# Patient Record
Sex: FEMALE | Race: WHITE | NOT HISPANIC OR LATINO | Employment: UNEMPLOYED | URBAN - METROPOLITAN AREA
[De-identification: names, ages, dates, MRNs, and addresses within clinical notes are randomized per-mention and may not be internally consistent; named-entity substitution may affect disease eponyms.]

---

## 2023-01-01 ENCOUNTER — APPOINTMENT (EMERGENCY)
Dept: CT IMAGING | Facility: HOSPITAL | Age: 78
End: 2023-01-01
Payer: MEDICARE

## 2023-01-01 ENCOUNTER — APPOINTMENT (INPATIENT)
Dept: RADIOLOGY | Facility: HOSPITAL | Age: 78
End: 2023-01-01
Payer: MEDICARE

## 2023-01-01 ENCOUNTER — HOSPITAL ENCOUNTER (INPATIENT)
Facility: HOSPITAL | Age: 78
LOS: 1 days | End: 2023-08-25
Attending: EMERGENCY MEDICINE | Admitting: INTERNAL MEDICINE
Payer: MEDICARE

## 2023-01-01 VITALS
HEIGHT: 59 IN | BODY MASS INDEX: 20.67 KG/M2 | DIASTOLIC BLOOD PRESSURE: 51 MMHG | TEMPERATURE: 98.1 F | OXYGEN SATURATION: 82 % | SYSTOLIC BLOOD PRESSURE: 93 MMHG | RESPIRATION RATE: 3 BRPM | WEIGHT: 102.51 LBS

## 2023-01-01 DIAGNOSIS — L02.91 PHLEGMON: ICD-10-CM

## 2023-01-01 DIAGNOSIS — R77.8 ELEVATED TROPONIN: ICD-10-CM

## 2023-01-01 DIAGNOSIS — K55.1 MESENTERIC ARTERY STENOSIS (HCC): Primary | ICD-10-CM

## 2023-01-01 DIAGNOSIS — A41.9 SEPTIC SHOCK (HCC): ICD-10-CM

## 2023-01-01 DIAGNOSIS — R77.8 TROPONIN I ABOVE REFERENCE RANGE: Primary | ICD-10-CM

## 2023-01-01 DIAGNOSIS — K85.90 PANCREATITIS: ICD-10-CM

## 2023-01-01 DIAGNOSIS — R65.21 SEPTIC SHOCK (HCC): ICD-10-CM

## 2023-01-01 DIAGNOSIS — R93.2 ABNORMAL LIVER DIAGNOSTIC IMAGING: Primary | ICD-10-CM

## 2023-01-01 LAB
2HR DELTA HS TROPONIN: 279 NG/L
4HR DELTA HS TROPONIN: 300 NG/L
ABO GROUP BLD: NORMAL
ALBUMIN SERPL BCP-MCNC: 2 G/DL (ref 3.5–5)
ALBUMIN SERPL BCP-MCNC: 2.1 G/DL (ref 3.5–5)
ALP SERPL-CCNC: 236 U/L (ref 34–104)
ALP SERPL-CCNC: 286 U/L (ref 34–104)
ALT SERPL W P-5'-P-CCNC: 12 U/L (ref 7–52)
ALT SERPL W P-5'-P-CCNC: 13 U/L (ref 7–52)
AMMONIA PLAS-SCNC: 28 UMOL/L (ref 18–72)
ANION GAP SERPL CALCULATED.3IONS-SCNC: 10 MMOL/L
ANION GAP SERPL CALCULATED.3IONS-SCNC: 14 MMOL/L
APTT PPP: 52 SECONDS (ref 23–37)
AST SERPL W P-5'-P-CCNC: 39 U/L (ref 13–39)
AST SERPL W P-5'-P-CCNC: 45 U/L (ref 13–39)
ATRIAL RATE: 83 BPM
BASE EX.OXY STD BLDV CALC-SCNC: 63.9 % (ref 60–80)
BASE EXCESS BLDV CALC-SCNC: -5.4 MMOL/L
BASOPHILS # BLD AUTO: 0.05 THOUSANDS/ÂΜL (ref 0–0.1)
BASOPHILS # BLD AUTO: 0.05 THOUSANDS/ÂΜL (ref 0–0.1)
BASOPHILS NFR BLD AUTO: 0 % (ref 0–1)
BASOPHILS NFR BLD AUTO: 0 % (ref 0–1)
BILIRUB SERPL-MCNC: 1.59 MG/DL (ref 0.2–1)
BILIRUB SERPL-MCNC: 1.83 MG/DL (ref 0.2–1)
BILIRUB UR QL STRIP: NEGATIVE
BLD GP AB SCN SERPL QL: NEGATIVE
BUN SERPL-MCNC: 43 MG/DL (ref 5–25)
BUN SERPL-MCNC: 44 MG/DL (ref 5–25)
CA-I BLD-SCNC: 0.97 MMOL/L (ref 1.12–1.32)
CALCIUM ALBUM COR SERPL-MCNC: 8.9 MG/DL (ref 8.3–10.1)
CALCIUM ALBUM COR SERPL-MCNC: 9.1 MG/DL (ref 8.3–10.1)
CALCIUM SERPL-MCNC: 7.4 MG/DL (ref 8.4–10.2)
CALCIUM SERPL-MCNC: 7.5 MG/DL (ref 8.4–10.2)
CARDIAC TROPONIN I PNL SERPL HS: 1230 NG/L
CARDIAC TROPONIN I PNL SERPL HS: 1251 NG/L
CARDIAC TROPONIN I PNL SERPL HS: 951 NG/L
CHLORIDE SERPL-SCNC: 101 MMOL/L (ref 96–108)
CHLORIDE SERPL-SCNC: 99 MMOL/L (ref 96–108)
CLARITY UR: CLEAR
CO2 SERPL-SCNC: 16 MMOL/L (ref 21–32)
CO2 SERPL-SCNC: 21 MMOL/L (ref 21–32)
COLOR UR: YELLOW
CREAT SERPL-MCNC: 2.61 MG/DL (ref 0.6–1.3)
CREAT SERPL-MCNC: 2.67 MG/DL (ref 0.6–1.3)
EOSINOPHIL # BLD AUTO: 0.02 THOUSAND/ÂΜL (ref 0–0.61)
EOSINOPHIL # BLD AUTO: 0.04 THOUSAND/ÂΜL (ref 0–0.61)
EOSINOPHIL NFR BLD AUTO: 0 % (ref 0–6)
EOSINOPHIL NFR BLD AUTO: 0 % (ref 0–6)
ERYTHROCYTE [DISTWIDTH] IN BLOOD BY AUTOMATED COUNT: 14.4 % (ref 11.6–15.1)
ERYTHROCYTE [DISTWIDTH] IN BLOOD BY AUTOMATED COUNT: 14.6 % (ref 11.6–15.1)
GFR SERPL CREATININE-BSD FRML MDRD: 16 ML/MIN/1.73SQ M
GFR SERPL CREATININE-BSD FRML MDRD: 17 ML/MIN/1.73SQ M
GLUCOSE SERPL-MCNC: 125 MG/DL (ref 65–140)
GLUCOSE SERPL-MCNC: 31 MG/DL (ref 65–140)
GLUCOSE SERPL-MCNC: 57 MG/DL (ref 65–140)
GLUCOSE SERPL-MCNC: 76 MG/DL (ref 65–140)
GLUCOSE UR STRIP-MCNC: NEGATIVE MG/DL
HCO3 BLDV-SCNC: 19.2 MMOL/L (ref 24–30)
HCT VFR BLD AUTO: 24.6 % (ref 34.8–46.1)
HCT VFR BLD AUTO: 28.2 % (ref 34.8–46.1)
HGB BLD-MCNC: 7.9 G/DL (ref 11.5–15.4)
HGB BLD-MCNC: 9.5 G/DL (ref 11.5–15.4)
HGB UR QL STRIP.AUTO: NEGATIVE
IMM GRANULOCYTES # BLD AUTO: 0.28 THOUSAND/UL (ref 0–0.2)
IMM GRANULOCYTES # BLD AUTO: 0.4 THOUSAND/UL (ref 0–0.2)
IMM GRANULOCYTES NFR BLD AUTO: 1 % (ref 0–2)
IMM GRANULOCYTES NFR BLD AUTO: 1 % (ref 0–2)
INR PPP: 2.11 (ref 0.84–1.19)
INR PPP: 2.3 (ref 0.84–1.19)
KETONES UR STRIP-MCNC: NEGATIVE MG/DL
LACTATE SERPL-SCNC: 1.6 MMOL/L (ref 0.5–2)
LACTATE SERPL-SCNC: 3.1 MMOL/L (ref 0.5–2)
LEUKOCYTE ESTERASE UR QL STRIP: NEGATIVE
LIPASE SERPL-CCNC: 24 U/L (ref 11–82)
LIPASE SERPL-CCNC: 61 U/L (ref 11–82)
LYMPHOCYTES # BLD AUTO: 1.18 THOUSANDS/ÂΜL (ref 0.6–4.47)
LYMPHOCYTES # BLD AUTO: 1.22 THOUSANDS/ÂΜL (ref 0.6–4.47)
LYMPHOCYTES NFR BLD AUTO: 4 % (ref 14–44)
LYMPHOCYTES NFR BLD AUTO: 5 % (ref 14–44)
MAGNESIUM SERPL-MCNC: 1.6 MG/DL (ref 1.9–2.7)
MCH RBC QN AUTO: 32.6 PG (ref 26.8–34.3)
MCH RBC QN AUTO: 32.6 PG (ref 26.8–34.3)
MCHC RBC AUTO-ENTMCNC: 32.1 G/DL (ref 31.4–37.4)
MCHC RBC AUTO-ENTMCNC: 33.7 G/DL (ref 31.4–37.4)
MCV RBC AUTO: 102 FL (ref 82–98)
MCV RBC AUTO: 97 FL (ref 82–98)
MONOCYTES # BLD AUTO: 2.44 THOUSAND/ÂΜL (ref 0.17–1.22)
MONOCYTES # BLD AUTO: 2.62 THOUSAND/ÂΜL (ref 0.17–1.22)
MONOCYTES NFR BLD AUTO: 9 % (ref 4–12)
MONOCYTES NFR BLD AUTO: 9 % (ref 4–12)
NEUTROPHILS # BLD AUTO: 21.89 THOUSANDS/ÂΜL (ref 1.85–7.62)
NEUTROPHILS # BLD AUTO: 23.59 THOUSANDS/ÂΜL (ref 1.85–7.62)
NEUTS SEG NFR BLD AUTO: 85 % (ref 43–75)
NEUTS SEG NFR BLD AUTO: 86 % (ref 43–75)
NITRITE UR QL STRIP: NEGATIVE
NRBC BLD AUTO-RTO: 0 /100 WBCS
NRBC BLD AUTO-RTO: 0 /100 WBCS
O2 CT BLDV-SCNC: 7.3 ML/DL
P AXIS: 53 DEGREES
PCO2 BLDV: 33.8 MM HG (ref 42–50)
PH BLDV: 7.37 [PH] (ref 7.3–7.4)
PH UR STRIP.AUTO: 5.5 [PH]
PHOSPHATE SERPL-MCNC: 4.9 MG/DL (ref 2.3–4.1)
PLATELET # BLD AUTO: 105 THOUSANDS/UL (ref 149–390)
PLATELET # BLD AUTO: 109 THOUSANDS/UL (ref 149–390)
PLATELET # BLD AUTO: 136 THOUSANDS/UL (ref 149–390)
PMV BLD AUTO: 10.1 FL (ref 8.9–12.7)
PMV BLD AUTO: 10.2 FL (ref 8.9–12.7)
PMV BLD AUTO: 10.5 FL (ref 8.9–12.7)
PO2 BLDV: 36.2 MM HG (ref 35–45)
POTASSIUM SERPL-SCNC: 4.4 MMOL/L (ref 3.5–5.3)
POTASSIUM SERPL-SCNC: 4.9 MMOL/L (ref 3.5–5.3)
PR INTERVAL: 156 MS
PROT SERPL-MCNC: 4.7 G/DL (ref 6.4–8.4)
PROT SERPL-MCNC: 5.3 G/DL (ref 6.4–8.4)
PROT UR STRIP-MCNC: NEGATIVE MG/DL
PROTHROMBIN TIME: 23.6 SECONDS (ref 11.6–14.5)
PROTHROMBIN TIME: 25.2 SECONDS (ref 11.6–14.5)
QRS AXIS: 11 DEGREES
QRSD INTERVAL: 84 MS
QT INTERVAL: 388 MS
QTC INTERVAL: 455 MS
RBC # BLD AUTO: 2.42 MILLION/UL (ref 3.81–5.12)
RBC # BLD AUTO: 2.91 MILLION/UL (ref 3.81–5.12)
RH BLD: POSITIVE
SODIUM SERPL-SCNC: 130 MMOL/L (ref 135–147)
SODIUM SERPL-SCNC: 131 MMOL/L (ref 135–147)
SP GR UR STRIP.AUTO: 1.01
SPECIMEN EXPIRATION DATE: NORMAL
T WAVE AXIS: 82 DEGREES
UROBILINOGEN UR QL STRIP.AUTO: 0.2 E.U./DL
VENTRICULAR RATE: 83 BPM
WBC # BLD AUTO: 25.86 THOUSAND/UL (ref 4.31–10.16)
WBC # BLD AUTO: 27.92 THOUSAND/UL (ref 4.31–10.16)

## 2023-01-01 PROCEDURE — 82948 REAGENT STRIP/BLOOD GLUCOSE: CPT

## 2023-01-01 PROCEDURE — NC001 PR NO CHARGE: Performed by: NURSE PRACTITIONER

## 2023-01-01 PROCEDURE — 96365 THER/PROPH/DIAG IV INF INIT: CPT

## 2023-01-01 PROCEDURE — 82330 ASSAY OF CALCIUM: CPT

## 2023-01-01 PROCEDURE — 36415 COLL VENOUS BLD VENIPUNCTURE: CPT | Performed by: EMERGENCY MEDICINE

## 2023-01-01 PROCEDURE — 84100 ASSAY OF PHOSPHORUS: CPT

## 2023-01-01 PROCEDURE — 99292 CRITICAL CARE ADDL 30 MIN: CPT

## 2023-01-01 PROCEDURE — 83690 ASSAY OF LIPASE: CPT | Performed by: HOSPITALIST

## 2023-01-01 PROCEDURE — 74176 CT ABD & PELVIS W/O CONTRAST: CPT

## 2023-01-01 PROCEDURE — 84484 ASSAY OF TROPONIN QUANT: CPT | Performed by: EMERGENCY MEDICINE

## 2023-01-01 PROCEDURE — 85049 AUTOMATED PLATELET COUNT: CPT | Performed by: HOSPITALIST

## 2023-01-01 PROCEDURE — 83605 ASSAY OF LACTIC ACID: CPT | Performed by: NURSE PRACTITIONER

## 2023-01-01 PROCEDURE — 99285 EMERGENCY DEPT VISIT HI MDM: CPT

## 2023-01-01 PROCEDURE — 83735 ASSAY OF MAGNESIUM: CPT

## 2023-01-01 PROCEDURE — 80053 COMPREHEN METABOLIC PANEL: CPT | Performed by: EMERGENCY MEDICINE

## 2023-01-01 PROCEDURE — 85025 COMPLETE CBC W/AUTO DIFF WBC: CPT | Performed by: EMERGENCY MEDICINE

## 2023-01-01 PROCEDURE — 86850 RBC ANTIBODY SCREEN: CPT | Performed by: EMERGENCY MEDICINE

## 2023-01-01 PROCEDURE — 36556 INSERT NON-TUNNEL CV CATH: CPT

## 2023-01-01 PROCEDURE — 94760 N-INVAS EAR/PLS OXIMETRY 1: CPT

## 2023-01-01 PROCEDURE — 99223 1ST HOSP IP/OBS HIGH 75: CPT

## 2023-01-01 PROCEDURE — 96361 HYDRATE IV INFUSION ADD-ON: CPT

## 2023-01-01 PROCEDURE — 82140 ASSAY OF AMMONIA: CPT | Performed by: EMERGENCY MEDICINE

## 2023-01-01 PROCEDURE — 87147 CULTURE TYPE IMMUNOLOGIC: CPT | Performed by: HOSPITALIST

## 2023-01-01 PROCEDURE — 82805 BLOOD GASES W/O2 SATURATION: CPT

## 2023-01-01 PROCEDURE — 85025 COMPLETE CBC W/AUTO DIFF WBC: CPT | Performed by: HOSPITALIST

## 2023-01-01 PROCEDURE — 85610 PROTHROMBIN TIME: CPT

## 2023-01-01 PROCEDURE — 81003 URINALYSIS AUTO W/O SCOPE: CPT | Performed by: EMERGENCY MEDICINE

## 2023-01-01 PROCEDURE — 76937 US GUIDE VASCULAR ACCESS: CPT

## 2023-01-01 PROCEDURE — 83690 ASSAY OF LIPASE: CPT | Performed by: EMERGENCY MEDICINE

## 2023-01-01 PROCEDURE — 80053 COMPREHEN METABOLIC PANEL: CPT | Performed by: HOSPITALIST

## 2023-01-01 PROCEDURE — 02HV33Z INSERTION OF INFUSION DEVICE INTO SUPERIOR VENA CAVA, PERCUTANEOUS APPROACH: ICD-10-PCS | Performed by: INTERNAL MEDICINE

## 2023-01-01 PROCEDURE — 96375 TX/PRO/DX INJ NEW DRUG ADDON: CPT

## 2023-01-01 PROCEDURE — 87081 CULTURE SCREEN ONLY: CPT | Performed by: HOSPITALIST

## 2023-01-01 PROCEDURE — 99222 1ST HOSP IP/OBS MODERATE 55: CPT | Performed by: INTERNAL MEDICINE

## 2023-01-01 PROCEDURE — NC001 PR NO CHARGE

## 2023-01-01 PROCEDURE — 87040 BLOOD CULTURE FOR BACTERIA: CPT | Performed by: EMERGENCY MEDICINE

## 2023-01-01 PROCEDURE — 83605 ASSAY OF LACTIC ACID: CPT | Performed by: EMERGENCY MEDICINE

## 2023-01-01 PROCEDURE — 85610 PROTHROMBIN TIME: CPT | Performed by: EMERGENCY MEDICINE

## 2023-01-01 PROCEDURE — 99291 CRITICAL CARE FIRST HOUR: CPT

## 2023-01-01 PROCEDURE — 86901 BLOOD TYPING SEROLOGIC RH(D): CPT | Performed by: EMERGENCY MEDICINE

## 2023-01-01 PROCEDURE — 99223 1ST HOSP IP/OBS HIGH 75: CPT | Performed by: HOSPITALIST

## 2023-01-01 PROCEDURE — 86900 BLOOD TYPING SEROLOGIC ABO: CPT | Performed by: EMERGENCY MEDICINE

## 2023-01-01 PROCEDURE — 99291 CRITICAL CARE FIRST HOUR: CPT | Performed by: EMERGENCY MEDICINE

## 2023-01-01 PROCEDURE — C9113 INJ PANTOPRAZOLE SODIUM, VIA: HCPCS | Performed by: EMERGENCY MEDICINE

## 2023-01-01 PROCEDURE — 99233 SBSQ HOSP IP/OBS HIGH 50: CPT | Performed by: PHYSICIAN ASSISTANT

## 2023-01-01 PROCEDURE — 93010 ELECTROCARDIOGRAM REPORT: CPT | Performed by: INTERNAL MEDICINE

## 2023-01-01 PROCEDURE — 85730 THROMBOPLASTIN TIME PARTIAL: CPT | Performed by: EMERGENCY MEDICINE

## 2023-01-01 PROCEDURE — 93005 ELECTROCARDIOGRAM TRACING: CPT

## 2023-01-01 PROCEDURE — 71045 X-RAY EXAM CHEST 1 VIEW: CPT

## 2023-01-01 RX ORDER — METOPROLOL SUCCINATE 25 MG/1
25 TABLET, EXTENDED RELEASE ORAL 2 TIMES DAILY
Status: DISCONTINUED | OUTPATIENT
Start: 2023-01-01 | End: 2023-01-01

## 2023-01-01 RX ORDER — LEVOTHYROXINE SODIUM 0.07 MG/1
75 TABLET ORAL DAILY
Status: DISCONTINUED | OUTPATIENT
Start: 2023-01-01 | End: 2023-01-01

## 2023-01-01 RX ORDER — DEXTROSE MONOHYDRATE 25 G/50ML
50 INJECTION, SOLUTION INTRAVENOUS ONCE
Status: COMPLETED | OUTPATIENT
Start: 2023-01-01 | End: 2023-01-01

## 2023-01-01 RX ORDER — SODIUM CHLORIDE, SODIUM GLUCONATE, SODIUM ACETATE, POTASSIUM CHLORIDE, MAGNESIUM CHLORIDE, SODIUM PHOSPHATE, DIBASIC, AND POTASSIUM PHOSPHATE .53; .5; .37; .037; .03; .012; .00082 G/100ML; G/100ML; G/100ML; G/100ML; G/100ML; G/100ML; G/100ML
1000 INJECTION, SOLUTION INTRAVENOUS ONCE
Status: COMPLETED | OUTPATIENT
Start: 2023-01-01 | End: 2023-01-01

## 2023-01-01 RX ORDER — DEXTROSE MONOHYDRATE 25 G/50ML
INJECTION, SOLUTION INTRAVENOUS
Status: COMPLETED
Start: 2023-01-01 | End: 2023-01-01

## 2023-01-01 RX ORDER — MINERAL OIL AND PETROLATUM 150; 830 MG/G; MG/G
1 OINTMENT OPHTHALMIC 2 TIMES DAILY
Status: DISCONTINUED | OUTPATIENT
Start: 2023-01-01 | End: 2023-01-01 | Stop reason: HOSPADM

## 2023-01-01 RX ORDER — GLYCOPYRROLATE 0.2 MG/ML
0.1 INJECTION INTRAMUSCULAR; INTRAVENOUS EVERY 4 HOURS PRN
Status: DISCONTINUED | OUTPATIENT
Start: 2023-01-01 | End: 2023-01-01 | Stop reason: HOSPADM

## 2023-01-01 RX ORDER — ENOXAPARIN SODIUM 100 MG/ML
40 INJECTION SUBCUTANEOUS DAILY
Status: CANCELLED | OUTPATIENT
Start: 2023-01-01

## 2023-01-01 RX ORDER — PANTOPRAZOLE SODIUM 40 MG/1
40 TABLET, DELAYED RELEASE ORAL
Status: DISCONTINUED | OUTPATIENT
Start: 2023-01-01 | End: 2023-01-01

## 2023-01-01 RX ORDER — HYDROMORPHONE HCL/PF 1 MG/ML
0.5 SYRINGE (ML) INJECTION
Status: DISCONTINUED | OUTPATIENT
Start: 2023-01-01 | End: 2023-01-01

## 2023-01-01 RX ORDER — HALOPERIDOL 5 MG/ML
0.5 INJECTION INTRAMUSCULAR EVERY 2 HOUR PRN
Status: DISCONTINUED | OUTPATIENT
Start: 2023-01-01 | End: 2023-01-01 | Stop reason: HOSPADM

## 2023-01-01 RX ORDER — LORAZEPAM 2 MG/ML
1 INJECTION INTRAMUSCULAR
Status: DISCONTINUED | OUTPATIENT
Start: 2023-01-01 | End: 2023-01-01

## 2023-01-01 RX ORDER — SODIUM CHLORIDE, SODIUM GLUCONATE, SODIUM ACETATE, POTASSIUM CHLORIDE, MAGNESIUM CHLORIDE, SODIUM PHOSPHATE, DIBASIC, AND POTASSIUM PHOSPHATE .53; .5; .37; .037; .03; .012; .00082 G/100ML; G/100ML; G/100ML; G/100ML; G/100ML; G/100ML; G/100ML
500 INJECTION, SOLUTION INTRAVENOUS ONCE
Status: COMPLETED | OUTPATIENT
Start: 2023-01-01 | End: 2023-01-01

## 2023-01-01 RX ORDER — ONDANSETRON 2 MG/ML
4 INJECTION INTRAMUSCULAR; INTRAVENOUS EVERY 6 HOURS PRN
Status: DISCONTINUED | OUTPATIENT
Start: 2023-01-01 | End: 2023-01-01 | Stop reason: HOSPADM

## 2023-01-01 RX ORDER — SODIUM CHLORIDE, SODIUM LACTATE, POTASSIUM CHLORIDE, CALCIUM CHLORIDE 600; 310; 30; 20 MG/100ML; MG/100ML; MG/100ML; MG/100ML
125 INJECTION, SOLUTION INTRAVENOUS CONTINUOUS
Status: DISCONTINUED | OUTPATIENT
Start: 2023-01-01 | End: 2023-01-01 | Stop reason: HOSPADM

## 2023-01-01 RX ORDER — LORAZEPAM 2 MG/ML
1 INJECTION INTRAMUSCULAR
Status: DISCONTINUED | OUTPATIENT
Start: 2023-01-01 | End: 2023-01-01 | Stop reason: HOSPADM

## 2023-01-01 RX ORDER — ACETAMINOPHEN 325 MG/1
650 TABLET ORAL EVERY 6 HOURS PRN
Status: DISCONTINUED | OUTPATIENT
Start: 2023-01-01 | End: 2023-01-01 | Stop reason: HOSPADM

## 2023-01-01 RX ORDER — ASPIRIN 81 MG/1
324 TABLET, CHEWABLE ORAL ONCE
Status: DISCONTINUED | OUTPATIENT
Start: 2023-01-01 | End: 2023-01-01 | Stop reason: HOSPADM

## 2023-01-01 RX ORDER — ALBUMIN (HUMAN) 12.5 G/50ML
12.5 SOLUTION INTRAVENOUS ONCE
Status: COMPLETED | OUTPATIENT
Start: 2023-01-01 | End: 2023-01-01

## 2023-01-01 RX ORDER — POTASSIUM CHLORIDE 20 MEQ/1
20 TABLET, EXTENDED RELEASE ORAL DAILY
Status: DISCONTINUED | OUTPATIENT
Start: 2023-01-01 | End: 2023-01-01

## 2023-01-01 RX ORDER — ALBUMIN, HUMAN INJ 5% 5 %
12.5 SOLUTION INTRAVENOUS ONCE
Status: COMPLETED | OUTPATIENT
Start: 2023-01-01 | End: 2023-01-01

## 2023-01-01 RX ORDER — PHYTONADIONE 10 MG/ML
10 INJECTION, EMULSION INTRAMUSCULAR; INTRAVENOUS; SUBCUTANEOUS ONCE
Status: COMPLETED | OUTPATIENT
Start: 2023-01-01 | End: 2023-01-01

## 2023-01-01 RX ORDER — MAGNESIUM SULFATE HEPTAHYDRATE 40 MG/ML
2 INJECTION, SOLUTION INTRAVENOUS ONCE
Status: COMPLETED | OUTPATIENT
Start: 2023-01-01 | End: 2023-01-01

## 2023-01-01 RX ORDER — TRAZODONE HYDROCHLORIDE 100 MG/1
100 TABLET ORAL
Status: DISCONTINUED | OUTPATIENT
Start: 2023-01-01 | End: 2023-01-01

## 2023-01-01 RX ORDER — HYDROMORPHONE HCL/PF 1 MG/ML
0.3 SYRINGE (ML) INJECTION EVERY 2 HOUR PRN
Status: DISCONTINUED | OUTPATIENT
Start: 2023-01-01 | End: 2023-01-01 | Stop reason: HOSPADM

## 2023-01-01 RX ORDER — PANTOPRAZOLE SODIUM 40 MG/10ML
40 INJECTION, POWDER, LYOPHILIZED, FOR SOLUTION INTRAVENOUS ONCE
Status: COMPLETED | OUTPATIENT
Start: 2023-01-01 | End: 2023-01-01

## 2023-01-01 RX ORDER — DEXTROSE MONOHYDRATE 25 G/50ML
25 INJECTION, SOLUTION INTRAVENOUS ONCE
Status: COMPLETED | OUTPATIENT
Start: 2023-01-01 | End: 2023-01-01

## 2023-01-01 RX ADMIN — HYDROMORPHONE HYDROCHLORIDE 0.3 MG: 1 INJECTION, SOLUTION INTRAMUSCULAR; INTRAVENOUS; SUBCUTANEOUS at 16:49

## 2023-01-01 RX ADMIN — HYDROMORPHONE HYDROCHLORIDE 0.3 MG: 1 INJECTION, SOLUTION INTRAMUSCULAR; INTRAVENOUS; SUBCUTANEOUS at 13:50

## 2023-01-01 RX ADMIN — NOREPINEPHRINE BITARTRATE 6 MCG/MIN: 1 SOLUTION INTRAVENOUS at 07:55

## 2023-01-01 RX ADMIN — GLYCOPYRROLATE 0.1 MG: 0.2 INJECTION, SOLUTION INTRAMUSCULAR; INTRAVENOUS at 13:50

## 2023-01-01 RX ADMIN — ALBUMIN (HUMAN) 12.5 G: 0.25 INJECTION, SOLUTION INTRAVENOUS at 04:21

## 2023-01-01 RX ADMIN — PIPERACILLIN AND TAZOBACTAM 2.25 G: 2; .25 INJECTION, POWDER, FOR SOLUTION INTRAVENOUS at 02:56

## 2023-01-01 RX ADMIN — ALBUMIN (HUMAN) 12.5 G: 12.5 INJECTION, SOLUTION INTRAVENOUS at 02:14

## 2023-01-01 RX ADMIN — SODIUM CHLORIDE 1000 ML: 0.9 INJECTION, SOLUTION INTRAVENOUS at 10:30

## 2023-01-01 RX ADMIN — NOREPINEPHRINE BITARTRATE 4000 MCG: 1 SOLUTION INTRAVENOUS at 06:33

## 2023-01-01 RX ADMIN — SODIUM CHLORIDE, SODIUM LACTATE, POTASSIUM CHLORIDE, AND CALCIUM CHLORIDE 125 ML/HR: .6; .31; .03; .02 INJECTION, SOLUTION INTRAVENOUS at 17:22

## 2023-01-01 RX ADMIN — SODIUM CHLORIDE, SODIUM GLUCONATE, SODIUM ACETATE, POTASSIUM CHLORIDE, MAGNESIUM CHLORIDE, SODIUM PHOSPHATE, DIBASIC, AND POTASSIUM PHOSPHATE 1000 ML: .53; .5; .37; .037; .03; .012; .00082 INJECTION, SOLUTION INTRAVENOUS at 02:56

## 2023-01-01 RX ADMIN — PIPERACILLIN AND TAZOBACTAM 2.25 G: 2; .25 INJECTION, POWDER, FOR SOLUTION INTRAVENOUS at 22:28

## 2023-01-01 RX ADMIN — HYDROMORPHONE HYDROCHLORIDE 0.3 MG: 1 INJECTION, SOLUTION INTRAMUSCULAR; INTRAVENOUS; SUBCUTANEOUS at 11:39

## 2023-01-01 RX ADMIN — DEXTROSE MONOHYDRATE 50 ML: 25 INJECTION, SOLUTION INTRAVENOUS at 05:39

## 2023-01-01 RX ADMIN — SODIUM CHLORIDE, SODIUM GLUCONATE, SODIUM ACETATE, POTASSIUM CHLORIDE, MAGNESIUM CHLORIDE, SODIUM PHOSPHATE, DIBASIC, AND POTASSIUM PHOSPHATE 500 ML: .53; .5; .37; .037; .03; .012; .00082 INJECTION, SOLUTION INTRAVENOUS at 08:30

## 2023-01-01 RX ADMIN — SODIUM CHLORIDE, SODIUM GLUCONATE, SODIUM ACETATE, POTASSIUM CHLORIDE, MAGNESIUM CHLORIDE, SODIUM PHOSPHATE, DIBASIC, AND POTASSIUM PHOSPHATE 500 ML: .53; .5; .37; .037; .03; .012; .00082 INJECTION, SOLUTION INTRAVENOUS at 10:24

## 2023-01-01 RX ADMIN — SODIUM CHLORIDE, SODIUM LACTATE, POTASSIUM CHLORIDE, AND CALCIUM CHLORIDE 125 ML/HR: .6; .31; .03; .02 INJECTION, SOLUTION INTRAVENOUS at 01:49

## 2023-01-01 RX ADMIN — LORAZEPAM 1 MG: 2 INJECTION INTRAMUSCULAR; INTRAVENOUS at 12:47

## 2023-01-01 RX ADMIN — HYDROMORPHONE HYDROCHLORIDE 0.5 MG: 1 INJECTION, SOLUTION INTRAMUSCULAR; INTRAVENOUS; SUBCUTANEOUS at 17:24

## 2023-01-01 RX ADMIN — DEXTROSE MONOHYDRATE 25 ML: 25 INJECTION, SOLUTION INTRAVENOUS at 05:45

## 2023-01-01 RX ADMIN — PANTOPRAZOLE SODIUM 40 MG: 40 INJECTION, POWDER, FOR SOLUTION INTRAVENOUS at 11:22

## 2023-01-01 RX ADMIN — WHITE PETROLATUM 57.7 %-MINERAL OIL 31.9 % EYE OINTMENT 1 APPLICATION: at 09:58

## 2023-01-01 RX ADMIN — WHITE PETROLATUM 57.7 %-MINERAL OIL 31.9 % EYE OINTMENT 1 APPLICATION: at 22:28

## 2023-01-01 RX ADMIN — PIPERACILLIN AND TAZOBACTAM 3.38 G: 3; .375 INJECTION, POWDER, FOR SOLUTION INTRAVENOUS at 14:41

## 2023-01-01 RX ADMIN — SODIUM CHLORIDE 500 ML: 0.9 INJECTION, SOLUTION INTRAVENOUS at 15:07

## 2023-01-01 RX ADMIN — MAGNESIUM SULFATE HEPTAHYDRATE 2 G: 40 INJECTION, SOLUTION INTRAVENOUS at 09:49

## 2023-01-01 RX ADMIN — SODIUM CHLORIDE, SODIUM GLUCONATE, SODIUM ACETATE, POTASSIUM CHLORIDE, MAGNESIUM CHLORIDE, SODIUM PHOSPHATE, DIBASIC, AND POTASSIUM PHOSPHATE 1000 ML: .53; .5; .37; .037; .03; .012; .00082 INJECTION, SOLUTION INTRAVENOUS at 04:33

## 2023-01-01 RX ADMIN — PIPERACILLIN AND TAZOBACTAM 2.25 G: 2; .25 INJECTION, POWDER, FOR SOLUTION INTRAVENOUS at 09:49

## 2023-01-01 RX ADMIN — PHYTONADIONE 10 MG: 10 INJECTION, EMULSION INTRAMUSCULAR; INTRAVENOUS; SUBCUTANEOUS at 16:24

## 2023-01-01 RX ADMIN — MORPHINE SULFATE 1 MG: 2 INJECTION, SOLUTION INTRAMUSCULAR; INTRAVENOUS at 15:05

## 2023-03-20 ENCOUNTER — OFFICE VISIT (OUTPATIENT)
Dept: GASTROENTEROLOGY | Facility: CLINIC | Age: 78
End: 2023-03-20

## 2023-03-20 VITALS
SYSTOLIC BLOOD PRESSURE: 138 MMHG | BODY MASS INDEX: 22.97 KG/M2 | OXYGEN SATURATION: 97 % | HEART RATE: 59 BPM | WEIGHT: 117 LBS | DIASTOLIC BLOOD PRESSURE: 60 MMHG | HEIGHT: 60 IN

## 2023-03-20 DIAGNOSIS — D50.9 IRON DEFICIENCY ANEMIA, UNSPECIFIED IRON DEFICIENCY ANEMIA TYPE: ICD-10-CM

## 2023-03-20 DIAGNOSIS — R19.7 DIARRHEA, UNSPECIFIED TYPE: Primary | ICD-10-CM

## 2023-03-20 RX ORDER — FERROUS SULFATE 325(65) MG
1 TABLET ORAL 2 TIMES DAILY WITH MEALS
COMMUNITY

## 2023-03-20 RX ORDER — ALUMINUM ZIRCONIUM OCTACHLOROHYDREX GLY 16 G/100G
1 GEL TOPICAL
COMMUNITY

## 2023-03-20 RX ORDER — LEVOTHYROXINE SODIUM 0.07 MG/1
75 TABLET ORAL DAILY
COMMUNITY

## 2023-03-20 RX ORDER — SODIUM CHLORIDE 1000 MG
1 TABLET, SOLUBLE MISCELLANEOUS 2 TIMES DAILY
COMMUNITY
Start: 2022-12-13

## 2023-03-20 RX ORDER — POLYETHYLENE GLYCOL 3350 17 G/17G
238 POWDER, FOR SOLUTION ORAL ONCE
Qty: 238 G | Refills: 0 | Status: SHIPPED | OUTPATIENT
Start: 2023-03-20 | End: 2023-03-20

## 2023-03-20 RX ORDER — METOPROLOL SUCCINATE 25 MG/1
25 TABLET, EXTENDED RELEASE ORAL 2 TIMES DAILY
COMMUNITY

## 2023-03-20 RX ORDER — TRAZODONE HYDROCHLORIDE 50 MG/1
50 TABLET ORAL
COMMUNITY

## 2023-03-20 RX ORDER — BISACODYL 5 MG/1
TABLET, DELAYED RELEASE ORAL
Qty: 4 TABLET | Refills: 0 | Status: SHIPPED | OUTPATIENT
Start: 2023-03-20

## 2023-03-20 RX ORDER — AMLODIPINE BESYLATE 10 MG/1
10 TABLET ORAL DAILY
COMMUNITY

## 2023-03-20 RX ORDER — FUROSEMIDE 20 MG/1
20 TABLET ORAL DAILY
COMMUNITY

## 2023-03-20 RX ORDER — ASPIRIN 81 MG/1
81 TABLET ORAL DAILY
COMMUNITY

## 2023-03-20 RX ORDER — BUPROPION HYDROCHLORIDE 150 MG/1
150 TABLET ORAL DAILY
COMMUNITY

## 2023-03-20 RX ORDER — LOSARTAN POTASSIUM 50 MG/1
50 TABLET ORAL DAILY
COMMUNITY
Start: 2023-02-14 | End: 2024-02-14

## 2023-03-20 RX ORDER — OMEPRAZOLE 40 MG/1
40 CAPSULE, DELAYED RELEASE ORAL DAILY
COMMUNITY

## 2023-03-20 RX ORDER — SENNOSIDES 8.6 MG
CAPSULE ORAL
COMMUNITY

## 2023-03-20 RX ORDER — ASCORBIC ACID 500 MG
500 TABLET ORAL 2 TIMES DAILY
COMMUNITY

## 2023-03-20 RX ORDER — POTASSIUM CHLORIDE 750 MG/1
20 TABLET, FILM COATED, EXTENDED RELEASE ORAL 2 TIMES DAILY
COMMUNITY

## 2023-03-20 RX ORDER — SACCHAROMYCES BOULARDII 250 MG
250 CAPSULE ORAL 2 TIMES DAILY
COMMUNITY

## 2023-03-20 NOTE — PROGRESS NOTES
[facility] - Outpatient Consultation  Lon Avalos 69 y o  female MRN: [id number]  Encounter: [id number]          ASSESSMENT AND PLAN:      1  Diarrhea  2  Iron deficiency anemia    Patient reports struggling with diarrhea multiple times a day x months  She also has a history of an iron deficiency anemia on iron supplementation  Latest HGB from 12/13 was 9 8  She also had a low TSH level at that time  She reports a history of what sounds like mesenteric artery stenosis previously in [place]  Will plan for EGD and colonoscopy with visualization of the terminal ileum and biopsies to investigate  Will check CBC, TSH, CRP, and celiac serology  Will check stool cultures  Will check stool fecal calprotectin and fecal pancreatic elastase  Will check a mesenteric doppler, ultrasound  Follow up after above testing   ______________________________________________________________________    HPI:  Patient is a pleasant 69year old female with a PMH of CAD s/p CABG in 2021, PAF, mild AS, HTN who presents for an evaluation of diarrhea x months  She reports she can have six episodes in a day  She reports some abdominal cramping  She also reports rectal bleeding on the toilet tissue at times  She has a history of a GIB in the past   She also reports she was diagnosed with what sounds like mesenteric artery stenosis previously in [place]  She is anemic and on iron supplementation  She denies any family history of colon cancer  No family history of celiac disease, crohn's disease, or ulcerative colitis  She has tried a probiotic and fiber supplement without much improvement in her diarrhea  REVIEW OF SYSTEMS:    CONSTITUTIONAL: Denies any fever, chills, rigors, and weight loss  HEENT: No earache or tinnitus  Denies hearing loss or visual disturbances  CARDIOVASCULAR: No chest pain or palpitations     RESPIRATORY: Denies any cough, hemoptysis, shortness of breath or dyspnea on exertion  GASTROINTESTINAL: As noted in the History of Present Illness  GENITOURINARY: No problems with urination  Denies any hematuria or dysuria  NEUROLOGIC: No dizziness or vertigo, denies headaches  MUSCULOSKELETAL: Denies any muscle or joint pain  SKIN: Denies skin rashes or itching  ENDOCRINE: Denies excessive thirst  Denies intolerance to heat or cold  PSYCHOSOCIAL: Denies depression or anxiety  Denies any recent memory loss  Historical Information   Past Medical History:   Diagnosis Date   • Anemia    • Atrial fibrillation ([place] [address] )    • Depression    • GI (gastrointestinal bleed)    • Ischemic colitis ([place] [address] )      Past Surgical History:   Procedure Laterality Date   • APPENDECTOMY     • CARDIAC SURGERY     • CHOLECYSTECTOMY     • HIP SURGERY Right     Partial replacement   • HYSTERECTOMY       Social History   Social History     Substance and Sexual Activity   Alcohol Use Not Currently     Social History     Substance and Sexual Activity   Drug Use Never     Social History     Tobacco Use   Smoking Status Former   • Types: Cigarettes   Smokeless Tobacco Never     History reviewed  No pertinent family history      Meds/Allergies       Current Outpatient Medications:   •  acetaminophen (TYLENOL) 650 mg CR tablet  •  amLODIPine (NORVASC) 10 mg tablet  •  ascorbic acid (VITAMIN C) 500 MG tablet  •  aspirin (ECOTRIN LOW STRENGTH) 81 mg EC tablet  •  bisacodyl (DULCOLAX) 5 mg EC tablet  •  buPROPion (WELLBUTRIN XL) 150 mg 24 hr tablet  •  ferrous sulfate 325 (65 Fe) mg tablet  •  furosemide (LASIX) 20 mg tablet  •  levothyroxine 75 mcg tablet  •  losartan (COZAAR) 50 mg tablet  •  metoprolol succinate (TOPROL-XL) 25 mg 24 hr tablet  •  omeprazole (PriLOSEC) 40 MG capsule  •  polyethylene glycol (GLYCOLAX) 17 GM/SCOOP powder  •  potassium chloride (Klor-Con) 10 mEq tablet  •  psyllium (Metamucil Smooth Texture) 58 6 % powder  •  saccharomyces boulardii (FLORASTOR) 250 mg capsule  •  sodium chloride 1 g tablet  •  traZODone (DESYREL) 50 mg tablet    Allergies   Allergen Reactions   • Ceftaroline GI Intolerance   • Diphenhydramine Hyperactivity   • Influenza Vaccines Other (See Comments)           Objective     Blood pressure 138/60, pulse 59, height 5' (1 524 m), weight 53 1 kg (117 lb), SpO2 97 %  Body mass index is 22 85 kg/m²  PHYSICAL EXAM:      General Appearance:   Alert, cooperative, no distress   HEENT:   Normocephalic, atraumatic, anicteric     Neck:  Supple, symmetrical, trachea midline   Lungs:   Clear to auscultation bilaterally; no rales, rhonchi or wheezing; respirations unlabored    Heart[de-identified]   Regular rate and rhythm; no rub, or gallop  Abdomen:   Soft, non-tender, non-distended; normal bowel sounds; no masses, no organomegaly    Genitalia:   Deferred    Rectal:   Deferred    Extremities:  No cyanosis, clubbing   Pulses:  2+ and symmetric    Skin:  No jaundice, rashes, or lesions    Lymph nodes:  No palpable cervical lymphadenopathy        Lab Results:   No visits with results within 1 Day(s) from this visit  Latest known visit with results is:   No results found for any previous visit  Radiology Results:   No results found

## 2023-03-20 NOTE — PATIENT INSTRUCTIONS
Scheduled date of EGD/colonoscopy (as of today):  Facility to schedule with [facility]  Physician performing EGD/colonoscopy: Hari Malloy  Location of EGD/colonoscopy: [facility]  Desired bowel prep reviewed with patient: Jayce/Nikko  Instructions reviewed with patient by: Gracie RANGEL  Clearances:

## 2023-03-21 ENCOUNTER — TELEPHONE (OUTPATIENT)
Dept: OTHER | Facility: OTHER | Age: 78
End: 2023-03-21

## 2023-03-21 ENCOUNTER — TELEPHONE (OUTPATIENT)
Dept: GASTROENTEROLOGY | Facility: AMBULARY SURGERY CENTER | Age: 78
End: 2023-03-21

## 2023-03-21 DIAGNOSIS — R19.7 DIARRHEA, UNSPECIFIED TYPE: Primary | ICD-10-CM

## 2023-03-21 DIAGNOSIS — D50.9 IRON DEFICIENCY ANEMIA, UNSPECIFIED IRON DEFICIENCY ANEMIA TYPE: ICD-10-CM

## 2023-03-21 NOTE — TELEPHONE ENCOUNTER
Patients GI provider:  REGGIE Ingram    Number to return call: [facility] [phone] OPTION 1    Reason for call: Pt needs to schedule her procedure, please assist    Scheduled procedure/appointment date if applicable:  n/a

## 2023-03-21 NOTE — TELEPHONE ENCOUNTER
Call from [facility] advising patient was ordered a TSH 3rd generation lab which [facility] does not do  She is asking for call back to discuss available TSH testing the lab will do for them

## 2023-04-04 ENCOUNTER — HOSPITAL ENCOUNTER (OUTPATIENT)
Dept: ULTRASOUND IMAGING | Facility: HOSPITAL | Age: 78
Discharge: HOME | End: 2023-04-04

## 2023-04-04 ENCOUNTER — HOSPITAL ENCOUNTER (OUTPATIENT)
Dept: NON INVASIVE DIAGNOSTICS | Facility: HOSPITAL | Age: 78
Discharge: HOME | End: 2023-04-04

## 2023-04-04 DIAGNOSIS — R19.7 DIARRHEA, UNSPECIFIED TYPE: ICD-10-CM

## 2023-04-05 ENCOUNTER — TELEPHONE (OUTPATIENT)
Dept: GASTROENTEROLOGY | Facility: CLINIC | Age: 78
End: 2023-04-05

## 2023-04-05 NOTE — TELEPHONE ENCOUNTER
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

## 2023-04-18 ENCOUNTER — HOSPITAL ENCOUNTER (OUTPATIENT)
Facility: HOSPITAL | Age: 78
Setting detail: OBSERVATION
Discharge: HOME | End: 2023-04-19
Attending: EMERGENCY MEDICINE | Admitting: EMERGENCY MEDICINE

## 2023-04-18 ENCOUNTER — APPOINTMENT (EMERGENCY)
Dept: CT IMAGING | Facility: HOSPITAL | Age: 78
End: 2023-04-18

## 2023-04-18 DIAGNOSIS — E03.9 ACQUIRED HYPOTHYROIDISM: ICD-10-CM

## 2023-04-18 DIAGNOSIS — N17.9 AKI (ACUTE KIDNEY INJURY) (HCC): Primary | ICD-10-CM

## 2023-04-18 DIAGNOSIS — R10.9 ABDOMINAL PAIN: ICD-10-CM

## 2023-04-18 DIAGNOSIS — R19.7 DIARRHEA: ICD-10-CM

## 2023-04-18 DIAGNOSIS — R60.0 LOWER EXTREMITY EDEMA: ICD-10-CM

## 2023-04-18 DIAGNOSIS — I10 PRIMARY HYPERTENSION: ICD-10-CM

## 2023-04-18 PROBLEM — K58.0 IRRITABLE BOWEL SYNDROME WITH DIARRHEA: Status: ACTIVE | Noted: 2023-04-18

## 2023-04-18 PROBLEM — R93.89 ABNORMAL CT SCAN: Status: ACTIVE | Noted: 2023-04-18

## 2023-04-18 LAB
2HR DELTA HS TROPONIN: -1 NG/L
4HR DELTA HS TROPONIN: -2 NG/L
ALBUMIN SERPL BCP-MCNC: 3 G/DL (ref 3.5–5)
ALP SERPL-CCNC: 393 U/L (ref 34–104)
ALT SERPL W P-5'-P-CCNC: 19 U/L (ref 7–52)
ANION GAP SERPL CALCULATED.3IONS-SCNC: 9 MMOL/L (ref 4–13)
ANION GAP SERPL CALCULATED.3IONS-SCNC: 9 MMOL/L (ref 4–13)
AST SERPL W P-5'-P-CCNC: 35 U/L (ref 13–39)
BASOPHILS # BLD AUTO: 0.06 THOUSANDS/ΜL (ref 0–0.1)
BASOPHILS NFR BLD AUTO: 1 % (ref 0–1)
BILIRUB SERPL-MCNC: 1.21 MG/DL (ref 0.2–1)
BILIRUB UR QL STRIP: NEGATIVE
BUN SERPL-MCNC: 26 MG/DL (ref 5–25)
BUN SERPL-MCNC: 27 MG/DL (ref 5–25)
CALCIUM ALBUM COR SERPL-MCNC: 9.6 MG/DL (ref 8.3–10.1)
CALCIUM SERPL-MCNC: 8.1 MG/DL (ref 8.4–10.2)
CALCIUM SERPL-MCNC: 8.8 MG/DL (ref 8.4–10.2)
CARDIAC TROPONIN I PNL SERPL HS: 10 NG/L
CARDIAC TROPONIN I PNL SERPL HS: 8 NG/L
CARDIAC TROPONIN I PNL SERPL HS: 9 NG/L
CHLORIDE SERPL-SCNC: 104 MMOL/L (ref 96–108)
CHLORIDE SERPL-SCNC: 107 MMOL/L (ref 96–108)
CLARITY UR: CLEAR
CO2 SERPL-SCNC: 20 MMOL/L (ref 21–32)
CO2 SERPL-SCNC: 22 MMOL/L (ref 21–32)
COLOR UR: NORMAL
CREAT SERPL-MCNC: 1.39 MG/DL (ref 0.6–1.3)
CREAT SERPL-MCNC: 1.45 MG/DL (ref 0.6–1.3)
EOSINOPHIL # BLD AUTO: 0.34 THOUSAND/ΜL (ref 0–0.61)
EOSINOPHIL NFR BLD AUTO: 4 % (ref 0–6)
ERYTHROCYTE [DISTWIDTH] IN BLOOD BY AUTOMATED COUNT: 15.6 % (ref 11.6–15.1)
GFR SERPL CREATININE-BSD FRML MDRD: 35 ML/MIN/1.73SQ M
GFR SERPL CREATININE-BSD FRML MDRD: 36 ML/MIN/1.73SQ M
GLUCOSE SERPL-MCNC: 115 MG/DL (ref 65–140)
GLUCOSE SERPL-MCNC: 93 MG/DL (ref 65–140)
GLUCOSE UR STRIP-MCNC: NEGATIVE MG/DL
HCT VFR BLD AUTO: 36.7 % (ref 34.8–46.1)
HGB BLD-MCNC: 12.5 G/DL (ref 11.5–15.4)
HGB UR QL STRIP.AUTO: NEGATIVE
IMM GRANULOCYTES # BLD AUTO: 0.02 THOUSAND/UL (ref 0–0.2)
IMM GRANULOCYTES NFR BLD AUTO: 0 % (ref 0–2)
KETONES UR STRIP-MCNC: NEGATIVE MG/DL
LEUKOCYTE ESTERASE UR QL STRIP: NEGATIVE
LIPASE SERPL-CCNC: 104 U/L (ref 11–82)
LYMPHOCYTES # BLD AUTO: 1.86 THOUSANDS/ΜL (ref 0.6–4.47)
LYMPHOCYTES NFR BLD AUTO: 21 % (ref 14–44)
MCH RBC QN AUTO: 34.7 PG (ref 26.8–34.3)
MCHC RBC AUTO-ENTMCNC: 34.1 G/DL (ref 31.4–37.4)
MCV RBC AUTO: 102 FL (ref 82–98)
MONOCYTES # BLD AUTO: 2.07 THOUSAND/ΜL (ref 0.17–1.22)
MONOCYTES NFR BLD AUTO: 23 % (ref 4–12)
NEUTROPHILS # BLD AUTO: 4.63 THOUSANDS/ΜL (ref 1.85–7.62)
NEUTS SEG NFR BLD AUTO: 51 % (ref 43–75)
NITRITE UR QL STRIP: NEGATIVE
NRBC BLD AUTO-RTO: 0 /100 WBCS
PH UR STRIP.AUTO: 6 [PH]
PLATELET # BLD AUTO: 117 THOUSANDS/UL (ref 149–390)
PMV BLD AUTO: 11.6 FL (ref 8.9–12.7)
POTASSIUM SERPL-SCNC: 3.8 MMOL/L (ref 3.5–5.3)
POTASSIUM SERPL-SCNC: 3.8 MMOL/L (ref 3.5–5.3)
PROT SERPL-MCNC: 8 G/DL (ref 6.4–8.4)
PROT UR STRIP-MCNC: NEGATIVE MG/DL
RBC # BLD AUTO: 3.6 MILLION/UL (ref 3.81–5.12)
SODIUM SERPL-SCNC: 135 MMOL/L (ref 135–147)
SODIUM SERPL-SCNC: 136 MMOL/L (ref 135–147)
SP GR UR STRIP.AUTO: 1.01
UROBILINOGEN UR QL STRIP.AUTO: 0.2 E.U./DL
WBC # BLD AUTO: 8.98 THOUSAND/UL (ref 4.31–10.16)

## 2023-04-18 RX ORDER — BUPROPION HYDROCHLORIDE 150 MG/1
150 TABLET ORAL DAILY
Status: DISCONTINUED | OUTPATIENT
Start: 2023-04-19 | End: 2023-04-19 | Stop reason: HOSPADM

## 2023-04-18 RX ORDER — CHOLECALCIFEROL (VITAMIN D3) 125 MCG
6000 CAPSULE ORAL
Status: DISCONTINUED | OUTPATIENT
Start: 2023-04-18 | End: 2023-04-19 | Stop reason: HOSPADM

## 2023-04-18 RX ORDER — AMLODIPINE BESYLATE 10 MG/1
10 TABLET ORAL DAILY
Status: DISCONTINUED | OUTPATIENT
Start: 2023-04-19 | End: 2023-04-19 | Stop reason: HOSPADM

## 2023-04-18 RX ORDER — TRAZODONE HYDROCHLORIDE 50 MG/1
50 TABLET ORAL
Status: DISCONTINUED | OUTPATIENT
Start: 2023-04-18 | End: 2023-04-19 | Stop reason: HOSPADM

## 2023-04-18 RX ORDER — SODIUM CHLORIDE, SODIUM GLUCONATE, SODIUM ACETATE, POTASSIUM CHLORIDE, MAGNESIUM CHLORIDE, SODIUM PHOSPHATE, DIBASIC, AND POTASSIUM PHOSPHATE .53; .5; .37; .037; .03; .012; .00082 G/100ML; G/100ML; G/100ML; G/100ML; G/100ML; G/100ML; G/100ML
75 INJECTION, SOLUTION INTRAVENOUS CONTINUOUS
Status: DISCONTINUED | OUTPATIENT
Start: 2023-04-18 | End: 2023-04-19 | Stop reason: HOSPADM

## 2023-04-18 RX ORDER — HEPARIN SODIUM 5000 [USP'U]/ML
5000 INJECTION, SOLUTION INTRAVENOUS; SUBCUTANEOUS EVERY 8 HOURS SCHEDULED
Status: DISCONTINUED | OUTPATIENT
Start: 2023-04-18 | End: 2023-04-19 | Stop reason: HOSPADM

## 2023-04-18 RX ORDER — METOPROLOL SUCCINATE 25 MG/1
25 TABLET, EXTENDED RELEASE ORAL 2 TIMES DAILY
Status: DISCONTINUED | OUTPATIENT
Start: 2023-04-18 | End: 2023-04-19 | Stop reason: HOSPADM

## 2023-04-18 RX ORDER — ASPIRIN 81 MG/1
81 TABLET ORAL DAILY
Status: DISCONTINUED | OUTPATIENT
Start: 2023-04-19 | End: 2023-04-19 | Stop reason: HOSPADM

## 2023-04-18 RX ORDER — SODIUM CHLORIDE 1 G/1
1 TABLET ORAL 2 TIMES DAILY
Status: DISCONTINUED | OUTPATIENT
Start: 2023-04-18 | End: 2023-04-18

## 2023-04-18 RX ORDER — KETOROLAC TROMETHAMINE 30 MG/ML
15 INJECTION, SOLUTION INTRAMUSCULAR; INTRAVENOUS ONCE
Status: COMPLETED | OUTPATIENT
Start: 2023-04-18 | End: 2023-04-18

## 2023-04-18 RX ORDER — LEVOTHYROXINE SODIUM 0.07 MG/1
75 TABLET ORAL DAILY
Status: DISCONTINUED | OUTPATIENT
Start: 2023-04-19 | End: 2023-04-19 | Stop reason: HOSPADM

## 2023-04-18 RX ORDER — ACETAMINOPHEN 325 MG/1
650 TABLET ORAL EVERY 6 HOURS PRN
Status: DISCONTINUED | OUTPATIENT
Start: 2023-04-18 | End: 2023-04-19 | Stop reason: HOSPADM

## 2023-04-18 RX ORDER — PANTOPRAZOLE SODIUM 40 MG/1
40 TABLET, DELAYED RELEASE ORAL
Status: DISCONTINUED | OUTPATIENT
Start: 2023-04-19 | End: 2023-04-19 | Stop reason: HOSPADM

## 2023-04-18 RX ORDER — SACCHAROMYCES BOULARDII 250 MG
250 CAPSULE ORAL 2 TIMES DAILY
Status: DISCONTINUED | OUTPATIENT
Start: 2023-04-18 | End: 2023-04-19 | Stop reason: HOSPADM

## 2023-04-18 RX ADMIN — Medication 250 MG: at 17:56

## 2023-04-18 RX ADMIN — SODIUM CHLORIDE 1000 ML: 0.9 INJECTION, SOLUTION INTRAVENOUS at 11:24

## 2023-04-18 RX ADMIN — HEPARIN SODIUM 5000 UNITS: 5000 INJECTION INTRAVENOUS; SUBCUTANEOUS at 21:49

## 2023-04-18 RX ADMIN — LACTASE TAB 3000 UNIT 6000 UNITS: 3000 TAB at 17:56

## 2023-04-18 RX ADMIN — KETOROLAC TROMETHAMINE 15 MG: 30 INJECTION, SOLUTION INTRAMUSCULAR at 11:04

## 2023-04-18 RX ADMIN — METOPROLOL SUCCINATE 25 MG: 25 TABLET, EXTENDED RELEASE ORAL at 17:56

## 2023-04-18 RX ADMIN — SODIUM CHLORIDE 1000 ML: 0.9 INJECTION, SOLUTION INTRAVENOUS at 15:39

## 2023-04-18 RX ADMIN — IOHEXOL 80 ML: 350 INJECTION, SOLUTION INTRAVENOUS at 11:42

## 2023-04-18 RX ADMIN — SODIUM CHLORIDE, SODIUM GLUCONATE, SODIUM ACETATE, POTASSIUM CHLORIDE, MAGNESIUM CHLORIDE, SODIUM PHOSPHATE, DIBASIC, AND POTASSIUM PHOSPHATE 75 ML/HR: .53; .5; .37; .037; .03; .012; .00082 INJECTION, SOLUTION INTRAVENOUS at 17:56

## 2023-04-18 NOTE — ASSESSMENT & PLAN NOTE
· With increase frequency of loose non-bloody BMs 8-10 times daily since 12/1/2022  · Presented today with left sided abdominal pain and reports that she has been up all night running to the bathroom  · Has been living at [facility] since November 2022  · The patient claims to have lactose intolerance, IBS-D and cannot tolerate a lot of the food at the personal care home  · CT of abdomen and pelvis (4/18): Mild nonspecific enterocolitis  · Mildly elevated lipase -104 without CT evidence of pancreatitis   · Low suspicion that her diarrhea is due to infectious cause since this has been going on for months  However, will rule out C diff, obtain stool studies     · GI consult   · Dietary consult  · Lactaid pills with meals  · Follow with labs

## 2023-04-18 NOTE — ASSESSMENT & PLAN NOTE
· Baseline creatinine appears to be between 0 7 to 1 mg/dL  · Presented with creatinine of 1 45 milligram per deciliter  · ALEJANDRA is likely due to volume depletion with multiple episodes of diarrhea while on furosemide  · Received IV fluid boluses in the ED  · We will continue with gentle IV fluid  · Hold furosemide, losartan  · Avoid hypotension and nephrotoxins  · Check PVR  · Trend BMP

## 2023-04-18 NOTE — ASSESSMENT & PLAN NOTE
· With increase frequency of loose non-bloody BMs 8-10 times daily since 12/1/2022  · Presented today with left sided abdominal pain and reports that she has been up all night running to the bathroom  · Has been living at [facility] since November 2022  · The patient claims to have lactose intolerance, IBS-D and cannot tolerate a lot of the food at the personal care home  · CT of abdomen and pelvis (4/18): Mild nonspecific enterocolitis  · Mildly elevated lipase -104 without CT or clinical evidence of pancreatitis  · The patient is afebrile, no leukocytosis  · Interestingly diarrhea has resolved since hospitalization   · Low suspicion that her diarrhea is due to infectious cause since this has been going on for months  · C diff and stool enteric panel- negative, O&P- pending   · GI input appreciated   · Follow-up with outpatient GI  He has EGD and colonoscopy scheduled for 5/18/2023  · Follow-up with celiac panel and quantitative immunoglobulins  Patient will need pancreatic elastase fecal and fecal calprotectin as an outpatient (no further bowel movement)

## 2023-04-18 NOTE — H&P
[facility]  H&P  Name: Jakob Castro 69 y o  female I MRN: [id number]  Unit/Bed#: -01 I Date of Admission: 4/18/2023   Date of Service: 4/18/2023 I Hospital Day: 0      Assessment/Plan   * ALEJANDRA (acute kidney injury) ([facility] [address] )  Assessment & Plan  · Baseline creatinine appears to be between 0 7 to 1 mg/dL  · Presented with creatinine of 1 45 milligram per deciliter  · ALEJANDRA is likely due to volume depletion with multiple episodes of diarrhea while on furosemide  · Received IV fluid boluses in the ED  · We will continue with gentle IV fluid  · Hold furosemide, losartan  · Avoid hypotension and nephrotoxins  · Check PVR  · Trend BMP    Diarrhea of presumed infectious origin  Assessment & Plan  · With increase frequency of loose non-bloody BMs 8-10 times daily since 12/1/2022  · Presented today with left sided abdominal pain and reports that she has been up all night running to the bathroom  · Has been living at [facility] since November 2022  · The patient claims to have lactose intolerance, IBS-D and cannot tolerate a lot of the food at the personal care home  · CT of abdomen and pelvis (4/18): Mild nonspecific enterocolitis  · Mildly elevated lipase -104 without CT evidence of pancreatitis   · Low suspicion that her diarrhea is due to infectious cause since this has been going on for months  However, will rule out C diff, obtain stool studies  · GI consult   · Dietary consult  · Lactaid pills with meals  · Follow with labs     Abnormal CT scan  Assessment & Plan  · CT a/p w contrast (4/18) : · Nodular liver contour is suggestive of hepatic cirrhosis  · 5 mm cystic lesion in the tail of the pancreas  For simple cyst(s) less than 1 5 cm, recommend followup every 2 year for 5 times or to age [de-identified], whichever comes first  Followup can stop at age [de-identified] or can switch over to [de-identified] year or older algorithm  Recommend next followup in 2 years   Preferred imaging modality: abdomen MRI and MRCP with and without IV contrast, or triple phase abdomen CT with IV contrast, or abdomen MRI and MRCP without IV contrast   · Discussed finding with the patient     Irritable bowel syndrome with diarrhea  Assessment & Plan  · Normal BMs 1-2 times a daily with soft stools  · EGD/colonoscopy done 2022 in [place]  Per patient, EGD shows gastric ulcer  Colonoscopy was essential unmarkable  No records to review  · Consult GI   · Please see treatment outlined above     Acquired hypothyroidism  Assessment & Plan  · Continue with levothyroxine    Primary hypertension  Assessment & Plan  · BP is controlled   · We will hold furosemide and losartan for now due to ALEJANDRA  · Continue with amlodipine and Toprol       VTE Prophylaxis: Heparin  Code Status: DNR/DNI  POLST: POLST is not applicable to this patient  Discussion with family: none present during exam     Anticipated Length of Stay:  Patient will be admitted on an Observation basis with an anticipated length of stay of  < 2 midnights  Justification for Hospital Stay: ALEJANDRA     Chief Complaint:   Diarrhea and abdominal pain    History of Present Illness:    Driss Mendieta is a 69 y o  female who presents with diarrhea and abdominal pain  The patient past medical history of IBS-diarrhea, hypertension, CAD s/p CABG in 2021, and hypothyroidism  She presented with approximately 3 to 4 months history of increasing nonbloody loose stools approximately 8-10 times daily  She states that she has mentioned to the nursing staff at [facility] regarding her issue and was started on Metamucil which not help much with her symptoms  She recently moved up from [place] and has been living there since November 2022  Patient reports that in the beginning of December, her symptoms started  She reports approximately 10 pounds weight loss since December  Prior EGD/colonoscopy done in 2022 which were done in [place] were unremarkable    Patient was complaining of left-sided abdominal pain rating 6-7 out of 10 intensity, sharp pain, nonradiating, worse with meals, currently is much improved with pain medication received in the ED  In the ER, the patient received IV fluid boluses  She was found to have ALEJANDRA and mild enterocolitis on CT and subsequently being admitted  Review of Systems:  Review of Systems   Constitutional: Positive for unexpected weight change (loss about 10 lbs since 12/1/22)  Negative for activity change, appetite change, chills, diaphoresis and fever  HENT: Negative for congestion, ear pain, hearing loss, tinnitus and trouble swallowing  Eyes: Negative for photophobia, pain, discharge, itching and visual disturbance  Respiratory: Negative for cough, shortness of breath, wheezing and stridor  Cardiovascular: Negative for chest pain, palpitations and leg swelling  Gastrointestinal: Positive for diarrhea (up all night with incontinence)  Negative for abdominal pain, blood in stool, constipation, nausea and vomiting  Bright blood when wiping   Endocrine: Negative for cold intolerance, heat intolerance, polydipsia, polyphagia and polyuria  Genitourinary: Negative for difficulty urinating, dysuria, frequency, hematuria and urgency  Musculoskeletal: Negative for back pain, gait problem and neck stiffness  Skin: Negative for pallor, rash and wound  Allergic/Immunologic: Negative for environmental allergies, food allergies and immunocompromised state  Neurological: Negative for dizziness, tremors, speech difficulty, weakness, light-headedness, numbness and headaches  Hematological: Negative for adenopathy  Does not bruise/bleed easily  Psychiatric/Behavioral: Negative for confusion, hallucinations and sleep disturbance         Past Medical and Surgical History:   Past Medical History:   Diagnosis Date   • Anemia    • Atrial fibrillation [facility])    • Depression    • GI (gastrointestinal bleed)    • Ischemic colitis [facility])        Past Surgical History:   Procedure Laterality Date   • APPENDECTOMY     • CARDIAC SURGERY     • CHOLECYSTECTOMY     • HIP SURGERY Right     Partial replacement   • HYSTERECTOMY         Meds/Allergies:  Prior to Admission medications    Medication Sig Start Date End Date Taking?  Authorizing Provider   acetaminophen (TYLENOL) 650 mg CR tablet Take by mouth   Yes Historical Provider, MD   amLODIPine (NORVASC) 10 mg tablet Take 10 mg by mouth daily   Yes Historical Provider, MD   ascorbic acid (VITAMIN C) 500 MG tablet Take 500 mg by mouth 2 (two) times a day   Yes Historical Provider, MD   aspirin (ECOTRIN LOW STRENGTH) 81 mg EC tablet Take 81 mg by mouth daily   Yes Historical Provider, MD   buPROPion (WELLBUTRIN XL) 150 mg 24 hr tablet Take 150 mg by mouth daily   Yes Historical Provider, MD   ferrous sulfate 325 (65 Fe) mg tablet Take 1 tablet by mouth 2 (two) times a day with meals   Yes Historical Provider, MD   furosemide (LASIX) 20 mg tablet Take 20 mg by mouth daily   Yes Historical Provider, MD   levothyroxine 75 mcg tablet Take 75 mcg by mouth daily   Yes Historical Provider, MD   losartan (COZAAR) 50 mg tablet Take 50 mg by mouth daily 2/14/23 2/14/24 Yes Historical Provider, MD   metoprolol succinate (TOPROL-XL) 25 mg 24 hr tablet Take 25 mg by mouth 2 (two) times a day   Yes Historical Provider, MD   omeprazole (PriLOSEC) 40 MG capsule Take 40 mg by mouth daily   Yes Historical Provider, MD   potassium chloride (Klor-Con) 10 mEq tablet Take 20 mEq by mouth 2 (two) times a day   Yes Historical Provider, MD   saccharomyces boulardii (FLORASTOR) 250 mg capsule Take 250 mg by mouth 2 (two) times a day   Yes Historical Provider, MD   sodium chloride 1 g tablet Take 1 g by mouth 2 (two) times a day 12/13/22  Yes Historical Provider, MD   traZODone (DESYREL) 50 mg tablet Take 50 mg by mouth   Yes Historical Provider, MD   bisacodyl (DULCOLAX) 5 mg EC tablet Use as directed by the office for bowel prep 3/20/23   Oneil Rosales "YOVANI   polyethylene glycol (GLYCOLAX) 17 GM/SCOOP powder Take 238 g by mouth once for 1 dose Use as directed by the office  3/20/23 3/20/23  Oneil Rosales PA-C   psyllium (Metamucil Smooth Texture) 58 6 % powder Take 1 packet by mouth  Patient not taking: Reported on 4/18/2023    Historical Provider, MD     I have reviewed home medications with patient personally  Allergies: Allergies   Allergen Reactions   • Influenza Vaccines Other (See Comments) and Vomiting     nausea   • Ceftaroline GI Intolerance   • Diphenhydramine Hyperactivity       Social History:  Marital Status: Single   Occupation: retired   Patient Pre-hospital Living Situation: personal care home   Patient Pre-hospital Level of Mobility: independent   Patient Pre-hospital Diet Restrictions: lactose intolerance   Substance Use History:   Social History     Substance and Sexual Activity   Alcohol Use Never     Social History     Tobacco Use   Smoking Status Former   • Types: Cigarettes   Smokeless Tobacco Never     Social History     Substance and Sexual Activity   Drug Use Never       Family History:  I have reviewed the patients family history    Physical Exam:   Vitals:   Blood Pressure: 146/56 (04/18/23 1558)  Pulse: 62 (04/18/23 1558)  Temperature: (!) 97 2 °F (36 2 °C) (04/18/23 1558)  Temp Source: Temporal (04/18/23 1558)  Respirations: 20 (04/18/23 1558)  Height: 4' 11\" (149 9 cm) (04/18/23 1558)  Weight - Scale: 53 3 kg (117 lb 8 1 oz) (04/18/23 1558)  SpO2: 97 % (04/18/23 1558)    Physical Exam  Vitals and nursing note reviewed  Constitutional:       General: She is not in acute distress  Appearance: Normal appearance  Comments: Frail and elderly      HENT:      Head: Normocephalic and atraumatic  Right Ear: External ear normal       Left Ear: External ear normal       Nose: Nose normal  No rhinorrhea  Mouth/Throat:      Mouth: Mucous membranes are moist       Pharynx: Oropharynx is clear     Eyes:      General:    " Right eye: No discharge  Left eye: No discharge  Pupils: Pupils are equal, round, and reactive to light  Cardiovascular:      Rate and Rhythm: Normal rate and regular rhythm  Pulses: Normal pulses  Heart sounds: Normal heart sounds  No murmur heard  Pulmonary:      Effort: Pulmonary effort is normal  No respiratory distress  Breath sounds: Normal breath sounds  Abdominal:      General: Bowel sounds are normal  There is no distension  Palpations: Abdomen is soft  There is no mass  Tenderness: There is no abdominal tenderness  Musculoskeletal:         General: No swelling or tenderness  Normal range of motion  Cervical back: Normal range of motion and neck supple  No muscular tenderness  Skin:     General: Skin is warm and dry  Capillary Refill: Capillary refill takes less than 2 seconds  Findings: No erythema or rash  Neurological:      General: No focal deficit present  Mental Status: She is alert and oriented to person, place, and time  Mental status is at baseline  Psychiatric:         Mood and Affect: Mood normal          Behavior: Behavior normal          Thought Content: Thought content normal          Judgment: Judgment normal          Additional Data:   Lab Results: I have personally reviewed pertinent reports  Results from last 7 days   Lab Units 04/18/23  1043   WBC Thousand/uL 8 98   HEMOGLOBIN g/dL 12 5   HEMATOCRIT % 36 7   PLATELETS Thousands/uL 117*   NEUTROS PCT % 51   LYMPHS PCT % 21   MONOS PCT % 23*   EOS PCT % 4     Results from last 7 days   Lab Units 04/18/23  1312 04/18/23  1043   SODIUM mmol/L 136 135   POTASSIUM mmol/L 3 8 3 8   CHLORIDE mmol/L 107 104   CO2 mmol/L 20* 22   BUN mg/dL 26* 27*   CREATININE mg/dL 1 39* 1 45*   CALCIUM mg/dL 8 1* 8 8   ALK PHOS U/L  --  393*   ALT U/L  --  19   AST U/L  --  35                   Imaging: I have personally reviewed pertinent reports      CT abdomen pelvis with contrast Final Result by Angelica Beebe MD (04/18 1563)      Mild nonspecific enterocolitis  Nodular liver contour is suggestive of hepatic cirrhosis  5 mm cystic lesion in the tail of the pancreas  For simple cyst(s) less than 1 5 cm, recommend followup every 2 year for 5 times or to age [de-identified], whichever comes first  Followup can stop at age [de-identified] or can switch over to [de-identified] year or older algorithm  Recommend    next followup in 2 years  Preferred imaging modality: abdomen MRI and MRCP with and without IV contrast, or triple phase abdomen CT with IV contrast, or abdomen MRI and MRCP without IV contrast       The recommendations regarding pancreatic findings assumes that patient does not have family history of pancreatic cancer nor have any symptoms potentially attributable to pancreatic cystic lesions (hyperamylasemia, recent-onset diabetes, severe    epigastric pain, weight loss, steatorrhea, or jaundice ) If these conditions are not true, then management should be deferred to judgement of specialists such as gastroenterologists or oncologic surgeons  Recommendations are based on recent consensus    statements on management of pancreatic cystic lesions from [facility], [facility], the journal Pancreatology, and our own institutional consensus  Additional chronic findings and negatives as above  Workstation performed: [id number]             EKG, Pathology, and Other Studies Reviewed on Admission:   · EKG: NSR HR 74     Epic Records Reviewed: Yes     ** Please Note: This note has been constructed using a voice recognition system   **

## 2023-04-18 NOTE — PLAN OF CARE
Problem: PAIN - ADULT  Goal: Verbalizes/displays adequate comfort level or baseline comfort level  Description: Interventions:  - Encourage patient to monitor pain and request assistance  - Assess pain using appropriate pain scale  - Administer analgesics based on type and severity of pain and evaluate response  - Implement non-pharmacological measures as appropriate and evaluate response  - Consider cultural and social influences on pain and pain management  - Notify physician/advanced practitioner if interventions unsuccessful or patient reports new pain  Outcome: Progressing     Problem: INFECTION - ADULT  Goal: Absence or prevention of progression during hospitalization  Description: INTERVENTIONS:  - Assess and monitor for signs and symptoms of infection  - Monitor lab/diagnostic results  - Monitor all insertion sites, i e  indwelling lines, tubes, and drains  - Monitor endotracheal if appropriate and nasal secretions for changes in amount and color  - [place] appropriate cooling/warming therapies per order  - Administer medications as ordered  - Instruct and encourage patient and family to use good hand hygiene technique  - Identify and instruct in appropriate isolation precautions for identified infection/condition  Outcome: Progressing  Goal: Absence of fever/infection during neutropenic period  Description: INTERVENTIONS:  - Monitor WBC    Outcome: Progressing     Problem: SAFETY ADULT  Goal: Patient will remain free of falls  Description: INTERVENTIONS:  - Educate patient/family on patient safety including physical limitations  - Instruct patient to call for assistance with activity   - Consult OT/PT to assist with strengthening/mobility   - Keep Call bell within reach  - Keep bed low and locked with side rails adjusted as appropriate  - Keep care items and personal belongings within reach  - Initiate and maintain comfort rounds  - Make Fall Risk Sign visible to staff  - Offer Toileting every 2 Hours, in advance of need  - Initiate/Maintain bed alarm  - Obtain necessary fall risk management equipment  - Apply yellow socks and bracelet for high fall risk patients  - Consider moving patient to room near nurses station  Outcome: Progressing  Goal: Maintain or return to baseline ADL function  Description: INTERVENTIONS:  -  Assess patient's ability to carry out ADLs; assess patient's baseline for ADL function and identify physical deficits which impact ability to perform ADLs (bathing, care of mouth/teeth, toileting, grooming, dressing, etc )  - Assess/evaluate cause of self-care deficits   - Assess range of motion  - Assess patient's mobility; develop plan if impaired  - Assess patient's need for assistive devices and provide as appropriate  - Encourage maximum independence but intervene and supervise when necessary  - Involve family in performance of ADLs  - Assess for home care needs following discharge   - Consider OT consult to assist with ADL evaluation and planning for discharge  - Provide patient education as appropriate  Outcome: Progressing  Goal: Maintains/Returns to pre admission functional level  Description: INTERVENTIONS:  - Perform BMAT or MOVE assessment daily    - Set and communicate daily mobility goal to care team and patient/family/caregiver  - Collaborate with rehabilitation services on mobility goals if consulted  - Perform Range of Motion 2 times a day  - Reposition patient every 2 hours    - Dangle patient 2 times a day  - Stand patient 2 times a day  - Ambulate patient 2 times a day  - Out of bed to chair 2 times a day   - Out of bed for meals 2 times a day  - Out of bed for toileting  - Record patient progress and toleration of activity level   Outcome: Progressing     Problem: DISCHARGE PLANNING  Goal: Discharge to home or other facility with appropriate resources  Description: INTERVENTIONS:  - Identify barriers to discharge w/patient and caregiver  - Arrange for needed discharge resources and transportation as appropriate  - Identify discharge learning needs (meds, wound care, etc )  - Refer to Case Management Department for coordinating discharge planning if the patient needs post-hospital services based on physician/advanced practitioner order or complex needs related to functional status, cognitive ability, or social support system  Outcome: Progressing     Problem: Knowledge Deficit  Goal: Patient/family/caregiver demonstrates understanding of disease process, treatment plan, medications, and discharge instructions  Description: Complete learning assessment and assess knowledge base    Interventions:  - Provide teaching at level of understanding  - Provide teaching via preferred learning methods  Outcome: Progressing     Problem: CARDIOVASCULAR - ADULT  Goal: Maintains optimal cardiac output and hemodynamic stability  Description: INTERVENTIONS:  - Monitor I/O, vital signs and rhythm  - Monitor for S/S and trends of decreased cardiac output  - Administer and titrate ordered vasoactive medications to optimize hemodynamic stability  - Assess quality of pulses, skin color and temperature  - Assess for signs of decreased coronary artery perfusion  - Instruct patient to report change in severity of symptoms  Outcome: Progressing     Problem: GASTROINTESTINAL - ADULT  Goal: Minimal or absence of nausea and/or vomiting  Description: INTERVENTIONS:  - Administer IV fluids if ordered to ensure adequate hydration  - Maintain NPO status until nausea and vomiting are resolved  - Nasogastric tube if ordered  - Administer ordered antiemetic medications as needed  - Provide nonpharmacologic comfort measures as appropriate  - Advance diet as tolerated, if ordered  - Consider nutrition services referral to assist patient with adequate nutrition and appropriate food choices  Outcome: Progressing  Goal: Maintains or returns to baseline bowel function  Description: INTERVENTIONS:  - Assess bowel function  - Encourage oral fluids to ensure adequate hydration  - Administer IV fluids if ordered to ensure adequate hydration  - Administer ordered medications as needed  - Encourage mobilization and activity  - Consider nutritional services referral to assist patient with adequate nutrition and appropriate food choices  Outcome: Progressing     Problem: MOBILITY - ADULT  Goal: Maintain or return to baseline ADL function  Description: INTERVENTIONS:  -  Assess patient's ability to carry out ADLs; assess patient's baseline for ADL function and identify physical deficits which impact ability to perform ADLs (bathing, care of mouth/teeth, toileting, grooming, dressing, etc )  - Assess/evaluate cause of self-care deficits   - Assess range of motion  - Assess patient's mobility; develop plan if impaired  - Assess patient's need for assistive devices and provide as appropriate  - Encourage maximum independence but intervene and supervise when necessary  - Involve family in performance of ADLs  - Assess for home care needs following discharge   - Consider OT consult to assist with ADL evaluation and planning for discharge  - Provide patient education as appropriate  Outcome: Progressing  Goal: Maintains/Returns to pre admission functional level  Description: INTERVENTIONS:  - Perform BMAT or MOVE assessment daily    - Set and communicate daily mobility goal to care team and patient/family/caregiver  - Collaborate with rehabilitation services on mobility goals if consulted  - Perform Range of Motion 2 times a day  - Reposition patient every 2 hours    - Dangle patient 2 times a day  - Stand patient 2 times a day  - Ambulate patient 2 times a day  - Out of bed to chair 2 times a day   - Out of bed for meals 2 times a day  - Out of bed for toileting  - Record patient progress and toleration of activity level   Outcome: Progressing

## 2023-04-18 NOTE — ASSESSMENT & PLAN NOTE
· BP is controlled   · We will hold furosemide and losartan for now due to ALEJANDRA  · Continue with amlodipine and Toprol

## 2023-04-18 NOTE — ASSESSMENT & PLAN NOTE
· Normal BMs 1-2 times a daily with soft stools  · EGD/colonoscopy done 2022 in [place]  Per patient, EGD shows gastric ulcer  Colonoscopy was essential unmarkable  No records to review     · Consult GI   · Please see treatment outlined above

## 2023-04-18 NOTE — PLAN OF CARE
Problem: PAIN - ADULT  Goal: Verbalizes/displays adequate comfort level or baseline comfort level  Description: Interventions:  - Encourage patient to monitor pain and request assistance  - Assess pain using appropriate pain scale  - Administer analgesics based on type and severity of pain and evaluate response  - Implement non-pharmacological measures as appropriate and evaluate response  - Consider cultural and social influences on pain and pain management  - Notify physician/advanced practitioner if interventions unsuccessful or patient reports new pain  Outcome: Progressing     Problem: INFECTION - ADULT  Goal: Absence or prevention of progression during hospitalization  Description: INTERVENTIONS:  - Assess and monitor for signs and symptoms of infection  - Monitor lab/diagnostic results  - Monitor all insertion sites, i e  indwelling lines, tubes, and drains  - Monitor endotracheal if appropriate and nasal secretions for changes in amount and color  - [place] appropriate cooling/warming therapies per order  - Administer medications as ordered  - Instruct and encourage patient and family to use good hand hygiene technique  - Identify and instruct in appropriate isolation precautions for identified infection/condition  Outcome: Progressing  Goal: Absence of fever/infection during neutropenic period  Description: INTERVENTIONS:  - Monitor WBC    Outcome: Progressing     Problem: SAFETY ADULT  Goal: Patient will remain free of falls  Description: INTERVENTIONS:  - Educate patient/family on patient safety including physical limitations  - Instruct patient to call for assistance with activity   - Consult OT/PT to assist with strengthening/mobility   - Keep Call bell within reach  - Keep bed low and locked with side rails adjusted as appropriate  - Keep care items and personal belongings within reach  - Initiate and maintain comfort rounds  - Make Fall Risk Sign visible to staff  - Offer Toileting every 2 Hours, in advance of need  - Initiate/Maintain bed alarm  - Obtain necessary fall risk management equipment  - Apply yellow socks and bracelet for high fall risk patients  - Consider moving patient to room near nurses station  Outcome: Progressing  Goal: Maintain or return to baseline ADL function  Description: INTERVENTIONS:  -  Assess patient's ability to carry out ADLs; assess patient's baseline for ADL function and identify physical deficits which impact ability to perform ADLs (bathing, care of mouth/teeth, toileting, grooming, dressing, etc )  - Assess/evaluate cause of self-care deficits   - Assess range of motion  - Assess patient's mobility; develop plan if impaired  - Assess patient's need for assistive devices and provide as appropriate  - Encourage maximum independence but intervene and supervise when necessary  - Involve family in performance of ADLs  - Assess for home care needs following discharge   - Consider OT consult to assist with ADL evaluation and planning for discharge  - Provide patient education as appropriate  Outcome: Progressing  Goal: Maintains/Returns to pre admission functional level  Description: INTERVENTIONS:  - Perform BMAT or MOVE assessment daily    - Set and communicate daily mobility goal to care team and patient/family/caregiver  - Collaborate with rehabilitation services on mobility goals if consulted  - Perform Range of Motion 2 times a day  - Reposition patient every 2 hours    - Dangle patient 2 times a day  - Stand patient 2 times a day  - Ambulate patient 2 times a day  - Out of bed to chair 2 times a day   - Out of bed for meals 2 times a day  - Out of bed for toileting  - Record patient progress and toleration of activity level   Outcome: Progressing     Problem: DISCHARGE PLANNING  Goal: Discharge to home or other facility with appropriate resources  Description: INTERVENTIONS:  - Identify barriers to discharge w/patient and caregiver  - Arrange for needed discharge resources and transportation as appropriate  - Identify discharge learning needs (meds, wound care, etc )  - Refer to Case Management Department for coordinating discharge planning if the patient needs post-hospital services based on physician/advanced practitioner order or complex needs related to functional status, cognitive ability, or social support system  Outcome: Progressing     Problem: Knowledge Deficit  Goal: Patient/family/caregiver demonstrates understanding of disease process, treatment plan, medications, and discharge instructions  Description: Complete learning assessment and assess knowledge base    Interventions:  - Provide teaching at level of understanding  - Provide teaching via preferred learning methods  Outcome: Progressing     Problem: CARDIOVASCULAR - ADULT  Goal: Maintains optimal cardiac output and hemodynamic stability  Description: INTERVENTIONS:  - Monitor I/O, vital signs and rhythm  - Monitor for S/S and trends of decreased cardiac output  - Administer and titrate ordered vasoactive medications to optimize hemodynamic stability  - Assess quality of pulses, skin color and temperature  - Assess for signs of decreased coronary artery perfusion  - Instruct patient to report change in severity of symptoms  Outcome: Progressing     Problem: GASTROINTESTINAL - ADULT  Goal: Minimal or absence of nausea and/or vomiting  Description: INTERVENTIONS:  - Administer IV fluids if ordered to ensure adequate hydration  - Maintain NPO status until nausea and vomiting are resolved  - Nasogastric tube if ordered  - Administer ordered antiemetic medications as needed  - Provide nonpharmacologic comfort measures as appropriate  - Advance diet as tolerated, if ordered  - Consider nutrition services referral to assist patient with adequate nutrition and appropriate food choices  Outcome: Progressing  Goal: Maintains or returns to baseline bowel function  Description: INTERVENTIONS:  - Assess bowel function  - Encourage oral fluids to ensure adequate hydration  - Administer IV fluids if ordered to ensure adequate hydration  - Administer ordered medications as needed  - Encourage mobilization and activity  - Consider nutritional services referral to assist patient with adequate nutrition and appropriate food choices  Outcome: Progressing

## 2023-04-18 NOTE — ASSESSMENT & PLAN NOTE
· CT a/p w contrast (4/18) : · Nodular liver contour is suggestive of hepatic cirrhosis  · 5 mm cystic lesion in the tail of the pancreas  For simple cyst(s) less than 1 5 cm, recommend followup every 2 year for 5 times or to age [de-identified], whichever comes first  Followup can stop at age [de-identified] or can switch over to [de-identified] year or older algorithm  Recommend next followup in 2 years   Preferred imaging modality: abdomen MRI and MRCP with and without IV contrast, or triple phase abdomen CT with IV contrast, or abdomen MRI and MRCP without IV contrast   · Discussed finding with the patient

## 2023-04-19 VITALS
OXYGEN SATURATION: 95 % | BODY MASS INDEX: 23.69 KG/M2 | RESPIRATION RATE: 18 BRPM | HEIGHT: 59 IN | TEMPERATURE: 98.3 F | WEIGHT: 117.5 LBS | DIASTOLIC BLOOD PRESSURE: 58 MMHG | HEART RATE: 70 BPM | SYSTOLIC BLOOD PRESSURE: 128 MMHG

## 2023-04-19 PROBLEM — K55.1 SUPERIOR MESENTERIC ARTERY STENOSIS (HCC): Status: ACTIVE | Noted: 2023-04-19

## 2023-04-19 PROBLEM — R10.32 LEFT LOWER QUADRANT ABDOMINAL PAIN: Status: ACTIVE | Noted: 2023-04-19

## 2023-04-19 LAB
ALBUMIN SERPL BCP-MCNC: 2.4 G/DL (ref 3.5–5)
ALP SERPL-CCNC: 335 U/L (ref 34–104)
ALT SERPL W P-5'-P-CCNC: 17 U/L (ref 7–52)
ANION GAP SERPL CALCULATED.3IONS-SCNC: 7 MMOL/L (ref 4–13)
AST SERPL W P-5'-P-CCNC: 31 U/L (ref 13–39)
ATRIAL RATE: 74 BPM
BASOPHILS # BLD AUTO: 0.04 THOUSANDS/ΜL (ref 0–0.1)
BASOPHILS NFR BLD AUTO: 1 % (ref 0–1)
BILIRUB SERPL-MCNC: 1.13 MG/DL (ref 0.2–1)
BUN SERPL-MCNC: 28 MG/DL (ref 5–25)
C DIFF TOX GENS STL QL NAA+PROBE: NEGATIVE
CALCIUM ALBUM COR SERPL-MCNC: 9.4 MG/DL (ref 8.3–10.1)
CALCIUM SERPL-MCNC: 8.1 MG/DL (ref 8.4–10.2)
CAMPYLOBACTER DNA SPEC NAA+PROBE: NORMAL
CHLORIDE SERPL-SCNC: 109 MMOL/L (ref 96–108)
CO2 SERPL-SCNC: 20 MMOL/L (ref 21–32)
CREAT SERPL-MCNC: 1.25 MG/DL (ref 0.6–1.3)
EOSINOPHIL # BLD AUTO: 0.25 THOUSAND/ΜL (ref 0–0.61)
EOSINOPHIL NFR BLD AUTO: 4 % (ref 0–6)
ERYTHROCYTE [DISTWIDTH] IN BLOOD BY AUTOMATED COUNT: 15.8 % (ref 11.6–15.1)
GFR SERPL CREATININE-BSD FRML MDRD: 41 ML/MIN/1.73SQ M
GLUCOSE P FAST SERPL-MCNC: 80 MG/DL (ref 65–99)
GLUCOSE SERPL-MCNC: 80 MG/DL (ref 65–140)
HCT VFR BLD AUTO: 30.8 % (ref 34.8–46.1)
HGB BLD-MCNC: 10.2 G/DL (ref 11.5–15.4)
IMM GRANULOCYTES # BLD AUTO: 0.01 THOUSAND/UL (ref 0–0.2)
IMM GRANULOCYTES NFR BLD AUTO: 0 % (ref 0–2)
LIPASE SERPL-CCNC: 94 U/L (ref 11–82)
LYMPHOCYTES # BLD AUTO: 1.14 THOUSANDS/ΜL (ref 0.6–4.47)
LYMPHOCYTES NFR BLD AUTO: 19 % (ref 14–44)
MAGNESIUM SERPL-MCNC: 1.6 MG/DL (ref 1.9–2.7)
MCH RBC QN AUTO: 34.3 PG (ref 26.8–34.3)
MCHC RBC AUTO-ENTMCNC: 33.1 G/DL (ref 31.4–37.4)
MCV RBC AUTO: 104 FL (ref 82–98)
MONOCYTES # BLD AUTO: 1.17 THOUSAND/ΜL (ref 0.17–1.22)
MONOCYTES NFR BLD AUTO: 19 % (ref 4–12)
NEUTROPHILS # BLD AUTO: 3.42 THOUSANDS/ΜL (ref 1.85–7.62)
NEUTS SEG NFR BLD AUTO: 57 % (ref 43–75)
NRBC BLD AUTO-RTO: 0 /100 WBCS
P AXIS: 80 DEGREES
PHOSPHATE SERPL-MCNC: 3.6 MG/DL (ref 2.3–4.1)
PLATELET # BLD AUTO: 86 THOUSANDS/UL (ref 149–390)
PMV BLD AUTO: 11 FL (ref 8.9–12.7)
POTASSIUM SERPL-SCNC: 3.5 MMOL/L (ref 3.5–5.3)
PR INTERVAL: 178 MS
PROT SERPL-MCNC: 6.5 G/DL (ref 6.4–8.4)
QRS AXIS: 97 DEGREES
QRSD INTERVAL: 76 MS
QT INTERVAL: 398 MS
QTC INTERVAL: 441 MS
RBC # BLD AUTO: 2.97 MILLION/UL (ref 3.81–5.12)
SALMONELLA DNA SPEC QL NAA+PROBE: NORMAL
SHIGA TOXIN STX GENE SPEC NAA+PROBE: NORMAL
SHIGELLA DNA SPEC QL NAA+PROBE: NORMAL
SODIUM SERPL-SCNC: 136 MMOL/L (ref 135–147)
T WAVE AXIS: 58 DEGREES
VENTRICULAR RATE: 74 BPM
WBC # BLD AUTO: 6.03 THOUSAND/UL (ref 4.31–10.16)

## 2023-04-19 RX ORDER — FUROSEMIDE 20 MG/1
20 TABLET ORAL DAILY
Refills: 0 | Status: ON HOLD
Start: 2023-04-20

## 2023-04-19 RX ORDER — CHOLECALCIFEROL (VITAMIN D3) 125 MCG
6000 CAPSULE ORAL
Qty: 180 TABLET | Refills: 0 | Status: ON HOLD
Start: 2023-04-19 | End: 2023-05-19

## 2023-04-19 RX ORDER — MAGNESIUM SULFATE HEPTAHYDRATE 40 MG/ML
2 INJECTION, SOLUTION INTRAVENOUS ONCE
Status: COMPLETED | OUTPATIENT
Start: 2023-04-19 | End: 2023-04-19

## 2023-04-19 RX ORDER — POTASSIUM CHLORIDE 20 MEQ/1
40 TABLET, EXTENDED RELEASE ORAL ONCE
Status: COMPLETED | OUTPATIENT
Start: 2023-04-19 | End: 2023-04-19

## 2023-04-19 RX ORDER — LOSARTAN POTASSIUM 50 MG/1
50 TABLET ORAL DAILY
Qty: 30 TABLET | Refills: 0 | Status: ON HOLD
Start: 2023-04-20 | End: 2024-04-19

## 2023-04-19 RX ORDER — CHOLECALCIFEROL (VITAMIN D3) 125 MCG
6000 CAPSULE ORAL
Qty: 180 TABLET | Refills: 0
Start: 2023-04-19 | End: 2023-04-19 | Stop reason: SDUPTHER

## 2023-04-19 RX ADMIN — AMLODIPINE BESYLATE 10 MG: 10 TABLET ORAL at 08:15

## 2023-04-19 RX ADMIN — POTASSIUM CHLORIDE 40 MEQ: 1500 TABLET, EXTENDED RELEASE ORAL at 08:15

## 2023-04-19 RX ADMIN — BUPROPION HYDROCHLORIDE 150 MG: 150 TABLET, EXTENDED RELEASE ORAL at 08:15

## 2023-04-19 RX ADMIN — MAGNESIUM SULFATE HEPTAHYDRATE 2 G: 40 INJECTION, SOLUTION INTRAVENOUS at 08:15

## 2023-04-19 RX ADMIN — METOPROLOL SUCCINATE 25 MG: 25 TABLET, EXTENDED RELEASE ORAL at 08:16

## 2023-04-19 RX ADMIN — ASPIRIN 81 MG: 81 TABLET, COATED ORAL at 08:15

## 2023-04-19 RX ADMIN — PANTOPRAZOLE SODIUM 40 MG: 40 TABLET, DELAYED RELEASE ORAL at 07:28

## 2023-04-19 RX ADMIN — HEPARIN SODIUM 5000 UNITS: 5000 INJECTION INTRAVENOUS; SUBCUTANEOUS at 05:15

## 2023-04-19 RX ADMIN — Medication 250 MG: at 08:15

## 2023-04-19 RX ADMIN — LEVOTHYROXINE SODIUM 75 MCG: 75 TABLET ORAL at 05:15

## 2023-04-19 RX ADMIN — LACTASE TAB 3000 UNIT 6000 UNITS: 3000 TAB at 07:28

## 2023-04-19 RX ADMIN — SODIUM CHLORIDE, SODIUM GLUCONATE, SODIUM ACETATE, POTASSIUM CHLORIDE, MAGNESIUM CHLORIDE, SODIUM PHOSPHATE, DIBASIC, AND POTASSIUM PHOSPHATE 75 ML/HR: .53; .5; .37; .037; .03; .012; .00082 INJECTION, SOLUTION INTRAVENOUS at 07:19

## 2023-04-19 RX ADMIN — LACTASE TAB 3000 UNIT 6000 UNITS: 3000 TAB at 12:11

## 2023-04-19 NOTE — PLAN OF CARE
Problem: PHYSICAL THERAPY ADULT  Goal: Performs mobility at highest level of function for planned discharge setting  See evaluation for individualized goals  Description: Treatment/Interventions: Functional transfer training, LE strengthening/ROM, Elevations, Endurance training, Therapeutic exercise, Patient/family training, Equipment eval/education, Gait training, Spoke to nursing, Spoke to case management  Equipment Recommended: Julio (pt  has own)       See flowsheet documentation for full assessment, interventions and recommendations  Note: Prognosis: Good  Problem List: Decreased strength, Decreased endurance, Impaired balance, Decreased mobility, Decreased coordination, Pain  Assessment: Pt is 69 y o  female seen for PT evaluation s/p admit to [facility] on 4/18/2023 w/ ALEJANDRA (acute kidney injury) ([facility] [address] )  PT consulted to assess pt's functional mobility and d/c needs  Order placed for PT eval and tx, w/ up and OOB as tolerated order  Comorbidities affecting pt's physical performance at time of assessment include: weakness,ALEJANDRA, diarrhea of presumed infectious origin, irritable bowel syndrome with diarrhea, HTN  PTA, pt was independent w/ all functional mobility w/ AD usage  Personal factors affecting pt at time of IE include: inability to navigate community distances, unable to perform dynamic tasks in community, positive fall history and inability to perform IADLs  Please find objective findings from PT assessment regarding body systems outlined above with impairments and limitations including weakness, impaired balance, decreased endurance, impaired coordination, gait deviations, pain, decreased activity tolerance, decreased functional mobility tolerance and fall risk  From PT/mobility standpoint, recommendation at time of d/c would be return to facility with rehabilitation services pending progress in order to facilitate return to PLOF          PT Discharge Recommendation: Return to facility with rehabilitation services    See flowsheet documentation for full assessment

## 2023-04-19 NOTE — PLAN OF CARE
Problem: PAIN - ADULT  Goal: Verbalizes/displays adequate comfort level or baseline comfort level  Description: Interventions:  - Encourage patient to monitor pain and request assistance  - Assess pain using appropriate pain scale  - Administer analgesics based on type and severity of pain and evaluate response  - Implement non-pharmacological measures as appropriate and evaluate response  - Consider cultural and social influences on pain and pain management  - Notify physician/advanced practitioner if interventions unsuccessful or patient reports new pain  4/19/2023 1442 by Cris Leslie RN  Outcome: Adequate for Discharge  4/19/2023 0744 by Cris Leslie RN  Outcome: Progressing     Problem: INFECTION - ADULT  Goal: Absence or prevention of progression during hospitalization  Description: INTERVENTIONS:  - Assess and monitor for signs and symptoms of infection  - Monitor lab/diagnostic results  - Monitor all insertion sites, i e  indwelling lines, tubes, and drains  - Monitor endotracheal if appropriate and nasal secretions for changes in amount and color  - [place] appropriate cooling/warming therapies per order  - Administer medications as ordered  - Instruct and encourage patient and family to use good hand hygiene technique  - Identify and instruct in appropriate isolation precautions for identified infection/condition  4/19/2023 1442 by Cris Leslie RN  Outcome: Adequate for Discharge  4/19/2023 0744 by Cris Leslie RN  Outcome: Progressing  Goal: Absence of fever/infection during neutropenic period  Description: INTERVENTIONS:  - Monitor WBC    4/19/2023 1442 by Cris Leslie RN  Outcome: Adequate for Discharge  4/19/2023 0744 by Cris Leslie RN  Outcome: Progressing     Problem: SAFETY ADULT  Goal: Patient will remain free of falls  Description: INTERVENTIONS:  - Educate patient/family on patient safety including physical limitations  - Instruct patient to call for assistance with activity   - Consult OT/PT to assist with strengthening/mobility   - Keep Call bell within reach  - Keep bed low and locked with side rails adjusted as appropriate  - Keep care items and personal belongings within reach  - Initiate and maintain comfort rounds  - Make Fall Risk Sign visible to staff  - Offer Toileting every 2 Hours, in advance of need  - Initiate/Maintain bed alarm  - Obtain necessary fall risk management equipment  - Apply yellow socks and bracelet for high fall risk patients  - Consider moving patient to room near nurses station  4/19/2023 1442 by Ashley Chinchilla RN  Outcome: Adequate for Discharge  4/19/2023 0744 by Ashley Chinchilla RN  Outcome: Progressing  Goal: Maintain or return to baseline ADL function  Description: INTERVENTIONS:  -  Assess patient's ability to carry out ADLs; assess patient's baseline for ADL function and identify physical deficits which impact ability to perform ADLs (bathing, care of mouth/teeth, toileting, grooming, dressing, etc )  - Assess/evaluate cause of self-care deficits   - Assess range of motion  - Assess patient's mobility; develop plan if impaired  - Assess patient's need for assistive devices and provide as appropriate  - Encourage maximum independence but intervene and supervise when necessary  - Involve family in performance of ADLs  - Assess for home care needs following discharge   - Consider OT consult to assist with ADL evaluation and planning for discharge  - Provide patient education as appropriate  4/19/2023 1442 by Ashley Chinchilla RN  Outcome: Adequate for Discharge  4/19/2023 0744 by Ashley Chinchilla RN  Outcome: Progressing  Goal: Maintains/Returns to pre admission functional level  Description: INTERVENTIONS:  - Perform BMAT or MOVE assessment daily    - Set and communicate daily mobility goal to care team and patient/family/caregiver  - Collaborate with rehabilitation services on mobility goals if consulted  - Perform Range of Motion 2 times a day  - Reposition patient every 2 hours    - Dangle patient 2 times a day  - Stand patient 2 times a day  - Ambulate patient 2 times a day  - Out of bed to chair 2 times a day   - Out of bed for meals 2 times a day  - Out of bed for toileting  - Record patient progress and toleration of activity level   4/19/2023 1442 by Alma Estes RN  Outcome: Adequate for Discharge  4/19/2023 0744 by Alma Estes RN  Outcome: Progressing     Problem: DISCHARGE PLANNING  Goal: Discharge to home or other facility with appropriate resources  Description: INTERVENTIONS:  - Identify barriers to discharge w/patient and caregiver  - Arrange for needed discharge resources and transportation as appropriate  - Identify discharge learning needs (meds, wound care, etc )  - Refer to Case Management Department for coordinating discharge planning if the patient needs post-hospital services based on physician/advanced practitioner order or complex needs related to functional status, cognitive ability, or social support system  4/19/2023 1442 by Alma Estes RN  Outcome: Adequate for Discharge  4/19/2023 0744 by Alma Estes RN  Outcome: Progressing     Problem: Knowledge Deficit  Goal: Patient/family/caregiver demonstrates understanding of disease process, treatment plan, medications, and discharge instructions  Description: Complete learning assessment and assess knowledge base    Interventions:  - Provide teaching at level of understanding  - Provide teaching via preferred learning methods  4/19/2023 1442 by Alma Estes RN  Outcome: Adequate for Discharge  4/19/2023 0744 by Alma Estes RN  Outcome: Progressing     Problem: CARDIOVASCULAR - ADULT  Goal: Maintains optimal cardiac output and hemodynamic stability  Description: INTERVENTIONS:  - Monitor I/O, vital signs and rhythm  - Monitor for S/S and trends of decreased cardiac output  - Administer and titrate ordered vasoactive medications to optimize hemodynamic stability  - Assess quality of pulses, skin color and temperature  - Assess for signs of decreased coronary artery perfusion  - Instruct patient to report change in severity of symptoms  4/19/2023 1442 by Mily Rodgers RN  Outcome: Adequate for Discharge  4/19/2023 0744 by Mily Rodgers RN  Outcome: Progressing     Problem: GASTROINTESTINAL - ADULT  Goal: Minimal or absence of nausea and/or vomiting  Description: INTERVENTIONS:  - Administer IV fluids if ordered to ensure adequate hydration  - Maintain NPO status until nausea and vomiting are resolved  - Nasogastric tube if ordered  - Administer ordered antiemetic medications as needed  - Provide nonpharmacologic comfort measures as appropriate  - Advance diet as tolerated, if ordered  - Consider nutrition services referral to assist patient with adequate nutrition and appropriate food choices  4/19/2023 1442 by Mily Rodgers RN  Outcome: Adequate for Discharge  4/19/2023 0744 by Mily Rodgers RN  Outcome: Progressing  Goal: Maintains or returns to baseline bowel function  Description: INTERVENTIONS:  - Assess bowel function  - Encourage oral fluids to ensure adequate hydration  - Administer IV fluids if ordered to ensure adequate hydration  - Administer ordered medications as needed  - Encourage mobilization and activity  - Consider nutritional services referral to assist patient with adequate nutrition and appropriate food choices  4/19/2023 1442 by Mily Rodgers RN  Outcome: Adequate for Discharge  4/19/2023 0744 by Mily Rodgers RN  Outcome: Progressing     Problem: MOBILITY - ADULT  Goal: Maintain or return to baseline ADL function  Description: INTERVENTIONS:  -  Assess patient's ability to carry out ADLs; assess patient's baseline for ADL function and identify physical deficits which impact ability to perform ADLs (bathing, care of mouth/teeth, toileting, grooming, dressing, etc )  - Assess/evaluate cause of self-care deficits   - Assess range of motion  - Assess patient's mobility; develop plan if impaired  - Assess patient's need for assistive devices and provide as appropriate  - Encourage maximum independence but intervene and supervise when necessary  - Involve family in performance of ADLs  - Assess for home care needs following discharge   - Consider OT consult to assist with ADL evaluation and planning for discharge  - Provide patient education as appropriate  4/19/2023 1442 by Beatrice Christensen RN  Outcome: Adequate for Discharge  4/19/2023 0744 by Beatrice Christensen RN  Outcome: Progressing  Goal: Maintains/Returns to pre admission functional level  Description: INTERVENTIONS:  - Perform BMAT or MOVE assessment daily    - Set and communicate daily mobility goal to care team and patient/family/caregiver  - Collaborate with rehabilitation services on mobility goals if consulted  - Perform Range of Motion 2 times a day  - Reposition patient every 2 hours  - Dangle patient 2 times a day  - Stand patient 2 times a day  - Ambulate patient 2 times a day  - Out of bed to chair 2 times a day   - Out of bed for meals 2 times a day  - Out of bed for toileting  - Record patient progress and toleration of activity level   4/19/2023 1442 by Beatrice Christensen RN  Outcome: Adequate for Discharge  4/19/2023 0744 by Beatrice Christensen RN  Outcome: Progressing     Problem: Nutrition/Hydration-ADULT  Goal: Nutrient/Hydration intake appropriate for improving, restoring or maintaining nutritional needs  Description: Monitor and assess patient's nutrition/hydration status for malnutrition  Collaborate with interdisciplinary team and initiate plan and interventions as ordered  Monitor patient's weight and dietary intake as ordered or per policy  Utilize nutrition screening tool and intervene as necessary  Determine patient's food preferences and provide high-protein, high-caloric foods as appropriate       INTERVENTIONS:  - Monitor oral intake, urinary output, labs, and treatment plans  - Assess nutrition and hydration status and recommend course of action  - Evaluate amount of meals eaten  - Assist patient with eating if necessary   - Allow adequate time for meals  - Recommend/ encourage appropriate diets, oral nutritional supplements, and vitamin/mineral supplements  - Order, calculate, and assess calorie counts as needed  - Recommend, monitor, and adjust tube feedings and TPN/PPN based on assessed needs  - Assess need for intravenous fluids  - Provide specific nutrition/hydration education as appropriate  - Include patient/family/caregiver in decisions related to nutrition  Outcome: Adequate for Discharge

## 2023-04-19 NOTE — ASSESSMENT & PLAN NOTE
· Presented with LLQ pain   Now resolved   · With history of SMA stenosis  · Outpatient vascular surgery evaluation on 5/1

## 2023-04-19 NOTE — ASSESSMENT & PLAN NOTE
· BP is controlled   · Continue with amlodipine and Toprol   · Resume furosemide and losartan on 4/20

## 2023-04-19 NOTE — CASE MANAGEMENT
Case Management Assessment & Discharge Planning Note    Patient name Kris Ramos  Location [address] 222/-43 MRN [id number]  : 1945 Date 2023       Current Admission Date: 2023  Current Admission Diagnosis:ALEJANDRA (acute kidney injury) [facility])   Patient Active Problem List    Diagnosis Date Noted   • ALEJANDRA (acute kidney injury) ([facility] [address] ) 2023   • Diarrhea of presumed infectious origin 2023   • Primary hypertension 2023   • Acquired hypothyroidism 2023   • Irritable bowel syndrome with diarrhea 2023   • Abnormal CT scan 2023      LOS (days): 0  Geometric Mean LOS (GMLOS) (days):   Days to GMLOS:     OBJECTIVE:        Current admission status: Observation  Referral Reason: Other (Discharge planning)    Preferred Pharmacy:   [email or web address] [address] [place] [address] [address] [address]  Phone: [phone] Fax: [phone]    Primary Care Provider: Ailyn Hernandez MD    Primary Insurance: MEDICARE  Secondary Insurance: AETNA    ASSESSMENT:  [address] Proxies    There are no active Health Care Proxies on file  Advance Directives  Does patient have a Health Care POA?: Yes  Does patient have Advance Directives?: Yes  Advance Directives: Living will, Power of  for health care  Primary Contact: Whitney Schaeffer (Sister)   [phone] (Mobile)    Readmission Root Cause  30 Day Readmission: No    Patient Information  Admitted from[de-identified] Facility  Mental Status: Alert  During Assessment patient was accompanied by: Not accompanied during assessment  Assessment information provided by[de-identified] Patient  Primary Caregiver:  Other (Comment)  Caregiver's Name[de-identified] Reyes Cruz [facility] staff  Caregiver's Relationship to Patient[de-identified] Other (Specify) (Staff at [facility])  [facility] Telephone Number[de-identified] Sol Gagnon [phone]  Support Systems: Children, Self, Family members, Other (Comment) (Staff at [facility])  [place] of Residence: [place] [address] do you live in?: [address] entry access options   Select all that apply : No steps to enter home  Type of Current Residence: Facility  Upon entering residence, is there a bedroom on the main floor (no further steps)?: Yes  Upon entering residence, is there a bathroom on the main floor (no further steps)?: Yes  In the last 12 months, was there a time when you were not able to pay the mortgage or rent on time?: No  In the last 12 months, how many places have you lived?: 1  In the last 12 months, was there a time when you did not have a steady place to sleep or slept in a shelter (including now)?: No  Homeless/housing insecurity resource given?: N/A  Living Arrangements: Other (Comment) [facility])  Is patient a ?: No    Activities of Daily Living Prior to Admission  Functional Status: Independent  Completes ADLs independently?: Yes  Ambulates independently?: Yes  Does patient use assisted devices?: Yes  Assisted Devices (DME) used: Geovanni Ennis  Does patient currently own DME?: Yes  What DME does the patient currently own?: Geovanni Ennis, Other (Comment) ([employer]/sock aide)  Does patient have a history of Outpatient Therapy (PT/OT)?: No  Does the patient have a history of Short-Term Rehab?: No  Does patient have a history of HHC?: Yes  Does patient currently have [facility]?: Yes    Patient Information Continued  Does patient have prescription coverage?: Yes  Within the past 12 months, you worried that your food would run out before you got the money to buy more : Never true  Within the past 12 months, the food you bought just didn't last and you didn't have money to get more : Never true  Food insecurity resource given?: N/A  Does patient receive dialysis treatments?: No  Does patient have a history of substance abuse?: No  Does patient have a history of Mental Health Diagnosis?: No    Means of Transportation  Means of Transport to Appts[de-identified] Family transport  In the past 12 months, has lack of transportation kept you from medical appointments or from getting medications?: No  In the past 12 months, has lack of transportation kept you from meetings, work, or from getting things needed for daily living?: No  Was application for public transport provided?: N/A    DISCHARGE DETAILS:    Discharge planning discussed with[de-identified] Patient and Loly Pereyra from [facility] of Choice: Yes    Contacts  Patient Contacts: Herminia Wolf (Sister)   [phone] (Mobile)  Relationship to Patient[de-identified] Family  Contact Method: Phone  Phone Number: Herminia Wolf (Sister)   [phone] (Mobile)  Reason/Outcome: Emergency [address]         Is the patient interested in [address] at discharge?: No (Has [address] therapy thru [facility])    DME Referral Provided  Referral made for DME?: No    Treatment Team Recommendation: Facility Return [facility])     Additional Comments: Per [facility] [address]  patient possible dicharge and will f/u with facility when stool samples finalized  Met with patient at bedside to discuss discharge planning  Patient with many concerns RE OBS and cost op  Email to [facility] requesting call to patient to discuss same  May need transport home to [facility]  Call to Loly Pereyra [phone] at [facility] and discussed discharge  Per Loly Pereyra patient shares a bathroom with a hallway of residents and will need Cdiff results prior to return  Tabitha Rosales reports diarrhea so bad patient is incontinent and can not control bowels at facility    Message to [address] same

## 2023-04-19 NOTE — CASE MANAGEMENT
Case Management Discharge Planning Note    Patient name Angely Finder  Location [address] 222/-63 MRN [id number]  : 1945 Date 2023       Current Admission Date: 2023  Current Admission Diagnosis:ALEJANDRA (acute kidney injury) [facility])   Patient Active Problem List    Diagnosis Date Noted   • ALEJANDRA (acute kidney injury) ([facility] [address] ) 2023   • Diarrhea of presumed infectious origin 2023   • Primary hypertension 2023   • Acquired hypothyroidism 2023   • Irritable bowel syndrome with diarrhea 2023   • Abnormal CT scan 2023      LOS (days): 0  Geometric Mean LOS (GMLOS) (days):   Days to GMLOS:     OBJECTIVE:       Current admission status: Observation   Preferred Pharmacy:   [facility] [address] [place] [address]  [facility]   [place] [address]  Phone: [phone] Fax: [phone]    Primary Care Provider: Jhoana Collier MD    Primary Insurance: MEDICARE  Secondary Insurance: Kaylin Ser    DISCHARGE DETAILS:    Discharge planning discussed with[de-identified] Patient and Edith Hamilton from [facility] of Choice: Yes    Contacts  Patient Contacts: Mena Hsieh (Sister)   [phone] (Mobile)  Relationship to Patient[de-identified] Family  Contact Method: Phone  Phone Number: Mena Hsieh (Sister)   [phone] (Mobile)  Reason/Outcome: Emergency [address]         Is the patient interested in [facility] at discharge?: No (Has [facility] therapy thru [facility])    DME Referral Provided  Referral made for DME?: No    Other Referral/Resources/Interventions Provided:  Interventions: Transportation    Treatment Team Recommendation: Home with  [employer] [address]  Discharge Destination Plan[de-identified] Home with [place] at Discharge : Other (Comment) ([facility])  Dispatcher Contacted: Yes  Number/Name of Dispatcher: Via Round Trip  Transported by [employer] and Unit #):  [facility]  ETA of Transport (Date): 23  ETA of Transport (Time): 0506     Additional Comments: Per [facility] ap joce patient CDiff is negative and patient will be discharged  Call to Wilberto Ewing and updated same  Will need transport home  Updated nurse Pietro Pepe   Call to patient and updated same    Accepting Facility Name, [address] : [facility]  Receiving Facility/Agency Phone Number: [phone] Miracle Murrell for report  Facility/Agency Fax Number: [phone]

## 2023-04-19 NOTE — CONSULTS
Consultation - [address] [facility]  Ashwingraham Blunt 69 y o  female MRN: [id number]  Unit/Bed#: -01 Encounter: [id number]        Inpatient consult to gastroenterology  Consult performed by: Susan Kirkpatrick PA-C  Consult ordered by: Gillian Pearson          Reason for Consult / Principal Problem:     Diarrhea      ASSESSMENT AND PLAN:      Patient is a 69 y o  female with PMH significant for CAD s/p CABG (2021), mild AS, IBS-D, hypertension, hypothyroidism who presents to the ED with complaints of acute on chronic diarrhea and left-sided abdominal discomfort  Patient was found to have mild nonspecific enteriocolitis, nodular hepatic contour suggestive of cirrhosis and a 5 mm cystic lesion in the tail the pancreas on cross-sectional imaging  Serologies were notable for chronic thrombocytopenia, chronically elevated alk phos and T  Bili and ALEJANDRA  Review of outpatient labs also notable for elevated CRP, hyperthyroidism, and elevated TTG IgA although in the setting of extremely elevated IgA  1   Diarrhea  2  Abdominal pain  3  SMA stenosis  Differential remains broad including viral gastroenteritis, infectious/inflammatory colitis, microscopic colitis, thyroid abnormalities, and celiac disease  Agree with infectious stool studies including stool enteric bacterial panel, C  difficile, and O&P  Suspect multifactorial in the setting of hypothyroidism, recent long-term abx use and abnormal celiac serologies  Ordered a complete celiac panel and added a pancreatic elastase and fecal calprotectin to her previous collected specimen  Also ordered quant immunoglobulins given significantly elevated IgA  Regardless of etiology, patient with clinical improvement and extensive scheduled outpatient work-up including additional stool studies, EGD/colonoscopy (5/18) and vascular surgery consult for SMA stenosis  - Diet as tolerated;  Lactose-free diet  - Follow-up pending stool studies  - Follow-up quant immunoglobulins and celiac disease antibody panel  - Outpatient EGD/colonoscopy as scheduled (5/18) with random biopsies for further evaluation; R/o celiac disease and microscopic colitis  - Outpatient vascular surgery consult as scheduled (5/1) for further evaluation of SMA stenosis  - May benefit from outpatient immunology referral given elevated IgA    4  Cirrhotic morphology on imaging  5  Thrombocytopenia  Patient incidentally noted to have lobulated liver contour on ultrasound with nodular liver contour redemonstrated on CT A/P in addition to chronic thrombocytopenia  Etiology most likely REYNOLDS in the absence of excessive EtOH use  No serologic or clinical evidence of hepatic decompensation  Agree with outpatient elastography for staging of fibrosis  - Outpatient elastography for staging of hepatic fibrosis  - In anticipation of cirrhosis, recommend referral to hepatology to r/o competing causes of liver disease and further management    6  Pancreatic cyst  Patient incidentally noted to have a 5 mm cystic lesion in the tail of the pancreas on cross-sectional imaging  Patient also noted to have chronically elevated alk phos, remains stable when compared to previous  Recommend outpatient evaluation of chronically elevated alk phos and surveillance imaging as per radiology report for pancreatic cyst, repeat MRI/MRCP x2 years  - Outpt evaluation of elevated alk phos (GGT)  - Surveillance imaging of pancreatic cyst as recommended  - If alk phos of hepatic origin or rising, can consider earlier outpt MRI/MRCP    GI will continue to follow  ______________________________________________________________________    HPI: Patient is a 69 y o  female with PMH significant for CAD s/p CABG (2021), mild AS, IBS-D, hypertension, hypothyroidism who presents to the ED with complaints of diarrhea and left-sided abdominal discomfort      Patient is familiar to [facility], [facility], having been seen by Will Saeed PA-C in the outpatient setting for chronic diarrhea with recommendations for complete evaluation including serologies, stool studies, bidirectional endoscopic evaluation, and U/S with mesenteric Doppler  Reports having upwards of 8 soft-watery bowel movements daily since moving from [place] to her personal care home  Also reports mild left-sided abdominal discomfort, new within the last few days described as a dull ache  Admits to long-term abx use for MRSA bacteremia while in [place], attributed to infected staples following open-heart surgery  Denies recent travel outside the country, other close contact with similar symptoms or new medications  Also reportedly had an EGD and colonoscopy while in [place], which were unremarkable  Documentation and pathology unavailable for review  Discontinued abx 3-4 months prior  Also reports lactose-intolerance  Duplex showed greater than 70% stenosis of the SMA and she has been referred to vascular surgery  Ultrasound was notable for a lobulated liver contour and subsequent elastography has been ordered, not completed  Since hospitalization, patient has had 1 soft BM and reports complete resolution of her abdominal discomfort  CT A/P notable for mild nonspecific enterocolitis, nodular liver contour suggestive of hepatic cirrhosis and a 5 mm cystic lesion in the tail of the pancreas  Serologies on admission notable for normal white count, chronic thrombocytopenia (117 K), ALEJANDRA (Cr 1 45), chronically elevated alk phos and T  Bili  REVIEW OF SYSTEMS:    CONSTITUTIONAL: Denies any fever, chills, rigors, and weight loss  HEENT: No earache or tinnitus  Denies hearing loss or visual disturbances  CARDIOVASCULAR: No chest pain or palpitations  RESPIRATORY: Denies any cough, hemoptysis, shortness of breath or dyspnea on exertion  GASTROINTESTINAL: As noted in the History of Present Illness  GENITOURINARY: No problems with urination   Denies any hematuria or dysuria  NEUROLOGIC: No dizziness or vertigo, denies headaches  MUSCULOSKELETAL: Denies any muscle or joint pain  SKIN: Denies skin rashes or itching  ENDOCRINE: Denies excessive thirst  Denies intolerance to heat or cold  PSYCHOSOCIAL: Denies depression or anxiety  Denies any recent memory loss         Historical Information   Past Medical History:   Diagnosis Date   • Anemia    • Atrial fibrillation ([facility] )    • Depression    • GI (gastrointestinal bleed)    • Ischemic colitis ([facility] )      Past Surgical History:   Procedure Laterality Date   • APPENDECTOMY     • CARDIAC SURGERY     • CHOLECYSTECTOMY     • HIP SURGERY Right     Partial replacement   • HYSTERECTOMY       Social History   Social History     Substance and Sexual Activity   Alcohol Use Never     Social History     Substance and Sexual Activity   Drug Use Never     Social History     Tobacco Use   Smoking Status Former   • Types: Cigarettes   Smokeless Tobacco Never     Family History   Problem Relation Age of Onset   • No Known Problems Mother        Meds/Allergies     Medications Prior to Admission   Medication   • acetaminophen (TYLENOL) 650 mg CR tablet   • amLODIPine (NORVASC) 10 mg tablet   • ascorbic acid (VITAMIN C) 500 MG tablet   • aspirin (ECOTRIN LOW STRENGTH) 81 mg EC tablet   • buPROPion (WELLBUTRIN XL) 150 mg 24 hr tablet   • ferrous sulfate 325 (65 Fe) mg tablet   • furosemide (LASIX) 20 mg tablet   • levothyroxine 75 mcg tablet   • losartan (COZAAR) 50 mg tablet   • metoprolol succinate (TOPROL-XL) 25 mg 24 hr tablet   • omeprazole (PriLOSEC) 40 MG capsule   • potassium chloride (Klor-Con) 10 mEq tablet   • saccharomyces boulardii (FLORASTOR) 250 mg capsule   • sodium chloride 1 g tablet   • traZODone (DESYREL) 50 mg tablet   • bisacodyl (DULCOLAX) 5 mg EC tablet   • polyethylene glycol (GLYCOLAX) 17 GM/SCOOP powder   • psyllium (Metamucil Smooth Texture) 58 6 % powder     Current Facility-Administered Medications   Medication Dose "Route Frequency   • acetaminophen (TYLENOL) tablet 650 mg  650 mg Oral Q6H PRN   • amLODIPine (NORVASC) tablet 10 mg  10 mg Oral Daily   • aspirin (ECOTRIN LOW STRENGTH) EC tablet 81 mg  81 mg Oral Daily   • buPROPion (WELLBUTRIN XL) 24 hr tablet 150 mg  150 mg Oral Daily   • heparin (porcine) subcutaneous injection 5,000 Units  5,000 Units Subcutaneous Q8H [address]   • lactase (LACTAID) tablet 6,000 Units  6,000 Units Oral TID With Meals   • levothyroxine tablet 75 mcg  75 mcg Oral Daily   • magnesium sulfate 2 g/50 mL IVPB (premix) 2 g  2 g Intravenous Once   • metoprolol succinate (TOPROL-XL) 24 hr tablet 25 mg  25 mg Oral BID   • multi-electrolyte (PLASMALYTE-A/ISOLYTE-S PH 7 4) IV solution  75 mL/hr Intravenous Continuous   • pantoprazole (PROTONIX) EC tablet 40 mg  40 mg Oral Early Morning   • saccharomyces boulardii (FLORASTOR) capsule 250 mg  250 mg Oral BID   • traZODone (DESYREL) tablet 50 mg  50 mg Oral HS PRN       Allergies   Allergen Reactions   • Influenza Vaccines Other (See Comments) and Vomiting     nausea   • Ceftaroline GI Intolerance   • Diphenhydramine Hyperactivity           Objective     Blood pressure 128/58, pulse 70, temperature 98 3 °F (36 8 °C), temperature source Oral, resp  rate 18, height 4' 11\" (1 499 m), weight 53 3 kg (117 lb 8 1 oz), SpO2 95 %  Body mass index is 23 73 kg/m²  Intake/Output Summary (Last 24 hours) at 4/19/2023 0836  Last data filed at 4/18/2023 1700  Gross per 24 hour   Intake 220 ml   Output --   Net 220 ml         PHYSICAL EXAM:      General Appearance:   Alert, cooperative, no distress   HEENT:   Normocephalic, atraumatic, anicteric  Neck:  Supple, symmetrical, trachea midline   Lungs:   Clear to auscultation bilaterally; no rales, rhonchi or wheezing; respirations unlabored    Heart[de-identified]   Regular rate and rhythm; no murmur, rub, or gallop     Abdomen:   Soft, non-tender, non-distended; normal bowel sounds; no masses, no organomegaly    Genitalia:   Deferred  " Rectal:   Deferred    Extremities:  No cyanosis, clubbing or edema    Pulses:  2+ and symmetric all extremities    Skin:  No jaundice, rashes, or lesions    Lymph nodes:  No palpable cervical lymphadenopathy        Lab Results:   Admission on 04/18/2023   Component Date Value   • WBC 04/18/2023 8 98    • RBC 04/18/2023 3 60 (L)    • Hemoglobin 04/18/2023 12 5    • Hematocrit 04/18/2023 36 7    • MCV 04/18/2023 102 (H)    • MCH 04/18/2023 34 7 (H)    • MCHC 04/18/2023 34 1    • RDW 04/18/2023 15 6 (H)    • MPV 04/18/2023 11 6    • Platelets 44/45/2480 117 (L)    • nRBC 04/18/2023 0    • Neutrophils Relative 04/18/2023 51    • Immat GRANS % 04/18/2023 0    • Lymphocytes Relative 04/18/2023 21    • Monocytes Relative 04/18/2023 23 (H)    • Eosinophils Relative 04/18/2023 4    • Basophils Relative 04/18/2023 1    • Neutrophils Absolute 04/18/2023 4 63    • Immature Grans Absolute 04/18/2023 0 02    • Lymphocytes Absolute 04/18/2023 1 86    • Monocytes Absolute 04/18/2023 2 07 (H)    • Eosinophils Absolute 04/18/2023 0 34    • Basophils Absolute 04/18/2023 0 06    • Sodium 04/18/2023 135    • Potassium 04/18/2023 3 8    • Chloride 04/18/2023 104    • CO2 04/18/2023 22    • ANION GAP 04/18/2023 9    • BUN 04/18/2023 27 (H)    • Creatinine 04/18/2023 1 45 (H)    • Glucose 04/18/2023 115    • Calcium 04/18/2023 8 8    • Corrected Calcium 04/18/2023 9 6    • AST 04/18/2023 35    • ALT 04/18/2023 19    • Alkaline Phosphatase 04/18/2023 393 (H)    • Total Protein 04/18/2023 8 0    • Albumin 04/18/2023 3 0 (L)    • Total Bilirubin 04/18/2023 1 21 (H)    • eGFR 04/18/2023 35    • Lipase 04/18/2023 104 (H)    • hs TnI 0hr 04/18/2023 10    • Color, UA 04/18/2023 Straw    • Clarity, UA 04/18/2023 Clear    • Specific Gravity, UA 04/18/2023 1 010    • pH, UA 04/18/2023 6 0    • Leukocytes, UA 04/18/2023 Negative    • Nitrite, UA 04/18/2023 Negative    • Protein, UA 04/18/2023 Negative    • Glucose, UA 04/18/2023 Negative    • Ketones, UA 04/18/2023 Negative    • Urobilinogen, UA 04/18/2023 0 2    • Bilirubin, UA 04/18/2023 Negative    • Occult Blood, UA 04/18/2023 Negative    • Ventricular Rate 04/18/2023 74    • Atrial Rate 04/18/2023 74    • CO Interval 04/18/2023 178    • QRSD Interval 04/18/2023 76    • QT Interval 04/18/2023 398    • QTC Interval 04/18/2023 441    • P Axis 04/18/2023 80    • QRS Axis 04/18/2023 97    • T Wave Axis 04/18/2023 58    • hs TnI 2hr 04/18/2023 9    • Delta 2hr hsTnI 04/18/2023 -1    • hs TnI 4hr 04/18/2023 8    • Delta 4hr hsTnI 04/18/2023 -2    • Sodium 04/18/2023 136    • Potassium 04/18/2023 3 8    • Chloride 04/18/2023 107    • CO2 04/18/2023 20 (L)    • ANION GAP 04/18/2023 9    • BUN 04/18/2023 26 (H)    • Creatinine 04/18/2023 1 39 (H)    • Glucose 04/18/2023 93    • Calcium 04/18/2023 8 1 (L)    • eGFR 04/18/2023 36    • Sodium 04/19/2023 136    • Potassium 04/19/2023 3 5    • Chloride 04/19/2023 109 (H)    • CO2 04/19/2023 20 (L)    • ANION GAP 04/19/2023 7    • BUN 04/19/2023 28 (H)    • Creatinine 04/19/2023 1 25    • Glucose 04/19/2023 80    • Glucose, Fasting 04/19/2023 80    • Calcium 04/19/2023 8 1 (L)    • Corrected Calcium 04/19/2023 9 4    • AST 04/19/2023 31    • ALT 04/19/2023 17    • Alkaline Phosphatase 04/19/2023 335 (H)    • Total Protein 04/19/2023 6 5    • Albumin 04/19/2023 2 4 (L)    • Total Bilirubin 04/19/2023 1 13 (H)    • eGFR 04/19/2023 41    • Magnesium 04/19/2023 1 6 (L)    • Phosphorus 04/19/2023 3 6    • WBC 04/19/2023 6 03    • RBC 04/19/2023 2 97 (L)    • Hemoglobin 04/19/2023 10 2 (L)    • Hematocrit 04/19/2023 30 8 (L)    • MCV 04/19/2023 104 (H)    • MCH 04/19/2023 34 3    • MCHC 04/19/2023 33 1    • RDW 04/19/2023 15 8 (H)    • MPV 04/19/2023 11 0    • Platelets 17/38/3821 86 (L)    • nRBC 04/19/2023 0    • Neutrophils Relative 04/19/2023 57    • Immat GRANS % 04/19/2023 0    • Lymphocytes Relative 04/19/2023 19    • Monocytes Relative 04/19/2023 19 (H)    • Eosinophils Relative 04/19/2023 4    • Basophils Relative 04/19/2023 1    • Neutrophils Absolute 04/19/2023 3 42    • Immature Grans Absolute 04/19/2023 0 01    • Lymphocytes Absolute 04/19/2023 1 14    • Monocytes Absolute 04/19/2023 1 17    • Eosinophils Absolute 04/19/2023 0 25    • Basophils Absolute 04/19/2023 0 04    • Lipase 04/19/2023 94 (H)        Imaging Studies: I have personally reviewed pertinent imaging studies

## 2023-04-19 NOTE — ASSESSMENT & PLAN NOTE
· Normal BMs 1-2 times a daily with soft stools  · EGD/colonoscopy done 2022 in [place]  Per patient, EGD shows gastric ulcer  Colonoscopy was essential unmarkable  No records to review     · GI input appreciated  · The patient has an EGD and colonoscopy scheduled for 5/18/2023  · Please see treatment outlined above

## 2023-04-19 NOTE — ASSESSMENT & PLAN NOTE
· CT a/p w contrast (4/18) : · Nodular liver contour is suggestive of hepatic cirrhosis  · 5 mm cystic lesion in the tail of the pancreas  For simple cyst(s) less than 1 5 cm, recommend followup every 2 year for 5 times or to age [de-identified], whichever comes first  Followup can stop at age [de-identified] or can switch over to [de-identified] year or older algorithm  Recommend next followup in 2 years     · Will need MRI/MRCP and GI follow up outpatient   · Discussed finding with the patient

## 2023-04-19 NOTE — ASSESSMENT & PLAN NOTE
· Baseline creatinine appears to be between 0 7 to 1 mg/dL  · Presented with creatinine of 1 45 mg/dL --> down to 1 25  · ALEJANDRA is likely due to volume depletion with multiple episodes of diarrhea while on furosemide  · Received IV fluids   ALEJANDRA resolved   · Furosemide and losartan- held; can resume both on 4/20/2023  · Continue to monitor BMP closely in the outpatient setting

## 2023-04-19 NOTE — DISCHARGE INSTR - AVS FIRST PAGE
Dear Nba Pino,     It was our pleasure to care for you here at [facility],  [address]  It is our hope that we were always able to exceed the expected standards for your care during your stay  You were hospitalized due to acute kidney injury and diarrhea  You were cared for on the 2nd floor by EBEN Blanton with the [facility] who covers for your primary care physician (PCP), Susy Schuster MD, while you were hospitalized  If you have any questions or concerns related to this hospitalization, you may contact us at [phone]  For follow up as well as any medication refills, we recommend that you follow up with your primary care physician  A registered nurse will reach out to you by phone within a few days after your discharge to answer any additional questions that you may have after going home  However, at this time we provide for you here, the most important instructions / recommendations at discharge:     Notable Medication Adjustments -   Resume furosemide and losartan at the same dose on 4/20/2023  Lactaid pills 3 times with meals PRN   Testing Required after Discharge -   Follow up with BMP in 1 week- please discuss with your PCP to obtain the script   Important follow up information -   Follow up with GI, PCP, vascular surgery   Other Instructions -   Please refer to discharge instructions   Please review this entire after visit summary as additional general instructions including medication list, appointments, activity, diet, any pertinent wound care, and other additional recommendations from your care team that may be provided for you        Sincerely,     EBEN Blanton

## 2023-04-19 NOTE — PHYSICAL THERAPY NOTE
"Physical Therapy Evaluation     Patient's Name: Sony Solis    Admitting Diagnosis  Diarrhea [R19 7]  Abdominal pain [R10 9]  ALEJANDRA (acute kidney injury) ([facility] ) [N17 9]    Problem List  Patient Active Problem List   Diagnosis    ALEJANDRA (acute kidney injury) ([place] [address] )    Diarrhea of presumed infectious origin    Primary hypertension    Acquired hypothyroidism    Irritable bowel syndrome with diarrhea    Abnormal CT scan       Past Medical History  Past Medical History:   Diagnosis Date    Anemia     Atrial fibrillation ([place] [address] )     Depression     GI (gastrointestinal bleed)     Ischemic colitis [facility])        Past Surgical History  Past Surgical History:   Procedure Laterality Date    APPENDECTOMY      CARDIAC SURGERY      CHOLECYSTECTOMY      HIP SURGERY Right     Partial replacement    HYSTERECTOMY          04/19/23 0849   PT Last Visit   PT Visit Date 04/19/23   Note Type   Note type Evaluation   Pain Assessment   Pain Assessment Tool 0-10   Pain Score 8   Pain Location/Orientation Orientation: Bilateral;Location: Leg   Pain Onset/Description Onset: Ongoing;Frequency: Constant/Continuous; Descriptor: Aching; Other (Comment)  (\"swollen\", \"since I had my hip done\" -reports partial hip replacement approximately 8 weeks ago \"three times the size they should be and hard\")   Effect of Pain on Daily Activities yes   Patient's Stated Pain Goal No pain   Hospital Pain Intervention(s) Repositioned; Ambulation/increased activity; Environmental changes; Emotional support   Multiple Pain Sites Yes   Pain 2   Pain Score 2 8   Pain Location/Orientation 2 Orientation: Bilateral;Orientation: Lower; Location: Back   Pain Radiating Towards 2 yes   Pain Onset/Description 2 Onset: Ongoing;Frequency: Constant/Continuous; Descriptor: Aching;Descriptor: Sore   Patient's Stated Pain Goal 2 No pain   Hospital Pain Intervention(s) 2 Repositioned; Ambulation/increased activity; Environmental changes; Emotional support   Restrictions/Precautions   Weight Bearing " Precautions Per Order No   Other Precautions Contact/isolation; Chair Alarm; Bed Alarm;Pain; Fall Risk   Home Living   Type of Home Assisted living  [facility])   Home Layout Two level;Performs ADLs on one level; Able to live on main level with bedroom/bathroom; Access  (stays on 1st floor)   Bathroom Shower/Tub Walk-in shower   Bathroom Toilet Raised   Bathroom Equipment Grab bars in shower;Grab bars around toilet   [address] aid;Reacher; Other (Comment); Walker  (baseline rollator usage, shoe horn)   Additional Comments Lizette Lepe reports PT Mon and Wed and Tues/Thurs OT  Prior Function   Level of [place] Independent with functional mobility; Independent with ADLs; Independent with IADLS  (setup for ADLs)   Lives With Facility staff   Receives Help From Personal care attendant   IADLs Family/Friend/Other provides transportation; Family/Friend/Other provides meals; Family/Friend/Other provides medication management   Falls in the last 6 months 1 to 4  (x 1 that precipitated partial hip replacement)   Vocational Retired   General   Additional Pertinent History Myrisa OT present for co-assessment due to medical complexity, required skilled interventions of 2 clinicians for care delivery  Family/Caregiver Present No   Cognition   Overall Cognitive Status WFL   Arousal/Participation Alert   Attention Within functional limits   Orientation Level Oriented X4   Memory Within functional limits   Following Commands Follows all commands and directions without difficulty   Comments Lizette Lepe was agreeable to PT assessment, pleasant     RLE Assessment   RLE Assessment X  (3+/5 gross musculature)   LLE Assessment   LLE Assessment X  (3+/5 gross musculature)   Vestibular   Spontaneous Nystagmus (-) no evidence of nystagmus at rest in room light   Gaze Holding Nystagmus (-) no evidence of nystagmus   Coordination   Movements are Fluid and Coordinated 0   Coordination and Movement Description Incremental, antalgic mobility requiring increased time  Sharp/Dull   RLE Sharp/Dull Grossly intact   LLE Sharp/Dull Grossly intact   Bed Mobility   Supine to Sit 6  Modified independent   Additional items HOB elevated; Bedrails   Sit to Supine   (DNT as patient was sitting on the recliner upon conclusion )   Additional Comments Ana Paula Gan denied lightheadedness/dizziness with positional changes  Transfers   Sit to Stand 5  Supervision   Additional items Assist x 1;Bedrails; Increased time required;Verbal cues   Stand to Sit 5  Supervision   Additional items Assist x 1;Bedrails; Increased time required;Verbal cues   Stand pivot 5  Supervision   Additional items Assist x 1; Increased time required;Verbal cues   Toilet transfer 5  Supervision   Additional items Assist x 1; Increased time required;Verbal cues;Standard toilet  (R grab bar)   Additional Comments Verbal cues for B hand placement with transitional movements, safety while turning, and proper AD utilization  Ambulation/Elevation   Gait pattern Improper Weight shift;Narrow CARLEE; Short stride   Gait Assistance 5  Supervision   Additional items Assist x 1;Verbal cues   Assistive Device Rolling walker   Distance 90 feet  (bedside->hallway->bathroom->recliner)   Stair Management Assistance   (N/A)   Ambulation/Elevation Additional Comments Verbal cues for base of support widening for stabilization and directional change environmental awareness  Balance   Static Sitting Normal   Dynamic Sitting Good   Static Standing Fair +   Dynamic Standing Fair   Ambulatory Fair   Endurance Deficit   Endurance Deficit Yes   Activity Tolerance   Activity Tolerance Patient limited by pain   Medical Staff Made Aware Yes, Luz CONTRERAS was informed of assessment outcome and d/c disposition recommendation  Nurse Made Aware yes, Lilibeth Marquez RN   Assessment   Prognosis Good   Problem List Decreased strength;Decreased endurance; Impaired balance;Decreased mobility; Decreased coordination;Pain Assessment Pt is 69 y o  female seen for PT evaluation s/p admit to [facility] on 4/18/2023 w/ ALEJANDRA (acute kidney injury) ([facility] [address] )  PT consulted to assess pt's functional mobility and d/c needs  Order placed for PT eval and tx, w/ up and OOB as tolerated order  Comorbidities affecting pt's physical performance at time of assessment include: weakness,ALEJANDRA, diarrhea of presumed infectious origin, irritable bowel syndrome with diarrhea, HTN  PTA, pt was independent w/ all functional mobility w/ AD usage  Personal factors affecting pt at time of IE include: inability to navigate community distances, unable to perform dynamic tasks in community, positive fall history and inability to perform IADLs  Please find objective findings from PT assessment regarding body systems outlined above with impairments and limitations including weakness, impaired balance, decreased endurance, impaired coordination, gait deviations, pain, decreased activity tolerance, decreased functional mobility tolerance and fall risk  From PT/mobility standpoint, recommendation at time of d/c would be return to facility with rehabilitation services pending progress in order to facilitate return to PLOF  Goals   Patient Goals to get out of the hospital soon   LTG Expiration Date 04/29/23   Long Term Goal #1 Patient will complete transfers modified I  to decrease risk of falls, facilitate upright standing posture  BLE strength to greater than/equal to 4/5 gross musculature to increase ability to safely transfer, control descent to chair  Patient will exhibit increase dynamic standing balance Good for 5-7 minutes without LOB modified I  to improve endurance  Patient will exhibit increase dynamic ambulatory balance to Good 350-500 feet w/ AD modified I to improve ability to mobilize to toilet, chair and decrease risk for additional medical complications   Patient will exhibit good self monitoring and ability to follow 2 step commands to increase complexity of tasks and resume ADL's without LOB  PT Treatment Day 0   Plan   Treatment/Interventions Functional transfer training;LE strengthening/ROM; Elevations; Endurance training; Therapeutic exercise;Patient/family training;Equipment eval/education;Gait training;Spoke to nursing;Spoke to case management   PT Frequency 3-5x/wk   Recommendation   PT Discharge Recommendation Return to facility with rehabilitation services   Equipment Recommended Walker  (pt  has own)   Additional Comments Upon conclusion, Alonso Rao was sitting out of bed on the recliner  The chair alarm was engaged and all of her needs were within reach     AM-PAC Basic Mobility Inpatient   Turning in Flat Bed Without Bedrails 4   Lying on Back to Sitting on Edge of Flat Bed Without Bedrails 3   Moving Bed to Chair 3   Standing Up From Chair Using Arms 3   Walk in Room 3   Climb 3-5 Stairs With Railing 3   Basic Mobility Inpatient Raw Score 19   Basic Mobility Standardized Score 42 48   Highest Level Of Mobility   JH-HLM Goal 6: Walk 10 steps or more   JH-HLM Achieved 7: Walk 25 feet or more     History/Personal Factors/Comorbidities: weakness,ALEJANDRA, diarrhea of presumed infectious origin, irritable bowel syndrome with diarrhea, HTN    # of body structures/limitations: muscle weakness, activity intolerance,decreased endurance, impaired balance, gait deviations,pain    Clinical presentation: unstable as seen in pain severity in more than one body region constant in nature, fall risk, progressive symptoms prior to hospitalization,fall risk with positive fall history    Initial Assessment Time: [id number]    Lydia Contreras, PT

## 2023-04-19 NOTE — PLAN OF CARE
Problem: SAFETY ADULT  Goal: Patient will remain free of falls  Description: INTERVENTIONS:  - Educate patient/family on patient safety including physical limitations  - Instruct patient to call for assistance with activity   - Consult OT/PT to assist with strengthening/mobility   - Keep Call bell within reach  - Keep bed low and locked with side rails adjusted as appropriate  - Keep care items and personal belongings within reach  - Initiate and maintain comfort rounds  - Make Fall Risk Sign visible to staff  - Offer Toileting every 2 Hours, in advance of need  - Initiate/Maintain bed alarm  - Obtain necessary fall risk management equipment  - Apply yellow socks and bracelet for high fall risk patients  - Consider moving patient to room near nurses station  Outcome: Progressing     Problem: GASTROINTESTINAL - ADULT  Goal: Minimal or absence of nausea and/or vomiting  Description: INTERVENTIONS:  - Administer IV fluids if ordered to ensure adequate hydration  - Maintain NPO status until nausea and vomiting are resolved  - Nasogastric tube if ordered  - Administer ordered antiemetic medications as needed  - Provide nonpharmacologic comfort measures as appropriate  - Advance diet as tolerated, if ordered  - Consider nutrition services referral to assist patient with adequate nutrition and appropriate food choices  Outcome: Progressing

## 2023-04-19 NOTE — DISCHARGE SUMMARY
[address] [address]  Discharge- Marilunegraham Blunt 1945, 69 y o  female MRN: [id number]  Unit/Bed#: -01 Encounter: [id number]  Primary Care Provider: Noah Marroquin MD   Date and time admitted to hospital: 4/18/2023 10:21 AM    * ALEJANDRA (acute kidney injury) ([facility] [address] )  Assessment & Plan  · Baseline creatinine appears to be between 0 7 to 1 mg/dL  · Presented with creatinine of 1 45 mg/dL --> down to 1 25  · ALEJANDRA is likely due to volume depletion with multiple episodes of diarrhea while on furosemide  · Received IV fluids  ALEJANDRA resolved   · Furosemide and losartan- held; can resume both on 4/20/2023  · Continue to monitor BMP closely in the outpatient setting       Diarrhea of presumed infectious origin  Assessment & Plan  · With increase frequency of loose non-bloody BMs 8-10 times daily since 12/1/2022  · Presented today with left sided abdominal pain and reports that she has been up all night running to the bathroom  · Has been living at [facility] since November 2022  · The patient claims to have lactose intolerance, IBS-D and cannot tolerate a lot of the food at the personal care home  · CT of abdomen and pelvis (4/18): Mild nonspecific enterocolitis  · Mildly elevated lipase -104 without CT or clinical evidence of pancreatitis  · The patient is afebrile, no leukocytosis  · Interestingly diarrhea has resolved since hospitalization   · Low suspicion that her diarrhea is due to infectious cause since this has been going on for months  · C diff and stool enteric panel- negative, O&P- pending   · GI input appreciated   · Follow-up with outpatient GI  He has EGD and colonoscopy scheduled for 5/18/2023  · Follow-up with celiac panel and quantitative immunoglobulins  Patient will need pancreatic elastase fecal and fecal calprotectin as an outpatient (no further bowel movement)  Superior mesenteric artery stenosis [facility])  Assessment & Plan  · Presented with LLQ pain   Now resolved   · With history of SMA stenosis  · Outpatient vascular surgery evaluation on 5/1    Abnormal CT scan  Assessment & Plan  · CT a/p w contrast (4/18) : · Nodular liver contour is suggestive of hepatic cirrhosis  · 5 mm cystic lesion in the tail of the pancreas  For simple cyst(s) less than 1 5 cm, recommend followup every 2 year for 5 times or to age [de-identified], whichever comes first  Followup can stop at age [de-identified] or can switch over to [de-identified] year or older algorithm  Recommend next followup in 2 years  · Will need MRI/MRCP and GI follow up outpatient   · Discussed finding with the patient     Irritable bowel syndrome with diarrhea  Assessment & Plan  · Normal BMs 1-2 times a daily with soft stools  · EGD/colonoscopy done 2022 in [place]  Per patient, EGD shows gastric ulcer  Colonoscopy was essential unmarkable  No records to review  · GI input appreciated  · The patient has an EGD and colonoscopy scheduled for 5/18/2023  · Please see treatment outlined above     Acquired hypothyroidism  Assessment & Plan  · Continue with levothyroxine     Primary hypertension  Assessment & Plan  · BP is controlled   · Continue with amlodipine and Toprol   · Resume furosemide and losartan on 4/20        Discharging Physician / Practitioner: EBEN Coles  PCP: Zeenat Lazaro MD  Admission Date:   Admission Orders (From admission, onward)     Ordered        04/18/23 1522  Place in Observation  Once                      Discharge Date: 04/19/23    Medical Problems     Resolved Problems  Date Reviewed: 4/19/2023   None         Consultations During Hospital Stay:  · IP CONSULT TO CASE MANAGEMENT  · IP CONSULT TO GASTROENTEROLOGY  · IP CONSULT TO CASE MANAGEMENT  · IP CONSULT TO NUTRITION SERVICES      Procedures Performed:   CT abdomen pelvis with contrast  Result Date: 4/18/2023  Mild nonspecific enterocolitis  Nodular liver contour is suggestive of hepatic cirrhosis  5 mm cystic lesion in the tail of the pancreas   For simple cyst(s) less than 1 5 cm, recommend followup every 2 year for 5 times or to age [de-identified], whichever comes first  Followup can stop at age [de-identified] or can switch over to [de-identified] year or older algorithm  Recommend next followup in 2 years  Preferred imaging modality: abdomen MRI and MRCP with and without IV contrast, or triple phase abdomen CT with IV contrast, or abdomen MRI and MRCP without IV contrast  The recommendations regarding pancreatic findings assumes that patient does not have family history of pancreatic cancer nor have any symptoms potentially attributable to pancreatic cystic lesions (hyperamylasemia, recent-onset diabetes, severe epigastric pain, weight loss, steatorrhea, or jaundice ) If these conditions are not true, then management should be deferred to judgement of specialists such as gastroenterologists or oncologic surgeons  Recommendations are based on recent consensus statements on management of pancreatic cystic lesions from [facility], [facility], the journal Pancreatology, and our own institutional consensus  Additional chronic findings and negatives as above  Significant Findings / Test Results:   · Refer to above     Incidental Findings:   · Refer to above      Test Results Pending at Discharge (will require follow up):   · MRSA and stool O&P     Outpatient Tests Requested:  · Follow up with PCP, GI, vascular surgery     Complications:  None     Reason for Admission: Diarrhea (Pt presents to ER from [facility] by EMS for reports of worsening diarrhea and weakness over the last several weeks  Pt reports a history of IBS and has a colonscopy schedule but not for another month  )        Hospital Course:     Monique Crawford is a 69 y o  female patient who originally presented to the hospital on 4/18/2023 due to diarrhea and abdominal pain  The patient found to have acute kidney injury subsequently was admitted  She was started on IV fluid    ALEJANDRA "has resolved  Patient can resume furosemide and losartan on 4/20/2023  GI evaluation done for her diarrhea and abdominal pain  Interestingly, her diarrhea which has been going on for the past 3 to 4 months have resolved while in the hospital   The patient had 1 soft BM  So far C  difficile and stool enteric panel are negative  Patient will need to have a close follow-up with gastroenterology as an outpatient  She has EGD and colonoscopy scheduled  Patient also has a scheduled appointment with vascular surgery for SMA stenosis on 5/1/2023  Incidental finding of pancreatic cyst and for cirrhosis will be follow-up closely by outpatient gastroenterology  Please see above list of diagnoses and related plan for additional information  Condition at Discharge: good     Discharge Day Visit / Exam:     Subjective: Feeling much better  Diarrhea has resolved  Vitals: Blood Pressure: 128/58 (04/19/23 0723)  Pulse: 70 (04/19/23 0723)  Temperature: 98 3 °F (36 8 °C) (04/19/23 0723)  Temp Source: Oral (04/19/23 0723)  Respirations: 18 (04/19/23 0723)  Height: 4' 11\" (149 9 cm) (04/18/23 1558)  Weight - Scale: 53 3 kg (117 lb 8 1 oz) (04/18/23 1558)  SpO2: 95 % (04/19/23 0723)  Exam:   Physical Exam  Vitals and nursing note reviewed  Constitutional:       General: She is not in acute distress  Appearance: Normal appearance  Comments: Frail      HENT:      Head: Normocephalic and atraumatic  Right Ear: External ear normal       Left Ear: External ear normal       Nose: Nose normal  No rhinorrhea  Mouth/Throat:      Mouth: Mucous membranes are moist       Pharynx: Oropharynx is clear  Eyes:      General:         Right eye: No discharge  Left eye: No discharge  Pupils: Pupils are equal, round, and reactive to light  Cardiovascular:      Rate and Rhythm: Normal rate and regular rhythm  Pulses: Normal pulses  Heart sounds: Normal heart sounds  No murmur heard    Pulmonary: " Effort: Pulmonary effort is normal  No respiratory distress  Breath sounds: Normal breath sounds  Abdominal:      General: Bowel sounds are normal  There is no distension  Palpations: Abdomen is soft  There is no mass  Tenderness: There is no abdominal tenderness  Musculoskeletal:         General: No swelling or tenderness  Normal range of motion  Cervical back: Normal range of motion and neck supple  No muscular tenderness  Skin:     General: Skin is warm and dry  Capillary Refill: Capillary refill takes less than 2 seconds  Findings: No erythema or rash  Neurological:      General: No focal deficit present  Mental Status: She is alert and oriented to person, place, and time  Mental status is at baseline  Psychiatric:         Mood and Affect: Mood normal          Behavior: Behavior normal          Thought Content: Thought content normal          Judgment: Judgment normal        Discussion with Family: none present during exam     Discharge instructions/Information to patient and family:   See after visit summary for information provided to patient and family  Provisions for Follow-Up Care:  See after visit summary for information related to follow-up care and any pertinent home health orders  Disposition:     Group Home / [place] [facility] Readmission: no      Discharge Statement:  I spent 38 minutes discharging the patient  This time was spent on the day of discharge  I had direct contact with the patient on the day of discharge  Greater than 50% of the total time was spent examining patient, answering all patient questions, arranging and discussing plan of care with patient as well as directly providing post-discharge instructions  Additional time then spent on discharge activities  Discharge Medications:  See after visit summary for reconciled discharge medications provided to patient and family        ** Please Note: This note has been constructed using a voice recognition system **

## 2023-04-19 NOTE — OCCUPATIONAL THERAPY NOTE
"Occupational Therapy Evaluation      Maritn Keller    4/19/2023    Principal Problem:    ALEJANDRA (acute kidney injury) ([address] )  Active Problems:    Diarrhea of presumed infectious origin    Primary hypertension    Acquired hypothyroidism    Irritable bowel syndrome with diarrhea    Abnormal CT scan      Past Medical History:   Diagnosis Date    Anemia     Atrial fibrillation (HCC)     Depression     GI (gastrointestinal bleed)     Ischemic colitis [facility])        Past Surgical History:   Procedure Laterality Date    APPENDECTOMY      CARDIAC SURGERY      CHOLECYSTECTOMY      HIP SURGERY Right     Partial replacement    HYSTERECTOMY          04/19/23 0911   OT Last Visit   OT Visit Date 04/19/23   Note Type   Note type Evaluation   Pain Assessment   Pain Assessment Tool 0-10   Pain Score 8   Pain Location/Orientation Orientation: Bilateral;Location: Leg   Pain Onset/Description Onset: Ongoing;Frequency: Constant/Continuous; Descriptor: Aching; Other (Comment)  (\"swollen\", \"since I had my hip done\" -reports partial hip replacement approximately 8 weeks ago \"three times the size they should be and hard\")   Effect of Pain on Daily Activities yes   Patient's Stated Pain Goal No pain   Hospital Pain Intervention(s) Repositioned; Ambulation/increased activity; Environmental changes; Emotional support   Multiple Pain Sites Yes   Restrictions/Precautions   Weight Bearing Precautions Per Order No   Other Precautions Contact/isolation; Chair Alarm; Bed Alarm;Pain; Fall Risk   Home Living   Type of Home Assisted living  [facility])   Home Layout Two level;Performs ADLs on one level; Able to live on main level with bedroom/bathroom; Access  (stays on 1st floor)   Bathroom Shower/Tub Walk-in shower   Bathroom Toilet Raised   Bathroom Equipment Grab bars in shower;Grab bars around toilet   [address] aid;Reacher; Other (Comment); Walker  (baseline rollator usage, shoe horn)   Additional Comments Melody Smith " reports PT Mon and Wed and OT Tues/Thurs   Prior Function   Level of [place] Independent with functional mobility; Independent with ADLs; Needs assistance with IADLS  (setup for ADLs)   Lives With Facility staff   Receives Help From Personal care attendant   IADLs Family/Friend/Other provides transportation; Family/Friend/Other provides meals; Family/Friend/Other provides medication management   Falls in the last 6 months 1 to 4  (x 1 that precipitated partial hip replacement)   Vocational Retired   ADL   UB Bathing Assistance 5  Supervision/Setup   LB Bathing Assistance 5  [address] [facility] Deficit Verbal cueing;Supervison/safety; Increased time to complete;Grab bar use;Perineal hygiene   Bed Mobility   Supine to Sit 6  Modified independent   Additional items HOB elevated; Bedrails   Additional Comments Pt remained OOB in recliner upon conclusion   Transfers   Sit to Stand 5  Supervision   Additional items Assist x 1; Increased time required;Verbal cues   Stand to Sit 5  Supervision   Additional items Assist x 1; Armrests; Increased time required;Verbal cues   Toilet transfer 5  Supervision   Additional items Assist x 1; Increased time required;Verbal cues;Standard toilet  (R grab bar)   Balance   Static Sitting Normal   Dynamic Sitting Good   Static Standing Fair +   Dynamic Standing Fair   Ambulatory Fair   Activity Tolerance   Activity Tolerance Patient limited by pain   Nurse Made Aware STARR Martin   RUESTEPHANIA Assessment   RUE Assessment WFL   LUE Assessment   LUE Assessment WFL   Cognition   Overall Cognitive Status WFL   Arousal/Participation Alert; Cooperative   Attention Within functional limits   Orientation Level Oriented X4   Memory Within functional limits   Following Commands Follows all commands and directions without difficulty   Assessment   Limitation Decreased ADL status; Decreased Safe judgement during ADL;Decreased endurance;Decreased self-care trans;Decreased high-level ADLs   Prognosis Good   Assessment Pt is a 69 y o  female seen for OT evaluation s/p admit to [facility] on 4/18/2023 w/ ALEJANDRA (acute kidney injury) ([facility] [address] )  Comorbidities affecting pt's functional performance at time of assessment include: HTN, IBS  Personal factors affecting pt at time of IE include:difficulty performing ADLS  Prior to admission, pt was Mod I with ADLs  Upon evaluation: the following deficits impact occupational performance: decreased balance, decreased tolerance and increased pain  Pt to benefit from continued skilled OT tx while in the hospital to address deficits as defined above and maximize level of functional independence w ADL's and functional mobility  Occupational Performance areas to address include: bathing/shower, toilet hygiene, dressing, functional mobility and clothing management  From OT standpoint, recommendation at time of d/c would be return to facility with rehab services  Goals   Patient Goals To return to [facility]   Plan   Treatment Interventions ADL retraining;Functional transfer training; Endurance training;Patient/family training; Compensatory technique education; Energy conservation; Activityengagement   Goal Expiration Date 04/29/23   OT Treatment Day 0   OT Frequency 3-5x/wk   Recommendation   OT Discharge Recommendation Return to facility with rehabilitation services   Additional Comments  Pt seen as a co-eval with PT due to the patient's co-morbidities, clinically unstable presentation, and present impairments which are a regression from the patient's baseline  Additional Comments 2 The patient's raw score on the AM-PAC Daily Activity Inpatient Short Form is 20  A raw score of greater than or equal to 19 suggests the patient may benefit from discharge to home  Please refer to the recommendation of the Occupational Therapist for safe discharge planning     AM-[facility] Daily Activity Inpatient   Lower Body Dressing 3   Bathing 3   Toileting 3   Upper Body Dressing 3   Grooming 4   Eating 4   Daily Activity Raw Score 20   Daily Activity Standardized Score (Calc for Raw Score >=11) 42 03   AM-PAC Applied Cognition Inpatient   Following a Speech/Presentation 4   Understanding Ordinary Conversation 4   Taking Medications 4   Remembering Where Things Are Placed or Put Away 4   Remembering List of 4-5 Errands 4   Taking Care of Complicated Tasks 4   Applied Cognition Raw Score 24   Applied Cognition Standardized Score 62 21     GOALS:    Pt will achieve the following within specified time frame: STG  Pt will achieve the following goals within 5 days    *ADL transfers with (I) for inc'd independence with ADLs/purposeful tasks    *UB ADL with (I) for inc'd independence with self cares    *LB ADL with (I) using AE prn for inc'd independence with self cares    *Toileting with (I) for clothing management and hygiene for return to PLOF with personal care    *Increase static stand balance to G- and dyn stand balance to F+ for inc'd safety with standing purposeful tasks    *Increase stand tolerance x3 m for inc'd tolerance with standing purposeful tasks    *Participate in 10m UE therex to increase overall stamina/activity tolerance for purposeful tasks    *Bed mobility- (I) for inc'd independence to manage own comfort and initiate EOB & OOB purposeful tasks    Pt will achieve the following within specified time frame: LTG  Pt will achieve the following goals within 10 days    *Increase static stand balance to G and dyn stand balance to G- for inc'd safety with standing purposeful tasks    *Increase stand tolerance x5 m for inc'd tolerance with standing purposeful tasks      Perry Griffin MS, OTR/L

## 2023-04-20 LAB
ENDOMYSIUM IGA SER QL: NEGATIVE
GLIADIN PEPTIDE IGA SER-ACNC: 65 UNITS (ref 0–19)
GLIADIN PEPTIDE IGG SER-ACNC: 2 UNITS (ref 0–19)
IGA SERPL-MCNC: 1354 MG/DL (ref 64–422)
TTG IGA SER-ACNC: 3 U/ML (ref 0–3)
TTG IGG SER-ACNC: 5 U/ML (ref 0–5)

## 2023-04-21 LAB
IGA SERPL-MCNC: 1250 MG/DL (ref 70–400)
IGG SERPL-MCNC: 2190 MG/DL (ref 700–1600)
IGM SERPL-MCNC: 168 MG/DL (ref 40–230)

## 2023-04-22 LAB — MRSA NOSE QL CULT: NORMAL

## 2023-04-25 NOTE — ED PROVIDER NOTES
History  Chief Complaint   Patient presents with   • Diarrhea     Pt presents to ER from [facility] by EMS for reports of worsening diarrhea and weakness over the last several weeks  Pt reports a history of IBS and has a colonscopy schedule but not for another month  Patient is a 18-year-old female with history of irritable bowel syndrome that presents for evaluation of diarrhea  Patient has had this issue for months  She feels like she is having worsening diarrhea having 8-10 episodes a day over the past couple days  She says that she was going nearly constantly overnight  She says that she also has some dull/achy generalized abdominal pain  No blood noted in the stool  She denies nausea or vomiting is been able to handle p o  She has not take anything for her symptoms  She is planned to have a colonoscopy next month but is concerned that her symptoms are getting worse  She otherwise denies fever chills rigors nausea vomiting chest pain dyspnea  Prior to Admission Medications   Prescriptions Last Dose Informant Patient Reported?  Taking?   acetaminophen (TYLENOL) 650 mg CR tablet 4/18/2023 at 0800  Yes Yes   Sig: Take by mouth   amLODIPine (NORVASC) 10 mg tablet 4/18/2023 at 0800  Yes Yes   Sig: Take 10 mg by mouth daily   ascorbic acid (VITAMIN C) 500 MG tablet 4/18/2023 at 0800  Yes Yes   Sig: Take 500 mg by mouth 2 (two) times a day   aspirin (ECOTRIN LOW STRENGTH) 81 mg EC tablet 4/18/2023 at 0800  Yes Yes   Sig: Take 81 mg by mouth daily   bisacodyl (DULCOLAX) 5 mg EC tablet Unknown  No No   Sig: Use as directed by the office for bowel prep   buPROPion (WELLBUTRIN XL) 150 mg 24 hr tablet 4/18/2023 at 0800  Yes Yes   Sig: Take 150 mg by mouth daily   ferrous sulfate 325 (65 Fe) mg tablet 4/18/2023 at 0800  Yes Yes   Sig: Take 1 tablet by mouth 2 (two) times a day with meals   furosemide (LASIX) 20 mg tablet 4/18/2023 at 0800  Yes Yes   Sig: Take 20 mg by mouth daily levothyroxine 75 mcg tablet 4/18/2023 at 0800  Yes Yes   Sig: Take 75 mcg by mouth daily   losartan (COZAAR) 50 mg tablet 4/18/2023 at 0800  Yes Yes   Sig: Take 50 mg by mouth daily   metoprolol succinate (TOPROL-XL) 25 mg 24 hr tablet 4/18/2023 at 0800  Yes Yes   Sig: Take 25 mg by mouth 2 (two) times a day   omeprazole (PriLOSEC) 40 MG capsule 4/18/2023 at 0600  Yes Yes   Sig: Take 40 mg by mouth daily   polyethylene glycol (GLYCOLAX) 17 GM/SCOOP powder   No No   Sig: Take 238 g by mouth once for 1 dose Use as directed by the office  potassium chloride (Klor-Con) 10 mEq tablet 4/18/2023 at 0800  Yes Yes   Sig: Take 20 mEq by mouth 2 (two) times a day   psyllium (Metamucil Smooth Texture) 58 6 % powder Not Taking  Yes No   Sig: Take 1 packet by mouth   Patient not taking: Reported on 4/18/2023   saccharomyces boulardii (FLORASTOR) 250 mg capsule 4/18/2023 at 0800  Yes Yes   Sig: Take 250 mg by mouth 2 (two) times a day   sodium chloride 1 g tablet 4/18/2023 at 800  Yes Yes   Sig: Take 1 g by mouth 2 (two) times a day   traZODone (DESYREL) 50 mg tablet 4/17/2023 at 2100  Yes Yes   Sig: Take 50 mg by mouth      Facility-Administered Medications: None       Past Medical History:   Diagnosis Date   • Anemia    • Atrial fibrillation (HCC)    • Depression    • GI (gastrointestinal bleed)    • Ischemic colitis ([facility] [address] )        Past Surgical History:   Procedure Laterality Date   • APPENDECTOMY     • CARDIAC SURGERY     • CHOLECYSTECTOMY     • HIP SURGERY Right     Partial replacement   • HYSTERECTOMY         Family History   Problem Relation Age of Onset   • No Known Problems Mother      I have reviewed and agree with the history as documented      E-Cigarette/Vaping   • E-Cigarette Use Never User      E-Cigarette/Vaping Substances   • Nicotine No    • THC No    • CBD No    • Flavoring No    • Other No    • Unknown No      Social History     Tobacco Use   • Smoking status: Former     Types: Cigarettes   • Smokeless tobacco: Never   Vaping Use   • Vaping Use: Never used   Substance Use Topics   • Alcohol use: Never   • Drug use: Never       Review of Systems   Constitutional: Negative for fever  HENT: Negative for sore throat  Respiratory: Negative for shortness of breath  Cardiovascular: Negative for chest pain  Gastrointestinal: Positive for abdominal pain and diarrhea  Genitourinary: Negative for dysuria  Musculoskeletal: Negative for back pain  Skin: Negative for rash  Neurological: Negative for light-headedness  Psychiatric/Behavioral: Negative for agitation  All other systems reviewed and are negative  Physical Exam  Physical Exam  Vitals reviewed  Constitutional:       General: She is not in acute distress  Appearance: She is well-developed  HENT:      Head: Normocephalic  Eyes:      Pupils: Pupils are equal, round, and reactive to light  Cardiovascular:      Rate and Rhythm: Normal rate and regular rhythm  Heart sounds: Normal heart sounds  Pulmonary:      Effort: Pulmonary effort is normal       Breath sounds: Normal breath sounds  Abdominal:      General: Bowel sounds are normal  There is no distension  Palpations: Abdomen is soft  Tenderness: There is abdominal tenderness  There is no guarding  Comments: Mild diffuse tenderness noted   Musculoskeletal:         General: No tenderness or deformity  Normal range of motion  Cervical back: Normal range of motion and neck supple  Skin:     General: Skin is warm and dry  Capillary Refill: Capillary refill takes less than 2 seconds  Neurological:      Mental Status: She is alert and oriented to person, place, and time  Cranial Nerves: No cranial nerve deficit  Sensory: No sensory deficit  Psychiatric:         Behavior: Behavior normal          Thought Content:  Thought content normal          Judgment: Judgment normal          Vital Signs  ED Triage Vitals [04/18/23 1030]   Temperature Pulse Respirations Blood Pressure SpO2   (!) 97 1 °F (36 2 °C) 71 18 145/63 96 %      Temp Source Heart Rate Source Patient Position - Orthostatic VS BP Location FiO2 (%)   Temporal Monitor Lying Left arm --      Pain Score       6           Vitals:    04/18/23 1530 04/18/23 1558 04/18/23 2324 04/19/23 0723   BP: 140/62 146/56 (!) 128/48 128/58   Pulse: 63 62 63 70   Patient Position - Orthostatic VS:  Sitting Lying Lying         Visual Acuity  Visual Acuity    Flowsheet Row Most Recent Value   L Pupil Size (mm) 3   R Pupil Size (mm) 3          ED Medications  Medications   ketorolac (TORADOL) injection 15 mg (15 mg Intravenous Given 4/18/23 1104)   sodium chloride 0 9 % bolus 1,000 mL (1,000 mL Intravenous New Bag 4/18/23 1124)   iohexol (OMNIPAQUE) 350 MG/ML injection (SINGLE-DOSE) 80 mL (80 mL Intravenous Given 4/18/23 1142)   sodium chloride 0 9 % bolus 1,000 mL (1,000 mL Intravenous New Bag 4/18/23 1539)   magnesium sulfate 2 g/50 mL IVPB (premix) 2 g (0 g Intravenous Stopped 4/19/23 1009)   potassium chloride (K-DUR,KLOR-CON) CR tablet 40 mEq (40 mEq Oral Given 4/19/23 0815)       Diagnostic Studies  Results Reviewed     Procedure Component Value Units Date/Time    Ova and parasite examination [[id number]] Collected: 04/19/23 0531    Lab Status: Final result Specimen: Stool from Rectum Updated: 04/22/23 0506    Narrative:      Performed at:  [address] [place] [address] Rosalie John  [id number]  : Keri Pizano MD, Phone:  [phone]  No ova, cysts, or parasites seen     One negative specimen does not rule out the possibility of a  parasitic infection  These results were obtained using wet preparation(s) and trichrome  stained smear  This test does not include testing for Cryptosporidium  parvum, Cyclospora, or Microsporidia      Stool Enteric Bacterial Panel by PCR [[id number]]  (Normal) Collected: 04/19/23 0531    Lab Status: Final result Specimen: Stool from Rectum Updated: 04/19/23 1453     Salmonella sp PCR None Detected     Shigella sp/Enteroinvasive E  coli (EIEC) PCR None Detected     Campylobacter sp (jejuni and coli) PCR None Detected     Shiga toxin 1/Shiga toxin 2 genes PCR None Detected    Clostridium difficile toxin by PCR with EIA [[id number]]  (Normal) Collected: 04/19/23 0531    Lab Status: Final result Specimen: Stool from Rectum Updated: 04/19/23 1424     C difficile toxin by PCR Negative    HS Troponin I 4hr [[id number]]  (Normal) Collected: 04/18/23 1545    Lab Status: Final result Specimen: Blood from Arm, Left Updated: 04/18/23 1616     hs TnI 4hr 8 ng/L      Delta 4hr hsTnI -2 ng/L     HS Troponin I 2hr [[id number]]  (Normal) Collected: 04/18/23 1312    Lab Status: Final result Specimen: Blood from Arm, Left Updated: 04/18/23 1341     hs TnI 2hr 9 ng/L      Delta 2hr hsTnI -1 ng/L     Basic metabolic panel [[id number]]  (Abnormal) Collected: 04/18/23 1312    Lab Status: Final result Specimen: Blood from Arm, Left Updated: 04/18/23 1333     Sodium 136 mmol/L      Potassium 3 8 mmol/L      Chloride 107 mmol/L      CO2 20 mmol/L      ANION GAP 9 mmol/L      BUN 26 mg/dL      Creatinine 1 39 mg/dL      Glucose 93 mg/dL      Calcium 8 1 mg/dL      eGFR 36 ml/min/1 73sq m     Narrative:      [place] guidelines for Chronic Kidney Disease (CKD):   •  Stage 1 with normal or high GFR (GFR > 90 mL/min/1 73 square meters)  •  Stage 2 Mild CKD (GFR = 60-89 mL/min/1 73 square meters)  •  Stage 3A Moderate CKD (GFR = 45-59 mL/min/1 73 square meters)  •  Stage 3B Moderate CKD (GFR = 30-44 mL/min/1 73 square meters)  •  Stage 4 Severe CKD (GFR = 15-29 mL/min/1 73 square meters)  •  Stage 5 End Stage CKD (GFR <15 mL/min/1 73 square meters)  Note: GFR calculation is accurate only with a steady state creatinine    UA w Reflex to Microscopic w Reflex to Culture [[id number]]  (Normal) Collected: 04/18/23 1214    Lab Status: Final result Specimen: Urine, Clean Catch Updated: 04/18/23 1227     Color, UA Straw     Clarity, UA Clear     Specific Gravity, UA 1 010     pH, UA 6 0     Leukocytes, UA Negative     Nitrite, UA Negative     Protein, UA Negative mg/dl      Glucose, UA Negative mg/dl      Ketones, UA Negative mg/dl      Urobilinogen, UA 0 2 E U /dl      Bilirubin, UA Negative     Occult Blood, UA Negative    HS Troponin 0hr (reflex protocol) [[id number]]  (Normal) Collected: 04/18/23 1043    Lab Status: Final result Specimen: Blood from Arm, Left Updated: 04/18/23 1113     hs TnI 0hr 10 ng/L     CMP [[id number]]  (Abnormal) Collected: 04/18/23 1043    Lab Status: Final result Specimen: Blood from Arm, Left Updated: 04/18/23 1106     Sodium 135 mmol/L      Potassium 3 8 mmol/L      Chloride 104 mmol/L      CO2 22 mmol/L      ANION GAP 9 mmol/L      BUN 27 mg/dL      Creatinine 1 45 mg/dL      Glucose 115 mg/dL      Calcium 8 8 mg/dL      Corrected Calcium 9 6 mg/dL      AST 35 U/L      ALT 19 U/L      Alkaline Phosphatase 393 U/L      Total Protein 8 0 g/dL      Albumin 3 0 g/dL      Total Bilirubin 1 21 mg/dL      eGFR 35 ml/min/1 73sq m     Narrative:      [place] guidelines for Chronic Kidney Disease (CKD):   •  Stage 1 with normal or high GFR (GFR > 90 mL/min/1 73 square meters)  •  Stage 2 Mild CKD (GFR = 60-89 mL/min/1 73 square meters)  •  Stage 3A Moderate CKD (GFR = 45-59 mL/min/1 73 square meters)  •  Stage 3B Moderate CKD (GFR = 30-44 mL/min/1 73 square meters)  •  Stage 4 Severe CKD (GFR = 15-29 mL/min/1 73 square meters)  •  Stage 5 End Stage CKD (GFR <15 mL/min/1 73 square meters)  Note: GFR calculation is accurate only with a steady state creatinine    Lipase [[id number]]  (Abnormal) Collected: 04/18/23 1043    Lab Status: Final result Specimen: Blood from Arm, Left Updated: 04/18/23 1106     Lipase 104 u/L     CBC and differential [[id number]]  (Abnormal) Collected: 04/18/23 1043    Lab Status: Final result Specimen: Blood from Arm, Left Updated: 04/18/23 1057     WBC 8 98 Thousand/uL      RBC 3 60 Million/uL      Hemoglobin 12 5 g/dL      Hematocrit 36 7 %       fL      MCH 34 7 pg      MCHC 34 1 g/dL      RDW 15 6 %      MPV 11 6 fL      Platelets 616 Thousands/uL      nRBC 0 /100 WBCs      Neutrophils Relative 51 %      Immat GRANS % 0 %      Lymphocytes Relative 21 %      Monocytes Relative 23 %      Eosinophils Relative 4 %      Basophils Relative 1 %      Neutrophils Absolute 4 63 Thousands/µL      Immature Grans Absolute 0 02 Thousand/uL      Lymphocytes Absolute 1 86 Thousands/µL      Monocytes Absolute 2 07 Thousand/µL      Eosinophils Absolute 0 34 Thousand/µL      Basophils Absolute 0 06 Thousands/µL                  CT abdomen pelvis with contrast   Final Result by Garo Samaniego MD (04/18 1312)      Mild nonspecific enterocolitis  Nodular liver contour is suggestive of hepatic cirrhosis  5 mm cystic lesion in the tail of the pancreas  For simple cyst(s) less than 1 5 cm, recommend followup every 2 year for 5 times or to age [de-identified], whichever comes first  Followup can stop at age [de-identified] or can switch over to [de-identified] year or older algorithm  Recommend    next followup in 2 years  Preferred imaging modality: abdomen MRI and MRCP with and without IV contrast, or triple phase abdomen CT with IV contrast, or abdomen MRI and MRCP without IV contrast       The recommendations regarding pancreatic findings assumes that patient does not have family history of pancreatic cancer nor have any symptoms potentially attributable to pancreatic cystic lesions (hyperamylasemia, recent-onset diabetes, severe    epigastric pain, weight loss, steatorrhea, or jaundice ) If these conditions are not true, then management should be deferred to judgement of specialists such as gastroenterologists or oncologic surgeons   Recommendations are based on recent consensus    statements on management of pancreatic cystic lesions from American G[employer] Association, [facility], the journal Pancreatology, and our own institutional consensus  Additional chronic findings and negatives as above  Workstation performed: [id number]                    Procedures  Procedures         ED Course  ED Course as of 04/25/23 1210   Tue Apr 18, 2023   1109 Creatinine(!): 1 45  Baseline 0 7-1                               SBIRT 22yo+    Flowsheet Row Most Recent Value   Initial Alcohol Screen: US AUDIT-C     1  How often do you have a drink containing alcohol? 0 Filed at: 04/18/2023 1032   2  How many drinks containing alcohol do you have on a typical day you are drinking? 0 Filed at: 04/18/2023 1032   3b  FEMALE Any Age, or MALE 65+: How often do you have 4 or more drinks on one occassion? 0 Filed at: 04/18/2023 1032   Audit-C Score 0 Filed at: 04/18/2023 1032   ADAL: How many times in the past year have you    Used an illegal drug or used a prescription medication for non-medical reasons? Never Filed at: 04/18/2023 1032                    Medical Decision Making  Patient is a 21-year-old female presents for evaluation of acute on chronic diarrhea  Concern for possible IBS versus bowel obstruction versus bacterial colitis  CT imaging is negative  However her renal function is elevated from baseline  After 1 L IV fluids her kidney function did not improve significantly  She will require admission to the hospital for ALEJANDRA  Amount and/or Complexity of Data Reviewed  Labs: ordered  Decision-making details documented in ED Course  Radiology: ordered  Details: Patient with incidentally found pancreas lesion  Advised on the need to follow-up in 2 years  Risk  Prescription drug management  Decision regarding hospitalization            Disposition  Final diagnoses:   ALEJANDRA (acute kidney injury) ([facility] [address] )   Diarrhea   Abdominal pain     Time reflects when diagnosis was documented in both MDM as applicable and the Disposition within this note     Time User Action Codes Description Comment    4/18/2023  3:22 PM Karime Hamilton Add [N17 9] ALEJANDRA (acute kidney injury) ([facility] [address] )     4/18/2023  3:22 PM Kaden Farrell Add [R19 7] Diarrhea     4/18/2023  3:22 PM Kaden Hollis Bare Add [R10 9] Abdominal pain     4/19/2023  3:15 PM Bobbye Jennie Add [R60 0] Lower extremity edema     4/19/2023  3:15 PM Bobbye Jennie Add [E03 9] Acquired hypothyroidism     4/19/2023  3:15 PM Bobbye Jennie Add [I10] Primary hypertension       ED Disposition     ED Disposition   Admit    Condition   Stable    Date/Time   Tue Apr 18, 2023  3:21 PM    Comment   Case was discussed with GO and the patient's admission status was agreed to be Admission Status: observation status to the service of Dr Deepti Storey MD Documentation    [address] [facility] Name, [address]   Transported by (Company and Unit #) [employer]      RN Documentation    Flowsheet Row Most [address] [facility] Name, [address]   Transported by (Company and Unit #) [employer]      Follow-up Information     Follow up With Specialties Details Why Contact Info    Pau Deleon MD Internal Medicine Schedule an appointment as soon as possible for a visit  [address] [place] [address] Via Eloina Vazquez MD Gastroenterology Go to EGD/colonoscopy on 5/18/2023 [address]   [address] [facility] [address]  [address], [address] [facility] Surgery, Nurse Practitioner Go to appointment on 5/1/2023 [address]  [place]   [address]  [phone]            Discharge Medication List as of 4/19/2023  3:23 PM      CONTINUE these medications which have CHANGED    Details   furosemide (LASIX) 20 mg tablet Take 1 tablet (20 mg total) by mouth daily Do not start before April 20, 2023 , Starting Thu 4/20/2023, No Print      lactase (LACTAID) 3,000 units tablet Take 2 tablets (6,000 Units total) by mouth 3 (three) times a day with meals, Starting Wed 4/19/2023, Until Fri 5/19/2023, No Print      losartan (COZAAR) 50 mg tablet Take 1 tablet (50 mg total) by mouth daily Do not start before April 20, 2023 , Starting Thu 4/20/2023, Until Fri 4/19/2024, No Print         CONTINUE these medications which have NOT CHANGED    Details   acetaminophen (TYLENOL) 650 mg CR tablet Take by mouth, Historical Med      amLODIPine (NORVASC) 10 mg tablet Take 10 mg by mouth daily, Historical Med      ascorbic acid (VITAMIN C) 500 MG tablet Take 500 mg by mouth 2 (two) times a day, Historical Med      aspirin (ECOTRIN LOW STRENGTH) 81 mg EC tablet Take 81 mg by mouth daily, Historical Med      buPROPion (WELLBUTRIN XL) 150 mg 24 hr tablet Take 150 mg by mouth daily, Historical Med      ferrous sulfate 325 (65 Fe) mg tablet Take 1 tablet by mouth 2 (two) times a day with meals, Historical Med      levothyroxine 75 mcg tablet Take 75 mcg by mouth daily, Historical Med      metoprolol succinate (TOPROL-XL) 25 mg 24 hr tablet Take 25 mg by mouth 2 (two) times a day, Historical Med      omeprazole (PriLOSEC) 40 MG capsule Take 40 mg by mouth daily, Historical Med      potassium chloride (Klor-Con) 10 mEq tablet Take 20 mEq by mouth 2 (two) times a day, Historical Med      saccharomyces boulardii (FLORASTOR) 250 mg capsule Take 250 mg by mouth 2 (two) times a day, Historical Med      traZODone (DESYREL) 50 mg tablet Take 50 mg by mouth, Historical Med      bisacodyl (DULCOLAX) 5 mg EC tablet Use as directed by the office for bowel prep, Print      polyethylene glycol (GLYCOLAX) 17 GM/SCOOP powder Take 238 g by mouth once for 1 dose Use as directed by the office , Starting Mon 3/20/2023, Print         STOP taking these medications       sodium chloride 1 g tablet Comments:   Reason for Stopping:         psyllium (Metamucil Smooth Texture) 58 6 % powder Comments:   Reason for Stopping:               Outpatient Discharge Orders   Discharge Diet     Activity as tolerated     Call provider for:  persistent nausea or vomiting     Call provider for:     No dressing needed       PDMP Review     None          ED Provider  Electronically Signed by           Nilsa Paulino MD  04/25/23 2770

## 2023-04-27 ENCOUNTER — APPOINTMENT (EMERGENCY)
Dept: CT IMAGING | Facility: HOSPITAL | Age: 78
DRG: 385 | End: 2023-04-27
Payer: MEDICARE

## 2023-04-27 ENCOUNTER — HOSPITAL ENCOUNTER (INPATIENT)
Facility: HOSPITAL | Age: 78
LOS: 26 days | Discharge: NURSING, REHAB OR LONG-TERM CARE | DRG: 385 | End: 2023-05-23
Attending: FAMILY MEDICINE | Admitting: SPECIALIST
Payer: MEDICARE

## 2023-04-27 DIAGNOSIS — K74.60 HEPATIC CIRRHOSIS, UNSPECIFIED HEPATIC CIRRHOSIS TYPE, UNSPECIFIED WHETHER ASCITES PRESENT (HCC): ICD-10-CM

## 2023-04-27 DIAGNOSIS — R17 ELEVATED BILIRUBIN: ICD-10-CM

## 2023-04-27 DIAGNOSIS — N17.9 ACUTE KIDNEY INJURY (HCC): ICD-10-CM

## 2023-04-27 DIAGNOSIS — I10 PRIMARY HYPERTENSION: ICD-10-CM

## 2023-04-27 DIAGNOSIS — R10.9 ABDOMINAL PAIN: Primary | ICD-10-CM

## 2023-04-27 DIAGNOSIS — R10.32 LEFT LOWER QUADRANT ABDOMINAL PAIN: ICD-10-CM

## 2023-04-27 DIAGNOSIS — R74.8 ELEVATED ALKALINE PHOSPHATASE LEVEL: ICD-10-CM

## 2023-04-27 DIAGNOSIS — R19.7 DIARRHEA, UNSPECIFIED TYPE: ICD-10-CM

## 2023-04-27 DIAGNOSIS — D69.6 THROMBOCYTOPENIA (HCC): ICD-10-CM

## 2023-04-27 DIAGNOSIS — K57.92 DIVERTICULITIS: ICD-10-CM

## 2023-04-27 DIAGNOSIS — K92.1 TARRY STOOLS: ICD-10-CM

## 2023-04-27 DIAGNOSIS — R19.7 DIARRHEA OF PRESUMED INFECTIOUS ORIGIN: ICD-10-CM

## 2023-04-27 DIAGNOSIS — D64.9 ANEMIA, UNSPECIFIED TYPE: ICD-10-CM

## 2023-04-27 DIAGNOSIS — L03.115 CELLULITIS OF RIGHT LOWER EXTREMITY: ICD-10-CM

## 2023-04-27 DIAGNOSIS — N17.9 AKI (ACUTE KIDNEY INJURY) (HCC): ICD-10-CM

## 2023-04-27 DIAGNOSIS — E87.1 HYPONATREMIA: ICD-10-CM

## 2023-04-27 LAB
ALBUMIN SERPL BCP-MCNC: 2.9 G/DL (ref 3.5–5)
ALP SERPL-CCNC: 389 U/L (ref 34–104)
ALT SERPL W P-5'-P-CCNC: 20 U/L (ref 7–52)
ANION GAP SERPL CALCULATED.3IONS-SCNC: 8 MMOL/L (ref 4–13)
AST SERPL W P-5'-P-CCNC: 33 U/L (ref 13–39)
BASOPHILS # BLD AUTO: 0.03 THOUSANDS/ÂΜL (ref 0–0.1)
BASOPHILS NFR BLD AUTO: 0 % (ref 0–1)
BILIRUB DIRECT SERPL-MCNC: 0.6 MG/DL (ref 0–0.2)
BILIRUB SERPL-MCNC: 1.42 MG/DL (ref 0.2–1)
BUN SERPL-MCNC: 25 MG/DL (ref 5–25)
CALCIUM SERPL-MCNC: 9 MG/DL (ref 8.4–10.2)
CHLORIDE SERPL-SCNC: 107 MMOL/L (ref 96–108)
CO2 SERPL-SCNC: 18 MMOL/L (ref 21–32)
CREAT SERPL-MCNC: 1.16 MG/DL (ref 0.6–1.3)
EOSINOPHIL # BLD AUTO: 0.19 THOUSAND/ÂΜL (ref 0–0.61)
EOSINOPHIL NFR BLD AUTO: 3 % (ref 0–6)
ERYTHROCYTE [DISTWIDTH] IN BLOOD BY AUTOMATED COUNT: 16 % (ref 11.6–15.1)
GFR SERPL CREATININE-BSD FRML MDRD: 45 ML/MIN/1.73SQ M
GLUCOSE SERPL-MCNC: 99 MG/DL (ref 65–140)
HCT VFR BLD AUTO: 34.9 % (ref 34.8–46.1)
HGB BLD-MCNC: 11.5 G/DL (ref 11.5–15.4)
IMM GRANULOCYTES # BLD AUTO: 0.02 THOUSAND/UL (ref 0–0.2)
IMM GRANULOCYTES NFR BLD AUTO: 0 % (ref 0–2)
LYMPHOCYTES # BLD AUTO: 1.2 THOUSANDS/ÂΜL (ref 0.6–4.47)
LYMPHOCYTES NFR BLD AUTO: 18 % (ref 14–44)
MCH RBC QN AUTO: 34.4 PG (ref 26.8–34.3)
MCHC RBC AUTO-ENTMCNC: 33 G/DL (ref 31.4–37.4)
MCV RBC AUTO: 105 FL (ref 82–98)
MONOCYTES # BLD AUTO: 1.35 THOUSAND/ÂΜL (ref 0.17–1.22)
MONOCYTES NFR BLD AUTO: 20 % (ref 4–12)
NEUTROPHILS # BLD AUTO: 4.01 THOUSANDS/ÂΜL (ref 1.85–7.62)
NEUTS SEG NFR BLD AUTO: 59 % (ref 43–75)
NRBC BLD AUTO-RTO: 0 /100 WBCS
PLATELET # BLD AUTO: 90 THOUSANDS/UL (ref 149–390)
PMV BLD AUTO: 11.9 FL (ref 8.9–12.7)
POTASSIUM SERPL-SCNC: 4.8 MMOL/L (ref 3.5–5.3)
PROT SERPL-MCNC: 7.8 G/DL (ref 6.4–8.4)
RBC # BLD AUTO: 3.34 MILLION/UL (ref 3.81–5.12)
SODIUM SERPL-SCNC: 133 MMOL/L (ref 135–147)
WBC # BLD AUTO: 6.8 THOUSAND/UL (ref 4.31–10.16)

## 2023-04-27 PROCEDURE — 99285 EMERGENCY DEPT VISIT HI MDM: CPT

## 2023-04-27 PROCEDURE — 87081 CULTURE SCREEN ONLY: CPT | Performed by: SPECIALIST

## 2023-04-27 PROCEDURE — 82977 ASSAY OF GGT: CPT | Performed by: STUDENT IN AN ORGANIZED HEALTH CARE EDUCATION/TRAINING PROGRAM

## 2023-04-27 PROCEDURE — 80076 HEPATIC FUNCTION PANEL: CPT | Performed by: STUDENT IN AN ORGANIZED HEALTH CARE EDUCATION/TRAINING PROGRAM

## 2023-04-27 PROCEDURE — 99223 1ST HOSP IP/OBS HIGH 75: CPT

## 2023-04-27 PROCEDURE — 96374 THER/PROPH/DIAG INJ IV PUSH: CPT

## 2023-04-27 PROCEDURE — 36415 COLL VENOUS BLD VENIPUNCTURE: CPT | Performed by: FAMILY MEDICINE

## 2023-04-27 PROCEDURE — 80048 BASIC METABOLIC PNL TOTAL CA: CPT | Performed by: FAMILY MEDICINE

## 2023-04-27 PROCEDURE — 99285 EMERGENCY DEPT VISIT HI MDM: CPT | Performed by: FAMILY MEDICINE

## 2023-04-27 PROCEDURE — 74177 CT ABD & PELVIS W/CONTRAST: CPT

## 2023-04-27 PROCEDURE — 1123F ACP DISCUSS/DSCN MKR DOCD: CPT | Performed by: INTERNAL MEDICINE

## 2023-04-27 PROCEDURE — 85025 COMPLETE CBC W/AUTO DIFF WBC: CPT | Performed by: FAMILY MEDICINE

## 2023-04-27 PROCEDURE — 96361 HYDRATE IV INFUSION ADD-ON: CPT

## 2023-04-27 PROCEDURE — G1004 CDSM NDSC: HCPCS

## 2023-04-27 PROCEDURE — 99223 1ST HOSP IP/OBS HIGH 75: CPT | Performed by: STUDENT IN AN ORGANIZED HEALTH CARE EDUCATION/TRAINING PROGRAM

## 2023-04-27 RX ORDER — FLUTICASONE PROPIONATE 50 MCG
1 SPRAY, SUSPENSION (ML) NASAL DAILY
COMMUNITY
End: 2023-05-23

## 2023-04-27 RX ORDER — CIPROFLOXACIN 2 MG/ML
400 INJECTION, SOLUTION INTRAVENOUS EVERY 12 HOURS
Status: DISCONTINUED | OUTPATIENT
Start: 2023-04-27 | End: 2023-04-28

## 2023-04-27 RX ORDER — ENOXAPARIN SODIUM 100 MG/ML
40 INJECTION SUBCUTANEOUS DAILY
Status: DISCONTINUED | OUTPATIENT
Start: 2023-04-28 | End: 2023-05-03

## 2023-04-27 RX ORDER — POLYVINYL ALCOHOL 14 MG/ML
1 SOLUTION/ DROPS OPHTHALMIC 2 TIMES DAILY
COMMUNITY

## 2023-04-27 RX ORDER — LORATADINE 10 MG/1
10 TABLET ORAL DAILY
COMMUNITY
End: 2023-05-23

## 2023-04-27 RX ORDER — ONDANSETRON 2 MG/ML
4 INJECTION INTRAMUSCULAR; INTRAVENOUS EVERY 6 HOURS PRN
Status: DISCONTINUED | OUTPATIENT
Start: 2023-04-27 | End: 2023-05-21

## 2023-04-27 RX ORDER — SODIUM CHLORIDE 1 G/1
1 TABLET ORAL 2 TIMES DAILY
COMMUNITY
End: 2023-05-23

## 2023-04-27 RX ORDER — FENTANYL CITRATE 50 UG/ML
25 INJECTION, SOLUTION INTRAMUSCULAR; INTRAVENOUS ONCE
Status: COMPLETED | OUTPATIENT
Start: 2023-04-27 | End: 2023-04-27

## 2023-04-27 RX ORDER — DEXTROSE, SODIUM CHLORIDE, AND POTASSIUM CHLORIDE 5; .45; .15 G/100ML; G/100ML; G/100ML
125 INJECTION INTRAVENOUS CONTINUOUS
Status: DISCONTINUED | OUTPATIENT
Start: 2023-04-27 | End: 2023-04-29

## 2023-04-27 RX ORDER — METRONIDAZOLE 500 MG/100ML
500 INJECTION, SOLUTION INTRAVENOUS EVERY 8 HOURS
Status: DISCONTINUED | OUTPATIENT
Start: 2023-04-27 | End: 2023-04-28

## 2023-04-27 RX ADMIN — METRONIDAZOLE 500 MG: 500 INJECTION, SOLUTION INTRAVENOUS at 19:17

## 2023-04-27 RX ADMIN — SODIUM CHLORIDE 1000 ML: 0.9 INJECTION, SOLUTION INTRAVENOUS at 12:07

## 2023-04-27 RX ADMIN — DEXTROSE, SODIUM CHLORIDE, AND POTASSIUM CHLORIDE 125 ML/HR: 5; .45; .15 INJECTION INTRAVENOUS at 17:45

## 2023-04-27 RX ADMIN — IOHEXOL 80 ML: 350 INJECTION, SOLUTION INTRAVENOUS at 13:12

## 2023-04-27 RX ADMIN — FENTANYL CITRATE 25 MCG: 50 INJECTION, SOLUTION INTRAMUSCULAR; INTRAVENOUS at 12:07

## 2023-04-27 RX ADMIN — CIPROFLOXACIN 400 MG: 2 INJECTION, SOLUTION INTRAVENOUS at 17:53

## 2023-04-27 NOTE — ED PROVIDER NOTES
History  Chief Complaint   Patient presents with   • Abdominal Pain     PT REPORTS ABD PAIN STARTING AGAIN TODAY, PER STAFF PT HAS BEEN UNABLE TO EAT ANYTHING TODAY  HPI  Is a 76-year-old female presented from personal care home with complaint of left lower quadrant pain that started last night  Patient with history of diverticulitis was recently discharged from the hospital   She is rating her pain 8 out of 10 in the left lower quadrant  Denies any fever chills nausea vomiting  Vitals are stable  Patient denies any chest pain shortness of breath  Prior to Admission Medications   Prescriptions Last Dose Informant Patient Reported? Taking?   acetaminophen (TYLENOL) 650 mg CR tablet 4/26/2023  Yes Yes   Sig: Take by mouth   amLODIPine (NORVASC) 10 mg tablet 4/26/2023  Yes Yes   Sig: Take 10 mg by mouth daily   ascorbic acid (VITAMIN C) 500 MG tablet 4/26/2023  Yes Yes   Sig: Take 500 mg by mouth 2 (two) times a day   aspirin (ECOTRIN LOW STRENGTH) 81 mg EC tablet 4/26/2023  Yes Yes   Sig: Take 81 mg by mouth daily   bisacodyl (DULCOLAX) 5 mg EC tablet 4/26/2023  No Yes   Sig: Use as directed by the office for bowel prep   buPROPion (WELLBUTRIN XL) 150 mg 24 hr tablet 4/26/2023  Yes Yes   Sig: Take 150 mg by mouth daily   ferrous sulfate 325 (65 Fe) mg tablet 4/26/2023  Yes Yes   Sig: Take 1 tablet by mouth 2 (two) times a day with meals   furosemide (LASIX) 20 mg tablet 4/26/2023  No Yes   Sig: Take 1 tablet (20 mg total) by mouth daily Do not start before April 20, 2023  lactase (LACTAID) 3,000 units tablet 4/27/2023  No Yes   Sig: Take 2 tablets (6,000 Units total) by mouth 3 (three) times a day with meals   levothyroxine 75 mcg tablet 4/26/2023  Yes Yes   Sig: Take 75 mcg by mouth daily   losartan (COZAAR) 50 mg tablet 4/26/2023  No Yes   Sig: Take 1 tablet (50 mg total) by mouth daily Do not start before April 20, 2023     metoprolol succinate (TOPROL-XL) 25 mg 24 hr tablet 4/26/2023  Yes Yes   Sig: Take 25 mg by mouth 2 (two) times a day   omeprazole (PriLOSEC) 40 MG capsule 4/26/2023  Yes Yes   Sig: Take 40 mg by mouth daily   polyethylene glycol (GLYCOLAX) 17 GM/SCOOP powder   No No   Sig: Take 238 g by mouth once for 1 dose Use as directed by the office  potassium chloride (Klor-Con) 10 mEq tablet 4/26/2023  Yes Yes   Sig: Take 20 mEq by mouth 2 (two) times a day   saccharomyces boulardii (FLORASTOR) 250 mg capsule 4/26/2023  Yes Yes   Sig: Take 250 mg by mouth 2 (two) times a day   traZODone (DESYREL) 50 mg tablet   Yes No   Sig: Take 50 mg by mouth      Facility-Administered Medications: None       Past Medical History:   Diagnosis Date   • Anemia    • Atrial fibrillation (HCC)    • Depression    • GI (gastrointestinal bleed)    • Ischemic colitis ([facility] [address] )        Past Surgical History:   Procedure Laterality Date   • APPENDECTOMY     • CARDIAC SURGERY     • CHOLECYSTECTOMY     • HIP SURGERY Right     Partial replacement   • HYSTERECTOMY         Family History   Problem Relation Age of Onset   • No Known Problems Mother      I have reviewed and agree with the history as documented  E-Cigarette/Vaping   • E-Cigarette Use Never User      E-Cigarette/Vaping Substances   • Nicotine No    • THC No    • CBD No    • Flavoring No    • Other No    • Unknown No      Social History     Tobacco Use   • Smoking status: Former     Types: Cigarettes   • Smokeless tobacco: Never   Vaping Use   • Vaping Use: Never used   Substance Use Topics   • Alcohol use: Never   • Drug use: Never       Review of Systems   Constitutional: Negative  HENT: Negative  Eyes: Negative  Respiratory: Negative  Cardiovascular: Negative  Gastrointestinal: Positive for abdominal pain and nausea  Negative for diarrhea and vomiting  Endocrine: Negative  Genitourinary: Negative  Musculoskeletal: Negative  Skin: Negative  Allergic/Immunologic: Negative  Neurological: Negative  Hematological: Negative  Psychiatric/Behavioral: Negative  Physical Exam  Physical Exam  Vitals and nursing note reviewed  Constitutional:       Appearance: She is well-developed  HENT:      Head: Normocephalic and atraumatic  Right Ear: External ear normal       Left Ear: External ear normal       Nose: Nose normal       Mouth/Throat:      Mouth: Mucous membranes are moist       Pharynx: No oropharyngeal exudate  Eyes:      General: No scleral icterus  Right eye: No discharge  Left eye: No discharge  Conjunctiva/sclera: Conjunctivae normal       Pupils: Pupils are equal, round, and reactive to light  Cardiovascular:      Rate and Rhythm: Normal rate and regular rhythm  Pulses: Normal pulses  Heart sounds: Normal heart sounds  Pulmonary:      Effort: Pulmonary effort is normal  No respiratory distress  Breath sounds: Normal breath sounds  No wheezing  Abdominal:      General: Bowel sounds are normal       Palpations: Abdomen is soft  Tenderness: There is abdominal tenderness in the left lower quadrant  Musculoskeletal:         General: Normal range of motion  Cervical back: Normal range of motion and neck supple  Lymphadenopathy:      Cervical: No cervical adenopathy  Skin:     General: Skin is warm and dry  Capillary Refill: Capillary refill takes less than 2 seconds  Neurological:      General: No focal deficit present  Mental Status: She is alert and oriented to person, place, and time     Psychiatric:         Mood and Affect: Mood normal          Behavior: Behavior normal          Vital Signs  ED Triage Vitals [04/27/23 1200]   Temperature Pulse Respirations Blood Pressure SpO2   97 6 °F (36 4 °C) 68 18 (!) 175/71 96 %      Temp Source Heart Rate Source Patient Position - Orthostatic VS BP Location FiO2 (%)   Temporal Monitor Lying Left arm --      Pain Score       6           Vitals:    04/27/23 1430 04/27/23 1500 04/27/23 1600 04/27/23 1630   BP: 151/66 159/70 153/70 169/74   Pulse: 63 63 61 68   Patient Position - Orthostatic VS:    Lying         Visual Acuity      ED Medications  Medications   dextrose 5 % and sodium chloride 0 45 % with KCl 20 mEq/L infusion (has no administration in time range)   ondansetron (ZOFRAN) injection 4 mg (has no administration in time range)   enoxaparin (LOVENOX) subcutaneous injection 40 mg (has no administration in time range)   sodium chloride 0 9 % bolus 1,000 mL (0 mL Intravenous Stopped 4/27/23 1307)   fentanyl citrate (PF) 100 MCG/2ML 25 mcg (25 mcg Intravenous Given 4/27/23 1207)   iohexol (OMNIPAQUE) 350 MG/ML injection (SINGLE-DOSE) 80 mL (80 mL Intravenous Given 4/27/23 1312)       Diagnostic Studies  Results Reviewed     Procedure Component Value Units Date/Time    Basic metabolic panel [[id number]]  (Abnormal) Collected: 04/27/23 1205    Lab Status: Final result Specimen: Blood from Arm, Right Updated: 04/27/23 1250     Sodium 133 mmol/L      Potassium 4 8 mmol/L      Chloride 107 mmol/L      CO2 18 mmol/L      ANION GAP 8 mmol/L      BUN 25 mg/dL      Creatinine 1 16 mg/dL      Glucose 99 mg/dL      Calcium 9 0 mg/dL      eGFR 45 ml/min/1 73sq m     Narrative:      [place] guidelines for Chronic Kidney Disease (CKD):   •  Stage 1 with normal or high GFR (GFR > 90 mL/min/1 73 square meters)  •  Stage 2 Mild CKD (GFR = 60-89 mL/min/1 73 square meters)  •  Stage 3A Moderate CKD (GFR = 45-59 mL/min/1 73 square meters)  •  Stage 3B Moderate CKD (GFR = 30-44 mL/min/1 73 square meters)  •  Stage 4 Severe CKD (GFR = 15-29 mL/min/1 73 square meters)  •  Stage 5 End Stage CKD (GFR <15 mL/min/1 73 square meters)  Note: GFR calculation is accurate only with a steady state creatinine    CBC and differential [[id number]]  (Abnormal) Collected: 04/27/23 1205    Lab Status: Final result Specimen: Blood from Arm, Right Updated: 04/27/23 1226     WBC 6 80 Thousand/uL      RBC 3 34 Million/uL Hemoglobin 11 5 g/dL      Hematocrit 34 9 %       fL      MCH 34 4 pg      MCHC 33 0 g/dL      RDW 16 0 %      MPV 11 9 fL      Platelets 90 Thousands/uL      nRBC 0 /100 WBCs      Neutrophils Relative 59 %      Immat GRANS % 0 %      Lymphocytes Relative 18 %      Monocytes Relative 20 %      Eosinophils Relative 3 %      Basophils Relative 0 %      Neutrophils Absolute 4 01 Thousands/µL      Immature Grans Absolute 0 02 Thousand/uL      Lymphocytes Absolute 1 20 Thousands/µL      Monocytes Absolute 1 35 Thousand/µL      Eosinophils Absolute 0 19 Thousand/µL      Basophils Absolute 0 03 Thousands/µL                  CT abdomen pelvis with contrast   Final Result by Ozzy Wild MD (04/27 1444)      New small amount of free fluid in the abdomen and pelvis compared to 4/18/2023, which can be secondary to cirrhosis or acute inflammatory process in the abdomen and pelvis such as occult acute diverticulitis  Moderate amount of stool in the colon,    nonspecific and can represent constipation  Again seen is 0 6 cm pancreatic tail cystic lesion  Follow-up is recommended as suggested on report of prior CT abdomen pelvis 4/18/2023      Indeterminate 0 4 cm osteolytic lesion in the right aspect of L3 vertebral body, which can represent benign or malignant neoplasm  Bone scan is recommended for further evaluation  The study was marked in [facility] for immediate notification  Procedures  Procedures         ED Course  ED Course as of 04/27/23 1641   Thu Apr 27, 2023   1338 PAIN IS 2/10    1338 PENDING ct ABDOMEN    [address] April Shelton waiting for call back    [phone] Discuss with Dr April Shelton states will see her shortly , currently in wound center                               SBIRT 22yo+    Flowsheet Row Most Recent Value   Initial Alcohol Screen: US AUDIT-C     1  How often do you have a drink containing alcohol? 0 Filed at: 04/27/2023 1205   2   How many drinks containing alcohol do you have on a typical day you are drinking? 0 Filed at: 04/27/2023 1205   3b  FEMALE Any Age, or MALE 65+: How often do you have 4 or more drinks on one occassion? 0 Filed at: 04/27/2023 1205   Audit-C Score 0 Filed at: 04/27/2023 1205   ADAL: How many times in the past year have you    Used an illegal drug or used a prescription medication for non-medical reasons? Never Filed at: 04/27/2023 1205                    Medical Decision Making  This is a 72-year-old female presented with abdominal pain  History obtained from patient and EMS  Patient with a history of diverticulitis will obtain labs CT imaging  Differential diagnosis considered  Overall presentation is consistent with low risk abdominal pain  Low suspicion for cholecystitis, appendicitis, SBO, AAA rupture, or other serious pathology  Patient was treated with pain medications with improvement in symptoms  CT shows possible diverticulitis with the free fluid  General surgery is consulted  Given patient current condition general surgery Dr Wander Pagan is recommending admission  Disposition: Admit    Amount and/or Complexity of Data Reviewed  Labs: ordered  Radiology: ordered  Risk  Prescription drug management  Decision regarding hospitalization            Disposition  Final diagnoses:   Abdominal pain   Diverticulitis     Time reflects when diagnosis was documented in both MDM as applicable and the Disposition within this note     Time User Action Codes Description Comment    4/27/2023  4:32 PM Michelle Mejia [R10 9] Abdominal pain     4/27/2023  4:37 PM Ingrid RANGEL Add [R19 7] Diarrhea of presumed infectious origin     4/27/2023  4:37 PM Lennox Schlatter Remove [R19 7] Diarrhea of presumed infectious origin     4/27/2023  4:37 PM David Wilkinson Add [K57 92] Diverticulitis       ED Disposition     ED Disposition   Admit    Condition   Stable    Date/Time   Thu Apr 27, 2023  4:32 PM    Comment   Case was discussed with Dr Wander Pagan and the patient's admission status was agreed to be Admission Status: observation status to the service of Dr Sumit Hennessy    Follow-up Information    None         Patient's Medications   Discharge Prescriptions    No medications on file       No discharge procedures on file      PDMP Review     None          ED Provider  Electronically Signed by           Minh Fraser MD  04/27/23 4406

## 2023-04-27 NOTE — ED NOTES
Patient transported to [address] [place] [address] [address] [facility], [address] [place] [address]  04/27/23 3407

## 2023-04-27 NOTE — H&P
"H&P - General Surgery   Hayden Narayan 69 y o  female MRN: [id number]  Unit/Bed#: ED 01 Encounter: [id number]  Assessment:  69year old female with PMH significant for Afib, diverticulitis and ischemic colitis who was recently admitted for nonspecific enterocolitis presents for the evaluation of abdominal pain   · Afebrile, vitals stable   · Na 133  · Cr 1 16  · No leukocytosis   · Hgb 11 5  · CT abd/pelvis 4/26: \"New small amount of free fluid in the abdomen and pelvis compared to 4/18/2023, which can be secondary to cirrhosis or acute inflammatory process in the abdomen and pelvis such as occult acute diverticulitis  Moderate amount of stool in the colon, nonspecific and can represent constipation  \"    Plan:  · Admit to the general surgery team for conservative management of diverticulitis   · Clear liquid diet  · IV Cipro and Flagyl  · IV fluids  · Serial abdominal exams   · Analgesia and antiemetics prn   · Evaluated by attending at bedside     History of Present Illness   Chief Complaint: Abdominal pain    HPI:  Hayden Narayan is a 69 y o  female with past medical history significant for diverticulitis  Afib and ischemic colitis who initially presented to [facility] ED with complaints of abdominal pain  Patient states she has been having ongoing diarrhea for months  She reports having 6-8 episodes of diarrhea a day  Denies blood in the stool, but reports having blood on toilet paper  Pain began last night and is mostly on the left side  She was recently discharged from the hospital on 4/18/23 for nonspecific enterocolitis and ALEJANDRA  Patient was seen by GI and has an EGD and colonoscopy scheduled for May  She denies fever, chills, chest pain, sob, n/v, leg pain, leg swelling  She reports having a colonoscopy 02/2022, but does not remember the results  PSH includes appendectomy, cholecystectomy, hysterectomy, and hip surgery  Review of Systems   Constitutional: Negative for chills and fever     HENT: Negative for " congestion and sore throat  Respiratory: Negative for cough and shortness of breath  Cardiovascular: Negative for chest pain and leg swelling  Gastrointestinal: Positive for abdominal pain and diarrhea  Negative for abdominal distention, constipation, nausea and vomiting  Endocrine: Negative for polydipsia and polyuria  Genitourinary: Negative for difficulty urinating, frequency and urgency  Musculoskeletal: Negative for arthralgias and back pain  Skin: Negative for pallor and rash  Neurological: Negative for dizziness, numbness and headaches  Historical Information   Past Medical History:   Diagnosis Date   • Anemia    • Atrial fibrillation ([place] [id number] )    • Depression    • GI (gastrointestinal bleed)    • Ischemic colitis ([place] [address] )      Past Surgical History:   Procedure Laterality Date   • APPENDECTOMY     • CARDIAC SURGERY     • CHOLECYSTECTOMY     • HIP SURGERY Right     Partial replacement   • HYSTERECTOMY       Social History   Social History     Substance and Sexual Activity   Alcohol Use Never     Social History     Substance and Sexual Activity   Drug Use Never     Social History     Tobacco Use   Smoking Status Former   • Types: Cigarettes   Smokeless Tobacco Never     E-Cigarette/Vaping   • E-Cigarette Use Never User      E-Cigarette/Vaping Substances   • Nicotine No    • THC No    • CBD No    • Flavoring No    • Other No    • Unknown No      Family History: non-contributory    Meds/Allergies   PTA meds:   Prior to Admission Medications   Prescriptions Last Dose Informant Patient Reported?  Taking?   acetaminophen (TYLENOL) 650 mg CR tablet 4/26/2023  Yes Yes   Sig: Take by mouth   amLODIPine (NORVASC) 10 mg tablet 4/26/2023  Yes Yes   Sig: Take 10 mg by mouth daily   ascorbic acid (VITAMIN C) 500 MG tablet 4/26/2023  Yes Yes   Sig: Take 500 mg by mouth 2 (two) times a day   aspirin (ECOTRIN LOW STRENGTH) 81 mg EC tablet 4/26/2023  Yes Yes   Sig: Take 81 mg by mouth daily   bisacodyl (DULCOLAX) 5 mg EC tablet 4/26/2023  No Yes   Sig: Use as directed by the office for bowel prep   buPROPion (WELLBUTRIN XL) 150 mg 24 hr tablet 4/26/2023  Yes Yes   Sig: Take 150 mg by mouth daily   ferrous sulfate 325 (65 Fe) mg tablet 4/26/2023  Yes Yes   Sig: Take 1 tablet by mouth 2 (two) times a day with meals   furosemide (LASIX) 20 mg tablet 4/26/2023  No Yes   Sig: Take 1 tablet (20 mg total) by mouth daily Do not start before April 20, 2023  lactase (LACTAID) 3,000 units tablet 4/27/2023  No Yes   Sig: Take 2 tablets (6,000 Units total) by mouth 3 (three) times a day with meals   levothyroxine 75 mcg tablet 4/26/2023  Yes Yes   Sig: Take 75 mcg by mouth daily   losartan (COZAAR) 50 mg tablet 4/26/2023  No Yes   Sig: Take 1 tablet (50 mg total) by mouth daily Do not start before April 20, 2023  metoprolol succinate (TOPROL-XL) 25 mg 24 hr tablet 4/26/2023  Yes Yes   Sig: Take 25 mg by mouth 2 (two) times a day   omeprazole (PriLOSEC) 40 MG capsule 4/26/2023  Yes Yes   Sig: Take 40 mg by mouth daily   polyethylene glycol (GLYCOLAX) 17 GM/SCOOP powder   No No   Sig: Take 238 g by mouth once for 1 dose Use as directed by the office     potassium chloride (Klor-Con) 10 mEq tablet 4/26/2023  Yes Yes   Sig: Take 20 mEq by mouth 2 (two) times a day   saccharomyces boulardii (FLORASTOR) 250 mg capsule 4/26/2023  Yes Yes   Sig: Take 250 mg by mouth 2 (two) times a day   traZODone (DESYREL) 50 mg tablet   Yes No   Sig: Take 50 mg by mouth      Facility-Administered Medications: None     Allergies   Allergen Reactions   • Influenza Vaccines Other (See Comments) and Vomiting     nausea   • Lactose - Food Allergy Diarrhea   • Ceftaroline GI Intolerance   • Diphenhydramine Hyperactivity       Objective   First Vitals:   Blood Pressure: (!) 175/71 (04/27/23 1200)  Pulse: 68 (04/27/23 1200)  Temperature: 97 6 °F (36 4 °C) (04/27/23 1200)  Temp Source: Temporal (04/27/23 1200)  Respirations: 18 (04/27/23 1200)  Height: 4' "11\" (149 9 cm) (04/27/23 1200)  Weight - Scale: 53 1 kg (117 lb) (04/27/23 1200)  SpO2: 96 % (04/27/23 1200)    Current Vitals:   Blood Pressure: 153/70 (04/27/23 1600)  Pulse: 61 (04/27/23 1600)  Temperature: 97 6 °F (36 4 °C) (04/27/23 1200)  Temp Source: Temporal (04/27/23 1200)  Respirations: 18 (04/27/23 1600)  Height: 4' 11\" (149 9 cm) (04/27/23 1200)  Weight - Scale: 53 1 kg (117 lb) (04/27/23 1200)  SpO2: 93 % (04/27/23 1600)      Intake/Output Summary (Last 24 hours) at 4/27/2023 1632  Last data filed at 4/27/2023 1307  Gross per 24 hour   Intake 1000 ml   Output --   Net 1000 ml       Invasive Devices     Peripheral Intravenous Line  Duration           Peripheral IV 04/27/23 Distal;Right;Ventral (anterior) Forearm <1 day                Physical Exam  Vitals reviewed  Constitutional:       General: She is not in acute distress  Appearance: She is normal weight  HENT:      Head: Normocephalic and atraumatic  Cardiovascular:      Rate and Rhythm: Normal rate and regular rhythm  Heart sounds: Normal heart sounds  Pulmonary:      Effort: Pulmonary effort is normal  No respiratory distress  Abdominal:      General: Abdomen is flat  Bowel sounds are normal  There is no distension  Palpations: Abdomen is soft  Tenderness: There is abdominal tenderness in the left lower quadrant  There is no guarding  Musculoskeletal:      Right lower leg: No edema  Left lower leg: No edema  Skin:     General: Skin is warm and dry  Neurological:      General: No focal deficit present  Mental Status: She is alert  Mental status is at baseline  Psychiatric:         Mood and Affect: Mood normal          Behavior: Behavior normal           Lab Results:   I have personally reviewed pertinent lab results    , CBC:   Lab Results   Component Value Date    WBC 6 80 04/27/2023    HGB 11 5 04/27/2023    HCT 34 9 04/27/2023     (H) 04/27/2023    PLT 90 (L) 04/27/2023    MCH 34 4 (H) " 04/27/2023    MCHC 33 0 04/27/2023    RDW 16 0 (H) 04/27/2023    MPV 11 9 04/27/2023    NRBC 0 04/27/2023   , CMP:   Lab Results   Component Value Date    SODIUM 133 (L) 04/27/2023    K 4 8 04/27/2023     04/27/2023    CO2 18 (L) 04/27/2023    BUN 25 04/27/2023    CREATININE 1 16 04/27/2023    CALCIUM 9 0 04/27/2023    EGFR 45 04/27/2023     Imaging: I have personally reviewed pertinent reports  EKG, Pathology, and Other Studies: I have personally reviewed pertinent reports  Code Status: Prior  Advance Directive and Living Will:      Power of :    POLST:      Counseling / Coordination of Care  Total floor / unit time spent today 30 minutes  Greater than 50% of total time was spent with the patient and / or family counseling and / or coordination of care  A description of the counseling / coordination of care: evaluation of patient, review of diagnostic and laboratory studies, and discussion with attending        Jamal Cardona PA-C

## 2023-04-27 NOTE — QUICK NOTE
Asked to evaluate patient in the emergency room with recurrent abdominal pain, and a new finding on CT scan suggesting of inflammatory fluid in the pelvis  The patient is tender to palpation without acute peritoneal signs, the abdomen is firm with tenderness maximal in the left lower quadrant  This fluid may represent complicated diverticulitis, although her white count is normal     Will admit for IV antibiotics, patient was an inpatient at [place] 8 days ago, and the symptoms represent recurrent, and now somewhat more severe symptoms  n/a

## 2023-04-28 PROBLEM — R10.32 LEFT LOWER QUADRANT ABDOMINAL PAIN: Status: ACTIVE | Noted: 2023-04-28

## 2023-04-28 LAB
ALBUMIN SERPL BCP-MCNC: 3 G/DL (ref 3.5–5)
ALP SERPL-CCNC: 364 U/L (ref 34–104)
ALT SERPL W P-5'-P-CCNC: 20 U/L (ref 7–52)
ANION GAP SERPL CALCULATED.3IONS-SCNC: 8 MMOL/L (ref 4–13)
AST SERPL W P-5'-P-CCNC: 37 U/L (ref 13–39)
BILIRUB DIRECT SERPL-MCNC: 0.56 MG/DL (ref 0–0.2)
BILIRUB SERPL-MCNC: 1.43 MG/DL (ref 0.2–1)
BUN SERPL-MCNC: 20 MG/DL (ref 5–25)
CALCIUM SERPL-MCNC: 9 MG/DL (ref 8.4–10.2)
CHLORIDE SERPL-SCNC: 105 MMOL/L (ref 96–108)
CO2 SERPL-SCNC: 18 MMOL/L (ref 21–32)
CREAT SERPL-MCNC: 0.93 MG/DL (ref 0.6–1.3)
ERYTHROCYTE [DISTWIDTH] IN BLOOD BY AUTOMATED COUNT: 16.1 % (ref 11.6–15.1)
GFR SERPL CREATININE-BSD FRML MDRD: 59 ML/MIN/1.73SQ M
GGT SERPL-CCNC: 755 U/L (ref 5–85)
GLUCOSE SERPL-MCNC: 112 MG/DL (ref 65–140)
HCT VFR BLD AUTO: 36.4 % (ref 34.8–46.1)
HGB BLD-MCNC: 12 G/DL (ref 11.5–15.4)
MCH RBC QN AUTO: 34.3 PG (ref 26.8–34.3)
MCHC RBC AUTO-ENTMCNC: 33 G/DL (ref 31.4–37.4)
MCV RBC AUTO: 104 FL (ref 82–98)
PLATELET # BLD AUTO: 92 THOUSANDS/UL (ref 149–390)
PMV BLD AUTO: 10.9 FL (ref 8.9–12.7)
POTASSIUM SERPL-SCNC: 3.9 MMOL/L (ref 3.5–5.3)
PROT SERPL-MCNC: 8 G/DL (ref 6.4–8.4)
RBC # BLD AUTO: 3.5 MILLION/UL (ref 3.81–5.12)
SODIUM SERPL-SCNC: 131 MMOL/L (ref 135–147)
WBC # BLD AUTO: 6.86 THOUSAND/UL (ref 4.31–10.16)

## 2023-04-28 PROCEDURE — 80048 BASIC METABOLIC PNL TOTAL CA: CPT | Performed by: SPECIALIST

## 2023-04-28 PROCEDURE — 99232 SBSQ HOSP IP/OBS MODERATE 35: CPT | Performed by: PHYSICIAN ASSISTANT

## 2023-04-28 PROCEDURE — 80076 HEPATIC FUNCTION PANEL: CPT | Performed by: STUDENT IN AN ORGANIZED HEALTH CARE EDUCATION/TRAINING PROGRAM

## 2023-04-28 PROCEDURE — 99232 SBSQ HOSP IP/OBS MODERATE 35: CPT | Performed by: STUDENT IN AN ORGANIZED HEALTH CARE EDUCATION/TRAINING PROGRAM

## 2023-04-28 PROCEDURE — 97166 OT EVAL MOD COMPLEX 45 MIN: CPT

## 2023-04-28 PROCEDURE — 85027 COMPLETE CBC AUTOMATED: CPT | Performed by: SPECIALIST

## 2023-04-28 RX ORDER — METRONIDAZOLE 500 MG/1
500 TABLET ORAL EVERY 8 HOURS SCHEDULED
Status: DISCONTINUED | OUTPATIENT
Start: 2023-04-28 | End: 2023-05-01

## 2023-04-28 RX ORDER — CIPROFLOXACIN 500 MG/1
500 TABLET, FILM COATED ORAL EVERY 12 HOURS SCHEDULED
Status: DISCONTINUED | OUTPATIENT
Start: 2023-04-28 | End: 2023-05-01

## 2023-04-28 RX ORDER — POLYETHYLENE GLYCOL 3350 17 G/17G
17 POWDER, FOR SOLUTION ORAL
Status: DISCONTINUED | OUTPATIENT
Start: 2023-04-28 | End: 2023-04-30

## 2023-04-28 RX ORDER — MAGNESIUM CARB/ALUMINUM HYDROX 105-160MG
15 TABLET,CHEWABLE ORAL ONCE
Status: COMPLETED | OUTPATIENT
Start: 2023-04-28 | End: 2023-04-28

## 2023-04-28 RX ADMIN — METRONIDAZOLE 500 MG: 500 TABLET ORAL at 13:55

## 2023-04-28 RX ADMIN — DEXTROSE, SODIUM CHLORIDE, AND POTASSIUM CHLORIDE 125 ML/HR: 5; .45; .15 INJECTION INTRAVENOUS at 01:35

## 2023-04-28 RX ADMIN — CIPROFLOXACIN 400 MG: 2 INJECTION, SOLUTION INTRAVENOUS at 04:15

## 2023-04-28 RX ADMIN — METRONIDAZOLE 500 MG: 500 TABLET ORAL at 10:37

## 2023-04-28 RX ADMIN — CIPROFLOXACIN 500 MG: 500 TABLET, FILM COATED ORAL at 18:07

## 2023-04-28 RX ADMIN — MINERAL OIL 15 ML: 1000 SOLUTION ORAL at 10:37

## 2023-04-28 RX ADMIN — DEXTROSE, SODIUM CHLORIDE, AND POTASSIUM CHLORIDE 125 ML/HR: 5; .45; .15 INJECTION INTRAVENOUS at 10:40

## 2023-04-28 RX ADMIN — METRONIDAZOLE 500 MG: 500 TABLET ORAL at 21:49

## 2023-04-28 RX ADMIN — METRONIDAZOLE 500 MG: 500 INJECTION, SOLUTION INTRAVENOUS at 01:35

## 2023-04-28 RX ADMIN — POLYETHYLENE GLYCOL 3350 17 G: 17 POWDER, FOR SOLUTION ORAL at 21:51

## 2023-04-28 RX ADMIN — DEXTROSE, SODIUM CHLORIDE, AND POTASSIUM CHLORIDE 125 ML/HR: 5; .45; .15 INJECTION INTRAVENOUS at 20:18

## 2023-04-28 RX ADMIN — ENOXAPARIN SODIUM 40 MG: 40 INJECTION SUBCUTANEOUS at 08:12

## 2023-04-28 NOTE — CONSULTS
Consultation - [address] [facility]  [place] Millie 69 y o  female MRN: [id number]  Unit/Bed#: -01 Encounter: [id number]        Consults    Reason for Consult / Principal Problem:     Abdominal pain  Diverticulitis  Diarrhea  Abnormal celiac serology  Abnormal liver CT c/f cirrhosis  Thrombocytopenia  Cholestatic liver injury      ASSESSMENT AND PLAN:      70 yo F with CAD s/p CABG, AS, Sjogren's who presented 4/27 with 1 day of severe LLQ pain, presumed diverticulitis  GI consulted for chronic diarrhea and possible cirrhotic liver morphology on imaging  In terms of diarrhea, infectious stool studies were negative during most recent admission  TTG IgA is mildly elevated and she is scheduled for EGD and colonoscopy 5/18 to assess for celiac, IBD, microscopic colitis, malignancy which will likely need to be postponed until she is 6 weeks out from this episode of diverticulitis  Of note, CT on admissions shows considerable stool burden so it is possible overflow may be contributing to her diarrhea and she may benefit from a bowel cleanse  In terms of her liver, nodular morphology and thrombocytopenia concerning for cirrhosis though she has no evidence of decompensation  Suspect abdominal fluid is reactionary to her acute abdominal process and not ascites  She has had elevated alkaline phosphatase (awaiting liver tests for today) and will need chronic liver disease workup as an outpatient   -Continue supportive care with pain control, hydration/electrolyte repletion as needed  -Clear liquid diet, advance as tolerated  -Check LFTs and GGT  Will defer chronic liver disease workup and MRI to outpatient setting  -Monitor stool output   If abdominal symptoms improve, to consider giving miralax based bowel cleanse to alleviate fecal burden  -Will monitor clinical course but will likely need EGD and colonoscopy to be deferred until 6-8 weeks post diverticulitis episode    ______________________________________________________________________    HPI:    Has been having diarrhea or soft stools ever since moving up from [place]  Can have up to 8 BM per day  No blood in stool  No nocturnal BM  Has diarrhea and bowel movements even if she doesn't eat  Was in [address] [place] [address] until last night when developed LLQ pain  Feels similar to episode of diverticulitis she had in [place]  No fevers  No blood in stool  Has never had an issues with her liver in the past   No history of heavy alcohol use  Uncle had cirrhosis but may have been an alcoholic  REVIEW OF SYSTEMS:    CONSTITUTIONAL: Denies any fever, chills, rigors, and weight loss  HEENT: No earache or tinnitus  Denies hearing loss or visual disturbances  CARDIOVASCULAR: No chest pain or palpitations  RESPIRATORY: Denies any cough, hemoptysis, shortness of breath or dyspnea on exertion  GASTROINTESTINAL: As noted in the History of Present Illness  GENITOURINARY: No problems with urination  Denies any hematuria or dysuria  NEUROLOGIC: No dizziness or vertigo, denies headaches  MUSCULOSKELETAL: Denies any muscle or joint pain  SKIN: Denies skin rashes or itching  ENDOCRINE: Denies excessive thirst  Denies intolerance to heat or cold  PSYCHOSOCIAL: Denies depression or anxiety  Denies any recent memory loss         Historical Information   Past Medical History:   Diagnosis Date   • Anemia    • Atrial fibrillation ([facility] )    • Depression    • GI (gastrointestinal bleed)    • Ischemic colitis ([facility] )      Past Surgical History:   Procedure Laterality Date   • APPENDECTOMY     • CARDIAC SURGERY     • CHOLECYSTECTOMY     • HIP SURGERY Right     Partial replacement   • HYSTERECTOMY       Social History   Social History     Substance and Sexual Activity   Alcohol Use Never     Social History     Substance and Sexual Activity   Drug Use Never     Social History     Tobacco Use   Smoking Status Former   • Types: Cigarettes Smokeless Tobacco Never     Family History   Problem Relation Age of Onset   • No Known Problems Mother        Meds/Allergies     Medications Prior to Admission   Medication   • acetaminophen (TYLENOL) 650 mg CR tablet   • amLODIPine (NORVASC) 10 mg tablet   • ascorbic acid (VITAMIN C) 500 MG tablet   • aspirin (ECOTRIN LOW STRENGTH) 81 mg EC tablet   • buPROPion (WELLBUTRIN XL) 150 mg 24 hr tablet   • ferrous sulfate 325 (65 Fe) mg tablet   • fluticasone (FLONASE) 50 mcg/act nasal spray   • FLUTICASONE PROPIONATE, NASAL, NA   • furosemide (LASIX) 20 mg tablet   • lactase (LACTAID) 3,000 units tablet   • levothyroxine 75 mcg tablet   • loratadine (CLARITIN) 10 mg tablet   • losartan (COZAAR) 50 mg tablet   • metoprolol succinate (TOPROL-XL) 25 mg 24 hr tablet   • omeprazole (PriLOSEC) 40 MG capsule   • polyvinyl alcohol (LIQUIFILM TEARS) 1 4 % ophthalmic solution   • potassium chloride (Klor-Con) 10 mEq tablet   • saccharomyces boulardii (FLORASTOR) 250 mg capsule   • sodium chloride 1 g tablet   • bisacodyl (DULCOLAX) 5 mg EC tablet   • traZODone (DESYREL) 50 mg tablet     Current Facility-Administered Medications   Medication Dose Route Frequency   • ciprofloxacin (CIPRO) IVPB (premix in 5% dextrose) 400 mg 200 mL  400 mg Intravenous Q12H   • dextrose 5 % and sodium chloride 0 45 % with KCl 20 mEq/L infusion  125 mL/hr Intravenous Continuous   • [START ON 4/28/2023] enoxaparin (LOVENOX) subcutaneous injection 40 mg  40 mg Subcutaneous Daily   • metroNIDAZOLE (FLAGYL) IVPB (premix) 500 mg 100 mL  500 mg Intravenous Q8H   • ondansetron (ZOFRAN) injection 4 mg  4 mg Intravenous Q6H PRN       Allergies   Allergen Reactions   • Influenza Vaccines Other (See Comments) and Vomiting     nausea   • Lactose - Food Allergy Diarrhea   • Ceftaroline GI Intolerance   • Diphenhydramine Hyperactivity           Objective     Blood pressure 161/61, pulse 69, temperature 97 7 °F (36 5 °C), temperature source Oral, resp   rate 18, "height 4' 11\" (1 499 m), weight 53 1 kg (117 lb), SpO2 96 %  Body mass index is 23 63 kg/m²        Intake/Output Summary (Last 24 hours) at 4/27/2023 2010  Last data filed at 4/27/2023 1307  Gross per 24 hour   Intake 1000 ml   Output --   Net 1000 ml         PHYSICAL EXAM:      General Appearance:   Alert, cooperative, no distress   HEENT:   Normocephalic, atraumatic, anicteric      Neck:  Supple, symmetrical, trachea midline   Lungs:   Clear to auscultation bilaterally; no rales, rhonchi or wheezing; respirations unlabored    Heart[de-identified]   Regular rate and UPJGLE;7/3 systolic murmur   Abdomen:   Soft, LLQ TTP, non-distended; normal bowel sounds; no masses, no organomegaly    Genitalia:   Deferred    Rectal:   Deferred    Extremities:  No cyanosis, clubbing or edema    Pulses:  2+ and symmetric all extremities    Skin:  No jaundice, rashes, or lesions    Lymph nodes:  No palpable cervical lymphadenopathy        Lab Results:   Admission on 04/27/2023   Component Date Value   • WBC 04/27/2023 6 80    • RBC 04/27/2023 3 34 (L)    • Hemoglobin 04/27/2023 11 5    • Hematocrit 04/27/2023 34 9    • MCV 04/27/2023 105 (H)    • MCH 04/27/2023 34 4 (H)    • MCHC 04/27/2023 33 0    • RDW 04/27/2023 16 0 (H)    • MPV 04/27/2023 11 9    • Platelets 20/93/9995 90 (L)    • nRBC 04/27/2023 0    • Neutrophils Relative 04/27/2023 59    • Immat GRANS % 04/27/2023 0    • Lymphocytes Relative 04/27/2023 18    • Monocytes Relative 04/27/2023 20 (H)    • Eosinophils Relative 04/27/2023 3    • Basophils Relative 04/27/2023 0    • Neutrophils Absolute 04/27/2023 4 01    • Immature Grans Absolute 04/27/2023 0 02    • Lymphocytes Absolute 04/27/2023 1 20    • Monocytes Absolute 04/27/2023 1 35 (H)    • Eosinophils Absolute 04/27/2023 0 19    • Basophils Absolute 04/27/2023 0 03    • Sodium 04/27/2023 133 (L)    • Potassium 04/27/2023 4 8    • Chloride 04/27/2023 107    • CO2 04/27/2023 18 (L)    • ANION GAP 04/27/2023 8    • BUN 04/27/2023 25  " • Creatinine 04/27/2023 1 16    • Glucose 04/27/2023 99    • Calcium 04/27/2023 9 0    • eGFR 04/27/2023 45        Imaging Studies: I have personally reviewed pertinent imaging studies

## 2023-04-28 NOTE — ASSESSMENT & PLAN NOTE
69 F recently admitted for diarrhea presenting now with worseing LLQ abdominal pain  CT suggesting cirrhosis, but no acute findings  Ongoing pain, tolerating small amounts by mouth  Abdomen soft, mild distention, moderate TTP in LLQ and suprapubic region  AFVS on room air  , Cr 0 93, Plt 92, WBC 6 86, T bili 1 43,     Assessment:  Diverticulitis  Diarrhea  Cirrhosis    Plan:  Appreciate GI input  Bowel regimen today  PO antibiotics  Continue inpatient monitoring  AM labs

## 2023-04-28 NOTE — OCCUPATIONAL THERAPY NOTE
Occupational Therapy Evaluation      [facility]    4/28/2023    Principal Problem:    Left lower quadrant abdominal pain      Past Medical History:   Diagnosis Date    Anemia     Atrial fibrillation (HCC)     Depression     GI (gastrointestinal bleed)     Ischemic colitis [facility])        Past Surgical History:   Procedure Laterality Date    APPENDECTOMY      CARDIAC SURGERY      CHOLECYSTECTOMY      HIP SURGERY Right     Partial replacement    HYSTERECTOMY          04/28/23 1330   OT Last Visit   OT Visit Date 04/28/23   Note Type   Note type Evaluation   Pain Assessment   Pain Assessment Tool 0-10   Pain Score 6   Pain Location/Orientation Location: Abdomen   Pain Radiating Towards back   Pain Onset/Description Onset: Ongoing;Frequency: Constant/Continuous; Descriptor: Aching   Patient's Stated Pain Goal No pain   Hospital Pain Intervention(s) Repositioned; Ambulation/increased activity; Emotional support   Restrictions/Precautions   Weight Bearing Precautions Per Order No   Other Precautions Chair Alarm; Bed Alarm;Multiple lines; Fall Risk;Pain   Home Living   Type of Home Assisted living  [facility]   Home Layout Two level;Performs ADLs on one level; Able to live on main level with bedroom/bathroom; Access  (stays on 1st floor)   Bathroom Shower/Tub Walk-in shower   Bathroom Toilet Raised   Bathroom Equipment Grab bars in shower;Grab bars around toilet   [facility] aid;Reacher;Walker  (baseline rollator usage, shoe horn)   Additional Comments Alirio Pro reports PT Mon and Wed and OT Tues/Thurs   Prior Function   Level of Caroleen Independent with functional mobility; Independent with ADLs; Needs assistance with IADLS  (setup for ADLs)   Lives With Facility staff   Receives Help From Personal care attendant   IADLs Family/Friend/Other provides transportation; Family/Friend/Other provides meals; Family/Friend/Other provides medication management   Falls in the last 6 months 1 to 4  (x 1 that precipitated partial hip replacement)   Vocational Retired   ADL   UB Bathing Assistance 5  [address]  [address]  [phone]  [phone]  Supervision/Setup   Bed Mobility   Supine to Sit 5  Supervision   Additional items Assist x 1;Bedrails; Increased time required;Verbal cues   Additional Comments Pt OOB in recliner upon arrival   Transfers   Sit to Stand 5  Supervision   Additional items Assist x 1; Armrests; Increased time required;Verbal cues   Stand to Sit 5  Supervision   Additional items Assist x 1;Bedrails; Increased time required;Verbal cues   Toilet transfer 5  Supervision   Additional items Assist x 1; Increased time required;Verbal cues;Standard toilet  (L grab bar)   Functional Mobility   Functional Mobility 5  Supervision   Additional Comments 20ft + 15ft   Additional items Rolling walker   Balance   Static Sitting Good   Dynamic Sitting Fair +   Static Standing Fair   Dynamic Standing Fair -   Ambulatory Fair -   Activity Tolerance   Activity Tolerance Patient limited by pain   Medical Staff Made Aware [facility]   Nurse Made Aware STARR Elias   RUE Assessment   RUE Assessment WFL   LUE Assessment   LUE Assessment WFL   Vision-Basic Assessment   Current Vision Wears glasses all the time  (Pt has a reading pain and a distance pain)   Cognition   Overall Cognitive Status WFL   Arousal/Participation Alert; Cooperative   Attention Within functional limits   Orientation Level Oriented X4   Memory Within functional limits   Following Commands Follows all commands and directions without difficulty   Assessment   Limitation Decreased ADL status; Decreased Safe judgement during ADL;Decreased endurance;Decreased self-care trans;Decreased high-level ADLs   Prognosis Good   Assessment Pt is a 69 y o  female seen for OT evaluation s/p admit to [facility] on 4/27/2023 w/ Abdominal pain  Comorbidities affecting pt's functional performance at time of assessment include: HTN, Hypothyroidism, IBS  Personal factors affecting pt at time of IE include:difficulty performing ADLS  Prior to admission, pt was Mod I with ADLs  Upon evaluation: the following deficits impact occupational performance: decreased balance, decreased tolerance and increased pain  Pt to benefit from continued skilled OT tx while in the hospital to address deficits as defined above and maximize level of functional independence w ADL's and functional mobility  Occupational Performance areas to address include: bathing/shower, toilet hygiene, dressing, functional mobility and clothing management  From OT standpoint, recommendation at time of d/c would be return to facility with rehab services  Goals   Patient Goals To have less pain   Plan   Treatment Interventions ADL retraining;Functional transfer training; Endurance training;Patient/family training; Compensatory technique education; Energy conservation; Activityengagement   Goal Expiration Date 05/08/23   OT Treatment Day 0   OT Frequency 3-5x/wk   Recommendation   OT Discharge Recommendation Return to facility with rehabilitation services   Additional Comments  The patient's raw score on the AM-PAC Daily Activity Inpatient Short Form is 20  A raw score of greater than or equal to 19 suggests the patient may benefit from discharge to home  Please refer to the recommendation of the Occupational Therapist for safe discharge planning     AM-PAC Daily Activity Inpatient   Lower Body Dressing 3   Bathing 3   Toileting 3   Upper Body Dressing 3   Grooming 4   Eating 4   Daily Activity Raw Score 20   Daily Activity Standardized Score (Calc for Raw Score >=11) 42 03   AM-[facility] Applied Cognition Inpatient   Following a Speech/Presentation 4   Understanding Ordinary Conversation 4   Taking Medications 4   Remembering Where Things Are Placed or Put Away 4   Remembering List of 4-5 Errands 4 Taking Care of Complicated Tasks 4   Applied Cognition Raw Score 24   Applied Cognition Standardized Score 62 21     GOALS:    Pt will achieve the following within specified time frame: STG  Pt will achieve the following goals within 5 days    *ADL transfers with (I) for inc'd independence with ADLs/purposeful tasks    *UB ADL with (I) for inc'd independence with self cares    *LB ADL with (I) using AE prn for inc'd independence with self cares    *Toileting with (I) for clothing management and hygiene for return to PLOF with personal care    *Increase static stand balance to F+ and dyn stand balance to F for inc'd safety with standing purposeful tasks    *Increase stand tolerance x3 m for inc'd tolerance with standing purposeful tasks    *Participate in 10m UE therex to increase overall stamina/activity tolerance for purposeful tasks    *Bed mobility- (I) for inc'd independence to manage own comfort and initiate EOB & OOB purposeful tasks    Pt will achieve the following within specified time frame: LTG  Pt will achieve the following goals within 10 days    *Increase static stand balance to G- and dyn stand balance to F+ for inc'd safety with standing purposeful tasks    *Increase stand tolerance x5 m for inc'd tolerance with standing purposeful tasks      Perry Raman MS, OTR/L

## 2023-04-28 NOTE — PROGRESS NOTES
"[facility]  Progress Note  Name: Andrea Nunez  MRN: [id number]  Unit/Bed#: -01 I Date of Admission: 4/27/2023   Date of Service: 4/28/2023 I Hospital Day: 1    Assessment/Plan   * Left lower quadrant abdominal pain  Assessment & Plan  69 F recently admitted for diarrhea presenting now with worseing LLQ abdominal pain  CT suggesting cirrhosis, but no acute findings  Ongoing pain, tolerating small amounts by mouth  Abdomen soft, mild distention, moderate TTP in LLQ and suprapubic region  AFVS on room air  , Cr 0 93, Plt 92, WBC 6 86, T bili 1 43,     Assessment:  Diverticulitis  Diarrhea  Cirrhosis    Plan:  Appreciate GI input  Bowel regimen today  PO antibiotics  Continue inpatient monitoring  AM labs  Subjective/Objective   Chief Complaint: pain    Subjective: ongoing lower abdominal pain, improved but persisting, no N/V, but poor appetite, flatus and loose but not watery diarrhea    Objective: no overnight events    Blood pressure (!) 132/46, pulse 64, temperature 97 6 °F (36 4 °C), resp  rate 18, height 4' 11\" (1 499 m), weight 53 1 kg (117 lb), SpO2 95 %  ,Body mass index is 23 63 kg/m²  Intake/Output Summary (Last 24 hours) at 4/28/2023 1432  Last data filed at 4/28/2023 1249  Gross per 24 hour   Intake 1954 58 ml   Output --   Net 1954 58 ml       Invasive Devices     Peripheral Intravenous Line  Duration           Peripheral IV 04/27/23 Distal;Right;Ventral (anterior) Forearm 1 day                Physical Exam  Vitals and nursing note reviewed  Constitutional:       General: She is not in acute distress  Appearance: She is well-developed  She is not diaphoretic  HENT:      Head: Normocephalic and atraumatic  Eyes:      Conjunctiva/sclera: Conjunctivae normal       Pupils: Pupils are equal, round, and reactive to light  Pulmonary:      Effort: No respiratory distress  Abdominal:      Comments: Bowel sounds present   Soft, " mild distention  Moderate TTP in LLQ and suprapubic region  No guarding/rigidity  Musculoskeletal:         General: Normal range of motion  Cervical back: Normal range of motion  Skin:     General: Skin is warm and dry  Capillary Refill: Capillary refill takes less than 2 seconds  Neurological:      Mental Status: She is alert and oriented to person, place, and time  Psychiatric:         Behavior: Behavior normal            Lab, Imaging and other studies:  I have personally reviewed pertinent lab results    , CBC:   Lab Results   Component Value Date    WBC 6 86 04/28/2023    HGB 12 0 04/28/2023    HCT 36 4 04/28/2023     (H) 04/28/2023    PLT 92 (L) 04/28/2023    MCH 34 3 04/28/2023    MCHC 33 0 04/28/2023    RDW 16 1 (H) 04/28/2023    MPV 10 9 04/28/2023   , CMP:   Lab Results   Component Value Date    SODIUM 131 (L) 04/28/2023    K 3 9 04/28/2023     04/28/2023    CO2 18 (L) 04/28/2023    BUN 20 04/28/2023    CREATININE 0 93 04/28/2023    CALCIUM 9 0 04/28/2023    AST 37 04/28/2023    ALT 20 04/28/2023    ALKPHOS 364 (H) 04/28/2023    EGFR 59 04/28/2023     VTE Pharmacologic Prophylaxis: Enoxaparin (Lovenox)  VTE Mechanical Prophylaxis: sequential compression device

## 2023-04-28 NOTE — PLAN OF CARE
Problem: SAFETY ADULT  Goal: Patient will remain free of falls  Description: INTERVENTIONS:  - Educate patient/family on patient safety including physical limitations  - Instruct patient to call for assistance with activity   - Consult OT/PT to assist with strengthening/mobility   - Keep Call bell within reach  - Keep bed low and locked with side rails adjusted as appropriate  - Keep care items and personal belongings within reach  - Initiate and maintain comfort rounds  - Make Fall Risk Sign visible to staff  - Offer Toileting every 2 Hours, in advance of need  - Initiate/Maintain alarms  - Obtain necessary fall risk management equipment:  - Apply yellow socks and bracelet for high fall risk patients  - Consider moving patient to room near nurses station  4/28/2023 0958 by Erum Ferro RN  Outcome: Progressing  4/28/2023 0953 by Erum Ferro RN  Outcome: Progressing  Goal: Maintain or return to baseline ADL function  Description: INTERVENTIONS:  - Educate patient/family on patient safety including physical limitations  - Instruct patient to call for assistance with activity   - Consult OT/PT to assist with strengthening/mobility   - Keep Call bell within reach  - Keep bed low and locked with side rails adjusted as appropriate  - Keep care items and personal belongings within reach  - Initiate and maintain comfort rounds  - Make Fall Risk Sign visible to staff  - Offer Toileting every 2 Hours, in advance of need  - Initiate/Maintain alarms  - Obtain necessary fall risk management equipment:   - Apply yellow socks and bracelet for high fall risk patients  - Consider moving patient to room near nurses station  4/28/2023 0958 by Erum Ferro RN  Outcome: Progressing  4/28/2023 0953 by Erum Ferro RN  Outcome: Progressing    Outcome: Progressing     Problem: DISCHARGE PLANNING  Goal: Discharge to home or other facility with appropriate resources  Description: INTERVENTIONS:  - Identify barriers to discharge w/patient and caregiver  - Arrange for needed discharge resources and transportation as appropriate  - Identify discharge learning needs (meds, wound care, etc )  - Refer to Case Management Department for coordinating discharge planning if the patient needs post-hospital services based on physician/advanced practitioner order or complex needs related to functional status, cognitive ability, or social support system  Outcome: Progressing     Problem: Knowledge Deficit  Goal: Patient/family/caregiver demonstrates understanding of disease process, treatment plan, medications, and discharge instructions  Description: Complete learning assessment and assess knowledge base    Interventions:  - Provide teaching at level of understanding  - Provide teaching via preferred learning methods  Outcome: Progressing

## 2023-04-28 NOTE — PLAN OF CARE
Problem: OCCUPATIONAL THERAPY ADULT  Goal: Performs self-care activities at highest level of function for planned discharge setting  See evaluation for individualized goals  Description: Treatment Interventions: ADL retraining, Functional transfer training, Endurance training, Patient/family training, Compensatory technique education, Energy conservation, Activityengagement    See flowsheet documentation for full assessment, interventions and recommendations  Note: Limitation: Decreased ADL status, Decreased Safe judgement during ADL, Decreased endurance, Decreased self-care trans, Decreased high-level ADLs  Prognosis: Good  Assessment: Pt is a 69 y o  female seen for OT evaluation s/p admit to [facility] on 4/27/2023 w/ Abdominal pain  Comorbidities affecting pt's functional performance at time of assessment include: HTN, Hypothyroidism, IBS  Personal factors affecting pt at time of IE include:difficulty performing ADLS  Prior to admission, pt was Mod I with ADLs  Upon evaluation: the following deficits impact occupational performance: decreased balance, decreased tolerance and increased pain  Pt to benefit from continued skilled OT tx while in the hospital to address deficits as defined above and maximize level of functional independence w ADL's and functional mobility  Occupational Performance areas to address include: bathing/shower, toilet hygiene, dressing, functional mobility and clothing management  From OT standpoint, recommendation at time of d/c would be return to facility with rehab services       OT Discharge Recommendation: Return to facility with rehabilitation services     [address] [place] [address] Road, MS, OTR/L

## 2023-04-28 NOTE — PROGRESS NOTES
Progress Note- Jamal Beavers 69 y o  female MRN: [id number]    Unit/Bed#: -01 Encounter: [id number]      Assessment and Plan:    68 y/o F w/ pmhx sig for CAD s/p CABG, AS, HTN, Sjogren's syndrome, who presented on 04/27/23 with 1 day of severe left lower quadrant pain and was admitted with presumed diverticulitis  She was started on Cipro and Flagyl  She has chronic diarrhea, and stool enteric panel, C  difficile, and ova and parasites were negative during her last hospitalization for similar on 4/19/2023  She has positive TTG IgA antibodies, and has endoscopic evaluation planned  She has a history of SMA stenosis and will be evaluated by vascular surgery for this  Her CT scan on admission was also notable for a large stool burden  As of 4/20/2023, serologies noted Hb 12 0, , platelets 92, BUN 20, creatinine 0 93, T  bili 1 43, , AST 37, ALT 20, albumin 3 0  LLQ abdominal pain   Suspected diverticulitis   Diarrhea   Large stool burden     · Continue with conservative management of suspected diverticulitis with antibiotic therapy, IV fluid hydration, narcotic analgesia as needed, antiemetics as needed, etc   · Continue with serial abdominal examinations, monitoring WBC curve, and monitoring VS closely  · Abdominal pain may be multifactorial and a component of constipation is likely contributing to her symptoms  · Given large stool burden, her complaints of diarrhea may be in part secondary to overflow  · Should consider robust bowel regimen at this time if patient is able to tolerate  · She is planning on having comprehensive evaluation for chronic diarrhea in the coming weeks, with bidirectional endoscopic evaluation planned  This should be deferred for approximately 6 weeks given treatment for diverticulitis at present  · She has elevated TTG IgA, and she will be undergoing an upper endoscopy with duodenal biopsies in the future as well      Abnormal liver findings on CT Elevated transaminases   Thrombocytopenia     · Elevated transaminases in cholestatic pattern, with elevated GGT  · Cirrhotic liver morphology on admission imaging (and previous imaging) and thrombocytopenia likely indicative of chronic liver disease  · Pt will require complete serologic evaluation and elastography in outpatient setting, and may possibly require additional investigation in the future as well given cholestatic LFT elevation  · Outpt MRI/MRCP given panc cyst and elevated ALP  · Recommend f/u with hepatology  GI will continue to follow at this time  ______________________________________________________________________    Subjective:     Patient is a 69 y o  female with past medical history significant for CAD s/p CABG, aortic stenosis, HTN, Sjogren's syndrome, who presented on 4/27/2023 with 1 day of severe left lower quadrant abdominal pain and presumed diverticulitis  Started on antibiotics for diverticulitis  Patient has chronic diarrhea  She also had elevated TTG IgA  Interval events:  Patient continues to complain of left lower quadrant abdominal pain and diarrhea  No BRBPR or melena  No nausea or emesis  She is tolerating liquids though has poor appetite  Tmax in past 24 hours 97 7        Medication Administration - last 24 hours from 04/27/2023 0802 to 04/28/2023 0802       Date/Time Order Dose Route Action Action by     04/27/2023 1307 EDT sodium chloride 0 9 % bolus 1,000 mL 0 mL Intravenous Stopped Meliton Silva RN     04/27/2023 1207 EDT sodium chloride 0 9 % bolus 1,000 mL 1,000 mL Intravenous Pool 37 [address] \[facility]\""     04/27/2023 1207 EDT fentanyl citrate (PF) 100 MCG/2ML 25 mcg 25 mcg Intravenous Given Meliton Silva RN     04/27/2023 1312 EDT iohexol (OMNIPAQUE) 350 MG/ML injection (SINGLE-DOSE) 80 mL 80 mL Intravenous Given Sarah Boss     04/28/2023 0135 EDT dextrose 5 % and sodium chloride 0 45 % with KCl 20 mEq/L infusion 125 mL/hr "Intravenous [address] Elizabeth Zhou RN     04/27/2023 1745 EDT dextrose 5 % and sodium chloride 0 45 % with KCl 20 mEq/L infusion 125 mL/hr Intravenous [address] [facility]     04/28/2023 3641 EDT ciprofloxacin (CIPRO) IVPB (premix in 5% dextrose) 400 mg 200 mL 400 mg Intravenous [address] Zeeshan Zhou RN     04/27/2023 1753 EDT ciprofloxacin (CIPRO) IVPB (premix in 5% dextrose) 400 mg 200 mL 400 mg Intravenous [address] [facility]     04/28/2023 4010 EDT metroNIDAZOLE (FLAGYL) IVPB (premix) 500 mg 100 mL 500 mg Intravenous [address] Zeeshan Zhou RN     04/27/2023 1917 EDT metroNIDAZOLE (FLAGYL) IVPB (premix) 500 mg 100 mL 500 mg Intravenous New [address] Zeeshan Zhou RN        Review of Systems   Otherwise Per HPI    Objective:     Vitals: Blood pressure (!) 132/46, pulse 64, temperature 97 6 °F (36 4 °C), resp  rate 18, height 4' 11\" (1 499 m), weight 53 1 kg (117 lb), SpO2 95 %  ,Body mass index is 23 63 kg/m²  Intake/Output Summary (Last 24 hours) at 4/28/2023 0802  Last data filed at 4/28/2023 0616  Gross per 24 hour   Intake 2564 58 ml   Output --   Net 2564 58 ml     Physical Exam  Vitals and nursing note reviewed  Constitutional:       Appearance: Normal appearance  HENT:      Head: Normocephalic and atraumatic  Eyes:      General: No scleral icterus  Conjunctiva/sclera: Conjunctivae normal    Cardiovascular:      Rate and Rhythm: Normal rate  Heart sounds: Murmur heard  Pulmonary:      Effort: Pulmonary effort is normal  No respiratory distress  Abdominal:      General: Abdomen is flat  Bowel sounds are normal       Palpations: There is no mass  Tenderness: There is abdominal tenderness  There is guarding (active in LLQ)  There is no rebound  Skin:     General: Skin is warm and dry  Coloration: Skin is not jaundiced  Neurological:      General: No focal deficit present  Mental Status: She is alert and oriented to person, place, and time   " Psychiatric:         Mood and Affect: Mood normal          Behavior: Behavior normal        Invasive Devices     Peripheral Intravenous Line  Duration           Peripheral IV 04/27/23 Distal;Right;Ventral (anterior) Forearm <1 day              Lab Results:  Admission on 04/27/2023   Component Date Value   • WBC 04/27/2023 6 80    • RBC 04/27/2023 3 34 (L)    • Hemoglobin 04/27/2023 11 5    • Hematocrit 04/27/2023 34 9    • MCV 04/27/2023 105 (H)    • MCH 04/27/2023 34 4 (H)    • MCHC 04/27/2023 33 0    • RDW 04/27/2023 16 0 (H)    • MPV 04/27/2023 11 9    • Platelets 91/80/1349 90 (L)    • nRBC 04/27/2023 0    • Neutrophils Relative 04/27/2023 59    • Immat GRANS % 04/27/2023 0    • Lymphocytes Relative 04/27/2023 18    • Monocytes Relative 04/27/2023 20 (H)    • Eosinophils Relative 04/27/2023 3    • Basophils Relative 04/27/2023 0    • Neutrophils Absolute 04/27/2023 4 01    • Immature Grans Absolute 04/27/2023 0 02    • Lymphocytes Absolute 04/27/2023 1 20    • Monocytes Absolute 04/27/2023 1 35 (H)    • Eosinophils Absolute 04/27/2023 0 19    • Basophils Absolute 04/27/2023 0 03    • Sodium 04/27/2023 133 (L)    • Potassium 04/27/2023 4 8    • Chloride 04/27/2023 107    • CO2 04/27/2023 18 (L)    • ANION GAP 04/27/2023 8    • BUN 04/27/2023 25    • Creatinine 04/27/2023 1 16    • Glucose 04/27/2023 99    • Calcium 04/27/2023 9 0    • eGFR 04/27/2023 45    • Total Bilirubin 04/27/2023 1 42 (H)    • Bilirubin, Direct 04/27/2023 0 60 (H)    • Alkaline Phosphatase 04/27/2023 389 (H)    • AST 04/27/2023 33    • ALT 04/27/2023 20    • Total Protein 04/27/2023 7 8    • Albumin 04/27/2023 2 9 (L)    • Sodium 04/28/2023 131 (L)    • Potassium 04/28/2023 3 9    • Chloride 04/28/2023 105    • CO2 04/28/2023 18 (L)    • ANION GAP 04/28/2023 8    • BUN 04/28/2023 20    • Creatinine 04/28/2023 0 93    • Glucose 04/28/2023 112    • Calcium 04/28/2023 9 0    • eGFR 04/28/2023 59    • WBC 04/28/2023 6 86    • RBC 04/28/2023 3 50 (L)    • Hemoglobin 04/28/2023 12 0    • Hematocrit 04/28/2023 36 4    • MCV 04/28/2023 104 (H)    • MCH 04/28/2023 34 3    • MCHC 04/28/2023 33 0    • RDW 04/28/2023 16 1 (H)    • Platelets 73/95/9201 92 (L)    • MPV 04/28/2023 10 9    • Total Bilirubin 04/28/2023 1 43 (H)    • Bilirubin, Direct 04/28/2023 0 56 (H)    • Alkaline Phosphatase 04/28/2023 364 (H)    • AST 04/28/2023 37    • ALT 04/28/2023 20    • Total Protein 04/28/2023 8 0    • Albumin 04/28/2023 3 0 (L)      Imaging Studies: I have personally reviewed pertinent imaging studies  Katelyn Chou PA-C    **Please note:  Dictation voice to text software may have been used in the creation of this record  Occasional wrong word or “sound alike” substitutions may have occurred due to the inherent limitations of voice recognition software  Read the chart carefully and recognize, using context, where substitutions have occurred  **

## 2023-04-28 NOTE — CASE MANAGEMENT
Case Management Assessment & Discharge Planning Note    Patient name Holly Mares  Location [address] 309/-[id number] MRN [id number]  : 1945 Date 2023       Current Admission Date: 2023  Current Admission Diagnosis:Left lower quadrant abdominal pain   Patient Active Problem List    Diagnosis Date Noted   • Left lower quadrant abdominal pain 2023   • Superior mesenteric artery stenosis ([facility] ) 2023   • ALEJANDRA (acute kidney injury) ([facility] [address] [facility] ) 2023   • Diarrhea of presumed infectious origin 2023   • Primary hypertension 2023   • Acquired hypothyroidism 2023   • Irritable bowel syndrome with diarrhea 2023   • Abnormal CT scan 2023      LOS (days): 1  Geometric Mean LOS (GMLOS) (days): 2 60  Days to GMLOS:1 7     OBJECTIVE:    Risk of Unplanned Readmission Score: 10 2         Current admission status: Inpatient     Preferred Pharmacy:   [address] [place] [address]  [facility]   [place] [address]  Phone: [phone] Fax: [phone]    Primary Care Provider: Shonda Palmer MD    Primary Insurance: MEDICARE  Secondary Insurance: AETNA    ASSESSMENT:  Saurabh Mcclendon Proxies    There are no active Health Care Proxies on file  Advance Directives  Does patient have a [address]?: Yes  Does patient have Advance Directives?: Yes  Advance Directives: Living will, Power of  for health care  Primary Contact: Herbertherlin  (Sister)   [phone] (Mobile)         Readmission Root Cause  30 Day Readmission: No    Patient Information  Admitted from[de-identified] Facility  Mental Status: Alert  During Assessment patient was accompanied by: Not accompanied during assessment  Assessment information provided by[de-identified] Patient  Primary Caregiver: Self  Support Systems: Children, Self, Family members, Other (Comment) (Staff at [facility])  [place] of Residence: [place] [address] do you live in?: [address] entry access options   Select all that apply : No steps to enter home  Type of Current Residence: Facility  Upon entering residence, is there a bedroom on the main floor (no further steps)?: Yes  Upon entering residence, is there a bathroom on the main floor (no further steps)?: Yes  In the last 12 months, was there a time when you were not able to pay the mortgage or rent on time?: No  In the last 12 months, how many places have you lived?: 1  In the last 12 months, was there a time when you did not have a steady place to sleep or slept in a shelter (including now)?: No  Homeless/housing insecurity resource given?: N/A  Living Arrangements: Other (Comment[facility]  Is patient a ?: No    Activities of Daily Living Prior to Admission  Functional Status: Independent  Completes ADLs independently?: Yes  Ambulates independently?: Yes  Does patient use assisted devices?: Yes  Assisted Devices (DME) used: Ankit Guy  Does patient currently own DME?: Yes  What DME does the patient currently own?: Ankit Guy  Does patient have a history of Outpatient Therapy (PT/OT)?: Yes  Does the patient have a history of Short-Term Rehab?: No  Does patient have a history of HHC?: Yes  Does patient currently have [facility]?: Yes    Current Home Health Care  Type of Current Home Care Services: Home OT, [address][de-identified] Other (please enter name in comment) (Facility)  [facility] Provider[de-identified] PCP    Patient Information Continued  Income Source: Pension/long term  Does patient have prescription coverage?: Yes  Within the past 12 months, you worried that your food would run out before you got the money to buy more : Never true  Within the past 12 months, the food you bought just didn't last and you didn't have money to get more : Never true  Food insecurity resource given?: N/A  Does patient receive dialysis treatments?: No  Does patient have a history of substance abuse?: No    Means of Transportation  Means of Transport to [facility][de-identified] Family transport  In the past 12 months, has lack of transportation kept you from medical appointments or from getting medications?: No  In the past 12 months, has lack of transportation kept you from meetings, work, or from getting things needed for daily living?: No  Was application for public transport provided?: N/A    DISCHARGE DETAILS:    Discharge planning discussed with[de-identified] Patient and [facility] from [facility] of Choice: Yes     CM contacted family/caregiver?: No- see comments    Contacts  Patient Contacts: Jesusita Roberts (Sister)   [phone] (Mobile)  Relationship to Patient[de-identified] Family  Contact Method: Phone  Phone Number: Jesusita Roberts (Sister)   [phone] (Mobile)  Reason/Outcome: Emergency Contact    Other Referral/Resources/Interventions Provided:  Interventions: Transportation     Additional Comments: Met with patient at bedside Resides at [facility]  Therapy eval completed and recs are home with [facility]  Call to [facility] and discussed same  Per [facility] patient is recieving in home therapy  Facility can not accept back over weekend

## 2023-04-28 NOTE — PLAN OF CARE
Problem: Potential for Falls  Goal: Patient will remain free of falls  Description: INTERVENTIONS:  - Educate patient/family on patient safety including physical limitations  - Instruct patient to call for assistance with activity   - Consult OT/PT to assist with strengthening/mobility   - Keep Call bell within reach  - Keep bed low and locked with side rails adjusted as appropriate  - Keep care items and personal belongings within reach  - Initiate and maintain comfort rounds  - Make Fall Risk Sign visible to staff  - Offer Toileting every 4 Hours, in advance of need  - Initiate/Maintain Bed alarm  - Obtain necessary fall risk management equipment:   - Apply yellow socks and bracelet for high fall risk patients  - Consider moving patient to room near nurses station  Outcome: Progressing

## 2023-04-29 ENCOUNTER — APPOINTMENT (INPATIENT)
Dept: RADIOLOGY | Facility: HOSPITAL | Age: 78
DRG: 385 | End: 2023-04-29
Payer: MEDICARE

## 2023-04-29 PROBLEM — E83.42 HYPOMAGNESEMIA: Status: ACTIVE | Noted: 2023-04-29

## 2023-04-29 PROBLEM — I48.0 PAF (PAROXYSMAL ATRIAL FIBRILLATION) (HCC): Status: ACTIVE | Noted: 2022-12-01

## 2023-04-29 PROBLEM — Z95.1 HX OF CABG: Status: ACTIVE | Noted: 2023-01-25

## 2023-04-29 PROBLEM — D64.9 ANEMIA: Status: ACTIVE | Noted: 2023-04-29

## 2023-04-29 PROBLEM — E87.1 HYPONATREMIA: Status: ACTIVE | Noted: 2023-04-29

## 2023-04-29 LAB
ALBUMIN SERPL BCP-MCNC: 2.3 G/DL (ref 3.5–5)
ALP SERPL-CCNC: 308 U/L (ref 34–104)
ALT SERPL W P-5'-P-CCNC: 17 U/L (ref 7–52)
ANION GAP SERPL CALCULATED.3IONS-SCNC: 6 MMOL/L (ref 4–13)
AST SERPL W P-5'-P-CCNC: 31 U/L (ref 13–39)
BILIRUB SERPL-MCNC: 1.52 MG/DL (ref 0.2–1)
BUN SERPL-MCNC: 16 MG/DL (ref 5–25)
CALCIUM ALBUM COR SERPL-MCNC: 9.3 MG/DL (ref 8.3–10.1)
CALCIUM SERPL-MCNC: 7.9 MG/DL (ref 8.4–10.2)
CHLORIDE SERPL-SCNC: 105 MMOL/L (ref 96–108)
CO2 SERPL-SCNC: 17 MMOL/L (ref 21–32)
CREAT SERPL-MCNC: 0.88 MG/DL (ref 0.6–1.3)
ERYTHROCYTE [DISTWIDTH] IN BLOOD BY AUTOMATED COUNT: 15.4 % (ref 11.6–15.1)
GFR SERPL CREATININE-BSD FRML MDRD: 64 ML/MIN/1.73SQ M
GLUCOSE SERPL-MCNC: 92 MG/DL (ref 65–140)
HCT VFR BLD AUTO: 29.8 % (ref 34.8–46.1)
HEMOCCULT STL QL: NEGATIVE
HEMOCCULT STL QL: NORMAL
HEMOCCULT STL QL: NORMAL
HGB BLD-MCNC: 9.9 G/DL (ref 11.5–15.4)
LIPASE SERPL-CCNC: 108 U/L (ref 11–82)
MAGNESIUM SERPL-MCNC: 1.5 MG/DL (ref 1.9–2.7)
MCH RBC QN AUTO: 33.9 PG (ref 26.8–34.3)
MCHC RBC AUTO-ENTMCNC: 33.2 G/DL (ref 31.4–37.4)
MCV RBC AUTO: 102 FL (ref 82–98)
PHOSPHATE SERPL-MCNC: 2.6 MG/DL (ref 2.3–4.1)
PLATELET # BLD AUTO: 74 THOUSANDS/UL (ref 149–390)
PMV BLD AUTO: 12 FL (ref 8.9–12.7)
POTASSIUM SERPL-SCNC: 4.3 MMOL/L (ref 3.5–5.3)
PROT SERPL-MCNC: 6.2 G/DL (ref 6.4–8.4)
RBC # BLD AUTO: 2.92 MILLION/UL (ref 3.81–5.12)
SODIUM SERPL-SCNC: 128 MMOL/L (ref 135–147)
WBC # BLD AUTO: 6.08 THOUSAND/UL (ref 4.31–10.16)

## 2023-04-29 PROCEDURE — 84100 ASSAY OF PHOSPHORUS: CPT | Performed by: PHYSICIAN ASSISTANT

## 2023-04-29 PROCEDURE — 80053 COMPREHEN METABOLIC PANEL: CPT | Performed by: PHYSICIAN ASSISTANT

## 2023-04-29 PROCEDURE — 83690 ASSAY OF LIPASE: CPT | Performed by: PHYSICIAN ASSISTANT

## 2023-04-29 PROCEDURE — 83735 ASSAY OF MAGNESIUM: CPT | Performed by: PHYSICIAN ASSISTANT

## 2023-04-29 PROCEDURE — 99223 1ST HOSP IP/OBS HIGH 75: CPT | Performed by: INTERNAL MEDICINE

## 2023-04-29 PROCEDURE — 85027 COMPLETE CBC AUTOMATED: CPT | Performed by: PHYSICIAN ASSISTANT

## 2023-04-29 PROCEDURE — 74022 RADEX COMPL AQT ABD SERIES: CPT

## 2023-04-29 PROCEDURE — 99233 SBSQ HOSP IP/OBS HIGH 50: CPT | Performed by: SPECIALIST

## 2023-04-29 PROCEDURE — 82272 OCCULT BLD FECES 1-3 TESTS: CPT

## 2023-04-29 RX ORDER — LEVOTHYROXINE SODIUM 0.07 MG/1
75 TABLET ORAL
Status: DISCONTINUED | OUTPATIENT
Start: 2023-04-30 | End: 2023-05-23 | Stop reason: HOSPADM

## 2023-04-29 RX ORDER — POTASSIUM CHLORIDE 20MEQ/15ML
20 LIQUID (ML) ORAL ONCE
Status: COMPLETED | OUTPATIENT
Start: 2023-04-29 | End: 2023-04-29

## 2023-04-29 RX ORDER — PANTOPRAZOLE SODIUM 40 MG/1
40 TABLET, DELAYED RELEASE ORAL
Status: DISCONTINUED | OUTPATIENT
Start: 2023-04-30 | End: 2023-05-09

## 2023-04-29 RX ORDER — METOPROLOL SUCCINATE 25 MG/1
25 TABLET, EXTENDED RELEASE ORAL 2 TIMES DAILY
Status: DISCONTINUED | OUTPATIENT
Start: 2023-04-29 | End: 2023-05-05

## 2023-04-29 RX ORDER — MAGNESIUM SULFATE HEPTAHYDRATE 40 MG/ML
2 INJECTION, SOLUTION INTRAVENOUS ONCE
Status: COMPLETED | OUTPATIENT
Start: 2023-04-29 | End: 2023-04-29

## 2023-04-29 RX ORDER — SODIUM CHLORIDE 9 MG/ML
100 INJECTION, SOLUTION INTRAVENOUS CONTINUOUS
Status: DISCONTINUED | OUTPATIENT
Start: 2023-04-29 | End: 2023-04-29

## 2023-04-29 RX ORDER — FUROSEMIDE 10 MG/ML
40 INJECTION INTRAMUSCULAR; INTRAVENOUS ONCE
Status: COMPLETED | OUTPATIENT
Start: 2023-04-29 | End: 2023-04-29

## 2023-04-29 RX ORDER — KETOROLAC TROMETHAMINE 30 MG/ML
15 INJECTION, SOLUTION INTRAMUSCULAR; INTRAVENOUS EVERY 6 HOURS SCHEDULED
Status: COMPLETED | OUTPATIENT
Start: 2023-04-29 | End: 2023-04-30

## 2023-04-29 RX ADMIN — POLYETHYLENE GLYCOL 3350 17 G: 17 POWDER, FOR SOLUTION ORAL at 21:06

## 2023-04-29 RX ADMIN — METOPROLOL SUCCINATE 25 MG: 25 TABLET, EXTENDED RELEASE ORAL at 17:35

## 2023-04-29 RX ADMIN — POTASSIUM CHLORIDE 20 MEQ: 20 SOLUTION ORAL at 17:33

## 2023-04-29 RX ADMIN — CIPROFLOXACIN 500 MG: 500 TABLET, FILM COATED ORAL at 09:35

## 2023-04-29 RX ADMIN — FUROSEMIDE 40 MG: 10 INJECTION, SOLUTION INTRAMUSCULAR; INTRAVENOUS at 17:33

## 2023-04-29 RX ADMIN — METRONIDAZOLE 500 MG: 500 TABLET ORAL at 15:10

## 2023-04-29 RX ADMIN — ENOXAPARIN SODIUM 40 MG: 40 INJECTION SUBCUTANEOUS at 09:35

## 2023-04-29 RX ADMIN — DEXTROSE, SODIUM CHLORIDE, AND POTASSIUM CHLORIDE 125 ML/HR: 5; .45; .15 INJECTION INTRAVENOUS at 04:16

## 2023-04-29 RX ADMIN — KETOROLAC TROMETHAMINE 15 MG: 30 INJECTION, SOLUTION INTRAMUSCULAR; INTRAVENOUS at 15:10

## 2023-04-29 RX ADMIN — MAGNESIUM SULFATE HEPTAHYDRATE 2 G: 40 INJECTION, SOLUTION INTRAVENOUS at 15:10

## 2023-04-29 RX ADMIN — KETOROLAC TROMETHAMINE 15 MG: 30 INJECTION, SOLUTION INTRAMUSCULAR; INTRAVENOUS at 23:54

## 2023-04-29 RX ADMIN — KETOROLAC TROMETHAMINE 15 MG: 30 INJECTION, SOLUTION INTRAMUSCULAR; INTRAVENOUS at 18:42

## 2023-04-29 RX ADMIN — METRONIDAZOLE 500 MG: 500 TABLET ORAL at 21:06

## 2023-04-29 RX ADMIN — CIPROFLOXACIN 500 MG: 500 TABLET, FILM COATED ORAL at 21:06

## 2023-04-29 RX ADMIN — METRONIDAZOLE 500 MG: 500 TABLET ORAL at 05:09

## 2023-04-29 NOTE — INCIDENTAL FINDINGS
The following findings require follow up:  Radiographic finding   Finding: Abnormal CT abdomen and pelvis with IV contrast   Follow up required: After hospital discharge as an outpatient   Follow up should be done within 1 month(s)     CT abdomen pelvis with IV contrast April 27, 2023:    1) 0 6 cm pancreatic tail cystic lesion  Follow-up is recommended as suggested on report of prior CT abdomen pelvis 4/18/2023     2) Indeterminate 0 4 cm osteolytic lesion in the right aspect of L3 vertebral body, which can represent benign or malignant neoplasm  Bone scan is recommended for further evaluation  Please notify the following clinician to assist with the follow up:   Dr Mikayla Kumar or Dr Elo Fuller after hospital discharge as Dr Efra Arcos will be no longer              providing care after the patient's hospital discharge      Vivian Osborn PA-C

## 2023-04-29 NOTE — NURSING NOTE
"Pt ambulated to bathroom earlier, refused walker however held on to everything in the room on her way there  Bowel movement, 2 small soft/formed nuggets in underwear, loose stool in bathroom  \"loose but not diarrhea\" per pt  Pt unaware of why Miralax ordered and initially refused it, educated pt and then she remembered she is 'full of bm' and accepted Miralax  Currently pt is sleeping, no s/s of pain or distress  Call bell is within reach, bed alarm is active    "

## 2023-04-29 NOTE — CONSULTS
[address]  Consult  Name: Samantha Galicia 69 y o  female I MRN: [id number]  Unit/Bed#: -01 I Date of Admission: 4/27/2023   Date of Service: 4/29/2023 I Hospital Day: 2    Inpatient consult to Internal Medicine  Consult performed by: Bridget Travis MD  Consult ordered by: Gale Devries PA-C          Assessment/Plan   Anemia  Assessment & Plan  · Hemoglobin level down to 9 9 today  Patient denies any melena or hematemesis  · Monitor hemoglobin level  · Type and screen  · Transfuse as needed  · GI following    Hx of CABG  Assessment & Plan  · Currently stable  · Continue Toprol  Lasix as needed  · Resume home losartan tomorrow  Resume home Lasix and amlodipine on discharge    PAF (paroxysmal atrial fibrillation) ([facility] [address] )  Assessment & Plan  · Currently rate controlled  Continue Toprol  · Patient is typically on aspirin, resume when cleared by surgical team    Hypomagnesemia  Assessment & Plan  · Magnesium supplemented    Repeat level in the morning    Hyponatremia  Assessment & Plan  · Likely secondary to fluid overload, patient has been receiving IV fluids and is tolerating oral intake  · Hold IV fluids for now  · Will give 1 dose of IV Lasix 40 mg and monitor response  · Repeat BMP in the morning  · If sodium level worsening will consult nephrology    Acquired hypothyroidism  Assessment & Plan  Resume home Synthroid    Primary hypertension  Assessment & Plan  · Blood pressure currently stable  · Continue home Toprol  · Resume home losartan tomorrow  · We will give a dose of IV Lasix due to fluid overload    * Left lower quadrant abdominal pain  Assessment & Plan  · Continue management per surgical team  · Possible infectious etiology, patient currently on Cipro/Flagyl  · Currently on clear liquid diet         Recommendations for Discharge:  · Per Primary Service    History of Present Illness:  Samantha Galicia is a 69 y o  female who is originally admitted to the surgical service due to abdominal pain  We are consulted for medical management  Patient admitted with abdominal pain and concern for possible infection  Stool studies have been negative  Patient currently on Cipro/Flagyl  Also with a drop in hemoglobin level however no obvious signs of bleeding  Patient has been receiving IV fluids and is now on clear liquids  Patient states she has had increasing lower extremity edema as well as worsening shortness of breath  Denies any history of CHF however patient does have CAD, aortic stenosis, and chronic A-fib    Review of Systems:  Review of Systems   Constitutional: Positive for activity change and fatigue  Respiratory: Positive for cough and shortness of breath  Cardiovascular: Positive for leg swelling  Gastrointestinal: Positive for abdominal pain  Negative for blood in stool  All other systems reviewed and are negative  Past Medical and Surgical History:   Past Medical History:   Diagnosis Date   • Anemia    • Atrial fibrillation ([facility] )    • Depression    • GI (gastrointestinal bleed)    • Ischemic colitis ([facility] )        Past Surgical History:   Procedure Laterality Date   • APPENDECTOMY     • CARDIAC SURGERY     • CHOLECYSTECTOMY     • HIP SURGERY Right     Partial replacement   • HYSTERECTOMY         Meds/Allergies:  all medications and allergies reviewed    Allergies:    Allergies   Allergen Reactions   • Influenza Vaccines Other (See Comments) and Vomiting     nausea   • Lactose - Food Allergy Diarrhea   • Ceftaroline GI Intolerance   • Diphenhydramine Hyperactivity       Social History:     Marital Status: Single    Substance Use History:   Social History     Substance and Sexual Activity   Alcohol Use Never     Social History     Tobacco Use   Smoking Status Former   • Types: Cigarettes   Smokeless Tobacco Never     Social History     Substance and Sexual Activity   Drug Use Never       Family History:  I have reviewed the patients family "history    Physical Exam:   Vitals:   Blood Pressure: 153/51 (04/29/23 1601)  Pulse: 65 (04/29/23 1601)  Temperature: 97 5 °F (36 4 °C) (04/29/23 1601)  Temp Source: Oral (04/28/23 2335)  Respirations: 20 (04/29/23 1601)  Height: 4' 11\" (149 9 cm) (04/27/23 1200)  Weight - Scale: 53 1 kg (117 lb) (04/27/23 1200)  SpO2: 95 % (04/29/23 1601)    Physical Exam  Constitutional:       General: She is not in acute distress  Comments: Frail elderly female   HENT:      Head: Normocephalic and atraumatic  Nose: Nose normal       Mouth/Throat:      Mouth: Mucous membranes are moist    Eyes:      Extraocular Movements: Extraocular movements intact  Conjunctiva/sclera: Conjunctivae normal    Cardiovascular:      Rate and Rhythm: Normal rate and regular rhythm  Pulmonary:      Effort: Pulmonary effort is normal  No respiratory distress  Breath sounds: Rales present  Abdominal:      Palpations: Abdomen is soft  Tenderness: There is no abdominal tenderness  Musculoskeletal:         General: Swelling present  No tenderness  Normal range of motion  Cervical back: Normal range of motion and neck supple  Right lower leg: Edema present  Left lower leg: Edema present  Skin:     General: Skin is warm and dry  Neurological:      General: No focal deficit present  Mental Status: She is alert  Mental status is at baseline  Cranial Nerves: No cranial nerve deficit  Psychiatric:         Mood and Affect: Mood normal          Behavior: Behavior normal          Additional Data:   Lab Results: I have personally reviewed pertinent reports        Results from last 7 days   Lab Units 04/29/23  0515 04/28/23  0524 04/27/23  1205   WBC Thousand/uL 6 08   < > 6 80   HEMOGLOBIN g/dL 9 9*   < > 11 5   HEMATOCRIT % 29 8*   < > 34 9   PLATELETS Thousands/uL 74*   < > 90*   NEUTROS PCT %  --   --  59   LYMPHS PCT %  --   --  18   MONOS PCT %  --   --  20*   EOS PCT %  --   --  3    < > = values in " this interval not displayed  Results from last 7 days   Lab Units 04/29/23  0515   SODIUM mmol/L 128*   POTASSIUM mmol/L 4 3   CHLORIDE mmol/L 105   CO2 mmol/L 17*   BUN mg/dL 16   CREATININE mg/dL 0 88   CALCIUM mg/dL 7 9*   ALK PHOS U/L 308*   ALT U/L 17   AST U/L 31             No results found for: HGBA1C        Imaging: I have personally reviewed pertinent reports  CT abdomen pelvis with contrast   Final Result by Balta Sahni MD (04/27 1444)      New small amount of free fluid in the abdomen and pelvis compared to 4/18/2023, which can be secondary to cirrhosis or acute inflammatory process in the abdomen and pelvis such as occult acute diverticulitis  Moderate amount of stool in the colon,    nonspecific and can represent constipation  Again seen is 0 6 cm pancreatic tail cystic lesion  Follow-up is recommended as suggested on report of prior CT abdomen pelvis 4/18/2023      Indeterminate 0 4 cm osteolytic lesion in the right aspect of L3 vertebral body, which can represent benign or malignant neoplasm  Bone scan is recommended for further evaluation  The study was marked in [facility] for immediate notification  XR abdomen obstruction series    (Results Pending)     ** Please Note: This note has been constructed using a voice recognition system   **

## 2023-04-29 NOTE — ASSESSMENT & PLAN NOTE
· Blood pressure currently stable  · Continue home Toprol  · Resume home losartan tomorrow  · We will give a dose of IV Lasix due to fluid overload

## 2023-04-29 NOTE — ASSESSMENT & PLAN NOTE
· Currently rate controlled    Continue Toprol  · Patient is typically on aspirin, resume when cleared by surgical team

## 2023-04-29 NOTE — ASSESSMENT & PLAN NOTE
· Likely secondary to fluid overload, patient has been receiving IV fluids and is tolerating oral intake  · Hold IV fluids for now  · Will give 1 dose of IV Lasix 40 mg and monitor response  · Repeat BMP in the morning  · If sodium level worsening will consult nephrology

## 2023-04-29 NOTE — PROGRESS NOTES
Progress Note - General Surgery   Juancho Civil 69 y o  female MRN: [id number]  Unit/Bed#: -01 Encounter: [id number]    Assessment:  Patient continues with left lower quadrant abdominal pain, 8 or 9/10 pain scale  Diarrhea, not believed due to infectious etiology  Stool studies last week including C  difficile all negative  Continues with worsening left lower quadrant pain  CT abdomen pelvis showed liver cirrhosis though no acute findings or perforation  New development of anemia overnight, hemoglobin dropped to 9 9 from 12 0 yesterday  No evidence of any acute GI bleeding  Continued hyponatremia  Hypomagnesemia  Patient with 2-3+ pitting edema both lower extremities to the mid shins    Currently on clear liquids, poor appetite  Denies nausea or vomiting  2 bowel movements all loose watery this morning, 4-5 yesterday  Denies any blood in bowel movements  Decreased urine output, rule out acute urinary retention  Patient with significant bilateral lower extremity 2-3+ pitting edema to the mid shins  Hyponatremia may be related to her liver cirrhosis    CT scan abdomen pelvis with IV contrast 4/27/2023 showed:  New small amount of free fluid in the abdomen and pelvis compared to 4/18/2023, which can be secondary to cirrhosis or acute inflammatory process in the abdomen and pelvis such as occult acute diverticulitis  Moderate amount of stool in the colon,   nonspecific and can represent constipation      Again seen is 0 6 cm pancreatic tail cystic lesion  Follow-up is recommended as suggested on report of prior CT abdomen pelvis 4/18/2023     Indeterminate 0 4 cm osteolytic lesion in the right aspect of L3 vertebral body, which can represent benign or malignant neoplasm  Bone scan is recommended for further evaluation        A m  labs from today reviewed  Hypomagnesemia 1 5  WBC 6 08  Hemoglobin today dropped to 9 9, was 12 0 and 11 5 without obvious etiology or evidence of GI bleed  CMP shows continued hyponatremia of 128, (131)  Kidney function shows normal creatinine 0 88  Glucose 92  Alk phosphatase improved 308 (364)  AST and ALT remain WNL  Albumin low 2 3 (3 0)  Total protein 6 2 down from 8 0  Total bilirubin increased 1 52 (1 43)    Stool for C  difficile 4/19/2023 negative  Stool enteric bacterial panel none detected 4/19/2023, ova and parasites nondetected  Celiac disease antibody panel IgA significant elevated 1354, gliadin IgA 65 elevated    Urine output only measured 100 mL today    Vital signs reviewed, afebrile  Hemodynamically stable  Plan:  Discussed by phone with Dr Cristobal Devine  Will again ask input for reevaluation from gastroenterology as inpatient  Patient was started on bowel regiment yesterday with milk of magnesia daily as needed and MiraLAX given daily at bedtime, continues with diarrhea  CT scan shows stool burden  CBC, CMP, magnesium in a m  Replete magnesium 2g IV now  Continue IV fluids D5 and half-normal saline with 20 mEq of KCl at 125/h  Monitor strict PAM's  Stool for occult blood ordered in light of patient's drop in Hgb  Continue present ciprofloxacin and metronidazole  Patient has normal kidney function, will start Toradol 15 mg IV every 6 hours around-the-clock x4 doses to see if this helps improve her abdominal pain  Need to evaluate stool burden, ordered an abdominal obstruction series at this time  Dr Patricia Phelps requests internal medicine consult regarding hyponatremia and edema  Keep the patient on clear liquids as tolerated  Urinary retention protocol  Bladder scan by nursing every shift x4  PT/OT daily    This provider will contact her brother in [place], Randall Fitzpatrick today by telephone ([phone]) to update him about his sister's condition at the patient's request and her verbal consent that I share this information with the sibling regarding her current medical condition and care      Incidental findings on CT scan abdomen and pelvis done on April 27 showed 0 6 cm pancreatic tail cystic lesion, follow-up was recommended with surveillance CT  Also indeterminate 0 4 cm osteophytic lesion right aspect of L3 vertebral body for which bone scan was recommended for further evaluation can be done as an outpatient, may represent benign or malignant neoplasm  Subjective/Objective      Chief Complaint: Worsening left lower quadrant abdominal pain and continued diarrhea    Subjective: Patient is tearful  Asking that someone contact her son in [place] to try to obtain her records from [facility]  Patient previously had an extended hospital stay for similar symptoms, claiming she was hospitalized for about a month there last year  Patient more recently moved here from [place] to an assisted living facility to be closer to her son who now she claims does not keep in contact  Patient with worsening pain, cramping in nature  She relates as an 8 or 9 out of 10 point pain scale  Denies nausea or vomiting  She has been on clear liquids and does not want to try any solid foods  This morning she had 2 episodes of all loose watery bowel movements  The patient was started on a bowel regimen yesterday with MiraLAX and milk of magnesia  CT scan showed cirrhosis and stool in the colon  The patient currently remains on IV antibiotics with ciprofloxacin and metronidazole  The patient had previously been evaluated by GI, she underwent a whole battery of stool studies which essentially ruled out infectious etiology of her chronic diarrhea which she says she has had now for 3 or 4 years  Patient previously underwent EGD and colonoscopy in [place]  The patient admits that the abdominal pain is keeping her up and having difficulty sleeping at night  She continues with bilateral lower extremity swelling and edema  She has mostly been confined to her bed  Currently she is uncomfortable and asking for pain medication      She is asking for someone to contact her brother in [place] "[place]essee at [phone] to try to obtain her records from [place]  Scheduled Meds:  Current Facility-Administered Medications   Medication Dose Route Frequency Provider Last Rate   • ciprofloxacin  500 mg Oral Q12H [facility] Esme Hernandez PA-C     • dextrose 5 % and sodium chloride 0 45 % with KCl 20 mEq/L  125 mL/hr Intravenous Continuous Jael Cintron  mL/hr (04/29/23 0416)   • enoxaparin  40 mg Subcutaneous Daily Jael Cintron MD     • ketorolac  15 mg Intravenous Q6H [facility] Zachery Pro PA-C     • magnesium hydroxide  30 mL Oral Daily PRN Esme Hernandez PA-C     • magnesium sulfate  2 g Intravenous Once Zachery Pro PA-C     • metroNIDAZOLE  500 mg Oral [facility] Esme Hernandez PA-C     • ondansetron  4 mg Intravenous Q6H PRN Jael Cintron MD     • polyethylene glycol  17 g Oral HS Jael Cintron MD       Continuous Infusions:dextrose 5 % and sodium chloride 0 45 % with KCl 20 mEq/L, 125 mL/hr, Last Rate: 125 mL/hr (04/29/23 0416)      PRN Meds: •  magnesium hydroxide  •  ondansetron      Objective:     Blood pressure 136/57, pulse 66, temperature 98 °F (36 7 °C), resp  rate 20, height 4' 11\" (1 499 m), weight 53 1 kg (117 lb), SpO2 96 %  ,Body mass index is 23 63 kg/m²  Intake/Output Summary (Last 24 hours) at 4/29/2023 1344  Last data filed at 4/29/2023 1308  Gross per 24 hour   Intake 2600 ml   Output 100 ml   Net 2500 ml       Invasive Devices     Peripheral Intravenous Line  Duration           Peripheral IV 04/27/23 Distal;Right;Ventral (anterior) Forearm 2 days                Physical Exam:     Awake, chronically ill-appearing  Tearful  Complaining of left lower quadrant abdominal pain  Skin warm dry touch  No pallor or jaundice  ENT sclera white OU  Oral mucosa pink and moist   Neck no adenopathy  No JVD or bruit  Trachea midline  Chest symmetric in expansion  No respiratory distress  Heart regular rate and rhythm  No tachycardia    No murmur " heard   Lungs clear to auscultation  No rales or rhonchi  Back mild kyphotic curvature, no CVA tenderness to percussion  Abdomen mildly distended  Positive bowel signs are heard, hyperactive throughout  No fluid rushes  Tenderness to palpation in the left lower quadrant without guarding or rebound  No definite masses are felt  No ascites  Liver edge not palpable  No abdominal hernias are noted  Extremities patient has 2-3+ pitting edema to the mid shins bilaterally both lower extremities  Ambulation not observed  Calf muscles are soft bilaterally  Both feet are warm to touch and appear well-perfused  Neurological exam patient appears very anxious, tearful  No tremor noted  Mental status seems appropriate  Speech is clear  No focal motor or sensory neurologic weakness is present  Lab, Imaging and other studies:  I have personally reviewed pertinent lab results    , CBC:   Lab Results   Component Value Date    WBC 6 08 04/29/2023    HGB 9 9 (L) 04/29/2023    HCT 29 8 (L) 04/29/2023     (H) 04/29/2023    PLT 74 (L) 04/29/2023    MCH 33 9 04/29/2023    MCHC 33 2 04/29/2023    RDW 15 4 (H) 04/29/2023    MPV 12 0 04/29/2023   , CMP:   Lab Results   Component Value Date    SODIUM 128 (L) 04/29/2023    K 4 3 04/29/2023     04/29/2023    CO2 17 (L) 04/29/2023    BUN 16 04/29/2023    CREATININE 0 88 04/29/2023    CALCIUM 7 9 (L) 04/29/2023    AST 31 04/29/2023    ALT 17 04/29/2023    ALKPHOS 308 (H) 04/29/2023    EGFR 64 04/29/2023   , Urinalysis: No results found for: [place], SPECGRAV, PHUR, LEUKOCYTESUR, NITRITE, PROTEINUA, GLUCOSEU, KETONESU, BILIRUBINUR, BLOODU     Contains abnormal data Magnesium  Order: [id number]   Status: Final result      Visible to patient: No (inaccessible in [facility])      Next appt: 05/01/2023 at 11:30 AM in Vascular Surgery Kushal Pu, CRNP)      0 Result Notes  Component Ref Range & Units 4/29/23 0515 4/19/23 0447   Magnesium 1 9 - 2 7 mg/dL 1 5 Low   1 6 Low                Specimen Collected: 04/29/23 05:15 Last Resulted: 04/29/23 07:03             VTE Pharmacologic Prophylaxis: Enoxaparin (Lovenox)  VTE Mechanical Prophylaxis: sequential compression device noted to be not currently applied to both lower extremities despite being ordered  Total time spent for evaluation of this patient including review of medical records, review of diagnostic imaging laboratory values, personal examination patient, telephone conversation with the patient's brother, telephone conversation with Dr Juan Rodarte all conducted by this provider and the general surgical evaluation of the patient at this time was 60 minutes      Radha Olivo PA-C

## 2023-04-29 NOTE — ASSESSMENT & PLAN NOTE
· Currently stable  · Continue Toprol  Lasix as needed  · Resume home losartan tomorrow    Resume home Lasix and amlodipine on discharge

## 2023-04-29 NOTE — PLAN OF CARE
Problem: Potential for Falls  Goal: Patient will remain free of falls  Description: INTERVENTIONS:  - Educate patient/family on patient safety including physical limitations  - Instruct patient to call for assistance with activity   - Consult OT/PT to assist with strengthening/mobility   - Keep Call bell within reach  - Keep bed low and locked with side rails adjusted as appropriate  - Keep care items and personal belongings within reach  - Initiate and maintain comfort rounds  - Make Fall Risk Sign visible to staff  - Offer Toileting every 2 Hours, in advance of need  - Initiate/Maintain alarms  - Obtain necessary fall risk management equipment:  - Apply yellow socks and bracelet for high fall risk patients  - Consider moving patient to room near nurses station  Outcome: Progressing

## 2023-04-29 NOTE — NURSING NOTE
Pt denies any dark stool while here, earlier bm was tan  Denies any blood in feces or on toilet paper when wiping

## 2023-04-29 NOTE — ASSESSMENT & PLAN NOTE
· Continue management per surgical team  · Possible infectious etiology, patient currently on Cipro/Flagyl  · Currently on clear liquid diet

## 2023-04-29 NOTE — PLAN OF CARE
Problem: Potential for Falls  Goal: Patient will remain free of falls  Description: INTERVENTIONS:  - Educate patient/family on patient safety including physical limitations  - Instruct patient to call for assistance with activity   - Consult OT/PT to assist with strengthening/mobility   - Keep Call bell within reach  - Keep bed low and locked with side rails adjusted as appropriate  - Keep care items and personal belongings within reach  - Initiate and maintain comfort rounds  - Make Fall Risk Sign visible to staff  - Offer Toileting every 2 Hours, in advance of need  - Initiate/Maintain alarms  - Obtain necessary fall risk management equipment:  - Apply yellow socks and bracelet for high fall risk patients  - Consider moving patient to room near nurses station  Outcome: Progressing     Problem: PAIN - ADULT  Goal: Verbalizes/displays adequate comfort level or baseline comfort level  Description: Interventions:  - Encourage patient to monitor pain and request assistance  - Assess pain using appropriate pain scale  - Administer analgesics based on type and severity of pain and evaluate response  - Implement non-pharmacological measures as appropriate and evaluate response  - Consider cultural and social influences on pain and pain management  - Notify physician/advanced practitioner if interventions unsuccessful or patient reports new pain  Outcome: Progressing     Problem: SAFETY ADULT  Goal: Patient will remain free of falls  Description: INTERVENTIONS:  - Educate patient/family on patient safety including physical limitations  - Instruct patient to call for assistance with activity   - Consult OT/PT to assist with strengthening/mobility   - Keep Call bell within reach  - Keep bed low and locked with side rails adjusted as appropriate  - Keep care items and personal belongings within reach  - Initiate and maintain comfort rounds  - Make Fall Risk Sign visible to staff  - Offer Toileting every 2 Hours, in advance of need  - Initiate/Maintain alarms  - Obtain necessary fall risk management equipment:  - Apply yellow socks and bracelet for high fall risk patients  - Consider moving patient to room near nurses station  Outcome: Progressing  Goal: Maintain or return to baseline ADL function  Description: INTERVENTIONS:  - Educate patient/family on patient safety including physical limitations  - Instruct patient to call for assistance with activity   - Consult OT/PT to assist with strengthening/mobility   - Keep Call bell within reach  - Keep bed low and locked with side rails adjusted as appropriate  - Keep care items and personal belongings within reach  - Initiate and maintain comfort rounds  - Make Fall Risk Sign visible to staff  - Offer Toileting every 2 Hours, in advance of need  - Initiate/Maintain alarms  - Obtain necessary fall risk management equipment:   - Apply yellow socks and bracelet for high fall risk patients  - Consider moving patient to room near nurses station  Outcome: Progressing  Goal: Maintains/Returns to pre admission functional level  Description: INTERVENTIONS:  - Perform BMAT or MOVE assessment daily    - Set and communicate daily mobility goal to care team and patient/family/caregiver  - Collaborate with rehabilitation services on mobility goals if consulted  - Perform Range of Motion 2 times a day    - Reposition patient every   Problem: Potential for Falls  Goal: Patient will remain free of falls  Description: INTERVENTIONS:  - Educate patient/family on patient safety including physical limitations  - Instruct patient to call for assistance with activity   - Consult OT/PT to assist with strengthening/mobility   - Keep Call bell within reach  - Keep bed low and locked with side rails adjusted as appropriate  - Keep care items and personal belongings within reach  - Initiate and maintain comfort rounds  - Make Fall Risk Sign visible to staff  - Offer Toileting every 2 Hours, in advance of need  - Initiate/Maintain alarms  - Obtain necessary fall risk management equipment:  - Apply yellow socks and bracelet for high fall risk patients  - Consider moving patient to room near nurses station  Outcome: Progressing     Problem: PAIN - ADULT  Goal: Verbalizes/displays adequate comfort level or baseline comfort level  Description: Interventions:  - Encourage patient to monitor pain and request assistance  - Assess pain using appropriate pain scale  - Administer analgesics based on type and severity of pain and evaluate response  - Implement non-pharmacological measures as appropriate and evaluate response  - Consider cultural and social influences on pain and pain management  - Notify physician/advanced practitioner if interventions unsuccessful or patient reports new pain  Outcome: Progressing     Problem: SAFETY ADULT  Goal: Patient will remain free of falls  Description: INTERVENTIONS:  - Educate patient/family on patient safety including physical limitations  - Instruct patient to call for assistance with activity   - Consult OT/PT to assist with strengthening/mobility   - Keep Call bell within reach  - Keep bed low and locked with side rails adjusted as appropriate  - Keep care items and personal belongings within reach  - Initiate and maintain comfort rounds  - Make Fall Risk Sign visible to staff  - Offer Toileting every 2 Hours, in advance of need  - Initiate/Maintain alarms  - Obtain necessary fall risk management equipment:  - Apply yellow socks and bracelet for high fall risk patients  - Consider moving patient to room near nurses station  Outcome: Progressing  Goal: Maintain or return to baseline ADL function  Description: INTERVENTIONS:  - Educate patient/family on patient safety including physical limitations  - Instruct patient to call for assistance with activity   - Consult OT/PT to assist with strengthening/mobility   - Keep Call bell within reach  - Keep bed low and locked with side rails adjusted as appropriate  - Keep care items and personal belongings within reach  - Initiate and maintain comfort rounds  - Make Fall Risk Sign visible to staff  - Offer Toileting every 2 Hours, in advance of need  - Initiate/Maintain alarms  - Obtain necessary fall risk management equipment:   - Apply yellow socks and bracelet for high fall risk patients  - Consider moving patient to room near nurses station  Outcome: Progressing  Goal: Maintains/Returns to pre admission functional level  Description: INTERVENTIONS:  - Perform BMAT or MOVE assessment daily    - Set and communicate daily mobility goal to care team and patient/family/caregiver  - Collaborate with rehabilitation services on mobility goals if consulted  - Perform Range of Motion 2 times a day  - Reposition patient every 2 hours    - Dangle patient 2 times a day  - Stand patient 2 times a day  - Ambulate patient 2 times a day  - Out of bed to chair 2 times a day   - Out of bed for meals 2 times a day  - Out of bed for toileting  - Record patient progress and toleration of activity level   Outcome: Progressing     Problem: INFECTION - ADULT  Goal: Absence or prevention of progression during hospitalization  Description: INTERVENTIONS:  - Assess and monitor for signs and symptoms of infection  - Monitor lab/diagnostic results  - Monitor all insertion sites, i e  indwelling lines, tubes, and drains  - Monitor endotracheal if appropriate and nasal secretions for changes in amount and color  - [place] appropriate cooling/warming therapies per order  - Administer medications as ordered  - Instruct and encourage patient and family to use good hand hygiene technique  - Identify and instruct in appropriate isolation precautions for identified infection/condition  Outcome: Progressing     Problem: DISCHARGE PLANNING  Goal: Discharge to home or other facility with appropriate resources  Description: INTERVENTIONS:  - Identify barriers to discharge w/patient and caregiver  - Arrange for needed discharge resources and transportation as appropriate  - Identify discharge learning needs (meds, wound care, etc )  - Refer to Case Management Department for coordinating discharge planning if the patient needs post-hospital services based on physician/advanced practitioner order or complex needs related to functional status, cognitive ability, or social support system  Outcome: Progressing     Problem: Knowledge Deficit  Goal: Patient/family/caregiver demonstrates understanding of disease process, treatment plan, medications, and discharge instructions  Description: Complete learning assessment and assess knowledge base  Interventions:  - Provide teaching at level of understanding  - Provide teaching via preferred learning methods  Outcome: Progressing    hours    - Dangle patient 2 times a day  - Stand patient 2 times a day  - Ambulate patient 2 times a day  - Out of bed to chair 2 times a day   - Out of bed for meals 2 times a day  - Out of bed for toileting  - Record patient progress and toleration of activity level   Outcome: Progressing     Problem: INFECTION - ADULT  Goal: Absence or prevention of progression during hospitalization  Description: INTERVENTIONS:  - Assess and monitor for signs and symptoms of infection  - Monitor lab/diagnostic results  - Monitor all insertion sites, i e  indwelling lines, tubes, and drains  - Monitor endotracheal if appropriate and nasal secretions for changes in amount and color  - [place] appropriate cooling/warming therapies per order  - Administer medications as ordered  - Instruct and encourage patient and family to use good hand hygiene technique  - Identify and instruct in appropriate isolation precautions for identified infection/condition  Outcome: Progressing     Problem: DISCHARGE PLANNING  Goal: Discharge to home or other facility with appropriate resources  Description: INTERVENTIONS:  - Identify barriers to discharge w/patient and caregiver  - Arrange for needed discharge resources and transportation as appropriate  - Identify discharge learning needs (meds, wound care, etc )  - Refer to Case Management Department for coordinating discharge planning if the patient needs post-hospital services based on physician/advanced practitioner order or complex needs related to functional status, cognitive ability, or social support system  Outcome: Progressing     Problem: Knowledge Deficit  Goal: Patient/family/caregiver demonstrates understanding of disease process, treatment plan, medications, and discharge instructions  Description: Complete learning assessment and assess knowledge base    Interventions:  - Provide teaching at level of understanding  - Provide teaching via preferred learning methods  Outcome: Progressing

## 2023-04-29 NOTE — ASSESSMENT & PLAN NOTE
· Hemoglobin level down to 9 9 today    Patient denies any melena or hematemesis  · Monitor hemoglobin level  · Type and screen  · Transfuse as needed  · GI following

## 2023-04-30 LAB
ALBUMIN SERPL BCP-MCNC: 2.6 G/DL (ref 3.5–5)
ALP SERPL-CCNC: 330 U/L (ref 34–104)
ALT SERPL W P-5'-P-CCNC: 16 U/L (ref 7–52)
ANION GAP SERPL CALCULATED.3IONS-SCNC: 6 MMOL/L (ref 4–13)
ANION GAP SERPL CALCULATED.3IONS-SCNC: 8 MMOL/L (ref 4–13)
AST SERPL W P-5'-P-CCNC: 44 U/L (ref 13–39)
BILIRUB SERPL-MCNC: 1.98 MG/DL (ref 0.2–1)
BUN SERPL-MCNC: 15 MG/DL (ref 5–25)
BUN SERPL-MCNC: 16 MG/DL (ref 5–25)
CALCIUM ALBUM COR SERPL-MCNC: 9.6 MG/DL (ref 8.3–10.1)
CALCIUM SERPL-MCNC: 7.9 MG/DL (ref 8.4–10.2)
CALCIUM SERPL-MCNC: 8.5 MG/DL (ref 8.4–10.2)
CHLORIDE SERPL-SCNC: 100 MMOL/L (ref 96–108)
CHLORIDE SERPL-SCNC: 101 MMOL/L (ref 96–108)
CO2 SERPL-SCNC: 15 MMOL/L (ref 21–32)
CO2 SERPL-SCNC: 18 MMOL/L (ref 21–32)
CREAT SERPL-MCNC: 0.98 MG/DL (ref 0.6–1.3)
CREAT SERPL-MCNC: 1.01 MG/DL (ref 0.6–1.3)
ERYTHROCYTE [DISTWIDTH] IN BLOOD BY AUTOMATED COUNT: 15.5 % (ref 11.6–15.1)
GFR SERPL CREATININE-BSD FRML MDRD: 54 ML/MIN/1.73SQ M
GFR SERPL CREATININE-BSD FRML MDRD: 56 ML/MIN/1.73SQ M
GLUCOSE SERPL-MCNC: 77 MG/DL (ref 65–140)
GLUCOSE SERPL-MCNC: 96 MG/DL (ref 65–140)
HCT VFR BLD AUTO: 34 % (ref 34.8–46.1)
HEMOCCULT STL QL: NEGATIVE
HGB BLD-MCNC: 10.9 G/DL (ref 11.5–15.4)
MAGNESIUM SERPL-MCNC: 2 MG/DL (ref 1.9–2.7)
MCH RBC QN AUTO: 34.7 PG (ref 26.8–34.3)
MCHC RBC AUTO-ENTMCNC: 32.1 G/DL (ref 31.4–37.4)
MCV RBC AUTO: 108 FL (ref 82–98)
OSMOLALITY UR: 317 MMOL/KG
PLATELET # BLD AUTO: 64 THOUSANDS/UL (ref 149–390)
PMV BLD AUTO: 11.3 FL (ref 8.9–12.7)
POTASSIUM SERPL-SCNC: 4.4 MMOL/L (ref 3.5–5.3)
POTASSIUM SERPL-SCNC: 4.7 MMOL/L (ref 3.5–5.3)
PROT SERPL-MCNC: 6.7 G/DL (ref 6.4–8.4)
RBC # BLD AUTO: 3.14 MILLION/UL (ref 3.81–5.12)
SODIUM SERPL-SCNC: 124 MMOL/L (ref 135–147)
SODIUM SERPL-SCNC: 124 MMOL/L (ref 135–147)
WBC # BLD AUTO: 5.06 THOUSAND/UL (ref 4.31–10.16)

## 2023-04-30 PROCEDURE — 80053 COMPREHEN METABOLIC PANEL: CPT | Performed by: PHYSICIAN ASSISTANT

## 2023-04-30 PROCEDURE — 80048 BASIC METABOLIC PNL TOTAL CA: CPT | Performed by: NURSE PRACTITIONER

## 2023-04-30 PROCEDURE — 99222 1ST HOSP IP/OBS MODERATE 55: CPT | Performed by: NURSE PRACTITIONER

## 2023-04-30 PROCEDURE — 84300 ASSAY OF URINE SODIUM: CPT | Performed by: NURSE PRACTITIONER

## 2023-04-30 PROCEDURE — 85027 COMPLETE CBC AUTOMATED: CPT | Performed by: PHYSICIAN ASSISTANT

## 2023-04-30 PROCEDURE — 83935 ASSAY OF URINE OSMOLALITY: CPT | Performed by: NURSE PRACTITIONER

## 2023-04-30 PROCEDURE — 99233 SBSQ HOSP IP/OBS HIGH 50: CPT | Performed by: SPECIALIST

## 2023-04-30 PROCEDURE — 82272 OCCULT BLD FECES 1-3 TESTS: CPT | Performed by: SPECIALIST

## 2023-04-30 PROCEDURE — 83735 ASSAY OF MAGNESIUM: CPT

## 2023-04-30 RX ORDER — SODIUM BICARBONATE 650 MG/1
650 TABLET ORAL
Status: DISCONTINUED | OUTPATIENT
Start: 2023-04-30 | End: 2023-05-01

## 2023-04-30 RX ORDER — FUROSEMIDE 10 MG/ML
80 INJECTION INTRAMUSCULAR; INTRAVENOUS
Status: DISCONTINUED | OUTPATIENT
Start: 2023-04-30 | End: 2023-05-04

## 2023-04-30 RX ORDER — KETOROLAC TROMETHAMINE 30 MG/ML
15 INJECTION, SOLUTION INTRAMUSCULAR; INTRAVENOUS ONCE
Status: COMPLETED | OUTPATIENT
Start: 2023-04-30 | End: 2023-04-30

## 2023-04-30 RX ORDER — FUROSEMIDE 10 MG/ML
80 INJECTION INTRAMUSCULAR; INTRAVENOUS ONCE
Status: COMPLETED | OUTPATIENT
Start: 2023-04-30 | End: 2023-04-30

## 2023-04-30 RX ADMIN — CIPROFLOXACIN 500 MG: 500 TABLET, FILM COATED ORAL at 11:41

## 2023-04-30 RX ADMIN — SODIUM BICARBONATE 650 MG: 650 TABLET ORAL at 11:46

## 2023-04-30 RX ADMIN — LEVOTHYROXINE SODIUM 75 MCG: 75 TABLET ORAL at 05:45

## 2023-04-30 RX ADMIN — KETOROLAC TROMETHAMINE 15 MG: 30 INJECTION, SOLUTION INTRAMUSCULAR; INTRAVENOUS at 22:25

## 2023-04-30 RX ADMIN — SODIUM BICARBONATE 650 MG: 650 TABLET ORAL at 19:12

## 2023-04-30 RX ADMIN — METRONIDAZOLE 500 MG: 500 TABLET ORAL at 19:12

## 2023-04-30 RX ADMIN — METRONIDAZOLE 500 MG: 500 TABLET ORAL at 21:30

## 2023-04-30 RX ADMIN — ENOXAPARIN SODIUM 40 MG: 40 INJECTION SUBCUTANEOUS at 11:44

## 2023-04-30 RX ADMIN — PANTOPRAZOLE SODIUM 40 MG: 40 TABLET, DELAYED RELEASE ORAL at 05:45

## 2023-04-30 RX ADMIN — METRONIDAZOLE 500 MG: 500 TABLET ORAL at 05:46

## 2023-04-30 RX ADMIN — CIPROFLOXACIN 500 MG: 500 TABLET, FILM COATED ORAL at 21:30

## 2023-04-30 RX ADMIN — FUROSEMIDE 80 MG: 10 INJECTION, SOLUTION INTRAMUSCULAR; INTRAVENOUS at 16:10

## 2023-04-30 RX ADMIN — KETOROLAC TROMETHAMINE 15 MG: 30 INJECTION, SOLUTION INTRAMUSCULAR; INTRAVENOUS at 05:45

## 2023-04-30 RX ADMIN — METOPROLOL SUCCINATE 25 MG: 25 TABLET, EXTENDED RELEASE ORAL at 11:41

## 2023-04-30 RX ADMIN — METOPROLOL SUCCINATE 25 MG: 25 TABLET, EXTENDED RELEASE ORAL at 19:12

## 2023-04-30 NOTE — PLAN OF CARE
Problem: Potential for Falls  Goal: Patient will remain free of falls  Description: INTERVENTIONS:  - Educate patient/family on patient safety including physical limitations  - Instruct patient to call for assistance with activity   - Consult OT/PT to assist with strengthening/mobility   - Keep Call bell within reach  - Keep bed low and locked with side rails adjusted as appropriate  - Keep care items and personal belongings within reach  - Initiate and maintain comfort rounds  - Make Fall Risk Sign visible to staff  - Offer Toileting every 2 Hours, in advance of need  - Initiate/Maintain alarms  - Obtain necessary fall risk management equipment:  - Apply yellow socks and bracelet for high fall risk patients  - Consider moving patient to room near nurses station  Outcome: Progressing     Problem: PAIN - ADULT  Goal: Verbalizes/displays adequate comfort level or baseline comfort level  Description: Interventions:  - Encourage patient to monitor pain and request assistance  - Assess pain using appropriate pain scale  - Administer analgesics based on type and severity of pain and evaluate response  - Implement non-pharmacological measures as appropriate and evaluate response  - Consider cultural and social influences on pain and pain management  - Notify physician/advanced practitioner if interventions unsuccessful or patient reports new pain  Outcome: Progressing     Problem: SAFETY ADULT  Goal: Patient will remain free of falls  Description: INTERVENTIONS:  - Educate patient/family on patient safety including physical limitations  - Instruct patient to call for assistance with activity   - Consult OT/PT to assist with strengthening/mobility   - Keep Call bell within reach  - Keep bed low and locked with side rails adjusted as appropriate  - Keep care items and personal belongings within reach  - Initiate and maintain comfort rounds  - Make Fall Risk Sign visible to staff  - Offer Toileting every 2 Hours, in advance of need  - Initiate/Maintain alarms  - Obtain necessary fall risk management equipment:  - Apply yellow socks and bracelet for high fall risk patients  - Consider moving patient to room near nurses station  Outcome: Progressing  Goal: Maintain or return to baseline ADL function  Description: INTERVENTIONS:  - Educate patient/family on patient safety including physical limitations  - Instruct patient to call for assistance with activity   - Consult OT/PT to assist with strengthening/mobility   - Keep Call bell within reach  - Keep bed low and locked with side rails adjusted as appropriate  - Keep care items and personal belongings within reach  - Initiate and maintain comfort rounds  - Make Fall Risk Sign visible to staff  - Offer Toileting every 2 Hours, in advance of need  - Initiate/Maintain alarms  - Obtain necessary fall risk management equipment:   - Apply yellow socks and bracelet for high fall risk patients  - Consider moving patient to room near nurses station  Outcome: Progressing  Goal: Maintains/Returns to pre admission functional level  Description: INTERVENTIONS:  - Perform BMAT or MOVE assessment daily    - Set and communicate daily mobility goal to care team and patient/family/caregiver  - Collaborate with rehabilitation services on mobility goals if consulted  - Perform Range of Motion 2 times a day  - Reposition patient every 2 hours    - Dangle patient 2 times a day  - Stand patient 2 times a day  - Ambulate patient 2 times a day  - Out of bed to chair 2 times a day   - Out of bed for meals 2 times a day  - Out of bed for toileting  - Record patient progress and toleration of activity level   Outcome: Progressing     Problem: INFECTION - ADULT  Goal: Absence or prevention of progression during hospitalization  Description: INTERVENTIONS:  - Assess and monitor for signs and symptoms of infection  - Monitor lab/diagnostic results  - Monitor all insertion sites, i e  indwelling lines, tubes, and drains  - Monitor endotracheal if appropriate and nasal secretions for changes in amount and color  - [place] appropriate cooling/warming therapies per order  - Administer medications as ordered  - Instruct and encourage patient and family to use good hand hygiene technique  - Identify and instruct in appropriate isolation precautions for identified infection/condition  Outcome: Progressing     Problem: DISCHARGE PLANNING  Goal: Discharge to home or other facility with appropriate resources  Description: INTERVENTIONS:  - Identify barriers to discharge w/patient and caregiver  - Arrange for needed discharge resources and transportation as appropriate  - Identify discharge learning needs (meds, wound care, etc )  - Refer to Case Management Department for coordinating discharge planning if the patient needs post-hospital services based on physician/advanced practitioner order or complex needs related to functional status, cognitive ability, or social support system  Outcome: Progressing     Problem: Knowledge Deficit  Goal: Patient/family/caregiver demonstrates understanding of disease process, treatment plan, medications, and discharge instructions  Description: Complete learning assessment and assess knowledge base    Interventions:  - Provide teaching at level of understanding  - Provide teaching via preferred learning methods  Outcome: Progressing

## 2023-04-30 NOTE — PROGRESS NOTES
"Progress Note - General Surgery   Ce Carmen 69 y o  female MRN: [id number]  Unit/Bed#: -01 Encounter: [id number]    ASSESSMENT:  68 y/o F who presented with diarrhea and left lower quadrant abdominal pain  CT findings also suggestive of cirrhosis vs acute diverticulitis  CT also showed significant stool burden, diarrhea thought to be secondary to overflow from chronic constipation  Stool studies have been negative  FOB also negative  -Afebrile, vital signs stable  -Worsening hyponatremia,  from 128  -Persistent mildly elevated T bilirubin, 1 98  -Hypoalbuminemia 2 6  -No leukocytosis, WBC 5 06  -Anemia slightly improved but persists, 10 9 (9 9, 12)  -UO: 100cc  -Stool: Unrecorded  -Received IV Lasix from internal medicine team for fluid overload yesterday, nephrology gave another 80 mg today    PLAN:  -Advance to regular diet  Started on low residue/fiber and low sodium, as well as a fluid restriction per nephrology recommendations   -Continue empiric IV antibiotic  -DVT PPx, Lovenox  -PO home meds  -Analgesia/antiemetics as needed  -Consider repeat CT scan tomorrow for further evaluation of intra-abdominal pathology  -Nephrology saw in consultation for worsening hyponatremia, appreciate recommendations  -Medicine team following, appreciate recommendations  -PT/OT      SUBJECTIVE:  That she feels much better today  Up out of bed ambulating the room upon evaluation  Does note that she is having a lot of diarrhea still  She reports at least 6 episodes a day  Denies nausea/vomiting  Has an appetite  OBJECTIVE:   Vitals:  Blood pressure 143/50, pulse 56, temperature (!) 97 4 °F (36 3 °C), resp  rate 20, height 4' 11\" (1 499 m), weight 59 8 kg (131 lb 13 4 oz), SpO2 97 %  ,Body mass index is 26 63 kg/m²      I/Os:    Intake/Output Summary (Last 24 hours) at 4/30/2023 1352  Last data filed at 4/30/2023 1300  Gross per 24 hour   Intake 480 ml   Output --   Net 480 ml " Lines/Drains:  Invasive Devices     Peripheral Intravenous Line  Duration           Peripheral IV 04/29/23 Distal;Left;Ventral (anterior) Forearm 1 day                Physical Exam  Constitutional:       General: She is not in acute distress  Comments: Out of bed ambulating in the room upon evaluation   Cardiovascular:      Rate and Rhythm: Normal rate  Pulmonary:      Effort: Pulmonary effort is normal    Abdominal:      Palpations: Abdomen is soft  Tenderness: There is abdominal tenderness (Mild tenderness with deep palpation to the left lower quadrant)  Neurological:      Mental Status: She is alert  Diagnostics:  I have personally reviewed pertinent lab results  XR abdomen obstruction series    Result Date: 4/30/2023  Impression: Nonobstructive bowel gas pattern  Workstation performed: [id number]     CT abdomen pelvis with contrast    Result Date: 4/27/2023  Impression: New small amount of free fluid in the abdomen and pelvis compared to 4/18/2023, which can be secondary to cirrhosis or acute inflammatory process in the abdomen and pelvis such as occult acute diverticulitis  Moderate amount of stool in the colon, nonspecific and can represent constipation  Again seen is 0 6 cm pancreatic tail cystic lesion  Follow-up is recommended as suggested on report of prior CT abdomen pelvis 4/18/2023 Indeterminate 0 4 cm osteolytic lesion in the right aspect of L3 vertebral body, which can represent benign or malignant neoplasm  Bone scan is recommended for further evaluation  The study was marked in [facility] for immediate notification      Recent Results (from the past 36 hour(s))   CBC and Platelet    Collection Time: 04/29/23  5:15 AM   Result Value Ref Range    WBC 6 08 4 31 - 10 16 Thousand/uL    RBC 2 92 (L) 3 81 - 5 12 Million/uL    Hemoglobin 9 9 (L) 11 5 - 15 4 g/dL    Hematocrit 29 8 (L) 34 8 - 46 1 %     (H) 82 - 98 fL    MCH 33 9 26 8 - 34 3 pg    MCHC 33 2 31 4 - 37 4 g/dL RDW 15 4 (H) 11 6 - 15 1 %    Platelets 74 (L) 441 - 390 Thousands/uL    MPV 12 0 8 9 - 12 7 fL   Comprehensive metabolic panel    Collection Time: 04/29/23  5:15 AM   Result Value Ref Range    Sodium 128 (L) 135 - 147 mmol/L    Potassium 4 3 3 5 - 5 3 mmol/L    Chloride 105 96 - 108 mmol/L    CO2 17 (L) 21 - 32 mmol/L    ANION GAP 6 4 - 13 mmol/L    BUN 16 5 - 25 mg/dL    Creatinine 0 88 0 60 - 1 30 mg/dL    Glucose 92 65 - 140 mg/dL    Calcium 7 9 (L) 8 4 - 10 2 mg/dL    Corrected Calcium 9 3 8 3 - 10 1 mg/dL    AST 31 13 - 39 U/L    ALT 17 7 - 52 U/L    Alkaline Phosphatase 308 (H) 34 - 104 U/L    Total Protein 6 2 (L) 6 4 - 8 4 g/dL    Albumin 2 3 (L) 3 5 - 5 0 g/dL    Total Bilirubin 1 52 (H) 0 20 - 1 00 mg/dL    eGFR 64 ml/min/1 73sq m   Magnesium    Collection Time: 04/29/23  5:15 AM   Result Value Ref Range    Magnesium 1 5 (L) 1 9 - 2 7 mg/dL   Phosphorus    Collection Time: 04/29/23  5:15 AM   Result Value Ref Range    Phosphorus 2 6 2 3 - 4 1 mg/dL   Lipase    Collection Time: 04/29/23  5:15 AM   Result Value Ref Range    Lipase 108 (H) 11 - 82 u/L   Occult blood 1-3, stool    Collection Time: 04/29/23  7:55 PM   Result Value Ref Range    Fecal Occult Blood Diagnostic Negative Negative    Fecal Occult Blood Diagnostic 2 Test not performed Negative    Fecal Occult Blood Diagnostic 3 Test not performed Negative   CBC and Platelet    Collection Time: 04/30/23  5:45 AM   Result Value Ref Range    WBC 5 06 4 31 - 10 16 Thousand/uL    RBC 3 14 (L) 3 81 - 5 12 Million/uL    Hemoglobin 10 9 (L) 11 5 - 15 4 g/dL    Hematocrit 34 0 (L) 34 8 - 46 1 %     (H) 82 - 98 fL    MCH 34 7 (H) 26 8 - 34 3 pg    MCHC 32 1 31 4 - 37 4 g/dL    RDW 15 5 (H) 11 6 - 15 1 %    Platelets 64 (L) 129 - 390 Thousands/uL    MPV 11 3 8 9 - 12 7 fL   Comprehensive metabolic panel    Collection Time: 04/30/23  5:45 AM   Result Value Ref Range    Sodium 124 (L) 135 - 147 mmol/L    Potassium 4 7 3 5 - 5 3 mmol/L    Chloride 101 96 - 108 mmol/L    CO2 15 (L) 21 - 32 mmol/L    ANION GAP 8 4 - 13 mmol/L    BUN 15 5 - 25 mg/dL    Creatinine 0 98 0 60 - 1 30 mg/dL    Glucose 77 65 - 140 mg/dL    Calcium 8 5 8 4 - 10 2 mg/dL    Corrected Calcium 9 6 8 3 - 10 1 mg/dL    AST 44 (H) 13 - 39 U/L    ALT 16 7 - 52 U/L    Alkaline Phosphatase 330 (H) 34 - 104 U/L    Total Protein 6 7 6 4 - 8 4 g/dL    Albumin 2 6 (L) 3 5 - 5 0 g/dL    Total Bilirubin 1 98 (H) 0 20 - 1 00 mg/dL    eGFR 56 ml/min/1 73sq m   Magnesium    Collection Time: 04/30/23  5:45 AM   Result Value Ref Range    Magnesium 2 0 1 9 - 2 7 mg/dL   Occult blood 1-3, stool    Collection Time: 04/30/23  6:36 AM   Result Value Ref Range    Fecal Occult Blood Diagnostic Negative Negative    Fecal Occult Blood Diagnostic 2      Fecal Occult Blood Diagnostic 3         Current Medications:  Scheduled Meds:  Current Facility-Administered Medications   Medication Dose Route Frequency Provider Last Rate   • ciprofloxacin  500 mg Oral Q12H [address] Esme Hernandez PA-C     • enoxaparin  40 mg Subcutaneous Daily Jael Cintron MD     • levothyroxine  75 mcg Oral Early Morning Mike Mckeon MD     • magnesium hydroxide  30 mL Oral Daily PRN Esme Hernandez PA-C     • metoprolol succinate  25 mg Oral BID Mike Mckeon MD     • metroNIDAZOLE  500 mg Oral [facility] Esme Hernandez PA-C     • ondansetron  4 mg Intravenous Q6H PRN Jael Cintron MD     • pantoprazole  40 mg Oral Early Morning Mike Mckeon MD     • sodium bicarbonate  650 mg Oral BID after meals EBEN Angel       Continuous Infusions:   PRN Meds:  •  magnesium hydroxide  •  ondansetron      YOVANI Rodriguez  4/30/2023

## 2023-04-30 NOTE — NURSING NOTE
Pt in independent pt bathed with minimal help pt did oral care pt ambulates in room by self RN aware

## 2023-04-30 NOTE — QUICK NOTE
Serum sodium unchanged  Bicarb improved  200 mL UOP doc with 2 occurrences for day shift  Normotensive  Redose 80 mg IV lasix now and continue BID

## 2023-04-30 NOTE — CONSULTS
CONSULTATION-NEPHROLOGY   Hayden Narayan 69 y o  female MRN: [id number]  Unit/Bed#: -01 Encounter: [id number]        Assessment and Plan:    1  Hyponatremia  · Examines hypervolemic with +2 to +3 pitting lower extremity edema and abdominal ascites on imaging  Redose Lasix 80 mg now and repeat BMP later today  Initiate low-sodium diet with gentle fluid restriction once diet is advanced  Urine chemistries pending  2   Acidosis  · Setting of diarrhea  Start sodium bicarbonate tablets and monitor trends  3  Primary hypertension  · Thiazide and thiazide like diuretics contraindicated  Blood pressure appropriate-no changes made  4  Anemia  · Hemoglobin stable  Being managed by primary team and general surgery  5  Left lower quadrant pain, diarrhea  · Management per primary team, GI and general surgery    HPI:    Hayden Narayan is a 69 y o  female with CAD status post CABG in 2021, PAF, mild aortic stenosis, hypertension, diverticulitis, history of ischemic colitis who presented to [facility] emergency department after undergoing ongoing diarrhea for months, 6-8 episodes of diarrhea a day  Studies were done last week and negative  She is being followed by general surgery  Nephrology consulted today for hyponatremia  Upon discussion with the patient she relays HPI as above in addition: Patient states that she has worsening lower extremity edema and abdominal bloating that she thinks is from fluid  Denies any nausea vomiting today  She is on a liquid only diet  From a sodium perspective, mild hyponatremia noted back till December 2022  Not seen nephrology before    Reason for Consult: hyponatremia    Review of Systems:  A complete 10-point review of systems was performed  Aside from what was mentioned in the HPI, it is otherwise negative        Historical Information   Past Medical History:   Diagnosis Date   • Anemia    • Atrial fibrillation ([facility] [address] )    • Depression    • GI (gastrointestinal "bleed)    • Ischemic colitis ([facility] [address] )      Past Surgical History:   Procedure Laterality Date   • APPENDECTOMY     • CARDIAC SURGERY     • CHOLECYSTECTOMY     • HIP SURGERY Right     Partial replacement   • HYSTERECTOMY       Social History   Social History     Substance and Sexual Activity   Alcohol Use Never     Social History     Substance and Sexual Activity   Drug Use Never     Social History     Tobacco Use   Smoking Status Former   • Types: Cigarettes   Smokeless Tobacco Never       Family History:   Family History   Problem Relation Age of Onset   • No Known Problems Mother        Medications:  Pertinent medications were reviewed  Current Facility-Administered Medications   Medication Dose Route Frequency Provider Last Rate   • ciprofloxacin  500 mg Oral Q12H Grayson Steel PA-C     • enoxaparin  40 mg Subcutaneous Daily Sneha Rojas MD     • levothyroxine  75 mcg Oral Early Morning Ag Ojeda MD     • magnesium hydroxide  30 mL Oral Daily PRN Nancy Brink PA-C     • metoprolol succinate  25 mg Oral BID Ag Ojeda MD     • metroNIDAZOLE  500 mg Oral [facility] Nancy Brink PA-C     • ondansetron  4 mg Intravenous Q6H PRN Sneha Rojas MD     • pantoprazole  40 mg Oral Early Morning Ag Ojeda MD     • polyethylene glycol  17 g Oral HS Sneha Rojas MD           Allergies   Allergen Reactions   • Influenza Vaccines Other (See Comments) and Vomiting     nausea   • Lactose - Food Allergy Diarrhea   • Ceftaroline GI Intolerance   • Diphenhydramine Hyperactivity         Vitals:   /60   Pulse 55   Temp (!) 97 4 °F (36 3 °C)   Resp 20   Ht 4' 11\" (1 499 m)   Wt 53 1 kg (117 lb)   SpO2 97%   BMI 23 63 kg/m²   Body mass index is 23 63 kg/m²    SpO2: 97 %,   SpO2 Activity: At Rest,   O2 Device: None (Room air)      Intake/Output Summary (Last 24 hours) at 4/30/2023 1019  Last data filed at 4/29/2023 1738  Gross per 24 hour " "  Intake 600 ml   Output 100 ml   Net 500 ml     Invasive Devices     Peripheral Intravenous Line  Duration           Peripheral IV 04/29/23 Distal;Left;Ventral (anterior) Forearm <1 day                Physical Exam:  General: conscious, cooperative, in no acute distress  Eyes: conjunctivae pink, anicteric sclerae  ENT: lips and mucous membranes moist  Neck: supple, no JVD, no masses  Chest: clear breath sounds bilateral, no crackles, ronchus or wheezings  CVS: S1 & S2, normal rate, regular rhythm  Abdomen: soft, non-tender, non-distended, normoactive bowel sounds  Extremities: severe edema of both legs  Skin: no rash  Neuro: awake, alert, oriented      Diagnostic Data:  Lab: I have personally reviewed pertinent lab results  ,   CBC:  Results from last 7 days   Lab Units 04/30/23  0545   WBC Thousand/uL 5 06   HEMOGLOBIN g/dL 10 9*   HEMATOCRIT % 34 0*   PLATELETS Thousands/uL 64*      CMP:   Lab Results   Component Value Date    SODIUM 124 (L) 04/30/2023    K 4 7 04/30/2023     04/30/2023    CO2 15 (L) 04/30/2023    BUN 15 04/30/2023    CREATININE 0 98 04/30/2023    CALCIUM 8 5 04/30/2023    AST 44 (H) 04/30/2023    ALT 16 04/30/2023    ALKPHOS 330 (H) 04/30/2023    EGFR 56 04/30/2023   ,   PT/INR: No results found for: PT, INR,   Magnesium: No components found for: MAG,  Phosphorous: No results found for: PHOS    Microbiology:  @LABRCNTIP,(urinecx:7)@        EBEN Samuel    Portions of the record may have been created with voice recognition software  Occasional wrong word or \"sound a like\" substitutions may have occurred due to the inherent limitations of voice recognition software  Read the chart carefully and recognize, using context, where substitutions have occurred        "

## 2023-05-01 ENCOUNTER — APPOINTMENT (INPATIENT)
Dept: MRI IMAGING | Facility: HOSPITAL | Age: 78
DRG: 385 | End: 2023-05-01
Payer: MEDICARE

## 2023-05-01 ENCOUNTER — APPOINTMENT (INPATIENT)
Dept: CT IMAGING | Facility: HOSPITAL | Age: 78
DRG: 385 | End: 2023-05-01
Payer: MEDICARE

## 2023-05-01 LAB
ALBUMIN SERPL BCP-MCNC: 2.6 G/DL (ref 3.5–5)
ALP SERPL-CCNC: 331 U/L (ref 34–104)
ALT SERPL W P-5'-P-CCNC: 18 U/L (ref 7–52)
ANION GAP SERPL CALCULATED.3IONS-SCNC: 5 MMOL/L (ref 4–13)
AST SERPL W P-5'-P-CCNC: 40 U/L (ref 13–39)
BILIRUB SERPL-MCNC: 1.75 MG/DL (ref 0.2–1)
BUN SERPL-MCNC: 22 MG/DL (ref 5–25)
CALCIUM ALBUM COR SERPL-MCNC: 9.5 MG/DL (ref 8.3–10.1)
CALCIUM SERPL-MCNC: 8.4 MG/DL (ref 8.4–10.2)
CHLORIDE SERPL-SCNC: 99 MMOL/L (ref 96–108)
CO2 SERPL-SCNC: 19 MMOL/L (ref 21–32)
CREAT SERPL-MCNC: 1.08 MG/DL (ref 0.6–1.3)
ERYTHROCYTE [DISTWIDTH] IN BLOOD BY AUTOMATED COUNT: 14.5 % (ref 11.6–15.1)
GFR SERPL CREATININE-BSD FRML MDRD: 49 ML/MIN/1.73SQ M
GLUCOSE SERPL-MCNC: 79 MG/DL (ref 65–140)
HCT VFR BLD AUTO: 32.5 % (ref 34.8–46.1)
HGB BLD-MCNC: 11.1 G/DL (ref 11.5–15.4)
MCH RBC QN AUTO: 34.7 PG (ref 26.8–34.3)
MCHC RBC AUTO-ENTMCNC: 34.2 G/DL (ref 31.4–37.4)
MCV RBC AUTO: 102 FL (ref 82–98)
MRSA NOSE QL CULT: NORMAL
PLATELET # BLD AUTO: 92 THOUSANDS/UL (ref 149–390)
PMV BLD AUTO: 10.4 FL (ref 8.9–12.7)
POTASSIUM SERPL-SCNC: 4.1 MMOL/L (ref 3.5–5.3)
PROT SERPL-MCNC: 6.7 G/DL (ref 6.4–8.4)
RBC # BLD AUTO: 3.2 MILLION/UL (ref 3.81–5.12)
SODIUM 24H UR-SCNC: 84 MOL/L
SODIUM SERPL-SCNC: 123 MMOL/L (ref 135–147)
WBC # BLD AUTO: 6.35 THOUSAND/UL (ref 4.31–10.16)

## 2023-05-01 PROCEDURE — 99232 SBSQ HOSP IP/OBS MODERATE 35: CPT | Performed by: INTERNAL MEDICINE

## 2023-05-01 PROCEDURE — 80053 COMPREHEN METABOLIC PANEL: CPT | Performed by: PHYSICIAN ASSISTANT

## 2023-05-01 PROCEDURE — 72148 MRI LUMBAR SPINE W/O DYE: CPT

## 2023-05-01 PROCEDURE — 74177 CT ABD & PELVIS W/CONTRAST: CPT

## 2023-05-01 PROCEDURE — G1004 CDSM NDSC: HCPCS

## 2023-05-01 PROCEDURE — 99233 SBSQ HOSP IP/OBS HIGH 50: CPT | Performed by: SPECIALIST

## 2023-05-01 PROCEDURE — 85027 COMPLETE CBC AUTOMATED: CPT | Performed by: PHYSICIAN ASSISTANT

## 2023-05-01 PROCEDURE — 97163 PT EVAL HIGH COMPLEX 45 MIN: CPT

## 2023-05-01 RX ORDER — MORPHINE SULFATE 4 MG/ML
4 INJECTION, SOLUTION INTRAMUSCULAR; INTRAVENOUS
Status: DISCONTINUED | OUTPATIENT
Start: 2023-05-01 | End: 2023-05-07

## 2023-05-01 RX ORDER — TRAZODONE HYDROCHLORIDE 50 MG/1
50 TABLET ORAL
Status: DISCONTINUED | OUTPATIENT
Start: 2023-05-01 | End: 2023-05-23 | Stop reason: HOSPADM

## 2023-05-01 RX ADMIN — MORPHINE SULFATE 4 MG: 4 INJECTION INTRAVENOUS at 09:56

## 2023-05-01 RX ADMIN — TRAZODONE HYDROCHLORIDE 50 MG: 50 TABLET ORAL at 22:09

## 2023-05-01 RX ADMIN — METRONIDAZOLE 500 MG: 500 TABLET ORAL at 05:41

## 2023-05-01 RX ADMIN — METOPROLOL SUCCINATE 25 MG: 25 TABLET, EXTENDED RELEASE ORAL at 17:10

## 2023-05-01 RX ADMIN — LEVOTHYROXINE SODIUM 75 MCG: 75 TABLET ORAL at 05:41

## 2023-05-01 RX ADMIN — IOHEXOL 100 ML: 350 INJECTION, SOLUTION INTRAVENOUS at 08:22

## 2023-05-01 RX ADMIN — FUROSEMIDE 80 MG: 10 INJECTION, SOLUTION INTRAMUSCULAR; INTRAVENOUS at 10:02

## 2023-05-01 RX ADMIN — ENOXAPARIN SODIUM 40 MG: 40 INJECTION SUBCUTANEOUS at 09:56

## 2023-05-01 RX ADMIN — CIPROFLOXACIN 500 MG: 500 TABLET, FILM COATED ORAL at 09:56

## 2023-05-01 RX ADMIN — METOPROLOL SUCCINATE 25 MG: 25 TABLET, EXTENDED RELEASE ORAL at 09:56

## 2023-05-01 RX ADMIN — MORPHINE SULFATE 4 MG: 4 INJECTION INTRAVENOUS at 19:34

## 2023-05-01 RX ADMIN — FUROSEMIDE 80 MG: 10 INJECTION, SOLUTION INTRAMUSCULAR; INTRAVENOUS at 17:10

## 2023-05-01 RX ADMIN — SODIUM BICARBONATE 650 MG: 650 TABLET ORAL at 10:03

## 2023-05-01 RX ADMIN — PANTOPRAZOLE SODIUM 40 MG: 40 TABLET, DELAYED RELEASE ORAL at 05:41

## 2023-05-01 NOTE — CASE MANAGEMENT
Case Management Discharge Planning Note    Patient name Lianne Trivedi  Location [address] 309/-78 MRN [id number]  : 1945 Date 2023       Current Admission Date: 2023  Current Admission Diagnosis:Left lower quadrant abdominal pain   Patient Active Problem List    Diagnosis Date Noted   • Hyponatremia 2023   • Hypomagnesemia 2023   • Anemia 2023   • Left lower quadrant abdominal pain 2023   • Superior mesenteric artery stenosis ([facility] [address] ) 2023   • ALEJANDRA (acute kidney injury) ([facility] [address] ) 2023   • Diarrhea of presumed infectious origin 2023   • Primary hypertension 2023   • Acquired hypothyroidism 2023   • Irritable bowel syndrome with diarrhea 2023   • Abnormal CT scan 2023   • Hx of CABG 2023   • PAF (paroxysmal atrial fibrillation) ([facility] [address] ) 2022      LOS (days): 4  Geometric Mean LOS (GMLOS) (days): 2 60  Days to GMLOS:-1 3     OBJECTIVE:  Risk of Unplanned Readmission Score: 17 05         Current admission status: Inpatient   Preferred Pharmacy:   [address] [place] [address]  [facility]   [facility] [address]  Phone: [phone] Fax: [phone]    Primary Care Provider: Isac Vera MD    Primary Insurance: MEDICARE  Secondary Insurance: Kirill Dockery    DISCHARGE DETAILS:    Discharge planning discussed with[de-identified] [facility] from [facility]  Freedom of Choice: Yes    Contacts  Patient Contacts: Pao Maldonado (Sister)   [phone] (Mobile)  Relationship to Patient[de-identified] Family  Contact Method: Phone  Phone Number: Pao Maldonado (Sister)   [phone] (Mobile)  Reason/Outcome: Emergency Contact     Additional Comments: Met with patient at bedside  Patient wants records from [address] to give to Medical team here  Discussed with surgery and they do not need old records  Explained to patient she can request records for her own records  Added brother Stephany Kuhn to [facility] list   Call to sister Dave Hogan to update    Explained to Katarzyna Joseph about records  Katarzyna Joseph requesting medical update be called to brother Chente Mckeon  TT to Surgery team Maryuri alcantara

## 2023-05-01 NOTE — PHYSICAL THERAPY NOTE
PHYSICAL THERAPY EVALUATION  NAME:  Lucina Lazo  DATE: 05/01/23    AGE:   69 y o  Mrn:   [id number]  ADMIT DX:  Diverticulitis [K57 92]  Abdominal pain [R10 9]  Problem List:   Patient Active Problem List   Diagnosis    ALEJANDRA (acute kidney injury) ([facility] )    Diarrhea of presumed infectious origin    Primary hypertension    Acquired hypothyroidism    Irritable bowel syndrome with diarrhea    Abnormal CT scan    Superior mesenteric artery stenosis (HCC)    Left lower quadrant abdominal pain    Hyponatremia    Hypomagnesemia    PAF (paroxysmal atrial fibrillation) ([facility])    Hx of CABG    Anemia       Past Medical History  Past Medical History:   Diagnosis Date    Anemia     Atrial fibrillation (HCC)     Depression     GI (gastrointestinal bleed)     Ischemic colitis ([facility] )        Past Surgical History  Past Surgical History:   Procedure Laterality Date    APPENDECTOMY      CARDIAC SURGERY      CHOLECYSTECTOMY      HIP SURGERY Right     Partial replacement    HYSTERECTOMY         Length Of Stay: 4  Performed at least 2 patient identifiers during session: Name and Birthday       05/01/23 0901   PT Last Visit   PT Visit Date 05/01/23   Note Type   Note type Evaluation   Pain Assessment   Pain Assessment Tool 0-10   Pain Score 4   Pain Location/Orientation Location: Back  (and stomach)   Pain Onset/Description Descriptor: Aching;Descriptor: Sharp   Restrictions/Precautions   Weight Bearing Precautions Per Order No   Braces or Orthoses   (none reported)   Other Precautions Pain;Fluid restriction   Home Living   Type of Home Assisted living   Home Layout Able to live on main level with bedroom/bathroom; Ramped entrance   [employer] Walk-in shower   Bathroom Toilet Raised   Bathroom Equipment Grab bars in shower;Grab bars around toilet   [facility] shoehorn;Walker;Sock aid;Reacher  (uses rollator at baseline)   Prior Function   Level of [place] Independent with ADLs; Independent with functional mobility; Needs assistance with IADLS  (s/u with ADLs)   Lives With Facility staff   Receives Help From Personal care attendant   IADLs Family/Friend/Other provides transportation; Family/Friend/Other provides meals   Falls in the last 6 months 1 to 4   Vocational Retired   General   Family/Caregiver Present No   Cognition   Overall Cognitive Status WFL   Arousal/Participation Alert   Attention Within functional limits   Orientation Level Oriented X4   Memory Within functional limits   Following Commands Follows all commands and directions without difficulty   Comments pt tearful throughout session due to current condition   RLE Assessment   RLE Assessment WFL   LLE Assessment   LLE Assessment WFL   Vision-Basic Assessment   Current Vision Wears glasses all the time   Light Touch   RLE Light Touch Impaired   LLE Light Touch Impaired   Bed Mobility   Supine to Sit 5  Supervision   Additional items Assist x 1;HOB elevated; Increased time required;Verbal cues   Additional Comments pt denied dizziness   Transfers   Sit to Stand 5  Supervision   Additional items Assist x 1;Verbal cues; Increased time required   Stand to Sit 5  Supervision   Additional items Assist x 1; Armrests; Increased time required;Verbal cues   Stand pivot 5  Supervision   Additional items Assist x 1;Verbal cues; Increased time required   Toilet transfer 5  Supervision   Additional items Assist x 1;Commode;Verbal cues; Increased time required   Additional Comments with RW; pt reported increased pain with mobility   Ambulation/Elevation   Gait pattern Improper Weight shift;Decreased foot clearance; Inconsistent garfield; Antalgic; Excessively slow   Gait Assistance 5  Supervision   Additional items Verbal cues   Assistive Device Rolling walker   Distance 15 ft   Ambulation/Elevation Additional Comments cues for safety and encouragement   Balance   Static Sitting Normal   Dynamic Sitting Good   Static Standing Fair   Dynamic Standing [address], & 5 - Endurance Deficit   Endurance Deficit Yes   Endurance Deficit Description limited by pain and fatigue   Activity Tolerance   Activity Tolerance Patient limited by fatigue;Patient limited by pain   Assessment   Prognosis Good   Assessment Pt is 69 y o  female seen for PT evaluation s/p admit to [address] on 4/27/2023 w/ Left lower quadrant abdominal pain  PT consulted to assess pt's functional mobility and d/c needs  Order placed for PT eval and tx, w/ up as tolerated order  Pt agreeable to PT  session upon arrival, pt found supine in bed  PTA, pt was independent w/ all functional mobility w/ rollator, ambulates household distances, resident of [facility] and retired  Pt to benefit from continued PT tx to address deficits and maximize level of functional independent mobility and consistency  From PT/mobility standpoint, recommendation at time of d/c would be post acute rehabilitation services pending progress in order to facilitate return to PLOF  Upon conclusion pt  seated in recliner  Complexity: Comorbidities affecting pt's physical performance at time of assessment include: a fib, depression and diverticulitis  Personal factors affecting pt at time of IE include: ambulating w/ assistive device, inability to ambulate household distances and depression  Please find objective findings from PT assessment regarding body systems outlined above with impairments and limitations including decreased endurance, pain, decreased activity tolerance, decreased functional mobility tolerance, altered sensation and fall risk  Pt's clinical presentation is currently unstable/unpredictable seen in pt's presentation of abnormal H&H, abnormal sodium levels, abnormal Co2 levels, pain and polypharmacy  The patient's AM-PAC Basic Mobility Inpatient Short Form Raw Score is 18  A Raw score of greater than 16 suggests the patient may benefit from discharge to home   Please also refer to the recommendation of the Physical Therapist for safe discharge planning  Goals   Patient Goals to feel better   LTG Expiration Date 05/11/23   Long Term Goal #1 Pt will: Perform bed mobility tasks to modified I to improve ease of bed mobility  Perform transfers to modified I to improve ease of transfers  Perform ambulation with MI and RW for 250 feet to  increase Indep in home environment  Increase dynamic standing balance to F+ to decrease fall risk  Increase OOB activity tolerance to 10 minutes without s/s of exertion to decrease fall risk  Plan   Treatment/Interventions Functional transfer training; Endurance training; Therapeutic exercise;Gait training;Bed mobility   PT Frequency 3-5x/wk   Recommendation   PT Discharge Recommendation Return to facility with rehabilitation services   AM-PAC Basic Mobility Inpatient   Turning in Flat Bed Without Bedrails 4   Lying on Back to Sitting on Edge of Flat Bed Without Bedrails 3   Moving Bed to Chair 3   Standing Up From Chair Using Arms 3   Walk in Room 3   Climb 3-5 Stairs With Railing 2   Basic Mobility Inpatient Raw Score 18   Basic Mobility Standardized Score 41 05   Highest Level Of Mobility   JH-HLM Goal 6: Walk 10 steps or more   JH-HLM Achieved 6: Walk 10 steps or more       Time In: 0859  Time Out: 0927  Total Evaluation Minutes: [address] Bourgeois, PT

## 2023-05-01 NOTE — PROGRESS NOTES
Progress Note - Stefany Jo 69 y o  female MRN: [id number]    Unit/Bed#: -01 Encounter: [id number]      Assessment and Plan:    69-year-old female with CAD s/p CABG, aortic stenosis, Sjogren's admitted with severe left lower quadrant pain for the past 5 days  1) Left lower quadrant pain  2) Colon thickening    She has had 3 CT scans over the past 2 weeks  Initially, CT 4/18 concerning for mild non-specific enterocolitis  Then CT on 4/27 showed moderate stool burden consistent with constipation  She was given laxatives and reportedly had several BMs  Due to persistent/worsening pain, repeat CT scan was obtained this morning which shows Thickened appearance of the wall of the descending colon and sigmoid colon which may represent underdistention versus a mild nonspecific colitis  There are a few scattered colonic diverticula without evidence of acute diverticulitis  No leukocytosis or fever  Infectious stool studies negative  She has generalized tenderness without peritoneal signs  Differential diagnosis includes ischemic colitis, malignancy, IBD, less likely diverticulitis      -Plan discussed with surgery  -Stop antibiotics since no evidence of diverticulitis  -Consider colonoscopy / flex sig once medically optimized prior to discharge to evaluate colonic thickening  -Continue supportive care and diet as tolerated for now    3) Probable cirrhosis: Labs and CT scan consistent with cirrhosis, unclear etiology  No significant ascites  Needs outpatient chronic liver disease work-up  4) Pancreatic cyst: CT shows cystic lesion within the pancreatic tail measuring 8 mm  No evidence of ductal dilatation  Punctate calcifications are again noted within the pancreatic head, possibly representing sequelae of prior episodes of pancreatitis     -Plan for MRI/MRCP as an outpatient to further characterize the pancreatic cyst         Subjective:     Patient seen and examined at bedside   She complains of worsening "left lower quadrant pain today  She rates the pain 10/10  She describes the pain as achy and sharp  The pain radiates through to her back  She has been passing \"runny\" stools, both small and large volume stools per the patient  No reports of blood in the stool  She ate 100% of lunch yesterday and 75% of dinner yesterday  Objective:     Vitals: Blood pressure 140/56, pulse 60, temperature 97 6 °F (36 4 °C), resp  rate 17, height 4' 11\" (1 499 m), weight 60 kg (132 lb 4 4 oz), SpO2 97 %  ,Body mass index is 26 72 kg/m²  Intake/Output Summary (Last 24 hours) at 5/1/2023 0916  Last data filed at 5/1/2023 0805  Gross per 24 hour   Intake 240 ml   Output 1150 ml   Net -910 ml       Physical Exam:     General Appearance: Alert, appears stated age and cooperative  Lungs: Clear to auscultation bilaterally  Heart: Regular rate and rhythm  Abdomen: Non-distended  Normal bowel sounds  Soft  Moderate diffuse tenderness to palpation  No rebound or guarding  Extremities: No cyanosis, + bilateral lower extremity pitting edema    Invasive Devices     Peripheral Intravenous Line  Duration           Peripheral IV 04/29/23 Distal;Left;Ventral (anterior) Forearm 1 day                Lab Results:  Results from last 7 days   Lab Units 05/01/23  0549 04/28/23  0524 04/27/23  1205   WBC Thousand/uL 6 35   < > 6 80   HEMOGLOBIN g/dL 11 1*   < > 11 5   HEMATOCRIT % 32 5*   < > 34 9   PLATELETS Thousands/uL 92*   < > 90*   NEUTROS PCT %  --   --  59   LYMPHS PCT %  --   --  18   MONOS PCT %  --   --  20*   EOS PCT %  --   --  3    < > = values in this interval not displayed       Results from last 7 days   Lab Units 05/01/23  0549   POTASSIUM mmol/L 4 1   CHLORIDE mmol/L 99   CO2 mmol/L 19*   BUN mg/dL 22   CREATININE mg/dL 1 08   CALCIUM mg/dL 8 4   ALK PHOS U/L 331*   ALT U/L 18   AST U/L 40*         Results from last 7 days   Lab Units 04/29/23  0515   LIPASE u/L 108*       Imaging Studies: I have personally reviewed pertinent " imaging studies  XR abdomen obstruction series    Result Date: 4/30/2023  Impression: Nonobstructive bowel gas pattern  Workstation performed: [id number]     CT abdomen pelvis with contrast    Result Date: 4/27/2023  Impression: New small amount of free fluid in the abdomen and pelvis compared to 4/18/2023, which can be secondary to cirrhosis or acute inflammatory process in the abdomen and pelvis such as occult acute diverticulitis  Moderate amount of stool in the colon, nonspecific and can represent constipation  Again seen is 0 6 cm pancreatic tail cystic lesion  Follow-up is recommended as suggested on report of prior CT abdomen pelvis 4/18/2023 Indeterminate 0 4 cm osteolytic lesion in the right aspect of L3 vertebral body, which can represent benign or malignant neoplasm  Bone scan is recommended for further evaluation  The study was marked in [facility] for immediate notification

## 2023-05-01 NOTE — PLAN OF CARE
Problem: Potential for Falls  Goal: Patient will remain free of falls  Description: INTERVENTIONS:  - Educate patient/family on patient safety including physical limitations  - Instruct patient to call for assistance with activity   - Consult OT/PT to assist with strengthening/mobility   - Keep Call bell within reach  - Keep bed low and locked with side rails adjusted as appropriate  - Keep care items and personal belongings within reach  - Initiate and maintain comfort rounds  - Make Fall Risk Sign visible to staff  - Offer Toileting every 2 Hours, in advance of need  - Initiate/Maintain alarms  - Obtain necessary fall risk management equipment:  - Apply yellow socks and bracelet for high fall risk patients  - Consider moving patient to room near nurses station  Outcome: Progressing     Problem: PAIN - ADULT  Goal: Verbalizes/displays adequate comfort level or baseline comfort level  Description: Interventions:  - Encourage patient to monitor pain and request assistance  - Assess pain using appropriate pain scale  - Administer analgesics based on type and severity of pain and evaluate response  - Implement non-pharmacological measures as appropriate and evaluate response  - Consider cultural and social influences on pain and pain management  - Notify physician/advanced practitioner if interventions unsuccessful or patient reports new pain  Outcome: Progressing     Problem: SAFETY ADULT  Goal: Patient will remain free of falls  Description: INTERVENTIONS:  - Educate patient/family on patient safety including physical limitations  - Instruct patient to call for assistance with activity   - Consult OT/PT to assist with strengthening/mobility   - Keep Call bell within reach  - Keep bed low and locked with side rails adjusted as appropriate  - Keep care items and personal belongings within reach  - Initiate and maintain comfort rounds  - Make Fall Risk Sign visible to staff  - Offer Toileting every 2 Hours, in advance of need  - Initiate/Maintain alarms  - Obtain necessary fall risk management equipment:  - Apply yellow socks and bracelet for high fall risk patients  - Consider moving patient to room near nurses station  Outcome: Progressing  Goal: Maintain or return to baseline ADL function  Description: INTERVENTIONS:  - Educate patient/family on patient safety including physical limitations  - Instruct patient to call for assistance with activity   - Consult OT/PT to assist with strengthening/mobility   - Keep Call bell within reach  - Keep bed low and locked with side rails adjusted as appropriate  - Keep care items and personal belongings within reach  - Initiate and maintain comfort rounds  - Make Fall Risk Sign visible to staff  - Offer Toileting every 2 Hours, in advance of need  - Initiate/Maintain alarms  - Obtain necessary fall risk management equipment:   - Apply yellow socks and bracelet for high fall risk patients  - Consider moving patient to room near nurses station  Outcome: Progressing  Goal: Maintains/Returns to pre admission functional level  Description: INTERVENTIONS:  - Perform BMAT or MOVE assessment daily    - Set and communicate daily mobility goal to care team and patient/family/caregiver     - Collaborate with rehabilitation services on mobility goals if consulted  - Out of bed for toileting  - Record patient progress and toleration of activity level   Outcome: Progressing     Problem: INFECTION - ADULT  Goal: Absence or prevention of progression during hospitalization  Description: INTERVENTIONS:  - Assess and monitor for signs and symptoms of infection  - Monitor lab/diagnostic results  - Monitor all insertion sites, i e  indwelling lines, tubes, and drains  - Monitor endotracheal if appropriate and nasal secretions for changes in amount and color  - [place] appropriate cooling/warming therapies per order  - Administer medications as ordered  - Instruct and encourage patient and family to use good hand hygiene technique  - Identify and instruct in appropriate isolation precautions for identified infection/condition  Outcome: Progressing     Problem: DISCHARGE PLANNING  Goal: Discharge to home or other facility with appropriate resources  Description: INTERVENTIONS:  - Identify barriers to discharge w/patient and caregiver  - Arrange for needed discharge resources and transportation as appropriate  - Identify discharge learning needs (meds, wound care, etc )  - Refer to Case Management Department for coordinating discharge planning if the patient needs post-hospital services based on physician/advanced practitioner order or complex needs related to functional status, cognitive ability, or social support system  Outcome: Progressing     Problem: Knowledge Deficit  Goal: Patient/family/caregiver demonstrates understanding of disease process, treatment plan, medications, and discharge instructions  Description: Complete learning assessment and assess knowledge base    Interventions:  - Provide teaching at level of understanding  - Provide teaching via preferred learning methods  Outcome: Progressing

## 2023-05-01 NOTE — PROGRESS NOTES
"Progress Note - General Surgery   Jena Oar 69 y o  female MRN: [id number]  Unit/Bed#: -01 Encounter: [id number]    Assessment:  69year old female with PMH significant for Afib, diverticulitis and ischemic colitis presents for the evaluation of abdominal pain and diarrhea  · Afebrile, vitals stable   · No leukocytosis   · Hgb 11 1  · Hyponatremia 123 (124), likely from hypervolemia  · Cr 1 08  · AST 40  · Alk phos 331  · Tbili 1 75  · CT scan 5/1: \"Thickened appearance of the wall of the descending colon and sigmoid colon which may represent pseudo thickening from nondistention versus a mild nonspecific colitis in the appropriate clinical setting  There are a few scattered colonic diverticula without evidence of focal inflammatory changes  Small right pleural effusion and anasarca, both of which are worsened from most recent CT  There is again trace free fluid in the pelvis which appears improved from from most recent exam  Hepatic contour nodularity with heterogeneous appearance of the parenchyma as can be seen in the setting of cirrhosis  Subcentimeter lucency within the L3 vertebral body which is indeterminate in etiology  Consider nuclear bone scan for further evaluation  \"    Plan:  · Discussed with GI AP regarding possible scope as patient reports having severe pain  Definitive plan pending attending discussion   · Continue Lasix per nephrology recommendations  · Discontinue cipro/flagyl  · Fluid restriction   · Analgesia and antiemetics prn   · Trazadone ordered as patient requests something to help sleep   · Evaluated at bedside by attending    Subjective/Objective   Subjective: Patient reports not doing well  Patient is tearful at bedside  She feels as though \"she can't do this anymore and it's time to visit God  \" Patient complains of generalized abdominal and back pain  She complains of being fluid overloaded and associated SOB  Denies n/v  Reports having diarrhea   Tolerating diet    Objective: " "  Blood pressure 140/56, pulse 60, temperature 97 6 °F (36 4 °C), resp  rate 17, height 4' 11\" (1 499 m), weight 60 kg (132 lb 4 4 oz), SpO2 97 %  ,Body mass index is 26 72 kg/m²  Intake/Output Summary (Last 24 hours) at 5/1/2023 0915  Last data filed at 5/1/2023 0805  Gross per 24 hour   Intake 240 ml   Output 1150 ml   Net -910 ml       Invasive Devices     Peripheral Intravenous Line  Duration           Peripheral IV 04/29/23 Distal;Left;Ventral (anterior) Forearm 1 day                Physical Exam  Vitals reviewed  Constitutional:       General: She is not in acute distress  Appearance: She is normal weight  HENT:      Head: Normocephalic and atraumatic  Cardiovascular:      Rate and Rhythm: Normal rate and regular rhythm  Pulmonary:      Effort: Pulmonary effort is normal  No respiratory distress  Breath sounds: Wheezing present  Abdominal:      General: Bowel sounds are normal  There is no distension  Palpations: Abdomen is soft  Tenderness: There is generalized abdominal tenderness  There is no guarding  Musculoskeletal:         General: Swelling present  Right lower leg: Pitting Edema present  Left lower leg: Pitting Edema present  Skin:     General: Skin is warm and dry  Neurological:      General: No focal deficit present  Mental Status: She is alert  Mental status is at baseline  Psychiatric:         Mood and Affect: Mood normal          Behavior: Behavior normal          Lab, Imaging and other studies:  I have personally reviewed pertinent lab results    , CBC:   Lab Results   Component Value Date    WBC 6 35 05/01/2023    HGB 11 1 (L) 05/01/2023    HCT 32 5 (L) 05/01/2023     (H) 05/01/2023    PLT 92 (L) 05/01/2023    MCH 34 7 (H) 05/01/2023    MCHC 34 2 05/01/2023    RDW 14 5 05/01/2023    MPV 10 4 05/01/2023   , CMP:   Lab Results   Component Value Date    SODIUM 123 (L) 05/01/2023    K 4 1 05/01/2023    CL 99 05/01/2023    CO2 19 (L) " 05/01/2023    BUN 22 05/01/2023    CREATININE 1 08 05/01/2023    CALCIUM 8 4 05/01/2023    AST 40 (H) 05/01/2023    ALT 18 05/01/2023    ALKPHOS 331 (H) 05/01/2023    EGFR 49 05/01/2023     VTE Pharmacologic Prophylaxis: Enoxaparin (Lovenox)  VTE Mechanical Prophylaxis: sequential compression device    Samantha Quiroz PA-C

## 2023-05-01 NOTE — PROGRESS NOTES
"Progress Note - Nephrology   Rian Buckley 69 y o  female MRN: [id number]  Unit/Bed#: -01 Encounter: [id number]    A/P:  1  Hyponatremia   Continue with diuresis, patient serum sodium level remains stable at 123 mmol/L at this time  Continue fluid restriction 1 5 L     2   Metabolic acidosis   Patient feeling bicarbonate supplementation, bicarb has increased from 15 now up to 19 mmol/L with bicarbonate supplementation  Will discontinue bicarb at this time, will also be diuresing the patient which will cause slight increase in serum bicarb as well  3   Volume overload   As mentioned above, continue with furosemide at this time  In general, this should improve the patient's serum bicarbonate as well as serum sodium levels  4   Colonic thickening   Further work-up is indicated according to gastroenterology colleagues  5   Probable underlying cirrhosis   Patient GGT was significant elevated, further evaluation and care as indicated according to gastroenterology colleagues  Follow up reason for today's visit: Hyponatremia/acidosis/volume overload    Left lower quadrant abdominal pain    Patient Active Problem List   Diagnosis   • ALEJANDRA (acute kidney injury) ([facility] [address] )   • Diarrhea of presumed infectious origin   • Primary hypertension   • Acquired hypothyroidism   • Irritable bowel syndrome with diarrhea   • Abnormal CT scan   • Superior mesenteric artery stenosis (HCC)   • Left lower quadrant abdominal pain   • Hyponatremia   • Hypomagnesemia   • PAF (paroxysmal atrial fibrillation) (HCC)   • Hx of CABG   • Anemia         Subjective:   No acute events, patient is complaining of the potential constipation issues in the future otherwise no other issues at this time  Objective:     Vitals: Blood pressure 140/56, pulse 60, temperature 97 6 °F (36 4 °C), resp  rate 17, height 4' 11\" (1 499 m), weight 60 kg (132 lb 4 4 oz), SpO2 97 %  ,Body mass index is 26 72 kg/m²      Weight (last 2 days)     Date/Time Weight " "   05/01/23 0551 60 (132 28)    04/30/23 1100 59 8 (131 84)            Intake/Output Summary (Last 24 hours) at 5/1/2023 1352  Last data filed at 5/1/2023 0901  Gross per 24 hour   Intake 660 ml   Output 1150 ml   Net -490 ml     I/O last 3 completed shifts: In: 240 [P O :240]  Out: 750 [Urine:750]         Physical Exam: /56   Pulse 60   Temp 97 6 °F (36 4 °C)   Resp 17   Ht 4' 11\" (1 499 m)   Wt 60 kg (132 lb 4 4 oz)   SpO2 97%   BMI 26 72 kg/m²     General Appearance:    Alert, cooperative, no distress, appears stated age   Head:    Normocephalic, without obvious abnormality, atraumatic   Eyes:    Conjunctiva/corneas clear   Ears:    Normal external ears   Nose:   Nares normal, septum midline, mucosa normal, no drainage    or sinus tenderness   Throat:   Lips, mucosa, and tongue normal; teeth and gums normal   Neck:   Supple   Back:     Symmetric, no curvature, ROM normal, no CVA tenderness   Lungs:     Clear to auscultation bilaterally, respirations unlabored   Chest wall:    No tenderness or deformity   Heart:    Regular rate and rhythm, S1 and S2 normal, no murmur, rub   or gallop   Abdomen:     Soft, non-tender, bowel sounds active   Extremities:   Extremities normal, atraumatic, no cyanosis or edema   Skin:   Skin color, texture, turgor normal, no rashes or lesions   Lymph nodes:   Cervical normal   Neurologic:   CNII-XII intact            Lab, Imaging and other studies: I have personally reviewed pertinent labs    CBC:   Lab Results   Component Value Date    WBC 6 35 05/01/2023    HGB 11 1 (L) 05/01/2023    HCT 32 5 (L) 05/01/2023     (H) 05/01/2023    PLT 92 (L) 05/01/2023    MCH 34 7 (H) 05/01/2023    MCHC 34 2 05/01/2023    RDW 14 5 05/01/2023    MPV 10 4 05/01/2023     CMP:   Lab Results   Component Value Date    K 4 1 05/01/2023    CL 99 05/01/2023    CO2 19 (L) 05/01/2023    BUN 22 05/01/2023    CREATININE 1 08 05/01/2023    CALCIUM 8 4 05/01/2023    AST 40 (H) 05/01/2023    ALT 18 " 05/01/2023    ALKPHOS 331 (H) 05/01/2023    EGFR 49 05/01/2023         Results from last 7 days   Lab Units 05/01/23  0549 04/30/23  1556 04/30/23  0545 04/29/23  0515   POTASSIUM mmol/L 4 1 4 4 4 7 4 3   CHLORIDE mmol/L 99 100 101 105   CO2 mmol/L 19* 18* 15* 17*   BUN mg/dL 22 16 15 16   CREATININE mg/dL 1 08 1 01 0 98 0 88   CALCIUM mg/dL 8 4 7 9* 8 5 7 9*   ALK PHOS U/L 331*  --  330* 308*   ALT U/L 18  --  16 17   AST U/L 40*  --  44* 31         Phosphorus: No results found for: PHOS  Magnesium: No results found for: MG  Urinalysis: No results found for: COLORU, CLARITYU, SPECGRAV, PHUR, LEUKOCYTESUR, NITRITE, PROTEINUA, GLUCOSEU, KETONESU, BILIRUBINUR, BLOODU  Ionized Calcium: No results found for: CAION  Coagulation: No results found for: PT, INR, APTT  Troponin: No results found for: TROPONINI  ABG: No results found for: PHART, XLV5MFD, PO2ART, MDZ7GMM, N7UAITUB, BEART, SOURCE  Radiology review:     IMAGING  Procedure: XR abdomen obstruction series    Result Date: 4/30/2023  Narrative: OBSTRUCTION SERIES INDICATION:   LLQ pain, known diverticulitis  COMPARISON:  None EXAM PERFORMED/VIEWS:  XR ABDOMEN OBSTRUCTION SERIES FINDINGS: There is a nonobstructive bowel gas pattern  No free air beneath the hemidiaphragms  No pathologic calcifications or soft tissue masses evident  Partially imaged right hip knee arthroplasty  Examination of the chest reveals: Cardiomegaly  Left lateral pulmonary opacity, possibly scarring  Impression: Nonobstructive bowel gas pattern  Workstation performed: [id number]     Procedure: CT abdomen pelvis w contrast    Result Date: 5/1/2023  Narrative: CT ABDOMEN AND PELVIS WITH IV CONTRAST INDICATION:   LLQ abdominal pain Persistant LLQ abdominal pain  COMPARISON: CT abdomen pelvis 4/27/2023, 4/18/2023  Abdominal radiograph 4/29/2023  TECHNIQUE:  CT examination of the abdomen and pelvis was performed  Multiplanar 2D reformatted images were created from the source data   Radiation dose length product (DLP) for this visit:  539 91 mGy-cm   This examination, like all CT scans performed in the [facility], was performed utilizing techniques to minimize radiation dose exposure, including the use of iterative  reconstruction and automated exposure control  IV Contrast:  100 mL of iohexol (OMNIPAQUE) 50 mL of iohexol (OMNIPAQUE) Enteric Contrast:  Enteric contrast was administered  FINDINGS: ABDOMEN LOWER CHEST: There is a small hiatal hernia  Remote left-sided rib fractures are noted  Left basilar scarring/atelectasis is noted  There is a small right pleural effusion with associated atelectasis, worsened from prior CT  LIVER/BILIARY TREE: The liver demonstrates contour nodularity with a heterogeneous appearance of the liver parenchyma as can be seen in the setting of cirrhosis  Pneumobilia is again noted, likely secondary to prior surgery/biliary intervention  GALLBLADDER: Gallbladder is surgically absent  SPLEEN:  Unremarkable  PANCREAS: Cystic lesion is seen within the pancreatic tail measuring 8 mm (2:47)  No evidence of ductal dilatation  Punctate calcifications are again noted within the pancreatic head, possibly representing sequelae of prior episodes of pancreatitis  ADRENAL GLANDS:  Unremarkable  KIDNEYS/URETERS: No hydronephrosis  4 mm calcification is again noted the left renal midpole, favored to represent a vascular calcification rather than a nonobstructing calculus  Subcentimeter hypodensities within the bilateral kidneys are too small to characterize  STOMACH AND BOWEL: Small hiatal hernia  No evidence of small bowel obstruction  The wall of the sigmoid colon appears mildly thickened, however, evaluation is limited by nondistention  Finding may represent pseudo thickening from nondistention versus a mild nonspecific colitis in the appropriate clinical setting  There are a few scattered colonic diverticula without evidence of focal inflammatory changes   APPENDIX:  No findings to suggest appendicitis  ABDOMINOPELVIC CAVITY: No pneumoperitoneum  No gross abdominal or pelvic adenopathy  There is trace free fluid within the pelvis which appears improved from most recent CT  VESSELS: Moderate to severe atherosclerotic calcifications are noted within the abdominal aorta and branching vessels  PELVIS REPRODUCTIVE ORGANS: Surgical changes of prior hysterectomy  URINARY BLADDER: Decompressed, limiting evaluation  No gross abnormalities are seen within these confines  ABDOMINAL WALL/INGUINAL REGIONS: There is diffuse anasarca, worsened from most recent CT  OSSEOUS STRUCTURES: Indeterminate lucent focus is again seen within the anterior aspect of L3 measuring up to 7 mm (602:104)  Remote left-sided rib fractures are noted  There is a mild grade 1 anterolisthesis of L4 on L5 and remote appearing compression deformities of L5, L4 and T11 which appear stable from recent exams  Right hip arthroplasty is noted     Impression: 1  Thickened appearance of the wall of the descending colon and sigmoid colon which may represent pseudo thickening from nondistention versus a mild nonspecific colitis in the appropriate clinical setting  There are a few scattered colonic diverticula without evidence of focal inflammatory changes  2  Small right pleural effusion and anasarca, both of which are worsened from most recent CT  There is again trace free fluid in the pelvis which appears improved from from most recent exam  3  Hepatic contour nodularity with heterogeneous appearance of the parenchyma as can be seen in the setting of cirrhosis  4  Redemonstration of cystic lesion within the pancreatic tail measuring 8 mm, please refer to report from CT of 4/18/2023 for follow-up recommendations  5  Subcentimeter lucency within the L3 vertebral body which is indeterminate in etiology  Consider nuclear bone scan for further evaluation  The study was marked in [facility] for immediate notification    Back Workstation performed: [id number]       Current Facility-Administered Medications   Medication Dose Route Frequency   • barium (READI-CAT 2) suspension 900 mL  900 mL Oral Once in imaging   • enoxaparin (LOVENOX) subcutaneous injection 40 mg  40 mg Subcutaneous Daily   • furosemide (LASIX) injection 80 mg  80 mg Intravenous BID (diuretic)   • levothyroxine tablet 75 mcg  75 mcg Oral Early Morning   • magnesium hydroxide (MILK OF MAGNESIA) oral suspension 30 mL  30 mL Oral Daily PRN   • metoprolol succinate (TOPROL-XL) 24 hr tablet 25 mg  25 mg Oral BID   • morphine injection 4 mg  4 mg Intravenous Q3H PRN   • ondansetron (ZOFRAN) injection 4 mg  4 mg Intravenous Q6H PRN   • pantoprazole (PROTONIX) EC tablet 40 mg  40 mg Oral Early Morning   • sodium bicarbonate tablet 650 mg  650 mg Oral BID after meals   • traZODone (DESYREL) tablet 50 mg  50 mg Oral HS     Medications Discontinued During This Encounter   Medication Reason   • polyethylene glycol (GLYCOLAX) 17 GM/SCOOP powder Therapy completed   • ciprofloxacin (CIPRO) IVPB (premix in 5% dextrose) 400 mg 200 mL    • metroNIDAZOLE (FLAGYL) IVPB (premix) 500 mg 100 mL    • dextrose 5 % and sodium chloride 0 45 % with KCl 20 mEq/L infusion    • sodium chloride 0 9 % infusion    • polyethylene glycol (MIRALAX) packet 17 g    • ciprofloxacin (CIPRO) tablet 500 mg    • metroNIDAZOLE (FLAGYL) tablet 500 mg        Francia Castaneda,       This progress note was produced in part using a dictation device which may document imprecise wording from author's original intent

## 2023-05-01 NOTE — PLAN OF CARE
Problem: Potential for Falls  Goal: Patient will remain free of falls  Description: INTERVENTIONS:  - Educate patient/family on patient safety including physical limitations  - Instruct patient to call for assistance with activity   - Consult OT/PT to assist with strengthening/mobility   - Keep Call bell within reach  - Keep bed low and locked with side rails adjusted as appropriate  - Keep care items and personal belongings within reach  - Initiate and maintain comfort rounds  - Make Fall Risk Sign visible to staff  - Offer Toileting every 2 Hours, in advance of need  - Initiate/Maintain alarms  - Obtain necessary fall risk management equipment:  - Apply yellow socks and bracelet for high fall risk patients  - Consider moving patient to room near nurses station  Outcome: Progressing     Problem: PAIN - ADULT  Goal: Verbalizes/displays adequate comfort level or baseline comfort level  Description: Interventions:  - Encourage patient to monitor pain and request assistance  - Assess pain using appropriate pain scale  - Administer analgesics based on type and severity of pain and evaluate response  - Implement non-pharmacological measures as appropriate and evaluate response  - Consider cultural and social influences on pain and pain management  - Notify physician/advanced practitioner if interventions unsuccessful or patient reports new pain  Outcome: Progressing     Problem: SAFETY ADULT  Goal: Patient will remain free of falls  Description: INTERVENTIONS:  - Educate patient/family on patient safety including physical limitations  - Instruct patient to call for assistance with activity   - Consult OT/PT to assist with strengthening/mobility   - Keep Call bell within reach  - Keep bed low and locked with side rails adjusted as appropriate  - Keep care items and personal belongings within reach  - Initiate and maintain comfort rounds  - Make Fall Risk Sign visible to staff  - Offer Toileting every 2 Hours, in advance of need  - Initiate/Maintain alarms  - Obtain necessary fall risk management equipment:  - Apply yellow socks and bracelet for high fall risk patients  - Consider moving patient to room near nurses station  Outcome: Progressing  Goal: Maintain or return to baseline ADL function  Description: INTERVENTIONS:  - Educate patient/family on patient safety including physical limitations  - Instruct patient to call for assistance with activity   - Consult OT/PT to assist with strengthening/mobility   - Keep Call bell within reach  - Keep bed low and locked with side rails adjusted as appropriate  - Keep care items and personal belongings within reach  - Initiate and maintain comfort rounds  - Make Fall Risk Sign visible to staff  - Offer Toileting every 2 Hours, in advance of need  - Initiate/Maintain alarms  - Obtain necessary fall risk management equipment:   - Apply yellow socks and bracelet for high fall risk patients  - Consider moving patient to room near nurses station  Outcome: Progressing  Goal: Maintains/Returns to pre admission functional level  Description: INTERVENTIONS:  - Perform BMAT or MOVE assessment daily    - Set and communicate daily mobility goal to care team and patient/family/caregiver  - Collaborate with rehabilitation services on mobility goals if consulted  - Perform Range of Motion 2 times a day  - Reposition patient every 2 hours    - Dangle patient 2 times a day  - Stand patient 2 times a day  - Ambulate patient 2 times a day  - Out of bed to chair 2 times a day   - Out of bed for meals 2 times a day  - Out of bed for toileting  - Record patient progress and toleration of activity level   Outcome: Progressing     Problem: INFECTION - ADULT  Goal: Absence or prevention of progression during hospitalization  Description: INTERVENTIONS:  - Assess and monitor for signs and symptoms of infection  - Monitor lab/diagnostic results  - Monitor all insertion sites, i e  indwelling lines, tubes, and drains  - Monitor endotracheal if appropriate and nasal secretions for changes in amount and color  - [place] appropriate cooling/warming therapies per order  - Administer medications as ordered  - Instruct and encourage patient and family to use good hand hygiene technique  - Identify and instruct in appropriate isolation precautions for identified infection/condition  Outcome: Progressing     Problem: DISCHARGE PLANNING  Goal: Discharge to home or other facility with appropriate resources  Description: INTERVENTIONS:  - Identify barriers to discharge w/patient and caregiver  - Arrange for needed discharge resources and transportation as appropriate  - Identify discharge learning needs (meds, wound care, etc )  - Refer to Case Management Department for coordinating discharge planning if the patient needs post-hospital services based on physician/advanced practitioner order or complex needs related to functional status, cognitive ability, or social support system  Outcome: Progressing     Problem: Knowledge Deficit  Goal: Patient/family/caregiver demonstrates understanding of disease process, treatment plan, medications, and discharge instructions  Description: Complete learning assessment and assess knowledge base    Interventions:  - Provide teaching at level of understanding  - Provide teaching via preferred learning methods  Outcome: Progressing

## 2023-05-01 NOTE — PLAN OF CARE
Problem: PHYSICAL THERAPY ADULT  Goal: Performs mobility at highest level of function for planned discharge setting  See evaluation for individualized goals  Description: Treatment/Interventions: Functional transfer training, Endurance training, Therapeutic exercise, Gait training, Bed mobility          See flowsheet documentation for full assessment, interventions and recommendations  Outcome: Progressing  Note: Prognosis: Good     Assessment: Pt is 69 y o  female seen for PT evaluation s/p admit to [address] on 4/27/2023 w/ Left lower quadrant abdominal pain  PT consulted to assess pt's functional mobility and d/c needs  Order placed for PT eval and tx, w/ up as tolerated order  Pt agreeable to PT  session upon arrival, pt found supine in bed  PTA, pt was independent w/ all functional mobility w/ rollator, ambulates household distances, resident of [facility] and retired  Pt to benefit from continued PT tx to address deficits and maximize level of functional independent mobility and consistency  From PT/mobility standpoint, recommendation at time of d/c would be post acute rehabilitation services pending progress in order to facilitate return to PLOF  Upon conclusion pt  seated in recliner  Complexity: Comorbidities affecting pt's physical performance at time of assessment include: a fib, depression and diverticulitis  Personal factors affecting pt at time of IE include: ambulating w/ assistive device, inability to ambulate household distances and depression  Please find objective findings from PT assessment regarding body systems outlined above with impairments and limitations including decreased endurance, pain, decreased activity tolerance, decreased functional mobility tolerance, altered sensation and fall risk  Pt's clinical presentation is currently unstable/unpredictable seen in pt's presentation of abnormal H&H, abnormal sodium levels, abnormal Co2 levels, pain and polypharmacy   The patient's AM-PAC Basic Mobility Inpatient Short Form Raw Score is 18  A Raw score of greater than 16 suggests the patient may benefit from discharge to home  Please also refer to the recommendation of the Physical Therapist for safe discharge planning  PT Discharge Recommendation: Return to facility with rehabilitation services    See flowsheet documentation for full assessment

## 2023-05-02 LAB
BASOPHILS # BLD AUTO: 0.05 THOUSANDS/ÂΜL (ref 0–0.1)
BASOPHILS NFR BLD AUTO: 1 % (ref 0–1)
EOSINOPHIL # BLD AUTO: 0.37 THOUSAND/ÂΜL (ref 0–0.61)
EOSINOPHIL NFR BLD AUTO: 4 % (ref 0–6)
ERYTHROCYTE [DISTWIDTH] IN BLOOD BY AUTOMATED COUNT: 14.8 % (ref 11.6–15.1)
HCT VFR BLD AUTO: 34.9 % (ref 34.8–46.1)
HEMOCCULT STL QL: NEGATIVE
HEMOCCULT STL QL: NORMAL
HEMOCCULT STL QL: NORMAL
HGB BLD-MCNC: 11.8 G/DL (ref 11.5–15.4)
IMM GRANULOCYTES # BLD AUTO: 0.03 THOUSAND/UL (ref 0–0.2)
IMM GRANULOCYTES NFR BLD AUTO: 0 % (ref 0–2)
LYMPHOCYTES # BLD AUTO: 1.01 THOUSANDS/ÂΜL (ref 0.6–4.47)
LYMPHOCYTES NFR BLD AUTO: 10 % (ref 14–44)
MCH RBC QN AUTO: 34.5 PG (ref 26.8–34.3)
MCHC RBC AUTO-ENTMCNC: 33.8 G/DL (ref 31.4–37.4)
MCV RBC AUTO: 102 FL (ref 82–98)
MONOCYTES # BLD AUTO: 1.87 THOUSAND/ÂΜL (ref 0.17–1.22)
MONOCYTES NFR BLD AUTO: 18 % (ref 4–12)
NEUTROPHILS # BLD AUTO: 6.94 THOUSANDS/ÂΜL (ref 1.85–7.62)
NEUTS SEG NFR BLD AUTO: 67 % (ref 43–75)
NRBC BLD AUTO-RTO: 0 /100 WBCS
PLATELET # BLD AUTO: 101 THOUSANDS/UL (ref 149–390)
PMV BLD AUTO: 10.9 FL (ref 8.9–12.7)
RBC # BLD AUTO: 3.42 MILLION/UL (ref 3.81–5.12)
WBC # BLD AUTO: 10.27 THOUSAND/UL (ref 4.31–10.16)

## 2023-05-02 PROCEDURE — 99232 SBSQ HOSP IP/OBS MODERATE 35: CPT | Performed by: INTERNAL MEDICINE

## 2023-05-02 PROCEDURE — 85025 COMPLETE CBC W/AUTO DIFF WBC: CPT

## 2023-05-02 PROCEDURE — 99233 SBSQ HOSP IP/OBS HIGH 50: CPT | Performed by: SPECIALIST

## 2023-05-02 PROCEDURE — 82272 OCCULT BLD FECES 1-3 TESTS: CPT | Performed by: SPECIALIST

## 2023-05-02 RX ORDER — ERYTHROMYCIN 5 MG/G
0.5 OINTMENT OPHTHALMIC 2 TIMES DAILY
Status: DISCONTINUED | OUTPATIENT
Start: 2023-05-02 | End: 2023-05-20

## 2023-05-02 RX ORDER — ACETAMINOPHEN 325 MG/1
650 TABLET ORAL EVERY 6 HOURS PRN
Status: DISCONTINUED | OUTPATIENT
Start: 2023-05-02 | End: 2023-05-23 | Stop reason: HOSPADM

## 2023-05-02 RX ADMIN — PANTOPRAZOLE SODIUM 40 MG: 40 TABLET, DELAYED RELEASE ORAL at 06:06

## 2023-05-02 RX ADMIN — MORPHINE SULFATE 4 MG: 4 INJECTION INTRAVENOUS at 14:42

## 2023-05-02 RX ADMIN — ENOXAPARIN SODIUM 40 MG: 40 INJECTION SUBCUTANEOUS at 08:52

## 2023-05-02 RX ADMIN — MORPHINE SULFATE 4 MG: 4 INJECTION INTRAVENOUS at 20:03

## 2023-05-02 RX ADMIN — METOPROLOL SUCCINATE 25 MG: 25 TABLET, EXTENDED RELEASE ORAL at 17:51

## 2023-05-02 RX ADMIN — FUROSEMIDE 80 MG: 10 INJECTION, SOLUTION INTRAMUSCULAR; INTRAVENOUS at 17:51

## 2023-05-02 RX ADMIN — ERYTHROMYCIN 0.5 INCH: 5 OINTMENT OPHTHALMIC at 17:52

## 2023-05-02 RX ADMIN — ONDANSETRON 4 MG: 2 INJECTION INTRAMUSCULAR; INTRAVENOUS at 09:00

## 2023-05-02 RX ADMIN — METOPROLOL SUCCINATE 25 MG: 25 TABLET, EXTENDED RELEASE ORAL at 08:52

## 2023-05-02 RX ADMIN — LEVOTHYROXINE SODIUM 75 MCG: 75 TABLET ORAL at 06:06

## 2023-05-02 RX ADMIN — TRAZODONE HYDROCHLORIDE 50 MG: 50 TABLET ORAL at 21:15

## 2023-05-02 RX ADMIN — FUROSEMIDE 80 MG: 10 INJECTION, SOLUTION INTRAMUSCULAR; INTRAVENOUS at 08:52

## 2023-05-02 NOTE — PROGRESS NOTES
"Progress Note - General Surgery   Ale Nj 69 y o  female MRN: [id number]  Unit/Bed#: -01 Encounter: [id number]    Assessment:  69year old female with PMH significant for Afib, diverticulitis, and ischemic colitis presents for the evaluation of persistent abdominal pain   · Afebrile, vitals stable   · No AM labs collected, patient declined  · MRI lumbar spine: \"Multiple acute compression deformities mid to moderate in severity most pronounced at L5  No epidural hematoma or significant bony retropulsion  Multilevel spondylosis  Recently noted lucent lesion in the L3 vertebra correlates to a hemangioma  \"    Plan:  · GI plans for flex sigmoidoscopy later today if patient's Na level is above 125  Discussed with patient the importance of bloodwork and she is willing to have it drawn  Await results of scope  · NPO for scope  · Analgesia and antiemetics prn   · Fluid restriction, appreciate nephrology recommendations   · Warm compresses for L eye  · Discussed with attending     Subjective/Objective   Subjective: Patient states her back pain has improved  Still complains of generalized abdominal pain, more so in her LLQ  Still having loose stools  Reports feeling nauseous, but no episodes of vomiting  Also complains of L eye discomfort  B/L legs still fluid overloaded    Objective:   Blood pressure 141/57, pulse 69, temperature 98 2 °F (36 8 °C), resp  rate 20, height 4' 11\" (1 499 m), weight 59 kg (130 lb 1 1 oz), SpO2 97 %  ,Body mass index is 26 27 kg/m²  Intake/Output Summary (Last 24 hours) at 5/2/2023 0847  Last data filed at 5/2/2023 0100  Gross per 24 hour   Intake 290 ml   Output 1400 ml   Net -1110 ml       Invasive Devices     Peripheral Intravenous Line  Duration           Peripheral IV 04/29/23 Distal;Left;Ventral (anterior) Forearm 2 days                Physical Exam  Vitals reviewed  Constitutional:       General: She is not in acute distress  Appearance: She is normal weight   " HENT:      Head: Normocephalic and atraumatic  Eyes:      Conjunctiva/sclera: Conjunctivae normal       Comments: L eyelid erythematous  No visible discharge   Cardiovascular:      Rate and Rhythm: Normal rate and regular rhythm  Pulmonary:      Effort: Pulmonary effort is normal  No respiratory distress  Abdominal:      General: Bowel sounds are normal  There is no distension  Palpations: Abdomen is soft  Tenderness: There is generalized abdominal tenderness and tenderness in the left lower quadrant  There is no guarding  Musculoskeletal:      Right lower leg: Pitting Edema present  Left lower leg: Pitting Edema present  Skin:     General: Skin is warm and dry  Neurological:      General: No focal deficit present  Mental Status: She is alert  Mental status is at baseline  Psychiatric:         Mood and Affect: Mood normal          Behavior: Behavior normal          Lab, Imaging and other studies:I have personally reviewed pertinent lab results    , CBC: No results found for: WBC, HGB, HCT, MCV, PLT, ADJUSTEDWBC, MCH, MCHC, RDW, MPV, NRBC, CMP: No results found for: SODIUM, K, CL, CO2, ANIONGAP, BUN, CREATININE, GLUCOSE, CALCIUM, AST, ALT, ALKPHOS, PROT, BILITOT, EGFR  VTE Pharmacologic Prophylaxis: Enoxaparin (Lovenox)  VTE Mechanical Prophylaxis: sequential compression device    Shantell Harrington PA-C Detail Level: Zone Other (Free Text): Thickened crusts on scalp soaked with hydrogen peroxide then removed and silver nitrated applied. Note Text (......Xxx Chief Complaint.): This diagnosis correlates with the Render Risk Assessment In Note?: no

## 2023-05-02 NOTE — PLAN OF CARE
Problem: Potential for Falls  Goal: Patient will remain free of falls  Description: INTERVENTIONS:  - Educate patient/family on patient safety including physical limitations  - Instruct patient to call for assistance with activity   - Consult OT/PT to assist with strengthening/mobility   - Keep Call bell within reach  - Keep bed low and locked with side rails adjusted as appropriate  - Keep care items and personal belongings within reach  - Initiate and maintain comfort rounds  - Make Fall Risk Sign visible to staff  - Offer Toileting every 2 Hours, in advance of need  - Initiate/Maintain alarms  - Obtain necessary fall risk management equipment:  - Apply yellow socks and bracelet for high fall risk patients  - Consider moving patient to room near nurses station  Outcome: Progressing     Problem: PAIN - ADULT  Goal: Verbalizes/displays adequate comfort level or baseline comfort level  Description: Interventions:  - Encourage patient to monitor pain and request assistance  - Assess pain using appropriate pain scale  - Administer analgesics based on type and severity of pain and evaluate response  - Implement non-pharmacological measures as appropriate and evaluate response  - Consider cultural and social influences on pain and pain management  - Notify physician/advanced practitioner if interventions unsuccessful or patient reports new pain  Outcome: Progressing

## 2023-05-02 NOTE — CASE MANAGEMENT
Case Management Discharge Planning Note    Patient name Deshaun Armstrong  Location [address] [id number] MRN [id number]  : 1945 Date 2023       Current Admission Date: 2023  Current Admission Diagnosis:Left lower quadrant abdominal pain   Patient Active Problem List    Diagnosis Date Noted   • Hyponatremia 2023   • Hypomagnesemia 2023   • Anemia 2023   • Left lower quadrant abdominal pain 2023   • Superior mesenteric artery stenosis ([facility] [address] ) 2023   • ALEJANDRA (acute kidney injury) ([facility] [address] ) 2023   • Diarrhea of presumed infectious origin 2023   • Primary hypertension 2023   • Acquired hypothyroidism 2023   • Irritable bowel syndrome with diarrhea 2023   • Abnormal CT scan 2023   • Hx of CABG 2023   • PAF (paroxysmal atrial fibrillation) ([facility] [address] ) 2022      LOS (days): 5  Geometric Mean LOS (GMLOS) (days): 2 60  Days to GMLOS:-2 1     OBJECTIVE:  Risk of Unplanned Readmission Score: 15 6         Current admission status: Inpatient   Preferred Pharmacy:   [address] [place] [address]  [facility]   [place] [address]  Phone: [phone] Fax: [phone]    Primary Care Provider: Bj Edwards MD    Primary Insurance: MEDICARE  Secondary Insurance: Elena Love    DISCHARGE DETAILS:    Discharge planning discussed with[de-identified] Patient  Iqra Martinez from [facility]  Freedom of Choice: Yes    Other Referral/Resources/Interventions Provided:  Interventions: Other (Specify) (Medical recoreds)       Additional Comments: Message from Surgery [facility] requesting GI records from 1915  Call Jace Olive at [facility] to get Michaela's medical records  Records from 1915 obtained put in folder on nursing unit and copy provided to media  Met with patient at [facility] and discussed same    CM department following thru discharge

## 2023-05-02 NOTE — PROGRESS NOTES
"Progress Note - Nephrology   Marilou Zaidi 69 y o  female MRN: [id number]  Unit/Bed#: -01 Encounter: [id number]    A/P:  1  Hyponatremia              Patient's labs remain pending at this time,  given decline in weight, my hope is that the patient's serum sodium level has improved  2   Metabolic acidosis              Follow-up serum bicarbonate level, patient had her bicarb supplementation discontinued yesterday  3   Volume overload              Continue furosemide 80 mg IV twice daily for now, patient continues to have significant edema bilaterally  4   Colonic thickening              Patient for possible colonoscopy depending on how she progresses regarding hyponatremia  5   Probable underlying cirrhosis   Further care and evaluation according to gastroenterology colleagues  Follow up reason for today's visit: Hyponatremia/metabolic acidosis    Left lower quadrant abdominal pain    Patient Active Problem List   Diagnosis   • ALEJANDRA (acute kidney injury) ([facility] [address] )   • Diarrhea of presumed infectious origin   • Primary hypertension   • Acquired hypothyroidism   • Irritable bowel syndrome with diarrhea   • Abnormal CT scan   • Superior mesenteric artery stenosis (HCC)   • Left lower quadrant abdominal pain   • Hyponatremia   • Hypomagnesemia   • PAF (paroxysmal atrial fibrillation) (HCC)   • Hx of CABG   • Anemia         Subjective:   No acute issues at this time  Objective:     Vitals: Blood pressure 141/57, pulse 69, temperature 98 2 °F (36 8 °C), resp  rate 20, height 4' 11\" (1 499 m), weight 59 kg (130 lb 1 1 oz), SpO2 97 %  ,Body mass index is 26 27 kg/m²  Weight (last 2 days)     Date/Time Weight    05/02/23 0600 59 (130 07)    05/01/23 0551 60 (132 28)    04/30/23 1100 59 8 (131 84)            Intake/Output Summary (Last 24 hours) at 5/2/2023 1351  Last data filed at 5/2/2023 1100  Gross per 24 hour   Intake 50 ml   Output 1300 ml   Net -1250 ml     I/O last 3 completed shifts:   In: 590 " "[P O :590]  Out: 2350 [Urine:2350]         Physical Exam: /57   Pulse 69   Temp 98 2 °F (36 8 °C)   Resp 20   Ht 4' 11\" (1 499 m)   Wt 59 kg (130 lb 1 1 oz)   SpO2 97%   BMI 26 27 kg/m²     General Appearance:    Alert, cooperative, no distress, appears stated age   Head:    Normocephalic, without obvious abnormality, atraumatic   Eyes:    Conjunctiva/corneas clear   Ears:    Normal external ears   Nose:   Nares normal, septum midline, mucosa normal, no drainage    or sinus tenderness   Throat:   Lips, mucosa, and tongue normal; teeth and gums normal   Neck:   Supple   Back:     Symmetric, no curvature, ROM normal, no CVA tenderness   Lungs:     Clear to auscultation bilaterally, respirations unlabored   Chest wall:    No tenderness or deformity   Heart:    Regular rate and rhythm, S1 and S2 normal, no murmur, rub   or gallop   Abdomen:     Soft, non-tender, bowel sounds active   Extremities:   Extremities normal, atraumatic, no cyanosis, +2 bilateral lower extremity edema   Skin:   Skin color, texture, turgor normal, no rashes or lesions   Lymph nodes:   Cervical normal   Neurologic:   CNII-XII intact            Lab, Imaging and other studies: I have personally reviewed pertinent labs  CBC:   Lab Results   Component Value Date    WBC 10 27 (H) 05/02/2023    HGB 11 8 05/02/2023    HCT 34 9 05/02/2023     (H) 05/02/2023     (L) 05/02/2023    MCH 34 5 (H) 05/02/2023    MCHC 33 8 05/02/2023    RDW 14 8 05/02/2023    MPV 10 9 05/02/2023    NRBC 0 05/02/2023     CMP: No results found for: NA, K, CL, CO2, ANIONGAP, BUN, CREATININE, GLUCOSE, CALCIUM, AST, ALT, ALKPHOS, PROT, BILITOT, EGFR        Results from last 7 days   Lab Units 05/01/23  0549 04/30/23  1556 04/30/23  0545 04/29/23  0515   POTASSIUM mmol/L 4 1 4 4 4 7 4 3   CHLORIDE mmol/L 99 100 101 105   CO2 mmol/L 19* 18* 15* 17*   BUN mg/dL 22 16 15 16   CREATININE mg/dL 1 08 1 01 0 98 0 88   CALCIUM mg/dL 8 4 7 9* 8 5 7 9*   ALK PHOS U/L " 331*  --  330* 308*   ALT U/L 18  --  16 17   AST U/L 40*  --  44* 31         Phosphorus: No results found for: PHOS  Magnesium: No results found for: MG  Urinalysis: No results found for: COLORU, CLARITYU, SPECGRAV, PHUR, LEUKOCYTESUR, NITRITE, PROTEINUA, GLUCOSEU, KETONESU, BILIRUBINUR, BLOODU  Ionized Calcium: No results found for: CAION  Coagulation: No results found for: PT, INR, APTT  Troponin: No results found for: TROPONINI  ABG: No results found for: PHART, OBM5VJL, PO2ART, IUW7FRK, W8KLFHRH, BEART, SOURCE  Radiology review:     IMAGING  Procedure: XR abdomen obstruction series    Result Date: 4/30/2023  Narrative: OBSTRUCTION SERIES INDICATION:   LLQ pain, known diverticulitis  COMPARISON:  None EXAM PERFORMED/VIEWS:  XR ABDOMEN OBSTRUCTION SERIES FINDINGS: There is a nonobstructive bowel gas pattern  No free air beneath the hemidiaphragms  No pathologic calcifications or soft tissue masses evident  Partially imaged right hip knee arthroplasty  Examination of the chest reveals: Cardiomegaly  Left lateral pulmonary opacity, possibly scarring  Impression: Nonobstructive bowel gas pattern  Workstation performed: [id number]     Procedure: MRI lumbar spine wo contrast    Result Date: 5/2/2023  Narrative: MRI LUMBAR SPINE WITHOUT CONTRAST INDICATION: pain  Status post fall approximately 10 weeks ago  Left lower quadrant and low back pain COMPARISON:   5/1/2023 TECHNIQUE:  Multiplanar, multisequence imaging of the lumbar spine was performed    IMAGE QUALITY:  Diagnostic FINDINGS: VERTEBRAL BODIES:  There are 5 lumbar type vertebral bodies  Grade 1 anterolisthesis of L4 on L5 noted  Mild dextroscoliosis of the lower lumbar spine, centered at the L4 level  There are numerous compression deformities, most of which demonstrate marrow edema indicative of acute/recent compression deformities   There is approximately 30 to 40% loss of the height of the T1 vertebra with marrow edema indicative of an acute compression deformity  No significant bony retropulsion or epidural hematoma  There is mild less than 10% loss of height in the superior endplate of L1 with T1 hypointensity indicative of mild, recent compression deformity  L2 demonstrates marrow edema throughout much of the vertebra without loss of height although a linear T2 hypointensity is noted in the lower third of the vertebra suggesting nondepressed fracture  No bony retropulsion  25% loss of vertebral body height of L4 noted with a small amount of superior endplate marrow edema noted indicative of recent deformity  25 to 30% loss of height of L5 noted with marrow edema as well  The lucency noted in the L3 vertebra correlates to a T1 and T2 hyperintensity seen on series 4, image 13, measuring approximately 8 mm in maximal craniocaudal dimension, having typical imaging features of hemangioma correlating to the lucent lesion on the CT scan from yesterday  SACRUM: Scattered degenerative endplate changes  No focally suspicious marrow lesions  No bone marrow edema or compression abnormality  DISTAL CORD AND CONUS:  Normal size and signal within the distal cord and conus  The conus medullaris terminates at the L1 level  PARASPINAL SOFT TISSUES:  Paraspinal soft tissues are unremarkable  LOWER THORACIC DISC SPACES: T9-T10: There is a small central disc herniation, protrusion type extending into the right neural foramen  Mild central canal and right neural foraminal narrowing  Left neural foramen patent  T10-T11: There is a small central disc protrusion  Mild central canal and right neural foraminal narrowing  Left neural foramen patent  T11-T12: Tiny central disc protrusion  No significant central canal or neural foraminal narrowing  T12-L1: There is a small central/right paracentral disc protrusion  Mild central canal and right neural foraminal narrowing  Left neural foramen patent   LUMBAR DISC SPACES: L1-L2: There is loss of disc height and signal  There is a central/right paracentral disc protrusion  Mild central canal and right neural foraminal narrowing  Left neural foramen patent  L2-L3: There is loss of disc height and signal  There is bilateral facet hypertrophy  Mild to moderate central canal narrowing  Moderate right neural foraminal narrowing  Mild left neural foraminal narrowing  L3-L4: There is bilateral facet hypertrophy  There is loss of disc height and signal  There is a left neural foraminal disc protrusion  Moderate left neural foraminal narrowing  Mild central canal narrowing  Moderate right neural foraminal narrowing  L4-L5: There is uncovering the intervertebral disc space  There is bilateral facet hypertrophy  There is a left neural foraminal disc protrusion  Severe left neural foraminal narrowing  Moderate to severe central canal narrowing  Moderate right neural foraminal narrowing  There is uncovering the intervertebral disc space  L5-S1: There is bilateral facet hypertrophy  There is a right neural foraminal disc protrusion  Moderate to severe right neural foraminal narrowing  Mild central canal narrowing  Mild to moderate left neural foraminal narrowing  OTHER FINDINGS:  None  Impression: 1  Multiple acute compression deformities mild to moderate in severity most pronounced at L5 where there is approximately 40% loss of vertebral body height  No epidural hematoma or significant bony retropulsion  Correlation for osteoporotic risk factors is advised  2  Multilevel spondylosis  3  Recently noted lucent lesion in the L3 vertebra from the CT from earlier in the day correlates to a hemangioma, a benign finding  Workstation performed: [id number]     Procedure: CT abdomen pelvis w contrast    Result Date: 5/1/2023  Narrative: CT ABDOMEN AND PELVIS WITH IV CONTRAST INDICATION:   LLQ abdominal pain Persistant LLQ abdominal pain  COMPARISON: CT abdomen pelvis 4/27/2023, 4/18/2023  Abdominal radiograph 4/29/2023   TECHNIQUE:  CT examination of the abdomen and pelvis was performed  Multiplanar 2D reformatted images were created from the source data  Radiation dose length product (DLP) for this visit:  539 91 mGy-cm   This examination, like all CT scans performed in the [facility], was performed utilizing techniques to minimize radiation dose exposure, including the use of iterative  reconstruction and automated exposure control  IV Contrast:  100 mL of iohexol (OMNIPAQUE) 50 mL of iohexol (OMNIPAQUE) Enteric Contrast:  Enteric contrast was administered  FINDINGS: ABDOMEN LOWER CHEST: There is a small hiatal hernia  Remote left-sided rib fractures are noted  Left basilar scarring/atelectasis is noted  There is a small right pleural effusion with associated atelectasis, worsened from prior CT  LIVER/BILIARY TREE: The liver demonstrates contour nodularity with a heterogeneous appearance of the liver parenchyma as can be seen in the setting of cirrhosis  Pneumobilia is again noted, likely secondary to prior surgery/biliary intervention  GALLBLADDER: Gallbladder is surgically absent  SPLEEN:  Unremarkable  PANCREAS: Cystic lesion is seen within the pancreatic tail measuring 8 mm (2:47)  No evidence of ductal dilatation  Punctate calcifications are again noted within the pancreatic head, possibly representing sequelae of prior episodes of pancreatitis  ADRENAL GLANDS:  Unremarkable  KIDNEYS/URETERS: No hydronephrosis  4 mm calcification is again noted the left renal midpole, favored to represent a vascular calcification rather than a nonobstructing calculus  Subcentimeter hypodensities within the bilateral kidneys are too small to characterize  STOMACH AND BOWEL: Small hiatal hernia  No evidence of small bowel obstruction  The wall of the sigmoid colon appears mildly thickened, however, evaluation is limited by nondistention  Finding may represent pseudo thickening from nondistention versus a mild nonspecific colitis in the appropriate clinical setting   There are a few scattered colonic diverticula without evidence of focal inflammatory changes  APPENDIX:  No findings to suggest appendicitis  ABDOMINOPELVIC CAVITY: No pneumoperitoneum  No gross abdominal or pelvic adenopathy  There is trace free fluid within the pelvis which appears improved from most recent CT  VESSELS: Moderate to severe atherosclerotic calcifications are noted within the abdominal aorta and branching vessels  PELVIS REPRODUCTIVE ORGANS: Surgical changes of prior hysterectomy  URINARY BLADDER: Decompressed, limiting evaluation  No gross abnormalities are seen within these confines  ABDOMINAL WALL/INGUINAL REGIONS: There is diffuse anasarca, worsened from most recent CT  OSSEOUS STRUCTURES: Indeterminate lucent focus is again seen within the anterior aspect of L3 measuring up to 7 mm (602:104)  Remote left-sided rib fractures are noted  There is a mild grade 1 anterolisthesis of L4 on L5 and remote appearing compression deformities of L5, L4 and T11 which appear stable from recent exams  Right hip arthroplasty is noted     Impression: 1  Thickened appearance of the wall of the descending colon and sigmoid colon which may represent pseudo thickening from nondistention versus a mild nonspecific colitis in the appropriate clinical setting  There are a few scattered colonic diverticula without evidence of focal inflammatory changes  2  Small right pleural effusion and anasarca, both of which are worsened from most recent CT  There is again trace free fluid in the pelvis which appears improved from from most recent exam  3  Hepatic contour nodularity with heterogeneous appearance of the parenchyma as can be seen in the setting of cirrhosis  4  Redemonstration of cystic lesion within the pancreatic tail measuring 8 mm, please refer to report from CT of 4/18/2023 for follow-up recommendations  5  Subcentimeter lucency within the L3 vertebral body which is indeterminate in etiology   Consider nuclear bone scan for further evaluation  The study was marked in [facility] for immediate notification    Back Workstation performed: [id number]       Current Facility-Administered Medications   Medication Dose Route Frequency   • barium (READI-CAT 2) suspension 900 mL  900 mL Oral Once in imaging   • enoxaparin (LOVENOX) subcutaneous injection 40 mg  40 mg Subcutaneous Daily   • furosemide (LASIX) injection 80 mg  80 mg Intravenous BID (diuretic)   • levothyroxine tablet 75 mcg  75 mcg Oral Early Morning   • magnesium hydroxide (MILK OF MAGNESIA) oral suspension 30 mL  30 mL Oral Daily PRN   • metoprolol succinate (TOPROL-XL) 24 hr tablet 25 mg  25 mg Oral BID   • morphine injection 4 mg  4 mg Intravenous Q3H PRN   • ondansetron (ZOFRAN) injection 4 mg  4 mg Intravenous Q6H PRN   • pantoprazole (PROTONIX) EC tablet 40 mg  40 mg Oral Early Morning   • traZODone (DESYREL) tablet 50 mg  50 mg Oral HS     Medications Discontinued During This Encounter   Medication Reason   • polyethylene glycol (GLYCOLAX) 17 GM/SCOOP powder Therapy completed   • ciprofloxacin (CIPRO) IVPB (premix in 5% dextrose) 400 mg 200 mL    • metroNIDAZOLE (FLAGYL) IVPB (premix) 500 mg 100 mL    • dextrose 5 % and sodium chloride 0 45 % with KCl 20 mEq/L infusion    • sodium chloride 0 9 % infusion    • polyethylene glycol (MIRALAX) packet 17 g    • ciprofloxacin (CIPRO) tablet 500 mg    • metroNIDAZOLE (FLAGYL) tablet 500 mg    • sodium bicarbonate tablet 650 mg        Ayan Pereyra, DO      This progress note was produced in part using a dictation device which may document imprecise wording from author's original intent

## 2023-05-02 NOTE — PLAN OF CARE
Problem: SAFETY ADULT  Goal: Patient will remain free of falls  Description: INTERVENTIONS:  - Educate patient/family on patient safety including physical limitations  - Instruct patient to call for assistance with activity   - Consult OT/PT to assist with strengthening/mobility   - Keep Call bell within reach  - Keep bed low and locked with side rails adjusted as appropriate  - Keep care items and personal belongings within reach  - Initiate and maintain comfort rounds  - Make Fall Risk Sign visible to staff  - Offer Toileting every 2 Hours, in advance of need  - Initiate/Maintain alarms  - Obtain necessary fall risk management equipment:  - Apply yellow socks and bracelet for high fall risk patients  - Consider moving patient to room near nurses station  Outcome: Progressing

## 2023-05-02 NOTE — PROGRESS NOTES
Progress Note - Stephen Luke 69 y o  female MRN: [id number]    Unit/Bed#: -01 Encounter: [id number]    Assessment and Plan:    69-year-old female with CAD s/p CABG, aortic stenosis, Sjogren's admitted with acute left lower abdominal pain found to have colonic thickening on CT scan      1) Left lower quadrant pain  2) Colon thickening     She has had 3 CT scans over the past 2 weeks with variable findings  Initially, there was concern for mild nonspecific enterocolitis, then her next CT scan showed moderate stool burden consistent with constipation  Currently CT shows thickened appearance of the wall of the descending colon and sigmoid colon which may represent underdistention versus a mild nonspecific colitis  There are a few scattered colonic diverticula without evidence of acute diverticulitis  No leukocytosis or fever  Infectious stool studies negative  She continues to require IV pain medication  Her abdomen is generally tender to palpation  Differential diagnosis includes ischemic colitis, stercoral colitis, malignancy, IBD, or possibly symptomatic uncomplicated diverticular disease resulting in pain       -Plan discussed with surgery  -Continue to monitor off antibiotics  -Await results of BMP and if sodium > 125, we will plan for flexible sigmoidoscopy later today  -Continue n p o  status     3) Probable cirrhosis: Labs and CT scan consistent with cirrhosis, unclear etiology  No significant ascites  Needs outpatient chronic liver disease work-up     4) Pancreatic cyst: CT shows cystic lesion within the pancreatic tail measuring 8 mm  No evidence of ductal dilatation  Punctate calcifications are again noted within the pancreatic head, possibly representing sequelae of prior episodes of pancreatitis      -Plan for MRI/MRCP as an outpatient to further characterize the pancreatic cyst          Subjective:     Patient seen and examined at bedside    She reports some improvement of abdominal pain however "she is still receiving morphine  She has not had any significant BMs in the past 24 hours  Objective:     Vitals: Blood pressure 141/57, pulse 69, temperature 98 2 °F (36 8 °C), resp  rate 20, height 4' 11\" (1 499 m), weight 59 kg (130 lb 1 1 oz), SpO2 97 %  ,Body mass index is 26 27 kg/m²  Intake/Output Summary (Last 24 hours) at 5/2/2023 1049  Last data filed at 5/2/2023 0908  Gross per 24 hour   Intake 50 ml   Output 1800 ml   Net -1750 ml       Physical Exam:     General Appearance: Alert, elderly female, appears stated age and cooperative  Lungs: Clear to auscultation bilaterally  Heart: Regular rate and rhythm, S1, S2 normal, no murmur, click, rub or gallop  Abdomen: Soft, generalized abdominal tenderness, non-distended; bowel sounds normal  Extremities: No cyanosis, + bilateral lower extremity pitting edema    Invasive Devices     Peripheral Intravenous Line  Duration           Peripheral IV 04/29/23 Distal;Left;Ventral (anterior) Forearm 2 days                Lab Results:  Results from last 7 days   Lab Units 05/01/23  0549 04/28/23  0524 04/27/23  1205   WBC Thousand/uL 6 35   < > 6 80   HEMOGLOBIN g/dL 11 1*   < > 11 5   HEMATOCRIT % 32 5*   < > 34 9   PLATELETS Thousands/uL 92*   < > 90*   NEUTROS PCT %  --   --  59   LYMPHS PCT %  --   --  18   MONOS PCT %  --   --  20*   EOS PCT %  --   --  3    < > = values in this interval not displayed  Results from last 7 days   Lab Units 05/01/23  0549   POTASSIUM mmol/L 4 1   CHLORIDE mmol/L 99   CO2 mmol/L 19*   BUN mg/dL 22   CREATININE mg/dL 1 08   CALCIUM mg/dL 8 4   ALK PHOS U/L 331*   ALT U/L 18   AST U/L 40*         Results from last 7 days   Lab Units 04/29/23  0515   LIPASE u/L 108*       Imaging Studies: I have personally reviewed pertinent imaging studies  XR abdomen obstruction series    Result Date: 4/30/2023  Impression: Nonobstructive bowel gas pattern   Workstation performed: [id number]     MRI lumbar spine wo contrast    Result Date: " 5/2/2023  Impression: 1  Multiple acute compression deformities mild to moderate in severity most pronounced at L5 where there is approximately 40% loss of vertebral body height  No epidural hematoma or significant bony retropulsion  Correlation for osteoporotic risk factors is advised  2  Multilevel spondylosis  3  Recently noted lucent lesion in the L3 vertebra from the CT from earlier in the day correlates to a hemangioma, a benign finding  Workstation performed: [id number]     CT abdomen pelvis w contrast    Result Date: 5/1/2023  Impression: 1  Thickened appearance of the wall of the descending colon and sigmoid colon which may represent pseudo thickening from nondistention versus a mild nonspecific colitis in the appropriate clinical setting  There are a few scattered colonic diverticula without evidence of focal inflammatory changes  2  Small right pleural effusion and anasarca, both of which are worsened from most recent CT  There is again trace free fluid in the pelvis which appears improved from from most recent exam  3  Hepatic contour nodularity with heterogeneous appearance of the parenchyma as can be seen in the setting of cirrhosis  4  Redemonstration of cystic lesion within the pancreatic tail measuring 8 mm, please refer to report from CT of 4/18/2023 for follow-up recommendations  5  Subcentimeter lucency within the L3 vertebral body which is indeterminate in etiology  Consider nuclear bone scan for further evaluation  The study was marked in [facility] for immediate notification    Back Workstation performed: [id number]     CT abdomen pelvis with contrast    Result Date: 4/27/2023  Impression: New small amount of free fluid in the abdomen and pelvis compared to 4/18/2023, which can be secondary to cirrhosis or acute inflammatory process in the abdomen and pelvis such as occult acute diverticulitis  Moderate amount of stool in the colon, nonspecific and can represent constipation   Again seen is 0 6 cm pancreatic tail cystic lesion  Follow-up is recommended as suggested on report of prior CT abdomen pelvis 4/18/2023 Indeterminate 0 4 cm osteolytic lesion in the right aspect of L3 vertebral body, which can represent benign or malignant neoplasm  Bone scan is recommended for further evaluation  The study was marked in [facility] for immediate notification

## 2023-05-03 ENCOUNTER — ANESTHESIA (INPATIENT)
Dept: GASTROENTEROLOGY | Facility: HOSPITAL | Age: 78
End: 2023-05-03

## 2023-05-03 ENCOUNTER — ANESTHESIA EVENT (INPATIENT)
Dept: GASTROENTEROLOGY | Facility: HOSPITAL | Age: 78
End: 2023-05-03

## 2023-05-03 ENCOUNTER — APPOINTMENT (INPATIENT)
Dept: GASTROENTEROLOGY | Facility: HOSPITAL | Age: 78
DRG: 385 | End: 2023-05-03
Payer: MEDICARE

## 2023-05-03 LAB
ANION GAP SERPL CALCULATED.3IONS-SCNC: 9 MMOL/L (ref 4–13)
BASOPHILS # BLD AUTO: 0.04 THOUSANDS/ÂΜL (ref 0–0.1)
BASOPHILS NFR BLD AUTO: 0 % (ref 0–1)
BUN SERPL-MCNC: 29 MG/DL (ref 5–25)
CALCIUM SERPL-MCNC: 8.5 MG/DL (ref 8.4–10.2)
CHLORIDE SERPL-SCNC: 95 MMOL/L (ref 96–108)
CO2 SERPL-SCNC: 21 MMOL/L (ref 21–32)
CREAT SERPL-MCNC: 1.85 MG/DL (ref 0.6–1.3)
EOSINOPHIL # BLD AUTO: 0.17 THOUSAND/ÂΜL (ref 0–0.61)
EOSINOPHIL NFR BLD AUTO: 1 % (ref 0–6)
ERYTHROCYTE [DISTWIDTH] IN BLOOD BY AUTOMATED COUNT: 14.6 % (ref 11.6–15.1)
GFR SERPL CREATININE-BSD FRML MDRD: 26 ML/MIN/1.73SQ M
GLUCOSE SERPL-MCNC: 74 MG/DL (ref 65–140)
GLUCOSE SERPL-MCNC: 85 MG/DL (ref 65–140)
HCT VFR BLD AUTO: 34.3 % (ref 34.8–46.1)
HGB BLD-MCNC: 11.8 G/DL (ref 11.5–15.4)
IMM GRANULOCYTES # BLD AUTO: 0.06 THOUSAND/UL (ref 0–0.2)
IMM GRANULOCYTES NFR BLD AUTO: 0 % (ref 0–2)
LYMPHOCYTES # BLD AUTO: 0.96 THOUSANDS/ÂΜL (ref 0.6–4.47)
LYMPHOCYTES NFR BLD AUTO: 6 % (ref 14–44)
MCH RBC QN AUTO: 34.7 PG (ref 26.8–34.3)
MCHC RBC AUTO-ENTMCNC: 34.4 G/DL (ref 31.4–37.4)
MCV RBC AUTO: 101 FL (ref 82–98)
MONOCYTES # BLD AUTO: 2.05 THOUSAND/ÂΜL (ref 0.17–1.22)
MONOCYTES NFR BLD AUTO: 13 % (ref 4–12)
NEUTROPHILS # BLD AUTO: 12.22 THOUSANDS/ÂΜL (ref 1.85–7.62)
NEUTS SEG NFR BLD AUTO: 80 % (ref 43–75)
NRBC BLD AUTO-RTO: 0 /100 WBCS
PLATELET # BLD AUTO: 100 THOUSANDS/UL (ref 149–390)
PMV BLD AUTO: 10.8 FL (ref 8.9–12.7)
POTASSIUM SERPL-SCNC: 3.6 MMOL/L (ref 3.5–5.3)
RBC # BLD AUTO: 3.4 MILLION/UL (ref 3.81–5.12)
SODIUM SERPL-SCNC: 125 MMOL/L (ref 135–147)
WBC # BLD AUTO: 15.5 THOUSAND/UL (ref 4.31–10.16)

## 2023-05-03 PROCEDURE — 85025 COMPLETE CBC W/AUTO DIFF WBC: CPT

## 2023-05-03 PROCEDURE — 0DJD8ZZ INSPECTION OF LOWER INTESTINAL TRACT, VIA NATURAL OR ARTIFICIAL OPENING ENDOSCOPIC: ICD-10-PCS | Performed by: INTERNAL MEDICINE

## 2023-05-03 PROCEDURE — 80048 BASIC METABOLIC PNL TOTAL CA: CPT

## 2023-05-03 PROCEDURE — 88305 TISSUE EXAM BY PATHOLOGIST: CPT | Performed by: STUDENT IN AN ORGANIZED HEALTH CARE EDUCATION/TRAINING PROGRAM

## 2023-05-03 PROCEDURE — 45331 SIGMOIDOSCOPY AND BIOPSY: CPT | Performed by: INTERNAL MEDICINE

## 2023-05-03 PROCEDURE — 82948 REAGENT STRIP/BLOOD GLUCOSE: CPT

## 2023-05-03 PROCEDURE — 99233 SBSQ HOSP IP/OBS HIGH 50: CPT | Performed by: SPECIALIST

## 2023-05-03 RX ORDER — ENOXAPARIN SODIUM 100 MG/ML
30 INJECTION SUBCUTANEOUS DAILY
Status: DISCONTINUED | OUTPATIENT
Start: 2023-05-03 | End: 2023-05-05

## 2023-05-03 RX ORDER — PROPOFOL 10 MG/ML
INJECTION, EMULSION INTRAVENOUS AS NEEDED
Status: DISCONTINUED | OUTPATIENT
Start: 2023-05-03 | End: 2023-05-03

## 2023-05-03 RX ORDER — EPHEDRINE SULFATE 50 MG/ML
INJECTION INTRAVENOUS AS NEEDED
Status: DISCONTINUED | OUTPATIENT
Start: 2023-05-03 | End: 2023-05-03

## 2023-05-03 RX ORDER — SODIUM CHLORIDE, SODIUM LACTATE, POTASSIUM CHLORIDE, CALCIUM CHLORIDE 600; 310; 30; 20 MG/100ML; MG/100ML; MG/100ML; MG/100ML
INJECTION, SOLUTION INTRAVENOUS CONTINUOUS PRN
Status: DISCONTINUED | OUTPATIENT
Start: 2023-05-03 | End: 2023-05-03

## 2023-05-03 RX ORDER — LIDOCAINE HYDROCHLORIDE 20 MG/ML
INJECTION, SOLUTION EPIDURAL; INFILTRATION; INTRACAUDAL; PERINEURAL AS NEEDED
Status: DISCONTINUED | OUTPATIENT
Start: 2023-05-03 | End: 2023-05-03

## 2023-05-03 RX ADMIN — ERYTHROMYCIN 0.5 INCH: 5 OINTMENT OPHTHALMIC at 17:07

## 2023-05-03 RX ADMIN — SODIUM CHLORIDE, SODIUM LACTATE, POTASSIUM CHLORIDE, AND CALCIUM CHLORIDE: .6; .31; .03; .02 INJECTION, SOLUTION INTRAVENOUS at 13:52

## 2023-05-03 RX ADMIN — EPHEDRINE SULFATE 10 MG: 50 INJECTION INTRAVENOUS at 14:18

## 2023-05-03 RX ADMIN — SODIUM CHLORIDE, SODIUM LACTATE, POTASSIUM CHLORIDE, AND CALCIUM CHLORIDE: .6; .31; .03; .02 INJECTION, SOLUTION INTRAVENOUS at 14:00

## 2023-05-03 RX ADMIN — ONDANSETRON 4 MG: 2 INJECTION INTRAMUSCULAR; INTRAVENOUS at 06:15

## 2023-05-03 RX ADMIN — MORPHINE SULFATE 4 MG: 4 INJECTION INTRAVENOUS at 06:15

## 2023-05-03 RX ADMIN — PROPOFOL 50 MG: 10 INJECTION, EMULSION INTRAVENOUS at 14:13

## 2023-05-03 RX ADMIN — METOPROLOL SUCCINATE 25 MG: 25 TABLET, EXTENDED RELEASE ORAL at 09:08

## 2023-05-03 RX ADMIN — PROPOFOL 50 MG: 10 INJECTION, EMULSION INTRAVENOUS at 14:10

## 2023-05-03 RX ADMIN — LEVOTHYROXINE SODIUM 75 MCG: 75 TABLET ORAL at 06:14

## 2023-05-03 RX ADMIN — ERYTHROMYCIN 0.5 INCH: 5 OINTMENT OPHTHALMIC at 09:08

## 2023-05-03 RX ADMIN — FUROSEMIDE 80 MG: 10 INJECTION, SOLUTION INTRAMUSCULAR; INTRAVENOUS at 09:00

## 2023-05-03 RX ADMIN — MORPHINE SULFATE 4 MG: 4 INJECTION INTRAVENOUS at 19:31

## 2023-05-03 RX ADMIN — TRAZODONE HYDROCHLORIDE 50 MG: 50 TABLET ORAL at 21:02

## 2023-05-03 RX ADMIN — PANTOPRAZOLE SODIUM 40 MG: 40 TABLET, DELAYED RELEASE ORAL at 06:14

## 2023-05-03 RX ADMIN — LIDOCAINE HYDROCHLORIDE 50 MG: 20 INJECTION, SOLUTION EPIDURAL; INFILTRATION; INTRACAUDAL; PERINEURAL at 14:10

## 2023-05-03 NOTE — ANESTHESIA PREPROCEDURE EVALUATION
Procedure:  FLEXIBLE SIGMOIDOSCOPY    Relevant Problems   CARDIO   (+) Hx of CABG   (+) PAF (paroxysmal atrial fibrillation) (HCC)   (+) Primary hypertension   (+) Superior mesenteric artery stenosis (HCC)      ENDO   (+) Acquired hypothyroidism      /RENAL   (+) ALEJANDRA (acute kidney injury) ([facility] [address] )      HEMATOLOGY   (+) Anemia      Digestive   (+) Diarrhea of presumed infectious origin   (+) Irritable bowel syndrome with diarrhea      Other   (+) Hypomagnesemia   (+) Hyponatremia   (+) Left lower quadrant abdominal pain      Lab Results   Component Value Date    SODIUM 125 (L) 05/03/2023    K 3 6 05/03/2023    CL 95 (L) 05/03/2023    CO2 21 05/03/2023    AGAP 9 05/03/2023    BUN 29 (H) 05/03/2023    CREATININE 1 85 (H) 05/03/2023    GLUC 85 05/03/2023    GLUF 80 04/19/2023    CALCIUM 8 5 05/03/2023    AST 40 (H) 05/01/2023    ALT 18 05/01/2023    ALKPHOS 331 (H) 05/01/2023    TP 6 7 05/01/2023    TBILI 1 75 (H) 05/01/2023    EGFR 26 05/03/2023     Lab Results   Component Value Date    WBC 15 50 (H) 05/03/2023    HGB 11 8 05/03/2023    HCT 34 3 (L) 05/03/2023     (H) 05/03/2023     (L) 05/03/2023       Physical Exam    Airway    Mallampati score: II  TM Distance: >3 FB  Neck ROM: limited     Dental       Cardiovascular      Pulmonary      Other Findings        Anesthesia Plan  ASA Score- 3     Anesthesia Type- IV sedation with anesthesia with ASA Monitors  Additional Monitors:   Airway Plan:           Plan Factors-Exercise tolerance (METS): >4 METS  Chart reviewed  EKG reviewed  Imaging results reviewed  Existing labs reviewed  Patient summary reviewed  Induction- intravenous  Postoperative Plan- Plan for postoperative opioid use  Informed Consent- Anesthetic plan and risks discussed with patient  I personally reviewed this patient with the CRNA  Discussed and agreed on the Anesthesia Plan with the CRNA  Gio Stark

## 2023-05-03 NOTE — ANESTHESIA POSTPROCEDURE EVALUATION
Post-Op Assessment Note    CV Status:  Stable  Pain Score: 0    Pain management: adequate     Mental Status:  Awake and sleepy   Hydration Status:  Euvolemic   PONV Controlled:  Controlled   Airway Patency:  Patent      Post Op Vitals Reviewed: Yes      Staff: CRNA         No notable events documented      BP   100/46   Temp      Pulse  66   Resp   12   SpO2   100
No

## 2023-05-03 NOTE — PROGRESS NOTES
"[facility]  Progress Note  Name: Rylee Kendall  MRN: [id number]  Unit/Bed#: -01 I Date of Admission: 4/27/2023   Date of Service: 5/3/2023 I Hospital Day: 6    Assessment/Plan   * Left lower quadrant abdominal pain  Assessment & Plan  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%  Ongoing persistent severe left lower quadrant pain, no appetite nausea yesterday, difficulty recalling bowel movements, decreased flatus  Abdomen with mild distention, soft, bowel sounds present, mild generalized tenderness more moderate in the left lower quadrant and suprapubic region, no guarding or rigidity  Temp 97 9, heart rate 67, respirations 18, blood pressure 130/57, SPO2 95% on room air  Sodium 125 from 123, creatinine 1 85 from 1 08, WBC 15 5 from 10 27, platelets 912      Assessment:   Left lower quadrant pain suspected secondary to colitis, undifferentiated  Diarrhea  Cirrhosis  Pancreatic cyst  SMA stenosis    Plan:  Appreciate GI recs, for Flex Sig when stable  Appreciate SLIM recs for hyponatremia and ALEJANDRA, will notify of BMP results this AM   Continue supportive measures in interim  Follow-up with Dr Melanie Weems today  Subjective/Objective   Chief Complaint: pain    Subjective: ongoing LLQ pain, no appetite, nausea after eating yesterday, minimal flatus, possible small BM today, but difficulty recalling, no dyspnea or chest pain,  No dysuria    Objective: persistent pain    Blood pressure 130/57, pulse 67, temperature 97 9 °F (36 6 °C), resp  rate 18, height 4' 11\" (1 499 m), weight 57 9 kg (127 lb 10 3 oz), SpO2 95 %  ,Body mass index is 25 78 kg/m²        Intake/Output Summary (Last 24 hours) at " 5/3/2023 2427  Last data filed at 5/2/2023 2003  Gross per 24 hour   Intake 120 ml   Output 750 ml   Net -630 ml       Invasive Devices     Peripheral Intravenous Line  Duration           Peripheral IV 04/29/23 Distal;Left;Ventral (anterior) Forearm 3 days                Physical Exam  Vitals and nursing note reviewed  Constitutional:       General: She is not in acute distress  Appearance: She is well-developed  She is not diaphoretic  HENT:      Head: Normocephalic and atraumatic  Eyes:      Conjunctiva/sclera: Conjunctivae normal       Pupils: Pupils are equal, round, and reactive to light  Pulmonary:      Effort: No respiratory distress  Abdominal:      Comments: Soft, mild distention, TTP in lower abdomen, suprapubic and LLQ worst  No guarding  Musculoskeletal:         General: Normal range of motion  Cervical back: Normal range of motion  Skin:     General: Skin is warm and dry  Capillary Refill: Capillary refill takes less than 2 seconds  Neurological:      Mental Status: She is alert and oriented to person, place, and time  Psychiatric:         Behavior: Behavior normal            Lab, Imaging and other studies:  I have personally reviewed pertinent lab results    , CBC:   Lab Results   Component Value Date    WBC 15 50 (H) 05/03/2023    HGB 11 8 05/03/2023    HCT 34 3 (L) 05/03/2023     (H) 05/03/2023     (L) 05/03/2023    MCH 34 7 (H) 05/03/2023    MCHC 34 4 05/03/2023    RDW 14 6 05/03/2023    MPV 10 8 05/03/2023    NRBC 0 05/03/2023   , CMP:   Lab Results   Component Value Date    SODIUM 125 (L) 05/03/2023    K 3 6 05/03/2023    CL 95 (L) 05/03/2023    CO2 21 05/03/2023    BUN 29 (H) 05/03/2023    CREATININE 1 85 (H) 05/03/2023    CALCIUM 8 5 05/03/2023    EGFR 26 05/03/2023     VTE Pharmacologic Prophylaxis: Enoxaparin (Lovenox)  VTE Mechanical Prophylaxis: sequential compression device

## 2023-05-03 NOTE — ASSESSMENT & PLAN NOTE
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%  Ongoing persistent severe left lower quadrant pain, no appetite nausea yesterday, difficulty recalling bowel movements, decreased flatus  Abdomen with mild distention, soft, bowel sounds present, mild generalized tenderness more moderate in the left lower quadrant and suprapubic region, no guarding or rigidity  Temp 97 9, heart rate 67, respirations 18, blood pressure 130/57, SPO2 95% on room air  Sodium 125 from 123, creatinine 1 85 from 1 08, WBC 15 5 from 10 27, platelets 997      Assessment:   Left lower quadrant pain suspected secondary to colitis, undifferentiated  Diarrhea  Cirrhosis  Pancreatic cyst  SMA stenosis    Plan:  Appreciate GI recs, for Flex Sig when stable  Appreciate SLIM recs for hyponatremia and ALEJANDRA, will notify of BMP results this AM   Continue supportive measures in interim  Follow-up with Dr Yusuf Montero today

## 2023-05-04 LAB
ANION GAP SERPL CALCULATED.3IONS-SCNC: 9 MMOL/L (ref 4–13)
BASOPHILS # BLD AUTO: 0.03 THOUSANDS/ÂΜL (ref 0–0.1)
BASOPHILS NFR BLD AUTO: 0 % (ref 0–1)
BUN SERPL-MCNC: 36 MG/DL (ref 5–25)
CALCIUM SERPL-MCNC: 8.2 MG/DL (ref 8.4–10.2)
CHLORIDE SERPL-SCNC: 95 MMOL/L (ref 96–108)
CO2 SERPL-SCNC: 21 MMOL/L (ref 21–32)
CREAT SERPL-MCNC: 2.1 MG/DL (ref 0.6–1.3)
EOSINOPHIL # BLD AUTO: 0.12 THOUSAND/ÂΜL (ref 0–0.61)
EOSINOPHIL NFR BLD AUTO: 1 % (ref 0–6)
ERYTHROCYTE [DISTWIDTH] IN BLOOD BY AUTOMATED COUNT: 14.8 % (ref 11.6–15.1)
GFR SERPL CREATININE-BSD FRML MDRD: 22 ML/MIN/1.73SQ M
GLUCOSE SERPL-MCNC: 101 MG/DL (ref 65–140)
GLUCOSE SERPL-MCNC: 103 MG/DL (ref 65–140)
GLUCOSE SERPL-MCNC: 78 MG/DL (ref 65–140)
GLUCOSE SERPL-MCNC: 78 MG/DL (ref 65–140)
GLUCOSE SERPL-MCNC: 88 MG/DL (ref 65–140)
HCT VFR BLD AUTO: 31.6 % (ref 34.8–46.1)
HGB BLD-MCNC: 10.6 G/DL (ref 11.5–15.4)
IMM GRANULOCYTES # BLD AUTO: 0.08 THOUSAND/UL (ref 0–0.2)
IMM GRANULOCYTES NFR BLD AUTO: 1 % (ref 0–2)
LYMPHOCYTES # BLD AUTO: 0.86 THOUSANDS/ÂΜL (ref 0.6–4.47)
LYMPHOCYTES NFR BLD AUTO: 5 % (ref 14–44)
MCH RBC QN AUTO: 34.2 PG (ref 26.8–34.3)
MCHC RBC AUTO-ENTMCNC: 33.5 G/DL (ref 31.4–37.4)
MCV RBC AUTO: 102 FL (ref 82–98)
MONOCYTES # BLD AUTO: 2.4 THOUSAND/ÂΜL (ref 0.17–1.22)
MONOCYTES NFR BLD AUTO: 15 % (ref 4–12)
NEUTROPHILS # BLD AUTO: 12.72 THOUSANDS/ÂΜL (ref 1.85–7.62)
NEUTS SEG NFR BLD AUTO: 78 % (ref 43–75)
NRBC BLD AUTO-RTO: 0 /100 WBCS
PLATELET # BLD AUTO: 78 THOUSANDS/UL (ref 149–390)
PMV BLD AUTO: 11.5 FL (ref 8.9–12.7)
POTASSIUM SERPL-SCNC: 3.5 MMOL/L (ref 3.5–5.3)
RBC # BLD AUTO: 3.1 MILLION/UL (ref 3.81–5.12)
SODIUM SERPL-SCNC: 125 MMOL/L (ref 135–147)
WBC # BLD AUTO: 16.21 THOUSAND/UL (ref 4.31–10.16)

## 2023-05-04 PROCEDURE — 85025 COMPLETE CBC W/AUTO DIFF WBC: CPT

## 2023-05-04 PROCEDURE — 97535 SELF CARE MNGMENT TRAINING: CPT

## 2023-05-04 PROCEDURE — 99232 SBSQ HOSP IP/OBS MODERATE 35: CPT | Performed by: SPECIALIST

## 2023-05-04 PROCEDURE — 99232 SBSQ HOSP IP/OBS MODERATE 35: CPT | Performed by: HOSPITALIST

## 2023-05-04 PROCEDURE — 82948 REAGENT STRIP/BLOOD GLUCOSE: CPT

## 2023-05-04 PROCEDURE — 99233 SBSQ HOSP IP/OBS HIGH 50: CPT | Performed by: INTERNAL MEDICINE

## 2023-05-04 PROCEDURE — 80048 BASIC METABOLIC PNL TOTAL CA: CPT

## 2023-05-04 PROCEDURE — 97116 GAIT TRAINING THERAPY: CPT

## 2023-05-04 RX ORDER — ALBUMIN, HUMAN INJ 5% 5 %
12.5 SOLUTION INTRAVENOUS ONCE
Status: COMPLETED | OUTPATIENT
Start: 2023-05-04 | End: 2023-05-05

## 2023-05-04 RX ORDER — SODIUM CHLORIDE, SODIUM GLUCONATE, SODIUM ACETATE, POTASSIUM CHLORIDE, MAGNESIUM CHLORIDE, SODIUM PHOSPHATE, DIBASIC, AND POTASSIUM PHOSPHATE .53; .5; .37; .037; .03; .012; .00082 G/100ML; G/100ML; G/100ML; G/100ML; G/100ML; G/100ML; G/100ML
125 INJECTION, SOLUTION INTRAVENOUS CONTINUOUS
Status: DISCONTINUED | OUTPATIENT
Start: 2023-05-04 | End: 2023-05-04

## 2023-05-04 RX ORDER — SODIUM CHLORIDE, SODIUM GLUCONATE, SODIUM ACETATE, POTASSIUM CHLORIDE, MAGNESIUM CHLORIDE, SODIUM PHOSPHATE, DIBASIC, AND POTASSIUM PHOSPHATE .53; .5; .37; .037; .03; .012; .00082 G/100ML; G/100ML; G/100ML; G/100ML; G/100ML; G/100ML; G/100ML
1000 INJECTION, SOLUTION INTRAVENOUS ONCE
Status: COMPLETED | OUTPATIENT
Start: 2023-05-04 | End: 2023-05-05

## 2023-05-04 RX ADMIN — ACETAMINOPHEN 650 MG: 325 TABLET ORAL at 20:54

## 2023-05-04 RX ADMIN — ENOXAPARIN SODIUM 30 MG: 100 INJECTION SUBCUTANEOUS at 10:29

## 2023-05-04 RX ADMIN — METOPROLOL SUCCINATE 25 MG: 25 TABLET, EXTENDED RELEASE ORAL at 10:29

## 2023-05-04 RX ADMIN — SODIUM CHLORIDE, SODIUM GLUCONATE, SODIUM ACETATE, POTASSIUM CHLORIDE, MAGNESIUM CHLORIDE, SODIUM PHOSPHATE, DIBASIC, AND POTASSIUM PHOSPHATE 1000 ML: .53; .5; .37; .037; .03; .012; .00082 INJECTION, SOLUTION INTRAVENOUS at 22:26

## 2023-05-04 RX ADMIN — ERYTHROMYCIN 0.5 INCH: 5 OINTMENT OPHTHALMIC at 17:55

## 2023-05-04 RX ADMIN — LEVOTHYROXINE SODIUM 75 MCG: 75 TABLET ORAL at 05:09

## 2023-05-04 RX ADMIN — TRAZODONE HYDROCHLORIDE 50 MG: 50 TABLET ORAL at 21:00

## 2023-05-04 RX ADMIN — ERYTHROMYCIN 0.5 INCH: 5 OINTMENT OPHTHALMIC at 10:29

## 2023-05-04 RX ADMIN — PANTOPRAZOLE SODIUM 40 MG: 40 TABLET, DELAYED RELEASE ORAL at 05:09

## 2023-05-04 NOTE — PROGRESS NOTES
Progress Note - Nephrology   Cristel Beebe 69 y o  female MRN: [id number]  Unit/Bed#: -01 Encounter: [id number]    A/P:  1  Acute kidney injury   Creatinine is worsened over the last 48 hours, unclear etiology at this time, will check urine studies, and postvoid residual   Patient's furosemide was held earlier this morning by myself prior to dose given  Patient had a procedure yesterday, small amount of oral intake was only possible  CT scan of the abdomen pelvis with contrast on May 1, as well as April 27  On May 1, the patient received her first 2 doses of furosemide 80 mg IV  2   Hyponatremia   Serum sodium level remained stable at 125 mmol/L, continue fluid restriction, follow-up urine studies  3   Volume overload   As mentioned above, the patient's IV Lasix has been held, will continue to monitor for now  4   Probable underlying cirrhosis  5  Hypertension   With metoprolol at this time, continue to avoid harassing patient at this time  6   Left lower quadrant pain   Unclear if this is gas pain, or pain associated with the patient's chronic constipation  7   History of atrial fibrillation  8  History of ischemic colitis    Follow up reason for today's visit: Acute kidney injury/hyponatremia/volume overload    Left lower quadrant abdominal pain    Patient Active Problem List   Diagnosis   • Acute kidney injury ([facility] [address] )   • Diarrhea of presumed infectious origin   • Primary hypertension   • Acquired hypothyroidism   • Irritable bowel syndrome with diarrhea   • Abnormal CT scan   • Superior mesenteric artery stenosis (HCC)   • Left lower quadrant abdominal pain   • Hyponatremia   • Hypomagnesemia   • PAF (paroxysmal atrial fibrillation) (HCC)   • Hx of CABG   • Anemia         Subjective:   Patient is complaining of abdominal pain left lower quadrant and left flank region  Appears to radiate from the flank to the left lower quadrant of the abdomen  Is typically constant and sharp      Objective: "    Vitals: Blood pressure (!) 120/43, pulse 66, temperature 97 6 °F (36 4 °C), temperature source Oral, resp  rate 17, height 4' 11\" (1 499 m), weight 53 8 kg (118 lb 9 7 oz), SpO2 93 %  ,Body mass index is 23 96 kg/m²  Weight (last 2 days)     Date/Time Weight    05/04/23 0600 53 8 (118 61)    05/03/23 1343 57 9 (127 65)    05/03/23 0548 57 9 (127 65)    05/02/23 0600 59 (130 07)            Intake/Output Summary (Last 24 hours) at 5/4/2023 1146  Last data filed at 5/3/2023 2101  Gross per 24 hour   Intake 320 ml   Output --   Net 320 ml     I/O last 3 completed shifts: In: 320 [P O :120; I V :200]  Out: 450 [Urine:450]         Physical Exam: BP (!) 120/43 (BP Location: Right arm)   Pulse 66   Temp 97 6 °F (36 4 °C) (Oral)   Resp 17   Ht 4' 11\" (1 499 m)   Wt 53 8 kg (118 lb 9 7 oz)   SpO2 93%   BMI 23 96 kg/m²     General Appearance:    Alert, cooperative, no distress, appears stated age   Head:    Normocephalic, without obvious abnormality, atraumatic   Eyes:    Conjunctiva/corneas clear   Ears:    Normal external ears   Nose:   Nares normal, septum midline, mucosa normal, no drainage    or sinus tenderness   Throat:   Lips, mucosa, and tongue normal; teeth and gums normal   Neck:   Supple   Back:     Symmetric, no curvature, ROM normal, no CVA tenderness   Lungs:     Clear to auscultation bilaterally, respirations unlabored   Chest wall:    No tenderness or deformity   Heart:    Regular rate and rhythm, S1 and S2 normal, no murmur, rub   or gallop   Abdomen:     Soft, non-tender, bowel sounds active, left lower quadrant and left flank pain, no guarding noted   Extremities:   Extremities normal, atraumatic, no cyanosis, +2 bilateral lower extremity edema   Skin:   Skin color, texture, turgor normal, no rashes or lesions   Lymph nodes:   Cervical normal   Neurologic:   CNII-XII intact            Lab, Imaging and other studies: I have personally reviewed pertinent labs    CBC:   Lab Results   Component " Value Date    WBC 16 21 (H) 05/04/2023    HGB 10 6 (L) 05/04/2023    HCT 31 6 (L) 05/04/2023     (H) 05/04/2023    PLT 78 (L) 05/04/2023    MCH 34 2 05/04/2023    MCHC 33 5 05/04/2023    RDW 14 8 05/04/2023    MPV 11 5 05/04/2023    NRBC 0 05/04/2023     CMP:   Lab Results   Component Value Date    K 3 5 05/04/2023    CL 95 (L) 05/04/2023    CO2 21 05/04/2023    BUN 36 (H) 05/04/2023    CREATININE 2 10 (H) 05/04/2023    CALCIUM 8 2 (L) 05/04/2023    EGFR 22 05/04/2023         Results from last 7 days   Lab Units 05/04/23  0508 05/03/23  3076 05/01/23  0549 04/30/23  1556 04/30/23  0545 04/29/23  0515   POTASSIUM mmol/L 3 5 3 6 4 1   < > 4 7 4 3   CHLORIDE mmol/L 95* 95* 99   < > 101 105   CO2 mmol/L 21 21 19*   < > 15* 17*   BUN mg/dL 36* 29* 22   < > 15 16   CREATININE mg/dL 2 10* 1 85* 1 08   < > 0 98 0 88   CALCIUM mg/dL 8 2* 8 5 8 4   < > 8 5 7 9*   ALK PHOS U/L  --   --  331*  --  330* 308*   ALT U/L  --   --  18  --  16 17   AST U/L  --   --  40*  --  44* 31    < > = values in this interval not displayed  Phosphorus: No results found for: PHOS  Magnesium: No results found for: MG  Urinalysis: No results found for: Enrigue Flash, SPECGRAV, PHUR, LEUKOCYTESUR, NITRITE, PROTEINUA, GLUCOSEU, KETONESU, BILIRUBINUR, BLOODU  Ionized Calcium: No results found for: CAION  Coagulation: No results found for: PT, INR, APTT  Troponin: No results found for: TROPONINI  ABG: No results found for: PHART, CBC4LWZ, PO2ART, PCB3QOQ, W2JNIDEP, BEART, SOURCE  Radiology review:     IMAGING  Procedure: MRI lumbar spine wo contrast    Result Date: 5/2/2023  Narrative: MRI LUMBAR SPINE WITHOUT CONTRAST INDICATION: pain  Status post fall approximately 10 weeks ago  Left lower quadrant and low back pain COMPARISON:   5/1/2023 TECHNIQUE:  Multiplanar, multisequence imaging of the lumbar spine was performed    IMAGE QUALITY:  Diagnostic FINDINGS: VERTEBRAL BODIES:  There are 5 lumbar type vertebral bodies   Grade 1 anterolisthesis of L4 on L5 noted  Mild dextroscoliosis of the lower lumbar spine, centered at the L4 level  There are numerous compression deformities, most of which demonstrate marrow edema indicative of acute/recent compression deformities  There is approximately 30 to 40% loss of the height of the T1 vertebra with marrow edema indicative of an acute compression deformity  No significant bony retropulsion or epidural hematoma  There is mild less than 10% loss of height in the superior endplate of L1 with T1 hypointensity indicative of mild, recent compression deformity  L2 demonstrates marrow edema throughout much of the vertebra without loss of height although a linear T2 hypointensity is noted in the lower third of the vertebra suggesting nondepressed fracture  No bony retropulsion  25% loss of vertebral body height of L4 noted with a small amount of superior endplate marrow edema noted indicative of recent deformity  25 to 30% loss of height of L5 noted with marrow edema as well  The lucency noted in the L3 vertebra correlates to a T1 and T2 hyperintensity seen on series 4, image 13, measuring approximately 8 mm in maximal craniocaudal dimension, having typical imaging features of hemangioma correlating to the lucent lesion on the CT scan from yesterday  SACRUM: Scattered degenerative endplate changes  No focally suspicious marrow lesions  No bone marrow edema or compression abnormality  DISTAL CORD AND CONUS:  Normal size and signal within the distal cord and conus  The conus medullaris terminates at the L1 level  PARASPINAL SOFT TISSUES:  Paraspinal soft tissues are unremarkable  LOWER THORACIC DISC SPACES: T9-T10: There is a small central disc herniation, protrusion type extending into the right neural foramen  Mild central canal and right neural foraminal narrowing  Left neural foramen patent  T10-T11: There is a small central disc protrusion  Mild central canal and right neural foraminal narrowing   Left neural foramen patent  T11-T12: Tiny central disc protrusion  No significant central canal or neural foraminal narrowing  T12-L1: There is a small central/right paracentral disc protrusion  Mild central canal and right neural foraminal narrowing  Left neural foramen patent  LUMBAR DISC SPACES: L1-L2: There is loss of disc height and signal  There is a central/right paracentral disc protrusion  Mild central canal and right neural foraminal narrowing  Left neural foramen patent  L2-L3: There is loss of disc height and signal  There is bilateral facet hypertrophy  Mild to moderate central canal narrowing  Moderate right neural foraminal narrowing  Mild left neural foraminal narrowing  L3-L4: There is bilateral facet hypertrophy  There is loss of disc height and signal  There is a left neural foraminal disc protrusion  Moderate left neural foraminal narrowing  Mild central canal narrowing  Moderate right neural foraminal narrowing  L4-L5: There is uncovering the intervertebral disc space  There is bilateral facet hypertrophy  There is a left neural foraminal disc protrusion  Severe left neural foraminal narrowing  Moderate to severe central canal narrowing  Moderate right neural foraminal narrowing  There is uncovering the intervertebral disc space  L5-S1: There is bilateral facet hypertrophy  There is a right neural foraminal disc protrusion  Moderate to severe right neural foraminal narrowing  Mild central canal narrowing  Mild to moderate left neural foraminal narrowing  OTHER FINDINGS:  None  Impression: 1  Multiple acute compression deformities mild to moderate in severity most pronounced at L5 where there is approximately 40% loss of vertebral body height  No epidural hematoma or significant bony retropulsion  Correlation for osteoporotic risk factors is advised  2  Multilevel spondylosis   3  Recently noted lucent lesion in the L3 vertebra from the CT from earlier in the day correlates to a hemangioma, a benign finding  Workstation performed: [id number]     Procedure: Flexible Sigmoidoscopy    Result Date: 5/3/2023  Narrative: Table formatting from the original result was not included  [facility] [id number] [phone] DATE OF SERVICE: 5/03/23 PHYSICIAN(S): Attending: Cathy Javier DO Fellow: No Staff Documented INDICATION: Diarrhea, unspecified type POST-OP DIAGNOSIS: See the impression below  HISTORY: Prior colonoscopy: Less than 3 years ago  It is being repeated at an interval of less than 3 years because: This colonoscopy is being performed for a diagnostic indication BOWEL PREPARATION: Enema PREPROCEDURE: Informed consent was obtained for the procedure, including sedation  Risks including but not limited to bleeding, infection, perforation, adverse drug reaction and aspiration were explained in detail  Also explained about less than 100% sensitivity with the exam and other alternatives  The patient was placed in the left lateral decubitus position  Procedure: Colonoscopy DETAILS OF PROCEDURE: Patient was taken to the procedure room where a time out was performed to confirm correct patient and correct procedure  The patient underwent monitored anesthesia care, which was administered by an anesthesia professional  The patient's blood pressure, heart rate, level of consciousness, oxygen and respirations were monitored throughout the procedure  A digital rectal exam was performed  The scope was introduced through the anus and advanced to the descending colon  Retroflexion was performed in the rectum  The quality of bowel preparation was evaluated using the [facility] Bowel Preparation Scale with scores of: right colon = 0, transverse colon = not assessed, left colon = not assessed  Bowel prep was not adequate  The patient experienced no blood loss  The procedure was not difficult  The patient tolerated the procedure well   There were no apparent complications  The total BBPS score was 0  ANESTHESIA INFORMATION: ASA: III Anesthesia Type: IV Sedation with Anesthesia MEDICATIONS: No administrations occurring from 1337 to 1422 on 05/03/23 FINDINGS: Mild abnormal mucosa with erosion in the rectum; performed cold forceps biopsy EVENTS: Procedure Events Event Event Time ENDO SCOPE OUT TIME 5/3/2023  2:20 PM SPECIMENS: ID Type Source Tests Collected by Time Destination 1 : BX, R/O COLITIS Tissue Rectum TISSUE EXAM Joelle Zuniga DO 5/3/2023  2:18 PM  EQUIPMENT: Colonoscope - SEE GASTRPSCOPE     Impression: Exam limited due to stool burden  Areas examined demonstrate normal mucosa; scope advanced to approximately 55cm, thereafter extensive stool blocking lumen Area of scant inflammation in rectum   Biopsies obtained to rule out colitis/microscopic colitis RECOMMENDATION:  Await pathology results  Follow up in GI office for ongoing care and history of liver disease  Joelle Zuniga DO       Current Facility-Administered Medications   Medication Dose Route Frequency   • acetaminophen (TYLENOL) tablet 650 mg  650 mg Oral Q6H PRN   • barium (READI-CAT 2) suspension 900 mL  900 mL Oral Once in imaging   • enoxaparin (LOVENOX) subcutaneous injection 30 mg  30 mg Subcutaneous Daily   • erythromycin (ILOTYCIN) 0 5 % ophthalmic ointment 0 5 inch  0 5 inch Left Eye BID   • levothyroxine tablet 75 mcg  75 mcg Oral Early Morning   • magnesium hydroxide (MILK OF MAGNESIA) oral suspension 30 mL  30 mL Oral Daily PRN   • metoprolol succinate (TOPROL-XL) 24 hr tablet 25 mg  25 mg Oral BID   • morphine injection 4 mg  4 mg Intravenous Q3H PRN   • ondansetron (ZOFRAN) injection 4 mg  4 mg Intravenous Q6H PRN   • pantoprazole (PROTONIX) EC tablet 40 mg  40 mg Oral Early Morning   • traZODone (DESYREL) tablet 50 mg  50 mg Oral HS     Medications Discontinued During This Encounter   Medication Reason   • polyethylene glycol (GLYCOLAX) 17 GM/SCOOP powder Therapy completed   • ciprofloxacin (CIPRO) IVPB (premix in 5% dextrose) 400 mg 200 mL    • metroNIDAZOLE (FLAGYL) IVPB (premix) 500 mg 100 mL    • dextrose 5 % and sodium chloride 0 45 % with KCl 20 mEq/L infusion    • sodium chloride 0 9 % infusion    • polyethylene glycol (MIRALAX) packet 17 g    • ciprofloxacin (CIPRO) tablet 500 mg    • metroNIDAZOLE (FLAGYL) tablet 500 mg    • sodium bicarbonate tablet 650 mg    • enoxaparin (LOVENOX) subcutaneous injection 40 mg    • furosemide (LASIX) injection 80 mg        Baby Gosselin, DO      This progress note was produced in part using a dictation device which may document imprecise wording from author's original intent

## 2023-05-04 NOTE — PLAN OF CARE
Problem: PHYSICAL THERAPY ADULT  Goal: Performs mobility at highest level of function for planned discharge setting  See evaluation for individualized goals  Description: Treatment/Interventions: Functional transfer training, Endurance training, Therapeutic exercise, Gait training, Bed mobility          See flowsheet documentation for full assessment, interventions and recommendations  Outcome: Progressing  Note: Prognosis: Good  Problem List: Decreased strength, Decreased endurance, Impaired balance, Decreased mobility, Decreased cognition  Assessment: Pt seen for PT treatment session this date with interventions consisting of gait training to reduce the risk of medical complications and advance toward previous level of function w/ emphasis on improving pt's ability to ambulate level surfaces x 35 feet x 2 trials with close S provided by therapist with RW  Pt agreeable to PT treatment session upon arrival, pt found supine in bed w/ HOB elevated, A&O x 2  In comparison to previous session, pt with improvements in ambulatory distance  Post session: pt returned BTB, bed alarm engaged, all needs in reach and RN notified of session findings/recommendations  Continue to recommend return to facility with rehabilitation services at time of d/c in order to maximize pt's functional independence and safety w/ mobility  Pt continues to be functioning below baseline level, and remains limited 2* factors listed above and including gait deviations, impaired balance,impaired cognition  PT will continue to see pt during current hospitalization in order to address the deficits listed above and provide interventions consistent w/ POC in effort to achieve LTGs  PT Discharge Recommendation: Return to facility with rehabilitation services    See flowsheet documentation for full assessment

## 2023-05-04 NOTE — PLAN OF CARE
Problem: OCCUPATIONAL THERAPY ADULT  Goal: Performs self-care activities at highest level of function for planned discharge setting  See evaluation for individualized goals  Description: Treatment Interventions: ADL retraining, Functional transfer training, Endurance training, Patient/family training, Compensatory technique education, Energy conservation, Activityengagement    See flowsheet documentation for full assessment, interventions and recommendations  5/4/2023 1428 by [place], [place]  Outcome: Progressing  Note: Limitation: Decreased ADL status, Decreased Safe judgement during ADL, Decreased endurance, Decreased self-care trans, Decreased high-level ADLs  Prognosis: Good  Assessment: Patient participated in Skilled OT session this date with interventions consisting of ADL re training with the use of correct body mechnaics   Patient agreeable to OT treatment session, upon arrival patient was found seated in bed (back supported)  Patient requiring occasional rest periods and self-care assistance as noted in flow sheet  Patient continues to be functioning below baseline level, occupational performance remains limited secondary to factors listed above and increased risk for falls and injury  From OT standpoint, recommendation at time of d/c would be return to facility with rehabilitation services  Patient to benefit from continued Occupational Therapy treatment while in the hospital to address deficits as defined above and maximize level of functional independence with ADLs and functional mobility       OT Discharge Recommendation: Return to facility with rehabilitation services       5/4/2023 1426 by [place], [place]  Outcome: Progressing  Note: Limitation: Decreased ADL status, Decreased Safe judgement during ADL, Decreased endurance, Decreased self-care trans, Decreased high-level ADLs  Prognosis: Good  Assessment: Patient participated in Skilled OT session this date with interventions consisting of ADL re training with the use of correct body [facility]   Patient agreeable to OT treatment session, upon arrival patient was found seated in bed (back supported)  Patient requiring occasional rest periods and self-care assistance as noted in flow sheet  Patient continues to be functioning below baseline level, occupational performance remains limited secondary to factors listed above and increased risk for falls and injury  From OT standpoint, recommendation at time of d/c would be return to facility with rehabilitation services  Patient to benefit from continued Occupational Therapy treatment while in the hospital to address deficits as defined above and maximize level of functional independence with ADLs and functional mobility       OT Discharge Recommendation: Return to facility with rehabilitation services        [place], JONES/L

## 2023-05-04 NOTE — ASSESSMENT & PLAN NOTE
· Likely secondary to fluid overload, patient has been receiving IV fluids and is tolerating oral intake  · Persists  · Nephrology following , we will defer to them   · IV lasix given without much improvement

## 2023-05-04 NOTE — PROGRESS NOTES
Char[facility]  67   Progress Note  Name: Kasi Garcia  MRN: [id number]  Unit/Bed#: -01 I Date of Admission: 4/27/2023   Date of Service: 5/4/2023 I Hospital Day: 7    Assessment/Plan   Anemia  Assessment & Plan  · Hemoglobin level down to 9 9 today  Patient denies any melena or hematemesis  · Monitor hemoglobin level  · Type and screen  · Transfuse as needed  · GI following    Hx of CABG  Assessment & Plan  · Currently stable  · Hold losartan, lasix d/t ALEJANDRA  PAF (paroxysmal atrial fibrillation) ([facility])  Assessment & Plan  · Currently rate controlled  Continue Toprol  · Patient is typically on aspirin, resume when cleared by surgical team    Hypomagnesemia  Assessment & Plan  · Magnesium supplemented  Repeat level in the morning    Hyponatremia  Assessment & Plan  · Likely secondary to fluid overload, patient has been receiving IV fluids and is tolerating oral intake  · Persists  · Nephrology following , we will defer to them   · IV lasix given without much improvement  Acquired hypothyroidism  Assessment & Plan  Resume home Synthroid    Primary hypertension  Assessment & Plan  · Blood pressure currently stable  · Continue home Toprol  · Hold losartan d/t ALEJANDRA  Hold further furosemide  Acute kidney injury [facility])  Assessment & Plan  • Avoid hypotension, nephrotoxins, and NSAIDS if possible    • Hold further IV lasix  Nephrology has discontinued this morning  • Suspect etiology related to over-diuresis  • Baseline Cr 1  • Recheck Cr in the AM     * Left lower quadrant abdominal pain  Assessment & Plan  · Continue management per surgical team   ·  s/p flex sig with biopsies taken  · Currently on clear liquid diet              VTE  Prophylaxis:   Pharmacologic: in place  Mechanical VTE Prophylaxis in Place: Yes    Patient Centered Rounds: I have performed bedside rounds with nursing staff today      Discussions with Specialists or Other Care Team Provider: case management    Education and Discussions with Family / Patient: pt      Current Length of Stay: 7 day(s)    Current Patient Status: Inpatient        Code Status: Level 1 - Full Code    Discharge Plan: Pt will require continued inpatient hospitalization  Subjective:   Pt with a little abd pain today states improved   Having a little bit of clears  No fever  No chest pain   No emesis  States legs still edematous  Patient is seen and examined at bedside  All other ROS are negative  Objective:     Vitals:   Temp (24hrs), Av 7 °F (36 5 °C), Min:96 9 °F (36 1 °C), Max:98 8 °F (37 1 °C)    Temp:  [96 9 °F (36 1 °C)-98 8 °F (37 1 °C)] 97 6 °F (36 4 °C)  HR:  [56-67] 66  Resp:  [16-20] 17  BP: ()/(32-57) 120/43  SpO2:  [91 %-100 %] 93 %  Body mass index is 23 96 kg/m²  Input and Output Summary (last 24 hours): Intake/Output Summary (Last 24 hours) at 2023 1036  Last data filed at 5/3/2023 2101  Gross per 24 hour   Intake 320 ml   Output --   Net 320 ml       Physical Exam:       GEN: No acute distress, comfortable  HEEENT: No JVD, PERRLA, no scleral icterus  RESP: Lungs clear to auscultation bilaterally  CV: RRR, +s1/s2   ABD: SOFT mild llq TENDER, POSITIVE BOWEL SOUNDS, NO DISTENTION  PSYCH: CALM  NEURO: Mentation baseline, NO FOCAL DEFICITS  SKIN: NO RASH  EXTREM: LE EDEMA present       Additional Data:     Labs:    Results from last 7 days   Lab Units 23  0508   WBC Thousand/uL 16 21*   HEMOGLOBIN g/dL 10 6*   HEMATOCRIT % 31 6*   PLATELETS Thousands/uL 78*   NEUTROS PCT % 78*   LYMPHS PCT % 5*   MONOS PCT % 15*   EOS PCT % 1     Results from last 7 days   Lab Units 23  0508 23  0635 23  0549   SODIUM mmol/L 125*   < > 123*   POTASSIUM mmol/L 3 5   < > 4 1   CHLORIDE mmol/L 95*   < > 99   CO2 mmol/L 21   < > 19*   BUN mg/dL 36*   < > 22   CREATININE mg/dL 2 10*   < > 1 08   ANION GAP mmol/L 9   < > 5   CALCIUM mg/dL 8 2*   < > 8 4   ALBUMIN g/dL  --   --  2 6*   TOTAL BILIRUBIN mg/dL  --   --  1 75*   ALK PHOS U/L  --   --  331*   ALT U/L  --   --  18   AST U/L  --   --  40*   GLUCOSE RANDOM mg/dL 78   < > 79    < > = values in this interval not displayed  Results from last 7 days   Lab Units 05/04/23  0712 05/03/23  2146   POC GLUCOSE mg/dl 88 74               Lines/Drains:  Invasive Devices     Peripheral Intravenous Line  Duration           Peripheral IV 05/03/23 Dorsal (posterior); Right Hand <1 day                Telemetry:        * I Have Reviewed All Lab Data Listed Above  Imaging:     No results found for this or any previous visit  No results found for this or any previous visit  *I have reviewed all imaging reports listed above      Recent Cultures (last 7 days):           Last 24 Hours Medication List:   Current Facility-Administered Medications   Medication Dose Route Frequency Provider Last Rate   • acetaminophen  650 mg Oral Q6H PRN Kleber Maher MD     • barium  900 mL Oral Once in imaging Venessa Cates MD     • enoxaparin  30 mg Subcutaneous Daily Venessa Cates MD     • erythromycin  0 5 inch Left Eye BID Venessa Cates MD     • levothyroxine  75 mcg Oral Early Morning Kleber Maher MD     • magnesium hydroxide  30 mL Oral Daily PRN Vandana Bee PA-C     • metoprolol succinate  25 mg Oral BID Kleber Maher MD     • morphine injection  4 mg Intravenous Q3H PRN Aleks Luevano PA-C     • ondansetron  4 mg Intravenous Q6H PRN Venessa Cates MD     • pantoprazole  40 mg Oral Early Morning Kleber Maher MD     • traZODone  50 mg Oral HS Aleks Luevano PA-C          Today, Patient Was Seen By: Gio Devine MD    ** Please Note: Dictation voice to text software may have been used in the creation of this document   **

## 2023-05-04 NOTE — PLAN OF CARE
Problem: Potential for Falls  Goal: Patient will remain free of falls  Description: INTERVENTIONS:  - Educate patient/family on patient safety including physical limitations  - Instruct patient to call for assistance with activity   - Consult OT/PT to assist with strengthening/mobility   - Keep Call bell within reach  - Keep bed low and locked with side rails adjusted as appropriate  - Keep care items and personal belongings within reach  - Initiate and maintain comfort rounds  - Make Fall Risk Sign visible to staff  - Offer Toileting every 2 Hours, in advance of need  - Initiate/Maintain alarms  - Obtain necessary fall risk management equipment:  - Apply yellow socks and bracelet for high fall risk patients  - Consider moving patient to room near nurses station  Outcome: Progressing     Problem: Prexisting or High Potential for Compromised Skin Integrity  Goal: Skin integrity is maintained or improved  Description: INTERVENTIONS:  - Identify patients at risk for skin breakdown  - Assess and monitor skin integrity  - Assess and monitor nutrition and hydration status  - Monitor labs   - Assess for incontinence   - Turn and reposition patient  - Assist with mobility/ambulation  - Relieve pressure over bony prominences  - Avoid friction and shearing  - Provide appropriate hygiene as needed including keeping skin clean and dry  - Evaluate need for skin moisturizer/barrier cream  - Collaborate with interdisciplinary team   - Patient/family teaching  - Consider wound care consult   Outcome: Progressing     Problem: PAIN - ADULT  Goal: Verbalizes/displays adequate comfort level or baseline comfort level  Description: Interventions:  - Encourage patient to monitor pain and request assistance  - Assess pain using appropriate pain scale  - Administer analgesics based on type and severity of pain and evaluate response  - Implement non-pharmacological measures as appropriate and evaluate response  - Consider cultural and social influences on pain and pain management  - Notify physician/advanced practitioner if interventions unsuccessful or patient reports new pain  Outcome: Progressing

## 2023-05-04 NOTE — PHYSICAL THERAPY NOTE
"Physical Therapy Treatment Session Note    Patient's Name: Lucina Lazo    Admitting Diagnosis  Diverticulitis [K57 92]  Abdominal pain [R10 9]    Problem List  Patient Active Problem List   Diagnosis    Acute kidney injury ([facility] )    Diarrhea of presumed infectious origin    Primary hypertension    Acquired hypothyroidism    Irritable bowel syndrome with diarrhea    Abnormal CT scan    Superior mesenteric artery stenosis ([facility])    Left lower quadrant abdominal pain    Hyponatremia    Hypomagnesemia    PAF (paroxysmal atrial fibrillation) ([facility])    Hx of CABG    Anemia       Past Medical History  Past Medical History:   Diagnosis Date    Anemia     Atrial fibrillation ([facility] )     Depression     GI (gastrointestinal bleed)     Ischemic colitis [facility])        Past Surgical History  Past Surgical History:   Procedure Laterality Date    APPENDECTOMY      CARDIAC SURGERY      CHOLECYSTECTOMY      HIP SURGERY Right     Partial replacement    HYSTERECTOMY          05/04/23 0949   PT Last Visit   PT Visit Date 05/04/23   Note Type   Note Type Treatment   Pain Assessment   Pain Assessment Tool 0-10   Pain Score No Pain  (denies)   Restrictions/Precautions   Weight Bearing Precautions Per Order No   Other Precautions Cognitive; Bed Alarm; Fall Risk   General   Chart Reviewed Yes   Response to Previous Treatment Patient unable to report, no changes reported from family or staff   Family/Caregiver Present No   Cognition   Overall Cognitive Status Impaired   Arousal/Participation Alert; Cooperative   Attention Attends with cues to redirect   Orientation Level Oriented to person;Oriented to place   Memory Decreased short term memory   Following Commands Follows one step commands with increased time or repetition   Comments Cori Medina was agreeable to PT treatment session     Subjective   Subjective \"they're under the bed making me go to the bathroom\"   Bed Mobility   Supine to Sit 5  Supervision   Additional items Assist x 1;HOB " elevated; Bedrails;Verbal cues   Sit to Supine 5  Supervision   Additional items Assist x 1;HOB elevated; Bedrails;Verbal cues   Additional Comments Verbal cues for bedrail utilization and proper body mechanics  Transfers   Sit to Stand 5  Supervision   Additional items Assist x 1;Bedrails; Increased time required;Verbal cues  (RW utilization)   Stand to Sit 5  Supervision   Additional items Assist x 1;Bedrails; Increased time required;Verbal cues   Stand pivot 5  Supervision   Additional items Assist x 1; Increased time required;Verbal cues   Additional Comments Verbal cues for environmental awareness with transitional movements and proper BUE placement  Ambulation/Elevation   Gait pattern Improper Weight shift;Narrow CARLEE; Forward Flexion; Short stride   Gait Assistance 5  Supervision  (close)   Additional items Assist x 1;Verbal cues   Assistive Device Rolling walker   Distance 35 feet  (x 2 trials)   Ambulation/Elevation Additional Comments Verbal cues for base of support widening and safety while turning  Stool incontinence noted with ambulatory tasks  Appropriate skin care complete upon gait activity conclusion  Balance   Static Sitting Normal   Dynamic Sitting Good   Static Standing Fair +   Dynamic Standing Fair   Ambulatory Fair   Endurance Deficit   Endurance Deficit Yes   Activity Tolerance   Activity Tolerance Patient tolerated treatment well   Medical Staff Made Aware Yes, [facility] general surgery PA-C was informed of d/c disposition recommendation  Nurse Made Aware Yes, nursing staff was informed of treatment outcome  Assessment   Prognosis Good   Problem List Decreased strength;Decreased endurance; Impaired balance;Decreased mobility; Decreased cognition   Assessment Pt seen for PT treatment session this date with interventions consisting of gait training to reduce the risk of medical complications and advance toward previous level of function w/ emphasis on improving pt's ability to ambulate level surfaces x 35 feet x 2 trials with close S provided by therapist with RW  Pt agreeable to PT treatment session upon arrival, pt found supine in bed w/ HOB elevated, A&O x 2  In comparison to previous session, pt with improvements in ambulatory distance  Post session: pt returned BTB, bed alarm engaged, all needs in reach and RN notified of session findings/recommendations  Continue to recommend return to facility with rehabilitation services at time of d/c in order to maximize pt's functional independence and safety w/ mobility  Pt continues to be functioning below baseline level, and remains limited 2* factors listed above and including gait deviations, impaired balance,impaired cognition  PT will continue to see pt during current hospitalization in order to address the deficits listed above and provide interventions consistent w/ POC in effort to achieve LTGs  Goals   Patient Goals to watch tv   LTG Expiration Date 05/11/23   Long Term Goal #1 LTGs remain appropriate   PT Treatment Day 1   Plan   Treatment/Interventions Functional transfer training; Endurance training;Cognitive reorientation;Patient/family training;Equipment eval/education; Bed mobility;Gait training;Spoke to nursing;Spoke to advanced practitioner   Progress Progressing toward goals   PT Frequency 3-5x/wk   Recommendation   PT Discharge Recommendation Return to facility with rehabilitation services   Equipment Recommended [facility] Recommended Wheeled walker   Change/add to [employer]? No   Additional Comments Upon conclusion, Joaquín Chow was resting in bed  The bed alarm was engaged     AM-PAC Basic Mobility Inpatient   Turning in Flat Bed Without Bedrails 3   Lying on Back to Sitting on Edge of Flat Bed Without Bedrails 3   Moving Bed to Chair 3   Standing Up From Chair Using Arms 3   Walk in Room 3   Climb 3-5 Stairs With Railing 3   Basic Mobility Inpatient Raw Score 18   Basic Mobility Standardized Score 41 05   Highest Level Of Mobility   JH-HLM Goal 6: Walk 10 steps or more   JH-HLM Achieved 7: Walk 25 feet or more     Treatment Time: 4312-4999    Senia Stanford, PT

## 2023-05-04 NOTE — ASSESSMENT & PLAN NOTE
• Avoid hypotension, nephrotoxins, and NSAIDS if possible    • Hold further IV lasix  Nephrology has discontinued this morning  • Suspect etiology related to over-diuresis     • Baseline Cr 1  • Recheck Cr in the AM

## 2023-05-04 NOTE — ASSESSMENT & PLAN NOTE
· Blood pressure currently stable  · Continue home Toprol  · Hold losartan d/t ALEJANDRA  Hold further furosemide

## 2023-05-04 NOTE — ASSESSMENT & PLAN NOTE
· Continue management per surgical team   ·  s/p flex sig with biopsies taken     · Currently on clear liquid diet

## 2023-05-04 NOTE — PLAN OF CARE
Problem: Potential for Falls  Goal: Patient will remain free of falls  Description: INTERVENTIONS:  - Educate patient/family on patient safety including physical limitations  - Instruct patient to call for assistance with activity   - Consult OT/PT to assist with strengthening/mobility   - Keep Call bell within reach  - Keep bed low and locked with side rails adjusted as appropriate  - Keep care items and personal belongings within reach  - Initiate and maintain comfort rounds  - Make Fall Risk Sign visible to staff  - Offer Toileting every 2 Hours, in advance of need  - Initiate/Maintain alarms  - Obtain necessary fall risk management equipment:  - Apply yellow socks and bracelet for high fall risk patients  - Consider moving patient to room near nurses station  Outcome: Progressing     Problem: PAIN - ADULT  Goal: Verbalizes/displays adequate comfort level or baseline comfort level  Description: Interventions:  - Encourage patient to monitor pain and request assistance  - Assess pain using appropriate pain scale  - Administer analgesics based on type and severity of pain and evaluate response  - Implement non-pharmacological measures as appropriate and evaluate response  - Consider cultural and social influences on pain and pain management  - Notify physician/advanced practitioner if interventions unsuccessful or patient reports new pain  Outcome: Progressing     Problem: SAFETY ADULT  Goal: Patient will remain free of falls  Description: INTERVENTIONS:  - Educate patient/family on patient safety including physical limitations  - Instruct patient to call for assistance with activity   - Consult OT/PT to assist with strengthening/mobility   - Keep Call bell within reach  - Keep bed low and locked with side rails adjusted as appropriate  - Keep care items and personal belongings within reach  - Initiate and maintain comfort rounds  - Make Fall Risk Sign visible to staff  - Offer Toileting every 2 Hours, in advance of need  - Initiate/Maintain alarms  - Obtain necessary fall risk management equipment:  - Apply yellow socks and bracelet for high fall risk patients  - Consider moving patient to room near nurses station  Outcome: Progressing  Goal: Maintain or return to baseline ADL function  Description: INTERVENTIONS:  - Educate patient/family on patient safety including physical limitations  - Instruct patient to call for assistance with activity   - Consult OT/PT to assist with strengthening/mobility   - Keep Call bell within reach  - Keep bed low and locked with side rails adjusted as appropriate  - Keep care items and personal belongings within reach  - Initiate and maintain comfort rounds  - Make Fall Risk Sign visible to staff  - Offer Toileting every 2 Hours, in advance of need  - Initiate/Maintain alarms  - Obtain necessary fall risk management equipment:   - Apply yellow socks and bracelet for high fall risk patients  - Consider moving patient to room near nurses station  Outcome: Progressing  Goal: Maintains/Returns to pre admission functional level  Description: INTERVENTIONS:  - Perform BMAT or MOVE assessment daily    - Set and communicate daily mobility goal to care team and patient/family/caregiver     - Collaborate with rehabilitation services on mobility goals if consulted  - Out of bed for toileting  - Record patient progress and toleration of activity level   Outcome: Progressing     Problem: INFECTION - ADULT  Goal: Absence or prevention of progression during hospitalization  Description: INTERVENTIONS:  - Assess and monitor for signs and symptoms of infection  - Monitor lab/diagnostic results  - Monitor all insertion sites, i e  indwelling lines, tubes, and drains  - Monitor endotracheal if appropriate and nasal secretions for changes in amount and color  - [place] appropriate cooling/warming therapies per order  - Administer medications as ordered  - Instruct and encourage patient and family to use good hand hygiene technique  - Identify and instruct in appropriate isolation precautions for identified infection/condition  Outcome: Progressing     Problem: DISCHARGE PLANNING  Goal: Discharge to home or other facility with appropriate resources  Description: INTERVENTIONS:  - Identify barriers to discharge w/patient and caregiver  - Arrange for needed discharge resources and transportation as appropriate  - Identify discharge learning needs (meds, wound care, etc )  - Refer to Case Management Department for coordinating discharge planning if the patient needs post-hospital services based on physician/advanced practitioner order or complex needs related to functional status, cognitive ability, or social support system  Outcome: Progressing     Problem: Knowledge Deficit  Goal: Patient/family/caregiver demonstrates understanding of disease process, treatment plan, medications, and discharge instructions  Description: Complete learning assessment and assess knowledge base    Interventions:  - Provide teaching at level of understanding  - Provide teaching via preferred learning methods  Outcome: Progressing     Problem: Prexisting or High Potential for Compromised Skin Integrity  Goal: Skin integrity is maintained or improved  Description: INTERVENTIONS:  - Identify patients at risk for skin breakdown  - Assess and monitor skin integrity  - Assess and monitor nutrition and hydration status  - Monitor labs   - Assess for incontinence   - Turn and reposition patient  - Assist with mobility/ambulation  - Relieve pressure over bony prominences  - Avoid friction and shearing  - Provide appropriate hygiene as needed including keeping skin clean and dry  - Evaluate need for skin moisturizer/barrier cream  - Collaborate with interdisciplinary team   - Patient/family teaching  - Consider wound care consult   Outcome: Progressing     Problem: MOBILITY - ADULT  Goal: Maintain or return to baseline ADL function  Description: INTERVENTIONS:  - Educate patient/family on patient safety including physical limitations  - Instruct patient to call for assistance with activity   - Consult OT/PT to assist with strengthening/mobility   - Keep Call bell within reach  - Keep bed low and locked with side rails adjusted as appropriate  - Keep care items and personal belongings within reach  - Initiate and maintain comfort rounds  - Make Fall Risk Sign visible to staff  - Offer Toileting every 2 Hours, in advance of need  - Initiate/Maintain alarms  - Obtain necessary fall risk management equipment:   - Apply yellow socks and bracelet for high fall risk patients  - Consider moving patient to room near nurses station  Outcome: Progressing  Goal: Maintains/Returns to pre admission functional level  Description: INTERVENTIONS:  - Perform BMAT or MOVE assessment daily    - Set and communicate daily mobility goal to care team and patient/family/caregiver     - Collaborate with rehabilitation services on mobility goals if consulted  - Out of bed for toileting  - Record patient progress and toleration of activity level   Outcome: Progressing

## 2023-05-04 NOTE — OCCUPATIONAL THERAPY NOTE
"   05/04/23 1141   OT Last Visit   OT Visit Date 05/04/23   Note Type   Note Type Treatment  (completed [id number])   Pain Assessment   Pain Assessment Tool 0-10   Pain Score No Pain   Restrictions/Precautions   Weight Bearing Precautions Per Order No   Other Precautions Cognitive; Bed Alarm; Fall Risk   Lifestyle   Intrinsic Gratification bingo, arts and crafts, and enjoys entertainers at [facility]   ADL   Where Assessed Other (Comment)  (sitting in bed (back supported))   Grooming Comments patient brushed teeth And combed hair with good results, post set-up   UB Bathing Assistance 5  Supervision/Setup   UB Bathing Deficit Setup;Supervision/safety; Increased time to complete   LB Bathing Assistance 3  Moderate Assistance   LB Bathing Deficit Setup;Verbal cueing;Supervision/safety; Increased time to complete;Right lower leg including foot; Left lower leg including foot   LB Bathing Comments patient deferred perianal areas as she was recently cleaned post episode of diarrhea   UB Dressing Assistance 4  Minimal Assistance   UB Dressing Comments *hospital gown management   LB Dressing Comments not tested this session   Bed Mobility   Additional Comments patient able to reposition/boost buttocks back toward head of bed for improved/upright sitting   Coordination   Gross Motor WFL   Dexterity appears WFL   Subjective   Subjective \"I get PT and OT 2X a week - different days\"   Cognition   Overall Cognitive Status [facility]   Arousal/Participation Alert; Cooperative   Orientation Level Oriented X4   Following Commands Follows one step commands with increased time or repetition   Activity Tolerance   Activity Tolerance Patient tolerated treatment well  (*Motivated for treatment / self-sufficiency)   Medical Staff [address] aware of session   Assessment   Assessment Patient participated in Skilled OT session this date with interventions consisting of ADL re training with the use of correct body mechnaics    Patient agreeable to " OT treatment session, upon arrival patient was found seated in bed (back supported)  Patient requiring occasional rest periods and self-care assistance as noted in flow sheet  Patient continues to be functioning below baseline level, occupational performance remains limited secondary to factors listed above and increased risk for falls and injury  From OT standpoint, recommendation at time of d/c would be return to facility with rehabilitation services  Patient to benefit from continued Occupational Therapy treatment while in the hospital to address deficits as defined above and maximize level of functional independence with ADLs and functional mobility  Plan   Treatment Interventions ADL retraining;Patient/family training; Activityengagement   Goal Expiration Date 05/08/23   OT Treatment Day 1   Recommendation   OT Discharge Recommendation Return to facility with rehabilitation services   Additional Comments 2 The patient's raw score on the AM-PAC Daily Activity Inpatient Short Form is 20  A raw score of greater than or equal to 19 suggests the patient may benefit from discharge to home  Please refer to the recommendation of the Occupational Therapist for safe discharge planning     AM-PAC Daily Activity Inpatient   Lower Body Dressing 3   Bathing 3   Toileting 3   Upper Body Dressing 3   Grooming 4   Eating 4   Daily Activity Raw Score 20   Daily Activity Standardized Score (Calc for Raw Score >=11) 42 03   AM-PAC Applied Cognition Inpatient   Following a Speech/Presentation 4   Understanding Ordinary Conversation 4   Taking Medications 4   Remembering Where Things Are Placed or Put Away 4   Remembering List of 4-5 Errands 4   Taking Care of Complicated Tasks 4   Applied Cognition Raw Score 24   Applied Cognition Standardized Score 62 21     KAREN Rogers/ADRIA

## 2023-05-04 NOTE — PLAN OF CARE
Problem: OCCUPATIONAL THERAPY ADULT  Goal: Performs self-care activities at highest level of function for planned discharge setting  See evaluation for individualized goals  Description: Treatment Interventions: ADL retraining, Functional transfer training, Endurance training, Patient/family training, Compensatory technique education, Energy conservation, Activityengagement    See flowsheet documentation for full assessment, interventions and recommendations  Outcome: Progressing  Note: Limitation: Decreased ADL status, Decreased Safe judgement during ADL, Decreased endurance, Decreased self-care trans, Decreased high-level ADLs  Prognosis: Good  Assessment: Patient participated in Skilled OT session this date with interventions consisting of ADL re training with the use of correct body mechnaics   Patient agreeable to OT treatment session, upon arrival patient was found seated in bed (back supported)  Patient requiring occasional rest periods and self-care assistance as noted in flow sheet  Patient continues to be functioning below baseline level, occupational performance remains limited secondary to factors listed above and increased risk for falls and injury  From OT standpoint, recommendation at time of d/c would be return to facility with rehabilitation services  Patient to benefit from continued Occupational Therapy treatment while in the hospital to address deficits as defined above and maximize level of functional independence with ADLs and functional mobility       OT Discharge Recommendation: Return to facility with rehabilitation services   [facility]/L

## 2023-05-04 NOTE — PROGRESS NOTES
"Progress Note - General Surgery   Rebahimahendra Armstrong 69 y o  female MRN: [id number]  Unit/Bed#: -01 Encounter: [id number]    Assessment:  69year old female with PMH significant for Afib, diverticulitis and ischemic colitis presents for the evaluation of persistent LLQ abdominal pain of unclear etiology  · Afebrile, episodes of hypotension   · Hyponatremia, 125 (125)  · Cr 2 10 (1 85)  · WBC 16 21 (15 5)  · Hgb 10 6  · Platelets 78 (482)  · CT scan showed findings suggesting cirrhosis, pancreatic cysts, SMA stenosis >70%  · Flexible sigmoidoscopy results 5/3: Mild abnormal mucosa with erosion in the rectum; performed cold forceps biopsy    Plan:  · Worsening ALEJANDRA and persistent hyponatremia, appreciate nephrology recommendations   · Clear liquid diet with Ensures  · Continue work with PT/OT, appreciate recs  · Appreciate GI recommendations   · Analgesia and antiemetics prn   · Evaluated at bedside by attending     Subjective/Objective   Subjective: Patient still complains of back pain and slight abdominal pain  Reports nausea last night, but no vomiting  Minimal appetite  Voiding with no issues  Last bowel movement this morning  L eye symptoms improved  Objective:   Blood pressure (!) 120/43, pulse 66, temperature 97 6 °F (36 4 °C), temperature source Oral, resp  rate 17, height 4' 11\" (1 499 m), weight 53 8 kg (118 lb 9 7 oz), SpO2 93 %  ,Body mass index is 23 96 kg/m²  Intake/Output Summary (Last 24 hours) at 5/4/2023 0916  Last data filed at 5/3/2023 2101  Gross per 24 hour   Intake 320 ml   Output --   Net 320 ml       Invasive Devices     Peripheral Intravenous Line  Duration           Peripheral IV 05/03/23 Dorsal (posterior); Right Hand <1 day                Physical Exam  Vitals reviewed  Constitutional:       Appearance: She is normal weight  HENT:      Head: Normocephalic and atraumatic  Eyes:      General:         Right eye: No discharge  Left eye: No discharge        " Conjunctiva/sclera: Conjunctivae normal       Comments: No erythema of L eye   Cardiovascular:      Rate and Rhythm: Normal rate  Pulmonary:      Effort: Pulmonary effort is normal  No respiratory distress  Abdominal:      General: Bowel sounds are normal  There is no distension  Palpations: Abdomen is soft  Tenderness: There is abdominal tenderness in the left lower quadrant  There is no guarding  Musculoskeletal:      Right lower leg: Pitting Edema present  Left lower leg: Pitting Edema present  Skin:     General: Skin is warm and dry  Neurological:      General: No focal deficit present  Mental Status: She is alert  Mental status is at baseline  Psychiatric:         Mood and Affect: Mood normal          Behavior: Behavior normal          Lab, Imaging and other studies:  I have personally reviewed pertinent lab results    , CBC:   Lab Results   Component Value Date    WBC 16 21 (H) 05/04/2023    HGB 10 6 (L) 05/04/2023    HCT 31 6 (L) 05/04/2023     (H) 05/04/2023    PLT 78 (L) 05/04/2023    MCH 34 2 05/04/2023    MCHC 33 5 05/04/2023    RDW 14 8 05/04/2023    MPV 11 5 05/04/2023    NRBC 0 05/04/2023   , CMP:   Lab Results   Component Value Date    SODIUM 125 (L) 05/04/2023    K 3 5 05/04/2023    CL 95 (L) 05/04/2023    CO2 21 05/04/2023    BUN 36 (H) 05/04/2023    CREATININE 2 10 (H) 05/04/2023    CALCIUM 8 2 (L) 05/04/2023    EGFR 22 05/04/2023     VTE Pharmacologic Prophylaxis: Enoxaparin (Lovenox)  VTE Mechanical Prophylaxis: sequential compression device    Cayla Chow PA-C

## 2023-05-05 ENCOUNTER — APPOINTMENT (INPATIENT)
Dept: NON INVASIVE DIAGNOSTICS | Facility: HOSPITAL | Age: 78
DRG: 385 | End: 2023-05-05
Payer: MEDICARE

## 2023-05-05 ENCOUNTER — APPOINTMENT (INPATIENT)
Dept: RADIOLOGY | Facility: HOSPITAL | Age: 78
DRG: 385 | End: 2023-05-05
Payer: MEDICARE

## 2023-05-05 LAB
ALBUMIN SERPL BCP-MCNC: 2.5 G/DL (ref 3.5–5)
ALP SERPL-CCNC: 322 U/L (ref 34–104)
ALT SERPL W P-5'-P-CCNC: 22 U/L (ref 7–52)
ANION GAP SERPL CALCULATED.3IONS-SCNC: 10 MMOL/L (ref 4–13)
AORTIC ROOT: 2.3 CM
AORTIC VALVE MEAN VELOCITY: 14.9 M/S
APICAL FOUR CHAMBER EJECTION FRACTION: 71 %
ASCENDING AORTA: 2.3 CM
AST SERPL W P-5'-P-CCNC: 59 U/L (ref 13–39)
ATRIAL RATE: 59 BPM
AV AREA BY CONTINUOUS VTI: 1.2 CM2
AV AREA PEAK VELOCITY: 1.1 CM2
AV LVOT MEAN GRADIENT: 2 MMHG
AV LVOT PEAK GRADIENT: 4 MMHG
AV MEAN GRADIENT: 10 MMHG
AV PEAK GRADIENT: 21 MMHG
AV REGURGITATION PRESSURE HALF TIME: 399 MS
AV VALVE AREA: 1.23 CM2
AV VELOCITY RATIO: 0.45
BASOPHILS # BLD AUTO: 0.02 THOUSANDS/ÂΜL (ref 0–0.1)
BASOPHILS NFR BLD AUTO: 0 % (ref 0–1)
BILIRUB SERPL-MCNC: 1.8 MG/DL (ref 0.2–1)
BUN SERPL-MCNC: 40 MG/DL (ref 5–25)
CALCIUM ALBUM COR SERPL-MCNC: 8.8 MG/DL (ref 8.3–10.1)
CALCIUM SERPL-MCNC: 7.6 MG/DL (ref 8.4–10.2)
CHLORIDE SERPL-SCNC: 93 MMOL/L (ref 96–108)
CO2 SERPL-SCNC: 20 MMOL/L (ref 21–32)
CREAT SERPL-MCNC: 2.05 MG/DL (ref 0.6–1.3)
CREAT UR-MCNC: 104 MG/DL
DOP CALC AO PEAK VEL: 2.29 M/S
DOP CALC AO VTI: 60.73 CM
DOP CALC LVOT AREA: 2.54 CM2
DOP CALC LVOT DIAMETER: 1.8 CM
DOP CALC LVOT PEAK VEL VTI: 29.29 CM
DOP CALC LVOT PEAK VEL: 1.02 M/S
DOP CALC LVOT STROKE INDEX: 50.7 ML/M2
DOP CALC LVOT STROKE VOLUME: 74.5 CM3
E WAVE DECELERATION TIME: 278 MS
EOSINOPHIL # BLD AUTO: 0.13 THOUSAND/ÂΜL (ref 0–0.61)
EOSINOPHIL NFR BLD AUTO: 1 % (ref 0–6)
ERYTHROCYTE [DISTWIDTH] IN BLOOD BY AUTOMATED COUNT: 14.6 % (ref 11.6–15.1)
FRACTIONAL SHORTENING: 36 % (ref 28–44)
GFR SERPL CREATININE-BSD FRML MDRD: 23 ML/MIN/1.73SQ M
GLUCOSE SERPL-MCNC: 125 MG/DL (ref 65–140)
GLUCOSE SERPL-MCNC: 83 MG/DL (ref 65–140)
GLUCOSE SERPL-MCNC: 85 MG/DL (ref 65–140)
GLUCOSE SERPL-MCNC: 97 MG/DL (ref 65–140)
HCT VFR BLD AUTO: 27.4 % (ref 34.8–46.1)
HGB BLD-MCNC: 9.6 G/DL (ref 11.5–15.4)
IMM GRANULOCYTES # BLD AUTO: 0.03 THOUSAND/UL (ref 0–0.2)
IMM GRANULOCYTES NFR BLD AUTO: 0 % (ref 0–2)
INTERVENTRICULAR SEPTUM IN DIASTOLE (PARASTERNAL SHORT AXIS VIEW): 0.9 CM
INTERVENTRICULAR SEPTUM: 0.9 CM (ref 0.6–1.1)
IVC: 18 MM
LAAS-AP2: 22.4 CM2
LAAS-AP4: 14.9 CM2
LACTATE SERPL-SCNC: 1.3 MMOL/L (ref 0.5–2)
LEFT ATRIUM SIZE: 4 CM
LEFT INTERNAL DIMENSION IN SYSTOLE: 2.5 CM (ref 2.1–4)
LEFT VENTRICULAR INTERNAL DIMENSION IN DIASTOLE: 3.9 CM (ref 3.5–6)
LEFT VENTRICULAR POSTERIOR WALL IN END DIASTOLE: 0.9 CM
LEFT VENTRICULAR STROKE VOLUME: 43 ML
LVSV (TEICH): 43 ML
LYMPHOCYTES # BLD AUTO: 1.01 THOUSANDS/ÂΜL (ref 0.6–4.47)
LYMPHOCYTES NFR BLD AUTO: 11 % (ref 14–44)
MAGNESIUM SERPL-MCNC: 1.8 MG/DL (ref 1.9–2.7)
MCH RBC QN AUTO: 34.7 PG (ref 26.8–34.3)
MCHC RBC AUTO-ENTMCNC: 35 G/DL (ref 31.4–37.4)
MCV RBC AUTO: 99 FL (ref 82–98)
MONOCYTES # BLD AUTO: 1.7 THOUSAND/ÂΜL (ref 0.17–1.22)
MONOCYTES NFR BLD AUTO: 19 % (ref 4–12)
MV E'TISSUE VEL-SEP: 7 CM/S
MV PEAK A VEL: 0.81 M/S
MV PEAK E VEL: 91 CM/S
NEUTROPHILS # BLD AUTO: 6.28 THOUSANDS/ÂΜL (ref 1.85–7.62)
NEUTS SEG NFR BLD AUTO: 69 % (ref 43–75)
NRBC BLD AUTO-RTO: 0 /100 WBCS
OSMOLALITY UR: 324 MMOL/KG
P AXIS: 43 DEGREES
PLATELET # BLD AUTO: 62 THOUSANDS/UL (ref 149–390)
PMV BLD AUTO: 11.1 FL (ref 8.9–12.7)
POTASSIUM SERPL-SCNC: 3.2 MMOL/L (ref 3.5–5.3)
PR INTERVAL: 202 MS
PROT SERPL-MCNC: 6 G/DL (ref 6.4–8.4)
QRS AXIS: 60 DEGREES
QRSD INTERVAL: 90 MS
QT INTERVAL: 460 MS
QTC INTERVAL: 455 MS
RBC # BLD AUTO: 2.77 MILLION/UL (ref 3.81–5.12)
RIGHT ATRIUM AREA SYSTOLE A4C: 15.5 CM2
RIGHT VENTRICLE ID DIMENSION: 3.6 CM
SL CV AV DECELERATION TIME RETROGRADE: 1377 MS
SL CV AV PEAK GRADIENT RETROGRADE: 43 MMHG
SL CV LEFT ATRIUM LENGTH A2C: 4.9 CM
SL CV LV EF: 66
SL CV PED ECHO LEFT VENTRICLE DIASTOLIC VOLUME (MOD BIPLANE) 2D: 64 ML
SL CV PED ECHO LEFT VENTRICLE SYSTOLIC VOLUME (MOD BIPLANE) 2D: 22 ML
SODIUM 24H UR-SCNC: <5 MOL/L
SODIUM SERPL-SCNC: 123 MMOL/L (ref 135–147)
T WAVE AXIS: 79 DEGREES
TR MAX PG: 20 MMHG
TR PEAK VELOCITY: 2.3 M/S
TRICUSPID ANNULAR PLANE SYSTOLIC EXCURSION: 2.1 CM
TRICUSPID VALVE PEAK REGURGITATION VELOCITY: 2.26 M/S
VENTRICULAR RATE: 59 BPM
WBC # BLD AUTO: 9.17 THOUSAND/UL (ref 4.31–10.16)

## 2023-05-05 PROCEDURE — 99232 SBSQ HOSP IP/OBS MODERATE 35: CPT | Performed by: NURSE PRACTITIONER

## 2023-05-05 PROCEDURE — 83735 ASSAY OF MAGNESIUM: CPT | Performed by: NURSE PRACTITIONER

## 2023-05-05 PROCEDURE — 99233 SBSQ HOSP IP/OBS HIGH 50: CPT | Performed by: STUDENT IN AN ORGANIZED HEALTH CARE EDUCATION/TRAINING PROGRAM

## 2023-05-05 PROCEDURE — 83605 ASSAY OF LACTIC ACID: CPT | Performed by: PHYSICIAN ASSISTANT

## 2023-05-05 PROCEDURE — 84300 ASSAY OF URINE SODIUM: CPT | Performed by: INTERNAL MEDICINE

## 2023-05-05 PROCEDURE — 93010 ELECTROCARDIOGRAM REPORT: CPT | Performed by: INTERNAL MEDICINE

## 2023-05-05 PROCEDURE — 71045 X-RAY EXAM CHEST 1 VIEW: CPT

## 2023-05-05 PROCEDURE — 83935 ASSAY OF URINE OSMOLALITY: CPT | Performed by: NURSE PRACTITIONER

## 2023-05-05 PROCEDURE — 93306 TTE W/DOPPLER COMPLETE: CPT

## 2023-05-05 PROCEDURE — 82948 REAGENT STRIP/BLOOD GLUCOSE: CPT

## 2023-05-05 PROCEDURE — 74018 RADEX ABDOMEN 1 VIEW: CPT

## 2023-05-05 PROCEDURE — 99232 SBSQ HOSP IP/OBS MODERATE 35: CPT | Performed by: SPECIALIST

## 2023-05-05 PROCEDURE — 93306 TTE W/DOPPLER COMPLETE: CPT | Performed by: INTERNAL MEDICINE

## 2023-05-05 PROCEDURE — 93005 ELECTROCARDIOGRAM TRACING: CPT

## 2023-05-05 PROCEDURE — 99232 SBSQ HOSP IP/OBS MODERATE 35: CPT | Performed by: INTERNAL MEDICINE

## 2023-05-05 PROCEDURE — 82570 ASSAY OF URINE CREATININE: CPT | Performed by: INTERNAL MEDICINE

## 2023-05-05 PROCEDURE — 80053 COMPREHEN METABOLIC PANEL: CPT | Performed by: PHYSICIAN ASSISTANT

## 2023-05-05 PROCEDURE — 85025 COMPLETE CBC W/AUTO DIFF WBC: CPT | Performed by: PHYSICIAN ASSISTANT

## 2023-05-05 RX ORDER — HEPARIN SODIUM 5000 [USP'U]/ML
5000 INJECTION, SOLUTION INTRAVENOUS; SUBCUTANEOUS EVERY 8 HOURS SCHEDULED
Status: DISCONTINUED | OUTPATIENT
Start: 2023-05-06 | End: 2023-05-08

## 2023-05-05 RX ORDER — METOPROLOL SUCCINATE 25 MG/1
12.5 TABLET, EXTENDED RELEASE ORAL 2 TIMES DAILY
Status: DISCONTINUED | OUTPATIENT
Start: 2023-05-05 | End: 2023-05-05

## 2023-05-05 RX ORDER — OCTREOTIDE ACETATE 100 UG/ML
100 INJECTION, SOLUTION INTRAVENOUS; SUBCUTANEOUS EVERY 8 HOURS SCHEDULED
Status: DISCONTINUED | OUTPATIENT
Start: 2023-05-05 | End: 2023-05-19

## 2023-05-05 RX ORDER — POTASSIUM CHLORIDE 20 MEQ/1
20 TABLET, EXTENDED RELEASE ORAL EVERY 4 HOURS
Status: COMPLETED | OUTPATIENT
Start: 2023-05-05 | End: 2023-05-05

## 2023-05-05 RX ORDER — MIDODRINE HYDROCHLORIDE 5 MG/1
5 TABLET ORAL
Status: DISCONTINUED | OUTPATIENT
Start: 2023-05-05 | End: 2023-05-11

## 2023-05-05 RX ORDER — DICYCLOMINE HYDROCHLORIDE 10 MG/1
10 CAPSULE ORAL 3 TIMES DAILY PRN
Status: DISCONTINUED | OUTPATIENT
Start: 2023-05-05 | End: 2023-05-23 | Stop reason: HOSPADM

## 2023-05-05 RX ORDER — MIDODRINE HYDROCHLORIDE 5 MG/1
2.5 TABLET ORAL
Status: DISCONTINUED | OUTPATIENT
Start: 2023-05-05 | End: 2023-05-05

## 2023-05-05 RX ORDER — MAGNESIUM SULFATE 1 G/100ML
1 INJECTION INTRAVENOUS ONCE
Status: COMPLETED | OUTPATIENT
Start: 2023-05-05 | End: 2023-05-05

## 2023-05-05 RX ORDER — ALBUMIN (HUMAN) 12.5 G/50ML
12.5 SOLUTION INTRAVENOUS EVERY 6 HOURS
Status: DISCONTINUED | OUTPATIENT
Start: 2023-05-05 | End: 2023-05-10

## 2023-05-05 RX ORDER — POLYETHYLENE GLYCOL 3350 17 G/17G
17 POWDER, FOR SOLUTION ORAL DAILY
Status: DISCONTINUED | OUTPATIENT
Start: 2023-05-05 | End: 2023-05-07

## 2023-05-05 RX ADMIN — POTASSIUM CHLORIDE 20 MEQ: 1500 TABLET, EXTENDED RELEASE ORAL at 17:33

## 2023-05-05 RX ADMIN — LEVOTHYROXINE SODIUM 75 MCG: 75 TABLET ORAL at 05:22

## 2023-05-05 RX ADMIN — OCTREOTIDE ACETATE 100 MCG: 100 INJECTION, SOLUTION INTRAVENOUS; SUBCUTANEOUS at 21:04

## 2023-05-05 RX ADMIN — TRAZODONE HYDROCHLORIDE 50 MG: 50 TABLET ORAL at 21:04

## 2023-05-05 RX ADMIN — POTASSIUM CHLORIDE 20 MEQ: 1500 TABLET, EXTENDED RELEASE ORAL at 09:05

## 2023-05-05 RX ADMIN — MIDODRINE HYDROCHLORIDE 5 MG: 5 TABLET ORAL at 17:33

## 2023-05-05 RX ADMIN — OCTREOTIDE ACETATE 100 MCG: 100 INJECTION, SOLUTION INTRAVENOUS; SUBCUTANEOUS at 14:50

## 2023-05-05 RX ADMIN — ERYTHROMYCIN 0.5 INCH: 5 OINTMENT OPHTHALMIC at 17:34

## 2023-05-05 RX ADMIN — ALBUMIN (HUMAN) 12.5 G: 0.25 INJECTION, SOLUTION INTRAVENOUS at 17:33

## 2023-05-05 RX ADMIN — ENOXAPARIN SODIUM 30 MG: 100 INJECTION SUBCUTANEOUS at 09:05

## 2023-05-05 RX ADMIN — ALBUMIN (HUMAN) 12.5 G: 0.25 INJECTION, SOLUTION INTRAVENOUS at 23:18

## 2023-05-05 RX ADMIN — ALBUMIN (HUMAN) 12.5 G: 2.5 SOLUTION INTRAVENOUS at 00:21

## 2023-05-05 RX ADMIN — ALBUMIN (HUMAN) 12.5 G: 0.25 INJECTION, SOLUTION INTRAVENOUS at 12:23

## 2023-05-05 RX ADMIN — PANTOPRAZOLE SODIUM 40 MG: 40 TABLET, DELAYED RELEASE ORAL at 05:23

## 2023-05-05 RX ADMIN — POTASSIUM CHLORIDE 20 MEQ: 1500 TABLET, EXTENDED RELEASE ORAL at 12:23

## 2023-05-05 RX ADMIN — ACETAMINOPHEN 650 MG: 325 TABLET ORAL at 01:57

## 2023-05-05 RX ADMIN — MAGNESIUM SULFATE IN DEXTROSE 1 G: 10 INJECTION, SOLUTION INTRAVENOUS at 12:23

## 2023-05-05 RX ADMIN — ONDANSETRON 4 MG: 2 INJECTION INTRAMUSCULAR; INTRAVENOUS at 20:08

## 2023-05-05 RX ADMIN — ERYTHROMYCIN 0.5 INCH: 5 OINTMENT OPHTHALMIC at 09:06

## 2023-05-05 RX ADMIN — DICYCLOMINE HYDROCHLORIDE 10 MG: 10 CAPSULE ORAL at 20:08

## 2023-05-05 NOTE — ASSESSMENT & PLAN NOTE
· Currently rate controlled    HR running 50-60's even with Toprol XL held due to BP parameters; hold off Toprol and if needed consider 25 mg QD not BID   · Patient is typically on aspirin, resume when cleared by surgical team

## 2023-05-05 NOTE — ASSESSMENT & PLAN NOTE
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%  Persistent lower abdominal pain, bloating, no appetite, diarrhea, low back pain  Distended on exam with tenderness to touch, worst in LLQ, no guarding  Hypotensive with /38, Temp 97 4, HR 56, RR 18, SpO2 98% on NC  Cr 2 05, Na 123, K 3 2, WBC 9 (16), Plt 62, Mag 1 8, Alk Phos 322, T bili 1 80    A/P by Organ System:  Neuro- tylenol/morphine for pain control  Cardio- CAD/CABG in 2021, murmur on exam and fluid overload, check ECHO, EKG, hypotension, received albumin overnight by SLIM, midodrine trial today per Nephrology, weight 54kg down from 60kg on 5/1  Pulm- hx smoking, no pulmonary disease, on 2L NC suspect 2/2 fluid overload, check CXR  GI- chronic diarrhea, abdominal pain unclear etiology, stool studies negative, s/p flex sig, last CT 5/1, not tolerating much PO and distended on exam, will check a XR abdomen today  Also with non-alcoholic cirrhosis, pancreatic cyst  - I/O not recorded, states she's making small amounts of urine without dysuria  Renal- ALEJANDRA, Renal following  FEN- on fluid restriction per Renal, low sodium, potassium, magnesium, on clear with ensure with limited intake  Heme- hgb 9 6 (10 6), Plt 62  ID- Afebrile, WBC normalized, no acute infectious process ongoing, not currently on antiboitics  Endo- normoglycemic  Msk/Skin- PT/OT  VTE- Lovenox and SCD  Complex medical picture without clear surgical source/process  Will review with Dr Jessica Iverson

## 2023-05-05 NOTE — PROGRESS NOTES
[facility] 46  Progress Note  Name: Jeffry Polk  MRN: [id number]  Unit/Bed#: -01 I Date of Admission: 4/27/2023   Date of Service: 5/5/2023 I Hospital Day: 8    Assessment/Plan   * Left lower quadrant abdominal pain  Assessment & Plan  · Surgery following, no planned/indication for intervention at the moment  · GI following, input appreciated  · S/p flex sig with minimal inflammation, biopsies taken, pending results  · Per GI, 05/05, as needed Bentyl and as needed MiraLAX  · Currently on surgical diet  · Will continue to monitor and revaluate; per Dr Pee Fan, if not improving / worsening LLQ pain, full colonoscopy might be considered for further evaluation pending clinical course     Acute kidney injury [facility])  Assessment & Plan  • Nephrology following, input appreciated   • avoid hypotension, nephrotoxins, and NSAIDS if possible    • Likely due to hepatorenal syndrome  • Chronic to be tight, midodrine and albumin per nephrology  • Diuresis discontinued  • Daily BMP, in and out    Anemia  Assessment & Plan  · Hemoglobin level down to 9 9 today  Patient denies any melena or hematemesis  · Monitor hemoglobin level  · Type and screen  · Transfuse as needed  · GI following    Hx of CABG  Assessment & Plan  · Currently stable  · Hold losartan, lasix d/t ALEJANDRA  PAF (paroxysmal atrial fibrillation) (HCC)  Assessment & Plan  · Currently rate controlled    HR running 50-60's even with Toprol XL held due to BP parameters; hold off Toprol and if needed consider 25 mg QD not BID   · Patient is typically on aspirin, resume when cleared by surgical team    Hypomagnesemia  Assessment & Plan  · Replete as needed  · Nephro following , input appreciated       Hyponatremia  Assessment & Plan  · Nephrology following and managing , input appreciated  · On fluid restrictions     Acquired hypothyroidism  Assessment & Plan  Resume home Synthroid    Primary hypertension  Assessment & Plan  · Nephrology following and ALEJANDRA was suspicious of hepatorenal syndrome  · Blood pressure running soft/low  · Currently on midodrine  · Losartan was discontinued due to ALEJANDRA ; on low dose Toprol XL for Afib   · On toprol XL 25 BID for afib ; rate is 50-60's even with Toprol was not given due to BP parameters on 05/05 ; will monitor off TOPROL and if indicated to restart 25 mg QD not BID              VTE Pharmacologic Prophylaxis: VTE Score: 3 Moderate Risk (Score 3-4) - Pharmacological DVT Prophylaxis Ordered: heparin  Patient Centered Rounds: I performed bedside rounds with nursing staff today  Discussions with Specialists or Other Care Team Provider: communicated with General Surgery & GI team     Education and Discussions with Family / Patient: Patient declined call to   Total Time Spent on Date of Encounter in care of patient: 45 minutes This time was spent on one or more of the following: performing physical exam; counseling and coordination of care; obtaining or reviewing history; documenting in the medical record; reviewing/ordering tests, medications or procedures; communicating with other healthcare professionals and discussing with patient's family/caregivers  Current Length of Stay: 8 day(s)  Current Patient Status: Inpatient   Certification Statement: The patient will continue to require additional inpatient hospital stay due to LLQ pain ; GI and surgery following   Discharge Plan: Anticipate discharge in >72 hrs to rehab facility      Code Status: Level 1 - Full Code    Subjective:   Patient seen and examined  In mild distress due to abdominal pain  For my exam was more generalized pain however worse towards the left lower  Mild to moderately distended abdomen with decreased bowel sounds  Discussed with GI and surgery team  Appreciate input, patient was evaluated by both team today  Surgery team confirmed no anticipated plan for intervention or surgery and attempted to call patient's brother for updates  GI will continue to follow and potential colonoscopy if not improved over weekend  Patient denies any nausea or vomiting, was able to tolerate some diet today surgical with clear liquid  Medicine will continue to follow      Objective:     Vitals:   Temp (24hrs), Av 4 °F (36 3 °C), Min:97 3 °F (36 3 °C), Max:97 4 °F (36 3 °C)    Temp:  [97 3 °F (36 3 °C)-97 4 °F (36 3 °C)] 97 4 °F (36 3 °C)  HR:  [55-65] 58  Resp:  [16-18] 16  BP: ()/(33-56) 145/56  SpO2:  [92 %-99 %] 95 %  Body mass index is 24 04 kg/m²  Input and Output Summary (last 24 hours): Intake/Output Summary (Last 24 hours) at 2023 1620  Last data filed at 2023 1752  Gross per 24 hour   Intake 640 ml   Output --   Net 640 ml       Physical Exam:   Physical Exam  Constitutional:       General: She is in acute distress  Appearance: She is obese  She is ill-appearing  She is not diaphoretic  Comments: In mild distress due to abdominal pain   HENT:      Right Ear: External ear normal       Left Ear: External ear normal       Nose: Nose normal  No congestion or rhinorrhea  Mouth/Throat:      Mouth: Mucous membranes are moist       Pharynx: Oropharynx is clear  No oropharyngeal exudate or posterior oropharyngeal erythema  Eyes:      General: No scleral icterus  Right eye: No discharge  Left eye: No discharge  Extraocular Movements: Extraocular movements intact  Conjunctiva/sclera: Conjunctivae normal       Pupils: Pupils are equal, round, and reactive to light  Cardiovascular:      Rate and Rhythm: Normal rate and regular rhythm  Pulses: Normal pulses  Heart sounds: Normal heart sounds  No murmur heard  Pulmonary:      Effort: Pulmonary effort is normal  No respiratory distress  Breath sounds: Normal breath sounds  No wheezing or rales  Abdominal:      General: Abdomen is flat  There is distension  Palpations: There is no mass  Tenderness:  There is abdominal tenderness  There is no guarding or rebound  Comments: Bowel sounds decreased, mild to moderate distended abdomen, diffuse abdominal tenderness to palpation however more toward the left lower side, no guarding, negative Dougherty sign   Musculoskeletal:      Cervical back: Normal range of motion and neck supple  No rigidity  Right lower leg: Edema present  Left lower leg: Edema present  Comments: Trace bilateral lower extremity edema   Lymphadenopathy:      Cervical: No cervical adenopathy  Skin:     General: Skin is warm  Capillary Refill: Capillary refill takes less than 2 seconds  Coloration: Skin is not jaundiced  Neurological:      General: No focal deficit present  Mental Status: She is oriented to person, place, and time  Cranial Nerves: No cranial nerve deficit  Motor: No weakness  Psychiatric:         Mood and Affect: Mood normal          Behavior: Behavior normal          Thought Content:  Thought content normal          Judgment: Judgment normal             Additional Data:     Labs:  Results from last 7 days   Lab Units 05/05/23  0132   WBC Thousand/uL 9 17   HEMOGLOBIN g/dL 9 6*   HEMATOCRIT % 27 4*   PLATELETS Thousands/uL 62*   NEUTROS PCT % 69   LYMPHS PCT % 11*   MONOS PCT % 19*   EOS PCT % 1     Results from last 7 days   Lab Units 05/05/23  0132   SODIUM mmol/L 123*   POTASSIUM mmol/L 3 2*   CHLORIDE mmol/L 93*   CO2 mmol/L 20*   BUN mg/dL 40*   CREATININE mg/dL 2 05*   ANION GAP mmol/L 10   CALCIUM mg/dL 7 6*   ALBUMIN g/dL 2 5*   TOTAL BILIRUBIN mg/dL 1 80*   ALK PHOS U/L 322*   ALT U/L 22   AST U/L 59*   GLUCOSE RANDOM mg/dL 83         Results from last 7 days   Lab Units 05/05/23  1123 05/05/23  0725 05/04/23  2023 05/04/23  1541 05/04/23  1100 05/04/23  0712 05/03/23  2146   POC GLUCOSE mg/dl 125 85 103 101 78 88 74         Results from last 7 days   Lab Units 05/05/23  0132   LACTIC ACID mmol/L 1 3       Lines/Drains:  Invasive Devices Peripheral Intravenous Line  Duration           Peripheral IV 05/03/23 Dorsal (posterior); Right Hand 2 days    Peripheral IV 05/05/23 Dorsal (posterior); Left Hand <1 day                      Imaging: Reviewed radiology reports from this admission including: chest xray    Recent Cultures (last 7 days):         Last 24 Hours Medication List:   Current Facility-Administered Medications   Medication Dose Route Frequency Provider Last Rate   • acetaminophen  650 mg Oral Q6H PRN Bright Lake MD     • Albumin 25%  12 5 g Intravenous Q6H Media , CRNP     • barium  900 mL Oral Once in imaging John Carter MD     • dicyclomine  10 mg Oral TID PRN Ainsley Tate MD     • erythromycin  0 5 inch Left Eye BID John Carter MD     • [START ON 5/6/2023] heparin (porcine)  5,000 Units Subcutaneous Q8H [address] [facility], DO     • levothyroxine  75 mcg Oral Early Morning Bright Lake MD     • magnesium hydroxide  30 mL Oral Daily PRN Stone Bhat PA-C     • midodrine  5 mg Oral TID AC Media , CRNP     • morphine injection  4 mg Intravenous Q3H PRN Gwendlyn Brunner, PA-C     • octreotide  100 mcg Subcutaneous [facility] Media , CRNP     • ondansetron  4 mg Intravenous Q6H PRN John Carter MD     • pantoprazole  40 mg Oral Early Morning Bright Lake MD     • polyethylene glycol  17 g Oral Daily Ainsley Tate MD     • potassium chloride  20 mEq Oral Q4H Media , CRNP     • traZODone  50 mg Oral HS Gwendlyn Brunner, PA-C          Today, Patient Was Seen By: Sophy Zelaya DO    **Please Note: This note may have been constructed using a voice recognition system  **

## 2023-05-05 NOTE — PLAN OF CARE
Problem: Potential for Falls  Goal: Patient will remain free of falls  Description: INTERVENTIONS:  - Educate patient/family on patient safety including physical limitations  - Instruct patient to call for assistance with activity   - Consult OT/PT to assist with strengthening/mobility   - Keep Call bell within reach  - Keep bed low and locked with side rails adjusted as appropriate  - Keep care items and personal belongings within reach  - Initiate and maintain comfort rounds  - Make Fall Risk Sign visible to staff  - Offer Toileting every 2 Hours, in advance of need  - Initiate/Maintain alarms  - Obtain necessary fall risk management equipment:  - Apply yellow socks and bracelet for high fall risk patients  - Consider moving patient to room near nurses station  Outcome: Progressing     Problem: PAIN - ADULT  Goal: Verbalizes/displays adequate comfort level or baseline comfort level  Description: Interventions:  - Encourage patient to monitor pain and request assistance  - Assess pain using appropriate pain scale  - Administer analgesics based on type and severity of pain and evaluate response  - Implement non-pharmacological measures as appropriate and evaluate response  - Consider cultural and social influences on pain and pain management  - Notify physician/advanced practitioner if interventions unsuccessful or patient reports new pain  Outcome: Progressing     Problem: SAFETY ADULT  Goal: Patient will remain free of falls  Description: INTERVENTIONS:  - Educate patient/family on patient safety including physical limitations  - Instruct patient to call for assistance with activity   - Consult OT/PT to assist with strengthening/mobility   - Keep Call bell within reach  - Keep bed low and locked with side rails adjusted as appropriate  - Keep care items and personal belongings within reach  - Initiate and maintain comfort rounds  - Make Fall Risk Sign visible to staff  - Offer Toileting every 2 Hours, in advance of need  - Initiate/Maintain alarms  - Obtain necessary fall risk management equipment:  - Apply yellow socks and bracelet for high fall risk patients  - Consider moving patient to room near nurses station  Outcome: Progressing  Goal: Maintain or return to baseline ADL function  Description: INTERVENTIONS:  - Educate patient/family on patient safety including physical limitations  - Instruct patient to call for assistance with activity   - Consult OT/PT to assist with strengthening/mobility   - Keep Call bell within reach  - Keep bed low and locked with side rails adjusted as appropriate  - Keep care items and personal belongings within reach  - Initiate and maintain comfort rounds  - Make Fall Risk Sign visible to staff  - Offer Toileting every 2 Hours, in advance of need  - Initiate/Maintain alarms  - Obtain necessary fall risk management equipment:   - Apply yellow socks and bracelet for high fall risk patients  - Consider moving patient to room near nurses station  Outcome: Progressing  Goal: Maintains/Returns to pre admission functional level  Description: INTERVENTIONS:  - Perform BMAT or MOVE assessment daily    - Set and communicate daily mobility goal to care team and patient/family/caregiver     - Collaborate with rehabilitation services on mobility goals if consulted  - Out of bed for toileting  - Record patient progress and toleration of activity level   Outcome: Progressing     Problem: INFECTION - ADULT  Goal: Absence or prevention of progression during hospitalization  Description: INTERVENTIONS:  - Assess and monitor for signs and symptoms of infection  - Monitor lab/diagnostic results  - Monitor all insertion sites, i e  indwelling lines, tubes, and drains  - Monitor endotracheal if appropriate and nasal secretions for changes in amount and color  - [place] appropriate cooling/warming therapies per order  - Administer medications as ordered  - Instruct and encourage patient and family to use good hand hygiene technique  - Identify and instruct in appropriate isolation precautions for identified infection/condition  Outcome: Progressing     Problem: DISCHARGE PLANNING  Goal: Discharge to home or other facility with appropriate resources  Description: INTERVENTIONS:  - Identify barriers to discharge w/patient and caregiver  - Arrange for needed discharge resources and transportation as appropriate  - Identify discharge learning needs (meds, wound care, etc )  - Refer to Case Management Department for coordinating discharge planning if the patient needs post-hospital services based on physician/advanced practitioner order or complex needs related to functional status, cognitive ability, or social support system  Outcome: Progressing     Problem: Knowledge Deficit  Goal: Patient/family/caregiver demonstrates understanding of disease process, treatment plan, medications, and discharge instructions  Description: Complete learning assessment and assess knowledge base    Interventions:  - Provide teaching at level of understanding  - Provide teaching via preferred learning methods  Outcome: Progressing     Problem: Prexisting or High Potential for Compromised Skin Integrity  Goal: Skin integrity is maintained or improved  Description: INTERVENTIONS:  - Identify patients at risk for skin breakdown  - Assess and monitor skin integrity  - Assess and monitor nutrition and hydration status  - Monitor labs   - Assess for incontinence   - Turn and reposition patient  - Assist with mobility/ambulation  - Relieve pressure over bony prominences  - Avoid friction and shearing  - Provide appropriate hygiene as needed including keeping skin clean and dry  - Evaluate need for skin moisturizer/barrier cream  - Collaborate with interdisciplinary team   - Patient/family teaching  - Consider wound care consult   Outcome: Progressing     Problem: MOBILITY - ADULT  Goal: Maintain or return to baseline ADL function  Description: INTERVENTIONS:  - Educate patient/family on patient safety including physical limitations  - Instruct patient to call for assistance with activity   - Consult OT/PT to assist with strengthening/mobility   - Keep Call bell within reach  - Keep bed low and locked with side rails adjusted as appropriate  - Keep care items and personal belongings within reach  - Initiate and maintain comfort rounds  - Make Fall Risk Sign visible to staff  - Offer Toileting every 2 Hours, in advance of need  - Initiate/Maintain alarms  - Obtain necessary fall risk management equipment:   - Apply yellow socks and bracelet for high fall risk patients  - Consider moving patient to room near nurses station  Outcome: Progressing  Goal: Maintains/Returns to pre admission functional level  Description: INTERVENTIONS:  - Perform BMAT or MOVE assessment daily    - Set and communicate daily mobility goal to care team and patient/family/caregiver     - Collaborate with rehabilitation services on mobility goals if consulted  - Out of bed for toileting  - Record patient progress and toleration of activity level   Outcome: Progressing

## 2023-05-05 NOTE — CASE MANAGEMENT
Case Management Progress Note    Patient name Alysia Norris  Location [address] [id number] MRN [id number]  : 1945 Date 2023       LOS (days): 8  Geometric Mean LOS (GMLOS) (days): 2 60  Days to GMLOS:-5 4        OBJECTIVE:        Current admission status: Inpatient  Preferred Pharmacy:   [address] [place] [address]  [facility]   [facility] [address]  Phone: [phone] Fax: [phone]    Primary Care Provider: Ricco Overton MD    Primary Insurance: MEDICARE  Secondary Insurance: Marilee Gonzalez    PROGRESS NOTE:    Message from Surgery  [facility],   requesting past year med recs from [facility]  Requesting xrays, imagaging, admissions and consults  Medical release form signed by patient  Request for records faxed to [phone] and [phone]  Records received 223 pages  Records on floor in patient box and copies provided to media    Message to surgery AP same

## 2023-05-05 NOTE — PROGRESS NOTES
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%  Persistent lower abdominal pain, bloating, no appetite, diarrhea, low back pain  Distended on exam with tenderness to touch, worst in LLQ, no guarding  Hypotensive with /38, Temp 97 4, HR 56, RR 18, SpO2 98% on NC  Cr 2 05, Na 123, K 3 2, WBC 9 (16), Plt 62, Mag 1 8, Alk Phos 322, T bili 1 80    A/P by Organ System:  Neuro- tylenol/morphine for pain control  Cardio- CAD/CABG in 2021, murmur on exam and fluid overload, check ECHO, EKG, hypotension, received albumin overnight by SLIM, midodrine trial today per Nephrology, weight 54kg down from 60kg on 5/1  Pulm- hx smoking, no pulmonary disease, on 2L NC suspect 2/2 fluid overload, check CXR  GI- chronic diarrhea, abdominal pain unclear etiology, stool studies negative, s/p flex sig, last CT 5/1, not tolerating much PO and distended on exam, will check a XR abdomen today  Also with non-alcoholic cirrhosis, pancreatic cyst  - I/O not recorded, states she's making small amounts of urine without dysuria  Renal- ALEJANDRA, Renal following  FEN- on fluid restriction per Renal, low sodium, potassium, magnesium, on clear with ensure with limited intake  Heme- hgb 9 6 (10 6), Plt 62  ID- Afebrile, WBC normalized, no acute infectious process ongoing, not currently on antiboitics  Endo- normoglycemic  Msk/Skin- PT/OT  VTE- Lovenox and SCD      Complex medical picture without clear "surgical source/process  Will review with Dr Viviana Leon  Subjective/Objective   Chief Complaint: continues to feel poorly    Subjective: ongoing pain to lower abdomen and back, bloating today, no appetite and not taking much PO    Objective: hypotensive overnight, ordered albumin    Blood pressure (!) 100/38, pulse 56, temperature (!) 97 4 °F (36 3 °C), temperature source Oral, resp  rate 18, height 4' 11\" (1 499 m), weight 54 kg (119 lb 0 8 oz), SpO2 98 %  ,Body mass index is 24 04 kg/m²  Intake/Output Summary (Last 24 hours) at 5/5/2023 0922  Last data filed at 5/4/2023 1752  Gross per 24 hour   Intake 1080 ml   Output --   Net 1080 ml       Invasive Devices     Peripheral Intravenous Line  Duration           Peripheral IV 05/03/23 Dorsal (posterior); Right Hand 1 day    Peripheral IV 05/05/23 Dorsal (posterior); Left Hand <1 day                Physical Exam  Vitals and nursing note reviewed  Constitutional:       General: She is not in acute distress  Appearance: She is well-developed  She is ill-appearing  She is not diaphoretic  HENT:      Head: Normocephalic and atraumatic  Eyes:      Conjunctiva/sclera: Conjunctivae normal       Pupils: Pupils are equal, round, and reactive to light  Cardiovascular:      Rate and Rhythm: Bradycardia present  Heart sounds: Murmur heard  Comments: JVD  Pulmonary:      Effort: Pulmonary effort is normal  No respiratory distress  Breath sounds: No wheezing, rhonchi or rales  Comments: On NC  Abdominal:      Comments: Moderate distention, firm, but compressible, bowel sounds present, mild generalized TTP, moderate TTP in LLQ  No guarding/rigidity  Musculoskeletal:         General: Normal range of motion  Cervical back: Normal range of motion  Right lower leg: Edema present  Left lower leg: Edema present  Skin:     General: Skin is warm and dry  Capillary Refill: Capillary refill takes less than 2 seconds   " Neurological:      Mental Status: She is alert and oriented to person, place, and time  Psychiatric:         Behavior: Behavior normal            Lab, Imaging and other studies:  I have personally reviewed pertinent lab results    , CBC:   Lab Results   Component Value Date    WBC 9 17 05/05/2023    HGB 9 6 (L) 05/05/2023    HCT 27 4 (L) 05/05/2023    MCV 99 (H) 05/05/2023    PLT 62 (L) 05/05/2023    MCH 34 7 (H) 05/05/2023    MCHC 35 0 05/05/2023    RDW 14 6 05/05/2023    MPV 11 1 05/05/2023    NRBC 0 05/05/2023   , CMP:   Lab Results   Component Value Date    SODIUM 123 (L) 05/05/2023    K 3 2 (L) 05/05/2023    CL 93 (L) 05/05/2023    CO2 20 (L) 05/05/2023    BUN 40 (H) 05/05/2023    CREATININE 2 05 (H) 05/05/2023    CALCIUM 7 6 (L) 05/05/2023    AST 59 (H) 05/05/2023    ALT 22 05/05/2023    ALKPHOS 322 (H) 05/05/2023    EGFR 23 05/05/2023     VTE Pharmacologic Prophylaxis: Enoxaparin (Lovenox)  VTE Mechanical Prophylaxis: sequential compression device

## 2023-05-05 NOTE — ASSESSMENT & PLAN NOTE
• Nephrology following, input appreciated   • avoid hypotension, nephrotoxins, and NSAIDS if possible    • Likely due to hepatorenal syndrome  • Chronic to be tight, midodrine and albumin per nephrology  • Diuresis discontinued  • Daily BMP, in and out

## 2023-05-05 NOTE — PROGRESS NOTES
[facility] - Outpatient Follow-up Note  Jamal Beavers 69 y o  female MRN: [id number]  Encounter: [id number]          ASSESSMENT AND PLAN:      Left lower quadrant abdominal pain -thickening in the descending/sigmoid colon  No evidence of diverticulitis on imaging  She has no leukocytosis or fever  She has  tenderness on exam without guarding or rigidity  Reviewed flex sig which showed mild proctitis and large stool burden  Her underlying cardiovascular disease and location favors ischemic colitis although there was no significant inflammation seen on flex sig  Other differential segmental colitis associated with diverticulosis and stercoral colitis but these were not seen on recent flex sig     -Recommend Bentyl 10 mg as needed for pain  -Bowel regimen -MiraLAX 17 g daily prn  -Diet as tolerated        New diagnosis of cirrhosis -based on imaging  No evidence of ascites, encephalopathy or known varices  Outpatient work-up for further evaluation      Pancreatic cyst -an 8 mm pancreatic cyst without ductal dilation  Recommend outpatient MRI/MRCP for further evaluation  ______________________________________________________________________    SUBJECTIVE: Patient is seen and examined at the bedside  She continues to have abdominal pain more significant on the left lower quadrant  She reports having watery bowel movement after taking bowel regimen      REVIEW OF SYSTEMS IS OTHERWISE NEGATIVE        Historical Information   Past Medical History:   Diagnosis Date   • Anemia    • Atrial fibrillation ([facility] [address] )    • Depression    • GI (gastrointestinal bleed)    • Ischemic colitis ([facility] [address] )      Past Surgical History:   Procedure Laterality Date   • APPENDECTOMY     • CARDIAC SURGERY     • CHOLECYSTECTOMY     • HIP SURGERY Right     Partial replacement   • HYSTERECTOMY       Social History   Social History     Substance and Sexual Activity   Alcohol Use Never     Social History     Substance and Sexual Activity   Drug Use Never     Social History     Tobacco Use   Smoking Status Former   • Types: Cigarettes   Smokeless Tobacco Never     Family History   Problem Relation Age of Onset   • No Known Problems Mother        Meds/Allergies       Current Facility-Administered Medications:   •  acetaminophen (TYLENOL) tablet 650 mg, 650 mg, Oral, Q6H PRN, 650 mg at 05/05/23 0157  •  albumin human (FLEXBUMIN) 25 % injection 12 5 g, 12 5 g, Intravenous, Q6H, 12 5 g at 05/05/23 1223  •  barium (READI-CAT 2) suspension 900 mL, 900 mL, Oral, Once in imaging  •  enoxaparin (LOVENOX) subcutaneous injection 30 mg, 30 mg, Subcutaneous, Daily, 30 mg at 05/05/23 0905  •  erythromycin (ILOTYCIN) 0 5 % ophthalmic ointment 0 5 inch, 0 5 inch, Left Eye, BID, 0 5 inch at 05/05/23 0906  •  levothyroxine tablet 75 mcg, 75 mcg, Oral, Early Morning, 75 mcg at 05/05/23 0522  •  magnesium hydroxide (MILK OF MAGNESIA) oral suspension 30 mL, 30 mL, Oral, Daily PRN  •  metoprolol succinate (TOPROL-XL) 24 hr tablet 12 5 mg, 12 5 mg, Oral, BID  •  midodrine (PROAMATINE) tablet 5 mg, 5 mg, Oral, TID AC  •  morphine injection 4 mg, 4 mg, Intravenous, Q3H PRN, 4 mg at 05/03/23 1931  •  octreotide (SandoSTATIN) injection 100 mcg, 100 mcg, Subcutaneous, Q8H CHRISTIAN, 100 mcg at 05/05/23 1450  •  ondansetron (ZOFRAN) injection 4 mg, 4 mg, Intravenous, Q6H PRN, 4 mg at 05/03/23 0615  •  pantoprazole (PROTONIX) EC tablet 40 mg, 40 mg, Oral, Early Morning, 40 mg at 05/05/23 8111  •  potassium chloride (K-DUR,KLOR-CON) CR tablet 20 mEq, 20 mEq, Oral, Q4H, 20 mEq at 05/05/23 1223  •  traZODone (DESYREL) tablet 50 mg, 50 mg, Oral, HS, 50 mg at 05/04/23 2100    Allergies   Allergen Reactions   • Influenza Vaccines Other (See Comments) and Vomiting     nausea   • Lactose - Food Allergy Diarrhea   • Ceftaroline GI Intolerance   • Diphenhydramine Hyperactivity           Objective     Blood pressure 145/56, pulse 58, temperature (!) 97 4 °F (36 3 °C), temperature "source Oral, resp  rate 16, height 4' 11\" (1 499 m), weight 54 kg (119 lb), SpO2 95 %  Body mass index is 24 04 kg/m²  PHYSICAL EXAM:      General Appearance:   Alert, cooperative, no distress   HEENT:   Normocephalic, atraumatic, anicteric      Neck:  Supple, symmetrical, trachea midline   Lungs:   Clear to auscultation bilaterally; no rales, rhonchi or wheezing; respirations unlabored    Heart[de-identified]   Regular rate and rhythm; no murmur, rub, or gallop  Abdomen:   Soft, non-tender, non-distended; normal bowel sounds; no masses, no organomegaly    Genitalia:   Deferred    Rectal:   Deferred    Extremities:  No cyanosis, clubbing or edema    Pulses:  2+ and symmetric    Skin:  No jaundice, rashes, or lesions    Lymph nodes:  No palpable cervical lymphadenopathy        Lab Results:   No results displayed because visit has over 200 results  Radiology Results:   XR abdomen obstruction series    Result Date: 4/30/2023  Narrative: OBSTRUCTION SERIES INDICATION:   LLQ pain, known diverticulitis  COMPARISON:  None EXAM PERFORMED/VIEWS:  XR ABDOMEN OBSTRUCTION SERIES FINDINGS: There is a nonobstructive bowel gas pattern  No free air beneath the hemidiaphragms  No pathologic calcifications or soft tissue masses evident  Partially imaged right hip knee arthroplasty  Examination of the chest reveals: Cardiomegaly  Left lateral pulmonary opacity, possibly scarring  Impression: Nonobstructive bowel gas pattern  Workstation performed: [id number]     MRI lumbar spine wo contrast    Result Date: 5/2/2023  Narrative: MRI LUMBAR SPINE WITHOUT CONTRAST INDICATION: pain  Status post fall approximately 10 weeks ago  Left lower quadrant and low back pain COMPARISON:   5/1/2023 TECHNIQUE:  Multiplanar, multisequence imaging of the lumbar spine was performed    IMAGE QUALITY:  Diagnostic FINDINGS: VERTEBRAL BODIES:  There are 5 lumbar type vertebral bodies  Grade 1 anterolisthesis of L4 on L5 noted   Mild dextroscoliosis " of the lower lumbar spine, centered at the L4 level  There are numerous compression deformities, most of which demonstrate marrow edema indicative of acute/recent compression deformities  There is approximately 30 to 40% loss of the height of the T1 vertebra with marrow edema indicative of an acute compression deformity  No significant bony retropulsion or epidural hematoma  There is mild less than 10% loss of height in the superior endplate of L1 with T1 hypointensity indicative of mild, recent compression deformity  L2 demonstrates marrow edema throughout much of the vertebra without loss of height although a linear T2 hypointensity is noted in the lower third of the vertebra suggesting nondepressed fracture  No bony retropulsion  25% loss of vertebral body height of L4 noted with a small amount of superior endplate marrow edema noted indicative of recent deformity  25 to 30% loss of height of L5 noted with marrow edema as well  The lucency noted in the L3 vertebra correlates to a T1 and T2 hyperintensity seen on series 4, image 13, measuring approximately 8 mm in maximal craniocaudal dimension, having typical imaging features of hemangioma correlating to the lucent lesion on the CT scan from yesterday  SACRUM: Scattered degenerative endplate changes  No focally suspicious marrow lesions  No bone marrow edema or compression abnormality  DISTAL CORD AND CONUS:  Normal size and signal within the distal cord and conus  The conus medullaris terminates at the L1 level  PARASPINAL SOFT TISSUES:  Paraspinal soft tissues are unremarkable  LOWER THORACIC DISC SPACES: T9-T10: There is a small central disc herniation, protrusion type extending into the right neural foramen  Mild central canal and right neural foraminal narrowing  Left neural foramen patent  T10-T11: There is a small central disc protrusion  Mild central canal and right neural foraminal narrowing  Left neural foramen patent   T11-T12: Tiny central disc protrusion  No significant central canal or neural foraminal narrowing  T12-L1: There is a small central/right paracentral disc protrusion  Mild central canal and right neural foraminal narrowing  Left neural foramen patent  LUMBAR DISC SPACES: L1-L2: There is loss of disc height and signal  There is a central/right paracentral disc protrusion  Mild central canal and right neural foraminal narrowing  Left neural foramen patent  L2-L3: There is loss of disc height and signal  There is bilateral facet hypertrophy  Mild to moderate central canal narrowing  Moderate right neural foraminal narrowing  Mild left neural foraminal narrowing  L3-L4: There is bilateral facet hypertrophy  There is loss of disc height and signal  There is a left neural foraminal disc protrusion  Moderate left neural foraminal narrowing  Mild central canal narrowing  Moderate right neural foraminal narrowing  L4-L5: There is uncovering the intervertebral disc space  There is bilateral facet hypertrophy  There is a left neural foraminal disc protrusion  Severe left neural foraminal narrowing  Moderate to severe central canal narrowing  Moderate right neural foraminal narrowing  There is uncovering the intervertebral disc space  L5-S1: There is bilateral facet hypertrophy  There is a right neural foraminal disc protrusion  Moderate to severe right neural foraminal narrowing  Mild central canal narrowing  Mild to moderate left neural foraminal narrowing  OTHER FINDINGS:  None  Impression: 1  Multiple acute compression deformities mild to moderate in severity most pronounced at L5 where there is approximately 40% loss of vertebral body height  No epidural hematoma or significant bony retropulsion  Correlation for osteoporotic risk factors is advised  2  Multilevel spondylosis  3  Recently noted lucent lesion in the L3 vertebra from the CT from earlier in the day correlates to a hemangioma, a benign finding   Workstation performed: [id number] CT abdomen pelvis w contrast    Result Date: 5/1/2023  Narrative: CT ABDOMEN AND PELVIS WITH IV CONTRAST INDICATION:   LLQ abdominal pain Persistant LLQ abdominal pain  COMPARISON: CT abdomen pelvis 4/27/2023, 4/18/2023  Abdominal radiograph 4/29/2023  TECHNIQUE:  CT examination of the abdomen and pelvis was performed  Multiplanar 2D reformatted images were created from the source data  Radiation dose length product (DLP) for this visit:  539 91 mGy-cm   This examination, like all CT scans performed in the [facility], was performed utilizing techniques to minimize radiation dose exposure, including the use of iterative  reconstruction and automated exposure control  IV Contrast:  100 mL of iohexol (OMNIPAQUE) 50 mL of iohexol (OMNIPAQUE) Enteric Contrast:  Enteric contrast was administered  FINDINGS: ABDOMEN LOWER CHEST: There is a small hiatal hernia  Remote left-sided rib fractures are noted  Left basilar scarring/atelectasis is noted  There is a small right pleural effusion with associated atelectasis, worsened from prior CT  LIVER/BILIARY TREE: The liver demonstrates contour nodularity with a heterogeneous appearance of the liver parenchyma as can be seen in the setting of cirrhosis  Pneumobilia is again noted, likely secondary to prior surgery/biliary intervention  GALLBLADDER: Gallbladder is surgically absent  SPLEEN:  Unremarkable  PANCREAS: Cystic lesion is seen within the pancreatic tail measuring 8 mm (2:47)  No evidence of ductal dilatation  Punctate calcifications are again noted within the pancreatic head, possibly representing sequelae of prior episodes of pancreatitis  ADRENAL GLANDS:  Unremarkable  KIDNEYS/URETERS: No hydronephrosis  4 mm calcification is again noted the left renal midpole, favored to represent a vascular calcification rather than a nonobstructing calculus  Subcentimeter hypodensities within the bilateral kidneys are too small to characterize   STOMACH AND BOWEL: Small hiatal hernia  No evidence of small bowel obstruction  The wall of the sigmoid colon appears mildly thickened, however, evaluation is limited by nondistention  Finding may represent pseudo thickening from nondistention versus a mild nonspecific colitis in the appropriate clinical setting  There are a few scattered colonic diverticula without evidence of focal inflammatory changes  APPENDIX:  No findings to suggest appendicitis  ABDOMINOPELVIC CAVITY: No pneumoperitoneum  No gross abdominal or pelvic adenopathy  There is trace free fluid within the pelvis which appears improved from most recent CT  VESSELS: Moderate to severe atherosclerotic calcifications are noted within the abdominal aorta and branching vessels  PELVIS REPRODUCTIVE ORGANS: Surgical changes of prior hysterectomy  URINARY BLADDER: Decompressed, limiting evaluation  No gross abnormalities are seen within these confines  ABDOMINAL WALL/INGUINAL REGIONS: There is diffuse anasarca, worsened from most recent CT  OSSEOUS STRUCTURES: Indeterminate lucent focus is again seen within the anterior aspect of L3 measuring up to 7 mm (602:104)  Remote left-sided rib fractures are noted  There is a mild grade 1 anterolisthesis of L4 on L5 and remote appearing compression deformities of L5, L4 and T11 which appear stable from recent exams  Right hip arthroplasty is noted     Impression: 1  Thickened appearance of the wall of the descending colon and sigmoid colon which may represent pseudo thickening from nondistention versus a mild nonspecific colitis in the appropriate clinical setting  There are a few scattered colonic diverticula without evidence of focal inflammatory changes  2  Small right pleural effusion and anasarca, both of which are worsened from most recent CT   There is again trace free fluid in the pelvis which appears improved from from most recent exam  3  Hepatic contour nodularity with heterogeneous appearance of the parenchyma as can be seen in the setting of cirrhosis  4  Redemonstration of cystic lesion within the pancreatic tail measuring 8 mm, please refer to report from CT of 4/18/2023 for follow-up recommendations  5  Subcentimeter lucency within the L3 vertebral body which is indeterminate in etiology  Consider nuclear bone scan for further evaluation  The study was marked in [facility] for immediate notification    Back Workstation performed: [id number]     CT abdomen pelvis with contrast    Result Date: 4/27/2023  Narrative: CT ABDOMEN AND PELVIS WITH IV CONTRAST INDICATION:   LLQ abdominal pain LLQ pain  COMPARISON: CT abdomen pelvis 4/18/2023 TECHNIQUE:  CT examination of the abdomen and pelvis was performed  Multiplanar 2D reformatted images were created from the source data  Radiation dose length product (DLP) for this visit:  516 97 mGy-cm   This examination, like all CT scans performed in the [facility], was performed utilizing techniques to minimize radiation dose exposure, including the use of iterative  reconstruction and automated exposure control  IV Contrast:  80 mL of iohexol (OMNIPAQUE) Enteric Contrast:  Enteric contrast was not administered  FINDINGS: ABDOMEN LOWER CHEST:  No clinically significant abnormality identified in the visualized lower chest  LIVER/BILIARY TREE: Again seen is nodular hepatic contour with heterogenous parenchyma, likely representing cirrhosis  Again seen is pneumobilia, likely secondary to prior biliary intervention  GALLBLADDER:  Gallbladder is surgically absent  SPLEEN:  Unremarkable  PANCREAS: No main pancreatic ductal dilation  0 6 cm cystic lesion in the pancreatic tail (series 2 image 42)  Again seen are a few calcifications in the pancreatic head, which can represent sequela of prior pancreatitis  ADRENAL GLANDS:  Unremarkable  KIDNEYS/URETERS: A 4 mm calcification in the left renal interpole, which can represent either a vascular calcification or nonobstructing calculus  Subcentimeter low-attenuation lesions in both kidneys, too small to characterize  No hydronephrosis  STOMACH AND BOWEL:  Moderate amount of stool in the colon, nonspecific and can represent constipation  APPENDIX:  No findings to suggest appendicitis  ABDOMINOPELVIC CAVITY: Small amount of free pelvic fluid, new compared to prior CT examination  Daisy Miller No pneumoperitoneum  No lymphadenopathy  VESSELS:  Atherosclerotic changes are present  No evidence of aneurysm  PELVIS REPRODUCTIVE ORGANS:  Surgical changes of prior hysterectomy  URINARY BLADDER:  Unremarkable  ABDOMINAL WALL/INGUINAL REGIONS:  Unremarkable  OSSEOUS STRUCTURES:  No acute fracture  Indeterminate 0 4 cm osteolytic lesion in the right aspect of L3 vertebral body (series 2 image 53), retrospectively also seen on 4/18/2023  Right hip arthroplasty with surrounding streak artifact, limiting evaluation of surrounding soft tissues  Impression: New small amount of free fluid in the abdomen and pelvis compared to 4/18/2023, which can be secondary to cirrhosis or acute inflammatory process in the abdomen and pelvis such as occult acute diverticulitis  Moderate amount of stool in the colon, nonspecific and can represent constipation  Again seen is 0 6 cm pancreatic tail cystic lesion  Follow-up is recommended as suggested on report of prior CT abdomen pelvis 4/18/2023 Indeterminate 0 4 cm osteolytic lesion in the right aspect of L3 vertebral body, which can represent benign or malignant neoplasm  Bone scan is recommended for further evaluation  The study was marked in [facility] for immediate notification  CT abdomen pelvis with contrast    Result Date: 4/18/2023  Narrative: CT ABDOMEN AND PELVIS WITH IV CONTRAST INDICATION:   Worsening diarrhea, left-sided abdominal pain  COMPARISON:  None  TECHNIQUE:  CT examination of the abdomen and pelvis was performed  Multiplanar 2D reformatted images were created from the source data   Radiation dose length product (DLP) for this visit:  529 36 mGy-cm   This examination, like all CT scans performed in the [facility], was performed utilizing techniques to minimize radiation dose exposure, including the use of iterative  reconstruction and automated exposure control  IV Contrast:  80 mL of iohexol (OMNIPAQUE)  350 Enteric Contrast:  Enteric contrast was not administered  FINDINGS: ABDOMEN LOWER CHEST:  Minimal bilateral lower lobe dependent subsegmental atelectasis and scarring  Small hiatal hernia  Evidence of prior cardiac surgery  Coronary artery calcification  LIVER/BILIARY TREE:  Nodular liver contour is suggestive of cirrhosis  Liver otherwise unremarkable  Minimal pneumobilia attributed to prior biliary intervention  No biliary dilation  GALLBLADDER:  Post cholecystectomy  SPLEEN:  Unremarkable  PANCREAS:  Punctate calcifications in the head of the pancreas may be sequela of prior episodes of pancreatitis  5 mm cystic lesion in the tail of the pancreas image 43 series 2  ADRENAL GLANDS:  Unremarkable  KIDNEYS/URETERS: One or more sharply circumscribed subcentimeter renal hypodensities are noted  These lesions are too small to accurately characterize, but are statistically most likely to represent benign cortical renal cyst(s)  According to the guidelines published in the [facility] Paper of the ACR Incidental Findings Committee (Radiology 2010), no further workup of these lesions is recommended  2 mm left renal interpolar nonobstructing calculus or vascular calcification  No perinephric collection  STOMACH AND BOWEL:  Nondistended stomach limits its evaluation  Scattered gas fluid levels in nondilated small bowel  Fluid and semisolid stool is present in the descending colon through rectum  Minimal rectosigmoid wall thickening  Scattered colonic diverticula without diverticulitis  No bowel obstruction  APPENDIX:  Post appendectomy per history in Epic  ABDOMINOPELVIC CAVITY:  No ascites  No pneumoperitoneum    No lymphadenopathy  VESSELS:  Aortoiliac calcification  Scattered mesenteric and bilateral renal artery origin calcification  Mild suprarenal aortic stenosis  Mild to moderate bilateral renal artery origin stenosis  No aneurysm  PELVIS REPRODUCTIVE ORGANS:  Post hysterectomy  URINARY BLADDER:  Unremarkable  ABDOMINAL WALL/INGUINAL REGIONS:  Mild body wall edema  OSSEOUS STRUCTURES:  No acute fracture or osseous destructive lesion identified  Old left posterior 11th rib fracture  Degenerative changes of the spine, pubic symphysis, and multiple joints  Partially visualized right hip prosthesis  Impression: Mild nonspecific enterocolitis  Nodular liver contour is suggestive of hepatic cirrhosis  5 mm cystic lesion in the tail of the pancreas  For simple cyst(s) less than 1 5 cm, recommend followup every 2 year for 5 times or to age [de-identified], whichever comes first  Followup can stop at age [de-identified] or can switch over to [de-identified] year or older algorithm  Recommend next followup in 2 years  Preferred imaging modality: abdomen MRI and MRCP with and without IV contrast, or triple phase abdomen CT with IV contrast, or abdomen MRI and MRCP without IV contrast  The recommendations regarding pancreatic findings assumes that patient does not have family history of pancreatic cancer nor have any symptoms potentially attributable to pancreatic cystic lesions (hyperamylasemia, recent-onset diabetes, severe epigastric pain, weight loss, steatorrhea, or jaundice ) If these conditions are not true, then management should be deferred to judgement of specialists such as gastroenterologists or oncologic surgeons  Recommendations are based on recent consensus statements on management of pancreatic cystic lesions from [address] [place] [address] Gastroenterology Association, [employer] of Radiology, the journal Pancreatology, and our own institutional consensus  Additional chronic findings and negatives as above   Workstation performed: [id number]     Flexible Sigmoidoscopy    Result Date: 5/3/2023  Narrative: Table formatting from the original result was not included  [facility] [address]cca F[id number] [id number] [phone] DATE OF SERVICE: 5/03/23 PHYSICIAN(S): Attending: Leda Albert DO Fellow: No Staff Documented INDICATION: Diarrhea, unspecified type POST-OP DIAGNOSIS: See the impression below  HISTORY: Prior colonoscopy: Less than 3 years ago  It is being repeated at an interval of less than 3 years because: This colonoscopy is being performed for a diagnostic indication BOWEL PREPARATION: Enema PREPROCEDURE: Informed consent was obtained for the procedure, including sedation  Risks including but not limited to bleeding, infection, perforation, adverse drug reaction and aspiration were explained in detail  Also explained about less than 100% sensitivity with the exam and other alternatives  The patient was placed in the left lateral decubitus position  Procedure: Colonoscopy DETAILS OF PROCEDURE: Patient was taken to the procedure room where a time out was performed to confirm correct patient and correct procedure  The patient underwent monitored anesthesia care, which was administered by an anesthesia professional  The patient's blood pressure, heart rate, level of consciousness, oxygen and respirations were monitored throughout the procedure  A digital rectal exam was performed  The scope was introduced through the anus and advanced to the descending colon  Retroflexion was performed in the rectum  The quality of bowel preparation was evaluated using the [facility] Bowel Preparation Scale with scores of: right colon = 0, transverse colon = not assessed, left colon = not assessed  Bowel prep was not adequate  The patient experienced no blood loss  The procedure was not difficult  The patient tolerated the procedure well  There were no apparent complications  The total BBPS score was 0   ANESTHESIA INFORMATION: ASA: III Anesthesia Type: IV Sedation with Anesthesia MEDICATIONS: No administrations occurring from 1337 to 1422 on 05/03/23 FINDINGS: Mild abnormal mucosa with erosion in the rectum; performed cold forceps biopsy EVENTS: Procedure Events Event Event Time ENDO SCOPE OUT TIME 5/3/2023  2:20 PM SPECIMENS: ID Type Source Tests Collected by Time Destination 1 : BX, R/O COLITIS Tissue Rectum TISSUE EXAM Joelle Zuniga DO 5/3/2023  2:18 PM  EQUIPMENT: Colonoscope - SEE GASTRPSCOPE     Impression: Exam limited due to stool burden  Areas examined demonstrate normal mucosa; scope advanced to approximately 55cm, thereafter extensive stool blocking lumen Area of scant inflammation in rectum  Biopsies obtained to rule out colitis/microscopic colitis RECOMMENDATION:  Await pathology results  Follow up in GI office for ongoing care and history of liver disease  Joelle Zuniga DO     Echo complete w/ contrast if indicated    Result Date: 5/5/2023  Narrative: •  Left Ventricle: Left ventricular cavity size is normal  Wall thickness is normal  There is mild concentric hypertrophy  The left ventricular ejection fraction is 66%  Systolic function is normal  Wall motion is normal  Diastolic function is normal for age  •  Right Ventricle: Right ventricular cavity size is dilated  •  Left Atrium: The atrium is dilated  •  Right Atrium: The atrium is dilated  •  Aortic Valve: The aortic valve is trileaflet  The leaflets are mildly thickened  The leaflets are moderately calcified  There is moderately reduced mobility  There is mild regurgitation  There is mild stenosis  The aortic valve peak velocity is 2 29 m/s  The aortic valve mean gradient is 10 mmHg  The aortic valve area is 1 23 cm2  •  Mitral Valve: There is annular calcification  There is mild regurgitation  •  Tricuspid Valve: There is moderate to severe regurgitation   The right ventricular systolic pressure is normal

## 2023-05-05 NOTE — ASSESSMENT & PLAN NOTE
· Surgery following, no planned/indication for intervention at the moment  · GI following, input appreciated  · S/p flex sig with minimal inflammation, biopsies taken, pending results  · Per GI, 05/05, as needed Bentyl and as needed MiraLAX  · Currently on surgical diet  · Will continue to monitor and revaluate; per Dr Vega Och, if not improving / worsening LLQ pain, full colonoscopy might be considered for further evaluation pending clinical course

## 2023-05-05 NOTE — PROGRESS NOTES
NEPHROLOGY PROGRESS NOTE   Stephen Luke 69 y o  female MRN: [id number]  Unit/Bed#: -01 Encounter: [id number]    Assessment/Plan:    68 yo female with ED status post CABG 2021, A-fib, Sjogren's, chronic diarrhea unclear origin, admitted 4/27 for left lower quadrant pain  Nephrology following for acute kidney injury, hyponatremia, volume overload, metabolic derangements  1   Acute kidney injury  · Urine chemistries suggestive of prerenal etiology  Concern for YOLANDA +/- ? Hepatorenal syndrome with undetectable urine sodium, cirrhotic morphology on imaging, precipitation of acute kidney injury with aggressive diuresis  Will initiate patient on hepatorenal syndrome cocktail with midodrine 5 mg 3 times daily, octreotide 100 g subcutaneously every 8 hour, albumin 12 5 g every 6 hour  Avoid diuresis  Avoid further contrast exposure  2   Potential underlying hepatorenal syndrome  · Initiate patient on hepatorenal syndrome cocktail as above  3  Hyponatremia  · ADH over secretion in patient with cirrhosis and pain  Not a candidate for tolvaptan due to cirrhosis, not a candidate for salt tabs given anasarca, not a candidate for diuretics given ALEJANDRA potentially due to HRS  Add fluid restriction  Treat underlying condition  4   Hypokalemia and hypomagnesemia  · Patient will be given supplementation in the form of oral potassium and IV magnesium  5  Cirrhosis of the liver with potential hepatorenal syndrome  · Appreciate gastroenterology's opinion on this matter  6  Left lower quadrant pain  · Per general surgery  7  Generalized anasarca  · Harsh murmur-consider echocardiogram   Discussed with primary team         ROS  Examined lying in bed  Complains of ongoing abdominal pain  A complete 10 point review of systems have been performed and are otherwise negative         Historical Information   Past Medical History:   Diagnosis Date   • Anemia    • Atrial fibrillation ([facility] [address] )    • Depression    • GI "(gastrointestinal bleed)    • Ischemic colitis ([facility] [address] )      Past Surgical History:   Procedure Laterality Date   • APPENDECTOMY     • CARDIAC SURGERY     • CHOLECYSTECTOMY     • HIP SURGERY Right     Partial replacement   • HYSTERECTOMY       Social History   Social History     Substance and Sexual Activity   Alcohol Use Never     Social History     Substance and Sexual Activity   Drug Use Never     Social History     Tobacco Use   Smoking Status Former   • Types: Cigarettes   Smokeless Tobacco Never       Family History:   Family History   Problem Relation Age of Onset   • No Known Problems Mother        Medications:  Pertinent medications were reviewed  Current Facility-Administered Medications   Medication Dose Route Frequency Provider Last Rate   • acetaminophen  650 mg Oral Q6H PRN Dar Murphy MD     • barium  900 mL Oral Once in imaging Gregory Negron MD     • enoxaparin  30 mg Subcutaneous Daily Gregory Negron MD     • erythromycin  0 5 inch Left Eye BID Gregory Negron MD     • levothyroxine  75 mcg Oral Early Morning Dar Murphy MD     • magnesium hydroxide  30 mL Oral Daily PRN Efra Gray PA-C     • metoprolol succinate  12 5 mg Oral BID Chau Gao PA-C     • midodrine  2 5 mg Oral TID AC Chau Gao PA-C     • morphine injection  4 mg Intravenous Q3H PRN Aleshia Rodriguez PA-C     • ondansetron  4 mg Intravenous Q6H PRN Gregory Negron MD     • pantoprazole  40 mg Oral Early Morning Dar Murphy MD     • potassium chloride  20 mEq Oral Q4H EBEN Brennan     • traZODone  50 mg Oral HS Aleshia Rodriguez PA-C           Allergies   Allergen Reactions   • Influenza Vaccines Other (See Comments) and Vomiting     nausea   • Lactose - Food Allergy Diarrhea   • Ceftaroline GI Intolerance   • Diphenhydramine Hyperactivity         Vitals:   BP (!) 100/38   Pulse 57   Temp (!) 97 4 °F (36 3 °C) (Oral)   Resp 18   Ht 4' 11\" " "(1 499 m)   Wt 54 kg (119 lb)   SpO2 98%   BMI 24 04 kg/m²   Body mass index is 24 04 kg/m²  SpO2: 98 %,   SpO2 Activity: At Rest,   O2 Device: Nasal cannula      Intake/Output Summary (Last 24 hours) at 5/5/2023 1122  Last data filed at 5/4/2023 1752  Gross per 24 hour   Intake 1080 ml   Output --   Net 1080 ml     Invasive Devices     Peripheral Intravenous Line  Duration           Peripheral IV 05/03/23 Dorsal (posterior); Right Hand 1 day    Peripheral IV 05/05/23 Dorsal (posterior); Left Hand <1 day                Physical Exam  General: conscious, cooperative, in no acute distress  Eyes: conjunctivae pink, anicteric sclerae  ENT: lips and mucous membranes moist  Neck: supple, no JVD, no masses  Chest: Diminished breath sounds bilaterally, no crackles, ronchus or wheezings  CVS: S1 & S2, normal rate, regular rhythm harsh murmur  Abdomen: soft, tenderness to left lower quadrant, distended, firm, normoactive bowel sounds  Extremities: Moderate edema of both legs  Skin: no rash  Neuro: awake, alert, oriented      Diagnostic Data:  Lab: I have personally reviewed pertinent lab results  ,   CBC:  Results from last 7 days   Lab Units 05/05/23  0132   WBC Thousand/uL 9 17   HEMOGLOBIN g/dL 9 6*   HEMATOCRIT % 27 4*   PLATELETS Thousands/uL 62*      CMP:   Lab Results   Component Value Date    SODIUM 123 (L) 05/05/2023    K 3 2 (L) 05/05/2023    CL 93 (L) 05/05/2023    CO2 20 (L) 05/05/2023    BUN 40 (H) 05/05/2023    CREATININE 2 05 (H) 05/05/2023    CALCIUM 7 6 (L) 05/05/2023    AST 59 (H) 05/05/2023    ALT 22 05/05/2023    ALKPHOS 322 (H) 05/05/2023    EGFR 23 05/05/2023   ,   PT/INR: No results found for: PT, INR,   Magnesium: No components found for: MAG,  Phosphorous: No results found for: PHOS    Microbiology:  @LABRCNTIP,(urinecx:7)@        TyreseEBEN Canseco    Portions of the record may have been created with voice recognition software   Occasional wrong word or \"sound a like\" substitutions may have occurred " due to the inherent limitations of voice recognition software  Read the chart carefully and recognize, using context, where substitutions have occurred

## 2023-05-05 NOTE — ASSESSMENT & PLAN NOTE
· Nephrology following and ALEJANDRA was suspicious of hepatorenal syndrome  · Blood pressure running soft/low  · Currently on midodrine  · Losartan was discontinued due to ALEJANDRA ; on low dose Toprol XL for Afib   · On toprol XL 25 BID for afib ; rate is 50-60's even with Toprol was not given due to BP parameters on 05/05 ; will monitor off TOPROL and if indicated to restart 25 mg QD not BID

## 2023-05-06 LAB
ANION GAP SERPL CALCULATED.3IONS-SCNC: 7 MMOL/L (ref 4–13)
BASOPHILS # BLD AUTO: 0.02 THOUSANDS/ÂΜL (ref 0–0.1)
BASOPHILS NFR BLD AUTO: 0 % (ref 0–1)
BUN SERPL-MCNC: 39 MG/DL (ref 5–25)
CALCIUM SERPL-MCNC: 8.2 MG/DL (ref 8.4–10.2)
CHLORIDE SERPL-SCNC: 96 MMOL/L (ref 96–108)
CO2 SERPL-SCNC: 22 MMOL/L (ref 21–32)
CORTIS AM PEAK SERPL-MCNC: 12.6 UG/DL (ref 4.2–22.4)
CREAT SERPL-MCNC: 1.99 MG/DL (ref 0.6–1.3)
EOSINOPHIL # BLD AUTO: 0.12 THOUSAND/ÂΜL (ref 0–0.61)
EOSINOPHIL NFR BLD AUTO: 2 % (ref 0–6)
ERYTHROCYTE [DISTWIDTH] IN BLOOD BY AUTOMATED COUNT: 14.7 % (ref 11.6–15.1)
GFR SERPL CREATININE-BSD FRML MDRD: 23 ML/MIN/1.73SQ M
GLUCOSE SERPL-MCNC: 124 MG/DL (ref 65–140)
HCT VFR BLD AUTO: 27.5 % (ref 34.8–46.1)
HGB BLD-MCNC: 9.5 G/DL (ref 11.5–15.4)
IMM GRANULOCYTES # BLD AUTO: 0.03 THOUSAND/UL (ref 0–0.2)
IMM GRANULOCYTES NFR BLD AUTO: 1 % (ref 0–2)
LYMPHOCYTES # BLD AUTO: 0.75 THOUSANDS/ÂΜL (ref 0.6–4.47)
LYMPHOCYTES NFR BLD AUTO: 12 % (ref 14–44)
MAGNESIUM SERPL-MCNC: 2.1 MG/DL (ref 1.9–2.7)
MCH RBC QN AUTO: 34.4 PG (ref 26.8–34.3)
MCHC RBC AUTO-ENTMCNC: 34.5 G/DL (ref 31.4–37.4)
MCV RBC AUTO: 100 FL (ref 82–98)
MONOCYTES # BLD AUTO: 1.36 THOUSAND/ÂΜL (ref 0.17–1.22)
MONOCYTES NFR BLD AUTO: 21 % (ref 4–12)
NEUTROPHILS # BLD AUTO: 4.2 THOUSANDS/ÂΜL (ref 1.85–7.62)
NEUTS SEG NFR BLD AUTO: 64 % (ref 43–75)
NRBC BLD AUTO-RTO: 0 /100 WBCS
PLATELET # BLD AUTO: 66 THOUSANDS/UL (ref 149–390)
PMV BLD AUTO: 11 FL (ref 8.9–12.7)
POTASSIUM SERPL-SCNC: 4.2 MMOL/L (ref 3.5–5.3)
RBC # BLD AUTO: 2.76 MILLION/UL (ref 3.81–5.12)
SODIUM SERPL-SCNC: 125 MMOL/L (ref 135–147)
WBC # BLD AUTO: 6.48 THOUSAND/UL (ref 4.31–10.16)

## 2023-05-06 PROCEDURE — 99232 SBSQ HOSP IP/OBS MODERATE 35: CPT

## 2023-05-06 PROCEDURE — 82533 TOTAL CORTISOL: CPT | Performed by: NURSE PRACTITIONER

## 2023-05-06 PROCEDURE — 99232 SBSQ HOSP IP/OBS MODERATE 35: CPT | Performed by: INTERNAL MEDICINE

## 2023-05-06 PROCEDURE — 85025 COMPLETE CBC W/AUTO DIFF WBC: CPT | Performed by: STUDENT IN AN ORGANIZED HEALTH CARE EDUCATION/TRAINING PROGRAM

## 2023-05-06 PROCEDURE — 88305 TISSUE EXAM BY PATHOLOGIST: CPT | Performed by: STUDENT IN AN ORGANIZED HEALTH CARE EDUCATION/TRAINING PROGRAM

## 2023-05-06 PROCEDURE — 83735 ASSAY OF MAGNESIUM: CPT | Performed by: STUDENT IN AN ORGANIZED HEALTH CARE EDUCATION/TRAINING PROGRAM

## 2023-05-06 PROCEDURE — 80048 BASIC METABOLIC PNL TOTAL CA: CPT | Performed by: NURSE PRACTITIONER

## 2023-05-06 RX ADMIN — LEVOTHYROXINE SODIUM 75 MCG: 75 TABLET ORAL at 05:37

## 2023-05-06 RX ADMIN — PANTOPRAZOLE SODIUM 40 MG: 40 TABLET, DELAYED RELEASE ORAL at 05:37

## 2023-05-06 RX ADMIN — MIDODRINE HYDROCHLORIDE 5 MG: 5 TABLET ORAL at 17:02

## 2023-05-06 RX ADMIN — ALBUMIN (HUMAN) 12.5 G: 0.25 INJECTION, SOLUTION INTRAVENOUS at 11:45

## 2023-05-06 RX ADMIN — ERYTHROMYCIN 0.5 INCH: 5 OINTMENT OPHTHALMIC at 17:02

## 2023-05-06 RX ADMIN — ERYTHROMYCIN 0.5 INCH: 5 OINTMENT OPHTHALMIC at 08:26

## 2023-05-06 RX ADMIN — OCTREOTIDE ACETATE 100 MCG: 100 INJECTION, SOLUTION INTRAVENOUS; SUBCUTANEOUS at 05:37

## 2023-05-06 RX ADMIN — TRAZODONE HYDROCHLORIDE 50 MG: 50 TABLET ORAL at 21:16

## 2023-05-06 RX ADMIN — MIDODRINE HYDROCHLORIDE 5 MG: 5 TABLET ORAL at 11:45

## 2023-05-06 RX ADMIN — OCTREOTIDE ACETATE 100 MCG: 100 INJECTION, SOLUTION INTRAVENOUS; SUBCUTANEOUS at 14:58

## 2023-05-06 RX ADMIN — OCTREOTIDE ACETATE 100 MCG: 100 INJECTION, SOLUTION INTRAVENOUS; SUBCUTANEOUS at 21:16

## 2023-05-06 RX ADMIN — HEPARIN SODIUM 5000 UNITS: 5000 INJECTION INTRAVENOUS; SUBCUTANEOUS at 21:16

## 2023-05-06 RX ADMIN — MIDODRINE HYDROCHLORIDE 5 MG: 5 TABLET ORAL at 08:25

## 2023-05-06 RX ADMIN — ALBUMIN (HUMAN) 12.5 G: 0.25 INJECTION, SOLUTION INTRAVENOUS at 05:37

## 2023-05-06 RX ADMIN — DICYCLOMINE HYDROCHLORIDE 10 MG: 10 CAPSULE ORAL at 21:16

## 2023-05-06 RX ADMIN — HEPARIN SODIUM 5000 UNITS: 5000 INJECTION INTRAVENOUS; SUBCUTANEOUS at 14:58

## 2023-05-06 RX ADMIN — ALBUMIN (HUMAN) 12.5 G: 0.25 INJECTION, SOLUTION INTRAVENOUS at 17:02

## 2023-05-06 RX ADMIN — ALBUMIN (HUMAN) 12.5 G: 0.25 INJECTION, SOLUTION INTRAVENOUS at 23:14

## 2023-05-06 RX ADMIN — HEPARIN SODIUM 5000 UNITS: 5000 INJECTION INTRAVENOUS; SUBCUTANEOUS at 05:37

## 2023-05-06 NOTE — PLAN OF CARE
Problem: Potential for Falls  Goal: Patient will remain free of falls  Description: INTERVENTIONS:  - Educate patient/family on patient safety including physical limitations  - Instruct patient to call for assistance with activity   - Consult OT/PT to assist with strengthening/mobility   - Keep Call bell within reach  - Keep bed low and locked with side rails adjusted as appropriate  - Keep care items and personal belongings within reach  - Initiate and maintain comfort rounds  - Make Fall Risk Sign visible to staff  - Offer Toileting every 2 Hours, in advance of need  - Initiate/Maintain alarms  - Obtain necessary fall risk management equipment:  - Apply yellow socks and bracelet for high fall risk patients  - Consider moving patient to room near nurses station  Outcome: Progressing     Problem: PAIN - ADULT  Goal: Verbalizes/displays adequate comfort level or baseline comfort level  Description: Interventions:  - Encourage patient to monitor pain and request assistance  - Assess pain using appropriate pain scale  - Administer analgesics based on type and severity of pain and evaluate response  - Implement non-pharmacological measures as appropriate and evaluate response  - Consider cultural and social influences on pain and pain management  - Notify physician/advanced practitioner if interventions unsuccessful or patient reports new pain  Outcome: Progressing     Problem: SAFETY ADULT  Goal: Patient will remain free of falls  Description: INTERVENTIONS:  - Educate patient/family on patient safety including physical limitations  - Instruct patient to call for assistance with activity   - Consult OT/PT to assist with strengthening/mobility   - Keep Call bell within reach  - Keep bed low and locked with side rails adjusted as appropriate  - Keep care items and personal belongings within reach  - Initiate and maintain comfort rounds  - Make Fall Risk Sign visible to staff  - Offer Toileting every 2 Hours, in advance of need  - Initiate/Maintain alarms  - Obtain necessary fall risk management equipment:  - Apply yellow socks and bracelet for high fall risk patients  - Consider moving patient to room near nurses station  Outcome: Progressing  Goal: Maintain or return to baseline ADL function  Description: INTERVENTIONS:  - Educate patient/family on patient safety including physical limitations  - Instruct patient to call for assistance with activity   - Consult OT/PT to assist with strengthening/mobility   - Keep Call bell within reach  - Keep bed low and locked with side rails adjusted as appropriate  - Keep care items and personal belongings within reach  - Initiate and maintain comfort rounds  - Make Fall Risk Sign visible to staff  - Offer Toileting every 2 Hours, in advance of need  - Initiate/Maintain alarms  - Obtain necessary fall risk management equipment:   - Apply yellow socks and bracelet for high fall risk patients  - Consider moving patient to room near nurses station  Outcome: Progressing  Goal: Maintains/Returns to pre admission functional level  Description: INTERVENTIONS:  - Perform BMAT or MOVE assessment daily    - Set and communicate daily mobility goal to care team and patient/family/caregiver  - Collaborate with rehabilitation services on mobility goals if consulted  - Perform Range of Motion 3 times a day  - Reposition patient every 2 hours    - Dangle patient 3 times a day  - Stand patient 3 times a day  - Ambulate patient 3 times a day  - Out of bed to chair 3 times a day   - Out of bed for meals 3 times a day  - Out of bed for toileting  - Record patient progress and toleration of activity level   Outcome: Progressing     Problem: INFECTION - ADULT  Goal: Absence or prevention of progression during hospitalization  Description: INTERVENTIONS:  - Assess and monitor for signs and symptoms of infection  - Monitor lab/diagnostic results  - Monitor all insertion sites, i e  indwelling lines, tubes, and drains  - Monitor endotracheal if appropriate and nasal secretions for changes in amount and color  - [place] appropriate cooling/warming therapies per order  - Administer medications as ordered  - Instruct and encourage patient and family to use good hand hygiene technique  - Identify and instruct in appropriate isolation precautions for identified infection/condition  Outcome: Progressing     Problem: DISCHARGE PLANNING  Goal: Discharge to home or other facility with appropriate resources  Description: INTERVENTIONS:  - Identify barriers to discharge w/patient and caregiver  - Arrange for needed discharge resources and transportation as appropriate  - Identify discharge learning needs (meds, wound care, etc )  - Refer to Case Management Department for coordinating discharge planning if the patient needs post-hospital services based on physician/advanced practitioner order or complex needs related to functional status, cognitive ability, or social support system  Outcome: Progressing     Problem: Knowledge Deficit  Goal: Patient/family/caregiver demonstrates understanding of disease process, treatment plan, medications, and discharge instructions  Description: Complete learning assessment and assess knowledge base    Interventions:  - Provide teaching at level of understanding  - Provide teaching via preferred learning methods  Outcome: Progressing     Problem: Prexisting or High Potential for Compromised Skin Integrity  Goal: Skin integrity is maintained or improved  Description: INTERVENTIONS:  - Identify patients at risk for skin breakdown  - Assess and monitor skin integrity  - Assess and monitor nutrition and hydration status  - Monitor labs   - Assess for incontinence   - Turn and reposition patient  - Assist with mobility/ambulation  - Relieve pressure over bony prominences  - Avoid friction and shearing  - Provide appropriate hygiene as needed including keeping skin clean and dry  - Evaluate need for skin moisturizer/barrier cream  - Collaborate with interdisciplinary team   - Patient/family teaching  - Consider wound care consult   Outcome: Progressing     Problem: MOBILITY - ADULT  Goal: Maintain or return to baseline ADL function  Description: INTERVENTIONS:  - Educate patient/family on patient safety including physical limitations  - Instruct patient to call for assistance with activity   - Consult OT/PT to assist with strengthening/mobility   - Keep Call bell within reach  - Keep bed low and locked with side rails adjusted as appropriate  - Keep care items and personal belongings within reach  - Initiate and maintain comfort rounds  - Make Fall Risk Sign visible to staff  - Offer Toileting every 2 Hours, in advance of need  - Initiate/Maintain alarms  - Obtain necessary fall risk management equipment:   - Apply yellow socks and bracelet for high fall risk patients  - Consider moving patient to room near nurses station  Outcome: Progressing    Problem: Nutrition/Hydration-ADULT  Goal: Nutrient/Hydration intake appropriate for improving, restoring or maintaining nutritional needs  Description: Monitor and assess patient's nutrition/hydration status for malnutrition  Collaborate with interdisciplinary team and initiate plan and interventions as ordered  Monitor patient's weight and dietary intake as ordered or per policy  Utilize nutrition screening tool and intervene as necessary  Determine patient's food preferences and provide high-protein, high-caloric foods as appropriate       INTERVENTIONS:  - Monitor oral intake, urinary output, labs, and treatment plans  - Assess nutrition and hydration status and recommend course of action  - Evaluate amount of meals eaten  - Assist patient with eating if necessary   - Allow adequate time for meals  - Recommend/ encourage appropriate diets, oral nutritional supplements, and vitamin/mineral supplements  - Order, calculate, and assess calorie counts as needed  - Recommend, monitor, and adjust tube feedings and TPN/PPN based on assessed needs  - Assess need for intravenous fluids  - Provide specific nutrition/hydration education as appropriate  - Include patient/family/caregiver in decisions related to nutrition  Outcome: Progressing

## 2023-05-06 NOTE — ASSESSMENT & PLAN NOTE
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%  Back pain more severe today, no N/V, some fecal incontinence  Abdomen soft, mild distention, good bowel sounds, mild TTP without guarding  T 97 6, HR 60, RR 17, /50, SpO2 97%  Na 125, BUN 39, Cr 1 99, Hgb 9 5, Plt 66    Assessment:  LLQ pain, ddx undifferentiated colitis without obstruction/bleeding/perforation, stable off antibiotics  Plan:  Abdomen exam stable this morning  Tolerating clears with crackers, can advance as tolerated  Defer to GI for bowel regimen  Defer to Nephro for suspected hepatorenal syndrome  Defer to Ray Becker regarding low back pain and MRI findings  Will discuss with Dr Gabriela Huang

## 2023-05-06 NOTE — ASSESSMENT & PLAN NOTE
· Baseline appears to be between 11 and 12   · Today hemoglobin 9 5   · No obvious signs of bleeding  · Continue to monitor for bleeding  · GI following, appreciate recommendations  · CBC a m

## 2023-05-06 NOTE — ASSESSMENT & PLAN NOTE
• Nephrology following, appreciate recommendations  • Etiology hepatorenal syndrome  • 5/5 urine sodium less than 5  • Continue midodrine, octreotide and albumin per nephrology  • Avoid hypotension and nephrotoxins  • Intake and output  • Daily weights  • BMP a m

## 2023-05-06 NOTE — ASSESSMENT & PLAN NOTE
· Presented to the ED with left lower quadrant abdominal pain on 4/27   · CT abdomen: New small amount of free fluid in the abdomen and pelvis compared to 4/18/2023, which can be secondary to cirrhosis or acute inflammatory process in the abdomen and pelvis such as occult acute diverticulitis  Moderate amount of stool in the colon, nonspecific and can represent constipation    · Surgery following, appreciate recommendations  · GI following, appreciate recommendations  · Patient had flex sig on 5/2 minimal inflammation noted, biopsies were taken results pending  · On surgical full liquid diet with 1500 fluid restriction  · Patient reports abdominal pain is improved today, concerned because she is now becoming incontinent of stool

## 2023-05-06 NOTE — PROGRESS NOTES
"Progress Note - Nephrology   Sourav Rosa 69 y o  female MRN: [id number]  Unit/Bed#: -01 Encounter: [id number]    A/P:  1  Acute kidney injury -urine sodium is less than 5 suggestive of hepatorenal syndrome    - Started on octreotide 100 mcg subcutaneously every 8 hours, midodrine 5 mg p o  3 times daily and albumin 12 5 g every 6 hours   - Creatinine dropped from 2 05 to 1 9 mg/dL   - Nonoliguric: Positive for 4 3 L   - Weight is inaccurate as she gained 6 3 kg since yesterday on a standing scale   - continue current treatment for another 24 hours    2  Hyponatremia   - 2/2 above with ADH oversecretion   - continue current management    3  Hypokalemia   - resolved with treatment to 4 2 mg/dl    4  Cirrhosis of the liver   - Appreciate GI note  A new diagnosis of cirrhosis was made based on imaging without evidence of ascites, encephalopathy or varices  Will need an outpatient work-up    5  Primary hypertension   - Blood pressure is 123/50 today    6  Hypomagnesemia   - Repleted      Follow up reason for today's visit: Acute kidney injury    Left lower quadrant abdominal pain    Patient Active Problem List   Diagnosis   • Acute kidney injury ([facility] [address] )   • Diarrhea of presumed infectious origin   • Primary hypertension   • Acquired hypothyroidism   • Irritable bowel syndrome with diarrhea   • Abnormal CT scan   • Superior mesenteric artery stenosis (HCC)   • Left lower quadrant abdominal pain   • Hyponatremia   • Hypomagnesemia   • PAF (paroxysmal atrial fibrillation) (HCC)   • Hx of CABG   • Anemia         Subjective:   Complaining of abdominal pain radiating to her left flank and severe pain on moving  Denies chest pain or shortness of breath  Has mild nausea no vomiting  Is voiding well    Objective:     Vitals: Blood pressure 123/50, pulse 60, temperature 97 6 °F (36 4 °C), resp  rate 17, height 4' 11\" (1 499 m), weight 60 3 kg (132 lb 15 oz), SpO2 97 %  ,Body mass index is 26 85 kg/m²      Weight " 77  year old male    h/o  CAD s/p stent , asa.      A-fib/ PPM, , hypothyroid, and total left knee replacement    prior  PPM  interrogation  with  [facility]/   s/p  brief   bursts  of  afib/ , on prior visit   Ct chest, retrosternal hematoma.  mod left pl effusion    was  on bumex/  aldactone        *    admitted   for  worsening   pedal  edema          component  of  cellulitis   and  from  c/c  diastolic  chf,/  h/o  l/edema  of L  leg         pt  admits to  being  non complaint with  meds        *   h/o    c/c AFIB,   has  PPM         on eliquis     *    Anemia, , hb stable, had  been seen by  gi  eval dr joann sanchez in past     *    CAD,     cath, with 2 vessel disease,  s/p  CABG and  MVR  surg d r marni   *      h/o  Afib on    eliquis          c/c leg redness/  4 +  edema, on keflex, has  improved    *     echo,  on 7/2022,  ef  35/ new/ severe  TR          cath,   was non  obstructive    *    on synthroid          on asa/  torpol/  eliquis          crt  noted,   from  lasix// aldactone.  leg  swelling  is  lessening   *   MSSA  bacterinia           was  on iv ancef.  ,  rpt bcx    are  negative          explant pf  ppm  and  Micra  placement on  3/30/23. dr sunitha rocha   *    Echo,  ef  25,  mod  AI.  severe  TR        PICC  line  and  extended  course  of  ab.         bumex./ aldactone stopped/        BEULAH,   seen  by  house [facility]      ?  AIN,  hence   ab was  switched   to iv vanco,  per  ID        follow  vanco  level  and dose  accordingly      on  iv heparin/  Afib      crt   was  9,9, now is  5.5     if crt  continues to decrease,  then perhaps  may defer  on  bx          card /  dr mikayla dick         rad< from: TTE with Doppler (w/Cont) (07.05.22 @ 09:42) >  Conclusions:  Endocardial visualization enhanced with intravenous  injection of Ultrasonic Enhancing Agent (Definity).  Severe left ventricular enlargement.  Estimated ejection fraction 35%. Diffuse hypokinesis, with  inferior akinesis.  Bioprosthetic mitral valve with normal function.  Right ventricular enlargement with decreased right  ventricular systolic function.  A device wire is noted in the right heart.  ------------------------------------------------------------------------  Confirmed on  7/5/2022 - 12:16:10 by Kristian    < end of copied text >                                  "(last 2 days)     Date/Time Weight    05/06/23 0600 60 3 (132 94)     Weight: Patient unable to stand at this time due to weakness and severe pain  Bed scale reading inaccurate  Will notify next shift  at 05/06/23 0600    05/05/23 1000 54 (119)    05/05/23 0540 54 (119 05)    05/04/23 0600 53 8 (118 61)            Intake/Output Summary (Last 24 hours) at 5/6/2023 1014  Last data filed at 5/6/2023 6778  Gross per 24 hour   Intake 360 ml   Output 525 ml   Net -165 ml     I/O last 3 completed shifts: In: 360 [P O :360]  Out: 200 [Urine:200]         Physical Exam: /50   Pulse 60   Temp 97 6 °F (36 4 °C)   Resp 17   Ht 4' 11\" (1 499 m)   Wt 60 3 kg (132 lb 15 oz) Comment: Patient unable to stand at this time due to weakness and severe pain  Bed scale reading inaccurate  Will notify next shift    SpO2 97%   BMI 26 85 kg/m²     General Appearance:    Alert frail ill appearing mildly flushed appears stated age   Head:    Normocephalic, without obvious abnormality, atraumatic   Eyes:    Conjunctiva/corneas clear   Ears:    Normal external ears   Nose:   Nares normal, septum midline, mucosa normal, no drainage    or sinus tenderness   Throat:   Lips, mucosa, and tongue normal; teeth and gums normal   Neck:   Supple, symmetrical, trachea midline, no adenopathy;        thyroid:  No enlargement/tenderness/nodules; no carotid    bruit or JVD   Back:     Symmetric, no curvature, ROM normal, no CVA tenderness   Lungs:     Clear to auscultation bilaterally, respirations unlabored   Chest wall:    No tenderness or deformity   Heart:    Regular rate and rhythm, S1 and S2 normal, no murmur, rub   or gallop   Abdomen:      Distended, firm and tender Soft, non-tender, bowel sounds active   Extremities:   Extremities normal, atraumatic, no cyanosis 2-3+  edema   Skin:   Skin color, texture, turgor normal, no rashes or lesions   Lymph nodes:   Cervical normal   Neurologic:   CNII-XII intact            Lab, Imaging and other " studies: I have personally reviewed pertinent labs  CBC:   Lab Results   Component Value Date    WBC 6 48 05/06/2023    HGB 9 5 (L) 05/06/2023    HCT 27 5 (L) 05/06/2023     (H) 05/06/2023    PLT 66 (L) 05/06/2023    MCH 34 4 (H) 05/06/2023    MCHC 34 5 05/06/2023    RDW 14 7 05/06/2023    MPV 11 0 05/06/2023    NRBC 0 05/06/2023     CMP:   Lab Results   Component Value Date    K 4 2 05/06/2023    CL 96 05/06/2023    CO2 22 05/06/2023    BUN 39 (H) 05/06/2023    CREATININE 1 99 (H) 05/06/2023    CALCIUM 8 2 (L) 05/06/2023    EGFR 23 05/06/2023         Results from last 7 days   Lab Units 05/06/23  0536 05/05/23  0132 05/04/23  0508 05/03/23  0696 05/01/23  0549 04/30/23  1556 04/30/23  0545   POTASSIUM mmol/L 4 2 3 2* 3 5   < > 4 1   < > 4 7   CHLORIDE mmol/L 96 93* 95*   < > 99   < > 101   CO2 mmol/L 22 20* 21   < > 19*   < > 15*   BUN mg/dL 39* 40* 36*   < > 22   < > 15   CREATININE mg/dL 1 99* 2 05* 2 10*   < > 1 08   < > 0 98   CALCIUM mg/dL 8 2* 7 6* 8 2*   < > 8 4   < > 8 5   ALK PHOS U/L  --  322*  --   --  331*  --  330*   ALT U/L  --  22  --   --  18  --  16   AST U/L  --  59*  --   --  40*  --  44*    < > = values in this interval not displayed  Phosphorus: No results found for: PHOS  Magnesium:   Lab Results   Component Value Date    MG 2 1 05/06/2023     Urinalysis: No results found for: Basim Dayanna, SPECGRAV, PHUR, LEUKOCYTESUR, NITRITE, PROTEINUA, GLUCOSEU, KETONESU, BILIRUBINUR, BLOODU  Ionized Calcium: No results found for: CAION  Coagulation: No results found for: PT, INR, APTT  Troponin: No results found for: TROPONINI  ABG: No results found for: PHART, SIA7EVC, PO2ART, KBO0EUE, R4PZCXHG, BEART, SOURCE  Radiology review:     IMAGING  Procedure: Flexible Sigmoidoscopy    Result Date: 5/3/2023  Narrative: Table formatting from the original result was not included   [facility] Endoscopy [address] [place] [id number] [address] [place] [address],[facility] Floor: 5/03/23 PHYSICIAN(S): Attending: Kalin Mckeon DO Fellow: No Staff Documented INDICATION: Diarrhea, unspecified type POST-OP DIAGNOSIS: See the impression below  HISTORY: Prior colonoscopy: Less than 3 years ago  It is being repeated at an interval of less than 3 years because: This colonoscopy is being performed for a diagnostic indication BOWEL PREPARATION: Enema PREPROCEDURE: Informed consent was obtained for the procedure, including sedation  Risks including but not limited to bleeding, infection, perforation, adverse drug reaction and aspiration were explained in detail  Also explained about less than 100% sensitivity with the exam and other alternatives  The patient was placed in the left lateral decubitus position  Procedure: Colonoscopy DETAILS OF PROCEDURE: Patient was taken to the procedure room where a time out was performed to confirm correct patient and correct procedure  The patient underwent monitored anesthesia care, which was administered by an anesthesia professional  The patient's blood pressure, heart rate, level of consciousness, oxygen and respirations were monitored throughout the procedure  A digital rectal exam was performed  The scope was introduced through the anus and advanced to the descending colon  Retroflexion was performed in the rectum  The quality of bowel preparation was evaluated using the [facility] Bowel Preparation Scale with scores of: right colon = 0, transverse colon = not assessed, left colon = not assessed  Bowel prep was not adequate  The patient experienced no blood loss  The procedure was not difficult  The patient tolerated the procedure well  There were no apparent complications  The total BBPS score was 0   ANESTHESIA INFORMATION: ASA: III Anesthesia Type: IV Sedation with Anesthesia MEDICATIONS: No administrations occurring from 1337 to 1422 on 05/03/23 FINDINGS: Mild abnormal mucosa with erosion in the rectum; performed cold forceps biopsy EVENTS: Procedure Events Event Event Time ENDO SCOPE OUT TIME 5/3/2023  2:20 PM SPECIMENS: ID Type Source Tests Collected by Time Destination 1 : BX, R/O COLITIS Tissue Rectum TISSUE EXAM Joelle ADRIA DO Nadia 5/3/2023  2:18 PM  EQUIPMENT: Colonoscope - SEE GASTRPSCOPE     Impression: Exam limited due to stool burden  Areas examined demonstrate normal mucosa; scope advanced to approximately 55cm, thereafter extensive stool blocking lumen Area of scant inflammation in rectum  Biopsies obtained to rule out colitis/microscopic colitis RECOMMENDATION:  Await pathology results  Follow up in GI office for ongoing care and history of liver disease  Qian Zuniga DO     Procedure: Echo complete w/ contrast if indicated    Result Date: 5/5/2023  Narrative: •  Left Ventricle: Left ventricular cavity size is normal  Wall thickness is normal  There is mild concentric hypertrophy  The left ventricular ejection fraction is 66%  Systolic function is normal  Wall motion is normal  Diastolic function is normal for age  •  Right Ventricle: Right ventricular cavity size is dilated  •  Left Atrium: The atrium is dilated  •  Right Atrium: The atrium is dilated  •  Aortic Valve: The aortic valve is trileaflet  The leaflets are mildly thickened  The leaflets are moderately calcified  There is moderately reduced mobility  There is mild regurgitation  There is mild stenosis  The aortic valve peak velocity is 2 29 m/s  The aortic valve mean gradient is 10 mmHg  The aortic valve area is 1 23 cm2  •  Mitral Valve: There is annular calcification  There is mild regurgitation  •  Tricuspid Valve: There is moderate to severe regurgitation   The right ventricular systolic pressure is normal        Current Facility-Administered Medications   Medication Dose Route Frequency   • acetaminophen (TYLENOL) tablet 650 mg  650 mg Oral Q6H PRN   • albumin human (FLEXBUMIN) 25 % injection 12 5 g  12 5 g Intravenous Q6H   • barium (READI-CAT 2) suspension 900 mL  900 mL Oral Once in imaging   • dicyclomine (BENTYL) capsule 10 mg  10 mg Oral TID PRN   • erythromycin (ILOTYCIN) 0 5 % ophthalmic ointment 0 5 inch  0 5 inch Left Eye BID   • heparin (porcine) subcutaneous injection 5,000 Units  5,000 Units Subcutaneous Q8H [address]   • levothyroxine tablet 75 mcg  75 mcg Oral Early Morning   • magnesium hydroxide (MILK OF MAGNESIA) oral suspension 30 mL  30 mL Oral Daily PRN   • midodrine (PROAMATINE) tablet 5 mg  5 mg Oral TID AC   • morphine injection 4 mg  4 mg Intravenous Q3H PRN   • octreotide (SandoSTATIN) injection 100 mcg  100 mcg Subcutaneous Q8H [address]   • ondansetron (ZOFRAN) injection 4 mg  4 mg Intravenous Q6H PRN   • pantoprazole (PROTONIX) EC tablet 40 mg  40 mg Oral Early Morning   • polyethylene glycol (MIRALAX) packet 17 g  17 g Oral Daily   • traZODone (DESYREL) tablet 50 mg  50 mg Oral HS     Medications Discontinued During This Encounter   Medication Reason   • polyethylene glycol (GLYCOLAX) 17 GM/SCOOP powder Therapy completed   • ciprofloxacin (CIPRO) IVPB (premix in 5% dextrose) 400 mg 200 mL    • metroNIDAZOLE (FLAGYL) IVPB (premix) 500 mg 100 mL    • dextrose 5 % and sodium chloride 0 45 % with KCl 20 mEq/L infusion    • sodium chloride 0 9 % infusion    • polyethylene glycol (MIRALAX) packet 17 g    • ciprofloxacin (CIPRO) tablet 500 mg    • metroNIDAZOLE (FLAGYL) tablet 500 mg    • sodium bicarbonate tablet 650 mg    • enoxaparin (LOVENOX) subcutaneous injection 40 mg    • furosemide (LASIX) injection 80 mg    • multi-electrolyte (PLASMALYTE-A/ISOLYTE-S PH 7 4) IV solution    • metoprolol succinate (TOPROL-XL) 24 hr tablet 25 mg    • midodrine (PROAMATINE) tablet 2 5 mg    • enoxaparin (LOVENOX) subcutaneous injection 30 mg    • metoprolol succinate (TOPROL-XL) 24 hr tablet 12 5 mg        Audrey Hurtado MD      This progress note was produced in part using a dictation device which may document imprecise wording from author's original intent

## 2023-05-06 NOTE — PLAN OF CARE
Problem: Potential for Falls  Goal: Patient will remain free of falls  Description: INTERVENTIONS:  - Educate patient/family on patient safety including physical limitations  - Instruct patient to call for assistance with activity   - Consult OT/PT to assist with strengthening/mobility   - Keep Call bell within reach  - Keep bed low and locked with side rails adjusted as appropriate  - Keep care items and personal belongings within reach  - Initiate and maintain comfort rounds  - Make Fall Risk Sign visible to staff  - Offer Toileting every 2 Hours, in advance of need  - Initiate/Maintain alarms  - Obtain necessary fall risk management equipment:  - Apply yellow socks and bracelet for high fall risk patients  - Consider moving patient to room near nurses station  5/6/2023 1106 by Medhat Muhammad RN  Outcome: Progressing  5/6/2023 1105 by Medhat Muhammad RN  Outcome: Progressing     Problem: PAIN - ADULT  Goal: Verbalizes/displays adequate comfort level or baseline comfort level  Description: Interventions:  - Encourage patient to monitor pain and request assistance  - Assess pain using appropriate pain scale  - Administer analgesics based on type and severity of pain and evaluate response  - Implement non-pharmacological measures as appropriate and evaluate response  - Consider cultural and social influences on pain and pain management  - Notify physician/advanced practitioner if interventions unsuccessful or patient reports new pain  5/6/2023 1106 by Medhat Muhammad RN  Outcome: Progressing  5/6/2023 1105 by Medhat Muhammad RN  Outcome: Progressing     Problem: SAFETY ADULT  Goal: Patient will remain free of falls  Description: INTERVENTIONS:  - Educate patient/family on patient safety including physical limitations  - Instruct patient to call for assistance with activity   - Consult OT/PT to assist with strengthening/mobility   - Keep Call bell within reach  - Keep bed low and locked with side rails adjusted as appropriate  - Keep care items and personal belongings within reach  - Initiate and maintain comfort rounds  - Make Fall Risk Sign visible to staff  - Offer Toileting every 2 Hours, in advance of need  - Initiate/Maintain alarms  - Obtain necessary fall risk management equipment:  - Apply yellow socks and bracelet for high fall risk patients  - Consider moving patient to room near nurses station  5/6/2023 1106 by Natasha Sahu RN  Outcome: Progressing  5/6/2023 1105 by Natasha Sahu RN  Outcome: Progressing  Goal: Maintain or return to baseline ADL function  Description: INTERVENTIONS:  - Educate patient/family on patient safety including physical limitations  - Instruct patient to call for assistance with activity   - Consult OT/PT to assist with strengthening/mobility   - Keep Call bell within reach  - Keep bed low and locked with side rails adjusted as appropriate  - Keep care items and personal belongings within reach  - Initiate and maintain comfort rounds  - Make Fall Risk Sign visible to staff  - Offer Toileting every 2 Hours, in advance of need  - Initiate/Maintain alarms  - Obtain necessary fall risk management equipment:   - Apply yellow socks and bracelet for high fall risk patients  - Consider moving patient to room near nurses station  5/6/2023 1106 by Natasha Sahu RN  Outcome: Progressing  5/6/2023 1105 by Natasha Sahu RN  Outcome: Progressing  Goal: Maintains/Returns to pre admission functional level  Description: INTERVENTIONS:  - Perform BMAT or MOVE assessment daily    - Set and communicate daily mobility goal to care team and patient/family/caregiver     - Collaborate with rehabilitation services on mobility goals if consulted  - Out of bed for toileting  - Record patient progress and toleration of activity level   5/6/2023 1106 by Natasha Sahu RN  Outcome: Progressing  5/6/2023 1105 by Natasha Sahu RN  Outcome: Progressing     Problem: INFECTION - ADULT  Goal: Absence or prevention of progression during hospitalization  Description: INTERVENTIONS:  - Assess and monitor for signs and symptoms of infection  - Monitor lab/diagnostic results  - Monitor all insertion sites, i e  indwelling lines, tubes, and drains  - Monitor endotracheal if appropriate and nasal secretions for changes in amount and color  - [place] appropriate cooling/warming therapies per order  - Administer medications as ordered  - Instruct and encourage patient and family to use good hand hygiene technique  - Identify and instruct in appropriate isolation precautions for identified infection/condition  5/6/2023 1106 by Zaida Honeycutt RN  Outcome: Progressing  5/6/2023 1105 by Zaida Honeycutt RN  Outcome: Progressing     Problem: DISCHARGE PLANNING  Goal: Discharge to home or other facility with appropriate resources  Description: INTERVENTIONS:  - Identify barriers to discharge w/patient and caregiver  - Arrange for needed discharge resources and transportation as appropriate  - Identify discharge learning needs (meds, wound care, etc )  - Refer to Case Management Department for coordinating discharge planning if the patient needs post-hospital services based on physician/advanced practitioner order or complex needs related to functional status, cognitive ability, or social support system  5/6/2023 1106 by Zaida Honeycutt RN  Outcome: Progressing  5/6/2023 1105 by Zaida Honeycutt RN  Outcome: Progressing     Problem: Knowledge Deficit  Goal: Patient/family/caregiver demonstrates understanding of disease process, treatment plan, medications, and discharge instructions  Description: Complete learning assessment and assess knowledge base    Interventions:  - Provide teaching at level of understanding  - Provide teaching via preferred learning methods  5/6/2023 1106 by Zaida Honeycutt RN  Outcome: Progressing  5/6/2023 1105 by Zaida Honeycutt RN  Outcome: Progressing     Problem: Prexisting or High Potential for Compromised Skin Integrity  Goal: Skin integrity is maintained or improved  Description: INTERVENTIONS:  - Identify patients at risk for skin breakdown  - Assess and monitor skin integrity  - Assess and monitor nutrition and hydration status  - Monitor labs   - Assess for incontinence   - Turn and reposition patient  - Assist with mobility/ambulation  - Relieve pressure over bony prominences  - Avoid friction and shearing  - Provide appropriate hygiene as needed including keeping skin clean and dry  - Evaluate need for skin moisturizer/barrier cream  - Collaborate with interdisciplinary team   - Patient/family teaching  - Consider wound care consult   5/6/2023 1106 by Shaun Rodriguez RN  Outcome: Progressing  5/6/2023 1105 by Shaun Rodriguez RN  Outcome: Progressing     Problem: MOBILITY - ADULT  Goal: Maintain or return to baseline ADL function  Description: INTERVENTIONS:  - Educate patient/family on patient safety including physical limitations  - Instruct patient to call for assistance with activity   - Consult OT/PT to assist with strengthening/mobility   - Keep Call bell within reach  - Keep bed low and locked with side rails adjusted as appropriate  - Keep care items and personal belongings within reach  - Initiate and maintain comfort rounds  - Make Fall Risk Sign visible to staff  - Offer Toileting every 2 Hours, in advance of need  - Initiate/Maintain alarms  - Obtain necessary fall risk management equipment:   - Apply yellow socks and bracelet for high fall risk patients  - Consider moving patient to room near nurses station  5/6/2023 1106 by Shaun Rodriguez RN  Outcome: Progressing  5/6/2023 1105 by Shaun Rodriguez RN  Outcome: Progressing  Goal: Maintains/Returns to pre admission functional level  Description: INTERVENTIONS:  - Perform BMAT or MOVE assessment daily    - Set and communicate daily mobility goal to care team and patient/family/caregiver     - Collaborate with rehabilitation services on mobility goals if consulted  - Out of bed for toileting  - Record patient progress and toleration of activity level   5/6/2023 1106 by Jim Cotter RN  Outcome: Progressing  5/6/2023 1105 by Jim Cotter RN  Outcome: Progressing     Problem: Nutrition/Hydration-ADULT  Goal: Nutrient/Hydration intake appropriate for improving, restoring or maintaining nutritional needs  Description: Monitor and assess patient's nutrition/hydration status for malnutrition  Collaborate with interdisciplinary team and initiate plan and interventions as ordered  Monitor patient's weight and dietary intake as ordered or per policy  Utilize nutrition screening tool and intervene as necessary  Determine patient's food preferences and provide high-protein, high-caloric foods as appropriate       INTERVENTIONS:  - Monitor oral intake, urinary output, labs, and treatment plans  - Assess nutrition and hydration status and recommend course of action  - Evaluate amount of meals eaten  - Assist patient with eating if necessary   - Allow adequate time for meals  - Recommend/ encourage appropriate diets, oral nutritional supplements, and vitamin/mineral supplements  - Order, calculate, and assess calorie counts as needed  - Recommend, monitor, and adjust tube feedings and TPN/PPN based on assessed needs  - Assess need for intravenous fluids  - Provide specific nutrition/hydration education as appropriate  - Include patient/family/caregiver in decisions related to nutrition  5/6/2023 1106 by Jim Cotter RN  Outcome: Progressing  5/6/2023 1105 by Jim Cotter RN  Outcome: Progressing

## 2023-05-06 NOTE — ASSESSMENT & PLAN NOTE
· Nephrology following and ALEJANDRA was suspicious of hepatorenal syndrome  · Blood pressure running soft  · Continue midodrine  · Losartan was discontinued due to ALEJANDRA ; on low dose Toprol XL for Afib   · Metoprolol currently on hold due to BPs, will continue to monitor

## 2023-05-06 NOTE — PROGRESS NOTES
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%  Back pain more severe today, no N/V, some fecal incontinence  Abdomen soft, mild distention, good bowel sounds, mild TTP without guarding  T 97 6, HR 60, RR 17, /50, SpO2 97%  Na 125, BUN 39, Cr 1 99, Hgb 9 5, Plt 66    Assessment:  LLQ pain, ddx undifferentiated colitis without obstruction/bleeding/perforation, stable off antibiotics  Plan:  Abdomen exam stable this morning  Tolerating clears with crackers, can advance as tolerated  Defer to GI for bowel regimen  Defer to Nephro for suspected hepatorenal syndrome  Defer to [facility] regarding low back pain and MRI findings  Will discuss with Dr Letitia Kenney  Subjective/Objective   Chief Complaint: back pain    Subjective: ongoing back pain, now up in chair with aqua pad, no N/V, tolerating clears/crackers, reports fecal incontinence    Objective:     Blood pressure 123/50, pulse 60, temperature 97 6 °F (36 4 °C), resp  rate 17, height 4' 11\" (1 499 m), weight 60 3 kg (132 lb 15 oz), SpO2 97 %  ,Body mass index is 26 85 kg/m²  Intake/Output Summary (Last 24 hours) at 5/6/2023 0849  Last data filed at 5/6/2023 9955  Gross per 24 hour   Intake 360 ml   Output 525 ml   Net -165 ml       Invasive Devices     Peripheral Intravenous Line  Duration           Peripheral IV 05/03/23 Dorsal (posterior); Right Hand 2 days    Peripheral IV 05/05/23 Dorsal " (posterior); Left Hand 1 day                Physical Exam  Vitals and nursing note reviewed  Constitutional:       General: She is not in acute distress  Appearance: She is well-developed  She is not diaphoretic  HENT:      Head: Normocephalic and atraumatic  Eyes:      Conjunctiva/sclera: Conjunctivae normal       Pupils: Pupils are equal, round, and reactive to light  Pulmonary:      Effort: No respiratory distress  Abdominal:      Comments: Mild distention, good bowel sounds, soft/compressible, mild generalized TTP without guarding or rigidity  Musculoskeletal:         General: Normal range of motion  Cervical back: Normal range of motion  Skin:     General: Skin is warm and dry  Capillary Refill: Capillary refill takes less than 2 seconds  Neurological:      Mental Status: She is alert and oriented to person, place, and time  Psychiatric:         Behavior: Behavior normal            Lab, Imaging and other studies:  I have personally reviewed pertinent lab results    , CBC:   Lab Results   Component Value Date    WBC 6 48 05/06/2023    HGB 9 5 (L) 05/06/2023    HCT 27 5 (L) 05/06/2023     (H) 05/06/2023    PLT 66 (L) 05/06/2023    MCH 34 4 (H) 05/06/2023    MCHC 34 5 05/06/2023    RDW 14 7 05/06/2023    MPV 11 0 05/06/2023    NRBC 0 05/06/2023   , CMP:   Lab Results   Component Value Date    SODIUM 125 (L) 05/06/2023    K 4 2 05/06/2023    CL 96 05/06/2023    CO2 22 05/06/2023    BUN 39 (H) 05/06/2023    CREATININE 1 99 (H) 05/06/2023    CALCIUM 8 2 (L) 05/06/2023    EGFR 23 05/06/2023     VTE Pharmacologic Prophylaxis: Heparin  VTE Mechanical Prophylaxis: sequential compression device

## 2023-05-06 NOTE — ASSESSMENT & PLAN NOTE
· Secondary to ALEJANDRA suggestive of hepatorenal syndrome with ADH secretion per nephrology   · Continue management as above

## 2023-05-06 NOTE — PROGRESS NOTES
[facility]  Progress Note  Name: Maddi Messina  MRN: [id number]  Unit/Bed#: -01 I Date of Admission: 4/27/2023   Date of Service: 5/6/2023 I Hospital Day: 9    Assessment/Plan   * Left lower quadrant abdominal pain  Assessment & Plan  · Presented to the ED with left lower quadrant abdominal pain on 4/27   · CT abdomen: New small amount of free fluid in the abdomen and pelvis compared to 4/18/2023, which can be secondary to cirrhosis or acute inflammatory process in the abdomen and pelvis such as occult acute diverticulitis  Moderate amount of stool in the colon, nonspecific and can represent constipation  · Surgery following, appreciate recommendations  · GI following, appreciate recommendations  · Patient had flex sig on 5/2 minimal inflammation noted, biopsies were taken results pending  · On surgical full liquid diet with 1500 fluid restriction  · Patient reports abdominal pain is improved today, concerned because she is now becoming incontinent of stool    Acute kidney injury [facility])  Assessment & Plan  • Nephrology following, appreciate recommendations  • Etiology hepatorenal syndrome  • 5/5 urine sodium less than 5  • Continue midodrine, octreotide and albumin per nephrology  • Avoid hypotension and nephrotoxins  • Intake and output  • Daily weights  • BMP a m           Hyponatremia  Assessment & Plan  · Secondary to ALEJANDRA suggestive of hepatorenal syndrome with ADH secretion per nephrology   · Continue management as above    Primary hypertension  Assessment & Plan  · Nephrology following and ALEJANDRA was suspicious of hepatorenal syndrome  · Blood pressure running soft  · Continue midodrine  · Losartan was discontinued due to ALEJANDRA ; on low dose Toprol XL for Afib   · Metoprolol currently on hold due to BPs, will continue to monitor    PAF (paroxysmal atrial fibrillation) (HCC)  Assessment & Plan  · Rate controlled on current regimen    Hypomagnesemia  Assessment & Plan  · 5/5 serum magnesium 1 8, repleted  · Resolved with repletion      Hx of CABG  Assessment & Plan  · Currently stable  · Hold losartan, lasix d/t ALEJANDRA  Acquired hypothyroidism  Assessment & Plan  · Continue Synthroid    Anemia  Assessment & Plan  · Baseline appears to be between 11 and 12   · Today hemoglobin 9 5   · No obvious signs of bleeding  · Continue to monitor for bleeding  · GI following, appreciate recommendations  · CBC a m  VTE Pharmacologic Prophylaxis:   Pharmacologic: Heparin  Mechanical VTE Prophylaxis in Place: Yes    Patient Centered Rounds: I have performed bedside rounds with nursing staff today  Discussions with Specialists or Other Care Team Provider: Nephrology, surgery, GI, case management, nursing    Education and Discussions with Family / Patient: Treatment plan discussed with patient who understands the plan as has been explained  All questions answered her satisfaction  Time Spent for Care: 45 minutes  More than 50% of total time spent on counseling and coordination of care as described above  Current Length of Stay: 9 day(s)    Current Patient Status: Inpatient   Certification Statement: The patient will continue to require additional inpatient hospital stay due to Patient with hyponatremia, nephrology following closely    Discharge Plan: Pending hospital course  Patient continues with hyponatremia, nephrology following closely  Plan will be to discharge to a rehab center when stable  Code Status: Level 1 - Full Code      Subjective:   Reports abdominal pain is better, still having some back pain  States has been incontinent of stool since yesterday which is new for her  Objective:     Vitals:   Temp (24hrs), Av 5 °F (36 4 °C), Min:97 4 °F (36 3 °C), Max:97 6 °F (36 4 °C)    Temp:  [97 4 °F (36 3 °C)-97 6 °F (36 4 °C)] 97 6 °F (36 4 °C)  HR:  [58-63] 60  Resp:  [16-18] 17  BP: (115-145)/(46-56) 123/50  SpO2:  [95 %-98 %] 97 %  Body mass index is 26 85 kg/m²  Input and Output Summary (last 24 hours): Intake/Output Summary (Last 24 hours) at 5/6/2023 1416  Last data filed at 5/6/2023 4819  Gross per 24 hour   Intake 360 ml   Output 525 ml   Net -165 ml       Physical Exam:     Physical Exam  Vitals and nursing note reviewed  Constitutional:       General: She is not in acute distress  Appearance: She is obese  She is not ill-appearing  HENT:      Head: Normocephalic and atraumatic  Nose: Nose normal       Mouth/Throat:      Mouth: Mucous membranes are moist    Cardiovascular:      Rate and Rhythm: Normal rate and regular rhythm  Pulses: Normal pulses  Heart sounds: Normal heart sounds  Pulmonary:      Effort: Pulmonary effort is normal  No respiratory distress  Breath sounds: No wheezing or rales  Abdominal:      General: Bowel sounds are normal  There is no distension  Palpations: Abdomen is soft  Tenderness: There is no abdominal tenderness  There is no guarding  Genitourinary:     Comments: Reports she is voiding without difficulty  Denies any urinary incontinence  Musculoskeletal:         General: Normal range of motion  Cervical back: Normal range of motion and neck supple  Skin:     General: Skin is warm and dry  Capillary Refill: Capillary refill takes less than 2 seconds  Neurological:      General: No focal deficit present  Mental Status: She is alert and oriented to person, place, and time  Mental status is at baseline  Cranial Nerves: No cranial nerve deficit  Sensory: No sensory deficit  Motor: No weakness  Coordination: Coordination normal       Gait: Gait normal       Comments: Reports she is ambulating without difficulty  Denies any numbness or tingling of extremities  + Sensation in lower extremities  Reporting new fecal incontinence  Continues to be continent of bladder     Psychiatric:         Mood and Affect: Mood normal          Behavior: Behavior normal  Thought Content: Thought content normal          Judgment: Judgment normal          Additional Data:     Labs:    Results from last 7 days   Lab Units 05/06/23  0536   WBC Thousand/uL 6 48   HEMOGLOBIN g/dL 9 5*   HEMATOCRIT % 27 5*   PLATELETS Thousands/uL 66*   NEUTROS PCT % 64   LYMPHS PCT % 12*   MONOS PCT % 21*   EOS PCT % 2     Results from last 7 days   Lab Units 05/06/23  0536 05/05/23  0132   POTASSIUM mmol/L 4 2 3 2*   CHLORIDE mmol/L 96 93*   CO2 mmol/L 22 20*   BUN mg/dL 39* 40*   CREATININE mg/dL 1 99* 2 05*   CALCIUM mg/dL 8 2* 7 6*   ALK PHOS U/L  --  322*   ALT U/L  --  22   AST U/L  --  59*           * I Have Reviewed All Lab Data Listed Above  * Additional Pertinent Lab Tests Reviewed:  [facility] Admission Reviewed    Imaging:    Imaging Reports Reviewed Today Include: CT abdomen pelvis, abdominal obstructive series, MRI lumbar spine, abdominal x-ray, chest x-ray  Imaging Personally Reviewed by Myself Includes:      Recent Cultures (last 7 days):           Last 24 Hours Medication List:   Current Facility-Administered Medications   Medication Dose Route Frequency Provider Last Rate   • acetaminophen  650 mg Oral Q6H PRN Ricardo Catalan MD     • Albumin 25%  12 5 g Intravenous Q6H EBEN Villa     • barium  900 mL Oral Once in imaging Army Cammy MD     • dicyclomine  10 mg Oral TID PRN Valentina Boo MD     • erythromycin  0 5 inch Left Eye BID Army Cammy MD     • heparin (porcine)  5,000 Units Subcutaneous [address],      • levothyroxine  75 mcg Oral Early Morning Ricardo Catalan MD     • magnesium hydroxide  30 mL Oral Daily PRN Jay Romero PA-C     • midodrine  5 mg Oral TID AC EBEN Villa     • morphine injection  4 mg Intravenous Q3H PRN Krystal Ponce PA-C     • octreotide  100 mcg Subcutaneous [facility] EBEN Villa     • ondansetron  4 mg Intravenous Q6H PRN Army Cammy MD     • pantoprazole 40 mg Oral Early Morning Radhika Dolan MD     • polyethylene glycol  17 g Oral Daily Shana Arshad MD     • traZODone  50 mg Oral HS Trevon Hudson PA-C          Today, Patient Was Seen By: EBEN Chaves    ** Please Note: Dictation voice to text software may have been used in the creation of this document   **

## 2023-05-07 LAB
ANION GAP SERPL CALCULATED.3IONS-SCNC: 6 MMOL/L (ref 4–13)
ANION GAP SERPL CALCULATED.3IONS-SCNC: 9 MMOL/L (ref 4–13)
BASOPHILS # BLD AUTO: 0.03 THOUSANDS/ÂΜL (ref 0–0.1)
BASOPHILS NFR BLD AUTO: 1 % (ref 0–1)
BUN SERPL-MCNC: 38 MG/DL (ref 5–25)
BUN SERPL-MCNC: 40 MG/DL (ref 5–25)
CALCIUM SERPL-MCNC: 8.5 MG/DL (ref 8.4–10.2)
CALCIUM SERPL-MCNC: 8.6 MG/DL (ref 8.4–10.2)
CHLORIDE SERPL-SCNC: 97 MMOL/L (ref 96–108)
CHLORIDE SERPL-SCNC: 97 MMOL/L (ref 96–108)
CO2 SERPL-SCNC: 23 MMOL/L (ref 21–32)
CO2 SERPL-SCNC: 23 MMOL/L (ref 21–32)
CREAT SERPL-MCNC: 1.75 MG/DL (ref 0.6–1.3)
CREAT SERPL-MCNC: 1.82 MG/DL (ref 0.6–1.3)
EOSINOPHIL # BLD AUTO: 0.24 THOUSAND/ÂΜL (ref 0–0.61)
EOSINOPHIL NFR BLD AUTO: 4 % (ref 0–6)
ERYTHROCYTE [DISTWIDTH] IN BLOOD BY AUTOMATED COUNT: 14.7 % (ref 11.6–15.1)
GFR SERPL CREATININE-BSD FRML MDRD: 26 ML/MIN/1.73SQ M
GFR SERPL CREATININE-BSD FRML MDRD: 27 ML/MIN/1.73SQ M
GLUCOSE SERPL-MCNC: 100 MG/DL (ref 65–140)
GLUCOSE SERPL-MCNC: 117 MG/DL (ref 65–140)
HCT VFR BLD AUTO: 25.3 % (ref 34.8–46.1)
HGB BLD-MCNC: 8.8 G/DL (ref 11.5–15.4)
IMM GRANULOCYTES # BLD AUTO: 0.03 THOUSAND/UL (ref 0–0.2)
IMM GRANULOCYTES NFR BLD AUTO: 1 % (ref 0–2)
LYMPHOCYTES # BLD AUTO: 1.03 THOUSANDS/ÂΜL (ref 0.6–4.47)
LYMPHOCYTES NFR BLD AUTO: 18 % (ref 14–44)
MCH RBC QN AUTO: 34.2 PG (ref 26.8–34.3)
MCHC RBC AUTO-ENTMCNC: 34.8 G/DL (ref 31.4–37.4)
MCV RBC AUTO: 98 FL (ref 82–98)
MONOCYTES # BLD AUTO: 1.28 THOUSAND/ÂΜL (ref 0.17–1.22)
MONOCYTES NFR BLD AUTO: 23 % (ref 4–12)
NEUTROPHILS # BLD AUTO: 3.03 THOUSANDS/ÂΜL (ref 1.85–7.62)
NEUTS SEG NFR BLD AUTO: 53 % (ref 43–75)
NRBC BLD AUTO-RTO: 0 /100 WBCS
PLATELET # BLD AUTO: 79 THOUSANDS/UL (ref 149–390)
PMV BLD AUTO: 11.8 FL (ref 8.9–12.7)
POTASSIUM SERPL-SCNC: 4.2 MMOL/L (ref 3.5–5.3)
POTASSIUM SERPL-SCNC: 4.7 MMOL/L (ref 3.5–5.3)
RBC # BLD AUTO: 2.57 MILLION/UL (ref 3.81–5.12)
SODIUM SERPL-SCNC: 126 MMOL/L (ref 135–147)
SODIUM SERPL-SCNC: 129 MMOL/L (ref 135–147)
WBC # BLD AUTO: 5.64 THOUSAND/UL (ref 4.31–10.16)

## 2023-05-07 PROCEDURE — 85025 COMPLETE CBC W/AUTO DIFF WBC: CPT

## 2023-05-07 PROCEDURE — 80048 BASIC METABOLIC PNL TOTAL CA: CPT

## 2023-05-07 PROCEDURE — 99232 SBSQ HOSP IP/OBS MODERATE 35: CPT | Performed by: INTERNAL MEDICINE

## 2023-05-07 PROCEDURE — 80048 BASIC METABOLIC PNL TOTAL CA: CPT | Performed by: INTERNAL MEDICINE

## 2023-05-07 PROCEDURE — 99232 SBSQ HOSP IP/OBS MODERATE 35: CPT

## 2023-05-07 RX ORDER — SODIUM CHLORIDE, SODIUM GLUCONATE, SODIUM ACETATE, POTASSIUM CHLORIDE, MAGNESIUM CHLORIDE, SODIUM PHOSPHATE, DIBASIC, AND POTASSIUM PHOSPHATE .53; .5; .37; .037; .03; .012; .00082 G/100ML; G/100ML; G/100ML; G/100ML; G/100ML; G/100ML; G/100ML
75 INJECTION, SOLUTION INTRAVENOUS CONTINUOUS
Status: DISCONTINUED | OUTPATIENT
Start: 2023-05-07 | End: 2023-05-08

## 2023-05-07 RX ORDER — LIDOCAINE 50 MG/G
1 PATCH TOPICAL DAILY
Status: DISCONTINUED | OUTPATIENT
Start: 2023-05-07 | End: 2023-05-23 | Stop reason: HOSPADM

## 2023-05-07 RX ORDER — HYDROMORPHONE HCL/PF 1 MG/ML
0.5 SYRINGE (ML) INJECTION EVERY 4 HOURS PRN
Status: DISCONTINUED | OUTPATIENT
Start: 2023-05-07 | End: 2023-05-21

## 2023-05-07 RX ADMIN — HEPARIN SODIUM 5000 UNITS: 5000 INJECTION INTRAVENOUS; SUBCUTANEOUS at 21:32

## 2023-05-07 RX ADMIN — SODIUM CHLORIDE, SODIUM GLUCONATE, SODIUM ACETATE, POTASSIUM CHLORIDE, MAGNESIUM CHLORIDE, SODIUM PHOSPHATE, DIBASIC, AND POTASSIUM PHOSPHATE 75 ML/HR: .53; .5; .37; .037; .03; .012; .00082 INJECTION, SOLUTION INTRAVENOUS at 12:35

## 2023-05-07 RX ADMIN — ALBUMIN (HUMAN) 12.5 G: 0.25 INJECTION, SOLUTION INTRAVENOUS at 11:54

## 2023-05-07 RX ADMIN — ERYTHROMYCIN 0.5 INCH: 5 OINTMENT OPHTHALMIC at 08:22

## 2023-05-07 RX ADMIN — ERYTHROMYCIN 0.5 INCH: 5 OINTMENT OPHTHALMIC at 17:17

## 2023-05-07 RX ADMIN — DICYCLOMINE HYDROCHLORIDE 10 MG: 10 CAPSULE ORAL at 21:34

## 2023-05-07 RX ADMIN — PANTOPRAZOLE SODIUM 40 MG: 40 TABLET, DELAYED RELEASE ORAL at 05:38

## 2023-05-07 RX ADMIN — HEPARIN SODIUM 5000 UNITS: 5000 INJECTION INTRAVENOUS; SUBCUTANEOUS at 05:38

## 2023-05-07 RX ADMIN — MIDODRINE HYDROCHLORIDE 5 MG: 5 TABLET ORAL at 08:22

## 2023-05-07 RX ADMIN — OCTREOTIDE ACETATE 100 MCG: 100 INJECTION, SOLUTION INTRAVENOUS; SUBCUTANEOUS at 05:38

## 2023-05-07 RX ADMIN — MIDODRINE HYDROCHLORIDE 5 MG: 5 TABLET ORAL at 11:54

## 2023-05-07 RX ADMIN — MIDODRINE HYDROCHLORIDE 5 MG: 5 TABLET ORAL at 17:16

## 2023-05-07 RX ADMIN — TRAZODONE HYDROCHLORIDE 50 MG: 50 TABLET ORAL at 21:32

## 2023-05-07 RX ADMIN — ALBUMIN (HUMAN) 12.5 G: 0.25 INJECTION, SOLUTION INTRAVENOUS at 05:37

## 2023-05-07 RX ADMIN — OCTREOTIDE ACETATE 100 MCG: 100 INJECTION, SOLUTION INTRAVENOUS; SUBCUTANEOUS at 21:32

## 2023-05-07 RX ADMIN — ALBUMIN (HUMAN) 12.5 G: 0.25 INJECTION, SOLUTION INTRAVENOUS at 17:16

## 2023-05-07 RX ADMIN — LEVOTHYROXINE SODIUM 75 MCG: 75 TABLET ORAL at 05:38

## 2023-05-07 RX ADMIN — HEPARIN SODIUM 5000 UNITS: 5000 INJECTION INTRAVENOUS; SUBCUTANEOUS at 14:07

## 2023-05-07 RX ADMIN — LIDOCAINE 1 PATCH: 700 PATCH TOPICAL at 11:54

## 2023-05-07 RX ADMIN — OCTREOTIDE ACETATE 100 MCG: 100 INJECTION, SOLUTION INTRAVENOUS; SUBCUTANEOUS at 14:07

## 2023-05-07 RX ADMIN — ALBUMIN (HUMAN) 12.5 G: 0.25 INJECTION, SOLUTION INTRAVENOUS at 23:01

## 2023-05-07 NOTE — PLAN OF CARE
Problem: Potential for Falls  Goal: Patient will remain free of falls  Description: INTERVENTIONS:  - Educate patient/family on patient safety including physical limitations  - Instruct patient to call for assistance with activity   - Consult OT/PT to assist with strengthening/mobility   - Keep Call bell within reach  - Keep bed low and locked with side rails adjusted as appropriate  - Keep care items and personal belongings within reach  - Initiate and maintain comfort rounds  - Make Fall Risk Sign visible to staff  - Offer Toileting every 2 Hours, in advance of need  - Initiate/Maintain alarms  - Obtain necessary fall risk management equipment:  - Apply yellow socks and bracelet for high fall risk patients  - Consider moving patient to room near nurses station  Outcome: Progressing     Problem: PAIN - ADULT  Goal: Verbalizes/displays adequate comfort level or baseline comfort level  Description: Interventions:  - Encourage patient to monitor pain and request assistance  - Assess pain using appropriate pain scale  - Administer analgesics based on type and severity of pain and evaluate response  - Implement non-pharmacological measures as appropriate and evaluate response  - Consider cultural and social influences on pain and pain management  - Notify physician/advanced practitioner if interventions unsuccessful or patient reports new pain  Outcome: Progressing     Problem: SAFETY ADULT  Goal: Patient will remain free of falls  Description: INTERVENTIONS:  - Educate patient/family on patient safety including physical limitations  - Instruct patient to call for assistance with activity   - Consult OT/PT to assist with strengthening/mobility   - Keep Call bell within reach  - Keep bed low and locked with side rails adjusted as appropriate  - Keep care items and personal belongings within reach  - Initiate and maintain comfort rounds  - Make Fall Risk Sign visible to staff  - Offer Toileting every 2 Hours, in advance of need  - Initiate/Maintain alarms  - Obtain necessary fall risk management equipment:  - Apply yellow socks and bracelet for high fall risk patients  - Consider moving patient to room near nurses station  Outcome: Progressing  Goal: Maintain or return to baseline ADL function  Description: INTERVENTIONS:  - Educate patient/family on patient safety including physical limitations  - Instruct patient to call for assistance with activity   - Consult OT/PT to assist with strengthening/mobility   - Keep Call bell within reach  - Keep bed low and locked with side rails adjusted as appropriate  - Keep care items and personal belongings within reach  - Initiate and maintain comfort rounds  - Make Fall Risk Sign visible to staff  - Offer Toileting every 2 Hours, in advance of need  - Initiate/Maintain alarms  - Obtain necessary fall risk management equipment:   - Apply yellow socks and bracelet for high fall risk patients  - Consider moving patient to room near nurses station  Outcome: Progressing  Goal: Maintains/Returns to pre admission functional level  Description: INTERVENTIONS:  - Perform BMAT or MOVE assessment daily    - Set and communicate daily mobility goal to care team and patient/family/caregiver     - Collaborate with rehabilitation services on mobility goals if consulted  - Out of bed for toileting  - Record patient progress and toleration of activity level   Outcome: Progressing     Problem: INFECTION - ADULT  Goal: Absence or prevention of progression during hospitalization  Description: INTERVENTIONS:  - Assess and monitor for signs and symptoms of infection  - Monitor lab/diagnostic results  - Monitor all insertion sites, i e  indwelling lines, tubes, and drains  - Monitor endotracheal if appropriate and nasal secretions for changes in amount and color  - [place] appropriate cooling/warming therapies per order  - Administer medications as ordered  - Instruct and encourage patient and family to use good hand hygiene technique  - Identify and instruct in appropriate isolation precautions for identified infection/condition  Outcome: Progressing     Problem: DISCHARGE PLANNING  Goal: Discharge to home or other facility with appropriate resources  Description: INTERVENTIONS:  - Identify barriers to discharge w/patient and caregiver  - Arrange for needed discharge resources and transportation as appropriate  - Identify discharge learning needs (meds, wound care, etc )  - Refer to Case Management Department for coordinating discharge planning if the patient needs post-hospital services based on physician/advanced practitioner order or complex needs related to functional status, cognitive ability, or social support system  Outcome: Progressing     Problem: Knowledge Deficit  Goal: Patient/family/caregiver demonstrates understanding of disease process, treatment plan, medications, and discharge instructions  Description: Complete learning assessment and assess knowledge base    Interventions:  - Provide teaching at level of understanding  - Provide teaching via preferred learning methods  Outcome: Progressing     Problem: Prexisting or High Potential for Compromised Skin Integrity  Goal: Skin integrity is maintained or improved  Description: INTERVENTIONS:  - Identify patients at risk for skin breakdown  - Assess and monitor skin integrity  - Assess and monitor nutrition and hydration status  - Monitor labs   - Assess for incontinence   - Turn and reposition patient  - Assist with mobility/ambulation  - Relieve pressure over bony prominences  - Avoid friction and shearing  - Provide appropriate hygiene as needed including keeping skin clean and dry  - Evaluate need for skin moisturizer/barrier cream  - Collaborate with interdisciplinary team   - Patient/family teaching  - Consider wound care consult   Outcome: Progressing     Problem: MOBILITY - ADULT  Goal: Maintain or return to baseline ADL function  Description: INTERVENTIONS:  - Educate patient/family on patient safety including physical limitations  - Instruct patient to call for assistance with activity   - Consult OT/PT to assist with strengthening/mobility   - Keep Call bell within reach  - Keep bed low and locked with side rails adjusted as appropriate  - Keep care items and personal belongings within reach  - Initiate and maintain comfort rounds  - Make Fall Risk Sign visible to staff  - Offer Toileting every 2 Hours, in advance of need  - Initiate/Maintain alarms  - Obtain necessary fall risk management equipment:   - Apply yellow socks and bracelet for high fall risk patients  - Consider moving patient to room near nurses station  Outcome: Progressing  Goal: Maintains/Returns to pre admission functional level  Description: INTERVENTIONS:  - Perform BMAT or MOVE assessment daily    - Set and communicate daily mobility goal to care team and patient/family/caregiver  - Collaborate with rehabilitation services on mobility goals if consulted  - Out of bed for toileting  - Record patient progress and toleration of activity level   Outcome: Progressing     Problem: Nutrition/Hydration-ADULT  Goal: Nutrient/Hydration intake appropriate for improving, restoring or maintaining nutritional needs  Description: Monitor and assess patient's nutrition/hydration status for malnutrition  Collaborate with interdisciplinary team and initiate plan and interventions as ordered  Monitor patient's weight and dietary intake as ordered or per policy  Utilize nutrition screening tool and intervene as necessary  Determine patient's food preferences and provide high-protein, high-caloric foods as appropriate       INTERVENTIONS:  - Monitor oral intake, urinary output, labs, and treatment plans  - Assess nutrition and hydration status and recommend course of action  - Evaluate amount of meals eaten  - Assist patient with eating if necessary   - Allow adequate time for meals  - Recommend/ encourage appropriate diets, oral nutritional supplements, and vitamin/mineral supplements  - Order, calculate, and assess calorie counts as needed  - Recommend, monitor, and adjust tube feedings and TPN/PPN based on assessed needs  - Assess need for intravenous fluids  - Provide specific nutrition/hydration education as appropriate  - Include patient/family/caregiver in decisions related to nutrition  Outcome: Progressing

## 2023-05-07 NOTE — ASSESSMENT & PLAN NOTE
· Nephrology following and ALEJANDRA was suspicious of hepatorenal syndrome  · Blood pressure has improved today  · Continue midodrine  · Losartan was discontinued due to ALEJANDRA   · Metoprolol currently on hold due to hypotension, will continue to monitor

## 2023-05-07 NOTE — PROGRESS NOTES
"Progress Note - Nephrology   Shanna Haas 69 y o  female MRN: [id number]  Unit/Bed#: -01 Encounter: [id number]    A/P:  1  Acute kidney injury   - Creatinine has trended down to 1 82 mg/dL from 2 1 mg/dL    -nonoliguric: 240/625 (+4239)   - CT kidneys unremarkable   - recheck UA   - continue current treatment    2  Hyponatremia   - Improved from 123 2 to 129 mmol/L   - case discussed with EBEN  She looks dry and had multiple episodes of diarrhea with NAGMA   - will add isolyte at 75 ml/hr and recheck labs at 1800  - Low urine sodium may be due to cirrhosis but will order above and follow closely    3  Cirrhosis   - had improvement with HRS cocktail  BP is adequate with a MAP of 65 and recheck in the morning    4  Paroxysmal atrial fibrillation   - has good rate control    5  Anemia with TCP   - platelet count is improved at 79K    Follow up reason for today's visit: Acute kidney injury/hyponatremia    Left lower quadrant abdominal pain    Patient Active Problem List   Diagnosis   • Acute kidney injury ([facility] [address] )   • Diarrhea of presumed infectious origin   • Primary hypertension   • Acquired hypothyroidism   • Irritable bowel syndrome with diarrhea   • Abnormal CT scan   • Superior mesenteric artery stenosis (HCC)   • Left lower quadrant abdominal pain   • Hyponatremia   • Hypomagnesemia   • PAF (paroxysmal atrial fibrillation) (HCC)   • Hx of CABG   • Anemia         Subjective:   Feels weak and ill  Complains of abdominal discomfort  Denies chest pain shortness of breath nausea or vomiting  Had diarrhea but MiraLAX was discontinued  Voiding well without difficulty    Objective:     Vitals: Blood pressure (!) 114/40, pulse 63, temperature (!) 96 2 °F (35 7 °C), temperature source Tympanic, resp  rate 20, height 4' 11\" (1 499 m), weight 60 5 kg (133 lb 6 1 oz), SpO2 92 %  ,Body mass index is 26 94 kg/m²      Weight (last 2 days)     Date/Time Weight    05/07/23 0600 60 5 (133 38)     Weight: pt weighed with " "one pillow,pad,sheet,and blanket  no scds or oxygent tanks at 05/07/23 0600    05/06/23 0600 60 3 (132 94)     Weight: Patient unable to stand at this time due to weakness and severe pain  Bed scale reading inaccurate  Will notify next shift  at 05/06/23 0600    05/05/23 1000 54 (119)    05/05/23 0540 54 (119 05)            Intake/Output Summary (Last 24 hours) at 5/7/2023 1129  Last data filed at 5/7/2023 0900  Gross per 24 hour   Intake 240 ml   Output 300 ml   Net -60 ml     I/O last 3 completed shifts: In: 480 [P O :480]  Out: 825 [Urine:825]         Physical Exam: BP (!) 114/40   Pulse 63   Temp (!) 96 2 °F (35 7 °C) (Tympanic)   Resp 20   Ht 4' 11\" (1 499 m)   Wt 60 5 kg (133 lb 6 1 oz) Comment: pt weighed with one pillow,pad,sheet,and blanket  no scds or oxygent tanks  SpO2 92%   BMI 26 94 kg/m²     General Appearance:    Alert, frail and ill appearing cooperative,  appears stated age   Head:    Normocephalic, without obvious abnormality, atraumatic   Eyes:    Conjunctiva/corneas clear   Ears:    Normal external ears   Nose:   Nares normal, septum midline, mucosa normal, no drainage    or sinus tenderness   Throat:   Lips, mucosa, and tongue normal; teeth and gums normal   Neck:   Supple, symmetrical, trachea midline, no adenopathy;        thyroid:  No enlargement/tenderness/nodules; no carotid    bruit or JVD   Back:     Symmetric, no curvature, ROM normal, no CVA tenderness   Lungs:     Clear to auscultation bilaterally, respirations unlabored   Chest wall:    No tenderness or deformity   Heart:    Regular rate and rhythm, S1 and S2 normal, no murmur, rub   or gallop   Abdomen:      tender   Extremities:   Extremities normal, atraumatic, no cyanosis or edema   Skin:   Skin color, texture, turgor normal, no rashes or lesions   Lymph nodes:   Cervical normal   Neurologic:   CNII-XII intact            Lab, Imaging and other studies: I have personally reviewed pertinent labs    CBC:   Lab Results " Component Value Date    WBC 5 64 05/07/2023    HGB 8 8 (L) 05/07/2023    HCT 25 3 (L) 05/07/2023    MCV 98 05/07/2023    PLT 79 (L) 05/07/2023    MCH 34 2 05/07/2023    MCHC 34 8 05/07/2023    RDW 14 7 05/07/2023    MPV 11 8 05/07/2023    NRBC 0 05/07/2023     CMP:   Lab Results   Component Value Date    K 4 2 05/07/2023    CL 97 05/07/2023    CO2 23 05/07/2023    BUN 40 (H) 05/07/2023    CREATININE 1 82 (H) 05/07/2023    CALCIUM 8 5 05/07/2023    EGFR 26 05/07/2023         Results from last 7 days   Lab Units 05/07/23  0547 05/06/23  0536 05/05/23  0132 05/03/23  0635 05/01/23  0549   POTASSIUM mmol/L 4 2 4 2 3 2*   < > 4 1   CHLORIDE mmol/L 97 96 93*   < > 99   CO2 mmol/L 23 22 20*   < > 19*   BUN mg/dL 40* 39* 40*   < > 22   CREATININE mg/dL 1 82* 1 99* 2 05*   < > 1 08   CALCIUM mg/dL 8 5 8 2* 7 6*   < > 8 4   ALK PHOS U/L  --   --  322*  --  331*   ALT U/L  --   --  22  --  18   AST U/L  --   --  59*  --  40*    < > = values in this interval not displayed  Phosphorus: No results found for: PHOS  Magnesium: No results found for: MG  Urinalysis: No results found for: Glenard Deleon, SPECGRAV, PHUR, LEUKOCYTESUR, NITRITE, PROTEINUA, GLUCOSEU, KETONESU, BILIRUBINUR, BLOODU  Ionized Calcium: No results found for: CAION  Coagulation: No results found for: PT, INR, APTT  Troponin: No results found for: TROPONINI  ABG: No results found for: PHART, WSV7ZUP, PO2ART, QHN8BEK, U0UZHCLN, BEART, SOURCE  Radiology review:     IMAGING  Procedure: Echo complete w/ contrast if indicated    Result Date: 5/5/2023  Narrative: •  Left Ventricle: Left ventricular cavity size is normal  Wall thickness is normal  There is mild concentric hypertrophy  The left ventricular ejection fraction is 66%  Systolic function is normal  Wall motion is normal  Diastolic function is normal for age  •  Right Ventricle: Right ventricular cavity size is dilated  •  Left Atrium: The atrium is dilated  •  Right Atrium: The atrium is dilated  •  Aortic Valve: The aortic valve is trileaflet  The leaflets are mildly thickened  The leaflets are moderately calcified  There is moderately reduced mobility  There is mild regurgitation  There is mild stenosis  The aortic valve peak velocity is 2 29 m/s  The aortic valve mean gradient is 10 mmHg  The aortic valve area is 1 23 cm2  •  Mitral Valve: There is annular calcification  There is mild regurgitation  •  Tricuspid Valve: There is moderate to severe regurgitation   The right ventricular systolic pressure is normal        Current Facility-Administered Medications   Medication Dose Route Frequency   • acetaminophen (TYLENOL) tablet 650 mg  650 mg Oral Q6H PRN   • albumin human (FLEXBUMIN) 25 % injection 12 5 g  12 5 g Intravenous Q6H   • barium (READI-CAT 2) suspension 900 mL  900 mL Oral Once in imaging   • dicyclomine (BENTYL) capsule 10 mg  10 mg Oral TID PRN   • erythromycin (ILOTYCIN) 0 5 % ophthalmic ointment 0 5 inch  0 5 inch Left Eye BID   • heparin (porcine) subcutaneous injection 5,000 Units  5,000 Units Subcutaneous Q8H [address]   • HYDROmorphone (DILAUDID) injection 0 5 mg  0 5 mg Intravenous Q4H PRN   • levothyroxine tablet 75 mcg  75 mcg Oral Early Morning   • lidocaine (LIDODERM) 5 % patch 1 patch  1 patch Topical Daily   • midodrine (PROAMATINE) tablet 5 mg  5 mg Oral TID AC   • octreotide (SandoSTATIN) injection 100 mcg  100 mcg Subcutaneous Q8H [address]   • ondansetron (ZOFRAN) injection 4 mg  4 mg Intravenous Q6H PRN   • pantoprazole (PROTONIX) EC tablet 40 mg  40 mg Oral Early Morning   • traZODone (DESYREL) tablet 50 mg  50 mg Oral HS     Medications Discontinued During This Encounter   Medication Reason   • polyethylene glycol (GLYCOLAX) 17 GM/SCOOP powder Therapy completed   • ciprofloxacin (CIPRO) IVPB (premix in 5% dextrose) 400 mg 200 mL    • metroNIDAZOLE (FLAGYL) IVPB (premix) 500 mg 100 mL    • dextrose 5 % and sodium chloride 0 45 % with KCl 20 mEq/L infusion    • sodium chloride 0 9 % infusion    • polyethylene glycol (MIRALAX) packet 17 g    • ciprofloxacin (CIPRO) tablet 500 mg    • metroNIDAZOLE (FLAGYL) tablet 500 mg    • sodium bicarbonate tablet 650 mg    • enoxaparin (LOVENOX) subcutaneous injection 40 mg    • furosemide (LASIX) injection 80 mg    • multi-electrolyte (PLASMALYTE-A/ISOLYTE-S PH 7 4) IV solution    • metoprolol succinate (TOPROL-XL) 24 hr tablet 25 mg    • midodrine (PROAMATINE) tablet 2 5 mg    • enoxaparin (LOVENOX) subcutaneous injection 30 mg    • metoprolol succinate (TOPROL-XL) 24 hr tablet 12 5 mg    • magnesium hydroxide (MILK OF MAGNESIA) oral suspension 30 mL    • polyethylene glycol (MIRALAX) packet 17 g    • morphine injection 4 mg        Maribel Guillen MD      This progress note was produced in part using a dictation device which may document imprecise wording from author's original intent

## 2023-05-07 NOTE — NURSING NOTE
Pt refused a bath said she was bathed in early morning and linen was changed pt did oral care pt had hair shampooed and combed pt not out of bed RN aware

## 2023-05-07 NOTE — ASSESSMENT & PLAN NOTE
Assessment:  LLQ pain, ddx undifferentiated colitis without obstruction/bleeding/perforation, stable off antibiotics  Plan:  Abdominal exam improving  Trial surgical soft diet today  Defer to GI for bowel regimen

## 2023-05-07 NOTE — ASSESSMENT & PLAN NOTE
· Secondary to ALEJANDRA suggestive of hepatorenal syndrome with ADH secretion per nephrology    · Serum sodium improved to 129  · Discussed with nephrology, patient has had multiple episodes of diarrhea and appears dry on exam  · Felt low sodium could be due to cirrhosis  · Started Isolyte at 75/hr with repeat labs at 1800 recommendation of nephrology  · Continue management as above

## 2023-05-07 NOTE — ASSESSMENT & PLAN NOTE
· Presented to the ED with left lower quadrant abdominal pain on 4/27   · Continues with some left lower quadrant abdominal pain today, as well as loose stools with it,  · CT abdomen: New small amount of free fluid in the abdomen and pelvis compared to 4/18/2023, which can be secondary to cirrhosis or acute inflammatory process in the abdomen and pelvis such as occult acute diverticulitis  Moderate amount of stool in the colon, nonspecific and can represent constipation    · Surgery following, appreciate recommendations  · GI following, appreciate recommendations  · Patient had flex sig on 5/2 minimal inflammation noted, biopsies were taken results pending  · 5/5 KUB results pending  · Diet increased to soft diet today by surgery  · MiraLAX discontinued due to diarrhea

## 2023-05-07 NOTE — PROGRESS NOTES
[facility]  67   Progress Note  Name: Justin Serrato  MRN: [id number]  Unit/Bed#: -01 I Date of Admission: 4/27/2023   Date of Service: 5/7/2023 I Hospital Day: 10    Assessment/Plan   * Left lower quadrant abdominal pain  Assessment & Plan  · Presented to the ED with left lower quadrant abdominal pain on 4/27   · Continues with some left lower quadrant abdominal pain today, as well as loose stools with it,  · CT abdomen: New small amount of free fluid in the abdomen and pelvis compared to 4/18/2023, which can be secondary to cirrhosis or acute inflammatory process in the abdomen and pelvis such as occult acute diverticulitis  Moderate amount of stool in the colon, nonspecific and can represent constipation  · Surgery following, appreciate recommendations  · GI following, appreciate recommendations  · Patient had flex sig on 5/2 minimal inflammation noted, biopsies were taken results pending  · 5/5 KUB results pending  · Diet increased to soft diet today by surgery  · MiraLAX discontinued due to diarrhea      Acute kidney injury [facility])  Assessment & Plan  • Nephrology following, appreciate recommendations  • Etiology hepatorenal syndrome  • 5/5 urine sodium less than 5  • Continue midodrine, octreotide and albumin per nephrology  • Avoid hypotension and nephrotoxins  • Intake and output  • Daily weights  • BMP a m           Hyponatremia  Assessment & Plan  · Secondary to ALEJANDRA suggestive of hepatorenal syndrome with ADH secretion per nephrology    · Serum sodium improved to 129  · Discussed with nephrology, patient has had multiple episodes of diarrhea and appears dry on exam  · Felt low sodium could be due to cirrhosis  · Started Isolyte at 75/hr with repeat labs at 1800 recommendation of nephrology  · Continue management as above    Primary hypertension  Assessment & Plan  · Nephrology following and ALEJANDRA was suspicious of hepatorenal syndrome  · Blood pressure has improved today  · Continue midodrine  · Losartan was discontinued due to ALEJANDRA   · Metoprolol currently on hold due to hypotension, will continue to monitor    PAF (paroxysmal atrial fibrillation) (HCC)  Assessment & Plan  · Rate controlled on current regimen  · Continue midodrine due to hypotension  · Home metoprolol on hold due to hypotension    Hypomagnesemia  Assessment & Plan  · 5/5 serum magnesium 1 8, repleted  · Resolved with repletion      Hx of CABG  Assessment & Plan  · Currently stable  · Losartan and Lasix currently on due to ALEJANDRA    Acquired hypothyroidism  Assessment & Plan  · Continue Synthroid    Anemia  Assessment & Plan  · Baseline appears to be between 11 and 12   · Hemoglobin today 8 8  · Without signs of active bleeding  · GI following, appreciate recommendations  · CBC a m  VTE Pharmacologic Prophylaxis:   Pharmacologic: Heparin  Mechanical VTE Prophylaxis in Place: Yes    Patient Centered Rounds: I have performed bedside rounds with nursing staff today  Discussions with Specialists or Other Care Team Provider: Nephrology, surgery, nursing    Education and Discussions with Family / Patient: Treatment plan discussed with patient who understands the plan as its been explained and is agreeable plan as stated  All questions answered to her satisfaction  Time Spent for Care: 45 minutes  More than 50% of total time spent on counseling and coordination of care as described above  Current Length of Stay: 10 day(s)    Current Patient Status: Inpatient   Certification Statement: The patient will continue to require additional inpatient hospital stay due to IV fluids, carefully monitoring of chemistries, monitoring of tolerance to diet    Discharge Plan: Pending hospital course  Patient will be discharged to rehab facility when stable for discharge  Code Status: Level 1 - Full Code      Subjective:   Patient  complains of some left lower quadrant abdominal pain and diarrhea    States she feels weak and tired  Objective:     Vitals:   Temp (24hrs), Av 2 °F (35 7 °C), Min:96 2 °F (35 7 °C), Max:96 2 °F (35 7 °C)    Temp:  [96 2 °F (35 7 °C)] 96 2 °F (35 7 °C)  HR:  [55-63] 63  Resp:  [18-20] 20  BP: (114-143)/(40-50) 114/40  SpO2:  [92 %-94 %] 92 %  Body mass index is 26 94 kg/m²  Input and Output Summary (last 24 hours): Intake/Output Summary (Last 24 hours) at 2023 1245  Last data filed at 2023 0900  Gross per 24 hour   Intake 240 ml   Output 300 ml   Net -60 ml       Physical Exam:     Physical Exam  Constitutional:       General: She is not in acute distress  Appearance: She is ill-appearing  HENT:      Head: Normocephalic and atraumatic  Nose: Nose normal       Mouth/Throat:      Mouth: Mucous membranes are dry  Cardiovascular:      Rate and Rhythm: Normal rate  Rhythm irregular  Pulses: Normal pulses  Heart sounds: Normal heart sounds  Pulmonary:      Effort: Pulmonary effort is normal  No respiratory distress  Breath sounds: No wheezing or rales  Comments: Decreased breath sounds bilateral bases, no cough or shortness of breath  Abdominal:      General: Bowel sounds are normal  There is distension  Palpations: Abdomen is soft  Tenderness: There is abdominal tenderness  There is no guarding  Comments: Slight tenderness left lower quadrant of abdomen on palpation without guarding   Musculoskeletal:      Right lower leg: Edema present  Left lower leg: Edema present  Comments: +1 lower extremity edema bilaterally   Skin:     General: Skin is warm and dry  Capillary Refill: Capillary refill takes less than 2 seconds  Neurological:      General: No focal deficit present  Mental Status: She is alert and oriented to person, place, and time  Mental status is at baseline        Comments: Patient has generalized weakness; able to ambulate with assistance; full sensation lower extremities; denies numbness or tingling of extremities; continent of bladder, continues with bowel incontinence   Psychiatric:         Mood and Affect: Mood normal          Behavior: Behavior normal          Thought Content: Thought content normal          Judgment: Judgment normal          Additional Data:     Labs:    Results from last 7 days   Lab Units 05/07/23  0547   WBC Thousand/uL 5 64   HEMOGLOBIN g/dL 8 8*   HEMATOCRIT % 25 3*   PLATELETS Thousands/uL 79*   NEUTROS PCT % 53   LYMPHS PCT % 18   MONOS PCT % 23*   EOS PCT % 4     Results from last 7 days   Lab Units 05/07/23  0547 05/06/23  0536 05/05/23  0132   POTASSIUM mmol/L 4 2   < > 3 2*   CHLORIDE mmol/L 97   < > 93*   CO2 mmol/L 23   < > 20*   BUN mg/dL 40*   < > 40*   CREATININE mg/dL 1 82*   < > 2 05*   CALCIUM mg/dL 8 5   < > 7 6*   ALK PHOS U/L  --   --  322*   ALT U/L  --   --  22   AST U/L  --   --  59*    < > = values in this interval not displayed  * I Have Reviewed All Lab Data Listed Above  * Additional Pertinent Lab Tests Reviewed:  [facility]    Imaging:    Imaging Reports Reviewed Today Include: CT abdomen pelvis, obstructive series, MRI lumbar spine, chest x-ray, abdominal x-ray  Imaging Personally Reviewed by Myself Includes: CT abdomen pelvis, obstructive series, l chest x-ray, abdominal x-ray    Recent Cultures (last 7 days):           Last 24 Hours Medication List:   Current Facility-Administered Medications   Medication Dose Route Frequency Provider Last Rate   • acetaminophen  650 mg Oral Q6H PRN Jagdeep Jolley MD     • Albumin 25%  12 5 g Intravenous Q6H EBEN Caceres     • barium  900 mL Oral Once in imaging Milla Henriquez MD     • dicyclomine  10 mg Oral TID PRN Tiana Alex MD     • erythromycin  0 5 inch Left Eye BID Milla Henriquez MD     • heparin (porcine)  5,000 Units Subcutaneous [facility] [address] Otisrajeev Pugh, DO     • HYDROmorphone  0 5 mg Intravenous Q4H PRN EBEN Hooker     • levothyroxine  75 mcg Oral Early Morning Bridget Travis MD     • lidocaine  1 patch Topical Daily EBEN Hooker     • midodrine  5 mg Oral TID AC EBEN Catalan     • multi-electrolyte  75 mL/hr Intravenous Continuous Rosales Leon MD 75 mL/hr (05/07/23 1235)   • octreotide  100 mcg Subcutaneous [facility] EBEN Catalan     • ondansetron  4 mg Intravenous Q6H PRN Kasi Canseco MD     • pantoprazole  40 mg Oral Early Morning Bridget Travis MD     • traZODone  50 mg Oral HS Janeece Ganser, PA-C          Today, Patient Was Seen By: EBEN Rivera    ** Please Note: Dictation voice to text software may have been used in the creation of this document   **

## 2023-05-07 NOTE — QUICK NOTE
Attempted to contact patient's sister Norm Simms at number provided on chart to update her on patient's current treatment plan  No answer at this time

## 2023-05-07 NOTE — PLAN OF CARE
Problem: Potential for Falls  Goal: Patient will remain free of falls  Description: INTERVENTIONS:  - Educate patient/family on patient safety including physical limitations  - Instruct patient to call for assistance with activity   - Consult OT/PT to assist with strengthening/mobility   - Keep Call bell within reach  - Keep bed low and locked with side rails adjusted as appropriate  - Keep care items and personal belongings within reach  - Initiate and maintain comfort rounds  - Make Fall Risk Sign visible to staff  - Offer Toileting every 2 Hours, in advance of need  - Initiate/Maintain alarms  - Obtain necessary fall risk management equipment:  - Apply yellow socks and bracelet for high fall risk patients  - Consider moving patient to room near nurses station  Outcome: Progressing     Problem: PAIN - ADULT  Goal: Verbalizes/displays adequate comfort level or baseline comfort level  Description: Interventions:  - Encourage patient to monitor pain and request assistance  - Assess pain using appropriate pain scale  - Administer analgesics based on type and severity of pain and evaluate response  - Implement non-pharmacological measures as appropriate and evaluate response  - Consider cultural and social influences on pain and pain management  - Notify physician/advanced practitioner if interventions unsuccessful or patient reports new pain  Outcome: Progressing     Problem: SAFETY ADULT  Goal: Patient will remain free of falls  Description: INTERVENTIONS:  - Educate patient/family on patient safety including physical limitations  - Instruct patient to call for assistance with activity   - Consult OT/PT to assist with strengthening/mobility   - Keep Call bell within reach  - Keep bed low and locked with side rails adjusted as appropriate  - Keep care items and personal belongings within reach  - Initiate and maintain comfort rounds  - Make Fall Risk Sign visible to staff  - Offer Toileting every 2 Hours, in advance of need  - Initiate/Maintain alarms  - Obtain necessary fall risk management equipment:  - Apply yellow socks and bracelet for high fall risk patients  - Consider moving patient to room near nurses station  Outcome: Progressing  Goal: Maintain or return to baseline ADL function  Description: INTERVENTIONS:  - Educate patient/family on patient safety including physical limitations  - Instruct patient to call for assistance with activity   - Consult OT/PT to assist with strengthening/mobility   - Keep Call bell within reach  - Keep bed low and locked with side rails adjusted as appropriate  - Keep care items and personal belongings within reach  - Initiate and maintain comfort rounds  - Make Fall Risk Sign visible to staff  - Offer Toileting every 2 Hours, in advance of need  - Initiate/Maintain alarms  - Obtain necessary fall risk management equipment:   - Apply yellow socks and bracelet for high fall risk patients  - Consider moving patient to room near nurses station  Outcome: Progressing  Goal: Maintains/Returns to pre admission functional level  Description: INTERVENTIONS:  - Perform BMAT or MOVE assessment daily    - Set and communicate daily mobility goal to care team and patient/family/caregiver     - Collaborate with rehabilitation services on mobility goals if consulted  - Out of bed for toileting  - Record patient progress and toleration of activity level   Outcome: Progressing     Problem: INFECTION - ADULT  Goal: Absence or prevention of progression during hospitalization  Description: INTERVENTIONS:  - Assess and monitor for signs and symptoms of infection  - Monitor lab/diagnostic results  - Monitor all insertion sites, i e  indwelling lines, tubes, and drains  - Monitor endotracheal if appropriate and nasal secretions for changes in amount and color  - [place] appropriate cooling/warming therapies per order  - Administer medications as ordered  - Instruct and encourage patient and family to use good hand hygiene technique  - Identify and instruct in appropriate isolation precautions for identified infection/condition  Outcome: Progressing     Problem: DISCHARGE PLANNING  Goal: Discharge to home or other facility with appropriate resources  Description: INTERVENTIONS:  - Identify barriers to discharge w/patient and caregiver  - Arrange for needed discharge resources and transportation as appropriate  - Identify discharge learning needs (meds, wound care, etc )  - Refer to Case Management Department for coordinating discharge planning if the patient needs post-hospital services based on physician/advanced practitioner order or complex needs related to functional status, cognitive ability, or social support system  Outcome: Progressing     Problem: Knowledge Deficit  Goal: Patient/family/caregiver demonstrates understanding of disease process, treatment plan, medications, and discharge instructions  Description: Complete learning assessment and assess knowledge base    Interventions:  - Provide teaching at level of understanding  - Provide teaching via preferred learning methods  Outcome: Progressing     Problem: Prexisting or High Potential for Compromised Skin Integrity  Goal: Skin integrity is maintained or improved  Description: INTERVENTIONS:  - Identify patients at risk for skin breakdown  - Assess and monitor skin integrity  - Assess and monitor nutrition and hydration status  - Monitor labs   - Assess for incontinence   - Turn and reposition patient  - Assist with mobility/ambulation  - Relieve pressure over bony prominences  - Avoid friction and shearing  - Provide appropriate hygiene as needed including keeping skin clean and dry  - Evaluate need for skin moisturizer/barrier cream  - Collaborate with interdisciplinary team   - Patient/family teaching  - Consider wound care consult   Outcome: Progressing     Problem: MOBILITY - ADULT  Goal: Maintain or return to baseline ADL function  Description: INTERVENTIONS:  - Educate patient/family on patient safety including physical limitations  - Instruct patient to call for assistance with activity   - Consult OT/PT to assist with strengthening/mobility   - Keep Call bell within reach  - Keep bed low and locked with side rails adjusted as appropriate  - Keep care items and personal belongings within reach  - Initiate and maintain comfort rounds  - Make Fall Risk Sign visible to staff  - Offer Toileting every 2 Hours, in advance of need  - Initiate/Maintain alarms  - Obtain necessary fall risk management equipment:   - Apply yellow socks and bracelet for high fall risk patients  - Consider moving patient to room near nurses station  Outcome: Progressing  Goal: Maintains/Returns to pre admission functional level  Description: INTERVENTIONS:  - Perform BMAT or MOVE assessment daily    - Set and communicate daily mobility goal to care team and patient/family/caregiver  - Collaborate with rehabilitation services on mobility goals if consulted  - Out of bed for toileting  - Record patient progress and toleration of activity level   Outcome: Progressing     Problem: Nutrition/Hydration-ADULT  Goal: Nutrient/Hydration intake appropriate for improving, restoring or maintaining nutritional needs  Description: Monitor and assess patient's nutrition/hydration status for malnutrition  Collaborate with interdisciplinary team and initiate plan and interventions as ordered  Monitor patient's weight and dietary intake as ordered or per policy  Utilize nutrition screening tool and intervene as necessary  Determine patient's food preferences and provide high-protein, high-caloric foods as appropriate       INTERVENTIONS:  - Monitor oral intake, urinary output, labs, and treatment plans  - Assess nutrition and hydration status and recommend course of action  - Evaluate amount of meals eaten  - Assist patient with eating if necessary   - Allow adequate time for meals  - Recommend/ encourage appropriate diets, oral nutritional supplements, and vitamin/mineral supplements  - Order, calculate, and assess calorie counts as needed  - Recommend, monitor, and adjust tube feedings and TPN/PPN based on assessed needs  - Assess need for intravenous fluids  - Provide specific nutrition/hydration education as appropriate  - Include patient/family/caregiver in decisions related to nutrition  Outcome: Progressing

## 2023-05-07 NOTE — PROGRESS NOTES
"[facility]  67   Progress Note  Name: Sofia Echols  MRN: [id number]  Unit/Bed#: -01 I Date of Admission: 4/27/2023   Date of Service: 5/7/2023 I Hospital Day: 10    Assessment/Plan   * Left lower quadrant abdominal pain  Assessment & Plan  Assessment:  LLQ pain, ddx undifferentiated colitis without obstruction/bleeding/perforation, stable off antibiotics  Plan:  Abdominal exam improving  Trial surgical soft diet today  Defer to GI for bowel regimen  Subjective/Objective   Chief Complaint: tired    Subjective: fatigued today, napping currently, no N/V, but tolerating small amounts of food, ongoing diarrhea, ongoing back/belly pain    Objective:     Blood pressure (!) 114/40, pulse 63, temperature (!) 96 2 °F (35 7 °C), temperature source Tympanic, resp  rate 20, height 4' 11\" (1 499 m), weight 60 5 kg (133 lb 6 1 oz), SpO2 92 %  ,Body mass index is 26 94 kg/m²  Intake/Output Summary (Last 24 hours) at 5/7/2023 1149  Last data filed at 5/7/2023 0900  Gross per 24 hour   Intake 240 ml   Output 300 ml   Net -60 ml       Invasive Devices     Peripheral Intravenous Line  Duration           Peripheral IV 05/03/23 Dorsal (posterior); Right Hand 3 days    Peripheral IV 05/05/23 Dorsal (posterior); Left Hand 2 days                Physical Exam  Vitals and nursing note reviewed  Constitutional:       General: She is not in acute distress  Appearance: She is well-developed  She is not diaphoretic  HENT:      Head: Normocephalic and atraumatic  Eyes:      Conjunctiva/sclera: Conjunctivae normal       Pupils: Pupils are equal, round, and reactive to light  Pulmonary:      Effort: No respiratory distress  Abdominal:      Comments: Soft, compressible, mild TTP in LLQ without guarding  Bowel sounds noted  Musculoskeletal:         General: Normal range of motion  Cervical back: Normal range of motion  Skin:     General: Skin is warm and dry        Capillary Refill: " Capillary refill takes less than 2 seconds  Neurological:      Mental Status: She is alert and oriented to person, place, and time  Psychiatric:         Behavior: Behavior normal            Lab, Imaging and other studies:  I have personally reviewed pertinent lab results    , CBC:   Lab Results   Component Value Date    WBC 5 64 05/07/2023    HGB 8 8 (L) 05/07/2023    HCT 25 3 (L) 05/07/2023    MCV 98 05/07/2023    PLT 79 (L) 05/07/2023    MCH 34 2 05/07/2023    MCHC 34 8 05/07/2023    RDW 14 7 05/07/2023    MPV 11 8 05/07/2023    NRBC 0 05/07/2023   , CMP:   Lab Results   Component Value Date    SODIUM 129 (L) 05/07/2023    K 4 2 05/07/2023    CL 97 05/07/2023    CO2 23 05/07/2023    BUN 40 (H) 05/07/2023    CREATININE 1 82 (H) 05/07/2023    CALCIUM 8 5 05/07/2023    EGFR 26 05/07/2023     VTE Pharmacologic Prophylaxis: Heparin  VTE Mechanical Prophylaxis: sequential compression device

## 2023-05-07 NOTE — ASSESSMENT & PLAN NOTE
· Rate controlled on current regimen  · Continue midodrine due to hypotension  · Home metoprolol on hold due to hypotension

## 2023-05-07 NOTE — ASSESSMENT & PLAN NOTE
· Baseline appears to be between 11 and 12   · Hemoglobin today 8 8  · Without signs of active bleeding  · GI following, appreciate recommendations  · CBC a m

## 2023-05-08 PROBLEM — K92.1 TARRY STOOLS: Status: ACTIVE | Noted: 2023-05-08

## 2023-05-08 PROBLEM — E83.42 HYPOMAGNESEMIA: Status: RESOLVED | Noted: 2023-04-29 | Resolved: 2023-05-08

## 2023-05-08 LAB
ABO GROUP BLD: NORMAL
ABO GROUP BLD: NORMAL
ALBUMIN SERPL BCP-MCNC: 3.3 G/DL (ref 3.5–5)
ALBUMIN SERPL BCP-MCNC: 3.8 G/DL (ref 3.5–5)
ALP SERPL-CCNC: 326 U/L (ref 34–104)
ALP SERPL-CCNC: 342 U/L (ref 34–104)
ALT SERPL W P-5'-P-CCNC: 15 U/L (ref 7–52)
ALT SERPL W P-5'-P-CCNC: 17 U/L (ref 7–52)
ANION GAP SERPL CALCULATED.3IONS-SCNC: 5 MMOL/L (ref 4–13)
AST SERPL W P-5'-P-CCNC: 35 U/L (ref 13–39)
AST SERPL W P-5'-P-CCNC: 41 U/L (ref 13–39)
BASOPHILS # BLD AUTO: 0.03 THOUSANDS/ÂΜL (ref 0–0.1)
BASOPHILS # BLD AUTO: 0.05 THOUSANDS/ÂΜL (ref 0–0.1)
BASOPHILS NFR BLD AUTO: 1 % (ref 0–1)
BASOPHILS NFR BLD AUTO: 1 % (ref 0–1)
BILIRUB DIRECT SERPL-MCNC: 0.82 MG/DL (ref 0–0.2)
BILIRUB SERPL-MCNC: 1.89 MG/DL (ref 0.2–1)
BILIRUB SERPL-MCNC: 1.98 MG/DL (ref 0.2–1)
BLD GP AB SCN SERPL QL: NEGATIVE
BUN SERPL-MCNC: 38 MG/DL (ref 5–25)
CALCIUM ALBUM COR SERPL-MCNC: 9.3 MG/DL (ref 8.3–10.1)
CALCIUM SERPL-MCNC: 8.7 MG/DL (ref 8.4–10.2)
CHLORIDE SERPL-SCNC: 99 MMOL/L (ref 96–108)
CO2 SERPL-SCNC: 25 MMOL/L (ref 21–32)
CREAT SERPL-MCNC: 1.59 MG/DL (ref 0.6–1.3)
EOSINOPHIL # BLD AUTO: 0.18 THOUSAND/ÂΜL (ref 0–0.61)
EOSINOPHIL # BLD AUTO: 0.24 THOUSAND/ÂΜL (ref 0–0.61)
EOSINOPHIL NFR BLD AUTO: 3 % (ref 0–6)
EOSINOPHIL NFR BLD AUTO: 3 % (ref 0–6)
ERYTHROCYTE [DISTWIDTH] IN BLOOD BY AUTOMATED COUNT: 15.1 % (ref 11.6–15.1)
ERYTHROCYTE [DISTWIDTH] IN BLOOD BY AUTOMATED COUNT: 15.3 % (ref 11.6–15.1)
GFR SERPL CREATININE-BSD FRML MDRD: 31 ML/MIN/1.73SQ M
GLUCOSE SERPL-MCNC: 88 MG/DL (ref 65–140)
HCT VFR BLD AUTO: 24.4 % (ref 34.8–46.1)
HCT VFR BLD AUTO: 27.6 % (ref 34.8–46.1)
HEMOCCULT STL QL: ABNORMAL
HEMOCCULT STL QL: POSITIVE
HGB BLD-MCNC: 8.6 G/DL (ref 11.5–15.4)
HGB BLD-MCNC: 9.6 G/DL (ref 11.5–15.4)
IMM GRANULOCYTES # BLD AUTO: 0.08 THOUSAND/UL (ref 0–0.2)
IMM GRANULOCYTES # BLD AUTO: 0.09 THOUSAND/UL (ref 0–0.2)
IMM GRANULOCYTES NFR BLD AUTO: 1 % (ref 0–2)
IMM GRANULOCYTES NFR BLD AUTO: 1 % (ref 0–2)
INR PPP: 1.91 (ref 0.84–1.19)
LYMPHOCYTES # BLD AUTO: 1.3 THOUSANDS/ÂΜL (ref 0.6–4.47)
LYMPHOCYTES # BLD AUTO: 1.56 THOUSANDS/ÂΜL (ref 0.6–4.47)
LYMPHOCYTES NFR BLD AUTO: 21 % (ref 14–44)
LYMPHOCYTES NFR BLD AUTO: 21 % (ref 14–44)
MCH RBC QN AUTO: 34.5 PG (ref 26.8–34.3)
MCH RBC QN AUTO: 34.8 PG (ref 26.8–34.3)
MCHC RBC AUTO-ENTMCNC: 34.8 G/DL (ref 31.4–37.4)
MCHC RBC AUTO-ENTMCNC: 35.2 G/DL (ref 31.4–37.4)
MCV RBC AUTO: 99 FL (ref 82–98)
MCV RBC AUTO: 99 FL (ref 82–98)
MONOCYTES # BLD AUTO: 1.11 THOUSAND/ÂΜL (ref 0.17–1.22)
MONOCYTES # BLD AUTO: 1.39 THOUSAND/ÂΜL (ref 0.17–1.22)
MONOCYTES NFR BLD AUTO: 18 % (ref 4–12)
MONOCYTES NFR BLD AUTO: 19 % (ref 4–12)
NEUTROPHILS # BLD AUTO: 3.56 THOUSANDS/ÂΜL (ref 1.85–7.62)
NEUTROPHILS # BLD AUTO: 4.06 THOUSANDS/ÂΜL (ref 1.85–7.62)
NEUTS SEG NFR BLD AUTO: 55 % (ref 43–75)
NEUTS SEG NFR BLD AUTO: 56 % (ref 43–75)
NRBC BLD AUTO-RTO: 0 /100 WBCS
NRBC BLD AUTO-RTO: 0 /100 WBCS
PLATELET # BLD AUTO: 83 THOUSANDS/UL (ref 149–390)
PLATELET # BLD AUTO: 88 THOUSANDS/UL (ref 149–390)
PMV BLD AUTO: 10.4 FL (ref 8.9–12.7)
PMV BLD AUTO: 11.6 FL (ref 8.9–12.7)
POTASSIUM SERPL-SCNC: 4.2 MMOL/L (ref 3.5–5.3)
PROT SERPL-MCNC: 6.1 G/DL (ref 6.4–8.4)
PROT SERPL-MCNC: 6.3 G/DL (ref 6.4–8.4)
PROTHROMBIN TIME: 21.8 SECONDS (ref 11.6–14.5)
RBC # BLD AUTO: 2.47 MILLION/UL (ref 3.81–5.12)
RBC # BLD AUTO: 2.78 MILLION/UL (ref 3.81–5.12)
RH BLD: POSITIVE
RH BLD: POSITIVE
SODIUM SERPL-SCNC: 129 MMOL/L (ref 135–147)
SPECIMEN EXPIRATION DATE: NORMAL
TSH SERPL DL<=0.05 MIU/L-ACNC: 2.81 UIU/ML (ref 0.45–4.5)
WBC # BLD AUTO: 6.27 THOUSAND/UL (ref 4.31–10.16)
WBC # BLD AUTO: 7.38 THOUSAND/UL (ref 4.31–10.16)

## 2023-05-08 PROCEDURE — 86704 HEP B CORE ANTIBODY TOTAL: CPT | Performed by: INTERNAL MEDICINE

## 2023-05-08 PROCEDURE — 87340 HEPATITIS B SURFACE AG IA: CPT | Performed by: INTERNAL MEDICINE

## 2023-05-08 PROCEDURE — 80076 HEPATIC FUNCTION PANEL: CPT | Performed by: INTERNAL MEDICINE

## 2023-05-08 PROCEDURE — 82272 OCCULT BLD FECES 1-3 TESTS: CPT | Performed by: INTERNAL MEDICINE

## 2023-05-08 PROCEDURE — 82784 ASSAY IGA/IGD/IGG/IGM EACH: CPT | Performed by: INTERNAL MEDICINE

## 2023-05-08 PROCEDURE — 85025 COMPLETE CBC W/AUTO DIFF WBC: CPT | Performed by: SURGERY

## 2023-05-08 PROCEDURE — 99232 SBSQ HOSP IP/OBS MODERATE 35: CPT | Performed by: INTERNAL MEDICINE

## 2023-05-08 PROCEDURE — 97116 GAIT TRAINING THERAPY: CPT

## 2023-05-08 PROCEDURE — 86038 ANTINUCLEAR ANTIBODIES: CPT | Performed by: INTERNAL MEDICINE

## 2023-05-08 PROCEDURE — 83550 IRON BINDING TEST: CPT | Performed by: INTERNAL MEDICINE

## 2023-05-08 PROCEDURE — 80053 COMPREHEN METABOLIC PANEL: CPT

## 2023-05-08 PROCEDURE — 86900 BLOOD TYPING SEROLOGIC ABO: CPT | Performed by: SURGERY

## 2023-05-08 PROCEDURE — 86015 ACTIN ANTIBODY EACH: CPT | Performed by: INTERNAL MEDICINE

## 2023-05-08 PROCEDURE — 82728 ASSAY OF FERRITIN: CPT | Performed by: INTERNAL MEDICINE

## 2023-05-08 PROCEDURE — 86381 MITOCHONDRIAL ANTIBODY EACH: CPT | Performed by: INTERNAL MEDICINE

## 2023-05-08 PROCEDURE — 99232 SBSQ HOSP IP/OBS MODERATE 35: CPT | Performed by: PHYSICIAN ASSISTANT

## 2023-05-08 PROCEDURE — 83540 ASSAY OF IRON: CPT | Performed by: INTERNAL MEDICINE

## 2023-05-08 PROCEDURE — 82103 ALPHA-1-ANTITRYPSIN TOTAL: CPT | Performed by: INTERNAL MEDICINE

## 2023-05-08 PROCEDURE — 82105 ALPHA-FETOPROTEIN SERUM: CPT | Performed by: INTERNAL MEDICINE

## 2023-05-08 PROCEDURE — 97110 THERAPEUTIC EXERCISES: CPT

## 2023-05-08 PROCEDURE — 84443 ASSAY THYROID STIM HORMONE: CPT | Performed by: INTERNAL MEDICINE

## 2023-05-08 PROCEDURE — 86803 HEPATITIS C AB TEST: CPT | Performed by: INTERNAL MEDICINE

## 2023-05-08 PROCEDURE — 82390 ASSAY OF CERULOPLASMIN: CPT | Performed by: INTERNAL MEDICINE

## 2023-05-08 PROCEDURE — 85610 PROTHROMBIN TIME: CPT | Performed by: INTERNAL MEDICINE

## 2023-05-08 PROCEDURE — 82787 IGG 1 2 3 OR 4 EACH: CPT | Performed by: INTERNAL MEDICINE

## 2023-05-08 PROCEDURE — 86705 HEP B CORE ANTIBODY IGM: CPT | Performed by: INTERNAL MEDICINE

## 2023-05-08 PROCEDURE — 85025 COMPLETE CBC W/AUTO DIFF WBC: CPT

## 2023-05-08 PROCEDURE — 82272 OCCULT BLD FECES 1-3 TESTS: CPT | Performed by: PHYSICIAN ASSISTANT

## 2023-05-08 PROCEDURE — 86901 BLOOD TYPING SEROLOGIC RH(D): CPT | Performed by: SURGERY

## 2023-05-08 PROCEDURE — 99232 SBSQ HOSP IP/OBS MODERATE 35: CPT | Performed by: SURGERY

## 2023-05-08 PROCEDURE — 86850 RBC ANTIBODY SCREEN: CPT | Performed by: SURGERY

## 2023-05-08 RX ADMIN — ALBUMIN (HUMAN) 12.5 G: 0.25 INJECTION, SOLUTION INTRAVENOUS at 16:47

## 2023-05-08 RX ADMIN — MIDODRINE HYDROCHLORIDE 5 MG: 5 TABLET ORAL at 12:13

## 2023-05-08 RX ADMIN — ALBUMIN (HUMAN) 12.5 G: 0.25 INJECTION, SOLUTION INTRAVENOUS at 12:13

## 2023-05-08 RX ADMIN — HYDROMORPHONE HYDROCHLORIDE 0.5 MG: 1 INJECTION, SOLUTION INTRAMUSCULAR; INTRAVENOUS; SUBCUTANEOUS at 07:55

## 2023-05-08 RX ADMIN — ALBUMIN (HUMAN) 12.5 G: 0.25 INJECTION, SOLUTION INTRAVENOUS at 05:19

## 2023-05-08 RX ADMIN — TRAZODONE HYDROCHLORIDE 50 MG: 50 TABLET ORAL at 21:25

## 2023-05-08 RX ADMIN — HYDROMORPHONE HYDROCHLORIDE 0.5 MG: 1 INJECTION, SOLUTION INTRAMUSCULAR; INTRAVENOUS; SUBCUTANEOUS at 16:48

## 2023-05-08 RX ADMIN — OCTREOTIDE ACETATE 100 MCG: 100 INJECTION, SOLUTION INTRAVENOUS; SUBCUTANEOUS at 21:25

## 2023-05-08 RX ADMIN — HEPARIN SODIUM 5000 UNITS: 5000 INJECTION INTRAVENOUS; SUBCUTANEOUS at 05:19

## 2023-05-08 RX ADMIN — LEVOTHYROXINE SODIUM 75 MCG: 75 TABLET ORAL at 05:19

## 2023-05-08 RX ADMIN — ERYTHROMYCIN 0.5 INCH: 5 OINTMENT OPHTHALMIC at 19:37

## 2023-05-08 RX ADMIN — ALBUMIN (HUMAN) 12.5 G: 0.25 INJECTION, SOLUTION INTRAVENOUS at 23:30

## 2023-05-08 RX ADMIN — OCTREOTIDE ACETATE 100 MCG: 100 INJECTION, SOLUTION INTRAVENOUS; SUBCUTANEOUS at 16:44

## 2023-05-08 RX ADMIN — OCTREOTIDE ACETATE 100 MCG: 100 INJECTION, SOLUTION INTRAVENOUS; SUBCUTANEOUS at 05:19

## 2023-05-08 RX ADMIN — LIDOCAINE 1 PATCH: 700 PATCH TOPICAL at 08:22

## 2023-05-08 RX ADMIN — MIDODRINE HYDROCHLORIDE 5 MG: 5 TABLET ORAL at 07:36

## 2023-05-08 RX ADMIN — PANTOPRAZOLE SODIUM 40 MG: 40 TABLET, DELAYED RELEASE ORAL at 05:19

## 2023-05-08 RX ADMIN — MIDODRINE HYDROCHLORIDE 5 MG: 5 TABLET ORAL at 15:21

## 2023-05-08 RX ADMIN — ERYTHROMYCIN 0.5 INCH: 5 OINTMENT OPHTHALMIC at 08:23

## 2023-05-08 NOTE — ASSESSMENT & PLAN NOTE
· Baseline appears to be between 11 and 12   · Hemoglobin today 8 8 -> 8 6, repeat 9 6  · Without signs of active bleeding  · GI following, appreciate recommendations  · CBC a m

## 2023-05-08 NOTE — PROGRESS NOTES
"Progress Note - Nephrology   Cindy Quevedo 69 y o  female MRN: [id number]  Unit/Bed#: -01 Encounter: [id number]    A/P:  1  Acute kidney injury, creatinine trended down to 1 5 with a peak of 2 1  Etiology prerenal versus ATN versus HRS  She is being treated per HRS protocol  Urine output nonoliguric  She has lower extremity edema and in a cirrhotic indicates that she is less likely volume depleted  CT kidneys unremarkable  Can continue with judicious albumin trial for HRS treatment in addition to octreotide and midodrine  Avoid isotonic IV fluids  Baseline creatinine around 1 0  In a cirrhotic this may overestimate renal function  2   Hyponatremia, sodium trends stable at 129 mmol/L  She does not appear volume contracted at this point  Observe off isotonic fluids she is already receiving albumin infusion  Low urine sodium would point to HRS versus prerenal state  3   Cirrhosis, treatment with HRS protocol  Increase MAP by 10 mmHg    4  Anemia with thrombocytopenia in the setting of cirrhosis  Also concerns for GI bleed as well  General surgery and GI have been reached out to by PCP  Continue repeating hemoglobin if concern for losses  Will defer to primary regarding transfusion threshold  Follow up reason for today's visit:     Acute kidney injury    Subjective:   Patient seen and examined today  Reports continued diarrhea and there has been blood in her stools  Apparently with dark stools  Reports tolerating some of her diet  Denies chest pain or shortness of breath  Reports ongoing lower extremity pain and abdominal pain  Discussed care with brother and sister at bedside  A complete 10 point review of systems was performed and is otherwise negative  Objective:     Vitals: Blood pressure (!) 124/42, pulse 66, temperature 97 5 °F (36 4 °C), resp  rate 17, height 4' 11\" (1 499 m), weight 61 2 kg (134 lb 14 7 oz), SpO2 93 %  ,Body mass index is 27 25 kg/m²      Weight " "(last 2 days)     Date/Time Weight    05/08/23 0544 61 2 (134 92)    05/07/23 0600 60 5 (133 38)     Weight: pt weighed with one pillow,pad,sheet,and blanket  no scds or oxygent tanks at 05/07/23 0600    05/06/23 0600 60 3 (132 94)     Weight: Patient unable to stand at this time due to weakness and severe pain  Bed scale reading inaccurate  Will notify next shift  at 05/06/23 0600            Intake/Output Summary (Last 24 hours) at 5/8/2023 1531  Last data filed at 5/8/2023 0544  Gross per 24 hour   Intake 120 ml   Output 350 ml   Net -230 ml     I/O last 3 completed shifts: In: 360 [P O :360]  Out: 650 [Urine:650]         Physical Exam: BP (!) 124/42   Pulse 66   Temp 97 5 °F (36 4 °C)   Resp 17   Ht 4' 11\" (1 499 m)   Wt 61 2 kg (134 lb 14 7 oz)   SpO2 93%   BMI 27 25 kg/m²     General Appearance:    Alert, cooperative, no distress, appears stated age   Head:    Normocephalic, without obvious abnormality, atraumatic   Eyes:    Conjunctiva/corneas clear   Ears:    Normal external ears   Nose:   Nares normal, septum midline, mucosa normal, no drainage    or sinus tenderness   Throat:   Lips, mucosa, and tongue normal; teeth and gums normal   Neck:    Back:      Lungs:     Clear to auscultation bilaterally, respirations unlabored   Chest wall:    No tenderness or deformity   Heart:    Regular rate and rhythm, S1 and S2 normal, no murmur, rub   or gallop   Abdomen:     Soft, non-tender, bowel sounds active   Extremities:  2+ lower extremity edema   Skin:   Skin color, texture, turgor normal, no rashes or lesions   Lymph nodes:   Cervical normal   Neurologic:   CNII-XII intact            Lab, Imaging and other studies: I have personally reviewed pertinent labs    CBC:   Lab Results   Component Value Date    WBC 6 27 05/08/2023    HGB 8 6 (L) 05/08/2023    HCT 24 4 (L) 05/08/2023    MCV 99 (H) 05/08/2023    PLT 83 (L) 05/08/2023    MCH 34 8 (H) 05/08/2023    MCHC 35 2 05/08/2023    RDW 15 1 05/08/2023    KAYLIN " 10 4 05/08/2023    NRBC 0 05/08/2023     CMP:   Lab Results   Component Value Date    K 4 2 05/08/2023    CL 99 05/08/2023    CO2 25 05/08/2023    BUN 38 (H) 05/08/2023    CREATININE 1 59 (H) 05/08/2023    CALCIUM 8 7 05/08/2023    AST 35 05/08/2023    ALT 15 05/08/2023    ALKPHOS 342 (H) 05/08/2023    EGFR 31 05/08/2023         Results from last 7 days   Lab Units 05/08/23  0536 05/07/23  1722 05/07/23  0547 05/06/23  0536 05/05/23  0132   POTASSIUM mmol/L 4 2 4 7 4 2   < > 3 2*   CHLORIDE mmol/L 99 97 97   < > 93*   CO2 mmol/L 25 23 23   < > 20*   BUN mg/dL 38* 38* 40*   < > 40*   CREATININE mg/dL 1 59* 1 75* 1 82*   < > 2 05*   CALCIUM mg/dL 8 7 8 6 8 5   < > 7 6*   ALK PHOS U/L 342*  --   --   --  322*   ALT U/L 15  --   --   --  22   AST U/L 35  --   --   --  59*    < > = values in this interval not displayed  Phosphorus: No results found for: PHOS  Magnesium: No results found for: MG  Urinalysis: No results found for: Shelly Richer, SPECGRAV, PHUR, LEUKOCYTESUR, NITRITE, PROTEINUA, GLUCOSEU, KETONESU, BILIRUBINUR, BLOODU  Ionized Calcium: No results found for: CAION  Coagulation: No results found for: PT, INR, APTT  Troponin: No results found for: TROPONINI  ABG: No results found for: PHART, OEZ8QVV, PO2ART, UKV5UQE, D3WCQCTZ, BEART, SOURCE  Radiology review:     IMAGING  No results found      Current Facility-Administered Medications   Medication Dose Route Frequency   • acetaminophen (TYLENOL) tablet 650 mg  650 mg Oral Q6H PRN   • albumin human (FLEXBUMIN) 25 % injection 12 5 g  12 5 g Intravenous Q6H   • barium (READI-CAT 2) suspension 900 mL  900 mL Oral Once in imaging   • dicyclomine (BENTYL) capsule 10 mg  10 mg Oral TID PRN   • erythromycin (ILOTYCIN) 0 5 % ophthalmic ointment 0 5 inch  0 5 inch Left Eye BID   • heparin (porcine) subcutaneous injection 5,000 Units  5,000 Units Subcutaneous Q8H [facility]   • HYDROmorphone (DILAUDID) injection 0 5 mg  0 5 mg Intravenous Q4H PRN   • levothyroxine tablet 75 mcg  75 mcg Oral Early Morning   • lidocaine (LIDODERM) 5 % patch 1 patch  1 patch Topical Daily   • midodrine (PROAMATINE) tablet 5 mg  5 mg Oral TID AC   • octreotide (SandoSTATIN) injection 100 mcg  100 mcg Subcutaneous Q8H [address]   • ondansetron (ZOFRAN) injection 4 mg  4 mg Intravenous Q6H PRN   • pantoprazole (PROTONIX) EC tablet 40 mg  40 mg Oral Early Morning   • traZODone (DESYREL) tablet 50 mg  50 mg Oral HS     Medications Discontinued During This Encounter   Medication Reason   • polyethylene glycol (GLYCOLAX) 17 GM/SCOOP powder Therapy completed   • ciprofloxacin (CIPRO) IVPB (premix in 5% dextrose) 400 mg 200 mL    • metroNIDAZOLE (FLAGYL) IVPB (premix) 500 mg 100 mL    • dextrose 5 % and sodium chloride 0 45 % with KCl 20 mEq/L infusion    • sodium chloride 0 9 % infusion    • polyethylene glycol (MIRALAX) packet 17 g    • ciprofloxacin (CIPRO) tablet 500 mg    • metroNIDAZOLE (FLAGYL) tablet 500 mg    • sodium bicarbonate tablet 650 mg    • enoxaparin (LOVENOX) subcutaneous injection 40 mg    • furosemide (LASIX) injection 80 mg    • multi-electrolyte (PLASMALYTE-A/ISOLYTE-S PH 7 4) IV solution    • metoprolol succinate (TOPROL-XL) 24 hr tablet 25 mg    • midodrine (PROAMATINE) tablet 2 5 mg    • enoxaparin (LOVENOX) subcutaneous injection 30 mg    • metoprolol succinate (TOPROL-XL) 24 hr tablet 12 5 mg    • magnesium hydroxide (MILK OF MAGNESIA) oral suspension 30 mL    • polyethylene glycol (MIRALAX) packet 17 g    • morphine injection 4 mg        Helio, DO      This progress note was produced in part using a dictation device which may document imprecise wording from author's original intent

## 2023-05-08 NOTE — ASSESSMENT & PLAN NOTE
· Secondary to ALEJANDRA suggestive of hepatorenal syndrome with ADH secretion per nephrology    · Serum sodium improved to 129  · Discussed with nephrology, patient has had multiple episodes of diarrhea and appears dry on exam  · Felt low sodium could be due to cirrhosis  · Started Isolyte at 75/hr with repeat labs at 1800 recommendation of nephrology- now discontinued  · Continue management as above

## 2023-05-08 NOTE — PROGRESS NOTES
"Progress Note - General Surgery   Iza Johnson 69 y o  female MRN: [id number]  Unit/Bed#: -01 Encounter: [id number]    Assessment:  69year old female with PMH significant for Afib, diverticulitis and ischemic colitis presents for the evaluation of persistent LLQ abdominal pain of unclear etiology   · Afebrile, vitals stable   · Hgb 8 6 (8 8)  · WBC 6 27  · Hyponatremia improving 129 (126)  · Cr 1 59 (1 75)  · Tbili 1 98  · Fecal occult positive x2    Plan:  · No surgical intervention indicated at this time  · Patient with hemoccult positive results  Recommend GI consult    · Nephrology following, appreciate recommendations   · Analgesia and antiemetics prn   · Diet as tolerated   · Continue work with PT/OT  · Rest of medical management per SLIM   · Discussed with attending    Subjective/Objective   Subjective: Patient's chief complaint is back pain  Still having unchanged abdominal pain  Denies n/v  Tolerating diet  Urinating frequently  Still having liquid stools  Objective:   Blood pressure (!) 124/42, pulse 66, temperature 97 5 °F (36 4 °C), resp  rate 17, height 4' 11\" (1 499 m), weight 61 2 kg (134 lb 14 7 oz), SpO2 93 %  ,Body mass index is 27 25 kg/m²  Intake/Output Summary (Last 24 hours) at 5/8/2023 1524  Last data filed at 5/8/2023 0544  Gross per 24 hour   Intake 120 ml   Output 350 ml   Net -230 ml       Invasive Devices     Peripheral Intravenous Line  Duration           Peripheral IV 05/05/23 Dorsal (posterior); Left Hand 3 days                Physical Exam  Vitals reviewed  Constitutional:       General: She is not in acute distress  Appearance: She is normal weight  HENT:      Head: Normocephalic and atraumatic  Cardiovascular:      Rate and Rhythm: Normal rate and regular rhythm  Pulmonary:      Effort: Pulmonary effort is normal  No respiratory distress  Abdominal:      General: Abdomen is flat  There is no distension  Palpations: Abdomen is soft        " Tenderness: There is abdominal tenderness  There is no guarding  Musculoskeletal:      Right lower leg: Pitting Edema present  Left lower leg: Pitting Edema present  Skin:     General: Skin is warm and dry  Neurological:      General: No focal deficit present  Mental Status: She is alert  Mental status is at baseline  Psychiatric:         Mood and Affect: Mood normal          Behavior: Behavior normal          Lab, Imaging and other studies:  I have personally reviewed pertinent lab results    , CBC:   Lab Results   Component Value Date    WBC 6 27 05/08/2023    HGB 8 6 (L) 05/08/2023    HCT 24 4 (L) 05/08/2023    MCV 99 (H) 05/08/2023    PLT 83 (L) 05/08/2023    MCH 34 8 (H) 05/08/2023    MCHC 35 2 05/08/2023    RDW 15 1 05/08/2023    MPV 10 4 05/08/2023    NRBC 0 05/08/2023   , CMP:   Lab Results   Component Value Date    SODIUM 129 (L) 05/08/2023    K 4 2 05/08/2023    CL 99 05/08/2023    CO2 25 05/08/2023    BUN 38 (H) 05/08/2023    CREATININE 1 59 (H) 05/08/2023    CALCIUM 8 7 05/08/2023    AST 35 05/08/2023    ALT 15 05/08/2023    ALKPHOS 342 (H) 05/08/2023    EGFR 31 05/08/2023     VTE Pharmacologic Prophylaxis: Enoxaparin (Lovenox)  VTE Mechanical Prophylaxis: sequential compression device    Rodrigue Hong PA-C

## 2023-05-08 NOTE — PHYSICAL THERAPY NOTE
PHYSICAL THERAPY TREATMENT NOTE  NAME:  Ivey Collet  DATE: 05/08/23    Length Of Stay: 11  Performed at least 2 patient identifiers during session: Name and ID bracelet    TREATMENT:      05/08/23 0935   PT Last Visit   PT Visit Date 05/08/23   Note Type   Note Type Treatment   Pain Assessment   Pain Assessment Tool 0-10   Pain Score 9   Pain Location/Orientation Orientation: Lower; Location: Back; Location: Abdomen   Pain Onset/Description Onset: Ongoing   Patient's Stated Pain Goal No pain   Hospital Pain Intervention(s) Medication (See MAR); Repositioned; Ambulation/increased activity; Emotional support   Restrictions/Precautions   Weight Bearing Precautions Per Order No   Other Precautions Chair Alarm;Cognitive; Bed Alarm; Fall Risk   General   Chart Reviewed Yes   Family/Caregiver Present No   Cognition   Overall Cognitive Status WFL   Arousal/Participation Alert; Cooperative   Attention Attends with cues to redirect   Orientation Level Oriented X4   Memory Decreased short term memory   Following Commands Follows one step commands without difficulty   Comments pt pleasant and cooperative   Bed Mobility   Additional Comments pt OOB in recliner and remains in recliner upon conclusion   Transfers   Sit to Stand 5  Supervision   Additional items Assist x 1; Armrests; Increased time required;Verbal cues   Stand to Sit 5  Supervision   Additional items Assist x 1; Armrests; Increased time required;Verbal cues   Stand pivot 5  Supervision   Additional items Assist x 1;Verbal cues; Increased time required   Additional Comments verbal cues for proper hand placement and safe technique, utilized RW for functional transfers   Ambulation/Elevation   Gait pattern Improper Weight shift;Decreased foot clearance; Inconsistent garfield; Short stride; Excessively slow   Gait Assistance 5  Supervision  (close S)   Additional items Assist x 1;Verbal cues   Assistive Device Rolling walker   Distance 30 ftx1   Ambulation/Elevation Additional Comments verbal cues for safety and proper RW management   Balance   Static Sitting Normal   Dynamic Sitting Good   Static Standing Fair +   Dynamic Standing Fair   Ambulatory Fair   Endurance Deficit   Endurance Deficit Yes   Activity Tolerance   Activity Tolerance Patient limited by pain  (stool incontinence with ambulation)   Nurse Made Aware yes   Exercises   Heelslides Sitting;20 reps;AROM; Bilateral   Hip Abduction Sitting;20 reps;AROM; Bilateral   Hip Adduction Sitting;20 reps;AROM; Bilateral   Knee AROM Long Arc Quad Sitting;20 reps;AROM; Bilateral   Ankle Pumps Sitting;20 reps;AROM; Bilateral   Marching Sitting;20 reps;AROM; Bilateral   Assessment   Prognosis Good   Problem List Decreased strength;Decreased endurance; Impaired balance;Decreased mobility; Decreased cognition;Decreased safety awareness   Assessment Pt seen for PT treatment session this date with interventions consisting of gait training w/ emphasis on improving pt's ability to ambulate level surfaces x 30 ftx1 with close S provided by therapist with RW, Therapeutic exercise consisting of: AROM 20 reps B LE in sitting position and therapeutic activity consisting of training: sit<>stand transfers  Pt agreeable to PT treatment session upon arrival, pt found seated OOB in recliner, in no apparent distress and responsive  In comparison to previous session, pt with no improvements as evidenced by pt limited by pain and stool incontinence  Post session: pt returned back to recliner, chair alarm engaged, all needs in reach and RN notified of session findings/recommendations  Continue to recommend return to facility with rehabilitation services at time of d/c in order to maximize pt's functional independence and safety w/ mobility  Pt continues to be functioning below baseline level  PT will continue to see pt during current hospitalization in order to address the deficits listed above and provide interventions consistent w/ POC in effort to achieve STGs  Goals   LTG Expiration Date 05/11/23   Plan   Treatment/Interventions Functional transfer training;LE strengthening/ROM; Endurance training;Bed mobility;Gait training;Spoke to nursing   PT Frequency 3-5x/wk   Recommendation   PT Discharge Recommendation Return to facility with rehabilitation services   AM-PAC Basic Mobility Inpatient   Turning in Flat Bed Without Bedrails 3   Lying on Back to Sitting on Edge of Flat Bed Without Bedrails 3   Moving Bed to Chair 3   Standing Up From Chair Using Arms 3   Walk in Room 3   Climb 3-5 Stairs With Railing 2   Basic Mobility Inpatient Raw Score 17   Basic Mobility Standardized Score 39 67   Highest Level Of Mobility   -HL Goal 5: Stand one or more mins   -HLM Achieved 7: Walk 25 feet or more   Education   Education Provided Mobility training;Assistive device   Patient Demonstrates verbal understanding;Reinforcement needed   End of Consult   Patient Position at End of Consult Bedside chair;Bed/Chair alarm activated; All needs within reach       The patient's AM-PAC Basic Mobility Inpatient Short Form Raw Score is 17  A Raw score of greater than 16 suggests the patient may benefit from discharge to home  Please also refer to the recommendation of the Physical Therapist for safe discharge planning        Ezra Gatica PTA,KISHORE

## 2023-05-08 NOTE — PLAN OF CARE
Problem: Potential for Falls  Goal: Patient will remain free of falls  Description: INTERVENTIONS:  - Educate patient/family on patient safety including physical limitations  - Instruct patient to call for assistance with activity   - Consult OT/PT to assist with strengthening/mobility   - Keep Call bell within reach  - Keep bed low and locked with side rails adjusted as appropriate  - Keep care items and personal belongings within reach  - Initiate and maintain comfort rounds  - Make Fall Risk Sign visible to staff  - Offer Toileting every 2 Hours, in advance of need  - Initiate/Maintain alarms  - Obtain necessary fall risk management equipment:  - Apply yellow socks and bracelet for high fall risk patients  - Consider moving patient to room near nurses station  Outcome: Progressing     Problem: PAIN - ADULT  Goal: Verbalizes/displays adequate comfort level or baseline comfort level  Description: Interventions:  - Encourage patient to monitor pain and request assistance  - Assess pain using appropriate pain scale  - Administer analgesics based on type and severity of pain and evaluate response  - Implement non-pharmacological measures as appropriate and evaluate response  - Consider cultural and social influences on pain and pain management  - Notify physician/advanced practitioner if interventions unsuccessful or patient reports new pain  Outcome: Progressing     Problem: SAFETY ADULT  Goal: Patient will remain free of falls  Description: INTERVENTIONS:  - Educate patient/family on patient safety including physical limitations  - Instruct patient to call for assistance with activity   - Consult OT/PT to assist with strengthening/mobility   - Keep Call bell within reach  - Keep bed low and locked with side rails adjusted as appropriate  - Keep care items and personal belongings within reach  - Initiate and maintain comfort rounds  - Make Fall Risk Sign visible to staff  - Offer Toileting every 2 Hours, in advance of need  - Initiate/Maintain alarms  - Obtain necessary fall risk management equipment:  - Apply yellow socks and bracelet for high fall risk patients  - Consider moving patient to room near nurses station  Outcome: Progressing  Goal: Maintain or return to baseline ADL function  Description: INTERVENTIONS:  - Educate patient/family on patient safety including physical limitations  - Instruct patient to call for assistance with activity   - Consult OT/PT to assist with strengthening/mobility   - Keep Call bell within reach  - Keep bed low and locked with side rails adjusted as appropriate  - Keep care items and personal belongings within reach  - Initiate and maintain comfort rounds  - Make Fall Risk Sign visible to staff  - Offer Toileting every 2 Hours, in advance of need  - Initiate/Maintain alarms  - Obtain necessary fall risk management equipment:   - Apply yellow socks and bracelet for high fall risk patients  - Consider moving patient to room near nurses station  Outcome: Progressing  Goal: Maintains/Returns to pre admission functional level  Description: INTERVENTIONS:  - Perform BMAT or MOVE assessment daily    - Set and communicate daily mobility goal to care team and patient/family/caregiver     - Collaborate with rehabilitation services on mobility goals if consulted  - Out of bed for toileting  - Record patient progress and toleration of activity level   Outcome: Progressing     Problem: INFECTION - ADULT  Goal: Absence or prevention of progression during hospitalization  Description: INTERVENTIONS:  - Assess and monitor for signs and symptoms of infection  - Monitor lab/diagnostic results  - Monitor all insertion sites, i e  indwelling lines, tubes, and drains  - Monitor endotracheal if appropriate and nasal secretions for changes in amount and color  - [place] appropriate cooling/warming therapies per order  - Administer medications as ordered  - Instruct and encourage patient and family to use good hand hygiene technique  - Identify and instruct in appropriate isolation precautions for identified infection/condition  Outcome: Progressing     Problem: DISCHARGE PLANNING  Goal: Discharge to home or other facility with appropriate resources  Description: INTERVENTIONS:  - Identify barriers to discharge w/patient and caregiver  - Arrange for needed discharge resources and transportation as appropriate  - Identify discharge learning needs (meds, wound care, etc )  - Refer to Case Management Department for coordinating discharge planning if the patient needs post-hospital services based on physician/advanced practitioner order or complex needs related to functional status, cognitive ability, or social support system  Outcome: Progressing     Problem: Knowledge Deficit  Goal: Patient/family/caregiver demonstrates understanding of disease process, treatment plan, medications, and discharge instructions  Description: Complete learning assessment and assess knowledge base    Interventions:  - Provide teaching at level of understanding  - Provide teaching via preferred learning methods  Outcome: Progressing     Problem: Prexisting or High Potential for Compromised Skin Integrity  Goal: Skin integrity is maintained or improved  Description: INTERVENTIONS:  - Identify patients at risk for skin breakdown  - Assess and monitor skin integrity  - Assess and monitor nutrition and hydration status  - Monitor labs   - Assess for incontinence   - Turn and reposition patient  - Assist with mobility/ambulation  - Relieve pressure over bony prominences  - Avoid friction and shearing  - Provide appropriate hygiene as needed including keeping skin clean and dry  - Evaluate need for skin moisturizer/barrier cream  - Collaborate with interdisciplinary team   - Patient/family teaching  - Consider wound care consult   Outcome: Progressing     Problem: MOBILITY - ADULT  Goal: Maintain or return to baseline ADL function  Description: INTERVENTIONS:  - Educate patient/family on patient safety including physical limitations  - Instruct patient to call for assistance with activity   - Consult OT/PT to assist with strengthening/mobility   - Keep Call bell within reach  - Keep bed low and locked with side rails adjusted as appropriate  - Keep care items and personal belongings within reach  - Initiate and maintain comfort rounds  - Make Fall Risk Sign visible to staff  - Offer Toileting every 2 Hours, in advance of need  - Initiate/Maintain alarms  - Obtain necessary fall risk management equipment:   - Apply yellow socks and bracelet for high fall risk patients  - Consider moving patient to room near nurses station  Outcome: Progressing  Goal: Maintains/Returns to pre admission functional level  Description: INTERVENTIONS:  - Perform BMAT or MOVE assessment daily    - Set and communicate daily mobility goal to care team and patient/family/caregiver  - Collaborate with rehabilitation services on mobility goals if consulted  - Out of bed for toileting  - Record patient progress and toleration of activity level   Outcome: Progressing     Problem: Nutrition/Hydration-ADULT  Goal: Nutrient/Hydration intake appropriate for improving, restoring or maintaining nutritional needs  Description: Monitor and assess patient's nutrition/hydration status for malnutrition  Collaborate with interdisciplinary team and initiate plan and interventions as ordered  Monitor patient's weight and dietary intake as ordered or per policy  Utilize nutrition screening tool and intervene as necessary  Determine patient's food preferences and provide high-protein, high-caloric foods as appropriate       INTERVENTIONS:  - Monitor oral intake, urinary output, labs, and treatment plans  - Assess nutrition and hydration status and recommend course of action  - Evaluate amount of meals eaten  - Assist patient with eating if necessary   - Allow adequate time for meals  - Recommend/ encourage appropriate diets, oral nutritional supplements, and vitamin/mineral supplements  - Order, calculate, and assess calorie counts as needed  - Recommend, monitor, and adjust tube feedings and TPN/PPN based on assessed needs  - Assess need for intravenous fluids  - Provide specific nutrition/hydration education as appropriate  - Include patient/family/caregiver in decisions related to nutrition  Outcome: Progressing

## 2023-05-08 NOTE — ASSESSMENT & PLAN NOTE
· Presented to the ED with left lower quadrant abdominal pain on 4/27   · Continues with some left lower quadrant abdominal pain today, as well as loose stools with it,  · CT abdomen: New small amount of free fluid in the abdomen and pelvis compared to 4/18/2023, which can be secondary to cirrhosis or acute inflammatory process in the abdomen and pelvis such as occult acute diverticulitis  Moderate amount of stool in the colon, nonspecific and can represent constipation    · Surgery following, appreciate recommendations  · GI following, appreciate recommendations  · Patient had flex sig on 5/2 minimal inflammation noted, biopsies were taken results pending  · KUB (5/5):  Nonobstructive bowel gas pattern  · Patient tolerating soft diet today  · MiraLAX discontinued due to diarrhea

## 2023-05-08 NOTE — ASSESSMENT & PLAN NOTE
· Nursing reported black tarry stools   · Stools are heme +  · hgb 8 6 this am- repeat is 9 6  · Spoke with GI- will see patient today  · Continue to monitor H&H

## 2023-05-08 NOTE — CASE MANAGEMENT
Case Management Progress Note    Patient name Theo Alfonso  Location [address] [id number] MRN [id number]  : 1945 Date 2023       LOS (days): 11  Geometric Mean LOS (GMLOS) (days): 3 80  Days to GMLOS:-7 1        OBJECTIVE:        Current admission status: Inpatient  Preferred Pharmacy:   [address] [place] [address]  [facility]   [facility] [address]  Phone: [phone] Fax: [phone]    Primary Care Provider: Irene High MD    Primary Insurance: MEDICARE  Secondary Insurance: [employer] [place]les[place]    PROGRESS NOTE:    Met with patient, brother from [place] and sister from [facility] at bedside  Patient asleep  Family concerned with discharge plans  Updated family that therapy saw this am and recommendations are to return back to [place] with Vishal Watkins  Both brother and sister worried about medical condition  Brother flew in from [facility] under the impression that patient was dying  Discussed that therapy has been and will continue to treat for safe discharge recommendations  Updated Constancia Skip AP same

## 2023-05-08 NOTE — PROGRESS NOTES
[address] [address]  Progress Note  Name: Ml Lara  MRN: [id number]  Unit/Bed#: -01 I Date of Admission: 4/27/2023   Date of Service: 5/8/2023 I Hospital Day: 11    Assessment/Plan   * Left lower quadrant abdominal pain  Assessment & Plan  · Presented to the ED with left lower quadrant abdominal pain on 4/27   · Continues with some left lower quadrant abdominal pain today, as well as loose stools with it,  · CT abdomen: New small amount of free fluid in the abdomen and pelvis compared to 4/18/2023, which can be secondary to cirrhosis or acute inflammatory process in the abdomen and pelvis such as occult acute diverticulitis  Moderate amount of stool in the colon, nonspecific and can represent constipation  · Surgery following, appreciate recommendations  · GI following, appreciate recommendations  · Patient had flex sig on 5/2 minimal inflammation noted, biopsies were taken results pending  · KUB (5/5):  Nonobstructive bowel gas pattern  · Patient tolerating soft diet today  · MiraLAX discontinued due to diarrhea      Tarry stools  Assessment & Plan  · Nursing reported black tarry stools   · Stools are heme +  · hgb 8 6 this am- repeat is 9 6  · Spoke with GI- will see patient today  · Continue to monitor H&H    Acute kidney injury [facility])  Assessment & Plan  • Nephrology following, appreciate recommendations  • Etiology hepatorenal syndrome  • 5/5 urine sodium less than 5  • Continue midodrine, octreotide and albumin per nephrology  • Avoid hypotension and nephrotoxins  • Renal function improving  • Intake and output  • Daily weights  • BMP a m  Anemia  Assessment & Plan  · Baseline appears to be between 11 and 12   · Hemoglobin today 8 8 -> 8 6, repeat 9 6  · Without signs of active bleeding  · GI following, appreciate recommendations  · CBC a m      Hx of CABG  Assessment & Plan  · Currently stable  · Losartan and Lasix currently on due to ALEJANDRA    PAF (paroxysmal atrial fibrillation) ([facility] [address] )  Assessment & Plan  · Rate controlled on current regimen  · Continue midodrine due to hypotension  · Home metoprolol on hold due to hypotension    Hyponatremia  Assessment & Plan  · Secondary to ALEJANDRA suggestive of hepatorenal syndrome with ADH secretion per nephrology    · Serum sodium improved to 129  · Discussed with nephrology, patient has had multiple episodes of diarrhea and appears dry on exam  · Felt low sodium could be due to cirrhosis  · Started Isolyte at 75/hr with repeat labs at 1800 recommendation of nephrology- now discontinued  · Continue management as above    Acquired hypothyroidism  Assessment & Plan  · Continue Synthroid    Primary hypertension  Assessment & Plan  · Nephrology following and ALEJANDRA was suspicious of hepatorenal syndrome  · Blood pressure has improved today  · Continue midodrine  · Losartan was discontinued due to ALEJANDRA   · Metoprolol currently on hold due to hypotension, will continue to monitor    Hypomagnesemia-resolved as of 5/8/2023  Assessment & Plan  · 5/5 serum magnesium 1 8, repleted  · Resolved with repletion             VTE Pharmacologic Prophylaxis: VTE Score: 3 Moderate Risk (Score 3-4) - Pharmacological DVT Prophylaxis Ordered: heparin  Patient Centered Rounds: I performed bedside rounds with nursing staff today  Discussions with Specialists or Other Care Team Provider: nursing, CM    Education and Discussions with Family / Patient: Updated  (sister and brother) at bedside  Total Time Spent on Date of Encounter in care of patient: 35 minutes This time was spent on one or more of the following: performing physical exam; counseling and coordination of care; obtaining or reviewing history; documenting in the medical record; reviewing/ordering tests, medications or procedures; communicating with other healthcare professionals and discussing with patient's family/caregivers      Current Length of Stay: 11 day(s)  Current Patient Status: Inpatient   Certification Statement: The patient will continue to require additional inpatient hospital stay due to continued monitoring of H&H, re-eval by GI for dark tarry stools  Discharge Plan: pending hospital course    Code Status: Level 1 - Full Code    Subjective: The patient was seen and examined  The patient was lying in her chair sleeping  She easily arouses  She continues to c/o LLQ pain  She denies any additional complaints  Objective:     Vitals:   Temp (24hrs), Av 6 °F (36 4 °C), Min:97 5 °F (36 4 °C), Max:97 6 °F (36 4 °C)    Temp:  [97 5 °F (36 4 °C)-97 6 °F (36 4 °C)] 97 5 °F (36 4 °C)  HR:  [66-76] 76  Resp:  [17-19] 19  BP: (124-156)/(42-57) 151/54  SpO2:  [92 %-93 %] 93 %  Body mass index is 27 25 kg/m²  Input and Output Summary (last 24 hours): Intake/Output Summary (Last 24 hours) at 2023 1652  Last data filed at 2023 0544  Gross per 24 hour   Intake 120 ml   Output 350 ml   Net -230 ml       Physical Exam:   Physical Exam  Vitals and nursing note reviewed  Constitutional:       General: She is awake  Appearance: She is ill-appearing (chronically)  HENT:      Head: Normocephalic and atraumatic  Cardiovascular:      Rate and Rhythm: Normal rate and regular rhythm  Heart sounds: Normal heart sounds  Pulmonary:      Effort: Pulmonary effort is normal       Breath sounds: Normal breath sounds  Abdominal:      Palpations: Abdomen is soft  Tenderness: There is abdominal tenderness in the left lower quadrant  There is no guarding or rebound  Musculoskeletal:      Right lower leg: Edema present  Left lower leg: Edema present  Skin:     General: Skin is warm and dry  Neurological:      General: No focal deficit present  Mental Status: She is alert  Mental status is at baseline     Psychiatric:         Attention and Perception: Attention normal          Mood and Affect: Mood normal          Speech: Speech normal          Behavior: Behavior is cooperative  Additional Data:     Labs:  Results from last 7 days   Lab Units 05/08/23  1554   WBC Thousand/uL 7 38   HEMOGLOBIN g/dL 9 6*   HEMATOCRIT % 27 6*   PLATELETS Thousands/uL 88*   NEUTROS PCT % 55   LYMPHS PCT % 21   MONOS PCT % 19*   EOS PCT % 3     Results from last 7 days   Lab Units 05/08/23  0536   SODIUM mmol/L 129*   POTASSIUM mmol/L 4 2   CHLORIDE mmol/L 99   CO2 mmol/L 25   BUN mg/dL 38*   CREATININE mg/dL 1 59*   ANION GAP mmol/L 5   CALCIUM mg/dL 8 7   ALBUMIN g/dL 3 3*   TOTAL BILIRUBIN mg/dL 1 98*   ALK PHOS U/L 342*   ALT U/L 15   AST U/L 35   GLUCOSE RANDOM mg/dL 88         Results from last 7 days   Lab Units 05/05/23  1615 05/05/23  1123 05/05/23  0725 05/04/23  2023 05/04/23  1541 05/04/23  1100 05/04/23  0712 05/03/23  2146   POC GLUCOSE mg/dl 97 125 85 103 101 78 88 74         Results from last 7 days   Lab Units 05/05/23  0132   LACTIC ACID mmol/L 1 3       Lines/Drains:  Invasive Devices     Peripheral Intravenous Line  Duration           Peripheral IV 05/05/23 Dorsal (posterior); Left Hand 3 days                      Imaging: No pertinent imaging reviewed      Recent Cultures (last 7 days):         Last 24 Hours Medication List:   Current Facility-Administered Medications   Medication Dose Route Frequency Provider Last Rate   • acetaminophen  650 mg Oral Q6H PRN Rossana Koehler MD     • Albumin 25%  12 5 g Intravenous Q6H Florence Halsted, CRNP     • barium  900 mL Oral Once in imaging Julien Cardenas MD     • dicyclomine  10 mg Oral TID PRN Aziza Reid MD     • erythromycin  0 5 inch Left Eye BID Julien Cardenas MD     • heparin (porcine)  5,000 Units Subcutaneous [facility] Otis Pugh DO     • HYDROmorphone  0 5 mg Intravenous Q4H PRN EBEN Hooker     • levothyroxine  75 mcg Oral Early Morning Rossana Koehler MD     • lidocaine  1 patch Topical Daily EBEN Hooker     • midodrine  5 mg Oral TID AC Florence Halsted, CRNP     • octreotide  100 mcg Subcutaneous [facility] EBEN Amezcua     • ondansetron  4 mg Intravenous Q6H PRN Millie Valenzuela MD     • pantoprazole  40 mg Oral Early Morning Marylu Ortega MD     • traZODone  50 mg Oral HS Gali Bedoya PA-C          Today, Patient Was Seen By: Rudi Tamayo PA-C    **Please Note: This note may have been constructed using a voice recognition system  **

## 2023-05-08 NOTE — PLAN OF CARE
Problem: PHYSICAL THERAPY ADULT  Goal: Performs mobility at highest level of function for planned discharge setting  See evaluation for individualized goals  Description: Treatment/Interventions: Functional transfer training, Endurance training, Therapeutic exercise, Gait training, Bed mobility          See flowsheet documentation for full assessment, interventions and recommendations  Outcome: Not Progressing  Note: Prognosis: Good  Problem List: Decreased strength, Decreased endurance, Impaired balance, Decreased mobility, Decreased cognition, Decreased safety awareness  Assessment: Pt seen for PT treatment session this date with interventions consisting of gait training w/ emphasis on improving pt's ability to ambulate level surfaces x 30 ftx1 with close S provided by therapist with RW, Therapeutic exercise consisting of: AROM 20 reps B LE in sitting position and therapeutic activity consisting of training: sit<>stand transfers  Pt agreeable to PT treatment session upon arrival, pt found seated OOB in recliner, in no apparent distress and responsive  In comparison to previous session, pt with no improvements as evidenced by pt limited by pain and stool incontinence  Post session: pt returned back to recliner, chair alarm engaged, all needs in reach and RN notified of session findings/recommendations  Continue to recommend return to facility with rehabilitation services at time of d/c in order to maximize pt's functional independence and safety w/ mobility  Pt continues to be functioning below baseline level  PT will continue to see pt during current hospitalization in order to address the deficits listed above and provide interventions consistent w/ POC in effort to achieve STGs  PT Discharge Recommendation: Return to facility with rehabilitation services    See flowsheet documentation for full assessment

## 2023-05-08 NOTE — PLAN OF CARE
Problem: SAFETY ADULT  Goal: Patient will remain free of falls  Description: INTERVENTIONS:  - Educate patient/family on patient safety including physical limitations  - Instruct patient to call for assistance with activity   - Consult OT/PT to assist with strengthening/mobility   - Keep Call bell within reach  - Keep bed low and locked with side rails adjusted as appropriate  - Keep care items and personal belongings within reach  - Initiate and maintain comfort rounds  - Make Fall Risk Sign visible to staff  - Offer Toileting every 2 Hours, in advance of need  - Initiate/Maintain alarms  - Obtain necessary fall risk management equipment:  - Apply yellow socks and bracelet for high fall risk patients  - Consider moving patient to room near nurses station  Outcome: Progressing     Problem: Prexisting or High Potential for Compromised Skin Integrity  Goal: Skin integrity is maintained or improved  Description: INTERVENTIONS:  - Identify patients at risk for skin breakdown  - Assess and monitor skin integrity  - Assess and monitor nutrition and hydration status  - Monitor labs   - Assess for incontinence   - Turn and reposition patient  - Assist with mobility/ambulation  - Relieve pressure over bony prominences  - Avoid friction and shearing  - Provide appropriate hygiene as needed including keeping skin clean and dry  - Evaluate need for skin moisturizer/barrier cream  - Collaborate with interdisciplinary team   - Patient/family teaching  - Consider wound care consult   Outcome: Progressing

## 2023-05-08 NOTE — ASSESSMENT & PLAN NOTE
• Nephrology following, appreciate recommendations  • Etiology hepatorenal syndrome  • 5/5 urine sodium less than 5  • Continue midodrine, octreotide and albumin per nephrology  • Avoid hypotension and nephrotoxins  • Renal function improving  • Intake and output  • Daily weights  • BMP a m

## 2023-05-08 NOTE — PROGRESS NOTES
Progress Note- Ale Nj 69 y o  female MRN: [id number]    Unit/Bed#: -01 Encounter: [id number]      Assessment and Plan: This is a 71-year-old woman admitted with left lower quadrant abdominal pain  She was also incidentally found to have cirrhosis on imaging  She has now developed black tarry stools, although hemoglobin appears stable  Our service has already seen her several times  She has had CT and flexible sigmoidoscopy without clear source of her abdominal pain  Her liver work-up has not been started because she already has outpatient follow-up arranged  Other issues during this admission have included failure with possible HRS component  She appears weak and unwell and I am not sure her ability to complete outpatient colonoscopy and EGD  Her abdomen is soft, nondistended, and diffusely tender to palpation, mostly in the left lower quadrant  Labs are significant for hemoglobin 8 6, MCV 99, platelets 83  Creatinine 1 59  ALT 15, AST 35, alk phos 342, T  bili 1 98  No recent INR  CT shows nodular liver  There is trace ascites  There is no evidence of colitis  Flex sig on 5/3/2023 with poor prep and minimal rectal inflammation but normal biopsies  A/P  71-year-old with new diagnosis of cirrhosis, CKD, anemia, FOBT positive, left lower quadrant abdominal pain  The cause of her left lower quadrant pain remains unclear, and her cirrhosis has not been worked up  I agree that ischemia should be ruled out  1   I think that we should do EGD and colonoscopy as an inpatient  Her hemoglobin is downtrending and she has black tarry stools  She has persistent left lower quadrant pain  N p o  after midnight and will discuss prep with her tomorrow morning    2   We are going to order a broad work-up for cirrhosis and get this started while she is in the hospital     ______________________________________________________________________    Subjective:  Complaining of loose stools left lower quadrant abdominal pain      Medication Administration - last 24 hours from 05/07/2023 1830 to 05/08/2023 1830       Date/Time Order Dose Route Action Action by     05/08/2023 0519 EDT levothyroxine tablet 75 mcg 75 mcg Oral Given Clint Iniguez, STARR     05/08/2023 0519 EDT pantoprazole (PROTONIX) EC tablet 40 mg 40 mg Oral Given Clint Iniguez, STARR     05/07/2023 2132 EDT traZODone (DESYREL) tablet 50 mg 50 mg Oral Given Clint Iniguez, STARR     05/08/2023 0670 EDT erythromycin (ILOTYCIN) 0 5 % ophthalmic ointment 0 5 inch 0 5 inch Left Eye Given [facility]     05/08/2023 1521 EDT midodrine (PROAMATINE) tablet 5 mg 5 mg Oral Given [facility]     05/08/2023 1213 EDT midodrine (PROAMATINE) tablet 5 mg 5 mg Oral Given [facility]     05/08/2023 0736 EDT midodrine (PROAMATINE) tablet 5 mg 5 mg Oral Given [facility]     05/08/2023 1644 EDT octreotide (SandoSTATIN) injection 100 mcg 100 mcg Subcutaneous Given [facility]     05/08/2023 0519 EDT octreotide (SandoSTATIN) injection 100 mcg 100 mcg Subcutaneous Given Clint Iniguez, STARR     05/07/2023 2132 EDT octreotide (SandoSTATIN) injection 100 mcg 100 mcg Subcutaneous Given Sharlene Cerda, STARR     05/08/2023 1647 EDT albumin human (FLEXBUMIN) 25 % injection 12 5 g 12 5 g Intravenous New Bag [facility]     05/08/2023 1213 EDT albumin human (FLEXBUMIN) 25 % injection 12 5 g 12 5 g Intravenous New Bag [facility]     05/08/2023 0519 EDT albumin human (FLEXBUMIN) 25 % injection 12 5 g 12 5 g Intravenous [employer], RN     05/07/2023 2301 EDT albumin human (FLEXBUMIN) 25 % injection 12 5 g 12 5 g Intravenous [employer], RN     05/07/2023 2134 EDT dicyclomine (BENTYL) capsule 10 mg 10 mg Oral Given Clint Iniguez, STARR     05/08/2023 1455 EDT heparin (porcine) subcutaneous injection 5,000 Units 0 Units Subcutaneous Hold Lin White     05/08/2023 0519 EDT heparin (porcine) subcutaneous injection 5,000 Units 5,000 Units Subcutaneous Given [facility] "STARR Cerda     05/07/2023 2132 EDT heparin (porcine) subcutaneous injection 5,000 Units 5,000 Units Subcutaneous Given Hector Rios RN     05/08/2023 1648 EDT HYDROmorphone (DILAUDID) injection 0 5 mg 0 5 mg [facility]ous [facility]     05/08/2023 0755 EDT HYDROmorphone (DILAUDID) injection 0 5 mg 0 5 mg Intravenous [facility]     05/08/2023 0822 EDT lidocaine (LIDODERM) 5 % patch 1 patch 1 patch Topical Medication Applied Lin [facility]     05/07/2023 2259 EDT lidocaine (LIDODERM) 5 % patch 1 patch 1 patch Topical Patch Removed Hector Rios RN     05/08/2023 0103 EDT multi-electrolyte (PLASMALYTE-A/ISOLYTE-S PH 7 4) IV solution 0 mL/hr Intravenous Stopped Sharlene Cerda RN          Objective:     Vitals: Blood pressure 151/54, pulse 76, temperature 97 5 °F (36 4 °C), resp  rate 19, height 4' 11\" (1 499 m), weight 61 5 kg (135 lb 9 3 oz), SpO2 93 %  ,Body mass index is 27 38 kg/m²  Intake/Output Summary (Last 24 hours) at 5/8/2023 1830  Last data filed at 5/8/2023 1400  Gross per 24 hour   Intake 120 ml   Output 1150 ml   Net -1030 ml       Physical Exam:   General Appearance: Awake and alert, in no acute distress  Abdomen: Soft, non-tender, non-distended; bowel sounds normal; no masses or no organomegaly    Invasive Devices     Peripheral Intravenous Line  Duration           Peripheral IV 05/05/23 Dorsal (posterior); Left Hand 3 days                Lab Results:  No results displayed because visit has over 200 results  Imaging Studies: I have personally reviewed pertinent imaging studies        "

## 2023-05-09 LAB
AFP-TM SERPL-MCNC: 3 NG/ML (ref 0.5–8)
ANA SER QL IA: NEGATIVE
ANION GAP SERPL CALCULATED.3IONS-SCNC: 7 MMOL/L (ref 4–13)
BUN SERPL-MCNC: 36 MG/DL (ref 5–25)
CALCIUM SERPL-MCNC: 8.9 MG/DL (ref 8.4–10.2)
CHLORIDE SERPL-SCNC: 103 MMOL/L (ref 96–108)
CO2 SERPL-SCNC: 23 MMOL/L (ref 21–32)
CREAT SERPL-MCNC: 1.33 MG/DL (ref 0.6–1.3)
ERYTHROCYTE [DISTWIDTH] IN BLOOD BY AUTOMATED COUNT: 15.8 % (ref 11.6–15.1)
FERRITIN SERPL-MCNC: 190 NG/ML (ref 8–388)
GFR SERPL CREATININE-BSD FRML MDRD: 38 ML/MIN/1.73SQ M
GLUCOSE SERPL-MCNC: 105 MG/DL (ref 65–140)
HBV CORE AB SER QL: NORMAL
HBV CORE IGM SER QL: NORMAL
HBV SURFACE AG SER QL: NORMAL
HCT VFR BLD AUTO: 23.7 % (ref 34.8–46.1)
HCV AB SER QL: NORMAL
HGB BLD-MCNC: 8.1 G/DL (ref 11.5–15.4)
IRON SATN MFR SERPL: 46 % (ref 15–50)
IRON SERPL-MCNC: 60 UG/DL (ref 50–170)
MCH RBC QN AUTO: 34 PG (ref 26.8–34.3)
MCHC RBC AUTO-ENTMCNC: 34.2 G/DL (ref 31.4–37.4)
MCV RBC AUTO: 100 FL (ref 82–98)
PLATELET # BLD AUTO: 132 THOUSANDS/UL (ref 149–390)
PMV BLD AUTO: 12.2 FL (ref 8.9–12.7)
POTASSIUM SERPL-SCNC: 4.3 MMOL/L (ref 3.5–5.3)
RBC # BLD AUTO: 2.38 MILLION/UL (ref 3.81–5.12)
SODIUM SERPL-SCNC: 133 MMOL/L (ref 135–147)
TIBC SERPL-MCNC: 130 UG/DL (ref 250–450)
WBC # BLD AUTO: 6.63 THOUSAND/UL (ref 4.31–10.16)

## 2023-05-09 PROCEDURE — 99232 SBSQ HOSP IP/OBS MODERATE 35: CPT | Performed by: INTERNAL MEDICINE

## 2023-05-09 PROCEDURE — 99232 SBSQ HOSP IP/OBS MODERATE 35: CPT | Performed by: PHYSICIAN ASSISTANT

## 2023-05-09 PROCEDURE — 43235 EGD DIAGNOSTIC BRUSH WASH: CPT | Performed by: INTERNAL MEDICINE

## 2023-05-09 PROCEDURE — 45380 COLONOSCOPY AND BIOPSY: CPT | Performed by: INTERNAL MEDICINE

## 2023-05-09 PROCEDURE — 85027 COMPLETE CBC AUTOMATED: CPT | Performed by: PHYSICIAN ASSISTANT

## 2023-05-09 PROCEDURE — 80048 BASIC METABOLIC PNL TOTAL CA: CPT | Performed by: PHYSICIAN ASSISTANT

## 2023-05-09 RX ORDER — PANTOPRAZOLE SODIUM 40 MG/1
40 TABLET, DELAYED RELEASE ORAL
Status: DISCONTINUED | OUTPATIENT
Start: 2023-05-09 | End: 2023-05-12

## 2023-05-09 RX ORDER — PHYTONADIONE 5 MG/1
5 TABLET ORAL ONCE
Status: COMPLETED | OUTPATIENT
Start: 2023-05-09 | End: 2023-05-09

## 2023-05-09 RX ORDER — BISACODYL 5 MG/1
10 TABLET, DELAYED RELEASE ORAL ONCE
Status: COMPLETED | OUTPATIENT
Start: 2023-05-09 | End: 2023-05-09

## 2023-05-09 RX ADMIN — PANTOPRAZOLE SODIUM 40 MG: 40 TABLET, DELAYED RELEASE ORAL at 16:08

## 2023-05-09 RX ADMIN — ACETAMINOPHEN 650 MG: 325 TABLET ORAL at 21:33

## 2023-05-09 RX ADMIN — MIDODRINE HYDROCHLORIDE 5 MG: 5 TABLET ORAL at 08:03

## 2023-05-09 RX ADMIN — OCTREOTIDE ACETATE 100 MCG: 100 INJECTION, SOLUTION INTRAVENOUS; SUBCUTANEOUS at 21:33

## 2023-05-09 RX ADMIN — BISACODYL 10 MG: 5 TABLET, COATED ORAL at 17:33

## 2023-05-09 RX ADMIN — OCTREOTIDE ACETATE 100 MCG: 100 INJECTION, SOLUTION INTRAVENOUS; SUBCUTANEOUS at 05:06

## 2023-05-09 RX ADMIN — ERYTHROMYCIN 0.5 INCH: 5 OINTMENT OPHTHALMIC at 08:04

## 2023-05-09 RX ADMIN — POLYETHYLENE GLYCOL 3350, SODIUM SULFATE ANHYDROUS, SODIUM BICARBONATE, SODIUM CHLORIDE, POTASSIUM CHLORIDE 4000 ML: 236; 22.74; 6.74; 5.86; 2.97 POWDER, FOR SOLUTION ORAL at 17:33

## 2023-05-09 RX ADMIN — MIDODRINE HYDROCHLORIDE 5 MG: 5 TABLET ORAL at 16:08

## 2023-05-09 RX ADMIN — TRAZODONE HYDROCHLORIDE 50 MG: 50 TABLET ORAL at 21:33

## 2023-05-09 RX ADMIN — ALBUMIN (HUMAN) 12.5 G: 0.25 INJECTION, SOLUTION INTRAVENOUS at 05:05

## 2023-05-09 RX ADMIN — OCTREOTIDE ACETATE 100 MCG: 100 INJECTION, SOLUTION INTRAVENOUS; SUBCUTANEOUS at 16:08

## 2023-05-09 RX ADMIN — PANTOPRAZOLE SODIUM 40 MG: 40 TABLET, DELAYED RELEASE ORAL at 05:05

## 2023-05-09 RX ADMIN — LEVOTHYROXINE SODIUM 75 MCG: 75 TABLET ORAL at 05:05

## 2023-05-09 RX ADMIN — LIDOCAINE 1 PATCH: 700 PATCH TOPICAL at 08:03

## 2023-05-09 RX ADMIN — ALBUMIN (HUMAN) 12.5 G: 0.25 INJECTION, SOLUTION INTRAVENOUS at 23:20

## 2023-05-09 RX ADMIN — PHYTONADIONE 5 MG: 5 TABLET ORAL at 20:37

## 2023-05-09 RX ADMIN — ERYTHROMYCIN 0.5 INCH: 5 OINTMENT OPHTHALMIC at 20:37

## 2023-05-09 RX ADMIN — ALBUMIN (HUMAN) 12.5 G: 0.25 INJECTION, SOLUTION INTRAVENOUS at 11:28

## 2023-05-09 RX ADMIN — MIDODRINE HYDROCHLORIDE 5 MG: 5 TABLET ORAL at 11:28

## 2023-05-09 RX ADMIN — ALBUMIN (HUMAN) 12.5 G: 0.25 INJECTION, SOLUTION INTRAVENOUS at 17:27

## 2023-05-09 NOTE — ASSESSMENT & PLAN NOTE
· Presented to the ED with left lower quadrant abdominal pain on 4/27   · Continues with some left lower quadrant abdominal pain today, as well as loose stools with it  · CT abdomen: New small amount of free fluid in the abdomen and pelvis compared to 4/18/2023, which can be secondary to cirrhosis or acute inflammatory process in the abdomen and pelvis such as occult acute diverticulitis  Moderate amount of stool in the colon, nonspecific and can represent constipation  · Surgery following, appreciate recommendations  · GI following, appreciate recommendations  · Patient had flex sig on 5/2 minimal inflammation noted, biopsies normal  · KUB (5/5):  Nonobstructive bowel gas pattern  · Patient tolerating soft diet   · Patient to have colonoscopy tomorrow 5/10 given bloody/tarry stools with down trending hemoglobin

## 2023-05-09 NOTE — PROGRESS NOTES
"Progress Note - Nephrology   Ce Carmen 69 y o  female MRN: [id number]  Unit/Bed#: -01 Encounter: [id number]    A/P:  1  Acute kidney injury, improving, peak Cr 2 1  Cr 1 59->1  33    Etiology of ALEJANDRA prerenal versus ATN versus HRS  She is being treated for HRS protocol  She has worsening moderate lower extremity edema  CT kidneys unremarkable  Intravenous fluids stopped yesterday  She is on judicious albumin trial in addition to midodrine and octreotide  Baseline creatinine around 0 8- 1 0  No cirrhotic this may overestimate renal function  Would recommend as needed diuresis  Recheck urine sodium today  2   Hyponatremia, mild, improving, Na 133  Appears hypervolemic  Etiology due to cirrhosis  Low urine sodium can be seen in cirrhotics, prerenal and HRS  3   Cirrhosis, treat with HRS protocol  4   Anemia with thrombocytopenia in the setting of cirrhosis  There was concern for GI bleed as well  GI and general surgery were consulted by PCP  Will defer to their recommendations for ongoing management in this endeavor  Follow up reason for today's visit:     Acute kidney injury    Subjective:   Patient seen and examined today  She is complaining of lower extremity edema  She denies chest pain or shortness of breath  Appears uncomfortable   A complete 10 point review of systems was performed and is otherwise negative  Objective:     Vitals: Blood pressure 158/55, pulse 72, temperature (!) 97 °F (36 1 °C), temperature source Tympanic, resp  rate 20, height 4' 11\" (1 499 m), weight 61 4 kg (135 lb 5 8 oz), SpO2 93 %  ,Body mass index is 27 34 kg/m²  Weight (last 2 days)     Date/Time Weight    05/09/23 0600 61 4 (135 36)    05/08/23 1537 61 5 (135 58)    05/08/23 0544 61 2 (134 92)    05/07/23 0600 60 5 (133 38)     Weight: pt weighed with one pillow,pad,sheet,and blanket   no scds or oxygent tanks at 05/07/23 0600            Intake/Output Summary (Last 24 hours) at " "5/9/2023 0833  Last data filed at 5/8/2023 2201  Gross per 24 hour   Intake 150 ml   Output 800 ml   Net -650 ml     I/O last 3 completed shifts: In: 270 [P O :270]  Out: 1150 [Urine:1150]         Physical Exam: /55   Pulse 72   Temp (!) 97 °F (36 1 °C) (Tympanic)   Resp 20   Ht 4' 11\" (1 499 m)   Wt 61 4 kg (135 lb 5 8 oz)   SpO2 93%   BMI 27 34 kg/m²     General Appearance:    Alert, cooperative, no distress, appears stated age   Head:    Normocephalic, without obvious abnormality, atraumatic   Eyes:    Conjunctiva/corneas clear   Ears:    Normal external ears   Nose:   Nares normal, septum midline, mucosa normal, no drainage    or sinus tenderness   Throat:   Lips, mucosa, and tongue normal; teeth and gums normal   Neck:    Back:      Lungs:     Clear to auscultation bilaterally, respirations unlabored   Chest wall:    No tenderness or deformity   Heart:    Regular rate and rhythm, S1 and S2 normal, no murmur, rub   or gallop   Abdomen:     Soft, non-tender, bowel sounds active   Extremities:   Moderate edema LE    Skin:   Skin color, texture, turgor normal, no rashes or lesions   Lymph nodes:   Cervical normal   Neurologic:   CNII-XII intact            Lab, Imaging and other studies: I have personally reviewed pertinent labs  CBC:   Lab Results   Component Value Date    WBC 6 63 05/09/2023    HGB 8 1 (L) 05/09/2023    HCT 23 7 (L) 05/09/2023     (H) 05/09/2023     (L) 05/09/2023    MCH 34 0 05/09/2023    MCHC 34 2 05/09/2023    RDW 15 8 (H) 05/09/2023    MPV 12 2 05/09/2023    NRBC 0 05/08/2023     CMP:   Lab Results   Component Value Date    K 4 3 05/09/2023     05/09/2023    CO2 23 05/09/2023    BUN 36 (H) 05/09/2023    CREATININE 1 33 (H) 05/09/2023    CALCIUM 8 9 05/09/2023    AST 41 (H) 05/08/2023    ALT 17 05/08/2023    ALKPHOS 326 (H) 05/08/2023    EGFR 38 05/09/2023           Results from last 7 days   Lab Units 05/09/23  0733 05/08/23  2108 05/08/23  0536 05/07/23  1722 " 05/06/23  0536 05/05/23  0132   POTASSIUM mmol/L 4 3  --  4 2 4 7   < > 3 2*   CHLORIDE mmol/L 103  --  99 97   < > 93*   CO2 mmol/L 23  --  25 23   < > 20*   BUN mg/dL 36*  --  38* 38*   < > 40*   CREATININE mg/dL 1 33*  --  1 59* 1 75*   < > 2 05*   CALCIUM mg/dL 8 9  --  8 7 8 6   < > 7 6*   ALK PHOS U/L  --  326* 342*  --   --  322*   ALT U/L  --  17 15  --   --  22   AST U/L  --  41* 35  --   --  59*    < > = values in this interval not displayed  Phosphorus: No results found for: PHOS  Magnesium: No results found for: MG  Urinalysis: No results found for: [place], SPECGRAV, PHUR, LEUKOCYTESUR, NITRITE, PROTEINUA, GLUCOSEU, KETONESU, BILIRUBINUR, BLOODU  Ionized Calcium: No results found for: CAION  Coagulation:   Lab Results   Component Value Date    INR 1 91 (H) 05/08/2023     Troponin: No results found for: TROPONINI  ABG: No results found for: PHART, STZ4VKS, PO2ART, GQT0FDV, K6RSWKFP, BEART, SOURCE  Radiology review:     IMAGING  No results found      Current Facility-Administered Medications   Medication Dose Route Frequency   • acetaminophen (TYLENOL) tablet 650 mg  650 mg Oral Q6H PRN   • albumin human (FLEXBUMIN) 25 % injection 12 5 g  12 5 g Intravenous Q6H   • barium (READI-CAT 2) suspension 900 mL  900 mL Oral Once in imaging   • dicyclomine (BENTYL) capsule 10 mg  10 mg Oral TID PRN   • erythromycin (ILOTYCIN) 0 5 % ophthalmic ointment 0 5 inch  0 5 inch Left Eye BID   • HYDROmorphone (DILAUDID) injection 0 5 mg  0 5 mg Intravenous Q4H PRN   • levothyroxine tablet 75 mcg  75 mcg Oral Early Morning   • lidocaine (LIDODERM) 5 % patch 1 patch  1 patch Topical Daily   • midodrine (PROAMATINE) tablet 5 mg  5 mg Oral TID AC   • octreotide (SandoSTATIN) injection 100 mcg  100 mcg Subcutaneous Q8H [facility]   • ondansetron (ZOFRAN) injection 4 mg  4 mg Intravenous Q6H PRN   • pantoprazole (PROTONIX) EC tablet 40 mg  40 mg Oral Early Morning   • traZODone (DESYREL) tablet 50 mg  50 mg Oral HS Medications Discontinued During This Encounter   Medication Reason   • polyethylene glycol (GLYCOLAX) 17 GM/SCOOP powder Therapy completed   • ciprofloxacin (CIPRO) IVPB (premix in 5% dextrose) 400 mg 200 mL    • metroNIDAZOLE (FLAGYL) IVPB (premix) 500 mg 100 mL    • dextrose 5 % and sodium chloride 0 45 % with KCl 20 mEq/L infusion    • sodium chloride 0 9 % infusion    • polyethylene glycol (MIRALAX) packet 17 g    • ciprofloxacin (CIPRO) tablet 500 mg    • metroNIDAZOLE (FLAGYL) tablet 500 mg    • sodium bicarbonate tablet 650 mg    • enoxaparin (LOVENOX) subcutaneous injection 40 mg    • furosemide (LASIX) injection 80 mg    • multi-electrolyte (PLASMALYTE-A/ISOLYTE-S PH 7 4) IV solution    • metoprolol succinate (TOPROL-XL) 24 hr tablet 25 mg    • midodrine (PROAMATINE) tablet 2 5 mg    • enoxaparin (LOVENOX) subcutaneous injection 30 mg    • metoprolol succinate (TOPROL-XL) 24 hr tablet 12 5 mg    • magnesium hydroxide (MILK OF MAGNESIA) oral suspension 30 mL    • polyethylene glycol (MIRALAX) packet 17 g    • morphine injection 4 mg    • multi-electrolyte (PLASMALYTE-A/ISOLYTE-S PH 7 4) IV solution    • heparin (porcine) subcutaneous injection 5,000 Units        [place], DO      This progress note was produced in part using a dictation device which may document imprecise wording from author's original intent

## 2023-05-09 NOTE — ASSESSMENT & PLAN NOTE
• Nephrology following, appreciate recommendations  • 5/5 urine sodium less than 5  • Continue midodrine, octreotide and albumin per nephrology  • Avoid hypotension and nephrotoxins  • Renal function improving  • Intake and output  • Daily weights  • BMP a m

## 2023-05-09 NOTE — PROGRESS NOTES
Progress Note- Juancho Civil 69 y o  female MRN: [id number]    Unit/Bed#: -01 Encounter: [id number]      Assessment and Plan:    Patient is a 81-year-old female with PMH significant for depression, CAD s/p CABG (2021), Sjogren's, mild AS, A-fib, IBS-D and hypothyroidism admitted on 4/27 for acute on chronic diarrhea and left-sided abdominal discomfort  Patient was found to have mild nonspecific enteric colitis, nodular hepatic contour suggestive of cirrhosis and a 5 mm cystic lesion in the tail the pancreas on cross-sectional imaging  Patient has been evaluated several times by GI service  She was noted to have a large stool burden s/p MiraLAX bowel prep with significant stool output but persistent left lower quadrant abdominal pain  Also treated for presumed symptomatic uncomplicated diverticulitis with Cipro/Flagyl  Now has developed melenic stools with down-trending hgb  Additionally, patient is noted to have mild TTG IgA elevation in the setting of significantly elevated IgA as well as SMA stenosis for which she was scheduled for outpatient evaluation but was admitted at the time of her appointment  1  LLQ abd pain  2  Change in bowel habits  3  Melenic stools  Repeat CT A/P (5/1) was notable for a thickened appearance of the wall of the descending colon and sigmoid colon concerning for mild nonspecific colitis vs underdistention, few scattered colonic diverticula without focal inflammatory change  Additional findings as detailed below  Infectious stool studies negative  Underwent flex sig (5/3) notable for normal mucosa although limited due to extensive stool burden  Also noted to have scant inflammation in the rectum  Biopsies negative for inflammation or microscopic colitis  Differential remains broad - Inflammatory colitis, ischemic colitis, other infectious colitis, malignancy, significant stool burden, sterile coral colitis, and diverticular disease      Given little to no improvement of patient's pain without clear etiology on cross-sectional imaging and patient likely to have great difficulty tolerating bowel prep in the outpt setting, will plan for Golytely bowel prep today in anticipation of colonoscopy tomorrow  Given reports of melena with down-trending hgb, will also plan for concurrent EGD for further evaluation of possible upper-GI bleeding  Can also screen for EV at that time  - Clear liquid diet  - GoLytely/Dulcolax bowel prep as ordered  - NPO at midnight in anticipation of EGD/colonoscopy tomorrow (5/10)  - Continue IV fluid hydration  - Continue octreotide   - Increased PPI to twice daily  - Continue supportive care with analgesics PRN; Try and avoid opioid analgesics given significant stool burden on imaging   - Monitor hgb; Transfuse for hgb <7 0  - Monitor stools for evidence of further overt GI bleeding  - If patient were to develop brisk or hemodynamically significant GI bleeding please make patient n p o  and contact GI on-call immediately    3  Cirrhotic morphology on imaging  4  Thrombocytopenia  5  ALEJANDRA  Patient incidentally noted to have a lobulated liver contour on ultrasound with nodular liver contour redemonstrated on CT A/P in addition to chronic thrombocytopenia  Also noted to have cholestatic pattern of liver injury with moderately elevated alk phos (342) and mildly elevated T  bili (1 98)  Complete serologic evaluation to r/o competing causes of liver disease has been ordered  Ascites - Patient noted to have a small right pleural effusion and anasarca on imaging with trace free fluid in the pelvis  Abdominal pelvic fluid unlikely amendable to paracentesis  Esophageal varices - Plan for EGD tomorrow (5/10)  Hepatic encephalopathy - No hx of HE and mentation baseline  HCC screening - U/S and cross-sectional imaging without focal liver lesion  ALEJANDRA - ALEJANDRA with peak Cr 2 1, which has since improved   Current Cr 1 33  Currently being treated with HRS protocol including human 12 5 g every 6 in addition to octreotide and midodrine  Nephrology following, appreciate recommendations  - Continue to monitor MELD labs daily (CBC, CMP, INR)  - Follow-up on hepatic serologies  - Nephrology following, appreciate recommendations    MELD-Na score: 22 at 5/9/2023  7:33 AM  MELD score: 19 at 5/9/2023  7:33 AM  Calculated from:  Serum Creatinine: 1 33 mg/dL at 5/9/2023  7:33 AM  Serum Sodium: 133 mmol/L at 5/9/2023  7:33 AM  Total Bilirubin: 1 89 mg/dL at 5/8/2023  9:08 PM  INR(ratio): 1 91 at 5/8/2023  9:08 PM  Age: 69 years    6  Pancreatic cyst  Patient instantly noted to have a 5 mm cystic lesion in the tail the pancreas on cross-sectional imaging  Also noted to have chronically elevated alk phos  Recommend outpatient evaluation including MRI/MRCP for definitive characterization  GI will continue to follow  ______________________________________________________________________    Subjective:     Patient is found comfortably resting in bed  She was repositioned at her request  Continues to endorse left lower quadrant abdominal discomfort  Per nursing, last BM was this morning documented as bloody       Medication Administration - last 24 hours from 05/08/2023 0916 to 05/09/2023 8458       Date/Time Order Dose Route Action Action by     05/09/2023 0505 EDT levothyroxine tablet 75 mcg 75 mcg Oral Given Walter Tamayo RN     05/09/2023 0505 EDT pantoprazole (PROTONIX) EC tablet 40 mg 40 mg Oral Given Walter Tamayo RN     05/08/2023 2125 EDT traZODone (DESYREL) tablet 50 mg 50 mg Oral Given Walter Tamayo RN     05/09/2023 0804 EDT erythromycin (ILOTYCIN) 0 5 % ophthalmic ointment 0 5 inch 0 5 inch Left Eye Given Shelli Pierce RN     05/08/2023 1937 EDT erythromycin (ILOTYCIN) 0 5 % ophthalmic ointment 0 5 inch 0 5 inch Left Eye Given Walter Tamayo RN     05/09/2023 0803 EDT midodrine (PROAMATINE) tablet 5 mg 5 mg Oral Given Shelli Pierce RN     05/08/2023 "1521 EDT midodrine (PROAMATINE) tablet 5 mg 5 mg Oral Given [facility]     05/08/2023 1213 EDT midodrine (PROAMATINE) tablet 5 mg 5 mg Oral Given [facility]     05/09/2023 0506 EDT octreotide (SandoSTATIN) injection 100 mcg 100 mcg Subcutaneous Given Thea Chappell RN     05/08/2023 2125 EDT octreotide (SandoSTATIN) injection 100 mcg 100 mcg Subcutaneous Given Thea Chappell RN     05/08/2023 1644 EDT octreotide (SandoSTATIN) injection 100 mcg 100 mcg Subcutaneous Given [facility]     05/09/2023 0505 EDT albumin human (FLEXBUMIN) 25 % injection 12 5 g 12 5 g Intravenous [address] Thea Chappell RN     05/08/2023 2330 EDT albumin human (FLEXBUMIN) 25 % injection 12 5 g 12 5 g Intravenous [address] Thea Chappell RN     05/08/2023 1647 EDT albumin human (FLEXBUMIN) 25 % injection 12 5 g 12 5 g Intravenous [place] [facility]     05/08/2023 1213 EDT albumin human (FLEXBUMIN) 25 % injection 12 5 g 12 5 g Intravenous [place] [facility]     05/08/2023 1455 EDT heparin (porcine) subcutaneous injection 5,000 Units 0 Units Subcutaneous Hold [facility]     05/08/2023 1648 EDT HYDROmorphone (DILAUDID) injection 0 5 mg 0 5 mg Intravenous Given [facility]     05/09/2023 0803 EDT lidocaine (LIDODERM) 5 % patch 1 patch 1 patch Topical Medication Applied Lauren Peña RN     05/08/2023 1938 EDT lidocaine (LIDODERM) 5 % patch 1 patch 1 patch Topical Patch Removed Thea Chappell RN          Objective:     Vitals: Blood pressure 158/55, pulse 72, temperature (!) 97 °F (36 1 °C), temperature source Tympanic, resp  rate 20, height 4' 11\" (1 499 m), weight 61 4 kg (135 lb 5 8 oz), SpO2 93 %  ,Body mass index is 27 34 kg/m²        Intake/Output Summary (Last 24 hours) at 5/9/2023 0916  Last data filed at 5/8/2023 2201  Gross per 24 hour   Intake 150 ml   Output 800 ml   Net -650 ml       Physical Exam:   General Appearance: +Frail and chronically ill-appearing; Awake and alert, in no acute " distress  Abdomen: +Mild abd TTP without guarding rebound or rigidity and mild abd distention; bowel sounds normal; no masses or no organomegaly    Invasive Devices     Peripheral Intravenous Line  Duration           Peripheral IV 05/05/23 Dorsal (posterior); Left Hand 4 days    Peripheral IV 05/08/23 Distal;Left;Upper;Ventral (anterior) Arm <1 day    Peripheral IV 05/08/23 Left;Ventral (anterior) Forearm <1 day                Lab Results:  No results displayed because visit has over 200 results  Imaging Studies: I have personally reviewed pertinent imaging studies

## 2023-05-09 NOTE — NUTRITION
05/09/23 1353   Biochemical Data,Medical Tests, and Procedures   Biochemical Data/Medical Tests/Procedures Lab values reviewed; Meds reviewed   Labs (Comment) 5/9/23 Na 133, BUN 36, creat 1 33, H&H 8 1/23 7   Meds (Comment) human albumin, dulcolax, levothyroxine, octreotide, protonix, dilaudid, zofran   Nutrition-Focused Physical Exam   Nutrition-Focused Physical Exam Findings RN skin assessment reviewed;Edema; No skin issues documented   Nutrition-Focused Physical Exam Findings +2 right UE edema, +2 BL LE edema   Medical-Related Concerns PMH reviewed   Current PO Intake   Estimated calorie intake compared to estimated need Nutrient needs are not met on NPO  PES Statement   Problem Continue previous diagnosis   Recommendations/Interventions   Malnutrition/BMI Present No  (does not meet criteria)   Summary Nutrition follow up assessment  Pt noted to be tolerating soft diet per chart review  Tarry stools noted by nursing  Seen by GI 5/8 - recommending EGD and colonoscopy  Pt NPO at present for procedure planned today  Met with pt at bedside - lethargic appearing  Pt states her appetite has been decreased since beginning of hospital admission  Pt confirms lactose allergy  Reports no difficulty chewing or swallowing  States she was tolerating PO diet  Meal intakes documented at 25-50%  Pt reports not drinking 100% Ensure Plant-Based  Agreeable to BID  Discussed Banatrol, pt is agreeable  RD protocol not initiated  RD continues following  Recommendations to Provider Pt reports not drinking 100% Ensure Plant-Based  Agreeable to BID  Discussed Banatrol, pt is agreeable to Bantrol BID in chocolate pudding  RD protocol not initiated  Education Assessment   Education Education not indicated at this time   Patient Nutrition Goals   Goal Meet PO needs; Adequate hydration; Adequate intake

## 2023-05-09 NOTE — ASSESSMENT & PLAN NOTE
· Baseline appears to be between 11 and 12   · Hemoglobin today 8 8 -> 8 6 -> 9 6 -> 8 1  · Patient noted to have melena 5/8  · Stools heme +  · GI following, appreciate recommendations- patient to have EGD/Colonoscopy tomorrow 5/10  · CBC a m

## 2023-05-09 NOTE — PROGRESS NOTES
[facility] 46  Progress Note  Name: Nick Pizano  MRN: [id number]  Unit/Bed#: -01 I Date of Admission: 4/27/2023   Date of Service: 5/9/2023 I Hospital Day: 12    Assessment/Plan   * Left lower quadrant abdominal pain  Assessment & Plan  · Presented to the ED with left lower quadrant abdominal pain on 4/27   · Continues with some left lower quadrant abdominal pain today, as well as loose stools with it  · CT abdomen: New small amount of free fluid in the abdomen and pelvis compared to 4/18/2023, which can be secondary to cirrhosis or acute inflammatory process in the abdomen and pelvis such as occult acute diverticulitis  Moderate amount of stool in the colon, nonspecific and can represent constipation  · Surgery following, appreciate recommendations  · GI following, appreciate recommendations  · Patient had flex sig on 5/2 minimal inflammation noted, biopsies normal  · KUB (5/5):  Nonobstructive bowel gas pattern  · Patient tolerating soft diet   · Patient to have colonoscopy tomorrow 5/10 given bloody/tarry stools with down trending hemoglobin  Tarry stools  Assessment & Plan  · Nursing reported black tarry stools   · Stools are heme +  · hgb 8 6 -> 9 6 -> 8 1  · Patient to have prep for EGD/Colonoscopy tomorrow 5/10  · Continue to monitor H&H    Acute kidney injury [facility])  Assessment & Plan  • Nephrology following, appreciate recommendations  • 5/5 urine sodium less than 5  • Continue midodrine, octreotide and albumin per nephrology  • Avoid hypotension and nephrotoxins  • Renal function improving  • Intake and output  • Daily weights  • BMP a m  Anemia  Assessment & Plan  · Baseline appears to be between 11 and 12   · Hemoglobin today 8 8 -> 8 6 -> 9 6 -> 8 1  · Patient noted to have melena 5/8  · Stools heme +  · GI following, appreciate recommendations- patient to have EGD/Colonoscopy tomorrow 5/10  · CBC a m      Hx of CABG  Assessment & Plan  · Currently stable  · Losartan and Lasix currently on hold due to ALEJANDRA    PAF (paroxysmal atrial fibrillation) ([facility])  Assessment & Plan  · Rate controlled on current regimen  · Continue midodrine due to hypotension  · Home metoprolol on hold due to hypotension    Hyponatremia  Assessment & Plan  · Likely due to cirrhosis   · Serum sodium improved to 133  · S/p IVF  · Continue management as above    Acquired hypothyroidism  Assessment & Plan  · Continue Synthroid    Primary hypertension  Assessment & Plan  · Blood pressure has improved   · Continue midodrine  · Losartan was discontinued due to ALEJANDRA   · Metoprolol currently on hold due to hypotension, will continue to monitor           VTE Pharmacologic Prophylaxis: VTE Score: 3 Moderate Risk (Score 3-4) - Pharmacological DVT Prophylaxis Contraindicated  Sequential Compression Devices Ordered  Patient Centered Rounds: I performed bedside rounds with nursing staff today  Discussions with Specialists or Other Care Team Provider: nursing, GI    Education and Discussions with Family / Patient: Family updated yesterday- no changes today        Total Time Spent on Date of Encounter in care of patient: 35 minutes This time was spent on one or more of the following: performing physical exam; counseling and coordination of care; obtaining or reviewing history; documenting in the medical record; reviewing/ordering tests, medications or procedures; communicating with other healthcare professionals and discussing with patient's family/caregivers  Current Length of Stay: 12 day(s)  Current Patient Status: Inpatient   Certification Statement: The patient will continue to require additional inpatient hospital stay due to need for EGD/colonoscopy given melena, anemia   Discharge Plan: pending clinical course    Code Status: Level 1 - Full Code    Subjective: The patient was seen and examined  The patient is lying in bed in no acute distress  She states she feels tired   She continues to c/o LLQ abdominal pain  Objective:     Vitals:   Temp (24hrs), Av 2 °F (36 2 °C), Min:97 °F (36 1 °C), Max:97 3 °F (36 3 °C)    Temp:  [97 °F (36 1 °C)-97 3 °F (36 3 °C)] 97 3 °F (36 3 °C)  HR:  [72-80] 80  Resp:  [18-20] 20  BP: (138-158)/(53-62) 138/53  SpO2:  [93 %-94 %] 94 %  Body mass index is 27 34 kg/m²  Input and Output Summary (last 24 hours): Intake/Output Summary (Last 24 hours) at 2023 1548  Last data filed at 2023 1300  Gross per 24 hour   Intake 150 ml   Output --   Net 150 ml       Physical Exam:   Physical Exam  Vitals and nursing note reviewed  Constitutional:       General: She is awake  Appearance: She is ill-appearing  HENT:      Head: Normocephalic and atraumatic  Cardiovascular:      Rate and Rhythm: Normal rate and regular rhythm  Heart sounds: Normal heart sounds  Pulmonary:      Effort: Pulmonary effort is normal       Breath sounds: Normal breath sounds  Abdominal:      Palpations: Abdomen is soft  Tenderness: There is generalized abdominal tenderness and tenderness in the left lower quadrant  There is no guarding or rebound  Skin:     General: Skin is warm and dry  Neurological:      General: No focal deficit present  Mental Status: She is alert and oriented to person, place, and time  Psychiatric:         Attention and Perception: Attention normal          Mood and Affect: Mood normal          Speech: Speech normal          Behavior: Behavior is cooperative            Additional Data:     Labs:  Results from last 7 days   Lab Units 23  0454 23  1554   WBC Thousand/uL 6 63 7 38   HEMOGLOBIN g/dL 8 1* 9 6*   HEMATOCRIT % 23 7* 27 6*   PLATELETS Thousands/uL 132* 88*   NEUTROS PCT %  --  55   LYMPHS PCT %  --  21   MONOS PCT %  --  19*   EOS PCT %  --  3     Results from last 7 days   Lab Units 23  0733 23  2108   SODIUM mmol/L 133*  --    POTASSIUM mmol/L 4 3  --    CHLORIDE mmol/L 103  --    CO2 mmol/L 23 --    BUN mg/dL 36*  --    CREATININE mg/dL 1 33*  --    ANION GAP mmol/L 7  --    CALCIUM mg/dL 8 9  --    ALBUMIN g/dL  --  3 8   TOTAL BILIRUBIN mg/dL  --  1 89*   ALK PHOS U/L  --  326*   ALT U/L  --  17   AST U/L  --  41*   GLUCOSE RANDOM mg/dL 105  --      Results from last 7 days   Lab Units 05/08/23  2108   INR  1 91*     Results from last 7 days   Lab Units 05/05/23  1615 05/05/23  1123 05/05/23  0725 05/04/23  2023 05/04/23  1541 05/04/23  1100 05/04/23  0712 05/03/23  2146   POC GLUCOSE mg/dl 97 125 85 103 101 78 88 74         Results from last 7 days   Lab Units 05/05/23  0132   LACTIC ACID mmol/L 1 3       Lines/Drains:  Invasive Devices     Peripheral Intravenous Line  Duration           Peripheral IV 05/05/23 Dorsal (posterior); Left Hand 4 days    Peripheral IV 05/08/23 Distal;Left;Upper;Ventral (anterior) Arm <1 day    Peripheral IV 05/08/23 Left;Ventral (anterior) Forearm <1 day                      Imaging: No pertinent imaging reviewed      Recent Cultures (last 7 days):         Last 24 Hours Medication List:   Current Facility-Administered Medications   Medication Dose Route Frequency Provider Last Rate   • acetaminophen  650 mg Oral Q6H PRN Radhika Dolan MD     • Albumin 25%  12 5 g Intravenous Q6H EBEN Bronson     • barium  900 mL Oral Once in imaging Vivian Larson MD     • bisacodyl  10 mg Oral Once SID MARTINEZ PA-C     • dicyclomine  10 mg Oral TID PRN Shana Arshad MD     • erythromycin  0 5 inch Left Eye BID Vivian Larson MD     • HYDROmorphone  0 5 mg Intravenous Q4H PRN EBEN Hooker     • levothyroxine  75 mcg Oral Early Morning Radhika Dolan MD     • lidocaine  1 patch Topical Daily EBEN Hooker     • midodrine  5 mg Oral TID AC EBEN Bronson     • octreotide  100 mcg Subcutaneous [facility] EBEN Bronson     • ondansetron  4 mg Intravenous Q6H PRN Vivian Larson MD     • pantoprazole  40 mg Oral BID AC Caridad Naranjo PA-C     • polyethylene glycol  4,000 mL Oral Once [place], [place]     • traZODone  50 mg Oral HS Lenwood Skiff, PA-C          Today, Patient Was Seen By: Jessi Chris PA-C    **Please Note: This note may have been constructed using a voice recognition system  **

## 2023-05-09 NOTE — PLAN OF CARE
Problem: Potential for Falls  Goal: Patient will remain free of falls  Description: INTERVENTIONS:  - Educate patient/family on patient safety including physical limitations  - Instruct patient to call for assistance with activity   - Consult OT/PT to assist with strengthening/mobility   - Keep Call bell within reach  - Keep bed low and locked with side rails adjusted as appropriate  - Keep care items and personal belongings within reach  - Initiate and maintain comfort rounds  - Make Fall Risk Sign visible to staff  - Offer Toileting every 2 Hours, in advance of need  - Initiate/Maintain alarms  - Obtain necessary fall risk management equipment:  - Apply yellow socks and bracelet for high fall risk patients  - Consider moving patient to room near nurses station  Outcome: Progressing     Problem: SAFETY ADULT  Goal: Patient will remain free of falls  Description: INTERVENTIONS:  - Educate patient/family on patient safety including physical limitations  - Instruct patient to call for assistance with activity   - Consult OT/PT to assist with strengthening/mobility   - Keep Call bell within reach  - Keep bed low and locked with side rails adjusted as appropriate  - Keep care items and personal belongings within reach  - Initiate and maintain comfort rounds  - Make Fall Risk Sign visible to staff  - Offer Toileting every 2 Hours, in advance of need  - Initiate/Maintain alarms  - Obtain necessary fall risk management equipment:  - Apply yellow socks and bracelet for high fall risk patients  - Consider moving patient to room near nurses station  Outcome: Progressing  Goal: Maintain or return to baseline ADL function  Description: INTERVENTIONS:  - Educate patient/family on patient safety including physical limitations  - Instruct patient to call for assistance with activity   - Consult OT/PT to assist with strengthening/mobility   - Keep Call bell within reach  - Keep bed low and locked with side rails adjusted as appropriate  - Keep care items and personal belongings within reach  - Initiate and maintain comfort rounds  - Make Fall Risk Sign visible to staff  - Offer Toileting every 2 Hours, in advance of need  - Initiate/Maintain alarms  - Obtain necessary fall risk management equipment:   - Apply yellow socks and bracelet for high fall risk patients  - Consider moving patient to room near nurses station  Outcome: Progressing  Goal: Maintains/Returns to pre admission functional level  Description: INTERVENTIONS:  - Perform BMAT or MOVE assessment daily    - Set and communicate daily mobility goal to care team and patient/family/caregiver     - Collaborate with rehabilitation services on mobility goals if consulted  - Out of bed for toileting  - Record patient progress and toleration of activity level   Outcome: Progressing

## 2023-05-09 NOTE — ASSESSMENT & PLAN NOTE
· Blood pressure has improved   · Continue midodrine  · Losartan was discontinued due to ALEJANDRA   · Metoprolol currently on hold due to hypotension, will continue to monitor

## 2023-05-09 NOTE — ASSESSMENT & PLAN NOTE
· Likely due to cirrhosis   · Serum sodium improved to 133  · S/p IVF  · Continue management as above

## 2023-05-09 NOTE — ASSESSMENT & PLAN NOTE
· Nursing reported black tarry stools   · Stools are heme +  · hgb 8 6 -> 9 6 -> 8 1  · Patient to have prep for EGD/Colonoscopy tomorrow 5/10  · Continue to monitor H&H

## 2023-05-09 NOTE — PHYSICAL THERAPY NOTE
Physical Therapy Cancellation Note             05/09/23 1138   PT Last Visit   PT Visit Date 05/09/23   Note Type   Note Type Treatment   Cancel Reasons Refusal  (Patient reports loose stools due to bowel prep and requested a session at a later time )     Gibran Hays

## 2023-05-09 NOTE — PLAN OF CARE
Problem: Potential for Falls  Goal: Patient will remain free of falls  Description: INTERVENTIONS:  - Educate patient/family on patient safety including physical limitations  - Instruct patient to call for assistance with activity   - Consult OT/PT to assist with strengthening/mobility   - Keep Call bell within reach  - Keep bed low and locked with side rails adjusted as appropriate  - Keep care items and personal belongings within reach  - Initiate and maintain comfort rounds  - Make Fall Risk Sign visible to staff  - Offer Toileting every 2 Hours, in advance of need  - Initiate/Maintain alarms  - Obtain necessary fall risk management equipment:  - Apply yellow socks and bracelet for high fall risk patients  - Consider moving patient to room near nurses station  Outcome: Progressing     Problem: PAIN - ADULT  Goal: Verbalizes/displays adequate comfort level or baseline comfort level  Description: Interventions:  - Encourage patient to monitor pain and request assistance  - Assess pain using appropriate pain scale  - Administer analgesics based on type and severity of pain and evaluate response  - Implement non-pharmacological measures as appropriate and evaluate response  - Consider cultural and social influences on pain and pain management  - Notify physician/advanced practitioner if interventions unsuccessful or patient reports new pain  Outcome: Progressing     Problem: SAFETY ADULT  Goal: Patient will remain free of falls  Description: INTERVENTIONS:  - Educate patient/family on patient safety including physical limitations  - Instruct patient to call for assistance with activity   - Consult OT/PT to assist with strengthening/mobility   - Keep Call bell within reach  - Keep bed low and locked with side rails adjusted as appropriate  - Keep care items and personal belongings within reach  - Initiate and maintain comfort rounds  - Make Fall Risk Sign visible to staff  - Offer Toileting every 2 Hours, in advance of need  - Initiate/Maintain alarms  - Obtain necessary fall risk management equipment:  - Apply yellow socks and bracelet for high fall risk patients  - Consider moving patient to room near nurses station  Outcome: Progressing  Goal: Maintain or return to baseline ADL function  Description: INTERVENTIONS:  - Educate patient/family on patient safety including physical limitations  - Instruct patient to call for assistance with activity   - Consult OT/PT to assist with strengthening/mobility   - Keep Call bell within reach  - Keep bed low and locked with side rails adjusted as appropriate  - Keep care items and personal belongings within reach  - Initiate and maintain comfort rounds  - Make Fall Risk Sign visible to staff  - Offer Toileting every 2 Hours, in advance of need  - Initiate/Maintain alarms  - Obtain necessary fall risk management equipment:   - Apply yellow socks and bracelet for high fall risk patients  - Consider moving patient to room near nurses station  Outcome: Progressing  Goal: Maintains/Returns to pre admission functional level  Description: INTERVENTIONS:  - Perform BMAT or MOVE assessment daily    - Set and communicate daily mobility goal to care team and patient/family/caregiver     - Collaborate with rehabilitation services on mobility goals if consulted  - Out of bed for toileting  - Record patient progress and toleration of activity level   Outcome: Progressing     Problem: INFECTION - ADULT  Goal: Absence or prevention of progression during hospitalization  Description: INTERVENTIONS:  - Assess and monitor for signs and symptoms of infection  - Monitor lab/diagnostic results  - Monitor all insertion sites, i e  indwelling lines, tubes, and drains  - Monitor endotracheal if appropriate and nasal secretions for changes in amount and color  - [place] appropriate cooling/warming therapies per order  - Administer medications as ordered  - Instruct and encourage patient and family to use good hand hygiene technique  - Identify and instruct in appropriate isolation precautions for identified infection/condition  Outcome: Progressing     Problem: DISCHARGE PLANNING  Goal: Discharge to home or other facility with appropriate resources  Description: INTERVENTIONS:  - Identify barriers to discharge w/patient and caregiver  - Arrange for needed discharge resources and transportation as appropriate  - Identify discharge learning needs (meds, wound care, etc )  - Refer to Case Management Department for coordinating discharge planning if the patient needs post-hospital services based on physician/advanced practitioner order or complex needs related to functional status, cognitive ability, or social support system  Outcome: Progressing     Problem: Knowledge Deficit  Goal: Patient/family/caregiver demonstrates understanding of disease process, treatment plan, medications, and discharge instructions  Description: Complete learning assessment and assess knowledge base    Interventions:  - Provide teaching at level of understanding  - Provide teaching via preferred learning methods  Outcome: Progressing     Problem: Prexisting or High Potential for Compromised Skin Integrity  Goal: Skin integrity is maintained or improved  Description: INTERVENTIONS:  - Identify patients at risk for skin breakdown  - Assess and monitor skin integrity  - Assess and monitor nutrition and hydration status  - Monitor labs   - Assess for incontinence   - Turn and reposition patient  - Assist with mobility/ambulation  - Relieve pressure over bony prominences  - Avoid friction and shearing  - Provide appropriate hygiene as needed including keeping skin clean and dry  - Evaluate need for skin moisturizer/barrier cream  - Collaborate with interdisciplinary team   - Patient/family teaching  - Consider wound care consult   Outcome: Progressing     Problem: MOBILITY - ADULT  Goal: Maintain or return to baseline ADL function  Description: INTERVENTIONS:  - Educate patient/family on patient safety including physical limitations  - Instruct patient to call for assistance with activity   - Consult OT/PT to assist with strengthening/mobility   - Keep Call bell within reach  - Keep bed low and locked with side rails adjusted as appropriate  - Keep care items and personal belongings within reach  - Initiate and maintain comfort rounds  - Make Fall Risk Sign visible to staff  - Offer Toileting every 2 Hours, in advance of need  - Initiate/Maintain alarms  - Obtain necessary fall risk management equipment:   - Apply yellow socks and bracelet for high fall risk patients  - Consider moving patient to room near nurses station  Outcome: Progressing  Goal: Maintains/Returns to pre admission functional level  Description: INTERVENTIONS:  - Perform BMAT or MOVE assessment daily    - Set and communicate daily mobility goal to care team and patient/family/caregiver  - Collaborate with rehabilitation services on mobility goals if consulted  - Out of bed for toileting  - Record patient progress and toleration of activity level   Outcome: Progressing     Problem: Nutrition/Hydration-ADULT  Goal: Nutrient/Hydration intake appropriate for improving, restoring or maintaining nutritional needs  Description: Monitor and assess patient's nutrition/hydration status for malnutrition  Collaborate with interdisciplinary team and initiate plan and interventions as ordered  Monitor patient's weight and dietary intake as ordered or per policy  Utilize nutrition screening tool and intervene as necessary  Determine patient's food preferences and provide high-protein, high-caloric foods as appropriate       INTERVENTIONS:  - Monitor oral intake, urinary output, labs, and treatment plans  - Assess nutrition and hydration status and recommend course of action  - Evaluate amount of meals eaten  - Assist patient with eating if necessary   - Allow adequate time for meals  - Recommend/ encourage appropriate diets, oral nutritional supplements, and vitamin/mineral supplements  - Order, calculate, and assess calorie counts as needed  - Recommend, monitor, and adjust tube feedings and TPN/PPN based on assessed needs  - Assess need for intravenous fluids  - Provide specific nutrition/hydration education as appropriate  - Include patient/family/caregiver in decisions related to nutrition  Outcome: Progressing

## 2023-05-09 NOTE — PLAN OF CARE
Problem: Potential for Falls  Goal: Patient will remain free of falls  Description: INTERVENTIONS:  - Educate patient/family on patient safety including physical limitations  - Instruct patient to call for assistance with activity   - Consult OT/PT to assist with strengthening/mobility   - Keep Call bell within reach  - Keep bed low and locked with side rails adjusted as appropriate  - Keep care items and personal belongings within reach  - Initiate and maintain comfort rounds  - Make Fall Risk Sign visible to staff  - Offer Toileting every 2 Hours, in advance of need  - Initiate/Maintain alarms  - Obtain necessary fall risk management equipment:  - Apply yellow socks and bracelet for high fall risk patients  - Consider moving patient to room near nurses station  Outcome: Progressing     Problem: PAIN - ADULT  Goal: Verbalizes/displays adequate comfort level or baseline comfort level  Description: Interventions:  - Encourage patient to monitor pain and request assistance  - Assess pain using appropriate pain scale  - Administer analgesics based on type and severity of pain and evaluate response  - Implement non-pharmacological measures as appropriate and evaluate response  - Consider cultural and social influences on pain and pain management  - Notify physician/advanced practitioner if interventions unsuccessful or patient reports new pain  Outcome: Progressing     Problem: SAFETY ADULT  Goal: Patient will remain free of falls  Description: INTERVENTIONS:  - Educate patient/family on patient safety including physical limitations  - Instruct patient to call for assistance with activity   - Consult OT/PT to assist with strengthening/mobility   - Keep Call bell within reach  - Keep bed low and locked with side rails adjusted as appropriate  - Keep care items and personal belongings within reach  - Initiate and maintain comfort rounds  - Make Fall Risk Sign visible to staff  - Offer Toileting every 2 Hours, in advance of need  - Initiate/Maintain alarms  - Obtain necessary fall risk management equipment:  - Apply yellow socks and bracelet for high fall risk patients  - Consider moving patient to room near nurses station  Outcome: Progressing  Goal: Maintain or return to baseline ADL function  Description: INTERVENTIONS:  - Educate patient/family on patient safety including physical limitations  - Instruct patient to call for assistance with activity   - Consult OT/PT to assist with strengthening/mobility   - Keep Call bell within reach  - Keep bed low and locked with side rails adjusted as appropriate  - Keep care items and personal belongings within reach  - Initiate and maintain comfort rounds  - Make Fall Risk Sign visible to staff  - Offer Toileting every 2 Hours, in advance of need  - Initiate/Maintain alarms  - Obtain necessary fall risk management equipment:   - Apply yellow socks and bracelet for high fall risk patients  - Consider moving patient to room near nurses station  Outcome: Progressing  Goal: Maintains/Returns to pre admission functional level  Description: INTERVENTIONS:  - Perform BMAT or MOVE assessment daily    - Set and communicate daily mobility goal to care team and patient/family/caregiver     - Collaborate with rehabilitation services on mobility goals if consulted  - Out of bed for toileting  - Record patient progress and toleration of activity level   Outcome: Progressing     Problem: INFECTION - ADULT  Goal: Absence or prevention of progression during hospitalization  Description: INTERVENTIONS:  - Assess and monitor for signs and symptoms of infection  - Monitor lab/diagnostic results  - Monitor all insertion sites, i e  indwelling lines, tubes, and drains  - Monitor endotracheal if appropriate and nasal secretions for changes in amount and color  - [place] appropriate cooling/warming therapies per order  - Administer medications as ordered  - Instruct and encourage patient and family to use good hand hygiene technique  - Identify and instruct in appropriate isolation precautions for identified infection/condition  Outcome: Progressing     Problem: DISCHARGE PLANNING  Goal: Discharge to home or other facility with appropriate resources  Description: INTERVENTIONS:  - Identify barriers to discharge w/patient and caregiver  - Arrange for needed discharge resources and transportation as appropriate  - Identify discharge learning needs (meds, wound care, etc )  - Refer to Case Management Department for coordinating discharge planning if the patient needs post-hospital services based on physician/advanced practitioner order or complex needs related to functional status, cognitive ability, or social support system  Outcome: Progressing     Problem: Knowledge Deficit  Goal: Patient/family/caregiver demonstrates understanding of disease process, treatment plan, medications, and discharge instructions  Description: Complete learning assessment and assess knowledge base    Interventions:  - Provide teaching at level of understanding  - Provide teaching via preferred learning methods  Outcome: Progressing     Problem: Prexisting or High Potential for Compromised Skin Integrity  Goal: Skin integrity is maintained or improved  Description: INTERVENTIONS:  - Identify patients at risk for skin breakdown  - Assess and monitor skin integrity  - Assess and monitor nutrition and hydration status  - Monitor labs   - Assess for incontinence   - Turn and reposition patient  - Assist with mobility/ambulation  - Relieve pressure over bony prominences  - Avoid friction and shearing  - Provide appropriate hygiene as needed including keeping skin clean and dry  - Evaluate need for skin moisturizer/barrier cream  - Collaborate with interdisciplinary team   - Patient/family teaching  - Consider wound care consult   Outcome: Progressing     Problem: MOBILITY - ADULT  Goal: Maintain or return to baseline ADL function  Description: INTERVENTIONS:  - Educate patient/family on patient safety including physical limitations  - Instruct patient to call for assistance with activity   - Consult OT/PT to assist with strengthening/mobility   - Keep Call bell within reach  - Keep bed low and locked with side rails adjusted as appropriate  - Keep care items and personal belongings within reach  - Initiate and maintain comfort rounds  - Make Fall Risk Sign visible to staff  - Offer Toileting every 2 Hours, in advance of need  - Initiate/Maintain alarms  - Obtain necessary fall risk management equipment:   - Apply yellow socks and bracelet for high fall risk patients  - Consider moving patient to room near nurses station  Outcome: Progressing  Goal: Maintains/Returns to pre admission functional level  Description: INTERVENTIONS:  - Perform BMAT or MOVE assessment daily    - Set and communicate daily mobility goal to care team and patient/family/caregiver  - Collaborate with rehabilitation services on mobility goals if consulted  - Out of bed for toileting  - Record patient progress and toleration of activity level   Outcome: Progressing     Problem: Nutrition/Hydration-ADULT  Goal: Nutrient/Hydration intake appropriate for improving, restoring or maintaining nutritional needs  Description: Monitor and assess patient's nutrition/hydration status for malnutrition  Collaborate with interdisciplinary team and initiate plan and interventions as ordered  Monitor patient's weight and dietary intake as ordered or per policy  Utilize nutrition screening tool and intervene as necessary  Determine patient's food preferences and provide high-protein, high-caloric foods as appropriate       INTERVENTIONS:  - Monitor oral intake, urinary output, labs, and treatment plans  - Assess nutrition and hydration status and recommend course of action  - Evaluate amount of meals eaten  - Assist patient with eating if necessary   - Allow adequate time for meals  - Recommend/ encourage appropriate diets, oral nutritional supplements, and vitamin/mineral supplements  - Order, calculate, and assess calorie counts as needed  - Recommend, monitor, and adjust tube feedings and TPN/PPN based on assessed needs  - Assess need for intravenous fluids  - Provide specific nutrition/hydration education as appropriate  - Include patient/family/caregiver in decisions related to nutrition  Outcome: Progressing

## 2023-05-10 ENCOUNTER — ANESTHESIA EVENT (INPATIENT)
Dept: GASTROENTEROLOGY | Facility: HOSPITAL | Age: 78
End: 2023-05-10

## 2023-05-10 ENCOUNTER — ANESTHESIA (INPATIENT)
Dept: GASTROENTEROLOGY | Facility: HOSPITAL | Age: 78
End: 2023-05-10

## 2023-05-10 ENCOUNTER — APPOINTMENT (INPATIENT)
Dept: GASTROENTEROLOGY | Facility: HOSPITAL | Age: 78
DRG: 385 | End: 2023-05-10
Payer: MEDICARE

## 2023-05-10 ENCOUNTER — APPOINTMENT (INPATIENT)
Dept: ULTRASOUND IMAGING | Facility: HOSPITAL | Age: 78
DRG: 385 | End: 2023-05-10
Payer: MEDICARE

## 2023-05-10 LAB
A1AT SERPL-MCNC: 148 MG/DL (ref 101–187)
ACTIN IGG SERPL-ACNC: 12 UNITS (ref 0–19)
ALBUMIN SERPL BCP-MCNC: 3.6 G/DL (ref 3.5–5)
ALP SERPL-CCNC: 464 U/L (ref 34–104)
ALT SERPL W P-5'-P-CCNC: 22 U/L (ref 7–52)
ANION GAP SERPL CALCULATED.3IONS-SCNC: 7 MMOL/L (ref 4–13)
ANION GAP SERPL CALCULATED.3IONS-SCNC: 9 MMOL/L (ref 4–13)
AST SERPL W P-5'-P-CCNC: 62 U/L (ref 13–39)
BACTERIA UR QL AUTO: ABNORMAL /HPF
BILIRUB SERPL-MCNC: 2.31 MG/DL (ref 0.2–1)
BILIRUB UR QL STRIP: NEGATIVE
BUN SERPL-MCNC: 31 MG/DL (ref 5–25)
BUN SERPL-MCNC: 33 MG/DL (ref 5–25)
CALCIUM SERPL-MCNC: 9 MG/DL (ref 8.4–10.2)
CALCIUM SERPL-MCNC: 9.3 MG/DL (ref 8.4–10.2)
CERULOPLASMIN SERPL-MCNC: 18.8 MG/DL (ref 19–39)
CHLORIDE SERPL-SCNC: 102 MMOL/L (ref 96–108)
CHLORIDE SERPL-SCNC: 99 MMOL/L (ref 96–108)
CLARITY UR: ABNORMAL
CO2 SERPL-SCNC: 25 MMOL/L (ref 21–32)
CO2 SERPL-SCNC: 26 MMOL/L (ref 21–32)
COLOR UR: YELLOW
CREAT SERPL-MCNC: 1.2 MG/DL (ref 0.6–1.3)
CREAT SERPL-MCNC: 1.26 MG/DL (ref 0.6–1.3)
ERYTHROCYTE [DISTWIDTH] IN BLOOD BY AUTOMATED COUNT: 15.9 % (ref 11.6–15.1)
GFR SERPL CREATININE-BSD FRML MDRD: 41 ML/MIN/1.73SQ M
GFR SERPL CREATININE-BSD FRML MDRD: 44 ML/MIN/1.73SQ M
GLUCOSE SERPL-MCNC: 103 MG/DL (ref 65–140)
GLUCOSE SERPL-MCNC: 75 MG/DL (ref 65–140)
GLUCOSE UR STRIP-MCNC: NEGATIVE MG/DL
HCT VFR BLD AUTO: 23.6 % (ref 34.8–46.1)
HGB BLD-MCNC: 7.8 G/DL (ref 11.5–15.4)
HGB UR QL STRIP.AUTO: ABNORMAL
INR PPP: 1.92 (ref 0.84–1.19)
KETONES UR STRIP-MCNC: NEGATIVE MG/DL
LEUKOCYTE ESTERASE UR QL STRIP: ABNORMAL
MAGNESIUM SERPL-MCNC: 2.2 MG/DL (ref 1.9–2.7)
MCH RBC QN AUTO: 33.6 PG (ref 26.8–34.3)
MCHC RBC AUTO-ENTMCNC: 33.1 G/DL (ref 31.4–37.4)
MCV RBC AUTO: 102 FL (ref 82–98)
MITOCHONDRIA M2 IGG SER-ACNC: <20 UNITS (ref 0–20)
NITRITE UR QL STRIP: NEGATIVE
NON-SQ EPI CELLS URNS QL MICRO: ABNORMAL /HPF
OTHER STN SPEC: ABNORMAL
PH UR STRIP.AUTO: 6 [PH]
PHOSPHATE SERPL-MCNC: 2.6 MG/DL (ref 2.3–4.1)
PLATELET # BLD AUTO: 78 THOUSANDS/UL (ref 149–390)
PMV BLD AUTO: 10.2 FL (ref 8.9–12.7)
POTASSIUM SERPL-SCNC: 4.1 MMOL/L (ref 3.5–5.3)
POTASSIUM SERPL-SCNC: 5.3 MMOL/L (ref 3.5–5.3)
PROT SERPL-MCNC: 6.3 G/DL (ref 6.4–8.4)
PROT UR STRIP-MCNC: NEGATIVE MG/DL
PROTHROMBIN TIME: 21.9 SECONDS (ref 11.6–14.5)
RBC # BLD AUTO: 2.32 MILLION/UL (ref 3.81–5.12)
RBC #/AREA URNS AUTO: ABNORMAL /HPF
SODIUM SERPL-SCNC: 134 MMOL/L (ref 135–147)
SODIUM SERPL-SCNC: 134 MMOL/L (ref 135–147)
SP GR UR STRIP.AUTO: 1.01
UROBILINOGEN UR QL STRIP.AUTO: 0.2 E.U./DL
WBC # BLD AUTO: 5.57 THOUSAND/UL (ref 4.31–10.16)
WBC #/AREA URNS AUTO: ABNORMAL /HPF

## 2023-05-10 PROCEDURE — 80048 BASIC METABOLIC PNL TOTAL CA: CPT | Performed by: INTERNAL MEDICINE

## 2023-05-10 PROCEDURE — 85610 PROTHROMBIN TIME: CPT | Performed by: INTERNAL MEDICINE

## 2023-05-10 PROCEDURE — 83735 ASSAY OF MAGNESIUM: CPT | Performed by: INTERNAL MEDICINE

## 2023-05-10 PROCEDURE — 99232 SBSQ HOSP IP/OBS MODERATE 35: CPT | Performed by: PHYSICIAN ASSISTANT

## 2023-05-10 PROCEDURE — 99233 SBSQ HOSP IP/OBS HIGH 50: CPT | Performed by: INTERNAL MEDICINE

## 2023-05-10 PROCEDURE — 99232 SBSQ HOSP IP/OBS MODERATE 35: CPT | Performed by: INTERNAL MEDICINE

## 2023-05-10 PROCEDURE — 81001 URINALYSIS AUTO W/SCOPE: CPT | Performed by: PHYSICIAN ASSISTANT

## 2023-05-10 PROCEDURE — 85027 COMPLETE CBC AUTOMATED: CPT | Performed by: INTERNAL MEDICINE

## 2023-05-10 PROCEDURE — 84100 ASSAY OF PHOSPHORUS: CPT | Performed by: INTERNAL MEDICINE

## 2023-05-10 PROCEDURE — 93976 VASCULAR STUDY: CPT

## 2023-05-10 PROCEDURE — 80053 COMPREHEN METABOLIC PANEL: CPT | Performed by: INTERNAL MEDICINE

## 2023-05-10 RX ORDER — PHYTONADIONE 5 MG/1
5 TABLET ORAL ONCE
Status: COMPLETED | OUTPATIENT
Start: 2023-05-10 | End: 2023-05-10

## 2023-05-10 RX ORDER — BUMETANIDE 0.25 MG/ML
1 INJECTION INTRAMUSCULAR; INTRAVENOUS 2 TIMES DAILY
Status: DISCONTINUED | OUTPATIENT
Start: 2023-05-10 | End: 2023-05-10

## 2023-05-10 RX ORDER — BUMETANIDE 0.25 MG/ML
0.5 INJECTION INTRAMUSCULAR; INTRAVENOUS 2 TIMES DAILY
Status: DISCONTINUED | OUTPATIENT
Start: 2023-05-10 | End: 2023-05-11

## 2023-05-10 RX ADMIN — OCTREOTIDE ACETATE 100 MCG: 100 INJECTION, SOLUTION INTRAVENOUS; SUBCUTANEOUS at 14:55

## 2023-05-10 RX ADMIN — TRAZODONE HYDROCHLORIDE 50 MG: 50 TABLET ORAL at 21:11

## 2023-05-10 RX ADMIN — PHYTONADIONE 5 MG: 5 TABLET ORAL at 14:55

## 2023-05-10 RX ADMIN — LIDOCAINE 1 PATCH: 700 PATCH TOPICAL at 08:03

## 2023-05-10 RX ADMIN — HYDROMORPHONE HYDROCHLORIDE 0.5 MG: 1 INJECTION, SOLUTION INTRAMUSCULAR; INTRAVENOUS; SUBCUTANEOUS at 03:20

## 2023-05-10 RX ADMIN — MIDODRINE HYDROCHLORIDE 5 MG: 5 TABLET ORAL at 12:12

## 2023-05-10 RX ADMIN — OCTREOTIDE ACETATE 100 MCG: 100 INJECTION, SOLUTION INTRAVENOUS; SUBCUTANEOUS at 05:12

## 2023-05-10 RX ADMIN — PANTOPRAZOLE SODIUM 40 MG: 40 TABLET, DELAYED RELEASE ORAL at 16:23

## 2023-05-10 RX ADMIN — MIDODRINE HYDROCHLORIDE 5 MG: 5 TABLET ORAL at 07:58

## 2023-05-10 RX ADMIN — MIDODRINE HYDROCHLORIDE 5 MG: 5 TABLET ORAL at 16:23

## 2023-05-10 RX ADMIN — OCTREOTIDE ACETATE 100 MCG: 100 INJECTION, SOLUTION INTRAVENOUS; SUBCUTANEOUS at 21:11

## 2023-05-10 RX ADMIN — ERYTHROMYCIN 0.5 INCH: 5 OINTMENT OPHTHALMIC at 08:04

## 2023-05-10 RX ADMIN — LEVOTHYROXINE SODIUM 75 MCG: 75 TABLET ORAL at 05:12

## 2023-05-10 RX ADMIN — BUMETANIDE 0.5 MG: 0.25 INJECTION INTRAMUSCULAR; INTRAVENOUS at 18:14

## 2023-05-10 RX ADMIN — ERYTHROMYCIN 0.5 INCH: 5 OINTMENT OPHTHALMIC at 18:14

## 2023-05-10 RX ADMIN — BUMETANIDE 1 MG: 0.25 INJECTION INTRAMUSCULAR; INTRAVENOUS at 10:46

## 2023-05-10 RX ADMIN — ALBUMIN (HUMAN) 12.5 G: 0.25 INJECTION, SOLUTION INTRAVENOUS at 05:12

## 2023-05-10 NOTE — ASSESSMENT & PLAN NOTE
· Baseline appears to be between 11 and 12   · Hemoglobin today 8 8 -> 8 6 -> 9 6 -> 8 1 -> 7 8  · Patient noted to have melena 5/8  · Stools heme +  · GI following, appreciate recommendations-  EGD/Colonoscopy postponed until tomorrow 5/11- continue with prep  · CBC a m

## 2023-05-10 NOTE — ASSESSMENT & PLAN NOTE
· Presented to the ED with left lower quadrant abdominal pain on 4/27   · Continues with some left lower quadrant abdominal pain today, as well as loose stools with it  · CT abdomen: New small amount of free fluid in the abdomen and pelvis compared to 4/18/2023, which can be secondary to cirrhosis or acute inflammatory process in the abdomen and pelvis such as occult acute diverticulitis  Moderate amount of stool in the colon, nonspecific and can represent constipation  · Surgery following, appreciate recommendations  · GI following, appreciate recommendations  · Patient had flex sig on 5/2 minimal inflammation noted, biopsies normal  · KUB (5/5):  Nonobstructive bowel gas pattern  · Clear liquid diet then NPO after midnight  · Colonoscopy will now be tomorrow 5/11 given drank less than half of prep

## 2023-05-10 NOTE — PROGRESS NOTES
[facility]  Progress Note  Name: Kike Malloy  MRN: [id number]  Unit/Bed#: -01 I Date of Admission: 4/27/2023   Date of Service: 5/10/2023 I Hospital Day: 13    Assessment/Plan   * Left lower quadrant abdominal pain  Assessment & Plan  · Presented to the ED with left lower quadrant abdominal pain on 4/27   · Continues with some left lower quadrant abdominal pain today, as well as loose stools with it  · CT abdomen: New small amount of free fluid in the abdomen and pelvis compared to 4/18/2023, which can be secondary to cirrhosis or acute inflammatory process in the abdomen and pelvis such as occult acute diverticulitis  Moderate amount of stool in the colon, nonspecific and can represent constipation  · Surgery following, appreciate recommendations  · GI following, appreciate recommendations  · Patient had flex sig on 5/2 minimal inflammation noted, biopsies normal  · KUB (5/5):  Nonobstructive bowel gas pattern  · Clear liquid diet then NPO after midnight  · Colonoscopy will now be tomorrow 5/11 given drank less than half of prep  Tarry stools  Assessment & Plan  · Nursing reported black tarry stools   · Stools are heme +  · hgb 8 6 -> 9 6 -> 8 1 -> 7 8  · Patient was ordered prep for EGD/Colonoscopy today however she drank less than half  Continue prep today and GI to plan for EGD/Colonoscopy tomorrow 5/11  · Continue to monitor H&H    Acute kidney injury [facility])  Assessment & Plan  • Nephrology following, appreciate recommendations  • Continue midodrine, octreotide per nephrology- albumin discontinued today 5/10  • Avoid hypotension and nephrotoxins  • I&O's, daily weights  • Continues to improve  • BMP a m           Anemia  Assessment & Plan  · Baseline appears to be between 11 and 12   · Hemoglobin today 8 8 -> 8 6 -> 9 6 -> 8 1 -> 7 8  · Patient noted to have melena 5/8  · Stools heme +  · GI following, appreciate recommendations-  EGD/Colonoscopy postponed until tomorrow 5/11- continue with prep  · CBC a m  Hx of CABG  Assessment & Plan  · Currently stable  · Losartan on hold due to hypotension    PAF (paroxysmal atrial fibrillation) (HCC)  Assessment & Plan  · Rate controlled on current regimen  · Continue midodrine due to hypotension  · Home metoprolol on hold due to hypotension    Hyponatremia  Assessment & Plan  · Likely due to cirrhosis   · Serum sodium improved to 134  · S/p IVF  · Continue management as above  · Patient very edematous today- albumin stopped by nephrology  · Patient started on Bumex 0 5 mg BID 5/10    Acquired hypothyroidism  Assessment & Plan  · Continue Synthroid    Primary hypertension  Assessment & Plan  · Blood pressure has improved   · Continue midodrine  · Losartan was discontinued due to ALEJANDRA   · Metoprolol currently on hold due to hypotension, will continue to monitor           VTE Pharmacologic Prophylaxis: VTE Score: 3 Moderate Risk (Score 3-4) - Pharmacological DVT Prophylaxis Contraindicated  Sequential Compression Devices Ordered  Patient Centered Rounds: I performed bedside rounds with nursing staff today  Discussions with Specialists or Other Care Team Provider: nursing, CM, GI, Nephrology      Total Time Spent on Date of Encounter in care of patient: 35 minutes This time was spent on one or more of the following: performing physical exam; counseling and coordination of care; obtaining or reviewing history; documenting in the medical record; reviewing/ordering tests, medications or procedures; communicating with other healthcare professionals and discussing with patient's family/caregivers  Current Length of Stay: 13 day(s)  Current Patient Status: Inpatient   Certification Statement: The patient will continue to require additional inpatient hospital stay due to need for EGD/colonoscopy for anemia, melena  monitoring labs  Discharge Plan: pending clinical course    Code Status: Level 1 - Full Code    Subjective:    The patient was seen and examined  The patient is lying in bed  She states she doesn't feel well  She continues to have abdominal pain and back pain  Objective:     Vitals:   Temp (24hrs), Av 4 °F (36 3 °C), Min:97 2 °F (36 2 °C), Max:97 6 °F (36 4 °C)    Temp:  [97 2 °F (36 2 °C)-97 6 °F (36 4 °C)] 97 2 °F (36 2 °C)  HR:  [66-80] 66  Resp:  [18-20] 20  BP: (118-147)/(47-53) 118/47  SpO2:  [90 %-95 %] 90 %  Body mass index is 27 03 kg/m²  Input and Output Summary (last 24 hours): Intake/Output Summary (Last 24 hours) at 5/10/2023 1220  Last data filed at 5/10/2023 0900  Gross per 24 hour   Intake 120 ml   Output --   Net 120 ml       Physical Exam:   Physical Exam  Vitals and nursing note reviewed  Constitutional:       General: She is awake  Appearance: She is ill-appearing  HENT:      Head: Normocephalic and atraumatic  Cardiovascular:      Rate and Rhythm: Normal rate and regular rhythm  Pulmonary:      Effort: Pulmonary effort is normal       Breath sounds: Normal breath sounds  Abdominal:      General: Abdomen is protuberant  Tenderness: There is generalized abdominal tenderness and tenderness in the left lower quadrant  There is no guarding or rebound  Musculoskeletal:      Right lower leg: Edema present  Left lower leg: Edema present  Skin:     General: Skin is warm and dry  Neurological:      General: No focal deficit present  Mental Status: She is alert and oriented to person, place, and time  Psychiatric:         Attention and Perception: Attention normal          Speech: Speech normal          Behavior: Behavior is cooperative            Additional Data:     Labs:  Results from last 7 days   Lab Units 05/10/23  0505 23  0454 23  1554   WBC Thousand/uL 5 57   < > 7 38   HEMOGLOBIN g/dL 7 8*   < > 9 6*   HEMATOCRIT % 23 6*   < > 27 6*   PLATELETS Thousands/uL 78*   < > 88*   NEUTROS PCT %  --   --  55   LYMPHS PCT %  --   --  21   MONOS PCT %  --   --  19* EOS PCT %  --   --  3    < > = values in this interval not displayed  Results from last 7 days   Lab Units 05/10/23  0505   SODIUM mmol/L 134*   POTASSIUM mmol/L 4 1   CHLORIDE mmol/L 102   CO2 mmol/L 25   BUN mg/dL 33*   CREATININE mg/dL 1 20   ANION GAP mmol/L 7   CALCIUM mg/dL 9 0   ALBUMIN g/dL 3 6   TOTAL BILIRUBIN mg/dL 2 31*   ALK PHOS U/L 464*   ALT U/L 22   AST U/L 62*   GLUCOSE RANDOM mg/dL 75     Results from last 7 days   Lab Units 05/10/23  0505   INR  1 92*     Results from last 7 days   Lab Units 05/05/23  1615 05/05/23  1123 05/05/23  0725 05/04/23  2023 05/04/23  1541 05/04/23  1100 05/04/23  0712 05/03/23  2146   POC GLUCOSE mg/dl 97 125 85 103 101 78 88 74         Results from last 7 days   Lab Units 05/05/23  0132   LACTIC ACID mmol/L 1 3       Lines/Drains:  Invasive Devices     Peripheral Intravenous Line  Duration           Peripheral IV 05/05/23 Dorsal (posterior); Left Hand 5 days    Peripheral IV 05/08/23 Distal;Left;Upper;Ventral (anterior) Arm 1 day                      Imaging: No pertinent imaging reviewed      Recent Cultures (last 7 days):         Last 24 Hours Medication List:   Current Facility-Administered Medications   Medication Dose Route Frequency Provider Last Rate   • acetaminophen  650 mg Oral Q6H PRN Ricardo Catalan MD     • barium  900 mL Oral Once in imaging Army Cammy MD     • bumetanide  0 5 mg Intravenous BID DO Helio     • dicyclomine  10 mg Oral TID PRN Valentina Boo MD     • erythromycin  0 5 inch Left Eye BID Army Cammy MD     • HYDROmorphone  0 5 mg Intravenous Q4H PRN EBEN Hooker     • levothyroxine  75 mcg Oral Early Morning Ricardo Catalan MD     • lidocaine  1 patch Topical Daily EBEN Hooker     • midodrine  5 mg Oral TID AC EBEN Villa     • octreotide  100 mcg Subcutaneous [facility] EBEN Villa     • ondansetron  4 mg Intravenous Q6H PRN Army Cammy MD     • pantoprazole  40 mg Oral BID AC Caridad Pandey [place], [place]     • traZODone  50 mg Oral HS Mechelle Avila PA-C          Today, Patient Was Seen By: Reshma Girard PA-C    **Please Note: This note may have been constructed using a voice recognition system  **

## 2023-05-10 NOTE — PLAN OF CARE
Problem: Potential for Falls  Goal: Patient will remain free of falls  Description: INTERVENTIONS:  - Educate patient/family on patient safety including physical limitations  - Instruct patient to call for assistance with activity   - Consult OT/PT to assist with strengthening/mobility   - Keep Call bell within reach  - Keep bed low and locked with side rails adjusted as appropriate  - Keep care items and personal belongings within reach  - Initiate and maintain comfort rounds  - Make Fall Risk Sign visible to staff  - Offer Toileting every 2 Hours, in advance of need  - Initiate/Maintain alarms  - Obtain necessary fall risk management equipment:  - Apply yellow socks and bracelet for high fall risk patients  - Consider moving patient to room near nurses station  Outcome: Progressing     Problem: PAIN - ADULT  Goal: Verbalizes/displays adequate comfort level or baseline comfort level  Description: Interventions:  - Encourage patient to monitor pain and request assistance  - Assess pain using appropriate pain scale  - Administer analgesics based on type and severity of pain and evaluate response  - Implement non-pharmacological measures as appropriate and evaluate response  - Consider cultural and social influences on pain and pain management  - Notify physician/advanced practitioner if interventions unsuccessful or patient reports new pain  Outcome: Progressing     Problem: SAFETY ADULT  Goal: Patient will remain free of falls  Description: INTERVENTIONS:  - Educate patient/family on patient safety including physical limitations  - Instruct patient to call for assistance with activity   - Consult OT/PT to assist with strengthening/mobility   - Keep Call bell within reach  - Keep bed low and locked with side rails adjusted as appropriate  - Keep care items and personal belongings within reach  - Initiate and maintain comfort rounds  - Make Fall Risk Sign visible to staff  - Offer Toileting every 2 Hours, in advance of need  - Initiate/Maintain alarms  - Obtain necessary fall risk management equipment:  - Apply yellow socks and bracelet for high fall risk patients  - Consider moving patient to room near nurses station  Outcome: Progressing  Goal: Maintain or return to baseline ADL function  Description: INTERVENTIONS:  - Educate patient/family on patient safety including physical limitations  - Instruct patient to call for assistance with activity   - Consult OT/PT to assist with strengthening/mobility   - Keep Call bell within reach  - Keep bed low and locked with side rails adjusted as appropriate  - Keep care items and personal belongings within reach  - Initiate and maintain comfort rounds  - Make Fall Risk Sign visible to staff  - Offer Toileting every 2 Hours, in advance of need  - Initiate/Maintain alarms  - Obtain necessary fall risk management equipment:   - Apply yellow socks and bracelet for high fall risk patients  - Consider moving patient to room near nurses station  Outcome: Progressing  Goal: Maintains/Returns to pre admission functional level  Description: INTERVENTIONS:  - Perform BMAT or MOVE assessment daily    - Set and communicate daily mobility goal to care team and patient/family/caregiver     - Collaborate with rehabilitation services on mobility goals if consulted  - Out of bed for toileting  - Record patient progress and toleration of activity level   Outcome: Progressing     Problem: INFECTION - ADULT  Goal: Absence or prevention of progression during hospitalization  Description: INTERVENTIONS:  - Assess and monitor for signs and symptoms of infection  - Monitor lab/diagnostic results  - Monitor all insertion sites, i e  indwelling lines, tubes, and drains  - Monitor endotracheal if appropriate and nasal secretions for changes in amount and color  - Sawyer appropriate cooling/warming therapies per order  - Administer medications as ordered  - Instruct and encourage patient and family to use good hand hygiene technique  - Identify and instruct in appropriate isolation precautions for identified infection/condition  Outcome: Progressing     Problem: DISCHARGE PLANNING  Goal: Discharge to home or other facility with appropriate resources  Description: INTERVENTIONS:  - Identify barriers to discharge w/patient and caregiver  - Arrange for needed discharge resources and transportation as appropriate  - Identify discharge learning needs (meds, wound care, etc )  - Refer to Case Management Department for coordinating discharge planning if the patient needs post-hospital services based on physician/advanced practitioner order or complex needs related to functional status, cognitive ability, or social support system  Outcome: Progressing     Problem: Knowledge Deficit  Goal: Patient/family/caregiver demonstrates understanding of disease process, treatment plan, medications, and discharge instructions  Description: Complete learning assessment and assess knowledge base    Interventions:  - Provide teaching at level of understanding  - Provide teaching via preferred learning methods  Outcome: Progressing     Problem: Prexisting or High Potential for Compromised Skin Integrity  Goal: Skin integrity is maintained or improved  Description: INTERVENTIONS:  - Identify patients at risk for skin breakdown  - Assess and monitor skin integrity  - Assess and monitor nutrition and hydration status  - Monitor labs   - Assess for incontinence   - Turn and reposition patient  - Assist with mobility/ambulation  - Relieve pressure over bony prominences  - Avoid friction and shearing  - Provide appropriate hygiene as needed including keeping skin clean and dry  - Evaluate need for skin moisturizer/barrier cream  - Collaborate with interdisciplinary team   - Patient/family teaching  - Consider wound care consult   Outcome: Progressing     Problem: MOBILITY - ADULT  Goal: Maintain or return to baseline ADL function  Description: INTERVENTIONS:  - Educate patient/family on patient safety including physical limitations  - Instruct patient to call for assistance with activity   - Consult OT/PT to assist with strengthening/mobility   - Keep Call bell within reach  - Keep bed low and locked with side rails adjusted as appropriate  - Keep care items and personal belongings within reach  - Initiate and maintain comfort rounds  - Make Fall Risk Sign visible to staff  - Offer Toileting every 2 Hours, in advance of need  - Initiate/Maintain alarms  - Obtain necessary fall risk management equipment:   - Apply yellow socks and bracelet for high fall risk patients  - Consider moving patient to room near nurses station  Outcome: Progressing  Goal: Maintains/Returns to pre admission functional level  Description: INTERVENTIONS:  - Perform BMAT or MOVE assessment daily    - Set and communicate daily mobility goal to care team and patient/family/caregiver  - Collaborate with rehabilitation services on mobility goals if consulted  - Out of bed for toileting  - Record patient progress and toleration of activity level   Outcome: Progressing     Problem: Nutrition/Hydration-ADULT  Goal: Nutrient/Hydration intake appropriate for improving, restoring or maintaining nutritional needs  Description: Monitor and assess patient's nutrition/hydration status for malnutrition  Collaborate with interdisciplinary team and initiate plan and interventions as ordered  Monitor patient's weight and dietary intake as ordered or per policy  Utilize nutrition screening tool and intervene as necessary  Determine patient's food preferences and provide high-protein, high-caloric foods as appropriate       INTERVENTIONS:  - Monitor oral intake, urinary output, labs, and treatment plans  - Assess nutrition and hydration status and recommend course of action  - Evaluate amount of meals eaten  - Assist patient with eating if necessary   - Allow adequate time for meals  - Recommend/ encourage appropriate diets, oral nutritional supplements, and vitamin/mineral supplements  - Order, calculate, and assess calorie counts as needed  - Recommend, monitor, and adjust tube feedings and TPN/PPN based on assessed needs  - Assess need for intravenous fluids  - Provide specific nutrition/hydration education as appropriate  - Include patient/family/caregiver in decisions related to nutrition  Outcome: Progressing

## 2023-05-10 NOTE — ASSESSMENT & PLAN NOTE
· Likely due to cirrhosis   · Serum sodium improved to 134  · S/p IVF  · Continue management as above  · Patient very edematous today- albumin stopped by nephrology     · Patient started on Bumex 0 5 mg BID 5/10

## 2023-05-10 NOTE — ASSESSMENT & PLAN NOTE
• Nephrology following, appreciate recommendations  • Continue midodrine, octreotide per nephrology- albumin discontinued today 5/10  • Avoid hypotension and nephrotoxins  • I&O's, daily weights  • Continues to improve  • MESERET boo m

## 2023-05-10 NOTE — PROGRESS NOTES
Progress Note- Deshaun Armstrong 69 y o  female MRN: [id number]    Unit/Bed#: -01 Encounter: [id number]      Assessment and Plan:    Patient is a 80-year-old female with PMH significant for depression, CAD s/p CABG (2021), Sjogren's, mild AS, A-fib, IBS-D and hypothyroidism admitted on 4/27 for acute on chronic diarrhea and left-sided abdominal discomfort  Patient was found to have mild nonspecific enteric colitis, nodular hepatic contour suggestive of cirrhosis and a 5 mm cystic lesion in the tail the pancreas on cross-sectional imaging      Patient has been evaluated several times by GI service  She was noted to have a large stool burden s/p MiraLAX bowel prep with significant stool output but persistent left lower quadrant abdominal pain  Also treated for presumed symptomatic uncomplicated diverticulitis with Cipro/Flagyl  Now has developed melenic stools with down-trending hgb       Additionally, patient is noted to have mild TTG IgA elevation in the setting of significantly elevated IgA as well as SMA stenosis for which she was scheduled for outpatient evaluation but was admitted at the time of her appointment      1  LLQ abd pain  2  Change in bowel habits  3  Melenic stools  Initially planned for bidirectional endoscopic evaluation today  However, patient has only finished half of her bowel prep with resultant loose and black/brown stools  Therefore, we will plan for clear liquid diet today and patient to complete her remaining bowel prep in anticipation of EGD/colonoscopy tomorrow (5/11)  If patient's bowel prep was adequate, she will receive 1 unit FFP tomorrow morning prior to her procedures  Of note, she does continue to have black/brown stools as per nursing in addition to a persistently downtrending hemoglobin (7 8)     - Clear liquid diet  - Complete GoLytely/Dulcolax bowel prep as ordered  - NPO at midnight in anticipation of EGD/colonoscopy tomorrow (5/11)  - If adequate prep, to receive 1 unit FFP tomorrow morning (5/11)  - Continue octreotide   - Continue twice daily PPI  - Continue supportive care with analgesics PRN; Try and avoid opioid analgesics given significant stool burden on imaging   - Monitor hgb; Transfuse for hgb <7 0  - Monitor stools for evidence of further overt GI bleeding  - If patient were to develop brisk or hemodynamically significant GI bleeding please make patient n p o  and contact GI on-call immediately     4  Cirrhotic morphology on imaging  5  Thrombocytopenia  6  ALEJANDRA  Patient incidentally noted to have a lobulated liver contour on ultrasound with nodular liver contour redemonstrated on CT A/P in addition to chronic thrombocytopenia  Alk phos and T bili up-trending (464, 2 31)  Patient noted to have low ceruloplasmin level  JAYANT and chronic hep panel normal, remaining serologies in process  Ascites - Patient significantly edematous  Nephrology following for management of diuresis, appreciate recommendations  Esophageal varices - Plan for EGD tomorrow (5/11)  Hepatic encephalopathy - Patient somnolent but arousable and AAOX3  No obvious asterixis  Monitor mentation closely  [facility] [address]  screening - U/S and cross-sectional imaging without focal liver lesion  ALEJANDRA - ALEJANDRA with peak Cr 2 1, which has since improved  Currently being treated with HRS protocol including human 12 5 g every 6 in addition to octreotide and midodrine  Nephrology following, appreciate recommendations       - Continue to monitor MELD labs daily (CBC, CMP, INR)  - Follow-up on hepatic serologies  - Nephrology following, appreciate recommendations     MELD-Na score: 21 at 5/10/2023  5:05 AM  MELD score: 19 at 5/10/2023  5:05 AM  Calculated from:  Serum Creatinine: 1 20 mg/dL at 5/10/2023  5:05 AM  Serum Sodium: 134 mmol/L at 5/10/2023  5:05 AM  Total Bilirubin: 2 31 mg/dL at 5/10/2023  5:05 AM  INR(ratio): 1 92 at 5/10/2023  5:05 AM  Age: 77 years    7   Pancreatic cyst  Patient instantly noted to have a 5 mm cystic lesion in the tail the pancreas on cross-sectional imaging  Also noted to have chronically elevated alk phos  Recommend outpatient evaluation including MRI/MRCP for definitive characterization  GI will continue to follow  ______________________________________________________________________    Subjective:     Patient is found resting in bed  Somnolent but arousable, once awake patient is AAOx3  Reports feeling bloated  GoLytely bowel prep at bedside, with more than half remaining  Per nursing, patient with multiple loose black/brown stools      Medication Administration - last 24 hours from 05/09/2023 1002 to 05/10/2023 1002       Date/Time Order Dose Route Action Action by     05/10/2023 0512 EDT levothyroxine tablet 75 mcg 75 mcg Oral Given Isaiah Po, RN     05/09/2023 2133 EDT traZODone (DESYREL) tablet 50 mg 50 mg Oral Given Isaiah Po, RN     05/09/2023 2133 EDT acetaminophen (TYLENOL) tablet 650 mg 650 mg Oral Given Isaiah Po, RN     05/10/2023 2772 EDT erythromycin (ILOTYCIN) 0 5 % ophthalmic ointment 0 5 inch 0 5 inch Left Eye Given Ardath Hodgkins, RN     05/09/2023 2037 EDT erythromycin (ILOTYCIN) 0 5 % ophthalmic ointment 0 5 inch 0 5 inch Left Eye Given Isaiah Po, RN     05/10/2023 4307 EDT midodrine (PROAMATINE) tablet 5 mg 5 mg Oral Given Reeseth Hodgkins, RN     05/09/2023 1608 EDT midodrine (PROAMATINE) tablet 5 mg 5 mg Oral Given Rubén Bouche, RN     05/09/2023 1128 EDT midodrine (PROAMATINE) tablet 5 mg 5 mg Oral Given Rubén Bouche, RN     05/10/2023 8820 EDT octreotide (SandoSTATIN) injection 100 mcg 100 mcg Subcutaneous Given Isaiah Po, RN     05/09/2023 2133 EDT octreotide (SandoSTATIN) injection 100 mcg 100 mcg Subcutaneous Given Isaiah Po, RN     05/09/2023 1608 EDT octreotide (SandoSTATIN) injection 100 mcg 100 mcg Subcutaneous Given Rubén Bouchshante, RN     05/10/2023 5039 EDT albumin human (FLEXBUMIN) 25 % injection 12 5 g 12 5 "g Intravenous New Bag Jam Castaneda RN     05/09/2023 2320 EDT albumin human (FLEXBUMIN) 25 % injection 12 5 g 12 5 g Intravenous [address] Jam Castaneda RN     05/09/2023 1727 EDT albumin human (FLEXBUMIN) 25 % injection 12 5 g 12 5 g Intravenous New Bag Kyleigh Mcintosh RN     05/09/2023 1128 EDT albumin human (FLEXBUMIN) 25 % injection 12 5 g 12 5 g Intravenous New Bag Kyleigh Mcintosh RN     05/10/2023 0320 EDT HYDROmorphone (DILAUDID) injection 0 5 mg 0 5 mg Intravenous Given Jam Casatneda RN     05/10/2023 0803 EDT lidocaine (LIDODERM) 5 % patch 1 patch 1 patch Topical Medication Applied Mimi Pearce RN     05/09/2023 2039 EDT lidocaine (LIDODERM) 5 % patch 1 patch 1 patch Topical Patch Removed Jam Castaneda RN     05/09/2023 1733 EDT polyethylene glycol (GOLYTELY) bowel prep 4,000 mL 4,000 mL Oral Given Kyleigh Mcintosh RN     05/09/2023 1733 EDT bisacodyl (DULCOLAX) EC tablet 10 mg 10 mg Oral Given Kyleigh Mcintohs RN     05/10/2023 4374 EDT pantoprazole (PROTONIX) EC tablet 40 mg 0 mg Oral Hold Mimi Pearce RN     05/09/2023 1608 EDT pantoprazole (PROTONIX) EC tablet 40 mg 40 mg Oral Given Kyleigh Mcintosh RN     05/09/2023 2037 EDT phytonadione (MEPHYTON) tablet 5 mg 5 mg Oral Given Jam Castaneda RN          Objective:     Vitals: Blood pressure 147/53, pulse 70, temperature (!) 97 2 °F (36 2 °C), resp  rate 20, height 4' 11\" (1 499 m), weight 60 7 kg (133 lb 13 1 oz), SpO2 94 %  ,Body mass index is 27 03 kg/m²  Intake/Output Summary (Last 24 hours) at 5/10/2023 1002  Last data filed at 5/9/2023 1745  Gross per 24 hour   Intake 120 ml   Output --   Net 120 ml       Physical Exam:   General Appearance: +Generalized pitting edema of both upper and lower extremities;  Somnolent, in no acute distress  Abdomen: Soft, non-tender, non-distended; bowel sounds normal; no masses or no organomegaly    Invasive Devices     Peripheral Intravenous Line  Duration           " Peripheral IV 05/05/23 Dorsal (posterior); Left Hand 5 days    Peripheral IV 05/08/23 Distal;Left;Upper;Ventral (anterior) Arm 1 day                Lab Results:  No results displayed because visit has over 200 results  Imaging Studies: I have personally reviewed pertinent imaging studies

## 2023-05-10 NOTE — ASSESSMENT & PLAN NOTE
· Nursing reported black tarry stools   · Stools are heme +  · hgb 8 6 -> 9 6 -> 8 1 -> 7 8  · Patient was ordered prep for EGD/Colonoscopy today however she drank less than half   Continue prep today and GI to plan for EGD/Colonoscopy tomorrow 5/11  · Continue to monitor H&H

## 2023-05-10 NOTE — PROGRESS NOTES
"Progress Note - Nephrology   Alysia Norris 69 y o  female MRN: [id number]  Unit/Bed#: -01 Encounter: [id number]    A/P:  1  Acute kidney injury, improving  Peaked at 2 1  Creatinine 1 2 today  Appears hypervolemic  Etiology likely prerenal state versus HRS  She has moderate worsening lower extremity edema  Recommend to stop albumin as this is contributing to a sodium load  Start on Bumex 0 5 mg twice daily and assess urinary response  Urine output not recorded as of now  Try to assess daily weight as best we can  Given her borderline blood pressure and plan for upcoming procedures with GI prep, change from 1 mg to 0 5 mg twice daily  Can escalate dose if needed  Cirrhotics may need higher diuretic dosing  Continue midodrine to allow diuresis  Baseline creatinine felt to be 0 8-1 0     2   Mild hyponatremia due to hypervolemic state  3   Cirrhosis, treated with HRS protocol  Observe off albumin  Has been more than 48 hours  Albumin may be contributing to volume overload with sodium content  4   Hypotension, blood pressure trends have improved  May need to increase midodrine to allow diuresis as she does appear hypervolemic  5   Melanotic stools, plan for endoscopy today however patient had not finished her bowel prep  Therefore clear liquid diet today and plan for EGD/colonoscopy 511  Follow up reason for today's visit:     Acute kidney injury    Subjective:   Patient seen and examined today  Reports feeling bloated and swollen  Reporting worsening swelling in her legs  Blood pressure stable  Weight on bed scale is unchanged at 133 pounds  Weights on bed scale can be in question  Urine output is not recorded  Objective:     Vitals: Blood pressure (!) 118/47, pulse 66, temperature (!) 97 2 °F (36 2 °C), resp  rate 20, height 4' 11\" (1 499 m), weight 60 7 kg (133 lb 13 1 oz), SpO2 90 %  ,Body mass index is 27 03 kg/m²      Weight (last 2 days)     " "Date/Time Weight    05/10/23 0536 60 7 (133 82)    05/09/23 0600 61 4 (135 36)    05/08/23 1537 61 5 (135 58)    05/08/23 0544 61 2 (134 92)            Intake/Output Summary (Last 24 hours) at 5/10/2023 1136  Last data filed at 5/9/2023 1745  Gross per 24 hour   Intake 120 ml   Output --   Net 120 ml     I/O last 3 completed shifts: In: 220 [P O :220]  Out: -          Physical Exam: BP (!) 118/47   Pulse 66   Temp (!) 97 2 °F (36 2 °C)   Resp 20   Ht 4' 11\" (1 499 m)   Wt 60 7 kg (133 lb 13 1 oz)   SpO2 90%   BMI 27 03 kg/m²     General Appearance:   Chronically ill-appearing   Head:    Normocephalic, without obvious abnormality, atraumatic   Eyes:    Conjunctiva/corneas clear   Ears:    Normal external ears   Nose:   Nares normal, septum midline, mucosa normal, no drainage    or sinus tenderness   Throat:   Lips, mucosa, and tongue normal; teeth and gums normal   Neck:    Back:      Lungs:     Clear to auscultation bilaterally, respirations unlabored   Chest wall:    No tenderness or deformity   Heart:    Regular rate and rhythm, S1 and S2 normal, no murmur, rub   or gallop   Abdomen:     Soft, non-tender, bowel sounds active   Extremities:  Moderate bilateral lower extremity edema to thigh   Skin:   Skin color, texture, turgor normal, no rashes or lesions   Lymph nodes:   Cervical normal   Neurologic:   CNII-XII intact            Lab, Imaging and other studies: I have personally reviewed pertinent labs    CBC:   Lab Results   Component Value Date    WBC 5 57 05/10/2023    HGB 7 8 (L) 05/10/2023    HCT 23 6 (L) 05/10/2023     (H) 05/10/2023    PLT 78 (L) 05/10/2023    MCH 33 6 05/10/2023    MCHC 33 1 05/10/2023    RDW 15 9 (H) 05/10/2023    MPV 10 2 05/10/2023     CMP:   Lab Results   Component Value Date    K 4 1 05/10/2023     05/10/2023    CO2 25 05/10/2023    BUN 33 (H) 05/10/2023    CREATININE 1 20 05/10/2023    CALCIUM 9 0 05/10/2023    AST 62 (H) 05/10/2023    ALT 22 05/10/2023    ALKPHOS " 464 (H) 05/10/2023    EGFR 44 05/10/2023         Results from last 7 days   Lab Units 05/10/23  0505 05/09/23  0733 05/08/23  2108 05/08/23  0536   POTASSIUM mmol/L 4 1 4 3  --  4 2   CHLORIDE mmol/L 102 103  --  99   CO2 mmol/L 25 23  --  25   BUN mg/dL 33* 36*  --  38*   CREATININE mg/dL 1 20 1 33*  --  1 59*   CALCIUM mg/dL 9 0 8 9  --  8 7   ALK PHOS U/L 464*  --  326* 342*   ALT U/L 22  --  17 15   AST U/L 62*  --  41* 35         Phosphorus:   Lab Results   Component Value Date    PHOS 2 6 05/10/2023     Magnesium:   Lab Results   Component Value Date    MG 2 2 05/10/2023     Urinalysis: No results found for: COLORU, CLARITYU, SPECGRAV, PHUR, LEUKOCYTESUR, NITRITE, PROTEINUA, GLUCOSEU, KETONESU, BILIRUBINUR, BLOODU  Ionized Calcium: No results found for: CAION  Coagulation:   Lab Results   Component Value Date    INR 1 92 (H) 05/10/2023     Troponin: No results found for: TROPONINI  ABG: No results found for: PHART, MHT2GUW, PO2ART, QBT8PWU, I4NMIWEF, BEART, SOURCE  Radiology review:     IMAGING  No results found      Current Facility-Administered Medications   Medication Dose Route Frequency   • acetaminophen (TYLENOL) tablet 650 mg  650 mg Oral Q6H PRN   • barium (READI-CAT 2) suspension 900 mL  900 mL Oral Once in imaging   • bumetanide (BUMEX) injection 1 mg  1 mg Intravenous BID   • dicyclomine (BENTYL) capsule 10 mg  10 mg Oral TID PRN   • erythromycin (ILOTYCIN) 0 5 % ophthalmic ointment 0 5 inch  0 5 inch Left Eye BID   • HYDROmorphone (DILAUDID) injection 0 5 mg  0 5 mg Intravenous Q4H PRN   • levothyroxine tablet 75 mcg  75 mcg Oral Early Morning   • lidocaine (LIDODERM) 5 % patch 1 patch  1 patch Topical Daily   • midodrine (PROAMATINE) tablet 5 mg  5 mg Oral TID AC   • octreotide (SandoSTATIN) injection 100 mcg  100 mcg Subcutaneous Q8H [facility]   • ondansetron (ZOFRAN) injection 4 mg  4 mg Intravenous Q6H PRN   • pantoprazole (PROTONIX) EC tablet 40 mg  40 mg Oral BID AC   • traZODone (DESYREL) tablet 50 mg  50 mg Oral HS     Medications Discontinued During This Encounter   Medication Reason   • polyethylene glycol (GLYCOLAX) 17 GM/SCOOP powder Therapy completed   • ciprofloxacin (CIPRO) IVPB (premix in 5% dextrose) 400 mg 200 mL    • metroNIDAZOLE (FLAGYL) IVPB (premix) 500 mg 100 mL    • dextrose 5 % and sodium chloride 0 45 % with KCl 20 mEq/L infusion    • sodium chloride 0 9 % infusion    • polyethylene glycol (MIRALAX) packet 17 g    • ciprofloxacin (CIPRO) tablet 500 mg    • metroNIDAZOLE (FLAGYL) tablet 500 mg    • sodium bicarbonate tablet 650 mg    • enoxaparin (LOVENOX) subcutaneous injection 40 mg    • furosemide (LASIX) injection 80 mg    • multi-electrolyte (PLASMALYTE-A/ISOLYTE-S PH 7 4) IV solution    • metoprolol succinate (TOPROL-XL) 24 hr tablet 25 mg    • midodrine (PROAMATINE) tablet 2 5 mg    • enoxaparin (LOVENOX) subcutaneous injection 30 mg    • metoprolol succinate (TOPROL-XL) 24 hr tablet 12 5 mg    • magnesium hydroxide (MILK OF MAGNESIA) oral suspension 30 mL    • polyethylene glycol (MIRALAX) packet 17 g    • morphine injection 4 mg    • multi-electrolyte (PLASMALYTE-A/ISOLYTE-S PH 7 4) IV solution    • heparin (porcine) subcutaneous injection 5,000 Units    • pantoprazole (PROTONIX) EC tablet 40 mg    • albumin human (FLEXBUMIN) 25 % injection 12 5 g        Helio, DO      This progress note was produced in part using a dictation device which may document imprecise wording from author's original intent

## 2023-05-10 NOTE — NURSING NOTE
Patient resting in bed at present  Complains of pain in lower back, lidocaine patch applied  Patient +2 edema present to bilateral upper and lower extremities  Expiratory wheezes auscultated, denies SOB at this time  Call bell and belongings within reach

## 2023-05-11 ENCOUNTER — APPOINTMENT (INPATIENT)
Dept: GASTROENTEROLOGY | Facility: HOSPITAL | Age: 78
DRG: 385 | End: 2023-05-11
Payer: MEDICARE

## 2023-05-11 LAB
ALBUMIN SERPL BCP-MCNC: 4 G/DL (ref 3.5–5)
ALP SERPL-CCNC: 505 U/L (ref 34–104)
ALT SERPL W P-5'-P-CCNC: 26 U/L (ref 7–52)
ANION GAP SERPL CALCULATED.3IONS-SCNC: 11 MMOL/L (ref 4–13)
AST SERPL W P-5'-P-CCNC: 72 U/L (ref 13–39)
BILIRUB SERPL-MCNC: 2.23 MG/DL (ref 0.2–1)
BUN SERPL-MCNC: 26 MG/DL (ref 5–25)
CALCIUM SERPL-MCNC: 9.2 MG/DL (ref 8.4–10.2)
CHLORIDE SERPL-SCNC: 98 MMOL/L (ref 96–108)
CO2 SERPL-SCNC: 27 MMOL/L (ref 21–32)
CREAT SERPL-MCNC: 1.23 MG/DL (ref 0.6–1.3)
ERYTHROCYTE [DISTWIDTH] IN BLOOD BY AUTOMATED COUNT: 16.2 % (ref 11.6–15.1)
GFR SERPL CREATININE-BSD FRML MDRD: 42 ML/MIN/1.73SQ M
GLUCOSE SERPL-MCNC: 128 MG/DL (ref 65–140)
GLUCOSE SERPL-MCNC: 133 MG/DL (ref 65–140)
HCT VFR BLD AUTO: 24.2 % (ref 34.8–46.1)
HGB BLD-MCNC: 8.2 G/DL (ref 11.5–15.4)
MAGNESIUM SERPL-MCNC: 1.9 MG/DL (ref 1.9–2.7)
MCH RBC QN AUTO: 34.9 PG (ref 26.8–34.3)
MCHC RBC AUTO-ENTMCNC: 33.9 G/DL (ref 31.4–37.4)
MCV RBC AUTO: 103 FL (ref 82–98)
PLATELET # BLD AUTO: 70 THOUSANDS/UL (ref 149–390)
PMV BLD AUTO: 11.2 FL (ref 8.9–12.7)
POTASSIUM SERPL-SCNC: 3.6 MMOL/L (ref 3.5–5.3)
PROT SERPL-MCNC: 7 G/DL (ref 6.4–8.4)
RBC # BLD AUTO: 2.35 MILLION/UL (ref 3.81–5.12)
SODIUM SERPL-SCNC: 136 MMOL/L (ref 135–147)
WBC # BLD AUTO: 6.16 THOUSAND/UL (ref 4.31–10.16)

## 2023-05-11 PROCEDURE — 99232 SBSQ HOSP IP/OBS MODERATE 35: CPT | Performed by: PHYSICIAN ASSISTANT

## 2023-05-11 PROCEDURE — 80053 COMPREHEN METABOLIC PANEL: CPT | Performed by: PHYSICIAN ASSISTANT

## 2023-05-11 PROCEDURE — 82948 REAGENT STRIP/BLOOD GLUCOSE: CPT

## 2023-05-11 PROCEDURE — 0DJ08ZZ INSPECTION OF UPPER INTESTINAL TRACT, VIA NATURAL OR ARTIFICIAL OPENING ENDOSCOPIC: ICD-10-PCS | Performed by: INTERNAL MEDICINE

## 2023-05-11 PROCEDURE — 85027 COMPLETE CBC AUTOMATED: CPT | Performed by: PHYSICIAN ASSISTANT

## 2023-05-11 PROCEDURE — 0DBP8ZX EXCISION OF RECTUM, VIA NATURAL OR ARTIFICIAL OPENING ENDOSCOPIC, DIAGNOSTIC: ICD-10-PCS | Performed by: INTERNAL MEDICINE

## 2023-05-11 PROCEDURE — 88305 TISSUE EXAM BY PATHOLOGIST: CPT | Performed by: STUDENT IN AN ORGANIZED HEALTH CARE EDUCATION/TRAINING PROGRAM

## 2023-05-11 PROCEDURE — 30233K1 TRANSFUSION OF NONAUTOLOGOUS FROZEN PLASMA INTO PERIPHERAL VEIN, PERCUTANEOUS APPROACH: ICD-10-PCS | Performed by: INTERNAL MEDICINE

## 2023-05-11 PROCEDURE — 99233 SBSQ HOSP IP/OBS HIGH 50: CPT | Performed by: INTERNAL MEDICINE

## 2023-05-11 PROCEDURE — P9017 PLASMA 1 DONOR FRZ W/IN 8 HR: HCPCS

## 2023-05-11 PROCEDURE — 83735 ASSAY OF MAGNESIUM: CPT | Performed by: INTERNAL MEDICINE

## 2023-05-11 RX ORDER — SODIUM CHLORIDE, SODIUM LACTATE, POTASSIUM CHLORIDE, CALCIUM CHLORIDE 600; 310; 30; 20 MG/100ML; MG/100ML; MG/100ML; MG/100ML
INJECTION, SOLUTION INTRAVENOUS CONTINUOUS PRN
Status: DISCONTINUED | OUTPATIENT
Start: 2023-05-11 | End: 2023-05-11

## 2023-05-11 RX ORDER — BUMETANIDE 0.25 MG/ML
1 INJECTION INTRAMUSCULAR; INTRAVENOUS 2 TIMES DAILY
Status: DISCONTINUED | OUTPATIENT
Start: 2023-05-11 | End: 2023-05-12

## 2023-05-11 RX ORDER — MIDODRINE HYDROCHLORIDE 5 MG/1
2.5 TABLET ORAL
Status: DISCONTINUED | OUTPATIENT
Start: 2023-05-11 | End: 2023-05-23 | Stop reason: HOSPADM

## 2023-05-11 RX ORDER — KETAMINE HCL IN NACL, ISO-OSM 100MG/10ML
SYRINGE (ML) INJECTION AS NEEDED
Status: DISCONTINUED | OUTPATIENT
Start: 2023-05-11 | End: 2023-05-11

## 2023-05-11 RX ORDER — PROPOFOL 10 MG/ML
INJECTION, EMULSION INTRAVENOUS AS NEEDED
Status: DISCONTINUED | OUTPATIENT
Start: 2023-05-11 | End: 2023-05-11

## 2023-05-11 RX ORDER — BUMETANIDE 0.25 MG/ML
0.5 INJECTION INTRAMUSCULAR; INTRAVENOUS ONCE
Status: COMPLETED | OUTPATIENT
Start: 2023-05-11 | End: 2023-05-11

## 2023-05-11 RX ORDER — LIDOCAINE HYDROCHLORIDE 20 MG/ML
INJECTION, SOLUTION EPIDURAL; INFILTRATION; INTRACAUDAL; PERINEURAL AS NEEDED
Status: DISCONTINUED | OUTPATIENT
Start: 2023-05-11 | End: 2023-05-11

## 2023-05-11 RX ADMIN — PROPOFOL 20 MG: 10 INJECTION, EMULSION INTRAVENOUS at 15:23

## 2023-05-11 RX ADMIN — PROPOFOL 20 MG: 10 INJECTION, EMULSION INTRAVENOUS at 14:58

## 2023-05-11 RX ADMIN — PROPOFOL 30 MG: 10 INJECTION, EMULSION INTRAVENOUS at 15:10

## 2023-05-11 RX ADMIN — PROPOFOL 50 MG: 10 INJECTION, EMULSION INTRAVENOUS at 14:51

## 2023-05-11 RX ADMIN — PROPOFOL 20 MG: 10 INJECTION, EMULSION INTRAVENOUS at 15:27

## 2023-05-11 RX ADMIN — ERYTHROMYCIN 0.5 INCH: 5 OINTMENT OPHTHALMIC at 17:05

## 2023-05-11 RX ADMIN — PROPOFOL 20 MG: 10 INJECTION, EMULSION INTRAVENOUS at 15:29

## 2023-05-11 RX ADMIN — MIDODRINE HYDROCHLORIDE 5 MG: 5 TABLET ORAL at 07:01

## 2023-05-11 RX ADMIN — MIDODRINE HYDROCHLORIDE 2.5 MG: 5 TABLET ORAL at 16:48

## 2023-05-11 RX ADMIN — PROPOFOL 20 MG: 10 INJECTION, EMULSION INTRAVENOUS at 14:55

## 2023-05-11 RX ADMIN — TRAZODONE HYDROCHLORIDE 50 MG: 50 TABLET ORAL at 21:16

## 2023-05-11 RX ADMIN — BUMETANIDE 0.5 MG: 0.25 INJECTION INTRAMUSCULAR; INTRAVENOUS at 08:28

## 2023-05-11 RX ADMIN — ERYTHROMYCIN 0.5 INCH: 5 OINTMENT OPHTHALMIC at 08:29

## 2023-05-11 RX ADMIN — PANTOPRAZOLE SODIUM 40 MG: 40 TABLET, DELAYED RELEASE ORAL at 07:01

## 2023-05-11 RX ADMIN — Medication 20 MG: at 14:50

## 2023-05-11 RX ADMIN — BUMETANIDE 0.5 MG: 0.25 INJECTION INTRAMUSCULAR; INTRAVENOUS at 11:50

## 2023-05-11 RX ADMIN — ACETAMINOPHEN 650 MG: 325 TABLET ORAL at 07:01

## 2023-05-11 RX ADMIN — PROPOFOL 20 MG: 10 INJECTION, EMULSION INTRAVENOUS at 15:07

## 2023-05-11 RX ADMIN — BUMETANIDE 1 MG: 0.25 INJECTION INTRAMUSCULAR; INTRAVENOUS at 17:03

## 2023-05-11 RX ADMIN — PROPOFOL 20 MG: 10 INJECTION, EMULSION INTRAVENOUS at 15:39

## 2023-05-11 RX ADMIN — LIDOCAINE HYDROCHLORIDE 80 MG: 20 INJECTION, SOLUTION EPIDURAL; INFILTRATION; INTRACAUDAL; PERINEURAL at 14:51

## 2023-05-11 RX ADMIN — PROPOFOL 20 MG: 10 INJECTION, EMULSION INTRAVENOUS at 15:14

## 2023-05-11 RX ADMIN — PROPOFOL 20 MG: 10 INJECTION, EMULSION INTRAVENOUS at 14:53

## 2023-05-11 RX ADMIN — PROPOFOL 20 MG: 10 INJECTION, EMULSION INTRAVENOUS at 15:20

## 2023-05-11 RX ADMIN — PROPOFOL 20 MG: 10 INJECTION, EMULSION INTRAVENOUS at 15:33

## 2023-05-11 RX ADMIN — SODIUM CHLORIDE, SODIUM LACTATE, POTASSIUM CHLORIDE, AND CALCIUM CHLORIDE: .6; .31; .03; .02 INJECTION, SOLUTION INTRAVENOUS at 14:43

## 2023-05-11 RX ADMIN — OCTREOTIDE ACETATE 100 MCG: 100 INJECTION, SOLUTION INTRAVENOUS; SUBCUTANEOUS at 05:13

## 2023-05-11 RX ADMIN — LEVOTHYROXINE SODIUM 75 MCG: 75 TABLET ORAL at 05:12

## 2023-05-11 RX ADMIN — MIDODRINE HYDROCHLORIDE 5 MG: 5 TABLET ORAL at 11:32

## 2023-05-11 RX ADMIN — PROPOFOL 40 MG: 10 INJECTION, EMULSION INTRAVENOUS at 15:02

## 2023-05-11 RX ADMIN — OCTREOTIDE ACETATE 100 MCG: 100 INJECTION, SOLUTION INTRAVENOUS; SUBCUTANEOUS at 21:16

## 2023-05-11 RX ADMIN — PANTOPRAZOLE SODIUM 40 MG: 40 TABLET, DELAYED RELEASE ORAL at 16:48

## 2023-05-11 RX ADMIN — LIDOCAINE 1 PATCH: 700 PATCH TOPICAL at 08:28

## 2023-05-11 NOTE — ANESTHESIA PREPROCEDURE EVALUATION
Procedure:  COLONOSCOPY  EGD    Relevant Problems   CARDIO   (+) Hx of CABG   (+) PAF (paroxysmal atrial fibrillation) (HCC)   (+) Primary hypertension      ENDO   (+) Acquired hypothyroidism      /RENAL   (+) Acute kidney injury ([address] )      HEMATOLOGY   (+) Anemia        Physical Exam    Airway    Mallampati score: III  TM Distance: >3 FB  Neck ROM: full     Dental   No notable dental hx     Cardiovascular  Cardiovascular exam normal    Pulmonary  Pulmonary exam normal     Other Findings    New dx of cirrhosis  Pt states she ate things this morning but she has been more confused lately and per her nurse upstairs her prep was the only thing she had today  •  Left Ventricle: Left ventricular cavity size is normal  Wall thickness is normal  There is mild concentric hypertrophy  The left ventricular ejection fraction is 66%  Systolic function is normal  Wall motion is normal  Diastolic function is normal for age  •  Right Ventricle: Right ventricular cavity size is dilated  •  Left Atrium: The atrium is dilated  •  Right Atrium: The atrium is dilated  •  Aortic Valve: The aortic valve is trileaflet  The leaflets are mildly thickened  The leaflets are moderately calcified  There is moderately reduced mobility  There is mild regurgitation  There is mild stenosis  The aortic valve peak velocity is 2 29 m/s  The aortic valve mean gradient is 10 mmHg  The aortic valve area is 1 23 cm2  •  Mitral Valve: There is annular calcification  There is mild regurgitation  •  Tricuspid Valve: There is moderate to severe regurgitation  The right ventricular systolic pressure is normal       Anesthesia Plan  ASA Score- 4     Anesthesia Type- IV sedation with anesthesia with ASA Monitors  Additional Monitors:   Airway Plan:           Plan Factors-Exercise tolerance (METS): <4 METS  Chart reviewed  EKG reviewed  Existing labs reviewed  Patient summary reviewed  Patient is not a current smoker           Induction- intravenous  Postoperative Plan-     Informed Consent- Anesthetic plan and risks discussed with patient  I personally reviewed this patient with the CRNA  Discussed and agreed on the Anesthesia Plan with the CRNA  Sterling Mares

## 2023-05-11 NOTE — ASSESSMENT & PLAN NOTE
· Nursing reported black tarry stools   · Stools are heme +  · hgb 8 6 -> 9 6 -> 8 1 -> 7 8 -> 8 2  · Patient to have EGD/Colonoscopy today   · Continue to monitor H&H

## 2023-05-11 NOTE — NURSING NOTE
"OR brought patient to room, patient states, \"I had broth this morning, so they cant do it  \" Staff reports no breakfast tray was brought into room this AM   "

## 2023-05-11 NOTE — NURSING NOTE
Pt told me when I checked her in for her preop colonscopy and egd that she had broth and italian ice and this am at 0830 and she had a cup of hot tea one half hour before being checked in so I relayed this information to the anesthesia team and they decided to cancel the case because she drank these fluids this am so the pca and another staff took her back up to her room and the nurse in charge of her told the pca and staff nurse that patient must have been confused -she verbally stated that patient did not have anything to drink this am only her bowel prep liquid that was perscribed for this procedure  This information was relayed to anesthesia staff and they spoke to patient's RN, Amirah Gonzalez upstairs and she attested to the fact that patient did not drink those fluids she said she did and anesthesia staff and GI doctor decided to proceed with patient's scheduled procedures

## 2023-05-11 NOTE — ASSESSMENT & PLAN NOTE
· Rate controlled on current regimen  · Continue midodrine -decreased dose today  · Home metoprolol on hold due to hypotension

## 2023-05-11 NOTE — CASE MANAGEMENT
Case Management Progress Note    Patient name Holly Mares  Location [address] [id number] MRN [id number]  : 1945 Date 2023       LOS (days): 14  Geometric Mean LOS (GMLOS) (days): 3 80  Days to GMLOS:-10 2        OBJECTIVE:        Current admission status: Inpatient  Preferred Pharmacy:   [address] [place] [address]  [facility]   [place] [id number]  Phone: [phone] Fax: [phone]    Primary Care Provider: Shonda Palmer MD    Primary Insurance: MEDICARE  Secondary Insurance: Kamryn Gonzales    PROGRESS NOTE:    Requested therapy treatment in am at medical rounds for discharge recs  Medical update to [facility] at [facility] and discussed discharge planning

## 2023-05-11 NOTE — ASSESSMENT & PLAN NOTE
· Presented to the ED with left lower quadrant abdominal pain on 4/27   · Continues with some left lower quadrant abdominal pain today, as well as loose stools with it  · CT abdomen: New small amount of free fluid in the abdomen and pelvis compared to 4/18/2023, which can be secondary to cirrhosis or acute inflammatory process in the abdomen and pelvis such as occult acute diverticulitis  Moderate amount of stool in the colon, nonspecific and can represent constipation    · Surgery following, appreciate recommendations  · GI following, appreciate recommendations  · Patient had flex sig on 5/2 minimal inflammation noted, biopsies normal  · KUB (5/5):  Nonobstructive bowel gas pattern  · Colonoscopy today

## 2023-05-11 NOTE — NURSING NOTE
Night shift RN unable to obtain AM labs, this RN attempted x 2 sticks, unable to obtain labs  SLIM made aware

## 2023-05-11 NOTE — ASSESSMENT & PLAN NOTE
• Nephrology following, appreciate recommendations  • Continue midodrine- dose decreased 5/11, continue octreotide per nephrology  • albumin discontinued 5/10  • Avoid hypotension and nephrotoxins  • I&O's, daily weights  • Continues to improve  • MESERET george

## 2023-05-11 NOTE — ASSESSMENT & PLAN NOTE
· Likely due to cirrhosis   · Now resolved at 136  · S/p IVF  · Continue management as above  · Patient very edematous today- albumin stopped by nephrology 5/10  · Patient started on Bumex 0 5 mg BID- increased to 1 mg bid 5/11

## 2023-05-11 NOTE — ASSESSMENT & PLAN NOTE
· Baseline appears to be between 11 and 12   · Hemoglobin today 8 8 -> 8 6 -> 9 6 -> 8 1 -> 7 8 -> 8 2  · Patient noted to have melena 5/8  · Stools heme +  · GI following, appreciate recommendations-  EGD/Colonoscopy today  · CBC a m

## 2023-05-11 NOTE — PROGRESS NOTES
[facility]  Progress Note  Name: Orion Garcia  MRN: [id number]  Unit/Bed#: -01 I Date of Admission: 4/27/2023   Date of Service: 5/11/2023 I Hospital Day: 14    Assessment/Plan   * Left lower quadrant abdominal pain  Assessment & Plan  · Presented to the ED with left lower quadrant abdominal pain on 4/27   · Continues with some left lower quadrant abdominal pain today, as well as loose stools with it  · CT abdomen: New small amount of free fluid in the abdomen and pelvis compared to 4/18/2023, which can be secondary to cirrhosis or acute inflammatory process in the abdomen and pelvis such as occult acute diverticulitis  Moderate amount of stool in the colon, nonspecific and can represent constipation  · Surgery following, appreciate recommendations  · GI following, appreciate recommendations  · Patient had flex sig on 5/2 minimal inflammation noted, biopsies normal  · KUB (5/5):  Nonobstructive bowel gas pattern  · Colonoscopy today    Tarry stools  Assessment & Plan  · Nursing reported black tarry stools   · Stools are heme +  · hgb 8 6 -> 9 6 -> 8 1 -> 7 8 -> 8 2  · Patient to have EGD/Colonoscopy today   · Continue to monitor H&H    Acute kidney injury [facility])  Assessment & Plan  • Nephrology following, appreciate recommendations  • Continue midodrine- dose decreased 5/11, continue octreotide per nephrology  • albumin discontinued 5/10  • Avoid hypotension and nephrotoxins  • I&O's, daily weights  • Continues to improve  • BMP a m  Anemia  Assessment & Plan  · Baseline appears to be between 11 and 12   · Hemoglobin today 8 8 -> 8 6 -> 9 6 -> 8 1 -> 7 8 -> 8 2  · Patient noted to have melena 5/8  · Stools heme +  · GI following, appreciate recommendations-  EGD/Colonoscopy today  · CBC a m      Hx of CABG  Assessment & Plan  · Currently stable  · Losartan on hold due to hypotension    PAF (paroxysmal atrial fibrillation) ([facility])  Assessment & Plan  · Rate controlled on current regimen  · Continue midodrine -decreased dose today  · Home metoprolol on hold due to hypotension    Hyponatremia  Assessment & Plan  · Likely due to cirrhosis   · Now resolved at 136  · S/p IVF  · Continue management as above  · Patient very edematous today- albumin stopped by nephrology 5/10  · Patient started on Bumex 0 5 mg BID- increased to 1 mg bid 5/11    Acquired hypothyroidism  Assessment & Plan  · Continue Synthroid    Primary hypertension  Assessment & Plan  · Blood pressure elevated   · Continue midodrine- decreased to 2 5 mg tid by nephrology  · Losartan was discontinued due to ALEJANDRA   · Metoprolol currently on hold due to hypotension, will continue to monitor           VTE Pharmacologic Prophylaxis: VTE Score: 3 Moderate Risk (Score 3-4) - Pharmacological DVT Prophylaxis Contraindicated  Sequential Compression Devices Ordered  Patient Centered Rounds: I performed bedside rounds with nursing staff today  Discussions with Specialists or Other Care Team Provider: nursing, GI, nephrology, CM    Education and Discussions with Family / Patient: Updated  (sister and brother) at bedside  Total Time Spent on Date of Encounter in care of patient: 35 minutes This time was spent on one or more of the following: performing physical exam; counseling and coordination of care; obtaining or reviewing history; documenting in the medical record; reviewing/ordering tests, medications or procedures; communicating with other healthcare professionals and discussing with patient's family/caregivers  Current Length of Stay: 14 day(s)  Current Patient Status: Inpatient   Certification Statement: The patient will continue to require additional inpatient hospital stay due to planned EGD/colonoscpy today, IV bumex, monitoring labs  Discharge Plan: pending clincal course    Code Status: Level 1 - Full Code    Subjective: The patient was seen and examined  The patient is sitting up in her chair today  She states she's feeling better today  She continues to have edema  Objective:     Vitals:   Temp (24hrs), Av 5 °F (36 4 °C), Min:97 2 °F (36 2 °C), Max:97 9 °F (36 6 °C)    Temp:  [97 2 °F (36 2 °C)-97 9 °F (36 6 °C)] 97 6 °F (36 4 °C)  HR:  [67-81] 67  Resp:  [14-20] 18  BP: (135-168)/(54-69) 154/69  SpO2:  [91 %-98 %] 94 %  Body mass index is 26 26 kg/m²  Input and Output Summary (last 24 hours): Intake/Output Summary (Last 24 hours) at 2023 1302  Last data filed at 2023 1135  Gross per 24 hour   Intake 591 67 ml   Output 1300 ml   Net -708 33 ml       Physical Exam:   Physical Exam  Vitals and nursing note reviewed  Constitutional:       General: She is awake  Appearance: She is ill-appearing (chronically)  HENT:      Head: Normocephalic and atraumatic  Cardiovascular:      Rate and Rhythm: Normal rate and regular rhythm  Pulmonary:      Effort: Pulmonary effort is normal       Breath sounds: Examination of the right-lower field reveals rales  Examination of the left-lower field reveals rales  Rales present  Abdominal:      Palpations: Abdomen is soft  Tenderness: There is generalized abdominal tenderness  There is no guarding or rebound  Musculoskeletal:      Right lower leg: Edema present  Left lower leg: Edema present  Skin:     General: Skin is warm and dry  Neurological:      General: No focal deficit present  Mental Status: She is alert and oriented to person, place, and time  Psychiatric:         Attention and Perception: Attention normal          Mood and Affect: Mood normal          Speech: Speech normal          Behavior: Behavior is cooperative            Additional Data:     Labs:  Results from last 7 days   Lab Units 23  0506 23  0454 23  1554   WBC Thousand/uL 6 16   < > 7 38   HEMOGLOBIN g/dL 8 2*   < > 9 6*   HEMATOCRIT % 24 2*   < > 27 6*   PLATELETS Thousands/uL 70*   < > 88*   NEUTROS PCT %  --   --  55   LYMPHS PCT %  --   --  21   MONOS PCT %  --   --  19*   EOS PCT %  --   --  3    < > = values in this interval not displayed  Results from last 7 days   Lab Units 05/11/23  1148   SODIUM mmol/L 136   POTASSIUM mmol/L 3 6   CHLORIDE mmol/L 98   CO2 mmol/L 27   BUN mg/dL 26*   CREATININE mg/dL 1 23   ANION GAP mmol/L 11   CALCIUM mg/dL 9 2   ALBUMIN g/dL 4 0   TOTAL BILIRUBIN mg/dL 2 23*   ALK PHOS U/L 505*   ALT U/L 26   AST U/L 72*   GLUCOSE RANDOM mg/dL 133     Results from last 7 days   Lab Units 05/10/23  0505   INR  1 92*     Results from last 7 days   Lab Units 05/11/23  1158 05/05/23  1615 05/05/23  1123 05/05/23  0725 05/04/23  2023 05/04/23  1541   POC GLUCOSE mg/dl 128 97 125 85 103 101         Results from last 7 days   Lab Units 05/05/23  0132   LACTIC ACID mmol/L 1 3       Lines/Drains:  Invasive Devices     Peripheral Intravenous Line  Duration           Peripheral IV 05/11/23 Dorsal (posterior); Right Hand <1 day    Peripheral IV 05/11/23 Left;Proximal;Upper;Ventral (anterior) Arm <1 day          Drain  Duration           Urethral Catheter Straight-tip 16 Fr  <1 day              Urinary Catheter:  Goal for removal: Remove after 48 hrs of I/O monitoring               Imaging: No pertinent imaging reviewed      Recent Cultures (last 7 days):         Last 24 Hours Medication List:   Current Facility-Administered Medications   Medication Dose Route Frequency Provider Last Rate   • acetaminophen  650 mg Oral Q6H PRN Stephanie Auguste MD     • barium  900 mL Oral Once in imaging Nery Calhoun MD     • bumetanide  1 mg Intravenous BID DO Helio     • dicyclomine  10 mg Oral TID PRN Sheldon Cosby MD     • erythromycin  0 5 inch Left Eye BID Nery Calhoun MD     • HYDROmorphone  0 5 mg Intravenous Q4H PRN EBEN Hooker     • levothyroxine  75 mcg Oral Early Morning Stephanie Auguste MD     • lidocaine  1 patch Topical Daily EBEN Hooker     • midodrine  2 5 mg Oral TID [facility], DO     • octreotide  100 mcg Subcutaneous [facility] EBEN Rosales     • ondansetron  4 mg Intravenous Q6H PRN Natacha Blevins MD     • pantoprazole  40 mg Oral BID AC [place], [place]     • traZODone  50 mg Oral HS Jimmye Gosselin, PA-C          Today, Patient Was Seen By: Sobia Whittington PA-C    **Please Note: This note may have been constructed using a voice recognition system  **

## 2023-05-11 NOTE — PLAN OF CARE
Problem: Potential for Falls  Goal: Patient will remain free of falls  Description: INTERVENTIONS:  - Educate patient/family on patient safety including physical limitations  - Instruct patient to call for assistance with activity   - Consult OT/PT to assist with strengthening/mobility   - Keep Call bell within reach  - Keep bed low and locked with side rails adjusted as appropriate  - Keep care items and personal belongings within reach  - Initiate and maintain comfort rounds  - Make Fall Risk Sign visible to staff  - Offer Toileting every 2 Hours, in advance of need  - Initiate/Maintain alarms  - Obtain necessary fall risk management equipment:  - Apply yellow socks and bracelet for high fall risk patients  - Consider moving patient to room near nurses station  5/11/2023 0544 by Ru Taylor RN  Outcome: Progressing  5/11/2023 0543 by Ru Taylor RN  Outcome: Not Progressing  5/10/2023 1920 by Ru Taylor RN  Outcome: Progressing     Problem: PAIN - ADULT  Goal: Verbalizes/displays adequate comfort level or baseline comfort level  Description: Interventions:  - Encourage patient to monitor pain and request assistance  - Assess pain using appropriate pain scale  - Administer analgesics based on type and severity of pain and evaluate response  - Implement non-pharmacological measures as appropriate and evaluate response  - Consider cultural and social influences on pain and pain management  - Notify physician/advanced practitioner if interventions unsuccessful or patient reports new pain  5/11/2023 0544 by Ru Taylor RN  Outcome: Progressing  5/11/2023 0543 by Ru Taylor RN  Outcome: Progressing  5/10/2023 1920 by Ru Taylor RN  Outcome: Progressing     Problem: SAFETY ADULT  Goal: Patient will remain free of falls  Description: INTERVENTIONS:  - Educate patient/family on patient safety including physical limitations  - Instruct patient to call for assistance with activity   - Consult OT/PT to assist with strengthening/mobility   - Keep Call bell within reach  - Keep bed low and locked with side rails adjusted as appropriate  - Keep care items and personal belongings within reach  - Initiate and maintain comfort rounds  - Make Fall Risk Sign visible to staff  - Offer Toileting every 2 Hours, in advance of need  - Initiate/Maintain alarms  - Obtain necessary fall risk management equipment:  - Apply yellow socks and bracelet for high fall risk patients  - Consider moving patient to room near nurses station  5/11/2023 0544 by Jam Castaneda RN  Outcome: Progressing  5/11/2023 0543 by Jam Castaneda RN  Outcome: Not Progressing  5/10/2023 1920 by Jam Castaneda RN  Outcome: Progressing  Goal: Maintain or return to baseline ADL function  Description: INTERVENTIONS:  - Educate patient/family on patient safety including physical limitations  - Instruct patient to call for assistance with activity   - Consult OT/PT to assist with strengthening/mobility   - Keep Call bell within reach  - Keep bed low and locked with side rails adjusted as appropriate  - Keep care items and personal belongings within reach  - Initiate and maintain comfort rounds  - Make Fall Risk Sign visible to staff  - Offer Toileting every 2 Hours, in advance of need  - Initiate/Maintain alarms  - Obtain necessary fall risk management equipment:   - Apply yellow socks and bracelet for high fall risk patients  - Consider moving patient to room near nurses station  5/11/2023 0544 by Jam Castaneda RN  Outcome: Not Progressing  5/10/2023 1920 by Jam Castaneda RN  Outcome: Progressing  Goal: Maintains/Returns to pre admission functional level  Description: INTERVENTIONS:  - Perform BMAT or MOVE assessment daily    - Set and communicate daily mobility goal to care team and patient/family/caregiver     - Collaborate with rehabilitation services on mobility goals if consulted  - Out of bed for toileting  - Record patient progress and toleration of activity level   5/11/2023 0544 by Marcello Carter RN  Outcome: Not Progressing  5/10/2023 1920 by Marcello Carter RN  Outcome: Progressing     Problem: INFECTION - ADULT  Goal: Absence or prevention of progression during hospitalization  Description: INTERVENTIONS:  - Assess and monitor for signs and symptoms of infection  - Monitor lab/diagnostic results  - Monitor all insertion sites, i e  indwelling lines, tubes, and drains  - Monitor endotracheal if appropriate and nasal secretions for changes in amount and color  - [place] appropriate cooling/warming therapies per order  - Administer medications as ordered  - Instruct and encourage patient and family to use good hand hygiene technique  - Identify and instruct in appropriate isolation precautions for identified infection/condition  5/11/2023 0544 by Marcello Carter RN  Outcome: Progressing  5/10/2023 1920 by Marcello Carter RN  Outcome: Progressing     Problem: DISCHARGE PLANNING  Goal: Discharge to home or other facility with appropriate resources  Description: INTERVENTIONS:  - Identify barriers to discharge w/patient and caregiver  - Arrange for needed discharge resources and transportation as appropriate  - Identify discharge learning needs (meds, wound care, etc )  - Refer to Case Management Department for coordinating discharge planning if the patient needs post-hospital services based on physician/advanced practitioner order or complex needs related to functional status, cognitive ability, or social support system  5/11/2023 0544 by Marcello Carter RN  Outcome: Progressing  5/10/2023 1920 by Marcello Carter RN  Outcome: Progressing     Problem: Knowledge Deficit  Goal: Patient/family/caregiver demonstrates understanding of disease process, treatment plan, medications, and discharge instructions  Description: Complete learning assessment and assess knowledge base   Interventions:  - Provide teaching at level of understanding  - Provide teaching via preferred learning methods  5/11/2023 0544 by Vandana Pulido RN  Outcome: Not Progressing  5/10/2023 1920 by Vandana Pulido RN  Outcome: Progressing     Problem: Prexisting or High Potential for Compromised Skin Integrity  Goal: Skin integrity is maintained or improved  Description: INTERVENTIONS:  - Identify patients at risk for skin breakdown  - Assess and monitor skin integrity  - Assess and monitor nutrition and hydration status  - Monitor labs   - Assess for incontinence   - Turn and reposition patient  - Assist with mobility/ambulation  - Relieve pressure over bony prominences  - Avoid friction and shearing  - Provide appropriate hygiene as needed including keeping skin clean and dry  - Evaluate need for skin moisturizer/barrier cream  - Collaborate with interdisciplinary team   - Patient/family teaching  - Consider wound care consult   Outcome: Progressing     Problem: MOBILITY - ADULT  Goal: Maintain or return to baseline ADL function  Description: INTERVENTIONS:  - Educate patient/family on patient safety including physical limitations  - Instruct patient to call for assistance with activity   - Consult OT/PT to assist with strengthening/mobility   - Keep Call bell within reach  - Keep bed low and locked with side rails adjusted as appropriate  - Keep care items and personal belongings within reach  - Initiate and maintain comfort rounds  - Make Fall Risk Sign visible to staff  - Offer Toileting every 2 Hours, in advance of need  - Initiate/Maintain alarms  - Obtain necessary fall risk management equipment:   - Apply yellow socks and bracelet for high fall risk patients  - Consider moving patient to room near nurses station  5/11/2023 0544 by Vandana Pulido RN  Outcome: Not Progressing  5/10/2023 1920 by Vandana Pulido RN  Outcome: Progressing  Goal: Maintains/Returns to pre admission functional level  Description: INTERVENTIONS:  - Perform BMAT or MOVE assessment daily    - Set and communicate daily mobility goal to care team and patient/family/caregiver  - Collaborate with rehabilitation services on mobility goals if consulted  - Out of bed for toileting  - Record patient progress and toleration of activity level   5/11/2023 0544 by Tomasa Frank RN  Outcome: Not Progressing  5/10/2023 1920 by Tomasa Frank RN  Outcome: Progressing     Problem: Nutrition/Hydration-ADULT  Goal: Nutrient/Hydration intake appropriate for improving, restoring or maintaining nutritional needs  Description: Monitor and assess patient's nutrition/hydration status for malnutrition  Collaborate with interdisciplinary team and initiate plan and interventions as ordered  Monitor patient's weight and dietary intake as ordered or per policy  Utilize nutrition screening tool and intervene as necessary  Determine patient's food preferences and provide high-protein, high-caloric foods as appropriate       INTERVENTIONS:  - Monitor oral intake, urinary output, labs, and treatment plans  - Assess nutrition and hydration status and recommend course of action  - Evaluate amount of meals eaten  - Assist patient with eating if necessary   - Allow adequate time for meals  - Recommend/ encourage appropriate diets, oral nutritional supplements, and vitamin/mineral supplements  - Order, calculate, and assess calorie counts as needed  - Recommend, monitor, and adjust tube feedings and TPN/PPN based on assessed needs  - Assess need for intravenous fluids  - Provide specific nutrition/hydration education as appropriate  - Include patient/family/caregiver in decisions related to nutrition  5/11/2023 0544 by Tomasa Frank RN  Outcome: Not Progressing  5/10/2023 1920 by Tomasa Frank RN  Outcome: Progressing

## 2023-05-11 NOTE — ANESTHESIA POSTPROCEDURE EVALUATION
Post-Op Assessment Note    CV Status:  Stable    Pain management: satisfactory to patient     Mental Status:  Awake, sleepy and arousable   Hydration Status:  Euvolemic   PONV Controlled:  Controlled   Airway Patency:  Patent      Post Op Vitals Reviewed: Yes      Staff: CRNA, Anesthesiologist         No notable events documented      BP   97/55   Temp      Pulse  63   Resp   16   SpO2   96

## 2023-05-11 NOTE — ASSESSMENT & PLAN NOTE
· Blood pressure elevated   · Continue midodrine- decreased to 2 5 mg tid by nephrology  · Losartan was discontinued due to ALEJANDRA   · Metoprolol currently on hold due to hypotension, will continue to monitor

## 2023-05-12 LAB
ABO GROUP BLD BPU: NORMAL
ANION GAP SERPL CALCULATED.3IONS-SCNC: 9 MMOL/L (ref 4–13)
BPU ID: NORMAL
BUN SERPL-MCNC: 24 MG/DL (ref 5–25)
CALCIUM SERPL-MCNC: 8.7 MG/DL (ref 8.4–10.2)
CHLORIDE SERPL-SCNC: 98 MMOL/L (ref 96–108)
CO2 SERPL-SCNC: 28 MMOL/L (ref 21–32)
CREAT SERPL-MCNC: 1.23 MG/DL (ref 0.6–1.3)
ERYTHROCYTE [DISTWIDTH] IN BLOOD BY AUTOMATED COUNT: 16.2 % (ref 11.6–15.1)
GFR SERPL CREATININE-BSD FRML MDRD: 42 ML/MIN/1.73SQ M
GLUCOSE SERPL-MCNC: 119 MG/DL (ref 65–140)
HCT VFR BLD AUTO: 23.3 % (ref 34.8–46.1)
HGB BLD-MCNC: 7.8 G/DL (ref 11.5–15.4)
MCH RBC QN AUTO: 33.9 PG (ref 26.8–34.3)
MCHC RBC AUTO-ENTMCNC: 33.5 G/DL (ref 31.4–37.4)
MCV RBC AUTO: 101 FL (ref 82–98)
PLATELET # BLD AUTO: 85 THOUSANDS/UL (ref 149–390)
PMV BLD AUTO: 12.4 FL (ref 8.9–12.7)
POTASSIUM SERPL-SCNC: 5 MMOL/L (ref 3.5–5.3)
RBC # BLD AUTO: 2.3 MILLION/UL (ref 3.81–5.12)
SODIUM SERPL-SCNC: 135 MMOL/L (ref 135–147)
UNIT DISPENSE STATUS: NORMAL
UNIT PRODUCT CODE: NORMAL
UNIT PRODUCT VOLUME: 280 ML
UNIT RH: NORMAL
WBC # BLD AUTO: 5.09 THOUSAND/UL (ref 4.31–10.16)

## 2023-05-12 PROCEDURE — 97116 GAIT TRAINING THERAPY: CPT

## 2023-05-12 PROCEDURE — 99233 SBSQ HOSP IP/OBS HIGH 50: CPT | Performed by: INTERNAL MEDICINE

## 2023-05-12 PROCEDURE — 97530 THERAPEUTIC ACTIVITIES: CPT

## 2023-05-12 PROCEDURE — 99232 SBSQ HOSP IP/OBS MODERATE 35: CPT | Performed by: INTERNAL MEDICINE

## 2023-05-12 PROCEDURE — 85027 COMPLETE CBC AUTOMATED: CPT | Performed by: PHYSICIAN ASSISTANT

## 2023-05-12 PROCEDURE — 97110 THERAPEUTIC EXERCISES: CPT

## 2023-05-12 PROCEDURE — 80048 BASIC METABOLIC PNL TOTAL CA: CPT | Performed by: PHYSICIAN ASSISTANT

## 2023-05-12 PROCEDURE — 99232 SBSQ HOSP IP/OBS MODERATE 35: CPT | Performed by: PHYSICIAN ASSISTANT

## 2023-05-12 RX ORDER — PANTOPRAZOLE SODIUM 40 MG/1
40 TABLET, DELAYED RELEASE ORAL
Status: DISCONTINUED | OUTPATIENT
Start: 2023-05-13 | End: 2023-05-23 | Stop reason: HOSPADM

## 2023-05-12 RX ORDER — BUMETANIDE 0.25 MG/ML
2 INJECTION INTRAMUSCULAR; INTRAVENOUS 2 TIMES DAILY
Status: DISCONTINUED | OUTPATIENT
Start: 2023-05-12 | End: 2023-05-15

## 2023-05-12 RX ADMIN — LEVOTHYROXINE SODIUM 75 MCG: 75 TABLET ORAL at 06:07

## 2023-05-12 RX ADMIN — OCTREOTIDE ACETATE 100 MCG: 100 INJECTION, SOLUTION INTRAVENOUS; SUBCUTANEOUS at 21:11

## 2023-05-12 RX ADMIN — LIDOCAINE 1 PATCH: 700 PATCH TOPICAL at 08:29

## 2023-05-12 RX ADMIN — BUMETANIDE 1 MG: 0.25 INJECTION INTRAMUSCULAR; INTRAVENOUS at 08:29

## 2023-05-12 RX ADMIN — OCTREOTIDE ACETATE 100 MCG: 100 INJECTION, SOLUTION INTRAVENOUS; SUBCUTANEOUS at 06:07

## 2023-05-12 RX ADMIN — BUMETANIDE 2 MG: 0.25 INJECTION INTRAMUSCULAR; INTRAVENOUS at 17:19

## 2023-05-12 RX ADMIN — PANTOPRAZOLE SODIUM 40 MG: 40 TABLET, DELAYED RELEASE ORAL at 06:07

## 2023-05-12 RX ADMIN — ACETAMINOPHEN 650 MG: 325 TABLET ORAL at 08:29

## 2023-05-12 RX ADMIN — ERYTHROMYCIN 0.5 INCH: 5 OINTMENT OPHTHALMIC at 08:29

## 2023-05-12 RX ADMIN — TRAZODONE HYDROCHLORIDE 50 MG: 50 TABLET ORAL at 21:12

## 2023-05-12 RX ADMIN — ERYTHROMYCIN 0.5 INCH: 5 OINTMENT OPHTHALMIC at 17:19

## 2023-05-12 RX ADMIN — OCTREOTIDE ACETATE 100 MCG: 100 INJECTION, SOLUTION INTRAVENOUS; SUBCUTANEOUS at 14:29

## 2023-05-12 NOTE — ASSESSMENT & PLAN NOTE
· Rate controlled on current regimen  · Continue midodrine -decreased dose 5/11  · Home metoprolol on hold due to hypotension

## 2023-05-12 NOTE — OCCUPATIONAL THERAPY NOTE
Occupational Therapy Treatment Note      Ale Nj    5/12/2023    Principal Problem:    Left lower quadrant abdominal pain  Active Problems:    Acute kidney injury ([address] )    Primary hypertension    Acquired hypothyroidism    Hyponatremia    PAF (paroxysmal atrial fibrillation) (HCC)    Hx of CABG    Anemia    Tarry stools      Past Medical History:   Diagnosis Date    Anemia     Atrial fibrillation (HCC)     Depression     GI (gastrointestinal bleed)     Hypomagnesemia 4/29/2023    Ischemic colitis [facility])        Past Surgical History:   Procedure Laterality Date    APPENDECTOMY      CARDIAC SURGERY      CHOLECYSTECTOMY      HIP SURGERY Right     Partial replacement    HYSTERECTOMY          05/12/23 0811   OT Last Visit   OT Visit Date 05/12/23   Note Type   Note Type Treatment   Pain Assessment   Pain Assessment Tool 0-10   Pain Score 7   Pain Location/Orientation Orientation: Bilateral;Orientation: Lower; Location: Back   Pain Onset/Description Onset: Ongoing;Frequency: Constant/Continuous; Descriptor: Aching   Effect of Pain on Daily Activities yes   Patient's Stated Pain Goal No pain   Hospital Pain Intervention(s) Repositioned; Ambulation/increased activity; Emotional support; Environmental changes   Multiple Pain Sites No   Restrictions/Precautions   Weight Bearing Precautions Per Order No   Other Precautions Chair Alarm; Bed Alarm; Fall Risk  (+atkins)   Bed Mobility   Supine to Sit 5  Supervision   Additional items Assist x 1;HOB elevated; Bedrails; Increased time required;Verbal cues   Additional Comments Pt remained OOB in recliner upon conclusion   Transfers   Sit to Stand 6  Modified independent   Additional items Bedrails   Stand to Sit 6  Modified independent   Additional items Bedrails   Stand pivot 5  Supervision   Additional items Assist x 1; Increased time required;Verbal cues   Cognition   Overall Cognitive Status [facility]   Arousal/Participation Alert; Cooperative   Attention Within functional limits Orientation Level Oriented X4   Memory Within functional limits   Following Commands Follows one step commands without difficulty   Activity Tolerance   Activity Tolerance Patient limited by fatigue   Medical Staff Made Aware  [address]    Plan   Treatment Interventions ADL retraining;Functional transfer training; Endurance training;Patient/family training; Compensatory technique education; Energy conservation; Activityengagement   Goal Expiration Date 23  (goals  23, current goals remain appropriate and extended to 23)   OT Treatment Day 2   OT Frequency 3-5x/wk   Recommendation   OT Discharge Recommendation Return to facility with rehabilitation services   Additional Comments  The patient's raw score on the AM-PAC Daily Activity Inpatient Short Form is 20  A raw score of greater than or equal to 19 suggests the patient may benefit from discharge to home  Please refer to the recommendation of the Occupational Therapist for safe discharge planning     AM-PAC Daily Activity Inpatient   Lower Body Dressing 3   Bathing 3   Toileting 3   Upper Body Dressing 3   Grooming 4   Eating 4   Daily Activity Raw Score 20   Daily Activity Standardized Score (Calc for Raw Score >=11) 42 03   AM-[facility] Applied Cognition Inpatient   Following a Speech/Presentation 4   Understanding Ordinary Conversation 4   Taking Medications 4   Remembering Where Things Are Placed or Put Away 4   Remembering List of 4-5 Errands 4   Taking Care of Complicated Tasks 4   Applied Cognition Raw Score 24   Applied Cognition Standardized Score 62 21     Perry Rich MS, OTR/L

## 2023-05-12 NOTE — ASSESSMENT & PLAN NOTE
• Nephrology following, appreciate recommendations  • Continue midodrine- dose decreased 5/11, continue octreotide per nephrology  • albumin discontinued 5/10  • Avoid hypotension and nephrotoxins  • I&O's, daily weights  • Now resolved  • MESERET george

## 2023-05-12 NOTE — PLAN OF CARE
Problem: OCCUPATIONAL THERAPY ADULT  Goal: Performs self-care activities at highest level of function for planned discharge setting  See evaluation for individualized goals  Description: Treatment Interventions: ADL retraining, Functional transfer training, Endurance training, Patient/family training, Compensatory technique education, Energy conservation, Activityengagement    See flowsheet documentation for full assessment, interventions and recommendations  Outcome: Progressing  Note: Limitation: Decreased ADL status, Decreased Safe judgement during ADL, Decreased endurance, Decreased self-care trans, Decreased high-level ADLs  Prognosis: Good  Assessment: Patient participated in Skilled OT session this date with interventions consisting of ADL re training with the use of correct body mechnaics   Patient agreeable to OT treatment session, upon arrival patient was found seated in bed (back supported)  Patient requiring occasional rest periods and self-care assistance as noted in flow sheet  Patient continues to be functioning below baseline level, occupational performance remains limited secondary to factors listed above and increased risk for falls and injury  From OT standpoint, recommendation at time of d/c would be return to facility with rehabilitation services  Patient to benefit from continued Occupational Therapy treatment while in the hospital to address deficits as defined above and maximize level of functional independence with ADLs and functional mobility       OT Discharge Recommendation: Return to facility with rehabilitation services     [address] [place] [address], MS, OTR/L

## 2023-05-12 NOTE — ASSESSMENT & PLAN NOTE
· Nursing reported black tarry stools on 5/8 to 5/9- no additional black or bloody stools  · Stools are heme +  · hgb 8 6 -> 9 6 -> 8 1 -> 7 8 -> 8 2 -> 7 8  · S/p EGD/Colonoscopy 5/11- colonoscopy noted stricture and mucosal friability and shallow ulceration at the IC value and a single shallow ulcer in the rectosigmoid colon concerning for Crohn's disease  · Outpatient follow-up with GI for possible small bowel enterography for evaluation of possible small bowel crohn's disease     · Continue to monitor H&H

## 2023-05-12 NOTE — PROGRESS NOTES
Progress Note- Theo Alfonso 69 y o  female MRN: [id number]    Unit/Bed#: -01 Encounter: [id number]      Assessment and Plan:    Patient is a 69-year-old female with PMH significant for depression, CAD s/p CABG (2021), Sjogren's, mild AS, A-fib, IBS-D and hypothyroidism admitted on 4/27 for acute on chronic diarrhea and left-sided abdominal discomfort  Patient was found to have mild nonspecific enteric colitis, nodular hepatic contour suggestive of cirrhosis and a 5 mm cystic lesion in the tail the pancreas on cross-sectional imaging      Patient has been evaluated several times by GI service  She was noted to have a large stool burden s/p MiraLAX bowel prep with significant stool output but persistent left lower quadrant abdominal pain  Also treated for presumed symptomatic uncomplicated diverticulitis with Cipro/Flagyl  Now has developed melenic stools with down-trending hgb       Additionally, patient is noted to have mild TTG IgA elevation in the setting of significantly elevated IgA as well as SMA stenosis for which she was scheduled for outpatient evaluation but was admitted at the time of her appointment  Underwent EGD and colonoscopy on 5/9/2023  EGD notable for small sliding hiatal hernia, tiny varices at the GE junction, mild PHG but no blood was seen in the stomach  Colonoscopy notable for stricture and mucosal friability and shallow ulceration at the IC valve and a single shallow ulcer in the rectosigmoid colon concerning for Crohn's disease      1  LLQ abd pain  2  Change in bowel habits  3  Melenic stools  4  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  Colonoscopy performed on 5/11, as detailed above, with findings concerning for Crohn's disease  Denies any additional melena and hb overall stable   Recommend small bowel enterography for further evaluation of possible small bowel Crohn's disease, however this may prove difficult given recent ALEJANDRA with subsequent volume overload requiring diuresis  After discussion with nephrology, will defer contrasted imaging given IV diuresis and recent ALEJANDRA  - Diet as tolerated  - Decrease PPI to once daily  - Continue supportive care with analgesics PRN  - Follow-up pathology from colonoscopy  - Monitor hgb; Transfuse for hgb <7 0  - Monitor stools for evidence of further overt GI bleeding  - Consider outpatient small bowel enterography for evaluation of possible small bowel Crohn's ds       4  Cirrhotic morphology on imaging  5  Thrombocytopenia  6  ALEJANDRA  Patient incidentally noted to have a lobulated liver contour on ultrasound with nodular liver contour redemonstrated on CT A/P in addition to chronic thrombocytopenia  Serologic evaluation to r/o competing causes of liver disease (viral hepatitis, autoimmune hepatitis, A1 AT deficiency, Po's disease and hemochromatosis) have been unremarkable with the exception of a mildly low ceruloplasmin level  Quantitative IgG's pending  Given unclear etiology for cirrhosis, could consider inpt vs outpt liver biopsy for histologic evaluation  If patient remains admitted throughout the weekend, can discuss plan with IR for possible liver biopsy on Monday however this should not delay discharge  Ascites - Nephrology following for management of diuresis, appreciate recommendations  Esophageal varices - EGD (5/11) with small and minimal varices in the lower third of the esophagus and moderate PHG  Hepatic encephalopathy - Patient with significantly improved mentation, AAOx3  Continue to monitor clinically  HCC screening - U/S and cross-sectional imaging without focal liver lesion  AFP within normal limits  ALEJANDRA - ALEJANDRA with peak Cr 2 1, which has since improved  Nephrology following, appreciate recommendations   Defer contrasted imaging for the time being       - Continue to monitor MELD labs daily (CBC, CMP, INR)  - Follow-up on hepatic serologies  - Consider IR consult for consideration of inpt liver bx on Monday (5/15)  - Nephrology following, appreciate recommendations     MELD-Na score: 19 at 5/12/2023  5:18 AM  MELD score: 18 at 5/12/2023  5:18 AM  Calculated from:  Serum Creatinine: 1 23 mg/dL at 5/12/2023  5:18 AM  Serum Sodium: 135 mmol/L at 5/12/2023  5:18 AM  Total Bilirubin: 2 23 mg/dL at 5/11/2023 11:48 AM  INR(ratio): 1 92 at 5/10/2023  5:05 AM  Age: 77 years     7  Pancreatic cyst  Patient instantly noted to have a 5 mm cystic lesion in the tail the pancreas on cross-sectional imaging  Also noted to have chronically elevated alk phos  Recommend outpatient evaluation including MRI/MRCP for definitive characterization      GI will continue to follow  ______________________________________________________________________    Subjective:     Patient found resting comfortably in her bedside chair  Tolerating regular diet for breakfast  Patient reports feeling significantly better today and appears much more alert and oriented  Denies any further melena  Continues to endorse lower abdominal pain although significantly improved from previous      Medication Administration - last 24 hours from 05/11/2023 0822 to 05/12/2023 7538       Date/Time Order Dose Route Action Action by     05/12/2023 0607 EDT levothyroxine tablet 75 mcg 75 mcg Oral Given Ramandeep Cheney RN     05/11/2023 2116 EDT traZODone (DESYREL) tablet 50 mg 50 mg Oral Given Ramandeep Cheney RN     05/11/2023 1705 EDT erythromycin (ILOTYCIN) 0 5 % ophthalmic ointment 0 5 inch 0 5 inch Left Eye Given Sherie Kaufman RN     05/11/2023 2087 EDT erythromycin (ILOTYCIN) 0 5 % ophthalmic ointment 0 5 inch 0 5 inch Left Eye Given Sherie Kaufman RN     05/11/2023 1132 EDT midodrine (PROAMATINE) tablet 5 mg 5 mg Oral Given Sherie Kaufman RN     05/12/2023 9927 EDT octreotide (SandoSTATIN) injection 100 mcg 100 mcg Subcutaneous Given Ramandeep Cheney RN     05/11/2023 2116 EDT octreotide (SandoSTATIN) injection 100 mcg 100 mcg Subcutaneous Given Ramandeep Cheney RN "    05/11/2023 1348 EDT octreotide (SandoSTATIN) injection 100 mcg 100 mcg Subcutaneous Not Given Clint Bulla, RN     05/11/2023 2128 EDT lidocaine (LIDODERM) 5 % patch 1 patch 1 patch Topical Patch Removed Efra Mcallister, RN     05/11/2023 3435 EDT lidocaine (LIDODERM) 5 % patch 1 patch 1 patch Topical Medication Applied Clint Bulla, RN     05/12/2023 1686 EDT pantoprazole (PROTONIX) EC tablet 40 mg 40 mg Oral Given Efra Mcallister, RN     05/11/2023 1648 EDT pantoprazole (PROTONIX) EC tablet 40 mg 40 mg Oral Given Clint Bulla, RN     05/11/2023 7313 EDT bumetanide (BUMEX) injection 0 5 mg 0 5 mg Intravenous Given Clint Bulla, RN     05/11/2023 1703 EDT bumetanide (BUMEX) injection 1 mg 1 mg Intravenous Given Clint Bulla, RN     05/11/2023 1150 EDT bumetanide (BUMEX) injection 0 5 mg 0 5 mg Intravenous Given Clint Bulla, RN     05/12/2023 5066 EDT midodrine (PROAMATINE) tablet 2 5 mg 2 5 mg Oral Not Given Efra Ary, RN     05/11/2023 1648 EDT midodrine (PROAMATINE) tablet 2 5 mg 2 5 mg Oral Given Clint Bulla, RN          Objective:     Vitals: Blood pressure 138/50, pulse 74, temperature 98 2 °F (36 8 °C), resp  rate 17, height 4' 11\" (1 499 m), weight 59 4 kg (130 lb 15 3 oz), SpO2 91 %  ,Body mass index is 26 45 kg/m²  Intake/Output Summary (Last 24 hours) at 5/12/2023 7607  Last data filed at 5/12/2023 0700  Gross per 24 hour   Intake 1051 67 ml   Output 1850 ml   Net -798 33 ml       Physical Exam:   General Appearance: Awake and alert, in no acute distress  Abdomen: +Mild lower abd TTP; Soft, non-distended; bowel sounds normal; no masses or no organomegaly    Invasive Devices     Peripheral Intravenous Line  Duration           Peripheral IV 05/11/23 Left;Proximal;Upper;Ventral (anterior) Arm <1 day          Drain  Duration           Urethral Catheter Straight-tip 16 Fr  1 day                Lab Results:  No results displayed because visit has over 200 results            Imaging Studies: " I have personally reviewed pertinent imaging studies  **Please note:  Dictation voice to text software may have been used in the creation of this record  Occasional wrong word or “sound alike” substitutions may have occurred due to the inherent limitations of voice recognition software  Read the chart carefully and recognize, using context, where substitutions have occurred  **

## 2023-05-12 NOTE — ASSESSMENT & PLAN NOTE
· Likely due to cirrhosis   · Now resolved   · S/p IVF  · Continue management as above  · Patient edematous 5/10- albumin stopped by nephrology   · Patient started on Bumex 0 5 mg BID- increased to 1 mg bid 5/11

## 2023-05-12 NOTE — PLAN OF CARE
Problem: PHYSICAL THERAPY ADULT  Goal: Performs mobility at highest level of function for planned discharge setting  See evaluation for individualized goals  Description: Treatment/Interventions: Functional transfer training, Endurance training, Therapeutic exercise, Gait training, Bed mobility          See flowsheet documentation for full assessment, interventions and recommendations  Outcome: Progressing  Note: Prognosis: Good  Problem List: Decreased strength, Impaired balance  Assessment: Pt seen for PT treatment session this date with interventions consisting of gait training to advance toward previous level of function w/ emphasis on improving pt's ability to ambulate level surfaces x 60 feet x 2 with close S provided by therapist with RW and Therapeutic exercise to improve endurance consisting of: AROM 25 reps B LE in sitting position  Pt agreeable to PT treatment session upon arrival, pt found supine in bed w/ HOB elevated, A&O x 4  In comparison to previous session, pt with improvements in ambulatory distance, bed level assistance  Post session: pt returned back to recliner, chair alarm engaged, all needs in reach and RN notified of session findings/recommendations  Continue to recommend return to facility with rehabilitation services at time of d/c in order to maximize pt's functional independence and safety w/ mobility  Pt continues to be functioning below baseline level, and remains limited 2* factors listed above and including impaired balance, extended hosptialization  PT will continue to see pt during current hospitalization in order to address the deficits listed above and provide interventions consistent w/ POC in effort to achieve LTGs  PT Discharge Recommendation: Return to facility with rehabilitation services    See flowsheet documentation for full assessment

## 2023-05-12 NOTE — CASE MANAGEMENT
Case Management Discharge Planning Note    Patient name Holly Mares  Location [address] 309/-[id number] MRN [id number]  : 1945 Date 2023       Current Admission Date: 2023  Current Admission Diagnosis:Left lower quadrant abdominal pain   Patient Active Problem List    Diagnosis Date Noted   • Tarry stools 2023   • Hyponatremia 2023   • Anemia 2023   • Left lower quadrant abdominal pain 2023   • Superior mesenteric artery stenosis ([facility] ) 2023   • Acute kidney injury ([facility] ) 2023   • Diarrhea of presumed infectious origin 2023   • Primary hypertension 2023   • Acquired hypothyroidism 2023   • Irritable bowel syndrome with diarrhea 2023   • Abnormal CT scan 2023   • Hx of CABG 2023   • PAF (paroxysmal atrial fibrillation) ([facility] ) 2022      LOS (days): 15  Geometric Mean LOS (GMLOS) (days): 3 80  Days to GMLOS:-11 2     OBJECTIVE:  Risk of Unplanned Readmission Score: 21 26         Current admission status: Inpatient   Preferred Pharmacy:   [address] [place] [address]  [facility]   [place] [address]  Phone: [phone] Fax: [phone]    Primary Care Provider: Shonda Palmer MD    Primary Insurance: MEDICARE  Secondary Insurance: Kamryn Gonzales    DISCHARGE DETAILS:    Discharge planning discussed with[de-identified] patient & Greta Steele (sister) at 16:25 pm  Freedom of Choice: Yes  Comments - Freedom of Choice: recommendation is for the pt to return to [facility] with rehab services  CM contacted family/caregiver?: Yes             Contacts  Patient Contacts: Levi Mcguire (Sister)   [phone] (Mobile)  Relationship to Patient[de-identified] Family  Contact Method: Phone  Phone Number: Levi Mcguire (Sister)   [phone] (Mobile)  Reason/Outcome: Discharge [address]         Is the patient interested in [facility] at discharge?: No    DME Referral Provided  Referral made for DME?: No    Other Referral/Resources/Interventions Provided:  Interventions: Assisted Living, Outpatient OT, Outpatient PT  Referral Comments: return to [facility] with outpt therapy rx is needed- [facility] does not accept weekend discharges- pt is not stable for d/c pt remains on IV bumex bid    Would you like to participate in our [facility] service program?  : No - Declined    Treatment Team Recommendation: Assisted Living [facility] with outpt therapy- transportation needed)                                   IMM Given (Date):: 05/12/23  IMM Given to[de-identified] Patient

## 2023-05-12 NOTE — PHYSICAL THERAPY NOTE
Physical Therapy Treatment Session Note    Patient's Name: Mikala Hathaway    Admitting Diagnosis  Diverticulitis [K57 92]  Abdominal pain [R10 9]    Problem List  Patient Active Problem List   Diagnosis    Acute kidney injury ([facility] )    Diarrhea of presumed infectious origin    Primary hypertension    Acquired hypothyroidism    Irritable bowel syndrome with diarrhea    Abnormal CT scan    Superior mesenteric artery stenosis (HCC)    Left lower quadrant abdominal pain    Hyponatremia    PAF (paroxysmal atrial fibrillation) (HCC)    Hx of CABG    Anemia    Tarry stools       Past Medical History  Past Medical History:   Diagnosis Date    Anemia     Atrial fibrillation ([facility] )     Depression     GI (gastrointestinal bleed)     Hypomagnesemia 4/29/2023    Ischemic colitis [facility])        Past Surgical History  Past Surgical History:   Procedure Laterality Date    APPENDECTOMY      CARDIAC SURGERY      CHOLECYSTECTOMY      HIP SURGERY Right     Partial replacement    HYSTERECTOMY          05/12/23 0750   PT Last Visit   PT Visit Date 05/12/23   Note Type   Note Type Treatment   Pain Assessment   Pain Assessment Tool 0-10   Pain Score 7   Pain Location/Orientation Orientation: Bilateral;Orientation: Lower; Location: Back   Pain Onset/Description Onset: Ongoing;Frequency: Constant/Continuous; Descriptor: Aching   Effect of Pain on Daily Activities yes   Patient's Stated Pain Goal No pain   Hospital Pain Intervention(s) Repositioned; Ambulation/increased activity; Emotional support; Environmental changes   Multiple Pain Sites No   Restrictions/Precautions   Weight Bearing Precautions Per Order No   Other Precautions Chair Alarm; Bed Alarm; Fall Risk  (atkins)   General   Chart Reviewed Yes   Additional Pertinent History Myrisa OT present for co-treatment due to medical complexity, required skilled interventions of 2 clinicians for care delivery  Response to Previous Treatment Patient with no complaints from previous session  "  Family/Caregiver Present No   Cognition   Overall Cognitive Status WFL   Arousal/Participation Alert; Cooperative   Attention Within functional limits   Orientation Level Oriented X4   Memory Within functional limits   Following Commands Follows one step commands without difficulty   Comments Manuel Collins was agreeable to PT treatment, pleasant  Subjective   Subjective \"I think you girls are terrific\"   Bed Mobility   Supine to Sit 5  Supervision   Additional items Assist x 1;HOB elevated; Bedrails; Increased time required;Verbal cues   Sit to Supine   (DNT as Manuel Collins was sitting out of bed on the recliner upon conclusion )   Additional Comments Verbal cues for task pacing  Manuel Collins denied lightheadedness and dizziness with all positional changes  Transfers   Sit to Stand 6  Modified independent   Additional items Bedrails   Stand to Sit 6  Modified independent   Additional items Bedrails   Stand pivot 5  Supervision   Additional items Assist x 1; Increased time required;Verbal cues   Additional Comments Verbal cues for base of support widening with directional changes  Ambulation/Elevation   Gait pattern Improper Weight shift; Forward Flexion; Short stride   Gait Assistance 5  Supervision   Additional items Assist x 1;Verbal cues   Assistive Device Rolling walker   Distance 60 feet x 2  (distance complete was intended to mimic common ambulatory distance at [facility])   Stair Management Assistance Not tested   Ambulation/Elevation Additional Comments Verbal cues for base of support widening for stabilization, proper AD utilization  Balance   Static Sitting Good   Dynamic Sitting Fair +   Static Standing Fair +   Dynamic Standing Fair   Ambulatory Fair   Endurance Deficit   Endurance Deficit Yes   Activity Tolerance   Activity Tolerance Patient tolerated treatment well   Medical Staff Made Aware Yes, Naye Cardoza was informed the d/c disposition recommendation remains unchanged     Nurse Made Aware yes, Nicole CLEMENTS   Exercises   Quad " Sets Sitting;25 reps;AROM; Bilateral   Glute Sets Sitting;25 reps;AROM; Bilateral   Hip Abduction Sitting;25 reps;AROM; Bilateral   Hip Adduction Sitting;25 reps;AROM; Bilateral   Ankle Pumps Sitting;25 reps;AROM; Bilateral   Assessment   Prognosis Good   Problem List Decreased strength; Impaired balance   Assessment Pt seen for PT treatment session this date with interventions consisting of gait training to advance toward previous level of function w/ emphasis on improving pt's ability to ambulate level surfaces x 60 feet x 2 with close S provided by therapist with RW and Therapeutic exercise to improve endurance consisting of: AROM 25 reps B LE in sitting position  Pt agreeable to PT treatment session upon arrival, pt found supine in bed w/ HOB elevated, A&O x 4  In comparison to previous session, pt with improvements in ambulatory distance, bed level assistance  Post session: pt returned back to recliner, chair alarm engaged, all needs in reach and RN notified of session findings/recommendations  Continue to recommend return to facility with rehabilitation services at time of d/c in order to maximize pt's functional independence and safety w/ mobility  Pt continues to be functioning below baseline level, and remains limited 2* factors listed above and including impaired balance, extended hosptialization  PT will continue to see pt during current hospitalization in order to address the deficits listed above and provide interventions consistent w/ POC in effort to achieve LTGs  Goals   Patient Goals to have breakfast  (setup upon conclusion)   LTG Expiration Date 05/22/23  (goal dates extended at this time, prior goals remain appropriate)   Long Term Goal #1 LTGs remain appropriate   PT Treatment Day 2   Plan   Treatment/Interventions Functional transfer training;LE strengthening/ROM; Therapeutic exercise;Elevations;Gait training;Spoke to nursing;Spoke to advanced practitioner;Patient/family training   Progress Improving as expected   Recommendation   PT Discharge Recommendation Return to facility with rehabilitation services   Equipment Recommended [place] walker   Change/add to Interventional Imaging? No   Additional Comments Upon conclusion, Sai Cao was resting out of bed on the recliner  The chair alarm was engaged and all of her needs were within reach     AM-PAC Basic Mobility Inpatient   Turning in Flat Bed Without Bedrails 4   Lying on Back to Sitting on Edge of Flat Bed Without Bedrails 3   Moving Bed to Chair 3   Standing Up From Chair Using Arms 3   Walk in Room 3   Climb 3-5 Stairs With Railing 3   Basic Mobility Inpatient Raw Score 19   Basic Mobility Standardized Score 42 48   Highest Level Of Mobility   JH-HLM Goal 6: Walk 10 steps or more   JH-HLM Achieved 7: Walk 25 feet or more     Treatment Time: 8225-4920    Stef Escobar, PT

## 2023-05-12 NOTE — ASSESSMENT & PLAN NOTE
· Presented to the ED with left lower quadrant abdominal pain on 4/27   · Continues with some left lower quadrant abdominal pain today, as well as loose stools with it  · CT abdomen: New small amount of free fluid in the abdomen and pelvis compared to 4/18/2023, which can be secondary to cirrhosis or acute inflammatory process in the abdomen and pelvis such as occult acute diverticulitis  Moderate amount of stool in the colon, nonspecific and can represent constipation  · Surgery following, appreciate recommendations  · GI following, appreciate recommendations  · Patient had flex sig on 5/2 minimal inflammation noted, biopsies normal  · KUB (5/5):  Nonobstructive bowel gas pattern  · Colonoscopy showed signs concerning for crohn's dx     · Outpatient follow-up with GI

## 2023-05-12 NOTE — PROGRESS NOTES
[facility]  Progress Note  Name: Cori Murrell  MRN: [id number]  Unit/Bed#: -01 I Date of Admission: 4/27/2023   Date of Service: 5/12/2023 I Hospital Day: 15    Assessment/Plan   * Left lower quadrant abdominal pain  Assessment & Plan  · Presented to the ED with left lower quadrant abdominal pain on 4/27   · Continues with some left lower quadrant abdominal pain today, as well as loose stools with it  · CT abdomen: New small amount of free fluid in the abdomen and pelvis compared to 4/18/2023, which can be secondary to cirrhosis or acute inflammatory process in the abdomen and pelvis such as occult acute diverticulitis  Moderate amount of stool in the colon, nonspecific and can represent constipation  · Surgery following, appreciate recommendations  · GI following, appreciate recommendations  · Patient had flex sig on 5/2 minimal inflammation noted, biopsies normal  · KUB (5/5):  Nonobstructive bowel gas pattern  · Colonoscopy showed signs concerning for crohn's dx  · Outpatient follow-up with GI    Tarry stools  Assessment & Plan  · Nursing reported black tarry stools on 5/8 to 5/9- no additional black or bloody stools  · Stools are heme +  · hgb 8 6 -> 9 6 -> 8 1 -> 7 8 -> 8 2 -> 7 8  · S/p EGD/Colonoscopy 5/11- colonoscopy noted stricture and mucosal friability and shallow ulceration at the IC value and a single shallow ulcer in the rectosigmoid colon concerning for Crohn's disease  · Outpatient follow-up with GI for possible small bowel enterography for evaluation of possible small bowel crohn's disease  · Continue to monitor H&H    Acute kidney injury [facility])  Assessment & Plan  • Nephrology following, appreciate recommendations  • Continue midodrine- dose decreased 5/11, continue octreotide per nephrology  • albumin discontinued 5/10  • Avoid hypotension and nephrotoxins  • I&O's, daily weights  • Now resolved  • BMP a m           Anemia  Assessment & Plan  · Baseline "appears to be between 11 and 12   · Please see plan for \"tarry stools\"    Hx of CABG  Assessment & Plan  · Currently stable  · Losartan on hold due to hypotension    PAF (paroxysmal atrial fibrillation) (HCC)  Assessment & Plan  · Rate controlled on current regimen  · Continue midodrine -decreased dose 5/11  · Home metoprolol on hold due to hypotension    Hyponatremia  Assessment & Plan  · Likely due to cirrhosis   · Now resolved   · S/p IVF  · Continue management as above  · Patient edematous 5/10- albumin stopped by nephrology   · Patient started on Bumex 0 5 mg BID- increased to 1 mg bid 5/11    Acquired hypothyroidism  Assessment & Plan  · Continue Synthroid    Primary hypertension  Assessment & Plan  · Patient with hypotension- now blood pressure elevated   · Continue midodrine- decreased to 2 5 mg tid by nephrology on 5/11  · Losartan was discontinued due to ALEJANDRA   · Metoprolol held due to hypotension, will continue to monitor            VTE Pharmacologic Prophylaxis: VTE Score: 3 Moderate Risk (Score 3-4) - Pharmacological DVT Prophylaxis Contraindicated  Sequential Compression Devices Ordered  Patient Centered Rounds: I performed bedside rounds with nursing staff today  Discussions with Specialists or Other Care Team Provider: nursing, GI, CM    Education and Discussions with Family / Patient: Updated  (sister) via phone  Total Time Spent on Date of Encounter in care of patient: 35 minutes This time was spent on one or more of the following: performing physical exam; counseling and coordination of care; obtaining or reviewing history; documenting in the medical record; reviewing/ordering tests, medications or procedures; communicating with other healthcare professionals and discussing with patient's family/caregivers      Current Length of Stay: 15 day(s)  Current Patient Status: Inpatient   Certification Statement: The patient will continue to require additional inpatient hospital stay " due to continued need for IV diuresis, monitoring of labs  Discharge Plan: Anticipate discharge in >72 hrs to prior assisted or independent living facility  Code Status: Level 1 - Full Code    Subjective: The patient was seen and examined  The patient is sitting up in her chair  She continues to have edema in her legs  She denies any shortness of breath  Objective:     Vitals:   Temp (24hrs), Av 6 °F (36 4 °C), Min:96 6 °F (35 9 °C), Max:99 1 °F (37 3 °C)    Temp:  [96 6 °F (35 9 °C)-99 1 °F (37 3 °C)] 97 7 °F (36 5 °C)  HR:  [66-80] 73  Resp:  [17-20] 19  BP: (105-174)/(50-93) 148/53  SpO2:  [91 %-97 %] 93 %  Body mass index is 26 45 kg/m²  Input and Output Summary (last 24 hours): Intake/Output Summary (Last 24 hours) at 2023 1630  Last data filed at 2023 0700  Gross per 24 hour   Intake 460 ml   Output 1200 ml   Net -740 ml       Physical Exam:   Physical Exam  Vitals and nursing note reviewed  Constitutional:       General: She is awake  Appearance: Normal appearance  HENT:      Head: Normocephalic and atraumatic  Cardiovascular:      Rate and Rhythm: Normal rate and regular rhythm  Heart sounds: Normal heart sounds  Pulmonary:      Breath sounds: Examination of the right-lower field reveals rales  Examination of the left-lower field reveals rales  Rales present  Abdominal:      Palpations: Abdomen is soft  Tenderness: There is generalized abdominal tenderness  There is no guarding or rebound  Musculoskeletal:      Right lower leg: Edema present  Left lower leg: Edema present  Skin:     General: Skin is warm and dry  Neurological:      General: No focal deficit present  Mental Status: She is alert and oriented to person, place, and time  Psychiatric:         Attention and Perception: Attention normal          Mood and Affect: Mood normal          Speech: Speech normal          Behavior: Behavior is cooperative            Additional Data: Labs:  Results from last 7 days   Lab Units 05/12/23  0518 05/09/23  0454 05/08/23  1554   WBC Thousand/uL 5 09   < > 7 38   HEMOGLOBIN g/dL 7 8*   < > 9 6*   HEMATOCRIT % 23 3*   < > 27 6*   PLATELETS Thousands/uL 85*   < > 88*   NEUTROS PCT %  --   --  55   LYMPHS PCT %  --   --  21   MONOS PCT %  --   --  19*   EOS PCT %  --   --  3    < > = values in this interval not displayed  Results from last 7 days   Lab Units 05/12/23  0518 05/11/23  1148   SODIUM mmol/L 135 136   POTASSIUM mmol/L 5 0 3 6   CHLORIDE mmol/L 98 98   CO2 mmol/L 28 27   BUN mg/dL 24 26*   CREATININE mg/dL 1 23 1 23   ANION GAP mmol/L 9 11   CALCIUM mg/dL 8 7 9 2   ALBUMIN g/dL  --  4 0   TOTAL BILIRUBIN mg/dL  --  2 23*   ALK PHOS U/L  --  505*   ALT U/L  --  26   AST U/L  --  72*   GLUCOSE RANDOM mg/dL 119 133     Results from last 7 days   Lab Units 05/10/23  0505   INR  1 92*     Results from last 7 days   Lab Units 05/11/23  1158   POC GLUCOSE mg/dl 128               Lines/Drains:  Invasive Devices     Peripheral Intravenous Line  Duration           Peripheral IV 05/11/23 Left;Proximal;Upper;Ventral (anterior) Arm 1 day                      Imaging: No pertinent imaging reviewed      Recent Cultures (last 7 days):         Last 24 Hours Medication List:   Current Facility-Administered Medications   Medication Dose Route Frequency Provider Last Rate   • acetaminophen  650 mg Oral Q6H PRN Ariella Pascual MD     • barium  900 mL Oral Once in imaging Samuel Fatima MD     • bumetanide  2 mg Intravenous BID Anthony Gibbs DO     • dicyclomine  10 mg Oral TID PRN Luke Sy MD     • erythromycin  0 5 inch Left Eye BID Samuel Fatima MD     • HYDROmorphone  0 5 mg Intravenous Q4H PRN EBEN Hooker     • levothyroxine  75 mcg Oral Early Morning Ariella Pascual MD     • lidocaine  1 patch Topical Daily EBEN Hooker     • midodrine  2 5 mg Oral TID [facility] Anthony Gibbs DO     • octreotide 100 mcg Subcutaneous [facility] EBEN Huff     • ondansetron  4 mg Intravenous Q6H PRN Ladan Ponce MD     • [START ON 5/13/2023] pantoprazole  40 mg Oral Early Morning Rola Gupta     • traZODone  50 mg Oral HS Jamal Cardona PA-C          Today, Patient Was Seen By: Francis Rinne, PA-C    **Please Note: This note may have been constructed using a voice recognition system  **

## 2023-05-12 NOTE — PROGRESS NOTES
Progress Note - Nephrology   Ceeran Carmen 69 y o  female MRN: [id number]  Unit/Bed#: -01 Encounter: [id number]    A/P:  1  Acute kidney injury, creatinine peaked at 2 1, etiology potentially prerenal versus HRS  Urine sodium was low less than 5 on 5/5  Creatinine stable at 1 2  Baseline creatinine 0 8-1 0  Remains hypervolemic, urine output is not at goal   Weight has not changed  Double Bumex to 2 mg twice daily  GI had discussed performing a small bowel enterography for possible evaluation of Crohn's disease  However with recent acute kidney injury and volume overload requiring diuresis, would hold off at this point  Can consider as an outpatient  Want to avoid risks of contrast in HRS  2   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, colonoscopy performed on 5/11  Hemoglobin has been overall stable  Would hold on evaluation of enterography for now given need for aggressive diuresis and recent ALEJANDRA  Discussed with GI     3   Cirrhotic morphology on imaging, patient had a serologic work-up to identify causes  This had been unremarkable except for a mildly low ceruloplasmin  May consider liver biopsy next week  Volume overload, doublet Bumex to 2 mg twice daily  4   Volume overload due to cirrhosis, ALEJANDRA, iatrogenic with fluid and albumin administration  Urine output nonoliguric  Goal -1- 1 5 L in the next 24 hours    Titrate up diuretics accordingly with Bumex 2 mg twice daily  Potassium has been normal at 5 0  Magnesium 1 9 on 5/11  Follow up reason for today's visit:     Acute kidney injury  Subjective:   Patient seen and examined today  Reports feeling swollen and mild shortness of breath unchanged  Blood pressure stable  Weight unchanged on bed scale  Urine output 1 8 L in past 24 hours    A complete 10 point review of systems was performed and is otherwise negative  Objective:     Vitals: Blood pressure 148/53, pulse 73, temperature 97 7 °F (36 5 °C), resp  "rate 19, height 4' 11\" (1 499 m), weight 59 4 kg (130 lb 15 3 oz), SpO2 93 %  ,Body mass index is 26 45 kg/m²  Weight (last 2 days)     Date/Time Weight    05/12/23 0600 59 4 (130 95)    05/11/23 1300 59 (130)    05/11/23 0600 59 2 (130 51)    05/10/23 0536 60 7 (133 82)            Intake/Output Summary (Last 24 hours) at 5/12/2023 1622  Last data filed at 5/12/2023 0700  Gross per 24 hour   Intake 460 ml   Output 1200 ml   Net -740 ml     I/O last 3 completed shifts: In: 1351 7 [P O :760; I V :300; Blood:291 7]  Out: 2500 [Urine:2500]         Physical Exam: /53   Pulse 73   Temp 97 7 °F (36 5 °C)   Resp 19   Ht 4' 11\" (1 499 m)   Wt 59 4 kg (130 lb 15 3 oz)   SpO2 93%   BMI 26 45 kg/m²     General Appearance:    Alert, cooperative, no distress, appears stated age   Head:    Normocephalic, without obvious abnormality, atraumatic   Eyes:    Conjunctiva/corneas clear   Ears:    Normal external ears   Nose:   Nares normal, septum midline, mucosa normal, no drainage    or sinus tenderness   Throat:   Lips, mucosa, and tongue normal; teeth and gums normal   Neck:    Back:      Lungs:     Clear to auscultation bilaterally, respirations unlabored   Chest wall:    No tenderness or deformity   Heart:    Regular rate and rhythm, S1 and S2 normal, no murmur, rub   or gallop   Abdomen:     Soft, non-tender, bowel sounds active   Extremities:   Extremities normal, atraumatic, no cyanosis or edema   Skin:   Skin color, texture, turgor normal, no rashes or lesions   Lymph nodes:   Cervical normal   Neurologic:   CNII-XII intact            Lab, Imaging and other studies: I have personally reviewed pertinent labs    CBC:   Lab Results   Component Value Date    WBC 5 09 05/12/2023    HGB 7 8 (L) 05/12/2023    HCT 23 3 (L) 05/12/2023     (H) 05/12/2023    PLT 85 (L) 05/12/2023    MCH 33 9 05/12/2023    MCHC 33 5 05/12/2023    RDW 16 2 (H) 05/12/2023    MPV 12 4 05/12/2023     CMP:   Lab Results   Component Value " Date    K 5 0 05/12/2023    CL 98 05/12/2023    CO2 28 05/12/2023    BUN 24 05/12/2023    CREATININE 1 23 05/12/2023    CALCIUM 8 7 05/12/2023    EGFR 42 05/12/2023         Results from last 7 days   Lab Units 05/12/23  0518 05/11/23  1148 05/10/23  1741 05/10/23  0505 05/09/23  0733 05/08/23  2108   POTASSIUM mmol/L 5 0 3 6 5 3 4 1   < >  --    CHLORIDE mmol/L 98 98 99 102   < >  --    CO2 mmol/L 28 27 26 25   < >  --    BUN mg/dL 24 26* 31* 33*   < >  --    CREATININE mg/dL 1 23 1 23 1 26 1 20   < >  --    CALCIUM mg/dL 8 7 9 2 9 3 9 0   < >  --    ALK PHOS U/L  --  505*  --  464*  --  326*   ALT U/L  --  26  --  22  --  17   AST U/L  --  72*  --  62*  --  41*    < > = values in this interval not displayed  Phosphorus: No results found for: PHOS  Magnesium: No results found for: MG  Urinalysis: No results found for: Timothy Mires, SPECGRAV, PHUR, LEUKOCYTESUR, NITRITE, PROTEINUA, GLUCOSEU, KETONESU, BILIRUBINUR, BLOODU  Ionized Calcium: No results found for: CAION  Coagulation: No results found for: PT, INR, APTT  Troponin: No results found for: TROPONINI  ABG: No results found for: PHART, UWT2ROL, PO2ART, ZSU5UQL, R5QDUXBF, BEART, SOURCE  Radiology review:     IMAGING  Procedure: EGD    Result Date: 5/11/2023  Narrative: Table formatting from the original result was not included  [place] [id number] [phone] DATE OF SERVICE: 5/11/23 PHYSICIAN(S): Attending: Rosena [place], MD Fellow: No Staff Documented INDICATION: Anemia, unspecified type, Hepatic cirrhosis, unspecified hepatic cirrhosis type, unspecified whether ascites present ([facility] [address] ) POST-OP DIAGNOSIS: See the impression below  PREPROCEDURE: Informed consent was obtained for the procedure, including sedation  Risks of perforation, hemorrhage, adverse drug reaction and aspiration were discussed  The patient was placed in the left lateral decubitus position   Patient was explained about the risks and benefits of the procedure  Risks including but not limited to bleeding, infection, and perforation were explained in detail  Also explained about less than 100% sensitivity with the exam and other alternatives  PROCEDURE: EGD DETAILS OF PROCEDURE: Patient was taken to the procedure room where a time out was performed to confirm correct patient and correct procedure  The patient underwent monitored anesthesia care, which was administered by an anesthesia professional  The patient's blood pressure, heart rate, level of consciousness, respirations and oxygen were monitored throughout the procedure  The scope was advanced to the second part of the duodenum  Retroflexion was performed in the fundus  The patient experienced no blood loss  The procedure was not difficult  The patient tolerated the procedure well  There were no apparent complications  ANESTHESIA INFORMATION: ASA: IV Anesthesia Type: Anesthesia type not filed in the log  MEDICATIONS: No administrations occurring from 1443 to 1457 on 05/11/23 FINDINGS: 2 cm sliding hiatal hernia (type I hiatal hernia) - GE junction 36 cm from the incisors, diaphragmatic impression 38 cm from the incisors Small and minimal varices in the lower third of the esophagus; no bleeding was identified Mild portal hypertensive gastropathy in the gastric fundus  No blood seen  The duodenum appeared normal  SPECIMENS: * No specimens in log *     Impression: Small sliding hiatal hernia  Tiny varices at the GE junction  Mild portal hypertensive gastropathy  No blood in the stomach  Normal duodenum  No interventions performed  RECOMMENDATION: Proceed with colonoscopy  Demarco Ramos MD     Procedure: Colonoscopy    Result Date: 5/11/2023  Narrative: Table formatting from the original result was not included   Jessica Casas [facility] [id number] [phone] DATE OF SERVICE: 5/11/23 PHYSICIAN(S): Attending: Demarco Ramos MD Fellow: No Staff Documented INDICATION: Tarry stools, Anemia, unspecified type, Left lower quadrant abdominal pain POST-OP DIAGNOSIS: See the impression below  HISTORY: Prior colonoscopy: More than 10 years ago  BOWEL PREPARATION:  PREPROCEDURE: Informed consent was obtained for the procedure, including sedation  Risks including but not limited to bleeding, infection, perforation, adverse drug reaction and aspiration were explained in detail  Also explained about less than 100% sensitivity with the exam and other alternatives  The patient was placed in the left lateral decubitus position  Procedure: Colonoscopy DETAILS OF PROCEDURE: Patient was taken to the procedure room where a time out was performed to confirm correct patient and correct procedure  The patient underwent monitored anesthesia care, which was administered by an anesthesia professional  The patient's blood pressure, heart rate, level of consciousness, oxygen and respirations were monitored throughout the procedure  A digital rectal exam was performed  The scope was introduced through the anus and advanced to the terminal ileum  Retroflexion was performed in the rectum  The quality of bowel preparation was evaluated using the [facility] Bowel Preparation Scale with scores of: right colon = 2, transverse colon = 2, left colon = 2  The total BBPS score was 6  Bowel prep was adequate  The patient experienced no blood loss  The procedure was not difficult  The patient tolerated the procedure well  There were no apparent complications  ANESTHESIA INFORMATION: ASA: IV Anesthesia Type: IV Sedation with Anesthesia MEDICATIONS: No administrations occurring from 1443 to 1546 on 05/11/23 FINDINGS: The entire colon appeared normal  The ICV was stenotic and friable, with a shallow ulcer  Initially, this was mistaken for a stricture because the opening did not have the appearance of a normal ICV  Moreover, an appendiceal orifice could not be identified    Attempts to intubate the stricture with a pediatric colonoscope were unsuccessful  Biopsies were taken  An upper scope was then used to reach the stricture  This was difficult due to looping  Upon intubation of the stricture, small bowel was seen, suggesting that this was the ICV  The scope fell out of the small bowel before biopsies could be taken  The terminal ileum appeared normal  A single shallow ulcer was seen in the rectosigmoid colon  Performed pancolonic forceps biopsies to rule out IBD No active bleeding seen  EVENTS: Procedure Events Event Event Time ENDO SCOPE OUT TIME 5/11/2023  3:02 PM ENDO SCOPE OUT TIME 5/11/2023  3:20 PM ENDO SCOPE OUT TIME 5/11/2023  3:46 PM SPECIMENS: ID Type Source Tests Collected by Time Destination 1 : bx colonic stricture Tissue Colon TISSUE EXAM Edgard Valdes MD 5/11/2023  3:16 PM  2 : random colon bx r/o chrons Tissue Colon TISSUE EXAM Edgard Valdes MD 5/11/2023  3:43 PM  EQUIPMENT: Colonoscope -     Impression: Stricture and mucosal friability and shallow ulceration at the ileocecal valve  Single shallow ulcer in the rectosigmoid colon  Most likely explanation for these findings is Crohn's disease  RECOMMENDATION:  Await pathology results  No further screening colonoscopies necessary  Age greater than 72  Other recommendations per inpatient GI team  Consider outpatient small bowel imaging  Edgard Valdes MD     Procedure: FK liver doppler only    Result Date: 5/11/2023  Narrative: RIGHT UPPER QUADRANT ULTRASOUND WITH LIVER DOPPLER INDICATION:     Cirrhosis    COMPARISON: CT abdomen pelvis May 1, 2023 TECHNIQUE:   Real-time ultrasound of the right upper quadrant vasculature was performed with a curvilinear transducer with both volumetric sweeps and still imaging techniques  FINDINGS: LIVER DOPPLER: The main portal vein and primary branch segments are patent and hepatopetal with normal spectral waveform  Hepatic veins are patent  Spectral waveforms within normal limits    Main hepatic artery appears normal size, patent with normal spectral waveform  Impression: No portal venous thrombus   Workstation performed: [id number]       Current Facility-Administered Medications   Medication Dose Route Frequency   • acetaminophen (TYLENOL) tablet 650 mg  650 mg Oral Q6H PRN   • barium (READI-CAT 2) suspension 900 mL  900 mL Oral Once in imaging   • bumetanide (BUMEX) injection 2 mg  2 mg Intravenous BID   • dicyclomine (BENTYL) capsule 10 mg  10 mg Oral TID PRN   • erythromycin (ILOTYCIN) 0 5 % ophthalmic ointment 0 5 inch  0 5 inch Left Eye BID   • HYDROmorphone (DILAUDID) injection 0 5 mg  0 5 mg Intravenous Q4H PRN   • levothyroxine tablet 75 mcg  75 mcg Oral Early Morning   • lidocaine (LIDODERM) 5 % patch 1 patch  1 patch Topical Daily   • midodrine (PROAMATINE) tablet 2 5 mg  2 5 mg Oral TID AC   • octreotide (SandoSTATIN) injection 100 mcg  100 mcg Subcutaneous Q8H [address]   • ondansetron (ZOFRAN) injection 4 mg  4 mg Intravenous Q6H PRN   • [START ON 5/13/2023] pantoprazole (PROTONIX) EC tablet 40 mg  40 mg Oral Early Morning   • traZODone (DESYREL) tablet 50 mg  50 mg Oral HS     Medications Discontinued During This Encounter   Medication Reason   • polyethylene glycol (GLYCOLAX) 17 GM/SCOOP powder Therapy completed   • ciprofloxacin (CIPRO) IVPB (premix in 5% dextrose) 400 mg 200 mL    • metroNIDAZOLE (FLAGYL) IVPB (premix) 500 mg 100 mL    • dextrose 5 % and sodium chloride 0 45 % with KCl 20 mEq/L infusion    • sodium chloride 0 9 % infusion    • polyethylene glycol (MIRALAX) packet 17 g    • ciprofloxacin (CIPRO) tablet 500 mg    • metroNIDAZOLE (FLAGYL) tablet 500 mg    • sodium bicarbonate tablet 650 mg    • enoxaparin (LOVENOX) subcutaneous injection 40 mg    • furosemide (LASIX) injection 80 mg    • multi-electrolyte (PLASMALYTE-A/ISOLYTE-S PH 7 4) IV solution    • metoprolol succinate (TOPROL-XL) 24 hr tablet 25 mg    • midodrine (PROAMATINE) tablet 2 5 mg    • enoxaparin (LOVENOX) subcutaneous injection 30 mg    • metoprolol succinate (TOPROL-XL) 24 hr tablet 12 5 mg    • magnesium hydroxide (MILK OF MAGNESIA) oral suspension 30 mL    • polyethylene glycol (MIRALAX) packet 17 g    • morphine injection 4 mg    • multi-electrolyte (PLASMALYTE-A/ISOLYTE-S PH 7 4) IV solution    • heparin (porcine) subcutaneous injection 5,000 Units    • pantoprazole (PROTONIX) EC tablet 40 mg    • albumin human (FLEXBUMIN) 25 % injection 12 5 g    • bumetanide (BUMEX) injection 1 mg    • bumetanide (BUMEX) injection 0 5 mg    • midodrine (PROAMATINE) tablet 5 mg    • pantoprazole (PROTONIX) EC tablet 40 mg    • bumetanide (BUMEX) injection 1 mg        [place], DO      This progress note was produced in part using a dictation device which may document imprecise wording from author's original intent

## 2023-05-13 LAB
ALBUMIN SERPL BCP-MCNC: 3.3 G/DL (ref 3.5–5)
ALP SERPL-CCNC: 434 U/L (ref 34–104)
ALT SERPL W P-5'-P-CCNC: 22 U/L (ref 7–52)
ANION GAP SERPL CALCULATED.3IONS-SCNC: 8 MMOL/L (ref 4–13)
AST SERPL W P-5'-P-CCNC: 57 U/L (ref 13–39)
BILIRUB SERPL-MCNC: 2.02 MG/DL (ref 0.2–1)
BUN SERPL-MCNC: 21 MG/DL (ref 5–25)
CALCIUM ALBUM COR SERPL-MCNC: 9.1 MG/DL (ref 8.3–10.1)
CALCIUM SERPL-MCNC: 8.5 MG/DL (ref 8.4–10.2)
CHLORIDE SERPL-SCNC: 96 MMOL/L (ref 96–108)
CO2 SERPL-SCNC: 32 MMOL/L (ref 21–32)
CREAT SERPL-MCNC: 1.24 MG/DL (ref 0.6–1.3)
ERYTHROCYTE [DISTWIDTH] IN BLOOD BY AUTOMATED COUNT: 16.2 % (ref 11.6–15.1)
GFR SERPL CREATININE-BSD FRML MDRD: 42 ML/MIN/1.73SQ M
GLUCOSE SERPL-MCNC: 90 MG/DL (ref 65–140)
HCT VFR BLD AUTO: 22.7 % (ref 34.8–46.1)
HGB BLD-MCNC: 7.8 G/DL (ref 11.5–15.4)
MAGNESIUM SERPL-MCNC: 1.5 MG/DL (ref 1.9–2.7)
MCH RBC QN AUTO: 35 PG (ref 26.8–34.3)
MCHC RBC AUTO-ENTMCNC: 34.4 G/DL (ref 31.4–37.4)
MCV RBC AUTO: 102 FL (ref 82–98)
PHOSPHATE SERPL-MCNC: 2.8 MG/DL (ref 2.3–4.1)
PLATELET # BLD AUTO: 44 THOUSANDS/UL (ref 149–390)
PMV BLD AUTO: 11.4 FL (ref 8.9–12.7)
POTASSIUM SERPL-SCNC: 3.6 MMOL/L (ref 3.5–5.3)
PROT SERPL-MCNC: 6 G/DL (ref 6.4–8.4)
RBC # BLD AUTO: 2.23 MILLION/UL (ref 3.81–5.12)
SODIUM SERPL-SCNC: 136 MMOL/L (ref 135–147)
WBC # BLD AUTO: 5.88 THOUSAND/UL (ref 4.31–10.16)

## 2023-05-13 PROCEDURE — 99232 SBSQ HOSP IP/OBS MODERATE 35: CPT | Performed by: INTERNAL MEDICINE

## 2023-05-13 PROCEDURE — 99232 SBSQ HOSP IP/OBS MODERATE 35: CPT | Performed by: PHYSICIAN ASSISTANT

## 2023-05-13 PROCEDURE — 84100 ASSAY OF PHOSPHORUS: CPT | Performed by: INTERNAL MEDICINE

## 2023-05-13 PROCEDURE — 85027 COMPLETE CBC AUTOMATED: CPT | Performed by: PHYSICIAN ASSISTANT

## 2023-05-13 PROCEDURE — 80053 COMPREHEN METABOLIC PANEL: CPT | Performed by: INTERNAL MEDICINE

## 2023-05-13 PROCEDURE — 83735 ASSAY OF MAGNESIUM: CPT | Performed by: INTERNAL MEDICINE

## 2023-05-13 RX ORDER — MAGNESIUM SULFATE HEPTAHYDRATE 40 MG/ML
2 INJECTION, SOLUTION INTRAVENOUS ONCE
Status: COMPLETED | OUTPATIENT
Start: 2023-05-13 | End: 2023-05-13

## 2023-05-13 RX ADMIN — LIDOCAINE 1 PATCH: 700 PATCH TOPICAL at 09:46

## 2023-05-13 RX ADMIN — HYDROMORPHONE HYDROCHLORIDE 0.5 MG: 1 INJECTION, SOLUTION INTRAMUSCULAR; INTRAVENOUS; SUBCUTANEOUS at 07:52

## 2023-05-13 RX ADMIN — LEVOTHYROXINE SODIUM 75 MCG: 75 TABLET ORAL at 06:25

## 2023-05-13 RX ADMIN — ERYTHROMYCIN 0.5 INCH: 5 OINTMENT OPHTHALMIC at 09:48

## 2023-05-13 RX ADMIN — PANTOPRAZOLE SODIUM 40 MG: 40 TABLET, DELAYED RELEASE ORAL at 06:25

## 2023-05-13 RX ADMIN — OCTREOTIDE ACETATE 100 MCG: 100 INJECTION, SOLUTION INTRAVENOUS; SUBCUTANEOUS at 21:11

## 2023-05-13 RX ADMIN — TRAZODONE HYDROCHLORIDE 50 MG: 50 TABLET ORAL at 21:11

## 2023-05-13 RX ADMIN — BUMETANIDE 2 MG: 0.25 INJECTION INTRAMUSCULAR; INTRAVENOUS at 09:46

## 2023-05-13 RX ADMIN — OCTREOTIDE ACETATE 100 MCG: 100 INJECTION, SOLUTION INTRAVENOUS; SUBCUTANEOUS at 15:00

## 2023-05-13 RX ADMIN — MAGNESIUM SULFATE HEPTAHYDRATE 2 G: 40 INJECTION, SOLUTION INTRAVENOUS at 09:46

## 2023-05-13 RX ADMIN — ERYTHROMYCIN 0.5 INCH: 5 OINTMENT OPHTHALMIC at 17:54

## 2023-05-13 RX ADMIN — HYDROMORPHONE HYDROCHLORIDE 0.5 MG: 1 INJECTION, SOLUTION INTRAMUSCULAR; INTRAVENOUS; SUBCUTANEOUS at 21:14

## 2023-05-13 RX ADMIN — OCTREOTIDE ACETATE 100 MCG: 100 INJECTION, SOLUTION INTRAVENOUS; SUBCUTANEOUS at 06:25

## 2023-05-13 RX ADMIN — BUMETANIDE 2 MG: 0.25 INJECTION INTRAMUSCULAR; INTRAVENOUS at 17:55

## 2023-05-13 NOTE — PROGRESS NOTES
"Progress Note - Nephrology   Juancho Borrero 69 y o  female MRN: [id number]  Unit/Bed#: -01 Encounter: [id number]    A/P:  1  Acute kidney injury due to hepatorenal syndrome versus volume depletion   Patient creatinine now stable at around 1 2 mg/dL  She is currently being diuresed, Bumex was adjusted up to 2 mg twice daily yesterday with a drop in weight from 59 4 kg down to 58 kg  Creatinine is stable, continue to monitor closely  2   Chronic disease stage III with baseline creatinine potentially between 0 8-1 mg/dL  3  Volume overload   Continue with current diuresis which is Bumex 2 mg twice daily, she appears to be responding with this dosing  Continue with low-sodium diet  4   Hypomagnesemia   Continue to monitor and add supplemental magnesium as indicated, currently the patient is having a 2 g infusion of magnesium sulfate  5   Cirrhosis   Continue care and treatment as indicated according to gastroenterology and hospitalist   6   Left lower quadrant abdominal pain   Continue care according to hospitalist and gastroenterology/surgery  Follow up reason for today's visit: Acute kidney injury/chronic kidney disease/volume overload    Left lower quadrant abdominal pain    Patient Active Problem List   Diagnosis   • Acute kidney injury ([facility] [address] )   • Diarrhea of presumed infectious origin   • Primary hypertension   • Acquired hypothyroidism   • Irritable bowel syndrome with diarrhea   • Abnormal CT scan   • Superior mesenteric artery stenosis (HCC)   • Left lower quadrant abdominal pain   • Hyponatremia   • PAF (paroxysmal atrial fibrillation) (HCC)   • Hx of CABG   • Anemia   • Tarry stools         Subjective:   Negative distress, patient is eating and drinking well, no nausea or vomiting although she continues to have generalized abdominal pain    Objective:     Vitals: Blood pressure 166/70, pulse 86, temperature 97 9 °F (36 6 °C), resp   rate 18, height 4' 11\" (1 499 m), weight 58 kg (127 lb 13 9 " "oz), SpO2 94 %  ,Body mass index is 25 83 kg/m²  Weight (last 2 days)     Date/Time Weight    05/13/23 0625 58 (127 87)    05/12/23 0600 59 4 (130 95)    05/11/23 1300 59 (130)    05/11/23 0600 59 2 (130 51)            Intake/Output Summary (Last 24 hours) at 5/13/2023 1220  Last data filed at 5/13/2023 0939  Gross per 24 hour   Intake 540 ml   Output 1300 ml   Net -760 ml     I/O last 3 completed shifts: In: 700 [P O :700]  Out: 2200 [Urine:2200]         Physical Exam: /70   Pulse 86   Temp 97 9 °F (36 6 °C)   Resp 18   Ht 4' 11\" (1 499 m)   Wt 58 kg (127 lb 13 9 oz)   SpO2 94%   BMI 25 83 kg/m²     General Appearance:    Alert, cooperative, no distress, appears stated age   Head:    Normocephalic, without obvious abnormality, atraumatic   Eyes:    Conjunctiva/corneas clear   Ears:    Normal external ears   Nose:   Nares normal, septum midline, mucosa normal, no drainage    or sinus tenderness   Throat:   Lips, mucosa, and tongue normal; teeth and gums normal   Neck:   Supple   Back:     Symmetric, no curvature, ROM normal, no CVA tenderness   Lungs:     Clear to auscultation bilaterally, respirations unlabored   Chest wall:    No tenderness or deformity   Heart:    Regular rate and rhythm, S1 and S2 normal, no murmur, rub   or gallop   Abdomen:     Soft, non-tender, bowel sounds active   Extremities:   Extremities normal, atraumatic, no cyanosis, +3 bilateral lower extremity edema of the presacral region   Skin:   Skin color, texture, turgor normal, no rashes or lesions   Lymph nodes:   Cervical normal   Neurologic:   CNII-XII intact            Lab, Imaging and other studies: I have personally reviewed pertinent labs    CBC:   Lab Results   Component Value Date    WBC 5 88 05/13/2023    HGB 7 8 (L) 05/13/2023    HCT 22 7 (L) 05/13/2023     (H) 05/13/2023    PLT 44 (LL) 05/13/2023    MCH 35 0 (H) 05/13/2023    MCHC 34 4 05/13/2023    RDW 16 2 (H) 05/13/2023    MPV 11 4 05/13/2023     CMP: " Lab Results   Component Value Date    K 3 6 05/13/2023    CL 96 05/13/2023    CO2 32 05/13/2023    BUN 21 05/13/2023    CREATININE 1 24 05/13/2023    CALCIUM 8 5 05/13/2023    AST 57 (H) 05/13/2023    ALT 22 05/13/2023    ALKPHOS 434 (H) 05/13/2023    EGFR 42 05/13/2023         Results from last 7 days   Lab Units 05/13/23  0433 05/12/23  0518 05/11/23  1148 05/10/23  1741 05/10/23  0505   POTASSIUM mmol/L 3 6 5 0 3 6   < > 4 1   CHLORIDE mmol/L 96 98 98   < > 102   CO2 mmol/L 32 28 27   < > 25   BUN mg/dL 21 24 26*   < > 33*   CREATININE mg/dL 1 24 1 23 1 23   < > 1 20   CALCIUM mg/dL 8 5 8 7 9 2   < > 9 0   ALK PHOS U/L 434*  --  505*  --  464*   ALT U/L 22  --  26  --  22   AST U/L 57*  --  72*  --  62*    < > = values in this interval not displayed  Phosphorus:   Lab Results   Component Value Date    PHOS 2 8 05/13/2023     Magnesium:   Lab Results   Component Value Date    MG 1 5 (L) 05/13/2023     Urinalysis: No results found for: Zoanne Jeong, SPECGRAV, PHUR, LEUKOCYTESUR, NITRITE, PROTEINUA, GLUCOSEU, KETONESU, BILIRUBINUR, BLOODU  Ionized Calcium: No results found for: CAION  Coagulation: No results found for: PT, INR, APTT  Troponin: No results found for: TROPONINI  ABG: No results found for: PHART, QEY2UDE, PO2ART, VAU9AJK, D4LNITAF, BEART, SOURCE  Radiology review:     IMAGING  Procedure: EGD    Result Date: 5/11/2023  Narrative: Table formatting from the original result was not included  Jamarcus Saeed [facility] [id number] [phone] DATE OF SERVICE: 5/11/23 PHYSICIAN(S): Attending: Jennie Mohamud MD Fellow: No Staff Documented INDICATION: Anemia, unspecified type, Hepatic cirrhosis, unspecified hepatic cirrhosis type, unspecified whether ascites present ([address] ) POST-OP DIAGNOSIS: See the impression below  PREPROCEDURE: Informed consent was obtained for the procedure, including sedation    Risks of perforation, hemorrhage, adverse drug reaction and aspiration were discussed  The patient was placed in the left lateral decubitus position  Patient was explained about the risks and benefits of the procedure  Risks including but not limited to bleeding, infection, and perforation were explained in detail  Also explained about less than 100% sensitivity with the exam and other alternatives  PROCEDURE: EGD DETAILS OF PROCEDURE: Patient was taken to the procedure room where a time out was performed to confirm correct patient and correct procedure  The patient underwent monitored anesthesia care, which was administered by an anesthesia professional  The patient's blood pressure, heart rate, level of consciousness, respirations and oxygen were monitored throughout the procedure  The scope was advanced to the second part of the duodenum  Retroflexion was performed in the fundus  The patient experienced no blood loss  The procedure was not difficult  The patient tolerated the procedure well  There were no apparent complications  ANESTHESIA INFORMATION: ASA: IV Anesthesia Type: Anesthesia type not filed in the log  MEDICATIONS: No administrations occurring from 1443 to 1457 on 05/11/23 FINDINGS: 2 cm sliding hiatal hernia (type I hiatal hernia) - GE junction 36 cm from the incisors, diaphragmatic impression 38 cm from the incisors Small and minimal varices in the lower third of the esophagus; no bleeding was identified Mild portal hypertensive gastropathy in the gastric fundus  No blood seen  The duodenum appeared normal  SPECIMENS: * No specimens in log *     Impression: Small sliding hiatal hernia  Tiny varices at the GE junction  Mild portal hypertensive gastropathy  No blood in the stomach  Normal duodenum  No interventions performed  RECOMMENDATION: Proceed with colonoscopy  Dale Rivas MD     Procedure: Colonoscopy    Result Date: 5/11/2023  Narrative: Table formatting from the original result was not included   [address]  [place] [id number] [phone] DATE OF SERVICE: 5/11/23 PHYSICIAN(S): Attending: Edgar Leon MD Fellow: No Staff Documented INDICATION: Tarry stools, Anemia, unspecified type, Left lower quadrant abdominal pain POST-OP DIAGNOSIS: See the impression below  HISTORY: Prior colonoscopy: More than 10 years ago  BOWEL PREPARATION:  PREPROCEDURE: Informed consent was obtained for the procedure, including sedation  Risks including but not limited to bleeding, infection, perforation, adverse drug reaction and aspiration were explained in detail  Also explained about less than 100% sensitivity with the exam and other alternatives  The patient was placed in the left lateral decubitus position  Procedure: Colonoscopy DETAILS OF PROCEDURE: Patient was taken to the procedure room where a time out was performed to confirm correct patient and correct procedure  The patient underwent monitored anesthesia care, which was administered by an anesthesia professional  The patient's blood pressure, heart rate, level of consciousness, oxygen and respirations were monitored throughout the procedure  A digital rectal exam was performed  The scope was introduced through the anus and advanced to the terminal ileum  Retroflexion was performed in the rectum  The quality of bowel preparation was evaluated using the [facility] Bowel Preparation Scale with scores of: right colon = 2, transverse colon = 2, left colon = 2  The total BBPS score was 6  Bowel prep was adequate  The patient experienced no blood loss  The procedure was not difficult  The patient tolerated the procedure well  There were no apparent complications  ANESTHESIA INFORMATION: ASA: IV Anesthesia Type: IV Sedation with Anesthesia MEDICATIONS: No administrations occurring from 1443 to 1546 on 05/11/23 FINDINGS: The entire colon appeared normal  The ICV was stenotic and friable, with a shallow ulcer    Initially, this was mistaken for a stricture because the opening did not have the appearance of a normal ICV  Moreover, an appendiceal orifice could not be identified  Attempts to intubate the stricture with a pediatric colonoscope were unsuccessful  Biopsies were taken  An upper scope was then used to reach the stricture  This was difficult due to looping  Upon intubation of the stricture, small bowel was seen, suggesting that this was the ICV  The scope fell out of the small bowel before biopsies could be taken  The terminal ileum appeared normal  A single shallow ulcer was seen in the rectosigmoid colon  Performed pancolonic forceps biopsies to rule out IBD No active bleeding seen  EVENTS: Procedure Events Event Event Time ENDO SCOPE OUT TIME 5/11/2023  3:02 PM ENDO SCOPE OUT TIME 5/11/2023  3:20 PM ENDO SCOPE OUT TIME 5/11/2023  3:46 PM SPECIMENS: ID Type Source Tests Collected by Time Destination 1 : bx colonic stricture Tissue Colon TISSUE EXAM Edgar Leon MD 5/11/2023  3:16 PM  2 : random colon bx r/o chrons Tissue Colon TISSUE EXAM Edgar Leon MD 5/11/2023  3:43 PM  EQUIPMENT: Colonoscope -     Impression: Stricture and mucosal friability and shallow ulceration at the ileocecal valve  Single shallow ulcer in the rectosigmoid colon  Most likely explanation for these findings is Crohn's disease  RECOMMENDATION:  Await pathology results  No further screening colonoscopies necessary  Age greater than 72  Other recommendations per inpatient GI team  Consider outpatient small bowel imaging  Edgar Leon MD     Procedure: DY liver doppler only    Result Date: 5/11/2023  Narrative: RIGHT UPPER QUADRANT ULTRASOUND WITH LIVER DOPPLER INDICATION:     Cirrhosis    COMPARISON: CT abdomen pelvis May 1, 2023 TECHNIQUE:   Real-time ultrasound of the right upper quadrant vasculature was performed with a curvilinear transducer with both volumetric sweeps and still imaging techniques   FINDINGS: LIVER DOPPLER: The main portal vein and primary branch segments are patent and hepatopetal with normal spectral waveform  Hepatic veins are patent  Spectral waveforms within normal limits  Main hepatic artery appears normal size, patent with normal spectral waveform  Impression: No portal venous thrombus   Workstation performed: [id number]       Current Facility-Administered Medications   Medication Dose Route Frequency   • acetaminophen (TYLENOL) tablet 650 mg  650 mg Oral Q6H PRN   • barium (READI-CAT 2) suspension 900 mL  900 mL Oral Once in imaging   • bumetanide (BUMEX) injection 2 mg  2 mg Intravenous BID   • dicyclomine (BENTYL) capsule 10 mg  10 mg Oral TID PRN   • erythromycin (ILOTYCIN) 0 5 % ophthalmic ointment 0 5 inch  0 5 inch Left Eye BID   • HYDROmorphone (DILAUDID) injection 0 5 mg  0 5 mg Intravenous Q4H PRN   • levothyroxine tablet 75 mcg  75 mcg Oral Early Morning   • lidocaine (LIDODERM) 5 % patch 1 patch  1 patch Topical Daily   • midodrine (PROAMATINE) tablet 2 5 mg  2 5 mg Oral TID AC   • octreotide (SandoSTATIN) injection 100 mcg  100 mcg Subcutaneous Q8H [facility]   • ondansetron (ZOFRAN) injection 4 mg  4 mg Intravenous Q6H PRN   • pantoprazole (PROTONIX) EC tablet 40 mg  40 mg Oral Early Morning   • traZODone (DESYREL) tablet 50 mg  50 mg Oral HS     Medications Discontinued During This Encounter   Medication Reason   • polyethylene glycol (GLYCOLAX) 17 GM/SCOOP powder Therapy completed   • ciprofloxacin (CIPRO) IVPB (premix in 5% dextrose) 400 mg 200 mL    • metroNIDAZOLE (FLAGYL) IVPB (premix) 500 mg 100 mL    • dextrose 5 % and sodium chloride 0 45 % with KCl 20 mEq/L infusion    • sodium chloride 0 9 % infusion    • polyethylene glycol (MIRALAX) packet 17 g    • ciprofloxacin (CIPRO) tablet 500 mg    • metroNIDAZOLE (FLAGYL) tablet 500 mg    • sodium bicarbonate tablet 650 mg    • enoxaparin (LOVENOX) subcutaneous injection 40 mg    • furosemide (LASIX) injection 80 mg    • multi-electrolyte (PLASMALYTE-A/ISOLYTE-S PH 7 4) IV solution    • metoprolol succinate (TOPROL-XL) 24 hr tablet 25 mg    • midodrine (PROAMATINE) tablet 2 5 mg    • enoxaparin (LOVENOX) subcutaneous injection 30 mg    • metoprolol succinate (TOPROL-XL) 24 hr tablet 12 5 mg    • magnesium hydroxide (MILK OF MAGNESIA) oral suspension 30 mL    • polyethylene glycol (MIRALAX) packet 17 g    • morphine injection 4 mg    • multi-electrolyte (PLASMALYTE-A/ISOLYTE-S PH 7 4) IV solution    • heparin (porcine) subcutaneous injection 5,000 Units    • pantoprazole (PROTONIX) EC tablet 40 mg    • albumin human (FLEXBUMIN) 25 % injection 12 5 g    • bumetanide (BUMEX) injection 1 mg    • bumetanide (BUMEX) injection 0 5 mg    • midodrine (PROAMATINE) tablet 5 mg    • pantoprazole (PROTONIX) EC tablet 40 mg    • bumetanide (BUMEX) injection 1 mg        Laura Marino, DO      This progress note was produced in part using a dictation device which may document imprecise wording from author's original intent

## 2023-05-13 NOTE — PROGRESS NOTES
[address]  67   Progress Note  Name: Endy Snider  MRN: [id number]  Unit/Bed#: -01 I Date of Admission: 4/27/2023   Date of Service: 5/13/2023 I Hospital Day: 12    Assessment/Plan   * Left lower quadrant abdominal pain  Assessment & Plan  · Presented to the ED with left lower quadrant abdominal pain on 4/27   · Pain improving  · CT abdomen: New small amount of free fluid in the abdomen and pelvis compared to 4/18/2023, which can be secondary to cirrhosis or acute inflammatory process in the abdomen and pelvis such as occult acute diverticulitis  Moderate amount of stool in the colon, nonspecific and can represent constipation  · Surgery following, appreciate recommendations  · GI following, appreciate recommendations  · Patient had flex sig on 5/2 minimal inflammation noted, biopsies normal  · KUB (5/5):  Nonobstructive bowel gas pattern  · Colonoscopy showed signs concerning for crohn's dx  · Outpatient follow-up with GI    Tarry stools  Assessment & Plan  · Nursing reported black tarry stools on 5/8 to 5/9- no additional black or bloody stools  · Stools are heme +  · hgb stable at  7 8  · S/p EGD/Colonoscopy 5/11- colonoscopy noted stricture and mucosal friability and shallow ulceration at the IC value and a single shallow ulcer in the rectosigmoid colon concerning for Crohn's disease  · Outpatient follow-up with GI for possible small bowel enterography for evaluation of possible small bowel crohn's disease  · Continue to monitor H&H    Acute kidney injury [facility])  Assessment & Plan  • Baseline creatinine if 0 8 to 1  • Creatinine currently stable at 1 2  • Nephrology following, appreciate recommendations- continue diuresis for volume overload  • Continue midodrine- dose decreased 5/11, continue octreotide per nephrology  • albumin discontinued 5/10  • Avoid hypotension and nephrotoxins  • I&O's, daily weights  • BMP a m           Anemia  Assessment & Plan  · Baseline appears to "be between 11 and 12   · Patient also noted to have platelet ct of 85 that decreased to 44 today- continue to monitor  · Please see plan for \"tarry stools\"    Hx of CABG  Assessment & Plan  · Currently stable  · Losartan on hold due to hypotension    PAF (paroxysmal atrial fibrillation) (HCC)  Assessment & Plan  · Rate controlled on current regimen  · Continue midodrine -decreased dose 5/11  · Home metoprolol on hold due to hypotension    Hyponatremia  Assessment & Plan  · Likely due to cirrhosis   · Now resolved   · S/p IVF  · Continue management as above  · Patient edematous 5/10- albumin stopped by nephrology   · Bumex increased to 2 mg BID- by nephrology on 5/12    Acquired hypothyroidism  Assessment & Plan  · Continue Synthroid    Primary hypertension  Assessment & Plan  · Patient with hypotension- now blood pressure normalized  · Continue midodrine- decreased to 2 5 mg tid by nephrology on 5/11  · Losartan was discontinued due to ALEJANDRA   · Metoprolol held due to hypotension, will continue to monitor            VTE Pharmacologic Prophylaxis: VTE Score: 3 Moderate Risk (Score 3-4) - Pharmacological DVT Prophylaxis Contraindicated  Sequential Compression Devices Ordered  Patient Centered Rounds: I performed bedside rounds with nursing staff today  Discussions with Specialists or Other Care Team Provider: nursing      Total Time Spent on Date of Encounter in care of patient: 35 minutes This time was spent on one or more of the following: performing physical exam; counseling and coordination of care; obtaining or reviewing history; documenting in the medical record; reviewing/ordering tests, medications or procedures; communicating with other healthcare professionals and discussing with patient's family/caregivers      Current Length of Stay: 16 day(s)  Current Patient Status: Inpatient   Certification Statement: The patient will continue to require additional inpatient hospital stay due to continued need for " diuresis, monitoring labs  Discharge Plan: Anticipate discharge in 48-72 hrs to prior assisted or independent living facility  Code Status: Level 1 - Full Code    Subjective: The patient was seen and examined  She states overall she is feeling better  She continues to have swelling of her legs  Objective:     Vitals:   Temp (24hrs), Av 8 °F (36 6 °C), Min:97 7 °F (36 5 °C), Max:97 9 °F (36 6 °C)    Temp:  [97 7 °F (36 5 °C)-97 9 °F (36 6 °C)] 97 9 °F (36 6 °C)  HR:  [73-86] 86  Resp:  [18-19] 18  BP: (141-166)/(50-70) 166/70  SpO2:  [91 %-94 %] 94 %  Body mass index is 25 83 kg/m²  Input and Output Summary (last 24 hours): Intake/Output Summary (Last 24 hours) at 2023 1431  Last data filed at 2023 1230  Gross per 24 hour   Intake 660 ml   Output 1750 ml   Net -1090 ml       Physical Exam:   Physical Exam  Vitals and nursing note reviewed  Constitutional:       General: She is awake  Appearance: Normal appearance  HENT:      Head: Normocephalic and atraumatic  Cardiovascular:      Rate and Rhythm: Normal rate and regular rhythm  Pulmonary:      Effort: Pulmonary effort is normal       Breath sounds: Normal breath sounds  Abdominal:      Palpations: Abdomen is soft  Tenderness: There is generalized abdominal tenderness  There is no guarding or rebound  Musculoskeletal:      Right lower leg: Edema present  Left lower leg: Edema present  Skin:     General: Skin is warm and dry  Neurological:      General: No focal deficit present  Mental Status: She is alert and oriented to person, place, and time  Psychiatric:         Attention and Perception: Attention normal          Mood and Affect: Mood normal          Speech: Speech normal          Behavior: Behavior is cooperative            Additional Data:     Labs:  Results from last 7 days   Lab Units 23  0433 23  0454 23  1554   WBC Thousand/uL 5 88   < > 7 38   HEMOGLOBIN g/dL 7 8*   < > 9 6*   HEMATOCRIT % 22 7*   < > 27 6*   PLATELETS Thousands/uL 44*   < > 88*   NEUTROS PCT %  --   --  55   LYMPHS PCT %  --   --  21   MONOS PCT %  --   --  19*   EOS PCT %  --   --  3    < > = values in this interval not displayed  Results from last 7 days   Lab Units 05/13/23  0433   SODIUM mmol/L 136   POTASSIUM mmol/L 3 6   CHLORIDE mmol/L 96   CO2 mmol/L 32   BUN mg/dL 21   CREATININE mg/dL 1 24   ANION GAP mmol/L 8   CALCIUM mg/dL 8 5   ALBUMIN g/dL 3 3*   TOTAL BILIRUBIN mg/dL 2 02*   ALK PHOS U/L 434*   ALT U/L 22   AST U/L 57*   GLUCOSE RANDOM mg/dL 90     Results from last 7 days   Lab Units 05/10/23  0505   INR  1 92*     Results from last 7 days   Lab Units 05/11/23  1158   POC GLUCOSE mg/dl 128               Lines/Drains:  Invasive Devices     Peripheral Intravenous Line  Duration           Peripheral IV 05/11/23 Left;Proximal;Upper;Ventral (anterior) Arm 2 days                      Imaging: No pertinent imaging reviewed      Recent Cultures (last 7 days):         Last 24 Hours Medication List:   Current Facility-Administered Medications   Medication Dose Route Frequency Provider Last Rate   • acetaminophen  650 mg Oral Q6H PRN Chava Sher MD     • barium  900 mL Oral Once in imaging Marck Dickey MD     • bumetanide  2 mg Intravenous BID DO Helio     • dicyclomine  10 mg Oral TID PRN Linden Bales MD     • erythromycin  0 5 inch Left Eye BID Marck Dickey MD     • HYDROmorphone  0 5 mg Intravenous Q4H PRN EBEN Hooker     • levothyroxine  75 mcg Oral Early Morning Chava Sher MD     • lidocaine  1 patch Topical Daily EBEN Hooker     • midodrine  2 5 mg Oral TID AC DO Helio     • octreotide  100 mcg Subcutaneous [facility] EBEN Wilburn     • ondansetron  4 mg Intravenous Q6H PRN Marck Dickey MD     • pantoprazole  40 mg Oral Early Morning [place], [place]     • traZODone  50 mg Oral  Maryuri Ayana Rodarte PA-C          Today, Patient Was Seen By: Pat Tran PA-C    **Please Note: This note may have been constructed using a voice recognition system  **

## 2023-05-13 NOTE — ASSESSMENT & PLAN NOTE
"· Baseline appears to be between 11 and 12   · Patient also noted to have platelet ct of 85 that decreased to 44 today- continue to monitor    · Please see plan for \"tarry stools\"  "

## 2023-05-13 NOTE — ASSESSMENT & PLAN NOTE
· Patient with hypotension- now blood pressure normalized  · Continue midodrine- decreased to 2 5 mg tid by nephrology on 5/11  · Losartan was discontinued due to ALEJANDRA   · Metoprolol held due to hypotension, will continue to monitor

## 2023-05-13 NOTE — NURSING NOTE
Patient awake and alert, complaint of pain to lower back and L abdomen, 8 out of 10  No nausea, passing gas    Call bell in reach

## 2023-05-13 NOTE — ASSESSMENT & PLAN NOTE
• Baseline creatinine if 0 8 to 1  • Creatinine currently stable at 1 2  • Nephrology following, appreciate recommendations- continue diuresis for volume overload  • Continue midodrine- dose decreased 5/11, continue octreotide per nephrology  • albumin discontinued 5/10  • Avoid hypotension and nephrotoxins  • I&O's, daily weights  • BMP a m

## 2023-05-13 NOTE — PLAN OF CARE
Problem: PAIN - ADULT  Goal: Verbalizes/displays adequate comfort level or baseline comfort level  Description: Interventions:  - Encourage patient to monitor pain and request assistance  - Assess pain using appropriate pain scale  - Administer analgesics based on type and severity of pain and evaluate response  - Implement non-pharmacological measures as appropriate and evaluate response  - Consider cultural and social influences on pain and pain management  - Notify physician/advanced practitioner if interventions unsuccessful or patient reports new pain  Outcome: Progressing     Problem: INFECTION - ADULT  Goal: Absence or prevention of progression during hospitalization  Description: INTERVENTIONS:  - Assess and monitor for signs and symptoms of infection  - Monitor lab/diagnostic results  - Monitor all insertion sites, i e  indwelling lines, tubes, and drains  - Monitor endotracheal if appropriate and nasal secretions for changes in amount and color  - Addy appropriate cooling/warming therapies per order  - Administer medications as ordered  - Instruct and encourage patient and family to use good hand hygiene technique  - Identify and instruct in appropriate isolation precautions for identified infection/condition  Outcome: Progressing     Problem: Knowledge Deficit  Goal: Patient/family/caregiver demonstrates understanding of disease process, treatment plan, medications, and discharge instructions  Description: Complete learning assessment and assess knowledge base    Interventions:  - Provide teaching at level of understanding  - Provide teaching via preferred learning methods  Outcome: Progressing

## 2023-05-13 NOTE — ASSESSMENT & PLAN NOTE
· Presented to the ED with left lower quadrant abdominal pain on 4/27   · Pain improving  · CT abdomen: New small amount of free fluid in the abdomen and pelvis compared to 4/18/2023, which can be secondary to cirrhosis or acute inflammatory process in the abdomen and pelvis such as occult acute diverticulitis  Moderate amount of stool in the colon, nonspecific and can represent constipation  · Surgery following, appreciate recommendations  · GI following, appreciate recommendations  · Patient had flex sig on 5/2 minimal inflammation noted, biopsies normal  · KUB (5/5):  Nonobstructive bowel gas pattern  · Colonoscopy showed signs concerning for crohn's dx     · Outpatient follow-up with GI

## 2023-05-13 NOTE — ASSESSMENT & PLAN NOTE
· Nursing reported black tarry stools on 5/8 to 5/9- no additional black or bloody stools  · Stools are heme +  · hgb stable at  7 8  · S/p EGD/Colonoscopy 5/11- colonoscopy noted stricture and mucosal friability and shallow ulceration at the IC value and a single shallow ulcer in the rectosigmoid colon concerning for Crohn's disease  · Outpatient follow-up with GI for possible small bowel enterography for evaluation of possible small bowel crohn's disease     · Continue to monitor H&H

## 2023-05-13 NOTE — PROGRESS NOTES
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: During her colonoscopy on May 9, 2023 she was noted to have an ulcerated stricture at her ileocecal valve as well as a single shallow ulcer in the rectosigmoid colon  These findings are concerning for Crohn's disease and biopsies were taken to confirm the diagnosis  She should also have an outpatient CT enterography to rule out additional small bowel involvement  Possible cirrhosis: She has imaging findings and labs suggestive of cirrhosis including her thrombocytopenia and nodular liver on CT scan  If she remains an inpatient, she could have an interventional radiology guided liver biopsy on Monday to confirm the diagnosis and evaluate the etiology  There was evidence of small esophageal varices during her upper endoscopy and moderate portal hypertensive gastropathy  We appreciate nephrology's input regarding her diuresis for ascites and fortunately there is no evidence of hepatic encephalopathy at this time  Pancreatic cyst: She has a tiny cyst in the tail of her pancreas and should have an outpatient MRI with MRCP for further evaluation  ______________________________________________________________________    Subjective:     She continues to have lower abdominal pain but has not had any bleeding or recent diarrhea or constipation  She said she had a normal bowel movement today  She also denies nausea, vomiting, and reflux      Medication Administration - last 24 hours from 05/12/2023 1747 to 05/13/2023 1747       Date/Time Order Dose Route Action Action by     05/13/2023 0674 EDT levothyroxine tablet 75 mcg 75 mcg Oral Given Mary Villasenor RN     05/12/2023 2112 EDT traZODone (DESYREL) tablet 50 mg 50 mg Oral Given Mary Villasenor RN     05/13/2023 9929 EDT erythromycin (ILOTYCIN) 0 5 % ophthalmic ointment 0 5 inch "0 5 inch Left Eye Given Linda Hayes, [facility]     05/13/2023 1500 EDT octreotide (SandoSTATIN) injection 100 mcg 100 mcg Subcutaneous Given Linda Hayes, [facility]     05/13/2023 3430 EDT octreotide (SandoSTATIN) injection 100 mcg 100 mcg Subcutaneous Given Teresa Venegas RN     05/12/2023 2111 EDT octreotide (SandoSTATIN) injection 100 mcg 100 mcg Subcutaneous Given Teresa Venegas RN     05/13/2023 4331 EDT HYDROmorphone (DILAUDID) injection 0 5 mg 0 5 mg Intravenous Given Linda Hayes RN     05/13/2023 0946 EDT lidocaine (LIDODERM) 5 % patch 1 patch 1 patch Topical Medication Applied Linda Hayes RN     05/12/2023 2112 EDT lidocaine (LIDODERM) 5 % patch 1 patch 1 patch Topical Patch Removed Teresa Venegas RN     05/13/2023 1637 EDT midodrine (PROAMATINE) tablet 2 5 mg 2 5 mg Oral Not Given Linda Hayes RN     05/13/2023 1207 EDT midodrine (PROAMATINE) tablet 2 5 mg 2 5 mg Oral Not Given Linda Hayes RN     05/13/2023 7812 EDT midodrine (PROAMATINE) tablet 2 5 mg 2 5 mg Oral Not Given Teresa Venegas RN     05/13/2023 4798 EDT pantoprazole (PROTONIX) EC tablet 40 mg 40 mg Oral Given Teresa Venegas RN     05/13/2023 2702 EDT bumetanide (BUMEX) injection 2 mg 2 mg Intravenous Given Linda Hayes RN     05/13/2023 0774 EDT magnesium sulfate 2 g/50 mL IVPB (premix) 2 g 2 g Intravenous New Bag Linda Hayes RN          Objective:     Vitals: Blood pressure 159/70, pulse 89, temperature 97 9 °F (36 6 °C), resp  rate 18, height 4' 11\" (1 499 m), weight 58 kg (127 lb 13 9 oz), SpO2 93 %  ,Body mass index is 25 83 kg/m²        Intake/Output Summary (Last 24 hours) at 5/13/2023 1747  Last data filed at 5/13/2023 1501  Gross per 24 hour   Intake 1060 ml   Output 2175 ml   Net -1115 ml       Physical Exam:   General Appearance: Awake and alert, in no acute distress  Abdomen: Soft, lower abdominal tenderness, non-distended; bowel sounds normal; no masses or no organomegaly    Invasive Devices     Peripheral Intravenous " Line  Duration           Peripheral IV 05/11/23 Left;Proximal;Upper;Ventral (anterior) Arm 2 days                Lab Results:  No results displayed because visit has over 200 results  Imaging Studies: I have personally reviewed pertinent imaging studies

## 2023-05-13 NOTE — ASSESSMENT & PLAN NOTE
· Likely due to cirrhosis   · Now resolved   · S/p IVF  · Continue management as above  · Patient edematous 5/10- albumin stopped by nephrology   · Bumex increased to 2 mg BID- by nephrology on 5/12

## 2023-05-14 LAB
ANION GAP SERPL CALCULATED.3IONS-SCNC: 5 MMOL/L (ref 4–13)
BUN SERPL-MCNC: 23 MG/DL (ref 5–25)
CALCIUM SERPL-MCNC: 8.8 MG/DL (ref 8.4–10.2)
CHLORIDE SERPL-SCNC: 96 MMOL/L (ref 96–108)
CO2 SERPL-SCNC: 34 MMOL/L (ref 21–32)
CREAT SERPL-MCNC: 1.21 MG/DL (ref 0.6–1.3)
ERYTHROCYTE [DISTWIDTH] IN BLOOD BY AUTOMATED COUNT: 16.2 % (ref 11.6–15.1)
GFR SERPL CREATININE-BSD FRML MDRD: 43 ML/MIN/1.73SQ M
GLUCOSE SERPL-MCNC: 104 MG/DL (ref 65–140)
HCT VFR BLD AUTO: 25.3 % (ref 34.8–46.1)
HGB BLD-MCNC: 8.3 G/DL (ref 11.5–15.4)
MAGNESIUM SERPL-MCNC: 1.8 MG/DL (ref 1.9–2.7)
MCH RBC QN AUTO: 33.7 PG (ref 26.8–34.3)
MCHC RBC AUTO-ENTMCNC: 32.8 G/DL (ref 31.4–37.4)
MCV RBC AUTO: 103 FL (ref 82–98)
PLATELET # BLD AUTO: 45 THOUSANDS/UL (ref 149–390)
PMV BLD AUTO: 11.9 FL (ref 8.9–12.7)
POTASSIUM SERPL-SCNC: 3.6 MMOL/L (ref 3.5–5.3)
RBC # BLD AUTO: 2.46 MILLION/UL (ref 3.81–5.12)
SODIUM SERPL-SCNC: 135 MMOL/L (ref 135–147)
WBC # BLD AUTO: 6.56 THOUSAND/UL (ref 4.31–10.16)

## 2023-05-14 PROCEDURE — 83735 ASSAY OF MAGNESIUM: CPT | Performed by: PHYSICIAN ASSISTANT

## 2023-05-14 PROCEDURE — 99232 SBSQ HOSP IP/OBS MODERATE 35: CPT | Performed by: INTERNAL MEDICINE

## 2023-05-14 PROCEDURE — 99232 SBSQ HOSP IP/OBS MODERATE 35: CPT | Performed by: PHYSICIAN ASSISTANT

## 2023-05-14 PROCEDURE — 80048 BASIC METABOLIC PNL TOTAL CA: CPT | Performed by: PHYSICIAN ASSISTANT

## 2023-05-14 PROCEDURE — 85027 COMPLETE CBC AUTOMATED: CPT | Performed by: PHYSICIAN ASSISTANT

## 2023-05-14 RX ORDER — MAGNESIUM SULFATE HEPTAHYDRATE 40 MG/ML
2 INJECTION, SOLUTION INTRAVENOUS ONCE
Status: COMPLETED | OUTPATIENT
Start: 2023-05-14 | End: 2023-05-14

## 2023-05-14 RX ADMIN — BUMETANIDE 2 MG: 0.25 INJECTION INTRAMUSCULAR; INTRAVENOUS at 18:23

## 2023-05-14 RX ADMIN — TRAZODONE HYDROCHLORIDE 50 MG: 50 TABLET ORAL at 21:47

## 2023-05-14 RX ADMIN — LIDOCAINE 1 PATCH: 700 PATCH TOPICAL at 09:50

## 2023-05-14 RX ADMIN — ERYTHROMYCIN 0.5 INCH: 5 OINTMENT OPHTHALMIC at 09:50

## 2023-05-14 RX ADMIN — OCTREOTIDE ACETATE 100 MCG: 100 INJECTION, SOLUTION INTRAVENOUS; SUBCUTANEOUS at 21:47

## 2023-05-14 RX ADMIN — PANTOPRAZOLE SODIUM 40 MG: 40 TABLET, DELAYED RELEASE ORAL at 06:05

## 2023-05-14 RX ADMIN — HYDROMORPHONE HYDROCHLORIDE 0.5 MG: 1 INJECTION, SOLUTION INTRAMUSCULAR; INTRAVENOUS; SUBCUTANEOUS at 21:51

## 2023-05-14 RX ADMIN — MAGNESIUM SULFATE HEPTAHYDRATE 2 G: 40 INJECTION, SOLUTION INTRAVENOUS at 08:30

## 2023-05-14 RX ADMIN — BUMETANIDE 2 MG: 0.25 INJECTION INTRAMUSCULAR; INTRAVENOUS at 09:50

## 2023-05-14 RX ADMIN — OCTREOTIDE ACETATE 100 MCG: 100 INJECTION, SOLUTION INTRAVENOUS; SUBCUTANEOUS at 06:04

## 2023-05-14 RX ADMIN — ERYTHROMYCIN 0.5 INCH: 5 OINTMENT OPHTHALMIC at 18:24

## 2023-05-14 RX ADMIN — LEVOTHYROXINE SODIUM 75 MCG: 75 TABLET ORAL at 06:04

## 2023-05-14 RX ADMIN — OCTREOTIDE ACETATE 100 MCG: 100 INJECTION, SOLUTION INTRAVENOUS; SUBCUTANEOUS at 14:56

## 2023-05-14 NOTE — ASSESSMENT & PLAN NOTE
· Likely due to cirrhosis   · Now resolved   · Continue management as above  · Patient noted to be edematous 5/10- albumin stopped by nephrology   · Bumex increased to 2 mg BID- by nephrology on 5/12 General Sunscreen Counseling: I recommended a broad spectrum sunscreen with a SPF of 30 or higher.  I explained that SPF 30 sunscreens block approximately 97 percent of the sun's harmful rays.  Sunscreens should be applied at least 15 minutes prior to expected sun exposure and then every 2 hours after that as long as sun exposure continues. If swimming or exercising sunscreen should be reapplied every 45 minutes to an hour after getting wet or sweating.  One ounce, or the equivalent of a shot glass full of sunscreen, is adequate to protect the skin not covered by a bathing suit. I also recommended a lip balm with a sunscreen as well. Sun protective clothing can be used in lieu of sunscreen but must be worn the entire time you are exposed to the sun's rays. Detail Level: Detailed

## 2023-05-14 NOTE — PROGRESS NOTES
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: During her colonoscopy on May 9, 2023 she was noted to have an ulcerated stricture at her ileocecal valve as well as a single shallow ulcer in the rectosigmoid colon  These findings are concerning for Crohn's disease and biopsies were taken to confirm the diagnosis  Once we receive the biopsy results, we will begin medical therapy accordingly  She should also have an outpatient CT enterography to rule out additional small bowel involvement      Possible cirrhosis: She has imaging findings and labs suggestive of cirrhosis including her thrombocytopenia and nodular liver on CT scan  If she remains an inpatient, she could have an interventional radiology guided liver biopsy on Monday to confirm the diagnosis and evaluate the etiology  There was evidence of small esophageal varices during her upper endoscopy and moderate portal hypertensive gastropathy  We appreciate nephrology's input regarding her diuresis for ascites and fortunately there is no evidence of hepatic encephalopathy at this time      Pancreatic cyst: She has a tiny cyst in the tail of her pancreas and should have an outpatient MRI with MRCP for further evaluation  ______________________________________________________________________    Subjective:     She complains of mild left lower quadrant pain and said it slightly better than yesterday  She denies any nausea, vomiting, or reflux      Medication Administration - last 24 hours from 05/13/2023 1528 to 05/14/2023 1528       Date/Time Order Dose Route Action Action by     05/14/2023 0604 EDT levothyroxine tablet 75 mcg 75 mcg Oral Given Latrell Caruso RN     05/13/2023 2111 EDT traZODone (DESYREL) tablet 50 mg 50 mg Oral Given Latrell Caruso RN     05/14/2023 0950 EDT erythromycin (ILOTYCIN) 0 5 % ophthalmic "ointment 0 5 inch 0 5 inch Left Eye Given Clarke Steen, RN     05/13/2023 1754 EDT erythromycin (ILOTYCIN) 0 5 % ophthalmic ointment 0 5 inch 0 5 inch Left Eye Given Clarke Steen, RN     05/14/2023 1456 EDT octreotide (SandoSTATIN) injection 100 mcg 100 mcg Subcutaneous Given Clarke Steen, RN     05/14/2023 2219 EDT octreotide (SandoSTATIN) injection 100 mcg 100 mcg Subcutaneous Given Kya Ard, RN     05/13/2023 2111 EDT octreotide (SandoSTATIN) injection 100 mcg 100 mcg Subcutaneous Given Kya Ard, RN     05/13/2023 2114 EDT HYDROmorphone (DILAUDID) injection 0 5 mg 0 5 mg Intravenous Given Kya Michel, RN     05/14/2023 0950 EDT lidocaine (LIDODERM) 5 % patch 1 patch 1 patch Topical Medication Applied Clarke Steen, RN     05/13/2023 2116 EDT lidocaine (LIDODERM) 5 % patch 1 patch 1 patch Topical Patch Removed Kya Michel, RN     05/14/2023 1148 EDT midodrine (PROAMATINE) tablet 2 5 mg 2 5 mg Oral Not Given Clarke Steen, RN     05/14/2023 9910 EDT midodrine (PROAMATINE) tablet 2 5 mg 2 5 mg Oral Not Given Kya Ard, RN     05/13/2023 1637 EDT midodrine (PROAMATINE) tablet 2 5 mg 2 5 mg Oral Not Given Clarke Steen, RN     05/14/2023 4337 EDT pantoprazole (PROTONIX) EC tablet 40 mg 40 mg Oral Given Kya Ard, RN     05/14/2023 4559 EDT bumetanide (BUMEX) injection 2 mg 2 mg Intravenous Given Clarke Steen, RN     05/13/2023 1755 EDT bumetanide (BUMEX) injection 2 mg 2 mg Intravenous Given Clarke Steen, RN     05/14/2023 0830 EDT magnesium sulfate 2 g/50 mL IVPB (premix) 2 g 2 g Intravenous New Bag Clarke Steen RN          Objective:     Vitals: Blood pressure 161/63, pulse 70, temperature (!) 97 1 °F (36 2 °C), resp  rate 20, height 4' 11\" (1 499 m), weight 56 3 kg (124 lb 1 9 oz), SpO2 92 %  ,Body mass index is 25 07 kg/m²        Intake/Output Summary (Last 24 hours) at 5/14/2023 1528  Last data filed at 5/14/2023 1340  Gross per 24 hour   Intake 410 ml   Output 3140 ml   Net " -2730 ml       Physical Exam:   General Appearance: Awake and alert, in no acute distress  Abdomen: Soft, mild LLQ tenderness, non-distended; bowel sounds normal; no masses or no organomegaly    Invasive Devices     Peripheral Intravenous Line  Duration           Peripheral IV 05/11/23 Left;Proximal;Upper;Ventral (anterior) Arm 3 days                Lab Results:  No results displayed because visit has over 200 results  Imaging Studies: I have personally reviewed pertinent imaging studies

## 2023-05-14 NOTE — ASSESSMENT & PLAN NOTE
· Nursing reported black tarry stools on 5/8 to 5/9- no additional black or bloody stools  · Stools are heme +  · hgb improved from 7 8 -> 8 3  · S/p EGD/Colonoscopy 5/11- colonoscopy noted stricture and mucosal friability and shallow ulceration at the IC value and a single shallow ulcer in the rectosigmoid colon concerning for Crohn's disease  · Outpatient follow-up with GI for possible small bowel enterography for evaluation of possible small bowel crohn's disease     · Continue to monitor H&H

## 2023-05-14 NOTE — NURSING NOTE
Patient awake and alert  Generalized abdominal tenderness and back discomfort complaint, 5 out of 10  No nausea  Legs with +2 edema  L arm appears jaundiced with bruising, sclera white    Call bell in reach

## 2023-05-14 NOTE — ASSESSMENT & PLAN NOTE
· Presented to the ED with left lower quadrant abdominal pain on 4/27   · Pain improving  · CT abdomen: New small amount of free fluid in the abdomen and pelvis compared to 4/18/2023, which can be secondary to cirrhosis or acute inflammatory process in the abdomen and pelvis such as occult acute diverticulitis  Moderate amount of stool in the colon, nonspecific and can represent constipation  · Surgery consultation appreciated- now signed off  · GI following, appreciate recommendations  · Patient had flex sig on 5/2 minimal inflammation noted, biopsies normal  · KUB (5/5):  Nonobstructive bowel gas pattern  · Colonoscopy showed signs concerning for crohn's dx   Biopsies pending  · Outpatient follow-up with GI

## 2023-05-14 NOTE — ASSESSMENT & PLAN NOTE
"· Baseline appears to be between 11 and 12   · Patient also noted to have platelet ct of 85 that decreased to 44 -> 45 today- continue to monitor    · Please see plan for \"tarry stools\"  "

## 2023-05-14 NOTE — PROGRESS NOTES
[facility]  Progress Note  Name: Cori Murrell  MRN: [id number]  Unit/Bed#: -01 I Date of Admission: 4/27/2023   Date of Service: 5/14/2023 I Hospital Day: 16    Assessment/Plan   * Left lower quadrant abdominal pain  Assessment & Plan  · Presented to the ED with left lower quadrant abdominal pain on 4/27   · Pain improving  · CT abdomen: New small amount of free fluid in the abdomen and pelvis compared to 4/18/2023, which can be secondary to cirrhosis or acute inflammatory process in the abdomen and pelvis such as occult acute diverticulitis  Moderate amount of stool in the colon, nonspecific and can represent constipation  · Surgery consultation appreciated- now signed off  · GI following, appreciate recommendations  · Patient had flex sig on 5/2 minimal inflammation noted, biopsies normal  · KUB (5/5):  Nonobstructive bowel gas pattern  · Colonoscopy showed signs concerning for crohn's dx  Biopsies pending  · Outpatient follow-up with GI    Tarry stools  Assessment & Plan  · Nursing reported black tarry stools on 5/8 to 5/9- no additional black or bloody stools  · Stools are heme +  · hgb improved from 7 8 -> 8 3  · S/p EGD/Colonoscopy 5/11- colonoscopy noted stricture and mucosal friability and shallow ulceration at the IC value and a single shallow ulcer in the rectosigmoid colon concerning for Crohn's disease  · Outpatient follow-up with GI for possible small bowel enterography for evaluation of possible small bowel crohn's disease  · Continue to monitor H&H    Acute kidney [facility])  Assessment & Plan  • Baseline creatinine if 0 8 to 1  • Creatinine currently stable at 1 2  • Nephrology following, appreciate recommendations- continue diuresis for volume overload  • Continue midodrine- dose decreased 5/11, continue octreotide per nephrology  • albumin discontinued 5/10  • Avoid hypotension and nephrotoxins  • I&O's, daily weights  • BMP a m           Anemia  Assessment "& Plan  · Baseline appears to be between 11 and 12   · Patient also noted to have platelet ct of 85 that decreased to 44 -> 45 today- continue to monitor  · Please see plan for \"tarry stools\"    Hx of CABG  Assessment & Plan  · Currently stable  · Losartan on hold due to hypotension    PAF (paroxysmal atrial fibrillation) (HCC)  Assessment & Plan  · Rate controlled on current regimen  · Continue midodrine -decreased dose 5/11  · Home metoprolol on hold due to hypotension    Hyponatremia  Assessment & Plan  · Likely due to cirrhosis   · Now resolved   · Continue management as above  · Patient noted to be edematous 5/10- albumin stopped by nephrology   · Bumex increased to 2 mg BID- by nephrology on 5/12    Acquired hypothyroidism  Assessment & Plan  · Continue Synthroid    Primary hypertension  Assessment & Plan  · Patient with hypotension- now blood pressure normalized  · Continue midodrine- decreased to 2 5 mg tid by nephrology on 5/11  · Losartan was discontinued due to ALEJANDRA   · Metoprolol held due to hypotension, will continue to monitor           VTE Pharmacologic Prophylaxis: VTE Score: 3 Moderate Risk (Score 3-4) - Pharmacological DVT Prophylaxis Contraindicated  Sequential Compression Devices Ordered  Patient Centered Rounds: I performed bedside rounds with nursing staff today  Discussions with Specialists or Other Care Team Provider: nursing    Education and Discussions with Family / Patient: patient updated  Total Time Spent on Date of Encounter in care of patient: 35 minutes This time was spent on one or more of the following: performing physical exam; counseling and coordination of care; obtaining or reviewing history; documenting in the medical record; reviewing/ordering tests, medications or procedures; communicating with other healthcare professionals and discussing with patient's family/caregivers      Current Length of Stay: 17 day(s)  Current Patient Status: Inpatient   Certification " Statement: The patient will continue to require additional inpatient hospital stay due to continued need for IV diuresis and monitoring labs  Discharge Plan: pending clinical course    Code Status: Level 1 - Full Code    Subjective: The patient was seen and examined  The patient is sitting up in her chair  She states she feels well and her edema is improving  Objective:     Vitals:   Temp (24hrs), Av 1 °F (36 7 °C), Min:97 8 °F (36 6 °C), Max:98 3 °F (36 8 °C)    Temp:  [97 8 °F (36 6 °C)-98 3 °F (36 8 °C)] 97 8 °F (36 6 °C)  HR:  [71-89] 71  Resp:  [18-19] 19  BP: (122-162)/(52-70) 148/53  SpO2:  [85 %-96 %] 93 %  Body mass index is 25 07 kg/m²  Input and Output Summary (last 24 hours): Intake/Output Summary (Last 24 hours) at 2023 1258  Last data filed at 2023 1200  Gross per 24 hour   Intake 610 ml   Output 3165 ml   Net -2555 ml       Physical Exam:   Physical Exam  Vitals and nursing note reviewed  Constitutional:       General: She is awake  Appearance: Normal appearance  HENT:      Head: Normocephalic and atraumatic  Cardiovascular:      Rate and Rhythm: Normal rate and regular rhythm  Heart sounds: Normal heart sounds  Pulmonary:      Effort: Pulmonary effort is normal       Breath sounds: Examination of the right-lower field reveals rales  Examination of the left-lower field reveals rales  Rales present  Abdominal:      Palpations: Abdomen is soft  Tenderness: There is no abdominal tenderness  Musculoskeletal:      Right lower leg: Edema present  Left lower leg: Edema present  Skin:     General: Skin is warm and dry  Neurological:      General: No focal deficit present  Mental Status: She is alert and oriented to person, place, and time  Psychiatric:         Attention and Perception: Attention normal          Mood and Affect: Mood normal          Speech: Speech normal          Behavior: Behavior is cooperative            Additional Data: Labs:  Results from last 7 days   Lab Units 05/14/23  0457 05/09/23  0454 05/08/23  1554   WBC Thousand/uL 6 56   < > 7 38   HEMOGLOBIN g/dL 8 3*   < > 9 6*   HEMATOCRIT % 25 3*   < > 27 6*   PLATELETS Thousands/uL 45*   < > 88*   NEUTROS PCT %  --   --  55   LYMPHS PCT %  --   --  21   MONOS PCT %  --   --  19*   EOS PCT %  --   --  3    < > = values in this interval not displayed  Results from last 7 days   Lab Units 05/14/23  0457 05/13/23  0433   SODIUM mmol/L 135 136   POTASSIUM mmol/L 3 6 3 6   CHLORIDE mmol/L 96 96   CO2 mmol/L 34* 32   BUN mg/dL 23 21   CREATININE mg/dL 1 21 1 24   ANION GAP mmol/L 5 8   CALCIUM mg/dL 8 8 8 5   ALBUMIN g/dL  --  3 3*   TOTAL BILIRUBIN mg/dL  --  2 02*   ALK PHOS U/L  --  434*   ALT U/L  --  22   AST U/L  --  57*   GLUCOSE RANDOM mg/dL 104 90     Results from last 7 days   Lab Units 05/10/23  0505   INR  1 92*     Results from last 7 days   Lab Units 05/11/23  1158   POC GLUCOSE mg/dl 128               Lines/Drains:  Invasive Devices     Peripheral Intravenous Line  Duration           Peripheral IV 05/11/23 Left;Proximal;Upper;Ventral (anterior) Arm 3 days                      Imaging: No pertinent imaging reviewed      Recent Cultures (last 7 days):         Last 24 Hours Medication List:   Current Facility-Administered Medications   Medication Dose Route Frequency Provider Last Rate   • acetaminophen  650 mg Oral Q6H PRN Mike Mckeon MD     • barium  900 mL Oral Once in imaging Jael Cintron MD     • bumetanide  2 mg Intravenous BID DO Helio     • dicyclomine  10 mg Oral TID PRN Rainer Rosa MD     • erythromycin  0 5 inch Left Eye BID Jael Cintron MD     • HYDROmorphone  0 5 mg Intravenous Q4H PRN EBEN Hooker     • levothyroxine  75 mcg Oral Early Morning Mike Mckeon MD     • lidocaine  1 patch Topical Daily EBEN Hooker     • midodrine  2 5 mg Oral TID [facility] DO Helio     • octreotide 100 mcg Subcutaneous [facility] EBEN El     • ondansetron  4 mg Intravenous Q6H PRN Vinicius Camp MD     • pantoprazole  40 mg Oral Early Morning Rola Gupta     • traZODone  50 mg Oral HS Shantell Harrington PA-C          Today, Patient Was Seen By: Estefania Calderon PA-C    **Please Note: This note may have been constructed using a voice recognition system  **

## 2023-05-14 NOTE — NURSING NOTE
Patient ambulated in hallway for 260 feet with rolling walker and standby assist of RN, tolerated well, no dyspnea on exertion

## 2023-05-14 NOTE — PROGRESS NOTES
Progress Note - Nephrology   aJmal Beavers 69 y o  female MRN: [id number]  Unit/Bed#: -01 Encounter: [id number]    A/P:  1  Acute kidney injury due to hepatorenal syndrome versus volume depletion              Creatinine stable at about 1 2 mg/dL  She continues to be diuresed aggressively with stable kidney function  Continue avoid potential nephrotoxins, maximize hemodynamics  2   Chronic disease stage III with baseline creatinine potentially between 0 8-1 mg/dL  3  Volume overload              Patient is improving with diuresis on Bumex 2 mg twice daily, her weight is now down to 124 pounds from 133 pounds 2 days ago  4   Hypomagnesemia              Continue to monitor magnesium levels, supplement as indicated  5   Cirrhosis              Continue care according to gastroenterology and hospitalist, patient has clinical signs and features typical of cirrhosis, although official diagnosis is not yet been given at this time  6  Left lower quadrant abdominal pain   Persists, has improved, continue with supportive care at this time  7   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   Appreciate gastroenterology input, this is concerning finding for Crohn's disease  Biopsy is currently pending, additional evaluation and care according to our colleagues  Follow up reason for today's visit: Acute kidney injury/chronic kidney disease/volume overload    Left lower quadrant abdominal pain    Patient Active Problem List   Diagnosis   • Acute kidney injury ([address] )   • Diarrhea of presumed infectious origin   • Primary hypertension   • Acquired hypothyroidism   • Irritable bowel syndrome with diarrhea   • Abnormal CT scan   • Superior mesenteric artery stenosis (HCC)   • Left lower quadrant abdominal pain   • Hyponatremia   • PAF (paroxysmal atrial fibrillation) (HCC)   • Hx of CABG   • Anemia   • Tarry stools         Subjective:   Patient continues to have abdominal pain and concerns    She is trying to eat and "drink as best as she can, no nausea or vomiting at this time  Objective:     Vitals: Blood pressure 148/53, pulse 71, temperature 97 8 °F (36 6 °C), resp  rate 19, height 4' 11\" (1 499 m), weight 56 3 kg (124 lb 1 9 oz), SpO2 93 %  ,Body mass index is 25 07 kg/m²  Weight (last 2 days)     Date/Time Weight    05/14/23 0553 56 3 (124 12)    05/13/23 0625 58 (127 87)    05/12/23 0600 59 4 (130 95)            Intake/Output Summary (Last 24 hours) at 5/14/2023 1224  Last data filed at 5/14/2023 1200  Gross per 24 hour   Intake 730 ml   Output 3165 ml   Net -2435 ml     I/O last 3 completed shifts:   In: 9816 [P O :1470]  Out: 56 [Urine:4115]         Physical Exam: /53   Pulse 71   Temp 97 8 °F (36 6 °C)   Resp 19   Ht 4' 11\" (1 499 m)   Wt 56 3 kg (124 lb 1 9 oz)   SpO2 93%   BMI 25 07 kg/m²     General Appearance:    Alert, cooperative, no distress, appears stated age   Head:    Normocephalic, without obvious abnormality, atraumatic   Eyes:    Conjunctiva/corneas clear   Ears:    Normal external ears   Nose:   Nares normal, septum midline, mucosa normal, no drainage    or sinus tenderness   Throat:   Lips, mucosa, and tongue normal; teeth and gums normal   Neck:   Supple   Back:     Symmetric, no curvature, ROM normal, no CVA tenderness   Lungs:     Clear to auscultation bilaterally, respirations unlabored   Chest wall:    No tenderness or deformity   Heart:    Regular rate and rhythm, S1 and S2 normal, no murmur, rub   or gallop   Abdomen:     Soft, bowel sounds active, mild tenderness especially in the left lower quadrant and left flank region which has been present for over 2 weeks   Extremities:   Extremities normal, atraumatic, no cyanosis, +2 bilateral lower extremity edema up to the presacral region   Skin:   Skin color, texture, turgor normal, no rashes or lesions   Lymph nodes:   Cervical normal   Neurologic:   CNII-XII intact            Lab, Imaging and other studies: I have personally " reviewed pertinent labs  CBC:   Lab Results   Component Value Date    WBC 6 56 05/14/2023    HGB 8 3 (L) 05/14/2023    HCT 25 3 (L) 05/14/2023     (H) 05/14/2023    PLT 45 (LL) 05/14/2023    MCH 33 7 05/14/2023    MCHC 32 8 05/14/2023    RDW 16 2 (H) 05/14/2023    MPV 11 9 05/14/2023     CMP:   Lab Results   Component Value Date    K 3 6 05/14/2023    CL 96 05/14/2023    CO2 34 (H) 05/14/2023    BUN 23 05/14/2023    CREATININE 1 21 05/14/2023    CALCIUM 8 8 05/14/2023    EGFR 43 05/14/2023         Results from last 7 days   Lab Units 05/14/23  0457 05/13/23  0433 05/12/23  0518 05/11/23  1148 05/10/23  1741 05/10/23  0505   POTASSIUM mmol/L 3 6 3 6 5 0 3 6   < > 4 1   CHLORIDE mmol/L 96 96 98 98   < > 102   CO2 mmol/L 34* 32 28 27   < > 25   BUN mg/dL 23 21 24 26*   < > 33*   CREATININE mg/dL 1 21 1 24 1 23 1 23   < > 1 20   CALCIUM mg/dL 8 8 8 5 8 7 9 2   < > 9 0   ALK PHOS U/L  --  434*  --  505*  --  464*   ALT U/L  --  22  --  26  --  22   AST U/L  --  57*  --  72*  --  62*    < > = values in this interval not displayed  Phosphorus: No results found for: PHOS  Magnesium:   Lab Results   Component Value Date    MG 1 8 (L) 05/14/2023     Urinalysis: No results found for: Don Gonzales, SPECGRAV, PHUR, LEUKOCYTESUR, NITRITE, PROTEINUA, GLUCOSEU, KETONESU, BILIRUBINUR, BLOODU  Ionized Calcium: No results found for: CAION  Coagulation: No results found for: PT, INR, APTT  Troponin: No results found for: TROPONINI  ABG: No results found for: PHART, LXE9NID, PO2ART, KVX1LVG, I9CRMKFL, BEART, SOURCE  Radiology review:     IMAGING  Procedure: EGD    Result Date: 5/11/2023  Narrative: Table formatting from the original result was not included   [place] [id number] [phone] DATE OF SERVICE: 5/11/23 PHYSICIAN(S): Attending: Michael Guerrero MD Fellow: No Staff Documented INDICATION: Anemia, unspecified type, Hepatic cirrhosis, unspecified hepatic cirrhosis type, unspecified whether ascites present ([facility] [address] ) POST-OP DIAGNOSIS: See the impression below  PREPROCEDURE: Informed consent was obtained for the procedure, including sedation  Risks of perforation, hemorrhage, adverse drug reaction and aspiration were discussed  The patient was placed in the left lateral decubitus position  Patient was explained about the risks and benefits of the procedure  Risks including but not limited to bleeding, infection, and perforation were explained in detail  Also explained about less than 100% sensitivity with the exam and other alternatives  PROCEDURE: EGD DETAILS OF PROCEDURE: Patient was taken to the procedure room where a time out was performed to confirm correct patient and correct procedure  The patient underwent monitored anesthesia care, which was administered by an anesthesia professional  The patient's blood pressure, heart rate, level of consciousness, respirations and oxygen were monitored throughout the procedure  The scope was advanced to the second part of the duodenum  Retroflexion was performed in the fundus  The patient experienced no blood loss  The procedure was not difficult  The patient tolerated the procedure well  There were no apparent complications  ANESTHESIA INFORMATION: ASA: IV Anesthesia Type: Anesthesia type not filed in the log  MEDICATIONS: No administrations occurring from 1443 to 1457 on 05/11/23 FINDINGS: 2 cm sliding hiatal hernia (type I hiatal hernia) - GE junction 36 cm from the incisors, diaphragmatic impression 38 cm from the incisors Small and minimal varices in the lower third of the esophagus; no bleeding was identified Mild portal hypertensive gastropathy in the gastric fundus  No blood seen  The duodenum appeared normal  SPECIMENS: * No specimens in log *     Impression: Small sliding hiatal hernia  Tiny varices at the GE junction  Mild portal hypertensive gastropathy  No blood in the stomach  Normal duodenum   No interventions performed  RECOMMENDATION: Proceed with colonoscopy  Bassem Martel MD     Procedure: Colonoscopy    Result Date: 5/11/2023  Narrative: Table formatting from the original result was not included  [place] [id number] [phone] DATE OF SERVICE: 5/11/23 PHYSICIAN(S): Attending: Bassem Martel MD Fellow: No Staff Documented INDICATION: Tarry stools, Anemia, unspecified type, Left lower quadrant abdominal pain POST-OP DIAGNOSIS: See the impression below  HISTORY: Prior colonoscopy: More than 10 years ago  BOWEL PREPARATION:  PREPROCEDURE: Informed consent was obtained for the procedure, including sedation  Risks including but not limited to bleeding, infection, perforation, adverse drug reaction and aspiration were explained in detail  Also explained about less than 100% sensitivity with the exam and other alternatives  The patient was placed in the left lateral decubitus position  Procedure: Colonoscopy DETAILS OF PROCEDURE: Patient was taken to the procedure room where a time out was performed to confirm correct patient and correct procedure  The patient underwent monitored anesthesia care, which was administered by an anesthesia professional  The patient's blood pressure, heart rate, level of consciousness, oxygen and respirations were monitored throughout the procedure  A digital rectal exam was performed  The scope was introduced through the anus and advanced to the terminal ileum  Retroflexion was performed in the rectum  The quality of bowel preparation was evaluated using the [facility] Bowel Preparation Scale with scores of: right colon = 2, transverse colon = 2, left colon = 2  The total BBPS score was 6  Bowel prep was adequate  The patient experienced no blood loss  The procedure was not difficult  The patient tolerated the procedure well  There were no apparent complications   ANESTHESIA INFORMATION: ASA: IV Anesthesia Type: IV Sedation with Anesthesia MEDICATIONS: No administrations occurring from 1443 to 1546 on 05/11/23 FINDINGS: The entire colon appeared normal  The ICV was stenotic and friable, with a shallow ulcer  Initially, this was mistaken for a stricture because the opening did not have the appearance of a normal ICV  Moreover, an appendiceal orifice could not be identified  Attempts to intubate the stricture with a pediatric colonoscope were unsuccessful  Biopsies were taken  An upper scope was then used to reach the stricture  This was difficult due to looping  Upon intubation of the stricture, small bowel was seen, suggesting that this was the ICV  The scope fell out of the small bowel before biopsies could be taken  The terminal ileum appeared normal  A single shallow ulcer was seen in the rectosigmoid colon  Performed pancolonic forceps biopsies to rule out IBD No active bleeding seen  EVENTS: Procedure Events Event Event Time ENDO SCOPE OUT TIME 5/11/2023  3:02 PM ENDO SCOPE OUT TIME 5/11/2023  3:20 PM ENDO SCOPE OUT TIME 5/11/2023  3:46 PM SPECIMENS: ID Type Source Tests Collected by Time Destination 1 : bx colonic stricture Tissue Colon TISSUE EXAM Bc Sommer MD 5/11/2023  3:16 PM  2 : random colon bx r/o chrons Tissue Colon TISSUE EXAM Bc Sommer MD 5/11/2023  3:43 PM  EQUIPMENT: Colonoscope -     Impression: Stricture and mucosal friability and shallow ulceration at the ileocecal valve  Single shallow ulcer in the rectosigmoid colon  Most likely explanation for these findings is Crohn's disease  RECOMMENDATION:  Await pathology results  No further screening colonoscopies necessary  Age greater than 72  Other recommendations per inpatient GI team  Consider outpatient small bowel imaging     Bc Sommer MD       Current Facility-Administered Medications   Medication Dose Route Frequency   • acetaminophen (TYLENOL) tablet 650 mg  650 mg Oral Q6H PRN   • barium (READI-CAT 2) suspension 900 mL  900 mL Oral Once in imaging   • bumetanide (BUMEX) injection 2 mg  2 mg Intravenous BID   • dicyclomine (BENTYL) capsule 10 mg  10 mg Oral TID PRN   • erythromycin (ILOTYCIN) 0 5 % ophthalmic ointment 0 5 inch  0 5 inch Left Eye BID   • HYDROmorphone (DILAUDID) injection 0 5 mg  0 5 mg Intravenous Q4H PRN   • levothyroxine tablet 75 mcg  75 mcg Oral Early Morning   • lidocaine (LIDODERM) 5 % patch 1 patch  1 patch Topical Daily   • midodrine (PROAMATINE) tablet 2 5 mg  2 5 mg Oral TID AC   • octreotide (SandoSTATIN) injection 100 mcg  100 mcg Subcutaneous Q8H [address]   • ondansetron (ZOFRAN) injection 4 mg  4 mg Intravenous Q6H PRN   • pantoprazole (PROTONIX) EC tablet 40 mg  40 mg Oral Early Morning   • traZODone (DESYREL) tablet 50 mg  50 mg Oral HS     Medications Discontinued During This Encounter   Medication Reason   • polyethylene glycol (GLYCOLAX) 17 GM/SCOOP powder Therapy completed   • ciprofloxacin (CIPRO) IVPB (premix in 5% dextrose) 400 mg 200 mL    • metroNIDAZOLE (FLAGYL) IVPB (premix) 500 mg 100 mL    • dextrose 5 % and sodium chloride 0 45 % with KCl 20 mEq/L infusion    • sodium chloride 0 9 % infusion    • polyethylene glycol (MIRALAX) packet 17 g    • ciprofloxacin (CIPRO) tablet 500 mg    • metroNIDAZOLE (FLAGYL) tablet 500 mg    • sodium bicarbonate tablet 650 mg    • enoxaparin (LOVENOX) subcutaneous injection 40 mg    • furosemide (LASIX) injection 80 mg    • multi-electrolyte (PLASMALYTE-A/ISOLYTE-S PH 7 4) IV solution    • metoprolol succinate (TOPROL-XL) 24 hr tablet 25 mg    • midodrine (PROAMATINE) tablet 2 5 mg    • enoxaparin (LOVENOX) subcutaneous injection 30 mg    • metoprolol succinate (TOPROL-XL) 24 hr tablet 12 5 mg    • magnesium hydroxide (MILK OF MAGNESIA) oral suspension 30 mL    • polyethylene glycol (MIRALAX) packet 17 g    • morphine injection 4 mg    • multi-electrolyte (PLASMALYTE-A/ISOLYTE-S PH 7 4) IV solution    • heparin (porcine) subcutaneous injection 5,000 Units    • pantoprazole (PROTONIX) EC tablet 40 mg    • albumin human (FLEXBUMIN) 25 % injection 12 5 g    • bumetanide (BUMEX) injection 1 mg    • bumetanide (BUMEX) injection 0 5 mg    • midodrine (PROAMATINE) tablet 5 mg    • pantoprazole (PROTONIX) EC tablet 40 mg    • bumetanide (BUMEX) injection 1 mg        Baby Gosselin, DO      This progress note was produced in part using a dictation device which may document imprecise wording from author's original intent

## 2023-05-14 NOTE — PLAN OF CARE
Problem: PAIN - ADULT  Goal: Verbalizes/displays adequate comfort level or baseline comfort level  Description: Interventions:  - Encourage patient to monitor pain and request assistance  - Assess pain using appropriate pain scale  - Administer analgesics based on type and severity of pain and evaluate response  - Implement non-pharmacological measures as appropriate and evaluate response  - Consider cultural and social influences on pain and pain management  - Notify physician/advanced practitioner if interventions unsuccessful or patient reports new pain  Outcome: Progressing     Problem: SAFETY ADULT  Goal: Maintain or return to baseline ADL function  Description: INTERVENTIONS:  - Educate patient/family on patient safety including physical limitations  - Instruct patient to call for assistance with activity   - Consult OT/PT to assist with strengthening/mobility   - Keep Call bell within reach  - Keep bed low and locked with side rails adjusted as appropriate  - Keep care items and personal belongings within reach  - Initiate and maintain comfort rounds  - Make Fall Risk Sign visible to staff  - Offer Toileting every 2 Hours, in advance of need  - Initiate/Maintain alarms  - Obtain necessary fall risk management equipment:   - Apply yellow socks and bracelet for high fall risk patients  - Consider moving patient to room near nurses station  Outcome: Progressing     Problem: Knowledge Deficit  Goal: Patient/family/caregiver demonstrates understanding of disease process, treatment plan, medications, and discharge instructions  Description: Complete learning assessment and assess knowledge base    Interventions:  - Provide teaching at level of understanding  - Provide teaching via preferred learning methods  Outcome: Progressing     Problem: MOBILITY - ADULT  Goal: Maintain or return to baseline ADL function  Description: INTERVENTIONS:  - Educate patient/family on patient safety including physical limitations  - Instruct patient to call for assistance with activity   - Consult OT/PT to assist with strengthening/mobility   - Keep Call bell within reach  - Keep bed low and locked with side rails adjusted as appropriate  - Keep care items and personal belongings within reach  - Initiate and maintain comfort rounds  - Make Fall Risk Sign visible to staff  - Offer Toileting every 2 Hours, in advance of need  - Initiate/Maintain alarms  - Obtain necessary fall risk management equipment:   - Apply yellow socks and bracelet for high fall risk patients  - Consider moving patient to room near nurses station  Outcome: Progressing     Problem: Nutrition/Hydration-ADULT  Goal: Nutrient/Hydration intake appropriate for improving, restoring or maintaining nutritional needs  Description: Monitor and assess patient's nutrition/hydration status for malnutrition  Collaborate with interdisciplinary team and initiate plan and interventions as ordered  Monitor patient's weight and dietary intake as ordered or per policy  Utilize nutrition screening tool and intervene as necessary  Determine patient's food preferences and provide high-protein, high-caloric foods as appropriate       INTERVENTIONS:  - Monitor oral intake, urinary output, labs, and treatment plans  - Assess nutrition and hydration status and recommend course of action  - Evaluate amount of meals eaten  - Assist patient with eating if necessary   - Allow adequate time for meals  - Recommend/ encourage appropriate diets, oral nutritional supplements, and vitamin/mineral supplements  - Order, calculate, and assess calorie counts as needed  - Recommend, monitor, and adjust tube feedings and TPN/PPN based on assessed needs  - Assess need for intravenous fluids  - Provide specific nutrition/hydration education as appropriate  - Include patient/family/caregiver in decisions related to nutrition  Outcome: Progressing

## 2023-05-15 ENCOUNTER — APPOINTMENT (INPATIENT)
Dept: MRI IMAGING | Facility: HOSPITAL | Age: 78
DRG: 385 | End: 2023-05-15
Payer: MEDICARE

## 2023-05-15 LAB
ALBUMIN SERPL BCP-MCNC: 3.5 G/DL (ref 3.5–5)
ALP SERPL-CCNC: 477 U/L (ref 34–104)
ALT SERPL W P-5'-P-CCNC: 22 U/L (ref 7–52)
ANION GAP SERPL CALCULATED.3IONS-SCNC: 7 MMOL/L (ref 4–13)
AST SERPL W P-5'-P-CCNC: 59 U/L (ref 13–39)
BILIRUB SERPL-MCNC: 2.3 MG/DL (ref 0.2–1)
BUN SERPL-MCNC: 27 MG/DL (ref 5–25)
CALCIUM SERPL-MCNC: 9 MG/DL (ref 8.4–10.2)
CHLORIDE SERPL-SCNC: 91 MMOL/L (ref 96–108)
CO2 SERPL-SCNC: 36 MMOL/L (ref 21–32)
CREAT SERPL-MCNC: 1.29 MG/DL (ref 0.6–1.3)
ERYTHROCYTE [DISTWIDTH] IN BLOOD BY AUTOMATED COUNT: 15.8 % (ref 11.6–15.1)
GFR SERPL CREATININE-BSD FRML MDRD: 40 ML/MIN/1.73SQ M
GLUCOSE SERPL-MCNC: 88 MG/DL (ref 65–140)
HCT VFR BLD AUTO: 25.2 % (ref 34.8–46.1)
HGB BLD-MCNC: 8.6 G/DL (ref 11.5–15.4)
MAGNESIUM SERPL-MCNC: 2 MG/DL (ref 1.9–2.7)
MCH RBC QN AUTO: 35.2 PG (ref 26.8–34.3)
MCHC RBC AUTO-ENTMCNC: 34.1 G/DL (ref 31.4–37.4)
MCV RBC AUTO: 103 FL (ref 82–98)
PLATELET # BLD AUTO: 50 THOUSANDS/UL (ref 149–390)
PMV BLD AUTO: 12.3 FL (ref 8.9–12.7)
POTASSIUM SERPL-SCNC: 3.9 MMOL/L (ref 3.5–5.3)
PROT SERPL-MCNC: 7 G/DL (ref 6.4–8.4)
RBC # BLD AUTO: 2.44 MILLION/UL (ref 3.81–5.12)
SODIUM SERPL-SCNC: 134 MMOL/L (ref 135–147)
WBC # BLD AUTO: 8.26 THOUSAND/UL (ref 4.31–10.16)

## 2023-05-15 PROCEDURE — 83735 ASSAY OF MAGNESIUM: CPT | Performed by: PHYSICIAN ASSISTANT

## 2023-05-15 PROCEDURE — 85027 COMPLETE CBC AUTOMATED: CPT | Performed by: PHYSICIAN ASSISTANT

## 2023-05-15 PROCEDURE — 99232 SBSQ HOSP IP/OBS MODERATE 35: CPT

## 2023-05-15 PROCEDURE — G1004 CDSM NDSC: HCPCS

## 2023-05-15 PROCEDURE — 80053 COMPREHEN METABOLIC PANEL: CPT | Performed by: PHYSICIAN ASSISTANT

## 2023-05-15 PROCEDURE — 86301 IMMUNOASSAY TUMOR CA 19-9: CPT | Performed by: FAMILY MEDICINE

## 2023-05-15 PROCEDURE — 99232 SBSQ HOSP IP/OBS MODERATE 35: CPT | Performed by: INTERNAL MEDICINE

## 2023-05-15 PROCEDURE — 74181 MRI ABDOMEN W/O CONTRAST: CPT

## 2023-05-15 RX ORDER — BUMETANIDE 0.25 MG/ML
2 INJECTION INTRAMUSCULAR; INTRAVENOUS DAILY
Status: DISCONTINUED | OUTPATIENT
Start: 2023-05-16 | End: 2023-05-16

## 2023-05-15 RX ADMIN — HYDROMORPHONE HYDROCHLORIDE 0.5 MG: 1 INJECTION, SOLUTION INTRAMUSCULAR; INTRAVENOUS; SUBCUTANEOUS at 05:25

## 2023-05-15 RX ADMIN — ERYTHROMYCIN 0.5 INCH: 5 OINTMENT OPHTHALMIC at 17:59

## 2023-05-15 RX ADMIN — OCTREOTIDE ACETATE 100 MCG: 100 INJECTION, SOLUTION INTRAVENOUS; SUBCUTANEOUS at 05:16

## 2023-05-15 RX ADMIN — OCTREOTIDE ACETATE 100 MCG: 100 INJECTION, SOLUTION INTRAVENOUS; SUBCUTANEOUS at 22:06

## 2023-05-15 RX ADMIN — BUMETANIDE 2 MG: 0.25 INJECTION INTRAMUSCULAR; INTRAVENOUS at 08:52

## 2023-05-15 RX ADMIN — LEVOTHYROXINE SODIUM 75 MCG: 75 TABLET ORAL at 05:16

## 2023-05-15 RX ADMIN — HYDROMORPHONE HYDROCHLORIDE 0.5 MG: 1 INJECTION, SOLUTION INTRAMUSCULAR; INTRAVENOUS; SUBCUTANEOUS at 22:16

## 2023-05-15 RX ADMIN — PANTOPRAZOLE SODIUM 40 MG: 40 TABLET, DELAYED RELEASE ORAL at 05:16

## 2023-05-15 RX ADMIN — OCTREOTIDE ACETATE 100 MCG: 100 INJECTION, SOLUTION INTRAVENOUS; SUBCUTANEOUS at 13:12

## 2023-05-15 RX ADMIN — TRAZODONE HYDROCHLORIDE 50 MG: 50 TABLET ORAL at 22:06

## 2023-05-15 RX ADMIN — LIDOCAINE 1 PATCH: 700 PATCH TOPICAL at 08:52

## 2023-05-15 RX ADMIN — ERYTHROMYCIN 0.5 INCH: 5 OINTMENT OPHTHALMIC at 08:52

## 2023-05-15 NOTE — CASE MANAGEMENT
Case Management Progress Note    Patient name Ivey Collet  Location [id number] MRN [id number]  : 1945 Date 5/15/2023       LOS (days): 18  Geometric Mean LOS (GMLOS) (days): 3 80  Days to GMLOS:-14 2        OBJECTIVE:        Current admission status: Inpatient  Preferred Pharmacy:   [address] [place] [address]   [facility] [address]  Phone: [phone] Fax: [phone]    Primary Care Provider: Linda Boles MD    Primary Insurance: MEDICARE  Secondary Insurance: Sarahi James    PROGRESS NOTE:    Met with patient to discuss discharge planning  Patient asking for CM assistance with a POA so her sister can do her banking  Explained to patient that has to be done by a   Call to Laquita Islas at [facility] and discussed discharge planning

## 2023-05-15 NOTE — PROGRESS NOTES
Charo U  67   Progress Note  Name: Diana Uriostegui  MRN: [id number]  Unit/Bed#: -01 I Date of Admission: 4/27/2023   Date of Service: 5/15/2023 I Hospital Day: 18    Assessment/Plan   * Left lower quadrant abdominal pain  Assessment & Plan  · Presented to the ED with left lower quadrant abdominal pain on 4/27   · Patient is pain-free this a m   · 4/27/2023 CT abdomen: New small amount of free fluid in the abdomen and pelvis compared to 4/18/2023, which can be secondary to cirrhosis or acute inflammatory process in the abdomen and pelvis such as occult acute diverticulitis  Moderate amount of stool in the colon, nonspecific and can represent constipation  · 4/29/2023 obstructive series: Nonobstructive bowel gas pattern  · 5/1/2023 CT abdomen pelvis: Thickened appearance of the wall of the descending colon and sigmoid colon which may represent pseudo thickening from nondistention versus a mild nonspecific colitis in the appropriate clinical setting  There are a few scattered colonic diverticula  · 5/2 flex sig: Minimal inflammation noted, biopsies were normal  · 5/5 KUB: Nonobstructive bowel gas pattern  · 5/11 colonoscopy: Stricture and mucosal friability and shallow ulceration at the ileocecal valve  Single shallow ulcer in the rectosigmoid colon  Most likely explanation for these findings is Crohn's disease  · 5/11 EGD: Small sliding hiatal hernia  Tiny varices at the GE junction  Mild portal hypertensive gastropathy  No blood in the stomach  Normal duodenum  No interventions performed    · Surgery consultation appreciated- now signed off  · GI following, appreciate recommendations  · Continue pain management    Acute kidney injury [facility])  Assessment & Plan  • Baseline creatinine if 0 8 to 1  • Creatinine stable, today 1 29  • Nephrology following, appreciate recommendations  • Continues on Bumex 2 mg IV twice a day per nephrology  • Continue midodrine and octreotide per "nephrology  • Avoid hypotension and nephrotoxins  • Monitor intake and output  • Daily weights, weight is down 1 kg from yesterday  • Diuresed 1 5 L over the last 24 hours  • Check BMP a m  Hyponatremia  Assessment & Plan  · Likely due to cirrhosis   · Serum sodium 134 today  · Continue management as above  · BMP a m  Primary hypertension  Assessment & Plan  · Patient with hypotension- now blood pressure normalized  · Continue midodrine- decreased to 2 5 mg tid by nephrology on 5/11  · Home metoprolol on hold due to hypotension, will continue to monitor  · Home losartan has been held due to ALEJANDRA    PAF (paroxysmal atrial fibrillation) ([facility])  Assessment & Plan  · Rate controlled   · Home metoprolol is on hold due to hypotension  · Continue midodrine      Tarry stools  Assessment & Plan  · Per record nursing reported black tarry stools on 5/8 to 5/9, none since Per discussion with patient  · 5/8 fecal occult blood positive  · Baseline hemoglobin 10-11  · Hemoglobin remains stable, today 8 6  · No signs of bleeding  · 5/11 had EGD/colonoscopy-see results above  · GI recommending outpatient follow-up for possible small bowel enterography for evaluation of possible small bowel crohn's disease  · CBC a m  Hx of CABG  Assessment & Plan  · Currently stable  · Losartan on hold due to hypotension    Anemia  Assessment & Plan  · Baseline appears to be between 11 and 12   · H/H today 8 6/25 2  · Please see plan for \"tarry stools\"    Acquired hypothyroidism  Assessment & Plan  · Continue Synthroid           VTE Pharmacologic Prophylaxis:   Pharmacologic: Pharmacologic VTE prophylaxis contraindicated  Mechanical VTE Prophylaxis in Place: Yes    Patient Centered Rounds: I have performed bedside rounds with nursing staff today      Discussions with Specialists or Other Care Team Provider: GI, nephrology, nursing, case management    Education and Discussions with Family / Patient: Treatment plan discussed with patient " who understands the plan as its been explained and is agreeable to the plan as stated  All questions answered to her satisfaction  Time Spent for Care: 40 minutes  More than 50% of total time spent on counseling and coordination of care as described above  Current Length of Stay: 18 day(s)    Current Patient Status: Inpatient   Certification Statement: The patient will continue to require additional inpatient hospital stay due to Need for IV diuretics, monitoring of labs    Discharge Plan: Pending hospital course  PT/OT are recommending patient return to facility with rehab services on discharge  Patient continues to be diuresed with IV Bumex per nephrology  Code Status: Level 1 - Full Code      Subjective:   Patient smiling and pleasant  States she feels great today  Has been up and ambulating in halls with staff  Denies any pain or discomfort  Eating breakfast     Objective:     Vitals:   Temp (24hrs), Av 7 °F (36 5 °C), Min:97 1 °F (36 2 °C), Max:98 °F (36 7 °C)    Temp:  [97 1 °F (36 2 °C)-98 °F (36 7 °C)] 97 9 °F (36 6 °C)  HR:  [70-84] 84  Resp:  [16-20] 16  BP: (132-161)/(50-63) 132/50  SpO2:  [91 %-94 %] 91 %  Body mass index is 24 62 kg/m²  Input and Output Summary (last 24 hours): Intake/Output Summary (Last 24 hours) at 5/15/2023 0912  Last data filed at 5/15/2023 0553  Gross per 24 hour   Intake 480 ml   Output 2000 ml   Net -1520 ml       Physical Exam:     Physical Exam  Vitals and nursing note reviewed  Constitutional:       General: She is not in acute distress  Appearance: She is normal weight  She is not ill-appearing  HENT:      Head: Normocephalic and atraumatic  Nose: Nose normal       Mouth/Throat:      Mouth: Mucous membranes are moist    Cardiovascular:      Rate and Rhythm: Normal rate and regular rhythm  Pulses: Normal pulses  Heart sounds: Normal heart sounds     Pulmonary:      Effort: Pulmonary effort is normal  No respiratory distress  Breath sounds: Normal breath sounds  No wheezing or rales  Abdominal:      General: Bowel sounds are normal  There is no distension  Palpations: Abdomen is soft  Tenderness: There is no abdominal tenderness  There is no guarding  Musculoskeletal:         General: Normal range of motion  Right lower leg: Edema present  Left lower leg: Edema present  Comments: +1 edema bilateral lower extremities   Skin:     General: Skin is warm and dry  Capillary Refill: Capillary refill takes less than 2 seconds  Neurological:      General: No focal deficit present  Mental Status: She is alert and oriented to person, place, and time  Mental status is at baseline  Psychiatric:         Mood and Affect: Mood normal          Behavior: Behavior normal          Thought Content: Thought content normal          Judgment: Judgment normal          Additional Data:     Labs:    Results from last 7 days   Lab Units 05/15/23  0523 05/09/23  0454 05/08/23  1554   WBC Thousand/uL 8 26   < > 7 38   HEMOGLOBIN g/dL 8 6*   < > 9 6*   HEMATOCRIT % 25 2*   < > 27 6*   PLATELETS Thousands/uL 50*   < > 88*   NEUTROS PCT %  --   --  55   LYMPHS PCT %  --   --  21   MONOS PCT %  --   --  19*   EOS PCT %  --   --  3    < > = values in this interval not displayed  Results from last 7 days   Lab Units 05/15/23  0523   POTASSIUM mmol/L 3 9   CHLORIDE mmol/L 91*   CO2 mmol/L 36*   BUN mg/dL 27*   CREATININE mg/dL 1 29   CALCIUM mg/dL 9 0   ALK PHOS U/L 477*   ALT U/L 22   AST U/L 59*     Results from last 7 days   Lab Units 05/10/23  0505   INR  1 92*       * I Have Reviewed All Lab Data Listed Above  * Additional Pertinent Lab Tests Reviewed:  [facility]    Imaging:    Imaging Reports Reviewed Today Include: CT abdomen pelvis, obstructive series, repeat CT abdomen pelvis, MRI lumbar spine, chest x-ray  Imaging Personally Reviewed by Myself Includes: CT abdomen pelvis, obstructive series, repeat CT abdomen pelvis, chest x-ray    Recent Cultures (last 7 days):           Last 24 Hours Medication List:   Current Facility-Administered Medications   Medication Dose Route Frequency Provider Last Rate   • acetaminophen  650 mg Oral Q6H PRN Gwen Browning MD     • barium  900 mL Oral Once in imaging Padmini Stout MD     • bumetanide  2 mg Intravenous BID DO Helio     • dicyclomine  10 mg Oral TID PRN Katarina Pacheco MD     • erythromycin  0 5 inch Left Eye BID Padmini Stout MD     • HYDROmorphone  0 5 mg Intravenous Q4H PRN EBEN Hooker     • levothyroxine  75 mcg Oral Early Morning Gwen Browning MD     • lidocaine  1 patch Topical Daily EBEN Hooker     • midodrine  2 5 mg Oral TID AC Helio DO     • octreotide  100 mcg Subcutaneous [facility] EBEN Najera     • ondansetron  4 mg Intravenous Q6H PRN Padmini Stout MD     • pantoprazole  40 mg Oral Early Morning YOVANI Gupta     • traZODone  50 mg Oral HS Smitha Lr PA-C          Today, Patient Was Seen By: EBEN Wagner    ** Please Note: Dictation voice to text software may have been used in the creation of this document   **

## 2023-05-15 NOTE — ASSESSMENT & PLAN NOTE
· Per record nursing reported black tarry stools on 5/8 to 5/9, none since Per discussion with patient  · 5/8 fecal occult blood positive  · Baseline hemoglobin 10-11  · Hemoglobin remains stable, today 8 6  · No signs of bleeding  · 5/11 had EGD/colonoscopy-see results above  · GI recommending outpatient follow-up for possible small bowel enterography for evaluation of possible small bowel crohn's disease  · CBC a m

## 2023-05-15 NOTE — PROGRESS NOTES
Progress Note - Nephrology   Ceeran Carmen 69 y o  female MRN: [id number]  Unit/Bed#: -01 Encounter: [id number]    A/P:  1   Acute kidney injury due to hepatorenal syndrome versus volume depletion              Creatinine stable at 1 2 mg/dL, will continue to optimize care, please refer below regarding diuretic regimen  Continue to avoid potential nephrotoxins  2   Chronic disease stage III with baseline creatinine potentially between 0 8-1 mg/dL  3   Volume overload              Patient has been improving with the Bumex 2 mg twice daily, her weight is now down to 121 pounds, continue with low-sodium diet, will reduce the patient's Bumex to just once daily for now  Continue 2 mg dose  4   Hypomagnesemia              Continue check levels from time to time, supplement as indicated  5   Cirrhosis              FL mentioned previously, patient has clinical signs and other features of cirrhosis, official diagnosis has not been given at this time  6   Left lower quadrant abdominal pain              Pain continues to persist, continue supportive care  7  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   Potential Crohn's disease diagnosis, biopsy remains pending, continue care according to our gastroenterology colleagues  Follow up reason for today's visit: Acute kidney injury/chronic kidney disease/volume overload    Left lower quadrant abdominal pain    Patient Active Problem List   Diagnosis   • Acute kidney injury ([facility] [address] )   • Diarrhea of presumed infectious origin   • Primary hypertension   • Acquired hypothyroidism   • Irritable bowel syndrome with diarrhea   • Abnormal CT scan   • Superior mesenteric artery stenosis (HCC)   • Left lower quadrant abdominal pain   • Hyponatremia   • PAF (paroxysmal atrial fibrillation) ([facility])   • Hx of CABG   • Anemia   • Tarry stools         Subjective:   No acute distress, eating drinking with no nausea or vomiting at this time      Objective:     Vitals: Blood pressure "134/51, pulse 77, temperature 97 9 °F (36 6 °C), resp  rate 17, height 4' 11\" (1 499 m), weight 55 3 kg (121 lb 14 6 oz), SpO2 92 %  ,Body mass index is 24 62 kg/m²  Weight (last 2 days)     Date/Time Weight    05/15/23 0553 55 3 (121 91)    05/14/23 0553 56 3 (124 12)    05/13/23 0625 58 (127 87)            Intake/Output Summary (Last 24 hours) at 5/15/2023 1233  Last data filed at 5/15/2023 1000  Gross per 24 hour   Intake 720 ml   Output 2150 ml   Net -1430 ml     I/O last 3 completed shifts: In: 840 [P O :840]  Out: 3790 [Urine:3790]         Physical Exam: /51   Pulse 77   Temp 97 9 °F (36 6 °C)   Resp 17   Ht 4' 11\" (1 499 m)   Wt 55 3 kg (121 lb 14 6 oz)   SpO2 92%   BMI 24 62 kg/m²     General Appearance:    Alert, cooperative, no distress, appears stated age   Head:    Normocephalic, without obvious abnormality, atraumatic   Eyes:    Conjunctiva/corneas clear   Ears:    Normal external ears   Nose:   Nares normal, septum midline, mucosa normal, no drainage    or sinus tenderness   Throat:   Lips, mucosa, and tongue normal; teeth and gums normal   Neck:   Supple   Back:     Symmetric, no curvature, ROM normal, no CVA tenderness   Lungs:     Clear to auscultation bilaterally, respirations unlabored   Chest wall:    No tenderness or deformity   Heart:    Regular rate and rhythm, S1 and S2 normal, no murmur, rub   or gallop   Abdomen:     Soft, non-tender, bowel sounds active   Extremities:   Extremities normal, atraumatic, no cyanosis, +2 bilateral LE edema up to the presacral region   Skin:   Skin color, texture, turgor normal, no rashes or lesions   Lymph nodes:   Cervical normal   Neurologic:   CNII-XII intact            Lab, Imaging and other studies: I have personally reviewed pertinent labs    CBC:   Lab Results   Component Value Date    WBC 8 26 05/15/2023    HGB 8 6 (L) 05/15/2023    HCT 25 2 (L) 05/15/2023     (H) 05/15/2023    PLT 50 (L) 05/15/2023    MCH 35 2 (H) 05/15/2023    " MCHC 34 1 05/15/2023    RDW 15 8 (H) 05/15/2023    MPV 12 3 05/15/2023     CMP:   Lab Results   Component Value Date    K 3 9 05/15/2023    CL 91 (L) 05/15/2023    CO2 36 (H) 05/15/2023    BUN 27 (H) 05/15/2023    CREATININE 1 29 05/15/2023    CALCIUM 9 0 05/15/2023    AST 59 (H) 05/15/2023    ALT 22 05/15/2023    ALKPHOS 477 (H) 05/15/2023    EGFR 40 05/15/2023         Results from last 7 days   Lab Units 05/15/23  0523 05/14/23  0457 05/13/23  0433 05/12/23  0518 05/11/23  1148   POTASSIUM mmol/L 3 9 3 6 3 6   < > 3 6   CHLORIDE mmol/L 91* 96 96   < > 98   CO2 mmol/L 36* 34* 32   < > 27   BUN mg/dL 27* 23 21   < > 26*   CREATININE mg/dL 1 29 1 21 1 24   < > 1 23   CALCIUM mg/dL 9 0 8 8 8 5   < > 9 2   ALK PHOS U/L 477*  --  434*  --  505*   ALT U/L 22  --  22  --  26   AST U/L 59*  --  57*  --  72*    < > = values in this interval not displayed  Phosphorus: No results found for: PHOS  Magnesium:   Lab Results   Component Value Date    MG 2 0 05/15/2023     Urinalysis: No results found for: Leanor Pals, SPECGRAV, PHUR, LEUKOCYTESUR, NITRITE, PROTEINUA, GLUCOSEU, KETONESU, BILIRUBINUR, BLOODU  Ionized Calcium: No results found for: CAION  Coagulation: No results found for: PT, INR, APTT  Troponin: No results found for: TROPONINI  ABG: No results found for: PHART, FIJ3HWP, PO2ART, DCH2HVR, S8SHPPST, BEART, SOURCE  Radiology review:     IMAGING  No results found      Current Facility-Administered Medications   Medication Dose Route Frequency   • acetaminophen (TYLENOL) tablet 650 mg  650 mg Oral Q6H PRN   • barium (READI-CAT 2) suspension 900 mL  900 mL Oral Once in imaging   • [START ON 5/16/2023] bumetanide (BUMEX) injection 2 mg  2 mg Intravenous Daily   • dicyclomine (BENTYL) capsule 10 mg  10 mg Oral TID PRN   • erythromycin (ILOTYCIN) 0 5 % ophthalmic ointment 0 5 inch  0 5 inch Left Eye BID   • HYDROmorphone (DILAUDID) injection 0 5 mg  0 5 mg Intravenous Q4H PRN   • levothyroxine tablet 75 mcg  75 mcg Oral Early Morning   • lidocaine (LIDODERM) 5 % patch 1 patch  1 patch Topical Daily   • midodrine (PROAMATINE) tablet 2 5 mg  2 5 mg Oral TID AC   • octreotide (SandoSTATIN) injection 100 mcg  100 mcg Subcutaneous Q8H [address]   • ondansetron (ZOFRAN) injection 4 mg  4 mg Intravenous Q6H PRN   • pantoprazole (PROTONIX) EC tablet 40 mg  40 mg Oral Early Morning   • traZODone (DESYREL) tablet 50 mg  50 mg Oral HS     Medications Discontinued During This Encounter   Medication Reason   • polyethylene glycol (GLYCOLAX) 17 GM/SCOOP powder Therapy completed   • ciprofloxacin (CIPRO) IVPB (premix in 5% dextrose) 400 mg 200 mL    • metroNIDAZOLE (FLAGYL) IVPB (premix) 500 mg 100 mL    • dextrose 5 % and sodium chloride 0 45 % with KCl 20 mEq/L infusion    • sodium chloride 0 9 % infusion    • polyethylene glycol (MIRALAX) packet 17 g    • ciprofloxacin (CIPRO) tablet 500 mg    • metroNIDAZOLE (FLAGYL) tablet 500 mg    • sodium bicarbonate tablet 650 mg    • enoxaparin (LOVENOX) subcutaneous injection 40 mg    • furosemide (LASIX) injection 80 mg    • multi-electrolyte (PLASMALYTE-A/ISOLYTE-S PH 7 4) IV solution    • metoprolol succinate (TOPROL-XL) 24 hr tablet 25 mg    • midodrine (PROAMATINE) tablet 2 5 mg    • enoxaparin (LOVENOX) subcutaneous injection 30 mg    • metoprolol succinate (TOPROL-XL) 24 hr tablet 12 5 mg    • magnesium hydroxide (MILK OF MAGNESIA) oral suspension 30 mL    • polyethylene glycol (MIRALAX) packet 17 g    • morphine injection 4 mg    • multi-electrolyte (PLASMALYTE-A/ISOLYTE-S PH 7 4) IV solution    • heparin (porcine) subcutaneous injection 5,000 Units    • pantoprazole (PROTONIX) EC tablet 40 mg    • albumin human (FLEXBUMIN) 25 % injection 12 5 g    • bumetanide (BUMEX) injection 1 mg    • bumetanide (BUMEX) injection 0 5 mg    • midodrine (PROAMATINE) tablet 5 mg    • pantoprazole (PROTONIX) EC tablet 40 mg    • bumetanide (BUMEX) injection 1 mg    • bumetanide (BUMEX) injection 2 mg Precious Atkins, DO      This progress note was produced in part using a dictation device which may document imprecise wording from author's original intent

## 2023-05-15 NOTE — PROGRESS NOTES
Progress Note- Idalia Rodriges 69 y o  female MRN: [id number]    Unit/Bed#: -01 Encounter: [id number]      Assessment and Plan:    Patient is a 28-year-old female with PMH significant for depression, CAD s/p CABG (2021), Sjogren's, mild AS, A-fib, IBS-D and hypothyroidism admitted on 4/27 for acute on chronic diarrhea and left-sided abdominal discomfort  Patient was found to have mild nonspecific enteric colitis, nodular hepatic contour suggestive of cirrhosis and a 5 mm cystic lesion in the tail the pancreas on cross-sectional imaging      Patient has been evaluated several times by GI service  She was noted to have a large stool burden s/p MiraLAX bowel prep with significant stool output but persistent left lower quadrant abdominal pain  Also treated for presumed symptomatic uncomplicated diverticulitis with Cipro/Flagyl  Now has developed melenic stools with down-trending hgb       Additionally, patient is noted to have mild TTG IgA elevation in the setting of significantly elevated IgA as well as SMA stenosis for which she was scheduled for outpatient evaluation but was admitted at the time of her appointment      Underwent EGD and colonoscopy on 5/9/2023  EGD notable for small sliding hiatal hernia, tiny varices at the GE junction, mild PHG but no blood was seen in the stomach  Colonoscopy notable for stricture and mucosal friability and shallow ulceration at the IC valve and a single shallow ulcer in the rectosigmoid colon concerning for Crohn's disease      1  LLQ abd pain  2  Change in bowel habits  3  Melenic stools  4  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  Colonoscopy performed on 5/11, as detailed above, with findings concerning for Crohn's disease  Awaiting pathology  Denies any additional melena and hb overall stable   In discussion with nephrology, recommend defering contrasted imaging given IV diuresis and recent ALEJANDRA but patient should eventually have a contrasted CT or MR enterography as an outpatient for further evaluation of potential small bowel Crohn's  Pending biopsy results, will consider the initiation of budesonide for suspected Crohn's ds    - Diet as tolerated  - Continue once daily PPI  - Continue supportive care with analgesics PRN  - Follow-up pathology from colonoscopy  - Monitor hgb; Transfuse for hgb <7 0  - Monitor stools for evidence of further overt GI bleeding  - Consider outpatient small bowel enterography for evaluation of possible small bowel Crohn's ds       4  Cirrhotic morphology on imaging  5  Thrombocytopenia  6  ALEJANDRA  Patient incidentally noted to have a lobulated liver contour on ultrasound with nodular liver contour redemonstrated on CT A/P in addition to chronic thrombocytopenia  Serologic evaluation to r/o competing causes of liver disease have been unremarkable with the exception of a mildly low ceruloplasmin level  Quantitative IgG's pending  Patient is also noted to have a significant elevated alk phos and rising T  Bili (477, 2 30 respectively)  In discussion with hepatology, recommended non-contrasted MRI abdomen with MRCP for further evaluation of PSC in the setting of suspected Crohn's and CA 19-9 levels  If negative, can consider liver biopsy for histologic evaluation as to a cause for her cirrhosis       Ascites - Nephrology following for management of diuresis, appreciate recommendations    Esophageal varices - EGD (5/11) with small and minimal varices in the lower third of the esophagus and moderate PHG  Hepatic encephalopathy - No hx or evidence of HE on exam  AAOx3  HCC screening - U/S and cross-sectional imaging without focal liver lesion  AFP within normal limits  ALEJANDRA - ALEJANDRA with peak Cr 2 1, which has since improved  Nephrology following, appreciate recommendations   Defer contrasted imaging for the time being       - Continue to monitor MELD labs daily (CBC, CMP, INR)  - Follow-up on hepatic serologies  - CA 19-9 level  - Pursue MRI abdomen w/o contrast and MRCP for evaluation of PSC  - Nephrology following, appreciate recommendations     MELD-Na score: 19 at 5/12/2023  5:18 AM  MELD score: 18 at 5/12/2023  5:18 AM  Calculated from:  Serum Creatinine: 1 23 mg/dL at 5/12/2023  5:18 AM  Serum Sodium: 135 mmol/L at 5/12/2023  5:18 AM  Total Bilirubin: 2 23 mg/dL at 5/11/2023 11:48 AM  INR(ratio): 1 92 at 5/10/2023  5:05 AM  Age: 77 years    7  Pancreatic cyst  Patient instantly noted to have a 5 mm cystic lesion in the tail the pancreas on cross-sectional imaging  Also noted to have chronically elevated alk phos  Recommend outpatient evaluation including MRI/MRCP for definitive characterization      GI will continue to follow      ______________________________________________________________________    Subjective:     Patient found resting comfortably in her bedside chair  Reports mild left lower quadrant abdominal pain, again improved since admission  Denies any additional GI related complaints      Medication Administration - last 24 hours from 05/14/2023 0950 to 05/15/2023 0950       Date/Time Order Dose Route Action Action by     05/15/2023 0516 EDT levothyroxine tablet 75 mcg 75 mcg Oral Given Doni Perez RN     05/14/2023 2147 EDT traZODone (DESYREL) tablet 50 mg 50 mg Oral Given Doni Perez RN     05/15/2023 9929 EDT erythromycin (ILOTYCIN) 0 5 % ophthalmic ointment 0 5 inch 0 5 inch Left Eye Given Edith Mckeon RN     05/14/2023 1824 EDT erythromycin (ILOTYCIN) 0 5 % ophthalmic ointment 0 5 inch 0 5 inch Left Eye Given Leticia Huang, [address] [place] [address]     05/15/2023 2060 EDT octreotide (SandoSTATIN) injection 100 mcg 100 mcg Subcutaneous Given Doni Perez RN     05/14/2023 2147 EDT octreotide (SandoSTATIN) injection 100 mcg 100 mcg Subcutaneous Given Doni Perez RN     05/14/2023 1456 EDT octreotide (SandoSTATIN) injection 100 mcg 100 mcg Subcutaneous Given Leticia Huang RN     05/15/2023 0073 EDT HYDROmorphone (DILAUDID) injection 0 5 mg 0 5 mg "Intravenous Given Mauricio Terry RN     05/14/2023 2151 EDT HYDROmorphone (DILAUDID) injection 0 5 mg 0 5 mg Intravenous Given Mauricio Terry RN     05/15/2023 1457 EDT lidocaine (LIDODERM) 5 % patch 1 patch 1 patch Topical Medication Applied Teresa Harris, [facility]     05/14/2023 2147 EDT lidocaine (LIDODERM) 5 % patch 1 patch 1 patch Topical Patch Removed Mauricio Terry RN     05/15/2023 7373 EDT midodrine (PROAMATINE) tablet 2 5 mg 2 5 mg Oral Not Given Teresa Harris, [facility]     05/14/2023 1648 EDT midodrine (PROAMATINE) tablet 2 5 mg 2 5 mg Oral Not Given Bal Leigh, [facility]     05/14/2023 1148 EDT midodrine (PROAMATINE) tablet 2 5 mg 2 5 mg Oral Not Given Bal Leigh RN     05/15/2023 2072 EDT pantoprazole (PROTONIX) EC tablet 40 mg 40 mg Oral Given Mauricio Terry RN     05/15/2023 7269 EDT bumetanide (BUMEX) injection 2 mg 2 mg Intravenous Given Teresa Harris RN     05/14/2023 1823 EDT bumetanide (BUMEX) injection 2 mg 2 mg Intravenous Given Bal Leigh RN          Objective:     Vitals: Blood pressure 132/50, pulse 84, temperature 97 9 °F (36 6 °C), resp  rate 16, height 4' 11\" (1 499 m), weight 55 3 kg (121 lb 14 6 oz), SpO2 91 %  ,Body mass index is 24 62 kg/m²  Intake/Output Summary (Last 24 hours) at 5/15/2023 0950  Last data filed at 5/15/2023 0553  Gross per 24 hour   Intake 480 ml   Output 2000 ml   Net -1520 ml       Physical Exam:   General Appearance: Awake and alert, in no acute distress  Abdomen: +Mild LLQ abd TTP without guarding rebound or rigidity; Soft, non-distended; bowel sounds normal; no masses or no organomegaly    Invasive Devices     Peripheral Intravenous Line  Duration           Peripheral IV 05/11/23 Left;Proximal;Upper;Ventral (anterior) Arm 3 days                Lab Results:  No results displayed because visit has over 200 results  Imaging Studies: I have personally reviewed pertinent imaging studies         **Please note:  Dictation voice to text software may have been " used in the creation of this record  Occasional wrong word or “sound alike” substitutions may have occurred due to the inherent limitations of voice recognition software  Read the chart carefully and recognize, using context, where substitutions have occurred  **

## 2023-05-15 NOTE — ASSESSMENT & PLAN NOTE
• Baseline creatinine if 0 8 to 1  • Creatinine stable, today 1 29  • Nephrology following, appreciate recommendations  • Continues on Bumex 2 mg IV twice a day per nephrology  • Continue midodrine and octreotide per nephrology  • Avoid hypotension and nephrotoxins  • Monitor intake and output  • Daily weights, weight is down 1 kg from yesterday  • Diuresed 1 5 L over the last 24 hours  • Check BMP a m

## 2023-05-15 NOTE — ASSESSMENT & PLAN NOTE
· Presented to the ED with left lower quadrant abdominal pain on 4/27   · Patient is pain-free this a m   · 4/27/2023 CT abdomen: New small amount of free fluid in the abdomen and pelvis compared to 4/18/2023, which can be secondary to cirrhosis or acute inflammatory process in the abdomen and pelvis such as occult acute diverticulitis  Moderate amount of stool in the colon, nonspecific and can represent constipation  · 4/29/2023 obstructive series: Nonobstructive bowel gas pattern  · 5/1/2023 CT abdomen pelvis: Thickened appearance of the wall of the descending colon and sigmoid colon which may represent pseudo thickening from nondistention versus a mild nonspecific colitis in the appropriate clinical setting  There are a few scattered colonic diverticula  · 5/2 flex sig: Minimal inflammation noted, biopsies were normal  · 5/5 KUB: Nonobstructive bowel gas pattern  · 5/11 colonoscopy: Stricture and mucosal friability and shallow ulceration at the ileocecal valve  Single shallow ulcer in the rectosigmoid colon  Most likely explanation for these findings is Crohn's disease  · 5/11 EGD: Small sliding hiatal hernia  Tiny varices at the GE junction  Mild portal hypertensive gastropathy  No blood in the stomach  Normal duodenum  No interventions performed    · Surgery consultation appreciated- now signed off  · GI following, appreciate recommendations  · Continue pain management

## 2023-05-15 NOTE — PLAN OF CARE

## 2023-05-15 NOTE — NUTRITION
05/15/23 1541   Biochemical Data,Medical Tests, and Procedures   Biochemical Data/Medical Tests/Procedures Lab values reviewed; Meds reviewed   Nutrition-Focused Physical Exam   Nutrition-Focused Physical Exam Findings RN skin assessment reviewed;Edema; Wound   Nutrition-Focused Physical Exam Findings trace BL UE edema, +1 BL LE edema   Current PO Intake   Current Diet Order Regular diet, 2 g Na, 1500 mL fluid   Nutrition Supplements Ensure Plant-based   Current Meal Intake %   Intake Supplements 50-75%   Estimated calorie intake compared to estimated need Nutrient needs are met  PES Statement   Problem Continue previous diagnosis   Recommendations/Interventions   Summary Nutrition follow up assessment  Colonoscopy and EGD procedure 5/11  See 5/12 GI note for detail  Per chart review, diarrhea noted to be resolved  Pt is undergoing diuresis this admission  5/15/23 121# via standing scale  Ordered for Regular diet, 2 g Na restriction, 1500 mL fluid restriction  Also ordered for Ensure Plant-Based chocolate TID  Pt reports her appetite is good  She feels overwhelmed and wasteful with large portions  Recommend adjusting diet to include small portions and lactose restriction  Pt with amble nutrition supplements on sink  Recommend discontinuing Ensure Plant-Based for 3 days and reinitiating chocolate and vanilla Ensure Plant-Based each once daily at breakfast as pt prefers to mix flavors together  RD protocol not initiated  Interventions/Recommendations Adjust diet order;Supplement adjust;Monitor I & O's   Recommendations to Provider Recommend adjusting diet to include small portions and lactose restriction  Pt with amble nutrition supplements on sink  Recommend discontinuing Ensure Plant-Based for 3 days and reinitiating chocolate and vanilla Ensure Plant-Based each once daily at breakfast as pt prefers to mix flavors together  RD protocol not initiated     Education Assessment   Education Education not indicated at this time   Patient Nutrition Goals   Goal Adequate hydration; Adequate intake;Meet PO needs

## 2023-05-15 NOTE — ASSESSMENT & PLAN NOTE
· Patient with hypotension- now blood pressure normalized  · Continue midodrine- decreased to 2 5 mg tid by nephrology on 5/11  · Home metoprolol on hold due to hypotension, will continue to monitor  · Home losartan has been held due to ALEJANDRA

## 2023-05-15 NOTE — ASSESSMENT & PLAN NOTE
"· Baseline appears to be between 11 and 12   · H/H today 8 6/25 2  · Please see plan for \"tarry stools\"  "

## 2023-05-16 LAB
IGG SERPL-MCNC: 1300 MG/DL (ref 586–1602)
IGG1 SER-MCNC: 892 MG/DL (ref 248–810)
IGG2 SER-MCNC: 177 MG/DL (ref 130–555)
IGG3 SER-MCNC: 55 MG/DL (ref 15–102)
IGG4 SER-MCNC: 119 MG/DL (ref 2–96)

## 2023-05-16 PROCEDURE — 97110 THERAPEUTIC EXERCISES: CPT

## 2023-05-16 PROCEDURE — 97116 GAIT TRAINING THERAPY: CPT

## 2023-05-16 PROCEDURE — 99232 SBSQ HOSP IP/OBS MODERATE 35: CPT | Performed by: INTERNAL MEDICINE

## 2023-05-16 PROCEDURE — 99232 SBSQ HOSP IP/OBS MODERATE 35: CPT

## 2023-05-16 PROCEDURE — NC001 PR NO CHARGE: Performed by: RADIOLOGY

## 2023-05-16 RX ADMIN — TRAZODONE HYDROCHLORIDE 50 MG: 50 TABLET ORAL at 21:22

## 2023-05-16 RX ADMIN — OCTREOTIDE ACETATE 100 MCG: 100 INJECTION, SOLUTION INTRAVENOUS; SUBCUTANEOUS at 14:44

## 2023-05-16 RX ADMIN — LEVOTHYROXINE SODIUM 75 MCG: 75 TABLET ORAL at 05:26

## 2023-05-16 RX ADMIN — ERYTHROMYCIN 0.5 INCH: 5 OINTMENT OPHTHALMIC at 17:22

## 2023-05-16 RX ADMIN — ERYTHROMYCIN 0.5 INCH: 5 OINTMENT OPHTHALMIC at 09:15

## 2023-05-16 RX ADMIN — OCTREOTIDE ACETATE 100 MCG: 100 INJECTION, SOLUTION INTRAVENOUS; SUBCUTANEOUS at 21:26

## 2023-05-16 RX ADMIN — OCTREOTIDE ACETATE 100 MCG: 100 INJECTION, SOLUTION INTRAVENOUS; SUBCUTANEOUS at 05:29

## 2023-05-16 RX ADMIN — PANTOPRAZOLE SODIUM 40 MG: 40 TABLET, DELAYED RELEASE ORAL at 05:26

## 2023-05-16 RX ADMIN — HYDROMORPHONE HYDROCHLORIDE 0.5 MG: 1 INJECTION, SOLUTION INTRAMUSCULAR; INTRAVENOUS; SUBCUTANEOUS at 07:17

## 2023-05-16 RX ADMIN — LIDOCAINE 1 PATCH: 700 PATCH TOPICAL at 09:15

## 2023-05-16 RX ADMIN — MIDODRINE HYDROCHLORIDE 2.5 MG: 5 TABLET ORAL at 07:13

## 2023-05-16 RX ADMIN — BUMETANIDE 2 MG: 0.25 INJECTION INTRAMUSCULAR; INTRAVENOUS at 09:20

## 2023-05-16 NOTE — ASSESSMENT & PLAN NOTE
· Presented to the ED with left lower quadrant abdominal pain on 4/27   · Patient is pain-free this a m   · 4/27/2023 CT abdomen: New small amount of free fluid in the abdomen and pelvis compared to 4/18/2023, which can be secondary to cirrhosis or acute inflammatory process in the abdomen and pelvis such as occult acute diverticulitis  Moderate amount of stool in the colon, nonspecific and can represent constipation  · 4/29/2023 obstructive series: Nonobstructive bowel gas pattern  · 5/1/2023 CT abdomen pelvis: Thickened appearance of the wall of the descending colon and sigmoid colon which may represent pseudo thickening from nondistention versus a mild nonspecific colitis in the appropriate clinical setting  There are a few scattered colonic diverticula  · 5/2 flex sig: Minimal inflammation noted, biopsies were normal  · 5/5 KUB: Nonobstructive bowel gas pattern  · 5/11 colonoscopy: Stricture and mucosal friability and shallow ulceration at the ileocecal valve  Single shallow ulcer in the rectosigmoid colon  Most likely explanation for these findings is Crohn's disease  · 5/11 EGD: Small sliding hiatal hernia  Tiny varices at the GE junction  Mild portal hypertensive gastropathy  No blood in the stomach  Normal duodenum  No interventions performed    · Surgery consultation appreciated- now signed off  · GI following, appreciate recommendations  · Pathology from colonoscopy results pending  · 5/15/2023 MRCP: Cirrhosis, pancreatic tail cyst, and small right pleural effusion and moderate body wall edema were noted  · Continue pain management

## 2023-05-16 NOTE — OCCUPATIONAL THERAPY NOTE
"   05/16/23 1001   OT Last Visit   OT Visit Date 05/16/23   Note Type   Note Type Treatment  (completed [id number])   Pain Assessment   Pain Assessment Tool 0-10   Pain Score 7   Pain Location/Orientation Location: Back   Restrictions/Precautions   Weight Bearing Precautions Per Order No   Other Precautions Chair Alarm; Bed Alarm; Fall Risk   Lifestyle   Reciprocal Relationships \"my son moved me here from [place] to be close to him and now he's distanced himself from me\"  Intrinsic Gratification at [employer], [employer], and games, and OTROUZA a month they provide a trip to [place] with breakfast or lunch (during the outing)\"   ADL   Where Assessed   (patient bathing had already been finished this morning)   Equipment Provided   (has reacher, [address], and sock-aid for use PRN)   Therapeutic Excerise-Strength   UE Strength Yes   Right Upper Extremity- Strength   R Shoulder Flexion; Horizontal ABduction; Other (Comment)  (pro/re-traction)   R Elbow Elbow flexion;Elbow extension  (in forward and reverse;  Also: supin/pron-ation)   R Weight/Reps/Sets 2 pound weight - 15 reps shoulders and 20 reps elbows   RUE Strength Comment *reports using a 3 pound weight for bilateral/cane exers  at home   Left Upper Extremity-Strength   L Weights/Reps/Sets all exers  same as RUE above   Coordination   Gross Motor WFL   Subjective   Subjective \"I try to walk in the winter - morning, afternoon, and evening\"   Cognition   Overall Cognitive Status [facility]   Arousal/Participation Alert; Cooperative   Attention Within functional limits   Orientation Level Oriented X4   Memory Within functional limits   Following Commands Follows one step commands without difficulty   Activity Tolerance   Activity Tolerance Patient tolerated treatment well  (with rests between sets of exercises)   Assessment   Assessment Patient participated in Skilled OT session this date with interventions consisting of therapeutic exercise to: increase functional use of " BUEs, increase BUE muscle strength  and increase OOB/ sitting tolerance   Patient agreeable to OT treatment session, upon arrival patient was found seated OOB to Recliner  In comparison to previous session, patient with improvements in activity tolerance  Patient requiring frequent rest periods, occasional verbal cues for correct technique, and self-care assistance as noted in flow sheet/AM-PAC  *Patient became tearful X1 during session regarding her son, who she reports has distanced himself from her  Patient continues to be functioning below baseline level, occupational performance remains limited secondary to factors listed above and increased risk for falls and injury  From OT standpoint, recommendation at time of d/c would be return to facility with rehabilitation services  Patient to benefit from continued Occupational Therapy treatment while in the hospital to address deficits as defined above and maximize level of functional independence with ADLs and functional mobility  Plan   Treatment Interventions UE strengthening/ROM; Patient/family training; Activityengagement   Goal Expiration Date 05/22/23   OT Treatment Day 3   Recommendation   OT Discharge Recommendation Return to facility with rehabilitation services   Additional Comments 2 The patient's raw score on the AM-PAC Daily Activity Inpatient Short Form is 20  A raw score of greater than or equal to 19 suggests the patient may benefit from discharge to home  Please refer to the recommendation of the Occupational Therapist for safe discharge planning     AM-PAC Daily Activity Inpatient   Lower Body Dressing 3   Bathing 3   Toileting 3   Upper Body Dressing 3   Grooming 4   Eating 4   Daily Activity Raw Score 20   Daily Activity Standardized Score (Calc for Raw Score >=11) 42 03   AM-[facility] Applied Cognition Inpatient   Following a Speech/Presentation 4   Understanding Ordinary Conversation 4   Taking Medications 4   Remembering Where Things Are Placed or Put Away 4   Remembering List of 4-5 Errands 4   Taking Care of Complicated Tasks 4   Applied Cognition Raw Score 24   Applied Cognition Standardized Score 62 21   KAREN Tejeda/L  *provided patient with word search puzzles for use at 'bedside'

## 2023-05-16 NOTE — PROGRESS NOTES
"Progress Note - Nephrology   Chaparro Pinto 69 y o  female MRN: [id number]  Unit/Bed#: -01 Encounter: [id number]    A/P:  1   Acute kidney injury due to hepatorenal syndrome versus volume depletion              Creatinine remained stable, continue to avoid nephrotoxins  At this time we will discontinue Bumex, patient for procedure tomorrow morning  2   Chronic disease stage III with baseline creatinine potentially between 0 8-1 mg/dL  3   Volume overload              Has been above, the patient remains volume overloaded will discontinue Bumex, and monitor for now  We will restart diuresis potentially next 48-72 hours  4   Hypomagnesemia              Continue to monitor levels from time to time, add supplementation as indicated  5   Cirrhosis              RLRX has not been officially diagnosed, patient for liver biopsy tomorrow  6   Left lower quadrant abdominal pain              Continue supportive care  7   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   Follow-up biopsies obtained the previous week, potential diagnosis of Crohn's disease    Follow up reason for today's visit: Acute kidney injury/hepatorenal syndrome/chronic kidney disease/electrolyte disorders    Left lower quadrant abdominal pain    Patient Active Problem List   Diagnosis   • Acute kidney injury ([address] )   • Diarrhea of presumed infectious origin   • Primary hypertension   • Acquired hypothyroidism   • Irritable bowel syndrome with diarrhea   • Abnormal CT scan   • Superior mesenteric artery stenosis (HCC)   • Left lower quadrant abdominal pain   • Hyponatremia   • PAF (paroxysmal atrial fibrillation) (HCC)   • Hx of CABG   • Anemia   • Tarry stools         Subjective:   No acute events, patient is eating and drinking with no nausea or vomiting at this time  Objective:     Vitals: Blood pressure (!) 131/45, pulse 79, temperature 98 1 °F (36 7 °C), temperature source Oral, resp   rate 17, height 4' 11\" (1 499 m), weight 55 kg (121 lb 4 1 " "oz), SpO2 91 %  ,Body mass index is 27 18 kg/m²  Weight (last 2 days)     Date/Time Weight    05/16/23 0600 55 (121 25)    05/15/23 0553 55 3 (121 91)    05/14/23 0553 56 3 (124 12)            Intake/Output Summary (Last 24 hours) at 5/16/2023 1501  Last data filed at 5/16/2023 1300  Gross per 24 hour   Intake 480 ml   Output --   Net 480 ml     I/O last 3 completed shifts: In: 1200 [P O :1200]  Out: 1525 [Urine:1525]         Physical Exam: BP (!) 131/45 (BP Location: Right arm)   Pulse 79   Temp 98 1 °F (36 7 °C) (Oral)   Resp 17   Ht 4' 11\" (1 499 m)   Wt 55 kg (121 lb 4 1 oz)   SpO2 91%   BMI 27 18 kg/m²     General Appearance:    Alert, cooperative, no distress, appears stated age   Head:    Normocephalic, without obvious abnormality, atraumatic   Eyes:    Conjunctiva/corneas clear   Ears:    Normal external ears   Nose:   Nares normal, septum midline, mucosa normal, no drainage    or sinus tenderness   Throat:   Lips, mucosa, and tongue normal; teeth and gums normal   Neck:   Supple   Back:     Symmetric, no curvature, ROM normal, no CVA tenderness   Lungs:     Clear to auscultation bilaterally, respirations unlabored   Chest wall:    No tenderness or deformity   Heart:    Regular rate and rhythm, S1 and S2 normal, no murmur, rub   or gallop   Abdomen:     Soft, non-tender, bowel sounds active   Extremities:   Extremities normal, atraumatic, no cyanosis,+ 2 bilateral lower extremity edema  Skin:   Skin color, texture, turgor normal, no rashes or lesions   Lymph nodes:   Cervical normal   Neurologic:   CNII-XII intact            Lab, Imaging and other studies: I have personally reviewed pertinent labs  CBC: No results found for: WBC, HGB, HCT, MCV, PLT, ADJUSTEDWBC, MCH, MCHC, RDW, MPV, NRBC  CMP: No results found for: NA, K, CL, CO2, ANIONGAP, BUN, CREATININE, GLUCOSE, CALCIUM, AST, ALT, ALKPHOS, PROT, BILITOT, EGFR        Results from last 7 days   Lab Units 05/15/23  0523 05/14/23  0457 " 05/13/23  0433 05/12/23  0518 05/11/23  1148   POTASSIUM mmol/L 3 9 3 6 3 6   < > 3 6   CHLORIDE mmol/L 91* 96 96   < > 98   CO2 mmol/L 36* 34* 32   < > 27   BUN mg/dL 27* 23 21   < > 26*   CREATININE mg/dL 1 29 1 21 1 24   < > 1 23   CALCIUM mg/dL 9 0 8 8 8 5   < > 9 2   ALK PHOS U/L 477*  --  434*  --  505*   ALT U/L 22  --  22  --  26   AST U/L 59*  --  57*  --  72*    < > = values in this interval not displayed  Phosphorus: No results found for: PHOS  Magnesium: No results found for: MG  Urinalysis: No results found for: Shelly Richer, SPECGRAV, PHUR, LEUKOCYTESUR, NITRITE, PROTEINUA, GLUCOSEU, KETONESU, BILIRUBINUR, BLOODU  Ionized Calcium: No results found for: CAION  Coagulation: No results found for: PT, INR, APTT  Troponin: No results found for: TROPONINI  ABG: No results found for: PHART, NOB9XXL, PO2ART, OGS7RAZ, R0GZYSDK, BEART, SOURCE  Radiology review:     IMAGING  Procedure: MRI abdomen wo contrast and mrcp    Result Date: 5/16/2023  Narrative: MRI OF THE ABDOMEN WITHOUT CONTRAST WITH MRCP INDICATION: 77 years / Female  left lower quadrant pain  COMPARISON: CT abdomen/pelvis 5/1/2023  TECHNIQUE:  Multiplanar/multisequence MRI of the abdomen with 3D MRCP was performed without the administration of contrast  FINDINGS: LOWER CHEST:   Small right pleural effusion and right basilar atelectasis  LIVER: Morphologic features of cirrhosis  No suspicious mass within the limitations of noncontrast imaging  Limited evaluation of hepatic veins and portal veins on this non-contrast MRI is unremarkable  BILE DUCTS: Minimal focal dilatation of the proximal right hepatic duct  Otherwise, no intrahepatic or extrahepatic bile duct dilation  Common bile duct is normal in caliber  No choledocholithiasis, biliary stricture or suspicious mass  Pneumobilia again noted   GALLBLADDER: Cholecystectomy PANCREAS: There is a 6 mm pancreatic tail cyst (series 3 image 13) without definite solid components, noting somewhat limited evaluation without intravenous contrast  No main duct dilatation  ADRENAL GLANDS:  Normal  SPLEEN:  Normal  KIDNEYS/PROXIMAL URETERS:  No hydroureteronephrosis  No suspicious renal mass  Bilateral hemorrhagic cysts  BOWEL:  No dilated loops of bowel  PERITONEAL CAVITY/RETROPERITONEUM:  No ascites  No mass  LYMPH NODES:  No abdominal lymphadenopathy  VASCULAR STRUCTURES:  Unremarkable  No aneurysm  ABDOMINAL WALL: Moderate body wall edema  OSSEOUS STRUCTURES:  No suspicious osseous lesion, noting limited evaluation  Impression: 1  Cirrhosis  Minimal focal dilatation of the proximal right hepatic duct with limited evaluation of the hepatic hilum due to susceptibility artifact from cholecystectomy clips and adjacent bowel gas  No additional biliary abnormalities  2  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, 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  For simple cyst(s) less than 1 5 cm, recommend followup every 2 year for 5 times or to age [de-identified], whichever comes first  Followup can stop at age [de-identified] or  can switch over to [de-identified] year or older algorithm  Recommend next followup in 2 years  Preferred imaging modality: abdomen MRI and MRCP with and without IV contrast, or triple phase abdomen CT with IV contrast, or abdomen MRI and MRCP without IV contrast  3  Small right pleural effusion and moderate body wall edema  The study was marked in [facility] for immediate notification   Workstation performed: [id number]       Current Facility-Administered Medications   Medication Dose Route Frequency   • acetaminophen (TYLENOL) tablet 650 mg  650 mg Oral Q6H PRN   • barium (READI-CAT 2) suspension 900 mL  900 mL Oral Once in imaging   • dicyclomine (BENTYL) capsule 10 mg  10 mg Oral TID PRN   • erythromycin (ILOTYCIN) 0 5 % ophthalmic ointment 0 5 inch  0 5 inch Left Eye BID   • HYDROmorphone (DILAUDID) injection 0 5 mg  0 5 mg Intravenous Q4H PRN   • levothyroxine tablet 75 mcg  75 mcg Oral Early Morning   • lidocaine (LIDODERM) 5 % patch 1 patch  1 patch Topical Daily   • midodrine (PROAMATINE) tablet 2 5 mg  2 5 mg Oral TID AC   • octreotide (SandoSTATIN) injection 100 mcg  100 mcg Subcutaneous Q8H [address]   • ondansetron (ZOFRAN) injection 4 mg  4 mg Intravenous Q6H PRN   • pantoprazole (PROTONIX) EC tablet 40 mg  40 mg Oral Early Morning   • traZODone (DESYREL) tablet 50 mg  50 mg Oral HS     Medications Discontinued During This Encounter   Medication Reason   • polyethylene glycol (GLYCOLAX) 17 GM/SCOOP powder Therapy completed   • ciprofloxacin (CIPRO) IVPB (premix in 5% dextrose) 400 mg 200 mL    • metroNIDAZOLE (FLAGYL) IVPB (premix) 500 mg 100 mL    • dextrose 5 % and sodium chloride 0 45 % with KCl 20 mEq/L infusion    • sodium chloride 0 9 % infusion    • polyethylene glycol (MIRALAX) packet 17 g    • ciprofloxacin (CIPRO) tablet 500 mg    • metroNIDAZOLE (FLAGYL) tablet 500 mg    • sodium bicarbonate tablet 650 mg    • enoxaparin (LOVENOX) subcutaneous injection 40 mg    • furosemide (LASIX) injection 80 mg    • multi-electrolyte (PLASMALYTE-A/ISOLYTE-S PH 7 4) IV solution    • metoprolol succinate (TOPROL-XL) 24 hr tablet 25 mg    • midodrine (PROAMATINE) tablet 2 5 mg    • enoxaparin (LOVENOX) subcutaneous injection 30 mg    • metoprolol succinate (TOPROL-XL) 24 hr tablet 12 5 mg    • magnesium hydroxide (MILK OF MAGNESIA) oral suspension 30 mL    • polyethylene glycol (MIRALAX) packet 17 g    • morphine injection 4 mg    • multi-electrolyte (PLASMALYTE-A/ISOLYTE-S PH 7 4) IV solution    • heparin (porcine) subcutaneous injection 5,000 Units    • pantoprazole (PROTONIX) EC tablet 40 mg    • albumin human (FLEXBUMIN) 25 % injection 12 5 g    • bumetanide (BUMEX) injection 1 mg    • bumetanide (BUMEX) injection 0 5 mg    • midodrine (PROAMATINE) tablet 5 mg    • pantoprazole (PROTONIX) EC tablet 40 mg    • bumetanide (BUMEX) injection 1 mg    • bumetanide (BUMEX) injection 2 mg    • bumetanide (BUMEX) injection 2 mg        Precious Atkins, DO      This progress note was produced in part using a dictation device which may document imprecise wording from author's original intent

## 2023-05-16 NOTE — PHYSICAL THERAPY NOTE
"Physical Therapy Treatment Session Note    Patient's Name: Hayden Narayan    Admitting Diagnosis  Diverticulitis [K57 92]  Abdominal pain [R10 9]    Problem List  Patient Active Problem List   Diagnosis    Acute kidney injury ([facility] [address] )    Diarrhea of presumed infectious origin    Primary hypertension    Acquired hypothyroidism    Irritable bowel syndrome with diarrhea    Abnormal CT scan    Superior mesenteric artery stenosis (HCC)    Left lower quadrant abdominal pain    Hyponatremia    PAF (paroxysmal atrial fibrillation) (HCC)    Hx of CABG    Anemia    Tarry stools       Past Medical History  Past Medical History:   Diagnosis Date    Anemia     Atrial fibrillation ([facility] [address] )     Depression     GI (gastrointestinal bleed)     Hypomagnesemia 4/29/2023    Ischemic colitis [facility])        Past Surgical History  Past Surgical History:   Procedure Laterality Date    APPENDECTOMY      CARDIAC SURGERY      CHOLECYSTECTOMY      HIP SURGERY Right     Partial replacement    HYSTERECTOMY          05/16/23 1134   PT Last Visit   PT Visit Date 05/16/23   Note Type   Note Type Treatment   Pain Assessment   Pain Assessment Tool 0-10   Pain Score No Pain  (denies at rest and with activity)   Restrictions/Precautions   Weight Bearing Precautions Per Order No   Other Precautions Chair Alarm; Bed Alarm; Fall Risk   General   Chart Reviewed Yes   Response to Previous Treatment Patient with no complaints from previous session  Family/Caregiver Present No   Cognition   Overall Cognitive Status WFL   Arousal/Participation Alert; Cooperative   Attention Within functional limits   Orientation Level Oriented X4   Memory Within functional limits   Following Commands Follows all commands and directions without difficulty   Comments Chen Holt was agreeable to PT treatment session  She was very pleasant throughout session     Subjective   Subjective \"I am just waiting for the biopsy results\"   Bed Mobility   Additional Comments DNT as Chen Holt was sitting out of " bed on the recliner upon arrival and at session conclusion  Transfers   Sit to Stand 6  Modified independent   Additional items Armrests   Stand to Sit 6  Modified independent   Additional items Armrests   Stand pivot 5  Supervision   Additional items Assist x 1;Verbal cues   Toilet transfer 6  Modified independent   Additional items Verbal cues;Standard toilet; Other  (left grab bar)   Additional Comments Tae Dunlap completed own posterior pericare in standing without LOB, distant supervision of 1  Ambulation/Elevation   Gait pattern Forward Flexion; Short stride   Gait Assistance 5  Supervision  (distant)   Additional items Assist x 1   Assistive Device Rolling walker   Distance 75 feet x 2   Stair Management Assistance Not tested   Ambulation/Elevation Additional Comments Verbal cues for base of support widening with directional changes  Balance   Static Sitting Good   Dynamic Sitting Good   Static Standing Fair +   Dynamic Standing Fair +   Ambulatory Fair   Endurance Deficit   Endurance Deficit No   Activity Tolerance   Activity Tolerance Patient tolerated treatment well   Medical Staff Made Aware Yes, CM was informed of treatment session outcome  Nurse Made Aware Yes, nursing staff was informed of treatment session outcome  Assessment   Prognosis Good   Problem List Impaired balance;Decreased coordination   Assessment Pt seen for PT treatment session this date with interventions consisting of gait training to reduce the risk of medical complications and safely return to prior living environment w/ emphasis on improving pt's ability to ambulate level surfaces x 75 feet x 2 with distant S provided by therapist with RW  Pt agreeable to PT treatment session upon arrival, pt found seated OOB in recliner, A&O x 4  In comparison to previous session, pt with improvements in ambulatory distance   Post session: pt returned back to recliner, chair alarm engaged, all needs in reach and RN notified of session findings/recommendations  Continue to recommend return to facility with rehabilitation services at time of d/c in order to maximize pt's functional independence and safety w/ mobility  Pt continues to be functioning below baseline level, and remains limited 2* factors listed above and including gait deviations  PT will continue to see pt during current hospitalization in order to address the deficits listed above and provide interventions consistent w/ POC in effort to achieve LTGs  Goals   Patient Goals to have lunch soon   LTG Expiration Date 05/22/23   Long Term Goal #1 LTGs remain appropriate   PT Treatment Day 3   Plan   Treatment/Interventions Functional transfer training;Gait training;Equipment eval/education;Spoke to nursing;Spoke to case management   Progress Improving as expected   PT Frequency 3-5x/wk   Recommendation   PT Discharge Recommendation Return to facility with rehabilitation services  (maintained recommendation)   Equipment Recommended [place] walker   Change/add to ip.access? No   Additional Comments Upon conclusion, Maury Sepulveda was resting out of bed on the recliner  The chair alarm was engaged and all of her needs were within reach     AM-PAC Basic Mobility Inpatient   Turning in Flat Bed Without Bedrails 4   Lying on Back to Sitting on Edge of Flat Bed Without Bedrails 3   Moving Bed to Chair 3   Standing Up From Chair Using Arms 3   Walk in Room 3   Climb 3-5 Stairs With Railing 3   Basic Mobility Inpatient Raw Score 19   Basic Mobility Standardized Score 42 48   Highest Level Of Mobility   JH-HLM Goal 6: Walk 10 steps or more   JH-HLM Achieved 7: Walk 25 feet or more   Education   Education Provided Mobility training;Assistive device   Patient Demonstrates acceptance/verbal understanding     Treatment Time: 5750-4779    Senia Stanford, PT

## 2023-05-16 NOTE — PLAN OF CARE
Problem: OCCUPATIONAL THERAPY ADULT  Goal: Performs self-care activities at highest level of function for planned discharge setting  See evaluation for individualized goals  Description: Treatment Interventions: ADL retraining, Functional transfer training, Endurance training, Patient/family training, Compensatory technique education, Energy conservation, Activityengagement    See flowsheet documentation for full assessment, interventions and recommendations  Outcome: Progressing  Note: Limitation: Decreased ADL status, Decreased Safe judgement during ADL, Decreased endurance, Decreased self-care trans, Decreased high-level ADLs  Prognosis: Good  Assessment: Patient participated in Skilled OT session this date with interventions consisting of therapeutic exercise to: increase functional use of BUEs, increase BUE muscle strength  and increase OOB/ sitting tolerance   Patient agreeable to OT treatment session, upon arrival patient was found seated OOB to Recliner  In comparison to previous session, patient with improvements in activity tolerance  Patient requiring frequent rest periods, occasional verbal cues for correct technique, and self-care assistance as noted in flow sheet/AM-PAC  *Patient became tearful X1 during session regarding her son, who she reports has distanced himself from her  Patient continues to be functioning below baseline level, occupational performance remains limited secondary to factors listed above and increased risk for falls and injury  From OT standpoint, recommendation at time of d/c would be return to facility with rehabilitation services  Patient to benefit from continued Occupational Therapy treatment while in the hospital to address deficits as defined above and maximize level of functional independence with ADLs and functional mobility       OT Discharge Recommendation: Return to facility with rehabilitation services        [place], JONES/L

## 2023-05-16 NOTE — CASE MANAGEMENT
Case Management Discharge Planning Note    Patient name Holly Mares  Location [address] [id number] MRN [id number]  : 1945 Date 2023       Current Admission Date: 2023  Current Admission Diagnosis:Left lower quadrant abdominal pain   Patient Active Problem List    Diagnosis Date Noted   • Tarry stools 2023   • Hyponatremia 2023   • Anemia 2023   • Left lower quadrant abdominal pain 2023   • Superior mesenteric artery stenosis ([facility] [address] [address] ) 2023   • Acute kidney injury ([address] ) 2023   • Diarrhea of presumed infectious origin 2023   • Primary hypertension 2023   • Acquired hypothyroidism 2023   • Irritable bowel syndrome with diarrhea 2023   • Abnormal CT scan 2023   • Hx of CABG 2023   • PAF (paroxysmal atrial fibrillation) ([facility] [address] ) 2022      LOS (days): 19  Geometric Mean LOS (GMLOS) (days): 3 80  Days to GMLOS:-15 2     OBJECTIVE:  Risk of Unplanned Readmission Score: 21 35         Current admission status: Inpatient   Preferred Pharmacy:   [address] [place] [address]  [facility]   [place] [address]  Phone: [phone] Fax: [phone]    Primary Care Provider: Shonda Palmer MD    Primary Insurance: MEDICARE  Secondary Insurance: [address]:    Discharge planning discussed with[de-identified] Patient and [facility] from [facility] of Choice: Yes  Comments - Freedom of Choice: Recs are return to facility with [facility] for therapy     IMM Given (Date):: 23 (Copy provided to patient and media)  IMM Given to[de-identified] Patient     Additional Comments: Updated [facility] from [facility]    Call [facility] cell phone [phone] to update on discharge tomorrow

## 2023-05-16 NOTE — ASSESSMENT & PLAN NOTE
· Per record nursing reported black tarry stools on 5/8 to 5/9, none since Per discussion with patient  · 5/8 fecal occult blood positive  · Baseline hemoglobin 10-11  · Hemoglobin remains stable  · No signs of bleeding  · 5/11 had EGD/colonoscopy-see results above  · GI following prescient recommendations  · CBC a m

## 2023-05-16 NOTE — PROGRESS NOTES
Progress Note- Samantha Galicia 69 y o  female MRN: [id number]    Unit/Bed#: -01 Encounter: [id number]      Assessment and Plan:    Patient is a 69-year-old female with PMH significant for depression, CAD s/p CABG (2021), Sjogren's, mild AS, A-fib, IBS-D and hypothyroidism admitted on 4/27 for acute on chronic diarrhea and left-sided abdominal discomfort  Patient was found to have mild nonspecific enteric colitis, nodular hepatic contour suggestive of cirrhosis and a 5 mm cystic lesion in the tail the pancreas on cross-sectional imaging      Patient has been evaluated several times by GI service  She was noted to have a large stool burden s/p MiraLAX bowel prep with significant stool output but persistent left lower quadrant abdominal pain  Also treated for presumed symptomatic uncomplicated diverticulitis with Cipro/Flagyl  Now has developed melenic stools with down-trending hgb       Additionally, patient is noted to have mild TTG IgA elevation in the setting of significantly elevated IgA as well as SMA stenosis for which she was scheduled for outpatient evaluation but was admitted at the time of her appointment      Giancarlo Leon and colonoscopy on 5/9/2023  EGD notable for small sliding hiatal hernia, tiny varices at the GE junction, mild PHG but no blood was seen in the stomach  Colonoscopy notable for stricture and mucosal friability and shallow ulceration at the IC valve and a single shallow ulcer in the rectosigmoid colon concerning for Crohn's disease      1  LLQ abd pain  2  Change in bowel habits  3  Melenic stools  4  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  Colonoscopy performed on 5/11, as detailed above, with findings concerning for Crohn's disease  Awaiting pathology   LLQ pain is significantly improved since admission with no further melena and overall stable hgb  In discussion with nephrology, recommend defering contrasted imaging given IV diuresis and recent ALEJANDRA but patient should eventually have an outpt contrasted CT or MR enterography for further evaluation of potential small bowel Crohn's  Pending biopsy results, will consider the initiation of budesonide   - Diet as tolerated  - Continue once daily PPI  - Continue supportive care with analgesics PRN  - Follow-up pathology from colonoscopy  - Monitor hgb; Transfuse for hgb <7 0  - Monitor stools for evidence of further overt GI bleeding  - Consider outpatient small bowel enterography for evaluation of possible small bowel Crohn's ds       4  Cirrhotic morphology on imaging  5  Thrombocytopenia  6  ALEJANDRA  Patient incidentally noted to have a lobulated liver contour on ultrasound with nodular liver contour redemonstrated on CT A/P in addition to chronic thrombocytopenia  Serologic evaluation to r/o competing causes of liver disease have been unremarkable with the exception of a mildly low ceruloplasmin level  Quantitative IgG's pending  Patient is also noted to have a significant elevated alk phos and rising T  Bili (477, 2 30 respectively) although MRI/MRCP without evidence of biliary stricture to suggest [facility]  CA 19-9 pending  Given etiology for patient's cirrhosis remains unclear, will place a consult to IR for consideration of transjugular liver biopsy with portal pressures  If agreeebale, patient will likely need FFP prior to procedure due to persistently low plt count        Ascites - Nephrology following for management of diuresis, appreciate recommendations    Esophageal varices - EGD (5/11) with small and minimal varices in the lower third of the esophagus and moderate PHG    Hepatic encephalopathy - No hx or evidence of HE on exam  AAOx3  HCC screening - U/S and cross-sectional imaging without focal liver lesion  AFP within normal limits    ALEJADNRA - ALEJANDRA with peak Cr 2 1, which has since improved  Nephrology following, appreciate recommendations   Defer contrasted imaging for the time being       - Continue to monitor MELD labs daily (CBC, CMP, INR)  - Follow-up on hepatic serologies  - CA 19-9 level  - Consult to IR for consideration of transjugular liver biopsy  - Nephrology following, appreciate recommendations     MELD-Na score: 19 at 5/12/2023  5:18 AM  MELD score: 18 at 5/12/2023  5:18 AM  Calculated from:  Serum Creatinine: 1 23 mg/dL at 5/12/2023  5:18 AM  Serum Sodium: 135 mmol/L at 5/12/2023  5:18 AM  Total Bilirubin: 2 23 mg/dL at 5/11/2023 11:48 AM  INR(ratio): 1 92 at 5/10/2023  5:05 AM  Age: 77 years     7  Pancreatic cyst  Patient instantly noted to have a 5 mm cystic lesion in the tail the pancreas on cross-sectional imaging  Also noted to have chronically elevated alk phos  MRI/MRCP redemonstrates a pancreatic tail cyst measuring up to 6 mm, 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  Recommend surveillance imaging as per radiology report      GI will continue to follow       ______________________________________________________________________    Subjective:     Patient found resting comfortably in her bedside chair  Again, patient reports significantly improved left lower quadrant abdominal pain since admission  Denies any further melena  Tolerating diet without difficulty  Denies any additional GI related complaints        Medication Administration - last 24 hours from 05/15/2023 1221 to 05/16/2023 1221       Date/Time Order Dose Route Action Action by     05/16/2023 0526 EDT levothyroxine tablet 75 mcg 75 mcg Oral Given Latosha Brandon RN     05/15/2023 2206 EDT traZODone (DESYREL) tablet 50 mg 50 mg Oral Given Sree King RN     05/16/2023 0915 EDT erythromycin (ILOTYCIN) 0 5 % ophthalmic ointment 0 5 inch 0 5 inch Left Eye Given Amanda Simmons RN     05/15/2023 1919 EDT erythromycin (ILOTYCIN) 0 5 % ophthalmic ointment 0 5 inch 0 5 inch Left Eye Given Amanda Simmons RN     05/16/2023 0762 EDT octreotide (SandoSTATIN) injection 100 mcg 100 mcg Subcutaneous Given Latosha Brandon RN     05/15/2023 8950 EDT octreotide "(SandoSTATIN) injection 100 mcg 100 mcg Subcutaneous Given Samm Minus, RN     05/15/2023 1312 EDT octreotide (SandoSTATIN) injection 100 mcg 100 mcg Subcutaneous Given Staci Reina, RN     05/16/2023 2766 EDT HYDROmorphone (DILAUDID) injection 0 5 mg 0 5 mg Intravenous Given Staci Reina, RN     05/15/2023 2216 EDT HYDROmorphone (DILAUDID) injection 0 5 mg 0 5 mg Intravenous Given Samm Minus, RN     05/16/2023 0915 EDT lidocaine (LIDODERM) 5 % patch 1 patch 1 patch Topical Medication Applied Staci Reina, RN     05/15/2023 2206 EDT lidocaine (LIDODERM) 5 % patch 1 patch 1 patch Topical Patch Removed Samm Minus, RN     05/16/2023 1205 EDT midodrine (PROAMATINE) tablet 2 5 mg 2 5 mg Oral Not Given Staci Reina, RN     05/16/2023 0070 EDT midodrine (PROAMATINE) tablet 2 5 mg 2 5 mg Oral Given Staci Reina, RN     05/15/2023 1549 EDT midodrine (PROAMATINE) tablet 2 5 mg 2 5 mg Oral Not Given Staci Reina, RN     05/16/2023 2884 EDT pantoprazole (PROTONIX) EC tablet 40 mg 40 mg Oral Given Naomisuresh Dominguez, RN     05/16/2023 0920 EDT bumetanide (BUMEX) injection 2 mg 2 mg Intravenous Given Staci Huang, RN          Objective:     Vitals: Blood pressure (!) 136/43, pulse 77, temperature 98 °F (36 7 °C), temperature source Oral, resp  rate 17, height 4' 11\" (1 499 m), weight 55 kg (121 lb 4 1 oz), SpO2 (!) 88 %  ,Body mass index is 27 18 kg/m²  Intake/Output Summary (Last 24 hours) at 5/16/2023 1221  Last data filed at 5/16/2023 0900  Gross per 24 hour   Intake 720 ml   Output 225 ml   Net 495 ml       Physical Exam:   General Appearance: Awake and alert, in no acute distress  Abdomen: +Mild LLQ TTP without guarding rebound or rigidity;  Soft, non-distended; bowel sounds normal; no masses or no organomegaly    Invasive Devices     Peripheral Intravenous Line  Duration           Peripheral IV 05/16/23 Left;Ventral (anterior) Forearm <1 day                Lab Results:  No results displayed because " visit has over 200 results  Imaging Studies: I have personally reviewed pertinent imaging studies  **Please note:  Dictation voice to text software may have been used in the creation of this record  Occasional wrong word or “sound alike” substitutions may have occurred due to the inherent limitations of voice recognition software  Read the chart carefully and recognize, using context, where substitutions have occurred  **

## 2023-05-16 NOTE — PROGRESS NOTES
[facility]  Progress Note  Name: Kiah Springer  MRN: [id number]  Unit/Bed#: -01 I Date of Admission: 4/27/2023   Date of Service: 5/16/2023 I Hospital Day: 19    Assessment/Plan   * Left lower quadrant abdominal pain  Assessment & Plan  · Presented to the ED with left lower quadrant abdominal pain on 4/27   · Patient is pain-free this a m   · 4/27/2023 CT abdomen: New small amount of free fluid in the abdomen and pelvis compared to 4/18/2023, which can be secondary to cirrhosis or acute inflammatory process in the abdomen and pelvis such as occult acute diverticulitis  Moderate amount of stool in the colon, nonspecific and can represent constipation  · 4/29/2023 obstructive series: Nonobstructive bowel gas pattern  · 5/1/2023 CT abdomen pelvis: Thickened appearance of the wall of the descending colon and sigmoid colon which may represent pseudo thickening from nondistention versus a mild nonspecific colitis in the appropriate clinical setting  There are a few scattered colonic diverticula  · 5/2 flex sig: Minimal inflammation noted, biopsies were normal  · 5/5 KUB: Nonobstructive bowel gas pattern  · 5/11 colonoscopy: Stricture and mucosal friability and shallow ulceration at the ileocecal valve  Single shallow ulcer in the rectosigmoid colon  Most likely explanation for these findings is Crohn's disease  · 5/11 EGD: Small sliding hiatal hernia  Tiny varices at the GE junction  Mild portal hypertensive gastropathy  No blood in the stomach  Normal duodenum  No interventions performed    · Surgery consultation appreciated- now signed off  · GI following, appreciate recommendations  · Pathology from colonoscopy results pending  · 5/15/2023 MRCP: Cirrhosis, pancreatic tail cyst, and small right pleural effusion and moderate body wall edema were noted  · Continue pain management    Acute kidney injury [facility])  Assessment & Plan  • Baseline creatinine if 0 8 to 1  • Creatinine stable "  • Nephrology following, appreciate recommendations  • IV Bumex decreased from twice a day to daily yesterday by nephrology  • Continue midodrine and octreotide per nephrology  • Avoid hypotension and nephrotoxins  • Monitor intake and output  • Daily weights  • Check BMP a m  Hyponatremia  Assessment & Plan  · Likely due to cirrhosis   · Continue management as above  · BMP a m  Primary hypertension  Assessment & Plan  · Patient with hypotension- now blood pressure normalized  · Continue midodrine- decreased to 2 5 mg tid by nephrology on 5/11  · Home metoprolol on hold due to hypotension, will continue to monitor  · Home losartan has been held due to ALEJANDRA    PAF (paroxysmal atrial fibrillation) ([facility])  Assessment & Plan  · Rate controlled   · Home metoprolol is on hold due to hypotension  · Continue midodrine      Tarry stools  Assessment & Plan  · Per record nursing reported black tarry stools on 5/8 to 5/9, none since Per discussion with patient  · 5/8 fecal occult blood positive  · Baseline hemoglobin 10-11  · Hemoglobin remains stable  · No signs of bleeding  · 5/11 had EGD/colonoscopy-see results above  · GI following prescient recommendations  · CBC a m  Hx of CABG  Assessment & Plan  · Currently stable  · Losartan on hold due to hypotension    Anemia  Assessment & Plan  · Baseline appears to be between 11 and 12   · Hemoglobin remains stable  · No signs of bleeding  · Please see plan for \"tarry stools\"    Acquired hypothyroidism  Assessment & Plan  · Continue Synthroid             VTE Pharmacologic Prophylaxis:   Pharmacologic: VTE prophylaxis contraindicated  Mechanical VTE Prophylaxis in Place: Yes    Patient Centered Rounds: I have performed bedside rounds with nursing staff today      Discussions with Specialists or Other Care Team Provider: GI, nephrology, nursing, case management    Education and Discussions with Family / Patient: Treatment plan discussed with patient understands the plan " as its been explained is agreeable plan as stated  All questions answered her satisfaction  Time Spent for Care: 40 minutes  More than 50% of total time spent on counseling and coordination of care as described above  Current Length of Stay: 19 day(s)    Current Patient Status: Inpatient   Certification Statement: The patient will continue to require additional inpatient hospital stay due to Need for IV diuresis, GI following awaiting tissue pathology report from colonoscopy to determine further testing    Discharge Plan: Patient will return to personal-care home when stable for discharge  Code Status: Level 1 - Full Code      Subjective:   Patient pleasant, denies any pain or discomfort  Verbalizing needs  Objective:     Vitals:   Temp (24hrs), Av 1 °F (36 7 °C), Min:97 8 °F (36 6 °C), Max:98 3 °F (36 8 °C)    Temp:  [97 8 °F (36 6 °C)-98 3 °F (36 8 °C)] 98 3 °F (36 8 °C)  HR:  [77-81] 80  Resp:  [16-18] 17  BP: (102-154)/(42-63) 125/44  SpO2:  [89 %-94 %] 89 %  Body mass index is 27 18 kg/m²  Input and Output Summary (last 24 hours): Intake/Output Summary (Last 24 hours) at 2023 1008  Last data filed at 2023 0900  Gross per 24 hour   Intake 1200 ml   Output 225 ml   Net 975 ml       Physical Exam:     Physical Exam  Vitals and nursing note reviewed  Constitutional:       General: She is not in acute distress  Appearance: Normal appearance  She is not ill-appearing  HENT:      Head: Normocephalic and atraumatic  Nose: Nose normal       Mouth/Throat:      Mouth: Mucous membranes are moist    Cardiovascular:      Rate and Rhythm: Normal rate and regular rhythm  Pulses: Normal pulses  Heart sounds: Normal heart sounds  Pulmonary:      Effort: Pulmonary effort is normal  No respiratory distress  Breath sounds: Normal breath sounds  No stridor  No wheezing or rales  Abdominal:      General: Bowel sounds are normal  There is no distension  Palpations: Abdomen is soft  There is no mass  Tenderness: There is no abdominal tenderness  There is no guarding  Musculoskeletal:         General: Normal range of motion  Cervical back: Normal range of motion and neck supple  Right lower leg: Edema present  Left lower leg: Edema present  Comments: Trace edema bilateral lower extremities   Skin:     General: Skin is warm and dry  Capillary Refill: Capillary refill takes less than 2 seconds  Neurological:      General: No focal deficit present  Mental Status: She is alert and oriented to person, place, and time  Mental status is at baseline  Cranial Nerves: No cranial nerve deficit  Sensory: No sensory deficit  Motor: No weakness  Gait: Gait normal    Psychiatric:         Mood and Affect: Mood normal          Behavior: Behavior normal          Thought Content: Thought content normal          Judgment: Judgment normal          Additional Data:     Labs:    Results from last 7 days   Lab Units 05/15/23  0523   WBC Thousand/uL 8 26   HEMOGLOBIN g/dL 8 6*   HEMATOCRIT % 25 2*   PLATELETS Thousands/uL 50*     Results from last 7 days   Lab Units 05/15/23  0523   POTASSIUM mmol/L 3 9   CHLORIDE mmol/L 91*   CO2 mmol/L 36*   BUN mg/dL 27*   CREATININE mg/dL 1 29   CALCIUM mg/dL 9 0   ALK PHOS U/L 477*   ALT U/L 22   AST U/L 59*     Results from last 7 days   Lab Units 05/10/23  0505   INR  1 92*       * I Have Reviewed All Lab Data Listed Above  * Additional Pertinent Lab Tests Reviewed:  [facility]    Imaging:    Imaging Reports Reviewed Today Include: CT abdomen pelvis, obstructive series, repeat CT abdomen pelvis, MRI lumbar spine, chest x-ray, MRI abdomen/MRCP  Imaging Personally Reviewed by Myself Includes:      Recent Cultures (last 7 days):           Last 24 Hours Medication List:   Current Facility-Administered Medications   Medication Dose Route Frequency Provider Last Rate   • acetaminophen  650 mg Oral Q6H PRN Jared Jacques MD     • barium  900 mL Oral Once in imaging Ladan Ponce MD     • bumetanide  2 mg Intravenous Daily Millie Townsend DO     • dicyclomine  10 mg Oral TID PRN Vinicio Yadav MD     • erythromycin  0 5 inch Left Eye BID Ladan Ponce MD     • HYDROmorphone  0 5 mg Intravenous Q4H PRN EBEN Hooker     • levothyroxine  75 mcg Oral Early Morning Jared Jacques MD     • lidocaine  1 patch Topical Daily EBEN Hooker     • midodrine  2 5 mg Oral TID AC Jeffy Mejia, DO     • octreotide  100 mcg Subcutaneous [facility] EBEN Huff     • ondansetron  4 mg Intravenous Q6H PRN Ladan Ponce MD     • pantoprazole  40 mg Oral Early Morning YOVANI Gupta     • traZODone  50 mg Oral HS Jamal Cardona PA-C          Today, Patient Was Seen By: EBEN Dietrich    ** Please Note: Dictation voice to text software may have been used in the creation of this document   **

## 2023-05-16 NOTE — PLAN OF CARE
Problem: PHYSICAL THERAPY ADULT  Goal: Performs mobility at highest level of function for planned discharge setting  See evaluation for individualized goals  Description: Treatment/Interventions: Functional transfer training, Endurance training, Therapeutic exercise, Gait training, Bed mobility          See flowsheet documentation for full assessment, interventions and recommendations  Outcome: Progressing  Note: Prognosis: Good  Problem List: Impaired balance, Decreased coordination  Assessment: Pt seen for PT treatment session this date with interventions consisting of gait training to reduce the risk of medical complications and safely return to prior living environment w/ emphasis on improving pt's ability to ambulate level surfaces x 75 feet x 2 with distant S provided by therapist with RW  Pt agreeable to PT treatment session upon arrival, pt found seated OOB in recliner, A&O x 4  In comparison to previous session, pt with improvements in ambulatory distance  Post session: pt returned back to recliner, chair alarm engaged, all needs in reach and RN notified of session findings/recommendations  Continue to recommend return to facility with rehabilitation services at time of d/c in order to maximize pt's functional independence and safety w/ mobility  Pt continues to be functioning below baseline level, and remains limited 2* factors listed above and including gait deviations  PT will continue to see pt during current hospitalization in order to address the deficits listed above and provide interventions consistent w/ POC in effort to achieve LTGs  PT Discharge Recommendation: Return to facility with rehabilitation services (maintained recommendation)    See flowsheet documentation for full assessment

## 2023-05-16 NOTE — ASSESSMENT & PLAN NOTE
"· Baseline appears to be between 11 and 12   · Hemoglobin remains stable  · No signs of bleeding  · Please see plan for \"tarry stools\"  "

## 2023-05-16 NOTE — CONSULTS
e-Consult (IP[facility])  - Interventional Radiology  Ale Nj 69 y o  female MRN: [id number]  Unit/Bed#: -01 Encounter: [id number]    Interventional Radiology has been consulted to evaluate Ale Nj    We were consulted by Dr Meka Vega concerning this patient with cirrhosis  Inpatient Consult to IR  Consult performed by: Carley Newberry MD  Consult ordered by: Valerie Chavez MD        05/16/23    Assessment/Recommendation:   Patient with elevated LFT's with a CT showing a cirrhotic liver morphology  Patient with CAD s/p CABG with atrial fibrillation currently not on anticoagulation  EGD showd portal hypertensive gastropathy  Recent colonoscopy showed an ileocecal stricture with a shallow ulcer which was biopsied with results pending  IR consulted for transjugular liver biopsy with portal vein pressures  INR from 5/10 was 1 92 but new INR is pending  IR asked to perform this as an inpatient due to poor outpatient follow-up      21-30 minutes, >50% of the total time devoted to medical consultative verbal/EMR discussion between providers  Written report will be generated in the EMR  Thank you for allowing Interventional Radiology to participate in the care of Ale Nj  Please don't hesitate to call or TigerText us with any questions       Carley Newberry MD

## 2023-05-17 LAB
ALBUMIN SERPL BCP-MCNC: 3.2 G/DL (ref 3.5–5)
ALP SERPL-CCNC: 421 U/L (ref 34–104)
ALT SERPL W P-5'-P-CCNC: 20 U/L (ref 7–52)
ANION GAP SERPL CALCULATED.3IONS-SCNC: 9 MMOL/L (ref 4–13)
AST SERPL W P-5'-P-CCNC: 53 U/L (ref 13–39)
BASOPHILS # BLD AUTO: 0.05 THOUSANDS/ÂΜL (ref 0–0.1)
BASOPHILS NFR BLD AUTO: 1 % (ref 0–1)
BILIRUB SERPL-MCNC: 2.13 MG/DL (ref 0.2–1)
BUN SERPL-MCNC: 37 MG/DL (ref 5–25)
CALCIUM ALBUM COR SERPL-MCNC: 9.3 MG/DL (ref 8.3–10.1)
CALCIUM SERPL-MCNC: 8.7 MG/DL (ref 8.4–10.2)
CANCER AG19-9 SERPL-ACNC: 25 U/ML (ref 0–35)
CHLORIDE SERPL-SCNC: 92 MMOL/L (ref 96–108)
CO2 SERPL-SCNC: 32 MMOL/L (ref 21–32)
CREAT SERPL-MCNC: 1.58 MG/DL (ref 0.6–1.3)
EOSINOPHIL # BLD AUTO: 0.17 THOUSAND/ÂΜL (ref 0–0.61)
EOSINOPHIL NFR BLD AUTO: 3 % (ref 0–6)
ERYTHROCYTE [DISTWIDTH] IN BLOOD BY AUTOMATED COUNT: 15.6 % (ref 11.6–15.1)
GFR SERPL CREATININE-BSD FRML MDRD: 31 ML/MIN/1.73SQ M
GLUCOSE SERPL-MCNC: 88 MG/DL (ref 65–140)
HCT VFR BLD AUTO: 23.4 % (ref 34.8–46.1)
HGB BLD-MCNC: 7.9 G/DL (ref 11.5–15.4)
IMM GRANULOCYTES # BLD AUTO: 0.02 THOUSAND/UL (ref 0–0.2)
IMM GRANULOCYTES NFR BLD AUTO: 0 % (ref 0–2)
INR PPP: 1.55 (ref 0.84–1.19)
LYMPHOCYTES # BLD AUTO: 1.32 THOUSANDS/ÂΜL (ref 0.6–4.47)
LYMPHOCYTES NFR BLD AUTO: 23 % (ref 14–44)
MCH RBC QN AUTO: 34.6 PG (ref 26.8–34.3)
MCHC RBC AUTO-ENTMCNC: 33.8 G/DL (ref 31.4–37.4)
MCV RBC AUTO: 103 FL (ref 82–98)
MONOCYTES # BLD AUTO: 0.97 THOUSAND/ÂΜL (ref 0.17–1.22)
MONOCYTES NFR BLD AUTO: 17 % (ref 4–12)
NEUTROPHILS # BLD AUTO: 3.26 THOUSANDS/ÂΜL (ref 1.85–7.62)
NEUTS SEG NFR BLD AUTO: 56 % (ref 43–75)
NRBC BLD AUTO-RTO: 0 /100 WBCS
PLATELET # BLD AUTO: 50 THOUSANDS/UL (ref 149–390)
PMV BLD AUTO: 11.9 FL (ref 8.9–12.7)
POTASSIUM SERPL-SCNC: 3.8 MMOL/L (ref 3.5–5.3)
PROT SERPL-MCNC: 6.6 G/DL (ref 6.4–8.4)
PROTHROMBIN TIME: 18.5 SECONDS (ref 11.6–14.5)
RBC # BLD AUTO: 2.28 MILLION/UL (ref 3.81–5.12)
SODIUM SERPL-SCNC: 133 MMOL/L (ref 135–147)
WBC # BLD AUTO: 5.79 THOUSAND/UL (ref 4.31–10.16)

## 2023-05-17 PROCEDURE — 88305 TISSUE EXAM BY PATHOLOGIST: CPT | Performed by: STUDENT IN AN ORGANIZED HEALTH CARE EDUCATION/TRAINING PROGRAM

## 2023-05-17 PROCEDURE — 82570 ASSAY OF URINE CREATININE: CPT | Performed by: INTERNAL MEDICINE

## 2023-05-17 PROCEDURE — 85025 COMPLETE CBC W/AUTO DIFF WBC: CPT

## 2023-05-17 PROCEDURE — 85610 PROTHROMBIN TIME: CPT

## 2023-05-17 PROCEDURE — 99232 SBSQ HOSP IP/OBS MODERATE 35: CPT | Performed by: INTERNAL MEDICINE

## 2023-05-17 PROCEDURE — 84300 ASSAY OF URINE SODIUM: CPT | Performed by: INTERNAL MEDICINE

## 2023-05-17 PROCEDURE — 80053 COMPREHEN METABOLIC PANEL: CPT

## 2023-05-17 PROCEDURE — 99232 SBSQ HOSP IP/OBS MODERATE 35: CPT

## 2023-05-17 RX ORDER — GUAIFENESIN/DEXTROMETHORPHAN 100-10MG/5
10 SYRUP ORAL EVERY 6 HOURS PRN
Status: DISCONTINUED | OUTPATIENT
Start: 2023-05-17 | End: 2023-05-23 | Stop reason: HOSPADM

## 2023-05-17 RX ADMIN — OCTREOTIDE ACETATE 100 MCG: 100 INJECTION, SOLUTION INTRAVENOUS; SUBCUTANEOUS at 14:46

## 2023-05-17 RX ADMIN — MIDODRINE HYDROCHLORIDE 2.5 MG: 5 TABLET ORAL at 11:48

## 2023-05-17 RX ADMIN — OCTREOTIDE ACETATE 100 MCG: 100 INJECTION, SOLUTION INTRAVENOUS; SUBCUTANEOUS at 05:39

## 2023-05-17 RX ADMIN — TRAZODONE HYDROCHLORIDE 50 MG: 50 TABLET ORAL at 21:24

## 2023-05-17 RX ADMIN — MIDODRINE HYDROCHLORIDE 2.5 MG: 5 TABLET ORAL at 17:17

## 2023-05-17 RX ADMIN — LEVOTHYROXINE SODIUM 75 MCG: 75 TABLET ORAL at 05:39

## 2023-05-17 RX ADMIN — ERYTHROMYCIN 0.5 INCH: 5 OINTMENT OPHTHALMIC at 09:53

## 2023-05-17 RX ADMIN — PANTOPRAZOLE SODIUM 40 MG: 40 TABLET, DELAYED RELEASE ORAL at 05:39

## 2023-05-17 RX ADMIN — ACETAMINOPHEN 650 MG: 325 TABLET ORAL at 09:53

## 2023-05-17 RX ADMIN — GUAIFENESIN AND DEXTROMETHORPHAN 10 ML: 100; 10 SYRUP ORAL at 11:48

## 2023-05-17 RX ADMIN — ERYTHROMYCIN 0.5 INCH: 5 OINTMENT OPHTHALMIC at 17:20

## 2023-05-17 RX ADMIN — LIDOCAINE 1 PATCH: 700 PATCH TOPICAL at 09:53

## 2023-05-17 RX ADMIN — HYDROMORPHONE HYDROCHLORIDE 0.5 MG: 1 INJECTION, SOLUTION INTRAMUSCULAR; INTRAVENOUS; SUBCUTANEOUS at 20:11

## 2023-05-17 RX ADMIN — MIDODRINE HYDROCHLORIDE 2.5 MG: 5 TABLET ORAL at 05:40

## 2023-05-17 RX ADMIN — OCTREOTIDE ACETATE 100 MCG: 100 INJECTION, SOLUTION INTRAVENOUS; SUBCUTANEOUS at 21:23

## 2023-05-17 NOTE — ASSESSMENT & PLAN NOTE
· Baseline appears to be between 11 and 12   · Hemoglobin has been running 8 3-8 6 over last few days, today 7 9  · No signs of bleeding  · Plan to transfuse PRBCs for hemoglobin less than 7  · CBC am

## 2023-05-17 NOTE — CASE MANAGEMENT
Case Management Progress Note    Patient name Lianne Trivedi  Location [address] [id number] MRN [id number]  : 1945 Date 2023       LOS (days): 20  Geometric Mean LOS (GMLOS) (days): 3 80  Days to GMLOS:-16 1        OBJECTIVE:        Current admission status: Inpatient  Preferred Pharmacy:   [address] [place] [address]  [facility]   [place] [address]  Phone: [phone] Fax: [phone]    Primary Care Provider: Isac Vera MD    Primary Insurance: MEDICARE  Secondary Insurance: [employer]ET[employer]    PROGRESS NOTE:    Per Nancy Ross AP patient is getting liver biopsy and tentative discharge in 24 hrs  Call to [facility] and updated same

## 2023-05-17 NOTE — PLAN OF CARE
Problem: Potential for Falls  Goal: Patient will remain free of falls  Description: INTERVENTIONS:  - Educate patient/family on patient safety including physical limitations  - Instruct patient to call for assistance with activity   - Consult OT/PT to assist with strengthening/mobility   - Keep Call bell within reach  - Keep bed low and locked with side rails adjusted as appropriate  - Keep care items and personal belongings within reach  - Initiate and maintain comfort rounds  - Make Fall Risk Sign visible to staff  - Offer Toileting every 2 Hours, in advance of need  - Initiate/Maintain alarms  - Obtain necessary fall risk management equipment:  - Apply yellow socks and bracelet for high fall risk patients  - Consider moving patient to room near nurses station  Outcome: Progressing     Problem: PAIN - ADULT  Goal: Verbalizes/displays adequate comfort level or baseline comfort level  Description: Interventions:  - Encourage patient to monitor pain and request assistance  - Assess pain using appropriate pain scale  - Administer analgesics based on type and severity of pain and evaluate response  - Implement non-pharmacological measures as appropriate and evaluate response  - Consider cultural and social influences on pain and pain management  - Notify physician/advanced practitioner if interventions unsuccessful or patient reports new pain  Outcome: Progressing     Problem: SAFETY ADULT  Goal: Patient will remain free of falls  Description: INTERVENTIONS:  - Educate patient/family on patient safety including physical limitations  - Instruct patient to call for assistance with activity   - Consult OT/PT to assist with strengthening/mobility   - Keep Call bell within reach  - Keep bed low and locked with side rails adjusted as appropriate  - Keep care items and personal belongings within reach  - Initiate and maintain comfort rounds  - Make Fall Risk Sign visible to staff  - Offer Toileting every 2 Hours, in advance of need  - Initiate/Maintain alarms  - Obtain necessary fall risk management equipment:  - Apply yellow socks and bracelet for high fall risk patients  - Consider moving patient to room near nurses station  Outcome: Progressing  Goal: Maintain or return to baseline ADL function  Description: INTERVENTIONS:  - Educate patient/family on patient safety including physical limitations  - Instruct patient to call for assistance with activity   - Consult OT/PT to assist with strengthening/mobility   - Keep Call bell within reach  - Keep bed low and locked with side rails adjusted as appropriate  - Keep care items and personal belongings within reach  - Initiate and maintain comfort rounds  - Make Fall Risk Sign visible to staff  - Offer Toileting every 2 Hours, in advance of need  - Initiate/Maintain alarms  - Obtain necessary fall risk management equipment:   - Apply yellow socks and bracelet for high fall risk patients  - Consider moving patient to room near nurses station  Outcome: Progressing  Goal: Maintains/Returns to pre admission functional level  Description: INTERVENTIONS:  - Perform BMAT or MOVE assessment daily    - Set and communicate daily mobility goal to care team and patient/family/caregiver     - Collaborate with rehabilitation services on mobility goals if consulted  - Out of bed for toileting  - Record patient progress and toleration of activity level   Outcome: Progressing     Problem: INFECTION - ADULT  Goal: Absence or prevention of progression during hospitalization  Description: INTERVENTIONS:  - Assess and monitor for signs and symptoms of infection  - Monitor lab/diagnostic results  - Monitor all insertion sites, i e  indwelling lines, tubes, and drains  - Monitor endotracheal if appropriate and nasal secretions for changes in amount and color  - Zillah appropriate cooling/warming therapies per order  - Administer medications as ordered  - Instruct and encourage patient and family to use good hand hygiene technique  - Identify and instruct in appropriate isolation precautions for identified infection/condition  Outcome: Progressing     Problem: DISCHARGE PLANNING  Goal: Discharge to home or other facility with appropriate resources  Description: INTERVENTIONS:  - Identify barriers to discharge w/patient and caregiver  - Arrange for needed discharge resources and transportation as appropriate  - Identify discharge learning needs (meds, wound care, etc )  - Refer to Case Management Department for coordinating discharge planning if the patient needs post-hospital services based on physician/advanced practitioner order or complex needs related to functional status, cognitive ability, or social support system  Outcome: Progressing     Problem: Knowledge Deficit  Goal: Patient/family/caregiver demonstrates understanding of disease process, treatment plan, medications, and discharge instructions  Description: Complete learning assessment and assess knowledge base    Interventions:  - Provide teaching at level of understanding  - Provide teaching via preferred learning methods  Outcome: Progressing     Problem: Prexisting or High Potential for Compromised Skin Integrity  Goal: Skin integrity is maintained or improved  Description: INTERVENTIONS:  - Identify patients at risk for skin breakdown  - Assess and monitor skin integrity  - Assess and monitor nutrition and hydration status  - Monitor labs   - Assess for incontinence   - Turn and reposition patient  - Assist with mobility/ambulation  - Relieve pressure over bony prominences  - Avoid friction and shearing  - Provide appropriate hygiene as needed including keeping skin clean and dry  - Evaluate need for skin moisturizer/barrier cream  - Collaborate with interdisciplinary team   - Patient/family teaching  - Consider wound care consult   Outcome: Progressing     Problem: MOBILITY - ADULT  Goal: Maintain or return to baseline ADL function  Description: INTERVENTIONS:  - Educate patient/family on patient safety including physical limitations  - Instruct patient to call for assistance with activity   - Consult OT/PT to assist with strengthening/mobility   - Keep Call bell within reach  - Keep bed low and locked with side rails adjusted as appropriate  - Keep care items and personal belongings within reach  - Initiate and maintain comfort rounds  - Make Fall Risk Sign visible to staff  - Offer Toileting every 2 Hours, in advance of need  - Initiate/Maintain alarms  - Obtain necessary fall risk management equipment:   - Apply yellow socks and bracelet for high fall risk patients  - Consider moving patient to room near nurses station  Outcome: Progressing  Goal: Maintains/Returns to pre admission functional level  Description: INTERVENTIONS:  - Perform BMAT or MOVE assessment daily    - Set and communicate daily mobility goal to care team and patient/family/caregiver  - Collaborate with rehabilitation services on mobility goals if consulted  - Out of bed for toileting  - Record patient progress and toleration of activity level   Outcome: Progressing     Problem: Nutrition/Hydration-ADULT  Goal: Nutrient/Hydration intake appropriate for improving, restoring or maintaining nutritional needs  Description: Monitor and assess patient's nutrition/hydration status for malnutrition  Collaborate with interdisciplinary team and initiate plan and interventions as ordered  Monitor patient's weight and dietary intake as ordered or per policy  Utilize nutrition screening tool and intervene as necessary  Determine patient's food preferences and provide high-protein, high-caloric foods as appropriate       INTERVENTIONS:  - Monitor oral intake, urinary output, labs, and treatment plans  - Assess nutrition and hydration status and recommend course of action  - Evaluate amount of meals eaten  - Assist patient with eating if necessary   - Allow adequate time for meals  - Recommend/ encourage appropriate diets, oral nutritional supplements, and vitamin/mineral supplements  - Order, calculate, and assess calorie counts as needed  - Recommend, monitor, and adjust tube feedings and TPN/PPN based on assessed needs  - Assess need for intravenous fluids  - Provide specific nutrition/hydration education as appropriate  - Include patient/family/caregiver in decisions related to nutrition  Outcome: Progressing

## 2023-05-17 NOTE — ASSESSMENT & PLAN NOTE
• Baseline creatinine if 0 8 to 1  • Creatinine 1 58 today  • Nephrology following, appreciate recommendations  • Bumex discontinued by nephrology on 5/16, monitoring off diuertics  • Continue midodrine and octreotide per nephrology  • Avoid hypotension and nephrotoxins  • Monitor I & O  • Daily weights  • BMP a m

## 2023-05-17 NOTE — PROGRESS NOTES
Progress Note- Chito Martinez 69 y o  female MRN: [id number]    Unit/Bed#: -01 Encounter: [id number]      Assessment and Plan:    Patient is a 60-year-old female with PMH significant for depression, CAD s/p CABG (2021), Sjogren's, mild AS, A-fib, IBS-D and hypothyroidism admitted on 4/27 for acute on chronic diarrhea and left-sided abdominal discomfort  Patient was found to have mild nonspecific enteric colitis, nodular hepatic contour suggestive of cirrhosis and a 5 mm cystic lesion in the tail the pancreas on cross-sectional imaging      Patient has been evaluated several times by GI service  She was noted to have a large stool burden s/p MiraLAX bowel prep with significant stool output but persistent left lower quadrant abdominal pain  Also treated for presumed symptomatic uncomplicated diverticulitis with Cipro/Flagyl  Now has developed melenic stools with down-trending hgb       Additionally, patient is noted to have mild TTG IgA elevation in the setting of significantly elevated IgA as well as SMA stenosis for which she was scheduled for outpatient evaluation but was admitted at the time of her appointment      Ebony Casper and colonoscopy on 5/9/2023  EGD notable for small sliding hiatal hernia, tiny varices at the GE junction, mild PHG but no blood was seen in the stomach  Colonoscopy notable for stricture and mucosal friability and shallow ulceration at the IC valve and a single shallow ulcer in the rectosigmoid colon concerning for Crohn's disease      1  LLQ abd pain  2  Change in bowel habits  3  Melenic stools  4  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  Colonoscopy performed on 5/11, as detailed above, with findings concerning for Crohn's disease   Random biopsies negative for colitis, but biopsies of the colonic stricture do show mild lamina propria expansion by mixed inflammation and crypt distortion pending biopsy results   Given pathology is non-specific for Crohn's, will defer starting budesonide unless otherwise recommended as per reading physician  Consider outpt CT or MR enterography for further evaluation of potential small bowel Crohn's  Patient with persistent mild LLQ abdominal pain although improved since admission  Hgb slightly down today (8 6-7 9) although no further melena  Continue to monitor both hgb and stool output      - Diet as tolerated  - Continue once daily PPI  - Continue supportive care with analgesics PRN  - Monitor hgb; Transfuse for hgb <7 0  - Monitor stools for evidence of further overt GI bleeding  - Consider outpatient small bowel enterography for evaluation of possible small bowel Crohn's ds       4  Cirrhotic morphology on imaging  5  Thrombocytopenia  6  ALEJANDRA  Patient incidentally noted to have a lobulated liver contour on ultrasound with nodular liver contour redemonstrated on CT A/P in addition to chronic thrombocytopenia  Serologic evaluation to r/o competing causes of liver disease have been unremarkable with the exception of a mildly low ceruloplasmin level  Quantitative IgG's notable for elevated IgG1 and IgG4  Patient is also noted to have a significant elevated alk phos and rising T  Bili (477, 2 30 respectively) although MRI/MRCP without evidence of biliary stricture to suggest PSC  CA 19-9 within normal limits  Given etiology for patient's cirrhosis remains unclear, planning for inpatient transjugular liver biopsy with portal pressures by IR for tomorrow (5/18)       Ascites - Nephrology following for management of diuresis, appreciate recommendations    Esophageal varices - EGD (5/11) with small and minimal varices in the lower third of the esophagus and moderate PHG    Hepatic encephalopathy - No hx or evidence of HE on exam  AAOx3    HCC screening - U/S and cross-sectional imaging without focal liver lesion  AFP within normal limits    ALEJANDRA - ALEJANDRA with peak Cr 2 1, which has since improved  Slightly elevated today (1 58)   Nephrology following, appreciate recommendations  Defer contrasted imaging for the time being       - Continue to monitor MELD labs daily (CBC, CMP, INR)  - Plan for transjugular liver bx w/ portal pressure by IR for tomorrow (5/18)  - Nephrology following, appreciate recommendations     MELD-Na score: 22 at 5/17/2023  5:59 AM  MELD score: 19 at 5/17/2023  5:59 AM  Calculated from:  Serum Creatinine: 1 58 mg/dL at 5/17/2023  5:59 AM  Serum Sodium: 133 mmol/L at 5/17/2023  5:59 AM  Total Bilirubin: 2 13 mg/dL at 5/17/2023  5:59 AM  INR(ratio): 1 55 at 5/17/2023  5:59 AM  Age: 77 years    7  Pancreatic cyst  Patient instantly noted to have a 5 mm cystic lesion in the tail the pancreas on cross-sectional imaging  Also noted to have chronically elevated alk phos  MRI/MRCP redemonstrates a pancreatic tail cyst measuring up to 6 mm, 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  Recommend surveillance imaging as per radiology report      GI will continue to follow      ______________________________________________________________________    Subjective:    Patient found resting comfortably in bed  No acute overnight events  Reports persistent left lower quadrant abdominal discomfort, although significantly improved since admission  Denies any additional melena  Tolerating diet without difficulty  Denies any additional GI related complaints      Medication Administration - last 24 hours from 05/16/2023 0909 to 05/17/2023 0400       Date/Time Order Dose Route Action Action by     05/17/2023 0539 EDT levothyroxine tablet 75 mcg 75 mcg Oral Given Lily Ortez RN     05/16/2023 2122 EDT traZODone (DESYREL) tablet 50 mg 50 mg Oral Given Lily Ortez RN     05/16/2023 1722 EDT erythromycin (ILOTYCIN) 0 5 % ophthalmic ointment 0 5 inch 0 5 inch Left Eye Given Edith Mckeon RN     05/16/2023 0915 EDT erythromycin (ILOTYCIN) 0 5 % ophthalmic ointment 0 5 inch 0 5 inch Left Eye Given Edith Mckeon RN     05/17/2023 4437 EDT octreotide (SandoSTATIN) injection 100 mcg "100 mcg Subcutaneous Given Ezio Rivers RN     05/16/2023 2126 EDT octreotide (SandoSTATIN) injection 100 mcg 100 mcg Subcutaneous Given Ezio Rivers RN     05/16/2023 1444 EDT octreotide (SandoSTATIN) injection 100 mcg 100 mcg Subcutaneous Given Amada Carrion RN     05/16/2023 2122 EDT lidocaine (LIDODERM) 5 % patch 1 patch 1 patch Topical Patch Removed Ezio Rivers RN     05/16/2023 0915 EDT lidocaine (LIDODERM) 5 % patch 1 patch 1 patch Topical Medication Applied Amada Carrion RN     05/17/2023 0540 EDT midodrine (PROAMATINE) tablet 2 5 mg 2 5 mg Oral Given Ezio Rivers RN     05/16/2023 1640 EDT midodrine (PROAMATINE) tablet 2 5 mg 2 5 mg Oral Not Given Amada Carrion RN     05/16/2023 1205 EDT midodrine (PROAMATINE) tablet 2 5 mg 2 5 mg Oral Not Given Amada Carrion RN     05/17/2023 6779 EDT pantoprazole (PROTONIX) EC tablet 40 mg 40 mg Oral Given Ezio Rivers RN     05/16/2023 0920 EDT bumetanide (BUMEX) injection 2 mg 2 mg Intravenous Given Amada Carrion RN          Objective:     Vitals: Blood pressure (!) 128/38, pulse 84, temperature 98 7 °F (37 1 °C), resp  rate 18, height 4' 11\" (1 499 m), weight 54 5 kg (120 lb 2 4 oz), SpO2 (!) 88 %  ,Body mass index is 26 94 kg/m²  Intake/Output Summary (Last 24 hours) at 5/17/2023 0909  Last data filed at 5/17/2023 0853  Gross per 24 hour   Intake 340 ml   Output 500 ml   Net -160 ml       Physical Exam:   General Appearance: Awake and alert, in no acute distress  Abdomen: +Mild LLQ abdominal TTP; Soft, non-distended; bowel sounds normal; no masses or no organomegaly    Invasive Devices     Peripheral Intravenous Line  Duration           Peripheral IV 05/16/23 Left;Ventral (anterior) Forearm <1 day                Lab Results:  No results displayed because visit has over 200 results  Imaging Studies: I have personally reviewed pertinent imaging studies         **Please note:  Dictation voice to text software may have been used in the creation of " this record  Occasional wrong word or “sound alike” substitutions may have occurred due to the inherent limitations of voice recognition software  Read the chart carefully and recognize, using context, where substitutions have occurred  **

## 2023-05-17 NOTE — PROGRESS NOTES
"Progress Note - Nephrology   Sourav Rosa 69 y o  female MRN: [id number]  Unit/Bed#: -01 Encounter: [id number]    A/P:  1   Acute kidney injury due to hepatorenal syndrome versus volume depletion              Creatinine slightly higher, will check a urine sodium and creatinine, continue to avoid loop diuretics  My intention was to discontinue octreotide and midodrine, will maintain both for the next 24 hours  Continue to monitor hemodynamics, avoid nephrotoxins  2   Chronic disease stage III with baseline creatinine potentially between 0 8-1 mg/dL  3   Volume overload              Patient remains volume overloaded with respect to edema at this time  Continue to hold diuretics due to slightly worsening creatinine  4   Hypomagnesemia              Continue to monitor, add supplementation as indicated  5   Cirrhosis              UMSYKJR is liver biopsy postponed to tomorrow, official diagnosis regarding cirrhosis remains pending  6   Left lower quadrant abdominal pain              Continue supportive care  7   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   Possible Crohn's disease, treatment according to our gastroenterology colleagues  Follow up reason for today's visit: Acute kidney injury/chronic kidney disease/volume overload    Left lower quadrant abdominal pain    Patient Active Problem List   Diagnosis   • Acute kidney injury ([address] )   • Diarrhea of presumed infectious origin   • Primary hypertension   • Acquired hypothyroidism   • Irritable bowel syndrome with diarrhea   • Abnormal CT scan   • Superior mesenteric artery stenosis (HCC)   • Left lower quadrant abdominal pain   • Hyponatremia   • PAF (paroxysmal atrial fibrillation) (HCC)   • Hx of CABG   • Anemia   • Tarry stools         Subjective:   No acute events over night, patient denies nausea or vomiting  Objective:     Vitals: Blood pressure (!) 128/38, pulse 84, temperature 98 7 °F (37 1 °C), resp   rate 18, height 4' 11\" (1 499 m), " "weight 54 5 kg (120 lb 2 4 oz), SpO2 (!) 88 %  ,Body mass index is 26 94 kg/m²  Weight (last 2 days)     Date/Time Weight    05/17/23 0551 54 5 (120 15)    05/16/23 0600 55 (121 25)    05/15/23 0553 55 3 (121 91)            Intake/Output Summary (Last 24 hours) at 5/17/2023 1419  Last data filed at 5/17/2023 1253  Gross per 24 hour   Intake 440 ml   Output 500 ml   Net -60 ml     I/O last 3 completed shifts: In: 700 [P O :700]  Out: 500 [Urine:500]         Physical Exam: BP (!) 128/38   Pulse 84   Temp 98 7 °F (37 1 °C)   Resp 18   Ht 4' 11\" (1 499 m)   Wt 54 5 kg (120 lb 2 4 oz)   SpO2 (!) 88%   BMI 26 94 kg/m²     General Appearance:    Alert, cooperative, no distress, appears stated age   Head:    Normocephalic, without obvious abnormality, atraumatic   Eyes:    Conjunctiva/corneas clear   Ears:    Normal external ears   Nose:   Nares normal, septum midline, mucosa normal, no drainage    or sinus tenderness   Throat:   Lips, mucosa, and tongue normal; teeth and gums normal   Neck:   Supple   Back:     Symmetric, no curvature, ROM normal, no CVA tenderness   Lungs:    Reduced but otherwise clear   Chest wall:    No tenderness or deformity   Heart:    Regular rate and rhythm, S1 and S2 normal, no murmur, rub   or gallop   Abdomen:     Soft, non-tender, bowel sounds active   Extremities:   Extremities normal, atraumatic, no cyanosis, +2 bilateral lower extremity edema to the pre-sacral region   Skin:   Skin color, texture, turgor normal, no rashes or lesions   Lymph nodes:   Cervical normal   Neurologic:   CNII-XII intact            Lab, Imaging and other studies: I have personally reviewed pertinent labs    CBC:   Lab Results   Component Value Date    WBC 5 79 05/17/2023    HGB 7 9 (L) 05/17/2023    HCT 23 4 (L) 05/17/2023     (H) 05/17/2023    PLT 50 (L) 05/17/2023    MCH 34 6 (H) 05/17/2023    MCHC 33 8 05/17/2023    RDW 15 6 (H) 05/17/2023    MPV 11 9 05/17/2023    NRBC 0 05/17/2023     CMP: " Lab Results   Component Value Date    K 3 8 05/17/2023    CL 92 (L) 05/17/2023    CO2 32 05/17/2023    BUN 37 (H) 05/17/2023    CREATININE 1 58 (H) 05/17/2023    CALCIUM 8 7 05/17/2023    AST 53 (H) 05/17/2023    ALT 20 05/17/2023    ALKPHOS 421 (H) 05/17/2023    EGFR 31 05/17/2023         Results from last 7 days   Lab Units 05/17/23  0559 05/15/23  0523 05/14/23  0457 05/13/23  0433   POTASSIUM mmol/L 3 8 3 9 3 6 3 6   CHLORIDE mmol/L 92* 91* 96 96   CO2 mmol/L 32 36* 34* 32   BUN mg/dL 37* 27* 23 21   CREATININE mg/dL 1 58* 1 29 1 21 1 24   CALCIUM mg/dL 8 7 9 0 8 8 8 5   ALK PHOS U/L 421* 477*  --  434*   ALT U/L 20 22  --  22   AST U/L 53* 59*  --  57*         Phosphorus: No results found for: PHOS  Magnesium: No results found for: MG  Urinalysis: No results found for: COLORU, CLARITYU, SPECGRAV, PHUR, LEUKOCYTESUR, NITRITE, PROTEINUA, GLUCOSEU, KETONESU, BILIRUBINUR, BLOODU  Ionized Calcium: No results found for: CAION  Coagulation:   Lab Results   Component Value Date    INR 1 55 (H) 05/17/2023     Troponin: No results found for: TROPONINI  ABG: No results found for: PHART, RGN1ENY, PO2ART, ELT1BGQ, C4JTWOZW, BEART, SOURCE  Radiology review:     IMAGING  Procedure: MRI abdomen wo contrast and mrcp    Result Date: 5/16/2023  Narrative: MRI OF THE ABDOMEN WITHOUT CONTRAST WITH MRCP INDICATION: 77 years / Female  left lower quadrant pain  COMPARISON: CT abdomen/pelvis 5/1/2023  TECHNIQUE:  Multiplanar/multisequence MRI of the abdomen with 3D MRCP was performed without the administration of contrast  FINDINGS: LOWER CHEST:   Small right pleural effusion and right basilar atelectasis  LIVER: Morphologic features of cirrhosis  No suspicious mass within the limitations of noncontrast imaging  Limited evaluation of hepatic veins and portal veins on this non-contrast MRI is unremarkable  BILE DUCTS: Minimal focal dilatation of the proximal right hepatic duct   Otherwise, no intrahepatic or extrahepatic bile duct dilation  Common bile duct is normal in caliber  No choledocholithiasis, biliary stricture or suspicious mass  Pneumobilia again noted  GALLBLADDER: Cholecystectomy PANCREAS: There is a 6 mm pancreatic tail cyst (series 3 image 13) without definite solid components, noting somewhat limited evaluation without intravenous contrast  No main duct dilatation  ADRENAL GLANDS:  Normal  SPLEEN:  Normal  KIDNEYS/PROXIMAL URETERS:  No hydroureteronephrosis  No suspicious renal mass  Bilateral hemorrhagic cysts  BOWEL:  No dilated loops of bowel  PERITONEAL CAVITY/RETROPERITONEUM:  No ascites  No mass  LYMPH NODES:  No abdominal lymphadenopathy  VASCULAR STRUCTURES:  Unremarkable  No aneurysm  ABDOMINAL WALL: Moderate body wall edema  OSSEOUS STRUCTURES:  No suspicious osseous lesion, noting limited evaluation  Impression: 1  Cirrhosis  Minimal focal dilatation of the proximal right hepatic duct with limited evaluation of the hepatic hilum due to susceptibility artifact from cholecystectomy clips and adjacent bowel gas  No additional biliary abnormalities  2  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, 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  For simple cyst(s) less than 1 5 cm, recommend followup every 2 year for 5 times or to age [de-identified], whichever comes first  Followup can stop at age [de-identified] or  can switch over to [de-identified] year or older algorithm  Recommend next followup in 2 years  Preferred imaging modality: abdomen MRI and MRCP with and without IV contrast, or triple phase abdomen CT with IV contrast, or abdomen MRI and MRCP without IV contrast  3  Small right pleural effusion and moderate body wall edema  The study was marked in [facility] for immediate notification   Workstation performed: [id number]       Current Facility-Administered Medications   Medication Dose Route Frequency   • acetaminophen (TYLENOL) tablet 650 mg  650 mg Oral Q6H PRN   • barium (READI-CAT 2) suspension 900 mL  900 mL Oral Once in imaging • dextromethorphan-guaiFENesin (ROBITUSSIN DM) oral syrup 10 mL  10 mL Oral Q6H PRN   • dicyclomine (BENTYL) capsule 10 mg  10 mg Oral TID PRN   • erythromycin (ILOTYCIN) 0 5 % ophthalmic ointment 0 5 inch  0 5 inch Left Eye BID   • HYDROmorphone (DILAUDID) injection 0 5 mg  0 5 mg Intravenous Q4H PRN   • levothyroxine tablet 75 mcg  75 mcg Oral Early Morning   • lidocaine (LIDODERM) 5 % patch 1 patch  1 patch Topical Daily   • midodrine (PROAMATINE) tablet 2 5 mg  2 5 mg Oral TID AC   • octreotide (SandoSTATIN) injection 100 mcg  100 mcg Subcutaneous Q8H [address]   • ondansetron (ZOFRAN) injection 4 mg  4 mg Intravenous Q6H PRN   • pantoprazole (PROTONIX) EC tablet 40 mg  40 mg Oral Early Morning   • traZODone (DESYREL) tablet 50 mg  50 mg Oral HS     Medications Discontinued During This Encounter   Medication Reason   • polyethylene glycol (GLYCOLAX) 17 GM/SCOOP powder Therapy completed   • ciprofloxacin (CIPRO) IVPB (premix in 5% dextrose) 400 mg 200 mL    • metroNIDAZOLE (FLAGYL) IVPB (premix) 500 mg 100 mL    • dextrose 5 % and sodium chloride 0 45 % with KCl 20 mEq/L infusion    • sodium chloride 0 9 % infusion    • polyethylene glycol (MIRALAX) packet 17 g    • ciprofloxacin (CIPRO) tablet 500 mg    • metroNIDAZOLE (FLAGYL) tablet 500 mg    • sodium bicarbonate tablet 650 mg    • enoxaparin (LOVENOX) subcutaneous injection 40 mg    • furosemide (LASIX) injection 80 mg    • multi-electrolyte (PLASMALYTE-A/ISOLYTE-S PH 7 4) IV solution    • metoprolol succinate (TOPROL-XL) 24 hr tablet 25 mg    • midodrine (PROAMATINE) tablet 2 5 mg    • enoxaparin (LOVENOX) subcutaneous injection 30 mg    • metoprolol succinate (TOPROL-XL) 24 hr tablet 12 5 mg    • magnesium hydroxide (MILK OF MAGNESIA) oral suspension 30 mL    • polyethylene glycol (MIRALAX) packet 17 g    • morphine injection 4 mg    • multi-electrolyte (PLASMALYTE-A/ISOLYTE-S PH 7 4) IV solution    • heparin (porcine) subcutaneous injection 5,000 Units    • pantoprazole (PROTONIX) EC tablet 40 mg    • albumin human (FLEXBUMIN) 25 % injection 12 5 g    • bumetanide (BUMEX) injection 1 mg    • bumetanide (BUMEX) injection 0 5 mg    • midodrine (PROAMATINE) tablet 5 mg    • pantoprazole (PROTONIX) EC tablet 40 mg    • bumetanide (BUMEX) injection 1 mg    • bumetanide (BUMEX) injection 2 mg    • bumetanide (BUMEX) injection 2 mg        Felipa Vegas,       This progress note was produced in part using a dictation device which may document imprecise wording from author's original intent

## 2023-05-17 NOTE — ASSESSMENT & PLAN NOTE
· Per record nursing reported black tarry stools on 5/8 to 5/9, none since Per discussion with patient  · 5/8 fecal occult blood positive  · Baseline hemoglobin 10-11  · Hemoglobin today 7 9  · No signs of bleeding  · 5/11 had EGD/colonoscopy-see results above  · GI following prescient recommendations  · CBC a m

## 2023-05-17 NOTE — PHYSICIAN ADVISOR
Current patient class: Inpatient  The patient is currently on Hospital Day: 21      The patient was admitted to the hospital at  4:39 PM on 4/27/23 for the following diagnosis:  Diverticulitis [K57 92]  Abdominal pain [R10 9]     CMS OUTLIER STAY REVIEW    After review of the relevant documentation, labs, vital signs and test results, the patient is appropriate for CONTINUED INPATIENT ADMISSION  The patient continues to remain hospitalized receiving acute medical care  The patient has surpassed the expected duration of stay, however given the clinical condition, need for further acute care management, the patient is appropriate to remain in an inpatient status  The patient still being actively managed, and does have unresolved medical issues requiring further hospitalization  This review is conducted at 20 days, to help satisfy the requirements for significant outlier stay review as per CMS  Given the current condition of this patient, the patient satisfies this review was determination for continued inpatient stay  Rationale is as follows: The patient is a 69 yrs old Female who presented to the ED at 4/27/2023 11:58 AM with a chief complaint of Abdominal Pain (PT REPORTS ABD PAIN STARTING AGAIN TODAY, PER STAFF PT HAS BEEN UNABLE TO EAT ANYTHING TODAY  )  Patient came with abdominal pain  Was seen by both surgery as well as GI  Did have an EGD done  Patient had episodes of hypotension with blood pressure is being monitored  Patient also stated he had some tarry stools  On further imaging on 515 patient was noted to have some cirrhosis as well as pancreatic tail cyst and moderate body wall edema  IR was consulted for a liver biopsy which will likely be performed today    Given the above the patient has been and continues to receive acute ongoing inpatient medical care    The patient’s vitals on arrival were   ED Triage Vitals [04/27/23 1200]   Temperature Pulse Respirations Blood Pressure SpO2 97 6 °F (36 4 °C) 68 18 (!) 175/71 96 %      Temp Source Heart Rate Source Patient Position - Orthostatic VS BP Location FiO2 (%)   Temporal Monitor Lying Left arm --      Pain Score       6           Past Medical History:   Diagnosis Date   • Anemia    • Atrial fibrillation (HCC)    • Depression    • GI (gastrointestinal bleed)    • Hypomagnesemia 4/29/2023   • Ischemic colitis [facility])      Past Surgical History:   Procedure Laterality Date   • APPENDECTOMY     • CARDIAC SURGERY     • CHOLECYSTECTOMY     • HIP SURGERY Right     Partial replacement   • HYSTERECTOMY             Consults have been placed to:   IP CONSULT TO GASTROENTEROLOGY  IP CONSULT TO GASTROENTEROLOGY  IP CONSULT TO INTERNAL MEDICINE  IP CONSULT TO NEPHROLOGY  INPATIENT CONSULT TO IR    Vitals:    05/16/23 1458 05/16/23 2227 05/17/23 0551 05/17/23 0606   BP: (!) 131/45 (!) 119/42  (!) 128/38   BP Location: Right arm Right arm     Pulse: 79 81  84   Resp: 17 18  18   Temp: 98 1 °F (36 7 °C) 97 8 °F (36 6 °C)  98 7 °F (37 1 °C)   TempSrc: Oral Axillary     SpO2: 91% (!) 89%  (!) 88%   Weight:   54 5 kg (120 lb 2 4 oz)    Height:           Most recent labs:    Recent Labs     05/17/23  0559   WBC 5 79   HGB 7 9*   HCT 23 4*   PLT 50*   K 3 8   CALCIUM 8 7   BUN 37*   CREATININE 1 58*   INR 1 55*   AST 53*   ALT 20   ALKPHOS 421*       Scheduled Meds:  Current Facility-Administered Medications   Medication Dose Route Frequency Provider Last Rate   • acetaminophen  650 mg Oral Q6H PRN Tony Medrano MD     • barium  900 mL Oral Once in imaging Lilian Marroquin MD     • dicyclomine  10 mg Oral TID PRN Rufina Latif MD     • erythromycin  0 5 inch Left Eye BID Lilian Marroquin MD     • HYDROmorphone  0 5 mg Intravenous Q4H PRN EBEN Hooker     • levothyroxine  75 mcg Oral Early Morning Tony Medrano MD     • lidocaine  1 patch Topical Daily EBEN Hooker     • midodrine  2 5 mg Oral TID AC Jasmina Mann DO     • octreotide  100 mcg Subcutaneous [facility] EBEN Caceres     • ondansetron  4 mg Intravenous Q6H PRN Milla Henriquez MD     • pantoprazole  40 mg Oral Early Morning Rola Gupta     • traZODone  50 mg Oral HS Lennox Gearing, PA-C       Continuous Infusions:   PRN Meds: •  acetaminophen  •  barium  •  dicyclomine  •  HYDROmorphone  •  ondansetron    Surgical procedures (if appropriate):

## 2023-05-17 NOTE — ASSESSMENT & PLAN NOTE
· Presented to the ED with left lower quadrant abdominal pain on 4/27   · Patient is without any complaints of discomfort today  · 4/27/2023 CT abdomen: New small amount of free fluid in the abdomen and pelvis compared to 4/18/2023, which can be secondary to cirrhosis or acute inflammatory process in the abdomen and pelvis such as occult acute diverticulitis  Moderate amount of stool in the colon, nonspecific and can represent constipation  · 4/29/2023 obstructive series: Nonobstructive bowel gas pattern  · 5/1/2023 CT abdomen pelvis: Thickened appearance of the wall of the descending colon and sigmoid colon which may represent pseudo thickening from nondistention versus a mild nonspecific colitis in the appropriate clinical setting  There are a few scattered colonic diverticula  · 5/2 flex sig: Minimal inflammation noted, biopsies were normal  · 5/5 KUB: Nonobstructive bowel gas pattern  · 5/11 colonoscopy: Stricture and mucosal friability and shallow ulceration at the ileocecal valve  Single shallow ulcer in the rectosigmoid colon  Most likely explanation for these findings is Crohn's disease  · 5/11 EGD: Small sliding hiatal hernia  Tiny varices at the GE junction  Mild portal hypertensive gastropathy  No blood in the stomach  Normal duodenum  No interventions performed    · Surgery consultation appreciated- now signed off  · GI following, appreciate recommendations  · Pathology from colonoscopy results pending  · 5/15/2023 MRCP: Cirrhosis, pancreatic tail cyst, and small right pleural effusion and moderate body wall edema were noted  · IR consulted, planning liver biopsy tomorrow  · Continue pain management

## 2023-05-18 ENCOUNTER — APPOINTMENT (INPATIENT)
Dept: INTERVENTIONAL RADIOLOGY/VASCULAR | Facility: HOSPITAL | Age: 78
DRG: 385 | End: 2023-05-18
Attending: RADIOLOGY
Payer: MEDICARE

## 2023-05-18 LAB
ALBUMIN SERPL BCP-MCNC: 3.1 G/DL (ref 3.5–5)
ALP SERPL-CCNC: 393 U/L (ref 34–104)
ALT SERPL W P-5'-P-CCNC: 19 U/L (ref 7–52)
ANION GAP SERPL CALCULATED.3IONS-SCNC: 8 MMOL/L (ref 4–13)
AST SERPL W P-5'-P-CCNC: 49 U/L (ref 13–39)
BASOPHILS # BLD AUTO: 0.03 THOUSANDS/ÂΜL (ref 0–0.1)
BASOPHILS NFR BLD AUTO: 1 % (ref 0–1)
BILIRUB SERPL-MCNC: 2.01 MG/DL (ref 0.2–1)
BUN SERPL-MCNC: 41 MG/DL (ref 5–25)
CALCIUM ALBUM COR SERPL-MCNC: 9.3 MG/DL (ref 8.3–10.1)
CALCIUM SERPL-MCNC: 8.6 MG/DL (ref 8.4–10.2)
CHLORIDE SERPL-SCNC: 94 MMOL/L (ref 96–108)
CO2 SERPL-SCNC: 33 MMOL/L (ref 21–32)
CREAT SERPL-MCNC: 1.61 MG/DL (ref 0.6–1.3)
CREAT UR-MCNC: 79.1 MG/DL
EOSINOPHIL # BLD AUTO: 0.18 THOUSAND/ÂΜL (ref 0–0.61)
EOSINOPHIL NFR BLD AUTO: 5 % (ref 0–6)
ERYTHROCYTE [DISTWIDTH] IN BLOOD BY AUTOMATED COUNT: 15.5 % (ref 11.6–15.1)
GFR SERPL CREATININE-BSD FRML MDRD: 30 ML/MIN/1.73SQ M
GLUCOSE SERPL-MCNC: 86 MG/DL (ref 65–140)
HCT VFR BLD AUTO: 23.2 % (ref 34.8–46.1)
HGB BLD-MCNC: 7.7 G/DL (ref 11.5–15.4)
IMM GRANULOCYTES # BLD AUTO: 0.01 THOUSAND/UL (ref 0–0.2)
IMM GRANULOCYTES NFR BLD AUTO: 0 % (ref 0–2)
INR PPP: 1.51 (ref 0.84–1.19)
LYMPHOCYTES # BLD AUTO: 0.91 THOUSANDS/ÂΜL (ref 0.6–4.47)
LYMPHOCYTES NFR BLD AUTO: 23 % (ref 14–44)
MCH RBC QN AUTO: 34.4 PG (ref 26.8–34.3)
MCHC RBC AUTO-ENTMCNC: 33.2 G/DL (ref 31.4–37.4)
MCV RBC AUTO: 104 FL (ref 82–98)
MONOCYTES # BLD AUTO: 0.79 THOUSAND/ÂΜL (ref 0.17–1.22)
MONOCYTES NFR BLD AUTO: 20 % (ref 4–12)
NEUTROPHILS # BLD AUTO: 2.07 THOUSANDS/ÂΜL (ref 1.85–7.62)
NEUTS SEG NFR BLD AUTO: 51 % (ref 43–75)
NRBC BLD AUTO-RTO: 0 /100 WBCS
PLATELET # BLD AUTO: 56 THOUSANDS/UL (ref 149–390)
PMV BLD AUTO: 12.1 FL (ref 8.9–12.7)
POTASSIUM SERPL-SCNC: 3.4 MMOL/L (ref 3.5–5.3)
PROT SERPL-MCNC: 6.5 G/DL (ref 6.4–8.4)
PROTHROMBIN TIME: 18.1 SECONDS (ref 11.6–14.5)
RBC # BLD AUTO: 2.24 MILLION/UL (ref 3.81–5.12)
SODIUM 24H UR-SCNC: 19 MOL/L
SODIUM SERPL-SCNC: 135 MMOL/L (ref 135–147)
WBC # BLD AUTO: 3.99 THOUSAND/UL (ref 4.31–10.16)

## 2023-05-18 PROCEDURE — C1769 GUIDE WIRE: HCPCS | Performed by: RADIOLOGY

## 2023-05-18 PROCEDURE — 80053 COMPREHEN METABOLIC PANEL: CPT

## 2023-05-18 PROCEDURE — 85025 COMPLETE CBC W/AUTO DIFF WBC: CPT

## 2023-05-18 PROCEDURE — 75889 VEIN X-RAY LIVER W/HEMODYNAM: CPT | Performed by: RADIOLOGY

## 2023-05-18 PROCEDURE — 85610 PROTHROMBIN TIME: CPT

## 2023-05-18 PROCEDURE — 0FB03ZX EXCISION OF LIVER, PERCUTANEOUS APPROACH, DIAGNOSTIC: ICD-10-PCS | Performed by: RADIOLOGY

## 2023-05-18 PROCEDURE — 37200 TRANSCATHETER BIOPSY: CPT

## 2023-05-18 PROCEDURE — 99152 MOD SED SAME PHYS/QHP 5/>YRS: CPT | Performed by: RADIOLOGY

## 2023-05-18 PROCEDURE — C1894 INTRO/SHEATH, NON-LASER: HCPCS | Performed by: RADIOLOGY

## 2023-05-18 PROCEDURE — 75970 VASCULAR BIOPSY: CPT

## 2023-05-18 PROCEDURE — 99153 MOD SED SAME PHYS/QHP EA: CPT

## 2023-05-18 PROCEDURE — 88342 IMHCHEM/IMCYTCHM 1ST ANTB: CPT | Performed by: PATHOLOGY

## 2023-05-18 PROCEDURE — 37200 TRANSCATHETER BIOPSY: CPT | Performed by: RADIOLOGY

## 2023-05-18 PROCEDURE — 76937 US GUIDE VASCULAR ACCESS: CPT

## 2023-05-18 PROCEDURE — 88313 SPECIAL STAINS GROUP 2: CPT | Performed by: PATHOLOGY

## 2023-05-18 PROCEDURE — 99152 MOD SED SAME PHYS/QHP 5/>YRS: CPT

## 2023-05-18 PROCEDURE — 88341 IMHCHEM/IMCYTCHM EA ADD ANTB: CPT | Performed by: PATHOLOGY

## 2023-05-18 PROCEDURE — 75889 VEIN X-RAY LIVER W/HEMODYNAM: CPT

## 2023-05-18 PROCEDURE — 99232 SBSQ HOSP IP/OBS MODERATE 35: CPT

## 2023-05-18 PROCEDURE — 99232 SBSQ HOSP IP/OBS MODERATE 35: CPT | Performed by: INTERNAL MEDICINE

## 2023-05-18 PROCEDURE — 76937 US GUIDE VASCULAR ACCESS: CPT | Performed by: RADIOLOGY

## 2023-05-18 PROCEDURE — 36012 PLACE CATHETER IN VEIN: CPT | Performed by: RADIOLOGY

## 2023-05-18 PROCEDURE — 88307 TISSUE EXAM BY PATHOLOGIST: CPT | Performed by: PATHOLOGY

## 2023-05-18 PROCEDURE — 75970 VASCULAR BIOPSY: CPT | Performed by: RADIOLOGY

## 2023-05-18 RX ORDER — FENTANYL CITRATE 50 UG/ML
INJECTION, SOLUTION INTRAMUSCULAR; INTRAVENOUS AS NEEDED
Status: COMPLETED | OUTPATIENT
Start: 2023-05-18 | End: 2023-05-18

## 2023-05-18 RX ORDER — POTASSIUM CHLORIDE 14.9 MG/ML
20 INJECTION INTRAVENOUS
Status: COMPLETED | OUTPATIENT
Start: 2023-05-18 | End: 2023-05-18

## 2023-05-18 RX ORDER — POTASSIUM CHLORIDE 14.9 MG/ML
20 INJECTION INTRAVENOUS
Status: DISCONTINUED | OUTPATIENT
Start: 2023-05-18 | End: 2023-05-18

## 2023-05-18 RX ORDER — LIDOCAINE HYDROCHLORIDE 10 MG/ML
INJECTION, SOLUTION EPIDURAL; INFILTRATION; INTRACAUDAL; PERINEURAL AS NEEDED
Status: COMPLETED | OUTPATIENT
Start: 2023-05-18 | End: 2023-05-18

## 2023-05-18 RX ORDER — MIDAZOLAM HYDROCHLORIDE 2 MG/2ML
INJECTION, SOLUTION INTRAMUSCULAR; INTRAVENOUS AS NEEDED
Status: COMPLETED | OUTPATIENT
Start: 2023-05-18 | End: 2023-05-18

## 2023-05-18 RX ORDER — POTASSIUM CHLORIDE 20 MEQ/1
20 TABLET, EXTENDED RELEASE ORAL ONCE
Status: COMPLETED | OUTPATIENT
Start: 2023-05-18 | End: 2023-05-18

## 2023-05-18 RX ADMIN — FENTANYL CITRATE 25 MCG: 50 INJECTION INTRAMUSCULAR; INTRAVENOUS at 13:30

## 2023-05-18 RX ADMIN — PANTOPRAZOLE SODIUM 40 MG: 40 TABLET, DELAYED RELEASE ORAL at 05:33

## 2023-05-18 RX ADMIN — MIDAZOLAM HYDROCHLORIDE 1 MG: 1 INJECTION, SOLUTION INTRAMUSCULAR; INTRAVENOUS at 13:21

## 2023-05-18 RX ADMIN — MIDAZOLAM HYDROCHLORIDE 0.5 MG: 1 INJECTION, SOLUTION INTRAMUSCULAR; INTRAVENOUS at 13:29

## 2023-05-18 RX ADMIN — TRAZODONE HYDROCHLORIDE 50 MG: 50 TABLET ORAL at 21:26

## 2023-05-18 RX ADMIN — FENTANYL CITRATE 50 MCG: 50 INJECTION INTRAMUSCULAR; INTRAVENOUS at 13:21

## 2023-05-18 RX ADMIN — MIDODRINE HYDROCHLORIDE 2.5 MG: 5 TABLET ORAL at 11:51

## 2023-05-18 RX ADMIN — LIDOCAINE 1 PATCH: 700 PATCH TOPICAL at 08:26

## 2023-05-18 RX ADMIN — POTASSIUM CHLORIDE 20 MEQ: 14.9 INJECTION, SOLUTION INTRAVENOUS at 10:22

## 2023-05-18 RX ADMIN — MIDODRINE HYDROCHLORIDE 2.5 MG: 5 TABLET ORAL at 05:33

## 2023-05-18 RX ADMIN — LEVOTHYROXINE SODIUM 75 MCG: 75 TABLET ORAL at 05:33

## 2023-05-18 RX ADMIN — POTASSIUM CHLORIDE 20 MEQ: 1500 TABLET, EXTENDED RELEASE ORAL at 08:26

## 2023-05-18 RX ADMIN — LIDOCAINE HYDROCHLORIDE 10 ML: 10 INJECTION, SOLUTION EPIDURAL; INFILTRATION; INTRACAUDAL; PERINEURAL at 13:34

## 2023-05-18 RX ADMIN — HYDROMORPHONE HYDROCHLORIDE 0.5 MG: 1 INJECTION, SOLUTION INTRAMUSCULAR; INTRAVENOUS; SUBCUTANEOUS at 22:55

## 2023-05-18 RX ADMIN — ERYTHROMYCIN 0.5 INCH: 5 OINTMENT OPHTHALMIC at 17:10

## 2023-05-18 RX ADMIN — ERYTHROMYCIN 0.5 INCH: 5 OINTMENT OPHTHALMIC at 08:26

## 2023-05-18 NOTE — PLAN OF CARE
Problem: GASTROINTESTINAL - ADULT  Goal: Minimal or absence of nausea and/or vomiting  Description: INTERVENTIONS:  - Administer IV fluids if ordered to ensure adequate hydration  - Maintain NPO status until nausea and vomiting are resolved  - Nasogastric tube if ordered  - Administer ordered antiemetic medications as needed  - Provide nonpharmacologic comfort measures as appropriate  - Advance diet as tolerated, if ordered  - Consider nutrition services referral to assist patient with adequate nutrition and appropriate food choices  Outcome: Progressing  Goal: Maintains or returns to baseline bowel function  Description: INTERVENTIONS:  - Assess bowel function  - Encourage oral fluids to ensure adequate hydration  - Administer IV fluids if ordered to ensure adequate hydration  - Administer ordered medications as needed  - Encourage mobilization and activity  - Consider nutritional services referral to assist patient with adequate nutrition and appropriate food choices  Outcome: Progressing  Goal: Maintains adequate nutritional intake  Description: INTERVENTIONS:  - Monitor percentage of each meal consumed  - Identify factors contributing to decreased intake, treat as appropriate  - Assist with meals as needed  - Monitor I&O, weight, and lab values if indicated  - Obtain nutrition services referral as needed  Outcome: Progressing     Problem: METABOLIC, FLUID AND ELECTROLYTES - ADULT  Goal: Electrolytes maintained within normal limits  Description: INTERVENTIONS:  - Monitor labs and assess patient for signs and symptoms of electrolyte imbalances  - Administer electrolyte replacement as ordered  - Monitor response to electrolyte replacements, including repeat lab results as appropriate  - Instruct patient on fluid and nutrition as appropriate  Outcome: Progressing     Problem: Knowledge Deficit  Goal: Patient/family/caregiver demonstrates understanding of disease process, treatment plan, medications, and discharge instructions  Description: Complete learning assessment and assess knowledge base    Interventions:  - Provide teaching at level of understanding  - Provide teaching via preferred learning methods  Outcome: Progressing

## 2023-05-18 NOTE — ASSESSMENT & PLAN NOTE
· Per record nursing reported black tarry stools on 5/8 to 5/9, none since Per discussion with patient  · 5/8 fecal occult blood positive  · Baseline hemoglobin 10-11  · Hemoglobin today 7 7  · No signs of bleeding  · 5/11 had EGD/colonoscopy-see results above  · GI following appreciate recommendations  · Plan transfusion PRBC's for HBG less than 7  · CBC a m

## 2023-05-18 NOTE — ASSESSMENT & PLAN NOTE
· Patient initially with hypotension, improved with midodrine  · Continue midodrine- decreased to 2 5 mg tid by nephrology on 5/11  · Home metoprolol on hold due to hypotension, will continue to monitor  · Home losartan has been held due to ALEJANDRA

## 2023-05-18 NOTE — DISCHARGE INSTRUCTIONS
[address]  Interventional Radiology  [phone]       Transjugular Liver Biopsy   WHAT YOU NEED TO KNOW:   A TJLB is a procedure to remove a sample of tissue from your liver  The sample can be sent to a lab and tested for liver disease, cancer, or infection  After the procedure your neck, abdomen, and right shoulder may be sore  You may also have mild swelling and bruising in your neck  These symptoms should get better in 48 to 72 hours  DISCHARGE INSTRUCTIONS:       [address] [facility] and [facility] patients  Contact Interventional Radiology at [phone] PATIENTS: Contact Interventional Radiology at [phone]   [facility] PATIENTS: Contact Interventional Radiology at [phone] if:  You cannot stop the bleeding from your wound even after you hold firm pressure for 10 minutes  Blood soaks through your bandage  You have severe pain in your abdomen  Your abdomen is larger than usual and feels hard  Your neck is more swollen and you have trouble swallowing  You feel weak or dizzy  Your heart is beating faster than usual    You have a fever or chills  Your pain does not get better after you take pain medicine  Your wound is red, swollen, or draining pus  You have nausea or are vomiting  Your skin is itchy, swollen, or you have a rash  You have questions or concerns about your condition or care  Medicines: You may need any of the following:  Acetaminophen decreases pain and fever  It is available without a doctor's order  Acetaminophen can cause liver damage if not taken correctly  Take your home medicine as directed  Resume your normal diet  Self-care:   Rest as directed  Do not play sports, exercise, or lift anything heavier than 5 pounds for up to 1 week  Resume your normal diet  Small sips of flat soda will help with mild nausea  Drink liquids as directed  Liquids will help flush the contrast liquid out of your body   Ask how much liquid to drink each day and which liquids are best for you  Apply firm, steady pressure if bleeding occurs  A small amount of bleeding from your wound is possible  Apply pressure with a clean gauze or towel for 5 to 10 minutes  Call 911 if bleeding becomes heavy or does not stop  Ask your healthcare provider when to take your blood thinner or antiplatelet medicine  You may need to wait 24 to 72 hours to take your medicine  This will prevent bleeding  Follow up with your healthcare provider as directed: Write down your questions so you remember to ask them during your visits  © 2017 2010 [employer] is for End User's use only and may not be sold, redistributed or otherwise used for commercial purposes  All illustrations and images included in CareNotes® are the copyrighted property of [employer]  or Alfredo Cesar  The above information is an  only  It is not intended as medical advice for individual conditions or treatments   Talk to your doctor, nurse or pharmacist before following any medical regimen to see if it is safe and ef

## 2023-05-18 NOTE — ASSESSMENT & PLAN NOTE
· Baseline appears to be between 11 and 12   · Hemoglobin today 7 7  · No signs of bleeding  · Plan to transfuse PRBCs for hemoglobin less than 7  · CBC am

## 2023-05-18 NOTE — CASE MANAGEMENT
Case Management Discharge Planning Note    Patient name Anda Severin  Location [address] [id number]/-[id number] MRN [id number]  : 1945 Date 2023       Current Admission Date: 2023  Current Admission Diagnosis:Left lower quadrant abdominal pain   Patient Active Problem List    Diagnosis Date Noted   • Tarry stools 2023   • Hyponatremia 2023   • Anemia 2023   • Left lower quadrant abdominal pain 2023   • Superior mesenteric artery stenosis ([facility] ) 2023   • Acute kidney injury ([facility] ) 2023   • Diarrhea of presumed infectious origin 2023   • Primary hypertension 2023   • Acquired hypothyroidism 2023   • Irritable bowel syndrome with diarrhea 2023   • Abnormal CT scan 2023   • Hx of CABG 2023   • PAF (paroxysmal atrial fibrillation) ([facility] ) 2022      LOS (days): 21  Geometric Mean LOS (GMLOS) (days): 3 80  Days to GMLOS:-17 3     OBJECTIVE:  Risk of Unplanned Readmission Score: 25 81         Current admission status: Inpatient   Preferred Pharmacy:   [address] [place] [address]  [facility]   [place] [address]  Phone: [phone] Fax: [phone]    Primary Care Provider: Wilda Corral MD    Primary Insurance: MEDICARE  Secondary Insurance: Kade Dennis    DISCHARGE DETAILS:    Discharge planning discussed with[de-identified] patient and spoke with Yuliana Pritchard in the morning and she is aware d/c planned for tomorrow- sister was called at 12:01pm  Freedom of Choice: Yes  Comments - Freedom of Choice: recommendation is for the pt to return to the facility with rehab services- rx for therpay is needed  CM contacted family/caregiver?: Yes             Contacts  Patient Contacts: Alex Cancino (Sister)   [phone] (Mobile)  Relationship to Patient[de-identified] Family (sister)  Contact Method: Phone  Phone Number: Alex Cancino (Sister)   [phone] (Mobile)  Reason/Outcome: Discharge [address]         Is the patient interested in [facility] at discharge?: No    DME Referral Provided  Referral made for DME?: No    Other Referral/Resources/Interventions Provided:  Interventions: Assisted Living, Outpatient OT, Outpatient PT  Referral Comments: pt to return to [facility] with rehab at [facility] facility- rx is needed- they are not able to accept in the weekend- pt had her liver biopsy completed today-   13:30pm w/v vans set up-    Would you like to participate in our [facility] e service program?  : No - Declined    Treatment Team Recommendation: [facility] & outpt follow up - 13:30 pm w/c Yong Reasons)                                   IMM Given (Date):: 05/18/23  IMM Given to[de-identified] Patient  Family notified[de-identified] sister was called and she instructed cm to set up transport - she was made aware that there is a fee for the transport  Additional Comments: [employer] was made aware of the d/c for tomorrow- she will need to be called in the morning with the transport time, avs needs to be faxed to [phone]- pt was also made aware that there is a fee for the w/c Tiffany Palafox

## 2023-05-18 NOTE — BRIEF OP NOTE (RAD/CATH)
INTERVENTIONAL RADIOLOGY PROCEDURE NOTE    Date: 5/18/2023    Procedure:   Procedure Summary     Date:  Room / Location:     Anesthesia Start:  Anesthesia Stop:     Procedure:  Diagnosis:     Scheduled Providers:  Responsible Provider:     Anesthesia Type: Not recorded ASA Status: Not recorded          Preoperative diagnosis:   1  Abdominal pain    2  Diverticulitis    3  Left lower quadrant abdominal pain    4  Diarrhea of presumed infectious origin    5  Hyponatremia Active   6  Hepatic cirrhosis, unspecified hepatic cirrhosis type, unspecified whether ascites present ([facility] ) Active   7  Diarrhea, unspecified type    8  Hyponatremia    9  Hepatic cirrhosis, unspecified hepatic cirrhosis type, unspecified whether ascites present ([facility] )    10  ALEJANDRA (acute kidney [facility]) [facility] )    11  Primary hypertension    12  Tarry stools    13  Anemia, unspecified type    14  Elevated alkaline phosphatase level    15  Elevated bilirubin         Postoperative diagnosis: Same  Surgeon: Raulito Aguilar MD     Assistant: None  No qualified resident was available  Blood loss: Minimal    Specimens: 19-gauge core biopsy x4 submitted to lab in formalin    Findings:  1   CO2 hepatic venography and portography shows wide patency of right hepatic vein and portal venous system  Iodinated contrast not used for procedure  2   Mildly elevated portosystemic gradient of 6-8mmHg  Free hepatic vein pressure measured 6-7 mmHg and wedged hepatic vein pressure measured at 13-14 mmHg  3   Transjugular liver biopsy performed from right hepatic vein  Complications: None immediate      Anesthesia: conscious sedation

## 2023-05-18 NOTE — ASSESSMENT & PLAN NOTE
· Presented to the ED with left lower quadrant abdominal pain on 4/27   · Patient reports intermittent pain across lower abdomen with mild tenderness on palpation  · 4/27/2023 CT abdomen: New small amount of free fluid in the abdomen and pelvis compared to 4/18/2023, which can be secondary to cirrhosis or acute inflammatory process in the abdomen and pelvis such as occult acute diverticulitis  Moderate amount of stool in the colon, nonspecific and can represent constipation  · 4/29/2023 obstructive series: Nonobstructive bowel gas pattern  · 5/1/2023 CT abdomen pelvis: Thickened appearance of the wall of the descending colon and sigmoid colon which may represent pseudo thickening from nondistention versus a mild nonspecific colitis in the appropriate clinical setting  There are a few scattered colonic diverticula  · 5/2 flex sig: Minimal inflammation noted, biopsies were normal  · 5/5 KUB: Nonobstructive bowel gas pattern  · 5/11 colonoscopy: Stricture and mucosal friability and shallow ulceration at the ileocecal valve  Single shallow ulcer in the rectosigmoid colon  Most likely explanation for these findings is Crohn's disease  · 5/11 EGD: Small sliding hiatal hernia  Tiny varices at the GE junction  Mild portal hypertensive gastropathy  No blood in the stomach  Normal duodenum  No interventions performed    · Surgery consultation appreciated- now signed off  · GI following, appreciate recommendations  · Pathology from colonoscopy results on chart, reviewed  · 5/15/2023 MRCP: Cirrhosis, pancreatic tail cyst, and small right pleural effusion and moderate body wall edema were noted  · Patient is for liver biopsy with IR today  · Continue pain management

## 2023-05-18 NOTE — PROGRESS NOTES
Progress Note - Nephrology   Rian Buckley 69 y o  female MRN: [id number]  Unit/Bed#: -01 Encounter: [id number]    A/P:  1  Acute kidney injury secondary to prerenal state in the setting of cirrhosis/diuretics versus  ischemic ATN versus HRS  creatinine trends rising in the setting of aggressive diuresis  She is off of diuresis since 5/16,receiving Bumex 2 mg twice daily  She did not respond well to 1 mg twice daily  She still appears edematous however, this is overall improved from my previous examination  Creatinine trend stable at 1 6  Urine sodium checked on 5/17 was 19  Usually with HRS, would expect more avid sodium reabsorption in the urine  Her blood pressure trends are still on the soft side continue midodrine 2 5 mg TID with hold parameters  Midodrine and octreotide can be continued for a period of 2 weeks with HRS  Continue octreotide for now with ALEJANDRA  May DC when Cr trend improve  Urine output not accurately documented  Her standing weight has improved to 118 pounds today  Down 14 pounds from standing scale weight 132 lb on 5/6  No obstructive uropathy noted  No hydroureteronephrosis on 5/15    2  Volume overload, improving, down 14 lb  Would continue to hold diuretics as she is n p o  for procedure  We will look to resume when creatinine trends improve or if she has worsening oxygenation/respiratory distress  3 cirrhosis, liver biopsy planned for today  Lizette Gutierrez Awaiting official diagnosis  4   Ulcerated stricture at ileocecal valve, would minimize IV contrast as best we can here  Minimize exposure to contrast as best we can here  Follow up reason for today's visit:     Acute kidney injury    Subjective:   Patient seen and examined today  Denies chest pain  She reports intermittent shortness of breath  She is n p o  for biopsy today  Otherwise denies complaints      A complete 10 point review of systems was performed and is otherwise "negative  Objective:     Vitals: Blood pressure (!) 112/40, pulse 63, temperature 98 °F (36 7 °C), resp  rate 18, height 4' 11\" (1 499 m), weight 53 9 kg (118 lb 13 3 oz), SpO2 97 %  ,Body mass index is 26 64 kg/m²  Weight (last 2 days)     Date/Time Weight    05/18/23 0546 53 9 (118 83)    05/17/23 0551 54 5 (120 15)    05/16/23 0600 55 (121 25)            Intake/Output Summary (Last 24 hours) at 5/18/2023 1046  Last data filed at 5/18/2023 0942  Gross per 24 hour   Intake 600 ml   Output 200 ml   Net 400 ml     I/O last 3 completed shifts: In: 600 [P O :600]  Out: -          Physical Exam: BP (!) 112/40   Pulse 63   Temp 98 °F (36 7 °C)   Resp 18   Ht 4' 11\" (1 499 m)   Wt 53 9 kg (118 lb 13 3 oz)   SpO2 97%   BMI 26 64 kg/m²     General Appearance:    Alert, cooperative, no distress, appears stated age   Head:    Normocephalic, without obvious abnormality, atraumatic   Eyes:    Conjunctiva/corneas clear   Ears:    Normal external ears   Nose:   Nares normal, septum midline, mucosa normal, no drainage    or sinus tenderness   Throat:   Lips, mucosa, and tongue normal; teeth and gums normal   Neck:    Back:      Lungs:     Clear to auscultation bilaterally, respirations unlabored   Chest wall:    No tenderness or deformity   Heart:    Regular rate and rhythm, S1 and S2 normal, no murmur, rub   or gallop   Abdomen:     Soft, non-tender, bowel sounds active   Extremities:  Moderate bilateral lower extremity edema, improving   Skin:   Skin color, texture, turgor normal, no rashes or lesions   Lymph nodes:   Cervical normal   Neurologic:   CNII-XII intact            Lab, Imaging and other studies: I have personally reviewed pertinent labs    CBC:   Lab Results   Component Value Date    WBC 3 99 (L) 05/18/2023    HGB 7 7 (L) 05/18/2023    HCT 23 2 (L) 05/18/2023     (H) 05/18/2023    PLT 56 (L) 05/18/2023    MCH 34 4 (H) 05/18/2023    MCHC 33 2 05/18/2023    RDW 15 5 (H) 05/18/2023    MPV 12 1 " 05/18/2023    NRBC 0 05/18/2023     CMP:   Lab Results   Component Value Date    K 3 4 (L) 05/18/2023    CL 94 (L) 05/18/2023    CO2 33 (H) 05/18/2023    BUN 41 (H) 05/18/2023    CREATININE 1 61 (H) 05/18/2023    CALCIUM 8 6 05/18/2023    AST 49 (H) 05/18/2023    ALT 19 05/18/2023    ALKPHOS 393 (H) 05/18/2023    EGFR 30 05/18/2023         Results from last 7 days   Lab Units 05/18/23  0532 05/17/23  0559 05/15/23  0523   POTASSIUM mmol/L 3 4* 3 8 3 9   CHLORIDE mmol/L 94* 92* 91*   CO2 mmol/L 33* 32 36*   BUN mg/dL 41* 37* 27*   CREATININE mg/dL 1 61* 1 58* 1 29   CALCIUM mg/dL 8 6 8 7 9 0   ALK PHOS U/L 393* 421* 477*   ALT U/L 19 20 22   AST U/L 49* 53* 59*         Phosphorus: No results found for: PHOS  Magnesium: No results found for: MG  Urinalysis: No results found for: COLORU, CLARITYU, SPECGRAV, PHUR, LEUKOCYTESUR, NITRITE, PROTEINUA, GLUCOSEU, KETONESU, BILIRUBINUR, BLOODU  Ionized Calcium: No results found for: CAION  Coagulation:   Lab Results   Component Value Date    INR 1 51 (H) 05/18/2023     Troponin: No results found for: TROPONINI  ABG: No results found for: PHART, ABB0QWG, PO2ART, EWS5SPM, S7IQGMRY, BEART, SOURCE  Radiology review:     IMAGING  Procedure: MRI abdomen wo contrast and mrcp    Result Date: 5/16/2023  Narrative: MRI OF THE ABDOMEN WITHOUT CONTRAST WITH MRCP INDICATION: 77 years / Female  left lower quadrant pain  COMPARISON: CT abdomen/pelvis 5/1/2023  TECHNIQUE:  Multiplanar/multisequence MRI of the abdomen with 3D MRCP was performed without the administration of contrast  FINDINGS: LOWER CHEST:   Small right pleural effusion and right basilar atelectasis  LIVER: Morphologic features of cirrhosis  No suspicious mass within the limitations of noncontrast imaging  Limited evaluation of hepatic veins and portal veins on this non-contrast MRI is unremarkable  BILE DUCTS: Minimal focal dilatation of the proximal right hepatic duct   Otherwise, no intrahepatic or extrahepatic bile duct dilation  Common bile duct is normal in caliber  No choledocholithiasis, biliary stricture or suspicious mass  Pneumobilia again noted  GALLBLADDER: Cholecystectomy PANCREAS: There is a 6 mm pancreatic tail cyst (series 3 image 13) without definite solid components, noting somewhat limited evaluation without intravenous contrast  No main duct dilatation  ADRENAL GLANDS:  Normal  SPLEEN:  Normal  KIDNEYS/PROXIMAL URETERS:  No hydroureteronephrosis  No suspicious renal mass  Bilateral hemorrhagic cysts  BOWEL:  No dilated loops of bowel  PERITONEAL CAVITY/RETROPERITONEUM:  No ascites  No mass  LYMPH NODES:  No abdominal lymphadenopathy  VASCULAR STRUCTURES:  Unremarkable  No aneurysm  ABDOMINAL WALL: Moderate body wall edema  OSSEOUS STRUCTURES:  No suspicious osseous lesion, noting limited evaluation  Impression: 1  Cirrhosis  Minimal focal dilatation of the proximal right hepatic duct with limited evaluation of the hepatic hilum due to susceptibility artifact from cholecystectomy clips and adjacent bowel gas  No additional biliary abnormalities  2  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, 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  For simple cyst(s) less than 1 5 cm, recommend followup every 2 year for 5 times or to age [de-identified], whichever comes first  Followup can stop at age [de-identified] or  can switch over to [de-identified] year or older algorithm  Recommend next followup in 2 years  Preferred imaging modality: abdomen MRI and MRCP with and without IV contrast, or triple phase abdomen CT with IV contrast, or abdomen MRI and MRCP without IV contrast  3  Small right pleural effusion and moderate body wall edema  The [facility] was [facility] in [facility] for immediate notification   Workstation performed: [id number]       Current Facility-Administered Medications   Medication Dose Route Frequency   • acetaminophen (TYLENOL) tablet 650 mg  650 mg Oral Q6H PRN   • barium (READI-CAT 2) suspension 900 mL  900 mL Oral Once in imaging • dextromethorphan-guaiFENesin (ROBITUSSIN DM) oral syrup 10 mL  10 mL Oral Q6H PRN   • dicyclomine (BENTYL) capsule 10 mg  10 mg Oral TID PRN   • erythromycin (ILOTYCIN) 0 5 % ophthalmic ointment 0 5 inch  0 5 inch Left Eye BID   • HYDROmorphone (DILAUDID) injection 0 5 mg  0 5 mg Intravenous Q4H PRN   • levothyroxine tablet 75 mcg  75 mcg Oral Early Morning   • lidocaine (LIDODERM) 5 % patch 1 patch  1 patch Topical Daily   • midodrine (PROAMATINE) tablet 2 5 mg  2 5 mg Oral TID AC   • octreotide (SandoSTATIN) injection 100 mcg  100 mcg Subcutaneous Q8H [facility]   • ondansetron (ZOFRAN) injection 4 mg  4 mg Intravenous Q6H PRN   • pantoprazole (PROTONIX) EC tablet 40 mg  40 mg Oral Early Morning   • potassium chloride 20 mEq IVPB (premix)  20 mEq Intravenous Q2H   • traZODone (DESYREL) tablet 50 mg  50 mg Oral HS     Medications Discontinued During This Encounter   Medication Reason   • polyethylene glycol (GLYCOLAX) 17 GM/SCOOP powder Therapy completed   • ciprofloxacin (CIPRO) IVPB (premix in 5% dextrose) 400 mg 200 mL    • metroNIDAZOLE (FLAGYL) IVPB (premix) 500 mg 100 mL    • dextrose 5 % and sodium chloride 0 45 % with KCl 20 mEq/L infusion    • sodium chloride 0 9 % infusion    • polyethylene glycol (MIRALAX) packet 17 g    • ciprofloxacin (CIPRO) tablet 500 mg    • metroNIDAZOLE (FLAGYL) tablet 500 mg    • sodium bicarbonate tablet 650 mg    • enoxaparin (LOVENOX) subcutaneous injection 40 mg    • furosemide (LASIX) injection 80 mg    • multi-electrolyte (PLASMALYTE-A/ISOLYTE-S PH 7 4) IV solution    • metoprolol succinate (TOPROL-XL) 24 hr tablet 25 mg    • midodrine (PROAMATINE) tablet 2 5 mg    • enoxaparin (LOVENOX) subcutaneous injection 30 mg    • metoprolol succinate (TOPROL-XL) 24 hr tablet 12 5 mg    • magnesium hydroxide (MILK OF MAGNESIA) oral suspension 30 mL    • polyethylene glycol (MIRALAX) packet 17 g    • morphine injection 4 mg    • multi-electrolyte (PLASMALYTE-A/ISOLYTE-S PH 7 4) IV solution    • heparin (porcine) subcutaneous injection 5,000 Units    • pantoprazole (PROTONIX) EC tablet 40 mg    • albumin human (FLEXBUMIN) 25 % injection 12 5 g    • bumetanide (BUMEX) injection 1 mg    • bumetanide (BUMEX) injection 0 5 mg    • midodrine (PROAMATINE) tablet 5 mg    • pantoprazole (PROTONIX) EC tablet 40 mg    • bumetanide (BUMEX) injection 1 mg    • bumetanide (BUMEX) injection 2 mg    • bumetanide (BUMEX) injection 2 mg    • potassium chloride 20 mEq IVPB (premix)        Madeline Murrieta, DO      This progress note was produced in part using a dictation device which may document imprecise wording from author's original intent

## 2023-05-18 NOTE — PROGRESS NOTES
[facility]  Progress Note  Name: Yoshi Wilson  MRN: [id number]  Unit/Bed#: -01 I Date of Admission: 4/27/2023   Date of Service: 5/18/2023 I Hospital Day: 21    Assessment/Plan   * Left lower quadrant abdominal pain  Assessment & Plan  · Presented to the ED with left lower quadrant abdominal pain on 4/27   · Patient reports intermittent pain across lower abdomen with mild tenderness on palpation  · 4/27/2023 CT abdomen: New small amount of free fluid in the abdomen and pelvis compared to 4/18/2023, which can be secondary to cirrhosis or acute inflammatory process in the abdomen and pelvis such as occult acute diverticulitis  Moderate amount of stool in the colon, nonspecific and can represent constipation  · 4/29/2023 obstructive series: Nonobstructive bowel gas pattern  · 5/1/2023 CT abdomen pelvis: Thickened appearance of the wall of the descending colon and sigmoid colon which may represent pseudo thickening from nondistention versus a mild nonspecific colitis in the appropriate clinical setting  There are a few scattered colonic diverticula  · 5/2 flex sig: Minimal inflammation noted, biopsies were normal  · 5/5 KUB: Nonobstructive bowel gas pattern  · 5/11 colonoscopy: Stricture and mucosal friability and shallow ulceration at the ileocecal valve  Single shallow ulcer in the rectosigmoid colon  Most likely explanation for these findings is Crohn's disease  · 5/11 EGD: Small sliding hiatal hernia  Tiny varices at the GE junction  Mild portal hypertensive gastropathy  No blood in the stomach  Normal duodenum  No interventions performed    · Surgery consultation appreciated- now signed off  · GI following, appreciate recommendations  · Pathology from colonoscopy results on chart, reviewed  · 5/15/2023 MRCP: Cirrhosis, pancreatic tail cyst, and small right pleural effusion and moderate body wall edema were noted  · Patient is for liver biopsy with IR today  · Continue pain management    Acute kidney injury [facility])  Assessment & Plan  • Baseline creatinine if 0 8 to 1  • Creatinine 1 61 today  • Nephrology following, appreciate recommendations  • Bumex discontinued by nephrology on 5/16, monitoring off diuertics  • Continue midodrine and octreotide per nephrology mentation  • Continue to avoid hypotension and nephrotoxins  • Intake and output  • Daily weights  • BMP a m  Hyponatremia  Assessment & Plan  · Likely due to cirrhosis   · Serum sodium today 135  · Continue management as above  · BMP a m  Primary hypertension  Assessment & Plan  · Patient initially with hypotension, improved with midodrine  · Continue midodrine- decreased to 2 5 mg tid by nephrology on 5/11  · Home metoprolol on hold due to hypotension, will continue to monitor  · Home losartan has been held due to ALEJANDRA    PAF (paroxysmal atrial fibrillation) ([facility])  Assessment & Plan  · Rate controlled   · Home metoprolol is on hold due to hypotension  · Continue midodrine      Tarry stools  Assessment & Plan  · Per record nursing reported black tarry stools on 5/8 to 5/9, none since Per discussion with patient  · 5/8 fecal occult blood positive  · Baseline hemoglobin 10-11  · Hemoglobin today 7 7  · No signs of bleeding  · 5/11 had EGD/colonoscopy-see results above  · GI following appreciate recommendations  · Plan transfusion PRBC's for HBG less than 7  · CBC a m      Hx of CABG  Assessment & Plan  · Currently stable  · Losartan on hold due to hypotension    Anemia  Assessment & Plan  · Baseline appears to be between 11 and 12   · Hemoglobin today 7 7  · No signs of bleeding  · Plan to transfuse PRBCs for hemoglobin less than 7  · CBC am     Acquired hypothyroidism  Assessment & Plan  · Continue Synthroid           VTE Pharmacologic Prophylaxis:   Pharmacologic: VTE pharmacologic prophylaxis contraindicated  Mechanical VTE Prophylaxis in Place: Yes    Patient Centered Rounds: I have performed bedside rounds with nursing staff today  Discussions with Specialists or Other Care Team Provider: GI, nephrology, IR, nursing    Education and Discussions with Family / Patient: Treatment plan discussed with patient who understands the plan as it has been explained and is agreeable to the plan as stated  All questions answered to satisfaction  Time Spent for Care: 40 minutes  More than 50% of total time spent on counseling and coordination of care as described above  Current Length of Stay: 21 day(s)    Current Patient Status: Inpatient   Certification Statement: The patient will continue to require additional inpatient hospital stay due to IR doing liver biopsy today, monitoring of H&H, monitoring for bleeding,    Discharge Plan: Pending hospital course  Plan is for patient to return to personal-care home when stable for discharge  Code Status: Level 1 - Full Code      Subjective:   Patient reports intermittent pain across lower abdomen and chronic low back pain  Denies any nausea or vomiting or dark stools  Anxious to have her liver biopsy today  Objective:     Vitals:   Temp (24hrs), Av 9 °F (36 6 °C), Min:97 5 °F (36 4 °C), Max:98 1 °F (36 7 °C)    Temp:  [97 5 °F (36 4 °C)-98 1 °F (36 7 °C)] 98 °F (36 7 °C)  HR:  [66-74] 73  Resp:  [17-18] 18  BP: (112-123)/(40-49) 112/40  SpO2:  [93 %-94 %] 93 %  Body mass index is 26 64 kg/m²  Input and Output Summary (last 24 hours): Intake/Output Summary (Last 24 hours) at 2023 0902  Last data filed at 2023 1915  Gross per 24 hour   Intake 600 ml   Output --   Net 600 ml       Physical Exam:     Physical Exam  Constitutional:       General: She is not in acute distress  Appearance: She is not ill-appearing  HENT:      Head: Normocephalic and atraumatic  Nose: Nose normal       Mouth/Throat:      Mouth: Mucous membranes are moist    Cardiovascular:      Rate and Rhythm: Normal rate and regular rhythm  Pulses: Normal pulses  Heart sounds: Normal heart sounds  Pulmonary:      Effort: Pulmonary effort is normal  No respiratory distress  Breath sounds: No wheezing or rales  Abdominal:      General: Bowel sounds are normal  There is no distension  Palpations: Abdomen is soft  Tenderness: There is abdominal tenderness  Comments: Mild tenderness on palpation lower abdomen   Musculoskeletal:         General: Normal range of motion  Right lower leg: Edema present  Left lower leg: Edema present  Comments: +1 edema bilateral lower extremities   Skin:     General: Skin is warm and dry  Capillary Refill: Capillary refill takes less than 2 seconds  Neurological:      General: No focal deficit present  Mental Status: She is alert and oriented to person, place, and time  Mental status is at baseline  Psychiatric:         Mood and Affect: Mood normal          Behavior: Behavior normal          Thought Content: Thought content normal          Judgment: Judgment normal          Additional Data:     Labs:    Results from last 7 days   Lab Units 05/18/23  0532   WBC Thousand/uL 3 99*   HEMOGLOBIN g/dL 7 7*   HEMATOCRIT % 23 2*   PLATELETS Thousands/uL 56*   NEUTROS PCT % 51   LYMPHS PCT % 23   MONOS PCT % 20*   EOS PCT % 5     Results from last 7 days   Lab Units 05/18/23  0532   POTASSIUM mmol/L 3 4*   CHLORIDE mmol/L 94*   CO2 mmol/L 33*   BUN mg/dL 41*   CREATININE mg/dL 1 61*   CALCIUM mg/dL 8 6   ALK PHOS U/L 393*   ALT U/L 19   AST U/L 49*     Results from last 7 days   Lab Units 05/18/23  0532   INR  1 51*       * I Have Reviewed All Lab Data Listed Above  * Additional Pertinent Lab Tests Reviewed:  All [place] Admission Reviewed    Imaging:    Imaging Reports Reviewed Today Include: CT abdomen pelvis, obstructive series, repeat CT abdomen pelvis, MRCP, KUB, chest x-ray  Imaging Personally Reviewed by Myself Includes:     Recent Cultures (last 7 days):           Last 24 Hours Medication List:   Current Facility-Administered Medications   Medication Dose Route Frequency Provider Last Rate   • acetaminophen  650 mg Oral Q6H PRN Jared Jacques MD     • barium  900 mL Oral Once in MD Mani     • dextromethorphan-guaiFENesin  10 mL Oral Q6H PRN EBEN Hooker     • dicyclomine  10 mg Oral TID PRN Vinicio Yadav MD     • erythromycin  0 5 inch Left Eye BID Ladan Ponce MD     • HYDROmorphone  0 5 mg Intravenous Q4H PRN EBEN Hooker     • levothyroxine  75 mcg Oral Early Morning Jared Jacques MD     • lidocaine  1 patch Topical Daily EBEN Hooker     • midodrine  2 5 mg Oral TID AC DO Helio     • octreotide  100 mcg Subcutaneous [facility] EBEN Huff     • ondansetron  4 mg Intravenous Q6H PRN Ladan Ponce MD     • pantoprazole  40 mg Oral Early Morning [place], [place]     • potassium chloride  20 mEq Intravenous Q2H DO Helio     • traZODone  50 mg Oral HS Jamal Cardona PA-C          Today, Patient Was Seen By: EBEN Dietrich    ** Please Note: Dictation voice to text software may have been used in the creation of this document   **

## 2023-05-18 NOTE — ASSESSMENT & PLAN NOTE
• Baseline creatinine if 0 8 to 1  • Creatinine 1 61 today  • Nephrology following, appreciate recommendations  • Bumex discontinued by nephrology on 5/16, monitoring off diuertics  • Continue midodrine and octreotide per nephrology mentation  • Continue to avoid hypotension and nephrotoxins  • Intake and output  • Daily weights  • BMP a m

## 2023-05-18 NOTE — PLAN OF CARE
Problem: Potential for Falls  Goal: Patient will remain free of falls  Description: INTERVENTIONS:  - Educate patient/family on patient safety including physical limitations  - Instruct patient to call for assistance with activity   - Consult OT/PT to assist with strengthening/mobility   - Keep Call bell within reach  - Keep bed low and locked with side rails adjusted as appropriate  - Keep care items and personal belongings within reach  - Initiate and maintain comfort rounds  - Make Fall Risk Sign visible to staff  - Offer Toileting every 2 Hours, in advance of need  - Initiate/Maintain alarms  - Obtain necessary fall risk management equipment:  - Apply yellow socks and bracelet for high fall risk patients  - Consider moving patient to room near nurses station  Outcome: Progressing     Problem: PAIN - ADULT  Goal: Verbalizes/displays adequate comfort level or baseline comfort level  Description: Interventions:  - Encourage patient to monitor pain and request assistance  - Assess pain using appropriate pain scale  - Administer analgesics based on type and severity of pain and evaluate response  - Implement non-pharmacological measures as appropriate and evaluate response  - Consider cultural and social influences on pain and pain management  - Notify physician/advanced practitioner if interventions unsuccessful or patient reports new pain  Outcome: Progressing     Problem: Prexisting or High Potential for Compromised Skin Integrity  Goal: Skin integrity is maintained or improved  Description: INTERVENTIONS:  - Identify patients at risk for skin breakdown  - Assess and monitor skin integrity  - Assess and monitor nutrition and hydration status  - Monitor labs   - Assess for incontinence   - Turn and reposition patient  - Assist with mobility/ambulation  - Relieve pressure over bony prominences  - Avoid friction and shearing  - Provide appropriate hygiene as needed including keeping skin clean and dry  - Evaluate need for skin moisturizer/barrier cream  - Collaborate with interdisciplinary team   - Patient/family teaching  - Consider wound care consult   Outcome: Progressing

## 2023-05-19 ENCOUNTER — APPOINTMENT (INPATIENT)
Dept: NON INVASIVE DIAGNOSTICS | Facility: HOSPITAL | Age: 78
DRG: 385 | End: 2023-05-19
Payer: MEDICARE

## 2023-05-19 PROBLEM — D69.6 THROMBOCYTOPENIA (HCC): Status: ACTIVE | Noted: 2023-05-19

## 2023-05-19 PROBLEM — L03.115 CELLULITIS OF RIGHT LOWER EXTREMITY: Status: ACTIVE | Noted: 2023-05-19

## 2023-05-19 LAB
ALBUMIN SERPL BCP-MCNC: 3.3 G/DL (ref 3.5–5)
ALP SERPL-CCNC: 439 U/L (ref 34–104)
ALT SERPL W P-5'-P-CCNC: 20 U/L (ref 7–52)
ANION GAP SERPL CALCULATED.3IONS-SCNC: 9 MMOL/L (ref 4–13)
AST SERPL W P-5'-P-CCNC: 57 U/L (ref 13–39)
BASOPHILS # BLD AUTO: 0.04 THOUSANDS/ÂΜL (ref 0–0.1)
BASOPHILS NFR BLD AUTO: 1 % (ref 0–1)
BILIRUB SERPL-MCNC: 2.11 MG/DL (ref 0.2–1)
BUN SERPL-MCNC: 38 MG/DL (ref 5–25)
CALCIUM ALBUM COR SERPL-MCNC: 9.4 MG/DL (ref 8.3–10.1)
CALCIUM SERPL-MCNC: 8.8 MG/DL (ref 8.4–10.2)
CHLORIDE SERPL-SCNC: 98 MMOL/L (ref 96–108)
CO2 SERPL-SCNC: 29 MMOL/L (ref 21–32)
CREAT SERPL-MCNC: 1.48 MG/DL (ref 0.6–1.3)
EOSINOPHIL # BLD AUTO: 0.18 THOUSAND/ÂΜL (ref 0–0.61)
EOSINOPHIL NFR BLD AUTO: 3 % (ref 0–6)
ERYTHROCYTE [DISTWIDTH] IN BLOOD BY AUTOMATED COUNT: 15.6 % (ref 11.6–15.1)
GFR SERPL CREATININE-BSD FRML MDRD: 34 ML/MIN/1.73SQ M
GLUCOSE SERPL-MCNC: 124 MG/DL (ref 65–140)
HCT VFR BLD AUTO: 24.8 % (ref 34.8–46.1)
HGB BLD-MCNC: 8.1 G/DL (ref 11.5–15.4)
IMM GRANULOCYTES # BLD AUTO: 0.01 THOUSAND/UL (ref 0–0.2)
IMM GRANULOCYTES NFR BLD AUTO: 0 % (ref 0–2)
INR PPP: 1.48 (ref 0.84–1.19)
LYMPHOCYTES # BLD AUTO: 1.02 THOUSANDS/ÂΜL (ref 0.6–4.47)
LYMPHOCYTES NFR BLD AUTO: 18 % (ref 14–44)
MCH RBC QN AUTO: 34.3 PG (ref 26.8–34.3)
MCHC RBC AUTO-ENTMCNC: 32.7 G/DL (ref 31.4–37.4)
MCV RBC AUTO: 105 FL (ref 82–98)
MONOCYTES # BLD AUTO: 0.85 THOUSAND/ÂΜL (ref 0.17–1.22)
MONOCYTES NFR BLD AUTO: 15 % (ref 4–12)
NEUTROPHILS # BLD AUTO: 3.67 THOUSANDS/ÂΜL (ref 1.85–7.62)
NEUTS SEG NFR BLD AUTO: 63 % (ref 43–75)
NRBC BLD AUTO-RTO: 0 /100 WBCS
PLATELET # BLD AUTO: 65 THOUSANDS/UL (ref 149–390)
PMV BLD AUTO: 11.9 FL (ref 8.9–12.7)
POTASSIUM SERPL-SCNC: 3.9 MMOL/L (ref 3.5–5.3)
PROT SERPL-MCNC: 6.9 G/DL (ref 6.4–8.4)
PROTHROMBIN TIME: 17.9 SECONDS (ref 11.6–14.5)
RBC # BLD AUTO: 2.36 MILLION/UL (ref 3.81–5.12)
SODIUM SERPL-SCNC: 136 MMOL/L (ref 135–147)
WBC # BLD AUTO: 5.77 THOUSAND/UL (ref 4.31–10.16)

## 2023-05-19 PROCEDURE — 99232 SBSQ HOSP IP/OBS MODERATE 35: CPT | Performed by: INTERNAL MEDICINE

## 2023-05-19 PROCEDURE — 93970 EXTREMITY STUDY: CPT

## 2023-05-19 PROCEDURE — 80053 COMPREHEN METABOLIC PANEL: CPT

## 2023-05-19 PROCEDURE — 93970 EXTREMITY STUDY: CPT | Performed by: SURGERY

## 2023-05-19 PROCEDURE — 85025 COMPLETE CBC W/AUTO DIFF WBC: CPT

## 2023-05-19 PROCEDURE — 85610 PROTHROMBIN TIME: CPT

## 2023-05-19 PROCEDURE — 99232 SBSQ HOSP IP/OBS MODERATE 35: CPT | Performed by: FAMILY MEDICINE

## 2023-05-19 RX ORDER — POTASSIUM CHLORIDE 750 MG/1
10 TABLET, EXTENDED RELEASE ORAL DAILY
Status: DISCONTINUED | OUTPATIENT
Start: 2023-05-19 | End: 2023-05-21

## 2023-05-19 RX ORDER — HEPARIN SODIUM 5000 [USP'U]/ML
5000 INJECTION, SOLUTION INTRAVENOUS; SUBCUTANEOUS EVERY 8 HOURS SCHEDULED
Status: DISCONTINUED | OUTPATIENT
Start: 2023-05-19 | End: 2023-05-23 | Stop reason: HOSPADM

## 2023-05-19 RX ORDER — BUMETANIDE 1 MG/1
2 TABLET ORAL DAILY
Status: DISCONTINUED | OUTPATIENT
Start: 2023-05-19 | End: 2023-05-20

## 2023-05-19 RX ADMIN — HEPARIN SODIUM 5000 UNITS: 5000 INJECTION INTRAVENOUS; SUBCUTANEOUS at 21:16

## 2023-05-19 RX ADMIN — PANTOPRAZOLE SODIUM 40 MG: 40 TABLET, DELAYED RELEASE ORAL at 05:50

## 2023-05-19 RX ADMIN — HEPARIN SODIUM 5000 UNITS: 5000 INJECTION INTRAVENOUS; SUBCUTANEOUS at 13:33

## 2023-05-19 RX ADMIN — POTASSIUM CHLORIDE 10 MEQ: 750 TABLET, EXTENDED RELEASE ORAL at 09:35

## 2023-05-19 RX ADMIN — ACETAMINOPHEN 650 MG: 325 TABLET ORAL at 21:15

## 2023-05-19 RX ADMIN — BUMETANIDE 2 MG: 1 TABLET ORAL at 09:35

## 2023-05-19 RX ADMIN — DOXYCYCLINE 100 MG: 100 INJECTION, POWDER, LYOPHILIZED, FOR SOLUTION INTRAVENOUS at 12:52

## 2023-05-19 RX ADMIN — ERYTHROMYCIN 0.5 INCH: 5 OINTMENT OPHTHALMIC at 17:28

## 2023-05-19 RX ADMIN — DOXYCYCLINE 100 MG: 100 INJECTION, POWDER, LYOPHILIZED, FOR SOLUTION INTRAVENOUS at 23:56

## 2023-05-19 RX ADMIN — ERYTHROMYCIN 0.5 INCH: 5 OINTMENT OPHTHALMIC at 08:43

## 2023-05-19 RX ADMIN — TRAZODONE HYDROCHLORIDE 50 MG: 50 TABLET ORAL at 21:16

## 2023-05-19 RX ADMIN — LEVOTHYROXINE SODIUM 75 MCG: 75 TABLET ORAL at 05:50

## 2023-05-19 NOTE — CASE MANAGEMENT
Case Management Discharge Planning Note    Patient name Samantha Galicia  Location [address] 309/-58 MRN [id number]  : 1945 Date 2023       Current Admission Date: 2023  Current Admission Diagnosis:Acute kidney injury [facility])   Patient Active Problem List    Diagnosis Date Noted   • Thrombocytopenia ([facility] ) 2023   • Tarry stools 2023   • Hyponatremia 2023   • Anemia 2023   • Left lower quadrant abdominal pain 2023   • Superior mesenteric artery stenosis ([facility] ) 2023   • Acute kidney injury ([facility] ) 2023   • Diarrhea of presumed infectious origin 2023   • Primary hypertension 2023   • Acquired hypothyroidism 2023   • Irritable bowel syndrome with diarrhea 2023   • Abnormal CT scan 2023   • Hx of CABG 2023   • PAF (paroxysmal atrial fibrillation) ([facility] ) 2022      LOS (days): 22  Geometric Mean LOS (GMLOS) (days): 3 80  Days to GMLOS:-17 9     OBJECTIVE:  Risk of Unplanned Readmission Score: 26 03         Current admission status: Inpatient   Preferred Pharmacy:   [address] [place] [address] -  [address]   [facility]  [address] [address]  Phone: [phone] Fax: [phone]    Primary Care Provider: Elo Dawson MD    Primary Insurance: MEDICARE  Secondary Insurance: [facility]    DISCHARGE DETAILS:    Discharge planning discussed with[de-identified] Patient and Mario Para from [address] of Choice: Yes  Comments - Freedom of Choice: Recommendations for facility return with home therapy  CM contacted family/caregiver?: No- see comments (Patient will update sister)        Additional Comments: Discussed with GO Navarro discharge  Patient is not cleared by Nephrology will be here thru the weekend,  [address] transport cancelled via [employer] and call to [employer] and updated [employer]  Call to Mario Jeter at [facility] and updated on discharge    Met with patient at bedside to discuss discharge and patient will update sister

## 2023-05-19 NOTE — PROGRESS NOTES
[address] [address]  Progress Note  Name: Nick Pizano  MRN: [id number]  Unit/Bed#: -01 I Date of Admission: 4/27/2023   Date of Service: 5/19/2023 I Hospital Day: 22    Assessment/Plan   * Acute kidney injury [facility])  Assessment & Plan  • Baseline creatinine if 0 8 to 1  • Creatinine is trending down slowly  • Nephrology following-discussed the case with nephrology  As per nephrology, recommends to adjust diuretics while patient remained in-house and monitor renal function  Expecting another 48-72 hours monitor based on diuretics dose adjustment  • Patient also remain octreotide injectable  • Bumex discontinued by nephrology on 5/16, monitoring off diuretic  • Continue midodrine and octreotide per nephrology mentation  • Continue to avoid hypotension and nephrotoxins  • Intake and output  • Daily weights        Left lower quadrant abdominal pain  Assessment & Plan  · Presented to the ED with left lower quadrant abdominal pain on 4/27   · Patient reports intermittent pain across lower abdomen with mild tenderness on palpation  · 4/27/2023 CT abdomen: New small amount of free fluid in the abdomen and pelvis compared to 4/18/2023, which can be secondary to cirrhosis or acute inflammatory process in the abdomen and pelvis such as occult acute diverticulitis  Moderate amount of stool in the colon, nonspecific and can represent constipation  · 4/29/2023 obstructive series: Nonobstructive bowel gas pattern  · 5/1/2023 CT abdomen pelvis: Thickened appearance of the wall of the descending colon and sigmoid colon which may represent pseudo thickening from nondistention versus a mild nonspecific colitis in the appropriate clinical setting  There are a few scattered colonic diverticula  · 5/2 flex sig: Minimal inflammation noted, biopsies were normal  · 5/5 KUB: Nonobstructive bowel gas pattern  · 5/11 colonoscopy: Stricture and mucosal friability and shallow ulceration at the ileocecal valve  Single shallow ulcer in the rectosigmoid colon  Most likely explanation for these findings is Crohn's disease  · 5/11 EGD: Small sliding hiatal hernia  Tiny varices at the GE junction  Mild portal hypertensive gastropathy  No blood in the stomach  Normal duodenum  No interventions performed  · Surgery consultation appreciated- now signed off  · GI following, appreciate recommendations  · Pathology from colonoscopy results on chart, reviewed  · 5/15/2023 MRCP: Cirrhosis, pancreatic tail cyst, and small right pleural effusion and moderate body wall edema were noted  · Patient is status post IR guided biopsy done on 5/18/2023-pending reports    Cellulitis of right lower extremity  Assessment & Plan  We will place patient on IV doxycycline  Check venous duplex rule out DVT  Continue to monitor    Thrombocytopenia [facility])  Assessment & Plan  Most likely secondary to cirrhosis  Blood count is 65,000, no active bleeding noted  May start on heparin for DVT prophylaxis as long as platelets remain 83,848 or above-and no active bleeding  Tarry stools  Assessment & Plan  · Per record nursing reported black tarry stools on 5/8 to 5/9, none since Per discussion with patient  · 5/8 fecal occult blood positive  · Baseline hemoglobin 10-11  · Hemoglobin today 7 7  · No signs of bleeding  · 5/11 had EGD/colonoscopy-see results above  · GI following appreciate recommendations  · Plan transfusion PRBC's for HBG less than 7  · CBC a m      Anemia  Assessment & Plan  · Baseline appears to be between 11 and 12   · Hemoglobin remained stable    Hx of CABG  Assessment & Plan  · Currently stable  · Losartan on hold due to hypotension    PAF (paroxysmal atrial fibrillation) (HCC)  Assessment & Plan  · Rate controlled   · Home metoprolol is on hold due to hypotension  · Continue midodrine      Hyponatremia  Assessment & Plan  · Likely due to cirrhosis   · Resolved    Acquired hypothyroidism  Assessment & Plan  · Continue Synthroid    Primary hypertension  Assessment & Plan  · Patient initially with hypotension, improved with midodrine  · Continue midodrine- decreased to 2 5 mg tid by nephrology on   · Home metoprolol on hold due to hypotension, will continue to monitor  · Home losartan has been held due to ALEJANDRA             VTE Pharmacologic Prophylaxis: VTE Score: 3 heparin  Patient Centered Rounds: I performed bedside rounds with nursing staff today  Discussions with Specialists or Other Care Team Provider: Nephrology    Education and Discussions with Family / Patient: Updated  (sister) over the phone  Total Time Spent on Date of Encounter in care of patient: 10 minutes This time was spent on one or more of the following: performing physical exam; counseling and coordination of care; obtaining or reviewing history; documenting in the medical record; reviewing/ordering tests, medications or procedures; communicating with other healthcare professionals and discussing with patient's family/caregivers  Current Length of Stay: 22 day(s)  Current Patient Status: Inpatient   Certification Statement: The patient will continue to require additional inpatient hospital stay due to To monitor above condition  Discharge Plan: Anticipate discharge in >72 hrs to To monitor above conditions    Code Status: Level 1 - Full Code    Subjective:   Seen and evaluated during the event  Resting comfortably  Denies any significant complaint  complaining of redness in right lower extremities  Objective:     Vitals:   Temp (24hrs), Av 2 °F (36 8 °C), Min:98 °F (36 7 °C), Max:98 3 °F (36 8 °C)    Temp:  [98 °F (36 7 °C)-98 3 °F (36 8 °C)] 98 3 °F (36 8 °C)  HR:  [57-76] 75  Resp:  [12-18] 17  BP: (107-173)/(32-77) 138/48  SpO2:  [90 %-100 %] 92 %  Body mass index is 26 49 kg/m²  Input and Output Summary (last 24 hours):      Intake/Output Summary (Last 24 hours) at 2023 1100  Last data filed at 2023 0800  Gross per 24 hour   Intake 240 ml   Output 200 ml   Net 40 ml       Physical Exam:   Physical Exam  Vitals and nursing note reviewed  Constitutional:       Appearance: Normal appearance  She is not ill-appearing or diaphoretic  HENT:      Head: Normocephalic  Nose: Nose normal  No congestion  Mouth/Throat:      Mouth: Mucous membranes are moist       Pharynx: No oropharyngeal exudate  Eyes:      General: No scleral icterus  Left eye: No discharge  Extraocular Movements: Extraocular movements intact  Conjunctiva/sclera: Conjunctivae normal       Pupils: Pupils are equal, round, and reactive to light  Cardiovascular:      Rate and Rhythm: Normal rate  Heart sounds: No friction rub  No gallop  Pulmonary:      Effort: Pulmonary effort is normal  No respiratory distress  Breath sounds: No stridor  No wheezing or rhonchi  Abdominal:      General: Abdomen is flat  There is distension  Tenderness: There is no abdominal tenderness  There is no guarding or rebound  Hernia: No hernia is present  Musculoskeletal:      Cervical back: Normal range of motion  Right lower leg: Edema present  Left lower leg: Edema present  Neurological:      General: No focal deficit present  Mental Status: She is alert and oriented to person, place, and time  Cranial Nerves: No cranial nerve deficit  Sensory: No sensory deficit  Motor: No weakness        Coordination: Coordination normal          Additional Data:     Labs:  Results from last 7 days   Lab Units 05/19/23  0551   WBC Thousand/uL 5 77   HEMOGLOBIN g/dL 8 1*   HEMATOCRIT % 24 8*   PLATELETS Thousands/uL 65*   NEUTROS PCT % 63   LYMPHS PCT % 18   MONOS PCT % 15*   EOS PCT % 3     Results from last 7 days   Lab Units 05/19/23  0551   SODIUM mmol/L 136   POTASSIUM mmol/L 3 9   CHLORIDE mmol/L 98   CO2 mmol/L 29   BUN mg/dL 38*   CREATININE mg/dL 1 48*   ANION GAP mmol/L 9   CALCIUM mg/dL 8 8   ALBUMIN g/dL 3 3*   TOTAL BILIRUBIN mg/dL 2 11*   ALK PHOS U/L 439*   ALT U/L 20   AST U/L 57*   GLUCOSE RANDOM mg/dL 124     Results from last 7 days   Lab Units 05/19/23  0551   INR  1 48*                   Lines/Drains:  Invasive Devices     Peripheral Intravenous Line  Duration           Peripheral IV 05/16/23 Left;Ventral (anterior) Forearm 3 days                      Imaging: No pertinent imaging reviewed  Recent Cultures (last 7 days):         Last 24 Hours Medication List:   Current Facility-Administered Medications   Medication Dose Route Frequency Provider Last Rate   • acetaminophen  650 mg Oral Q6H PRN Ximena Ritchie MD     • barium  900 mL Oral Once in imaging Demetri Montano MD     • bumetanide  2 mg Oral Daily Mynor Hsu DO     • dextromethorphan-guaiFENesin  10 mL Oral Q6H PRN EBEN Hooker     • dicyclomine  10 mg Oral TID PRN German Seaman MD     • doxycycline  100 mg Intravenous Q12H Michelle Vazquez MD     • erythromycin  0 5 inch Left Eye BID Demetri Montano MD     • HYDROmorphone  0 5 mg Intravenous Q4H PRN EBEN Hooker     • levothyroxine  75 mcg Oral Early Morning Ximena Ritchie MD     • lidocaine  1 patch Topical Daily EBEN Hooker     • midodrine  2 5 mg Oral TID AC Wil Nicole DO     • octreotide  100 mcg Subcutaneous [facility] EBEN Mckeon     • ondansetron  4 mg Intravenous Q6H PRN Demetri Montano MD     • pantoprazole  40 mg Oral Early Morning [address] [facility] [address] [place] [address]     • potassium chloride  10 mEq Oral Daily Wil Nicole DO     • traZODone  50 mg Oral HS Paul Torres PA-C          Today, Patient Was Seen By: Michelle Vazquez MD    **Please Note: This note may have been constructed using a voice recognition system  **

## 2023-05-19 NOTE — ASSESSMENT & PLAN NOTE
· Presented to the ED with left lower quadrant abdominal pain on 4/27   · Patient reports intermittent pain across lower abdomen with mild tenderness on palpation  · 4/27/2023 CT abdomen: New small amount of free fluid in the abdomen and pelvis compared to 4/18/2023, which can be secondary to cirrhosis or acute inflammatory process in the abdomen and pelvis such as occult acute diverticulitis  Moderate amount of stool in the colon, nonspecific and can represent constipation  · 4/29/2023 obstructive series: Nonobstructive bowel gas pattern  · 5/1/2023 CT abdomen pelvis: Thickened appearance of the wall of the descending colon and sigmoid colon which may represent pseudo thickening from nondistention versus a mild nonspecific colitis in the appropriate clinical setting  There are a few scattered colonic diverticula  · 5/2 flex sig: Minimal inflammation noted, biopsies were normal  · 5/5 KUB: Nonobstructive bowel gas pattern  · 5/11 colonoscopy: Stricture and mucosal friability and shallow ulceration at the ileocecal valve  Single shallow ulcer in the rectosigmoid colon  Most likely explanation for these findings is Crohn's disease  · 5/11 EGD: Small sliding hiatal hernia  Tiny varices at the GE junction  Mild portal hypertensive gastropathy  No blood in the stomach  Normal duodenum  No interventions performed    · Surgery consultation appreciated- now signed off  · GI following, appreciate recommendations  · Pathology from colonoscopy results on chart, reviewed  · 5/15/2023 MRCP: Cirrhosis, pancreatic tail cyst, and small right pleural effusion and moderate body wall edema were noted  · Patient is status post IR guided biopsy done on 5/18/2023-pending reports

## 2023-05-19 NOTE — PROGRESS NOTES
Progress Note- Idalia Rodriges 69 y o  female MRN: [id number]    Unit/Bed#: -01 Encounter: [id number]      Assessment and Plan:    Patient is a 64-year-old female with PMH significant for depression, CAD s/p CABG (2021), Sjogren's, mild AS, A-fib, IBS-D and hypothyroidism admitted on 4/27 for acute on chronic diarrhea and left-sided abdominal discomfort  Patient was found to have mild nonspecific enteric colitis, nodular hepatic contour suggestive of cirrhosis and a 5 mm cystic lesion in the tail the pancreas on cross-sectional imaging      Patient has been evaluated several times by GI service  She was noted to have a large stool burden s/p MiraLAX bowel prep with significant stool output but persistent left lower quadrant abdominal pain  Also treated for presumed symptomatic uncomplicated diverticulitis with Cipro/Flagyl  Now has developed melenic stools with down-trending hgb       Additionally, patient is noted to have mild TTG IgA elevation in the setting of significantly elevated IgA as well as SMA stenosis for which she was scheduled for outpatient evaluation but was admitted at the time of her appointment      Alvin Loja and colonoscopy on 5/9/2023  EGD notable for small sliding hiatal hernia, tiny varices at the GE junction, mild PHG but no blood was seen in the stomach  Colonoscopy notable for stricture and mucosal friability and shallow ulceration at the IC valve and a single shallow ulcer in the rectosigmoid colon concerning for Crohn's disease  S/p transjugular liver biopsy with portal pressures by IR (5/18)  Pathology pending  1  LLQ abd pain  2  Change in bowel habits  3  Melenic stools  4   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  Colonoscopy performed on 5/11, as detailed above, with findings concerning for Crohn's disease  Random biopsies negative for colitis, but biopsies of the colonic stricture do show mild lamina propria expansion by mixed inflammation and crypt distortion pending biopsy results  Given pathology is non-specific for Crohn's, will defer starting budesonide unless otherwise recommended as per reading physician  Consider outpt CT or MR enterography, renal function allowing, for further evaluation of potential small bowel Crohn's      Patient with persistent but mild LLQ abdominal pain improved since admission  Hgb overall stable (8 1) and no further melena  Continue to monitor both hgb and stool output       - Diet as tolerated  - Continue once daily PPI  - Continue supportive care with analgesics PRN  - Monitor hgb; Transfuse for hgb <7 0  - Monitor stools for evidence of further overt GI bleeding  - Consider outpatient small bowel enterography for evaluation of possible small bowel Crohn's ds       4  Cirrhotic morphology on imaging  5  Thrombocytopenia  6  ALEJANDRA  Patient incidentally noted to have a lobulated liver contour on ultrasound with nodular liver contour redemonstrated on CT A/P in addition to chronic thrombocytopenia  Serologic evaluation to r/o competing causes of liver disease have been unremarkable with the exception of a mildly low ceruloplasmin level  Quantitative IgG's notable for elevated IgG1 and IgG4  Patient was also noted to have a significant elevated alk phos and rising T  Bili (477, 2 30 respectively) although MRI/MRCP without evidence of biliary stricture to suggest PSC  CA 19-9 within normal limits  S/p transjugular liver biopsy with portal pressures (widely patent right portal vein and portal venous system with mildly elevated portosystemic gradient of 6-8 mmHg) by IR (5/17)   Awaiting pathology       Ascites/ALEJANDRA - Nephrology following for management of ALEJANDRA and diuresis, appreciate recommendations    Esophageal varices - EGD (5/11) with small and minimal varices in the lower third of the esophagus and moderate PHG    Hepatic encephalopathy - No hx or evidence of HE on exam  AAOx3    HCC screening - U/S and cross-sectional imaging without focal liver lesion  AFP within normal limits       - Continue to monitor MELD labs daily (CBC, CMP, INR)  - Nephrology following, appreciate recommendations  - Follow-up pathology from liver bx  - Close outpatient hepatology follow-up for the management of cirrhosis     MELD-Na score: 19 at 5/19/2023  5:51 AM  MELD score: 18 at 5/19/2023  5:51 AM  Calculated from:  Serum Creatinine: 1 48 mg/dL at 5/19/2023  5:51 AM  Serum Sodium: 136 mmol/L at 5/19/2023  5:51 AM  Total Bilirubin: 2 11 mg/dL at 5/19/2023  5:51 AM  INR(ratio): 1 48 at 5/19/2023  5:51 AM  Age: 77 years     7  Pancreatic cyst  Patient instantly noted to have a 5 mm cystic lesion in the tail the pancreas on cross-sectional imaging  Also noted to have chronically elevated alk phos  MRI/MRCP redemonstrates a pancreatic tail cyst measuring up to 6 mm, 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  Recommend surveillance imaging as per radiology report  Patient is aware she will be contacted with results and set up for close outpatient GI and hepatology follow-up  GI to sign off at this time  Please contact with questions or concerns  ______________________________________________________________________    Subjective:     Patient is found resting comfortably in her bedside chair  No acute overnight events  Patient is s/p transjugular liver biopsy, complains of some mild itching the right side of her neck but otherwise states biopsy was without complication  Continues to endorse mild left lower quadrant abdominal pain, improved since admission  Denies any additional melena  Tolerating diet without difficulty        Medication Administration - last 24 hours from 05/18/2023 0818 to 05/19/2023 0818       Date/Time Order Dose Route Action Action by     05/19/2023 0550 EDT levothyroxine tablet 75 mcg 75 mcg Oral Given Sharad Mcnair RN     05/18/2023 2126 EDT traZODone (DESYREL) tablet 50 mg 50 mg Oral Given Sharad Mcnair RN     05/18/2023 1710 EDT erythromycin (ILOTYCIN) 0 5 % ophthalmic ointment 0 5 inch 0 5 inch Left Eye Given [facility], RN     05/18/2023 7682 EDT erythromycin (ILOTYCIN) 0 5 % ophthalmic ointment 0 5 inch 0 5 inch Left Eye Given [facility], RN     05/19/2023 0550 EDT octreotide (SandoSTATIN) injection 100 mcg 100 mcg Subcutaneous Refused Lucille [facility], RN     05/18/2023 2204 EDT octreotide (SandoSTATIN) injection 100 mcg 100 mcg [facility]cutaneous [facility]     05/18/2023 1445 EDT octreotide (SandoSTATIN) injection 100 mcg 100 mcg Subcutaneous Not Given [facility],      05/18/2023 2255 EDT HYDROmorphone (DILAUDID) injection 0 5 mg 0 5 mg [facility]venous [facility]     05/18/2023 2204 EDT lidocaine (LIDODERM) 5 % patch 1 patch 1 patch Topical Patch Removed Lucille [facility], RN     05/18/2023 5689 EDT lidocaine (LIDODERM) 5 % patch 1 patch 1 patch Topical Medication Applied [facility], RN     05/19/2023 0601 EDT midodrine (PROAMATINE) tablet 2 5 mg 2 5 mg Oral Not Given [facility], RN     05/18/2023 1643 EDT midodrine (PROAMATINE) tablet 2 5 mg 2 5 mg Oral Not Given [facility], RN     05/18/2023 1151 EDT midodrine (PROAMATINE) tablet 2 5 mg 2 5 mg Oral Given [facility], RN     05/19/2023 0550 EDT pantoprazole (PROTONIX) EC tablet 40 mg 40 mg Oral [facility]     05/18/2023 6388 EDT potassium chloride (K-DUR,KLOR-CON) CR tablet 20 mEq 20 mEq Oral Given [facility], RN     05/18/2023 1643 EDT potassium chloride 20 mEq IVPB (premix) 0 mEq Intravenous Stopped [facility], RN     05/18/2023 1022 EDT potassium chloride 20 mEq IVPB (premix) 20 mEq Intravenous [address] [facility],      05/18/2023 1329 EDT midazolam (VERSED) injection 0 5 mg Intravenous Given Janene Morin RN     05/18/2023 1321 EDT midazolam (VERSED) injection 1 mg Intravenous Given Janene Morin RN     05/18/2023 1330 EDT fentanyl citrate (PF) 100 MCG/2ML 25 mcg Intravenous Given Janene Morin RN     05/18/2023 1321 EDT fentanyl citrate (PF) 100 MCG/2ML 50 mcg "Intravenous Given Benoit Dillon RN     05/18/2023 1334 EDT lidocaine (PF) (XYLOCAINE-MPF) 1 % injection 10 mL Infiltration Given Ciaran Roberson MD          Objective:     Vitals: Blood pressure (!) 138/48, pulse 75, temperature 98 3 °F (36 8 °C), resp  rate 17, height 4' 11\" (1 499 m), weight 53 6 kg (118 lb 2 7 oz), SpO2 92 %  ,Body mass index is 26 49 kg/m²  Intake/Output Summary (Last 24 hours) at 5/19/2023 0818  Last data filed at 5/18/2023 1751  Gross per 24 hour   Intake 0 ml   Output 400 ml   Net -400 ml       Physical Exam:   General Appearance: Awake and alert, in no acute distress; 4 x 4 gauze taped over transjugular access for liver biopsy  No surrounding erythema, edema or purulence  Abdomen: + Mild left lower quadrant abdominal TTP; soft, non-distended; bowel sounds normal; no masses or no organomegaly    Invasive Devices     Peripheral Intravenous Line  Duration           Peripheral IV 05/16/23 Left;Ventral (anterior) Forearm 2 days                Lab Results:  No results displayed because visit has over 200 results  Imaging Studies: I have personally reviewed pertinent imaging studies  **Please note:  Dictation voice to text software may have been used in the creation of this record  Occasional wrong word or “sound alike” substitutions may have occurred due to the inherent limitations of voice recognition software  Read the chart carefully and recognize, using context, where substitutions have occurred  **    "

## 2023-05-19 NOTE — ASSESSMENT & PLAN NOTE
Most likely secondary to cirrhosis  Blood count is 65,000, no active bleeding noted  May start on heparin for DVT prophylaxis as long as platelets remain 07,978 or above-and no active bleeding

## 2023-05-19 NOTE — ASSESSMENT & PLAN NOTE
• Baseline creatinine if 0 8 to 1  • Creatinine is trending down slowly  • Nephrology following-discussed the case with nephrology  As per nephrology, recommends to adjust diuretics while patient remained in-house and monitor renal function  Expecting another 48-72 hours monitor based on diuretics dose adjustment  • Patient also remain octreotide injectable    • Bumex discontinued by nephrology on 5/16, monitoring off diuretic  • Continue midodrine and octreotide per nephrology mentation  • Continue to avoid hypotension and nephrotoxins  • Intake and output  • Daily weights

## 2023-05-19 NOTE — PLAN OF CARE
Problem: METABOLIC, FLUID AND ELECTROLYTES - ADULT  Goal: Fluid balance maintained  Description: INTERVENTIONS:  - Monitor labs   - Monitor I/O and WT  - Instruct patient on fluid and nutrition as appropriate  - Assess for signs & symptoms of volume excess or deficit  Outcome: Progressing     Problem: GASTROINTESTINAL - ADULT  Goal: Maintains adequate nutritional intake  Description: INTERVENTIONS:  - Monitor percentage of each meal consumed  - Identify factors contributing to decreased intake, treat as appropriate  - Assist with meals as needed  - Monitor I&O, weight, and lab values if indicated  - Obtain nutrition services referral as needed  Outcome: Progressing     Problem: GASTROINTESTINAL - ADULT  Goal: Minimal or absence of nausea and/or vomiting  Description: INTERVENTIONS:  - Administer IV fluids if ordered to ensure adequate hydration  - Maintain NPO status until nausea and vomiting are resolved  - Nasogastric tube if ordered  - Administer ordered antiemetic medications as needed  - Provide nonpharmacologic comfort measures as appropriate  - Advance diet as tolerated, if ordered  - Consider nutrition services referral to assist patient with adequate nutrition and appropriate food choices  Outcome: Progressing     Problem: SAFETY ADULT  Goal: Patient will remain free of falls  Description: INTERVENTIONS:  - Educate patient/family on patient safety including physical limitations  - Instruct patient to call for assistance with activity   - Consult OT/PT to assist with strengthening/mobility   - Keep Call bell within reach  - Keep bed low and locked with side rails adjusted as appropriate  - Keep care items and personal belongings within reach  - Initiate and maintain comfort rounds  - Make Fall Risk Sign visible to staff  - Offer Toileting every 2 Hours, in advance of need  - Initiate/Maintain alarms  - Obtain necessary fall risk management equipment:  - Apply yellow socks and bracelet for high fall risk patients  - Consider moving patient to room near nurses station  Outcome: Progressing     Problem: Knowledge Deficit  Goal: Patient/family/caregiver demonstrates understanding of disease process, treatment plan, medications, and discharge instructions  Description: Complete learning assessment and assess knowledge base    Interventions:  - Provide teaching at level of understanding  - Provide teaching via preferred learning methods  Outcome: Progressing     Problem: DISCHARGE PLANNING  Goal: Discharge to home or other facility with appropriate resources  Description: INTERVENTIONS:  - Identify barriers to discharge w/patient and caregiver  - Arrange for needed discharge resources and transportation as appropriate  - Identify discharge learning needs (meds, wound care, etc )  - Refer to Case Management Department for coordinating discharge planning if the patient needs post-hospital services based on physician/advanced practitioner order or complex needs related to functional status, cognitive ability, or social support system  Outcome: Progressing

## 2023-05-19 NOTE — PLAN OF CARE
Problem: PAIN - ADULT  Goal: Verbalizes/displays adequate comfort level or baseline comfort level  Description: Interventions:  - Encourage patient to monitor pain and request assistance  - Assess pain using appropriate pain scale  - Administer analgesics based on type and severity of pain and evaluate response  - Implement non-pharmacological measures as appropriate and evaluate response  - Consider cultural and social influences on pain and pain management  - Notify physician/advanced practitioner if interventions unsuccessful or patient reports new pain  Outcome: Progressing     Problem: Knowledge Deficit  Goal: Patient/family/caregiver demonstrates understanding of disease process, treatment plan, medications, and discharge instructions  Description: Complete learning assessment and assess knowledge base    Interventions:  - Provide teaching at level of understanding  - Provide teaching via preferred learning methods  Outcome: Progressing     Problem: Prexisting or High Potential for Compromised Skin Integrity  Goal: Skin integrity is maintained or improved  Description: INTERVENTIONS:  - Identify patients at risk for skin breakdown  - Assess and monitor skin integrity  - Assess and monitor nutrition and hydration status  - Monitor labs   - Assess for incontinence   - Turn and reposition patient  - Assist with mobility/ambulation  - Relieve pressure over bony prominences  - Avoid friction and shearing  - Provide appropriate hygiene as needed including keeping skin clean and dry  - Evaluate need for skin moisturizer/barrier cream  - Collaborate with interdisciplinary team   - Patient/family teaching  - Consider wound care consult   Outcome: Progressing     Problem: MOBILITY - ADULT  Goal: Maintain or return to baseline ADL function  Description: INTERVENTIONS:  - Educate patient/family on patient safety including physical limitations  - Instruct patient to call for assistance with activity   - Consult OT/PT to assist with strengthening/mobility   - Keep Call bell within reach  - Keep bed low and locked with side rails adjusted as appropriate  - Keep care items and personal belongings within reach  - Initiate and maintain comfort rounds  - Make Fall Risk Sign visible to staff  - Offer Toileting every 2 Hours, in advance of need  - Initiate/Maintain alarms  - Obtain necessary fall risk management equipment:  - Apply yellow socks and bracelet for high fall risk patients  - Consider moving patient to room near nurses station  Outcome: Progressing  Goal: Maintains/Returns to pre admission functional level  Description: INTERVENTIONS:  - Educate patient/family on patient safety including physical limitations  - Instruct patient to call for assistance with activity   - Consult OT/PT to assist with strengthening/mobility   - Keep Call bell within reach  - Keep bed low and locked with side rails adjusted as appropriate  - Keep care items and personal belongings within reach  - Initiate and maintain comfort rounds  - Make Fall Risk Sign visible to staff  - Offer Toileting every 2 Hours, in advance of need  - Initiate/Maintain alarms  - Obtain necessary fall risk management equipment:   - Apply yellow socks and bracelet for high fall risk patients  - Consider moving patient to room near nurses station  Outcome: Progressing

## 2023-05-19 NOTE — PROGRESS NOTES
"Progress Note - Nephrology   Samantha Galicia 69 y o  female MRN: [id number]  Unit/Bed#: -01 Encounter: [id number]    A/P:  1  Acute kidney injury, secondary to prerenal state in the setting of cirrhosis/diuretics versus ischemic ATN versus HRS  Creatinine stable at 1 48  Would resume oral diuretics Bumex 2 mg/day  She is prone to fluid overload with her presumed diagnosis of cirrhosis  She still has significant lower extremity edema however, this is much improved from prior  She is down 14 pounds  Start potassium 10 mEq/day with plan for diuresis       Recommend to follow creatinine trends with diuresis  Continue midodrine with hold parameters and octreotide  At discharge may need midodrine  Can stop octreotide at this point  2   Volume overload, improving  She is down 14 pounds from 5/6  Resume Bumex 2 mg/day and follow volume status/creatinine trends  Would like to continually observe this patient as she is rather tenuous with her comorbid illness  3   Presumed cirrhosis, status post liver biopsy on 5/18  Follow up reason for today's visit:     Acute kidney injury [facility])      Subjective:   Patient seen and examined today  Reports intermittent shortness of breath  Weight stable at 118 pounds on standing scale  Denies nausea /vomiting  A complete 10 point review of systems was performed and is otherwise negative  Objective:     Vitals: Blood pressure (!) 146/43, pulse 74, temperature 98 3 °F (36 8 °C), resp  rate 18, height 4' 11\" (1 499 m), weight 53 6 kg (118 lb 2 7 oz), SpO2 96 %  ,Body mass index is 26 49 kg/m²  Weight (last 2 days)     Date/Time Weight    05/19/23 0600 53 6 (118 17)    05/18/23 0546 53 9 (118 83)    05/17/23 0551 54 5 (120 15)            Intake/Output Summary (Last 24 hours) at 5/19/2023 1502  Last data filed at 5/19/2023 1301  Gross per 24 hour   Intake 360 ml   Output 500 ml   Net -140 ml     I/O last 3 completed shifts:   In: 150 [P O :150]  Out: " "400 [Urine:400]         Physical Exam: BP (!) 146/43   Pulse 74   Temp 98 3 °F (36 8 °C)   Resp 18   Ht 4' 11\" (1 499 m)   Wt 53 6 kg (118 lb 2 7 oz)   SpO2 96%   BMI 26 49 kg/m²     General Appearance:    Alert, cooperative, no distress, appears stated age   Head:    Normocephalic, without obvious abnormality, atraumatic   Eyes:    Conjunctiva/corneas clear   Ears:    Normal external ears   Nose:   Nares normal, septum midline, mucosa normal, no drainage    or sinus tenderness   Throat:   Lips, mucosa, and tongue normal; teeth and gums normal   Neck:    Back:      Lungs:     Clear to auscultation bilaterally, respirations unlabored   Chest wall:    No tenderness or deformity   Heart:    Regular rate and rhythm, S1 and S2 normal, no murmur, rub   or gallop   Abdomen:     Soft, non-tender, bowel sounds active   Extremities:   Extremities normal, atraumatic, no cyanosis or edema   Skin:   Skin color, texture, turgor normal, no rashes or lesions   Lymph nodes:   Cervical normal   Neurologic:   CNII-XII intact            Lab, Imaging and other studies: I have personally reviewed pertinent labs  CBC:   Lab Results   Component Value Date    WBC 5 77 05/19/2023    HGB 8 1 (L) 05/19/2023    HCT 24 8 (L) 05/19/2023     (H) 05/19/2023    PLT 65 (L) 05/19/2023    MCH 34 3 05/19/2023    MCHC 32 7 05/19/2023    RDW 15 6 (H) 05/19/2023    MPV 11 9 05/19/2023    NRBC 0 05/19/2023     CMP:   Lab Results   Component Value Date    K 3 9 05/19/2023    CL 98 05/19/2023    CO2 29 05/19/2023    BUN 38 (H) 05/19/2023    CREATININE 1 48 (H) 05/19/2023    CALCIUM 8 8 05/19/2023    AST 57 (H) 05/19/2023    ALT 20 05/19/2023    ALKPHOS 439 (H) 05/19/2023    EGFR 34 05/19/2023           Results from last 7 days   Lab Units 05/19/23  0551 05/18/23  0532 05/17/23  0559   POTASSIUM mmol/L 3 9 3 4* 3 8   CHLORIDE mmol/L 98 94* 92*   CO2 mmol/L 29 33* 32   BUN mg/dL 38* 41* 37*   CREATININE mg/dL 1 48* 1 61* 1 58*   CALCIUM mg/dL 8 8 " 8 6 8 7   ALK PHOS U/L 439* 393* 421*   ALT U/L 20 19 20   AST U/L 57* 49* 53*         Phosphorus: No results found for: PHOS  Magnesium: No results found for: MG  Urinalysis: No results found for: [place], SPECGRAV, PHUR, LEUKOCYTESUR, NITRITE, PROTEINUA, GLUCOSEU, KETONESU, BILIRUBINUR, BLOODU  Ionized Calcium: No results found for: CAION  Coagulation:   Lab Results   Component Value Date    INR 1 48 (H) 05/19/2023     Troponin: No results found for: TROPONINI  ABG: No results found for: PHART, KOK8PTP, PO2ART, IVC6BYP, V6CNPIAG, BEART, SOURCE  Radiology review:     IMAGING  Procedure: IR biopsy transjugular liver    Result Date: 5/19/2023  Narrative: Transjugular liver biopsy Indication: Suspected cirrhosis  Procedure: The right neck was surveyed with ultrasound to assess for vessel patency  The right neck was prepped and draped in sterile fashion  1% lidocaine was used for local anesthetic  The right internal jugular vein was accessed using direct sonographic guidance with a 21-gauge needle  A static sonographic image was saved  A 9 [place] vascular sheath was placed over an Amplatz wire  The right hepatic vein was selected with a 5 [place] multipurpose catheter and 0 035 guidewire  Position was confirmed with CO2 injection  Wedged and free right hepatic vein pressures were obtained x3  Wedged CO2 portogram was performed  The catheter was exchanged over an wire and the sheath was advanced into the central right hepatic vein  A 7 [place] guiding sheath along with mental cannula were advanced into the right hepatic vein and a 19-gauge Quick-core biopsy device was advanced and 4 core specimens were obtained and submitted in formalin  The guiding catheter was removed  The sheath was removed and hemostasis achieved with direct manual compression  Sedation time: 45 minutes Images: 72 Contrast dose: 0 Fluoroscopy time: 5 5 minutes Findings: The right hepatic vein is widely patent   Portal venous system is widely patent  4 full-length core specimens were obtained and submitted  No free extravasation seen following biopsies  Free right hepatic vein pressure measured at 6-7 mmHg  Wedged right hepatic vein pressure measured at 13-14 mmHg  Impression: Impression: Technically successful transjugular liver biopsy   Widely patent right portal vein and portal venous system Mildly elevated portosystemic gradient of 6-8 mmHg Workstation performed: [id number]       Current Facility-Administered Medications   Medication Dose Route Frequency   • acetaminophen (TYLENOL) tablet 650 mg  650 mg Oral Q6H PRN   • barium (READI-CAT 2) suspension 900 mL  900 mL Oral Once in imaging   • bumetanide (BUMEX) tablet 2 mg  2 mg Oral Daily   • dextromethorphan-guaiFENesin (ROBITUSSIN DM) oral syrup 10 mL  10 mL Oral Q6H PRN   • dicyclomine (BENTYL) capsule 10 mg  10 mg Oral TID PRN   • doxycycline (VIBRAMYCIN) 100 mg in sodium chloride 0 9 % 100 mL IVPB  100 mg Intravenous Q12H   • erythromycin (ILOTYCIN) 0 5 % ophthalmic ointment 0 5 inch  0 5 inch Left Eye BID   • heparin (porcine) subcutaneous injection 5,000 Units  5,000 Units Subcutaneous Q8H [facility]   • HYDROmorphone (DILAUDID) injection 0 5 mg  0 5 mg Intravenous Q4H PRN   • levothyroxine tablet 75 mcg  75 mcg Oral Early Morning   • lidocaine (LIDODERM) 5 % patch 1 patch  1 patch Topical Daily   • midodrine (PROAMATINE) tablet 2 5 mg  2 5 mg Oral TID AC   • octreotide (SandoSTATIN) injection 100 mcg  100 mcg Subcutaneous Q8H [facility]   • ondansetron (ZOFRAN) injection 4 mg  4 mg Intravenous Q6H PRN   • pantoprazole (PROTONIX) EC tablet 40 mg  40 mg Oral Early Morning   • potassium chloride (K-DUR,KLOR-CON) CR tablet 10 mEq  10 mEq Oral Daily   • traZODone (DESYREL) tablet 50 mg  50 mg Oral HS     Medications Discontinued During This Encounter   Medication Reason   • polyethylene glycol (GLYCOLAX) 17 GM/SCOOP powder Therapy completed   • ciprofloxacin (CIPRO) IVPB (premix in 5% dextrose) 400 mg 200 mL    • metroNIDAZOLE (FLAGYL) IVPB (premix) 500 mg 100 mL    • dextrose 5 % and sodium chloride 0 45 % with KCl 20 mEq/L infusion    • sodium chloride 0 9 % infusion    • polyethylene glycol (MIRALAX) packet 17 g    • ciprofloxacin (CIPRO) tablet 500 mg    • metroNIDAZOLE (FLAGYL) tablet 500 mg    • sodium bicarbonate tablet 650 mg    • enoxaparin (LOVENOX) subcutaneous injection 40 mg    • furosemide (LASIX) injection 80 mg    • multi-electrolyte (PLASMALYTE-A/ISOLYTE-S PH 7 4) IV solution    • metoprolol succinate (TOPROL-XL) 24 hr tablet 25 mg    • midodrine (PROAMATINE) tablet 2 5 mg    • enoxaparin (LOVENOX) subcutaneous injection 30 mg    • metoprolol succinate (TOPROL-XL) 24 hr tablet 12 5 mg    • magnesium hydroxide (MILK OF MAGNESIA) oral suspension 30 mL    • polyethylene glycol (MIRALAX) packet 17 g    • morphine injection 4 mg    • multi-electrolyte (PLASMALYTE-A/ISOLYTE-S PH 7 4) IV solution    • heparin (porcine) subcutaneous injection 5,000 Units    • pantoprazole (PROTONIX) EC tablet 40 mg    • albumin human (FLEXBUMIN) 25 % injection 12 5 g    • bumetanide (BUMEX) injection 1 mg    • bumetanide (BUMEX) injection 0 5 mg    • midodrine (PROAMATINE) tablet 5 mg    • pantoprazole (PROTONIX) EC tablet 40 mg    • bumetanide (BUMEX) injection 1 mg    • bumetanide (BUMEX) injection 2 mg    • bumetanide (BUMEX) injection 2 mg    • potassium chloride 20 mEq IVPB (premix)        Jeffy Mejia, DO      This progress note was produced in part using a dictation device which may document imprecise wording from author's original intent

## 2023-05-20 LAB
ALBUMIN SERPL BCP-MCNC: 3.1 G/DL (ref 3.5–5)
ALP SERPL-CCNC: 375 U/L (ref 34–104)
ALT SERPL W P-5'-P-CCNC: 18 U/L (ref 7–52)
ANION GAP SERPL CALCULATED.3IONS-SCNC: 6 MMOL/L (ref 4–13)
AST SERPL W P-5'-P-CCNC: 46 U/L (ref 13–39)
BASOPHILS # BLD AUTO: 0.03 THOUSANDS/ÂΜL (ref 0–0.1)
BASOPHILS NFR BLD AUTO: 1 % (ref 0–1)
BILIRUB SERPL-MCNC: 2.22 MG/DL (ref 0.2–1)
BUN SERPL-MCNC: 35 MG/DL (ref 5–25)
CALCIUM ALBUM COR SERPL-MCNC: 9.3 MG/DL (ref 8.3–10.1)
CALCIUM SERPL-MCNC: 8.6 MG/DL (ref 8.4–10.2)
CHLORIDE SERPL-SCNC: 100 MMOL/L (ref 96–108)
CO2 SERPL-SCNC: 32 MMOL/L (ref 21–32)
CREAT SERPL-MCNC: 1.34 MG/DL (ref 0.6–1.3)
EOSINOPHIL # BLD AUTO: 0.16 THOUSAND/ÂΜL (ref 0–0.61)
EOSINOPHIL NFR BLD AUTO: 4 % (ref 0–6)
ERYTHROCYTE [DISTWIDTH] IN BLOOD BY AUTOMATED COUNT: 15.8 % (ref 11.6–15.1)
GFR SERPL CREATININE-BSD FRML MDRD: 38 ML/MIN/1.73SQ M
GLUCOSE SERPL-MCNC: 95 MG/DL (ref 65–140)
HCT VFR BLD AUTO: 22.1 % (ref 34.8–46.1)
HGB BLD-MCNC: 7.3 G/DL (ref 11.5–15.4)
IMM GRANULOCYTES # BLD AUTO: 0 THOUSAND/UL (ref 0–0.2)
IMM GRANULOCYTES NFR BLD AUTO: 0 % (ref 0–2)
LYMPHOCYTES # BLD AUTO: 1.21 THOUSANDS/ÂΜL (ref 0.6–4.47)
LYMPHOCYTES NFR BLD AUTO: 32 % (ref 14–44)
MCH RBC QN AUTO: 34.4 PG (ref 26.8–34.3)
MCHC RBC AUTO-ENTMCNC: 33 G/DL (ref 31.4–37.4)
MCV RBC AUTO: 104 FL (ref 82–98)
MONOCYTES # BLD AUTO: 0.76 THOUSAND/ÂΜL (ref 0.17–1.22)
MONOCYTES NFR BLD AUTO: 20 % (ref 4–12)
NEUTROPHILS # BLD AUTO: 1.68 THOUSANDS/ÂΜL (ref 1.85–7.62)
NEUTS SEG NFR BLD AUTO: 43 % (ref 43–75)
NRBC BLD AUTO-RTO: 0 /100 WBCS
PLATELET # BLD AUTO: 65 THOUSANDS/UL (ref 149–390)
PMV BLD AUTO: 11.9 FL (ref 8.9–12.7)
POTASSIUM SERPL-SCNC: 3.5 MMOL/L (ref 3.5–5.3)
PROT SERPL-MCNC: 6.6 G/DL (ref 6.4–8.4)
RBC # BLD AUTO: 2.12 MILLION/UL (ref 3.81–5.12)
SODIUM SERPL-SCNC: 138 MMOL/L (ref 135–147)
WBC # BLD AUTO: 3.84 THOUSAND/UL (ref 4.31–10.16)

## 2023-05-20 PROCEDURE — 80053 COMPREHEN METABOLIC PANEL: CPT | Performed by: FAMILY MEDICINE

## 2023-05-20 PROCEDURE — 85025 COMPLETE CBC W/AUTO DIFF WBC: CPT | Performed by: FAMILY MEDICINE

## 2023-05-20 PROCEDURE — 99232 SBSQ HOSP IP/OBS MODERATE 35: CPT | Performed by: INTERNAL MEDICINE

## 2023-05-20 PROCEDURE — 99232 SBSQ HOSP IP/OBS MODERATE 35: CPT

## 2023-05-20 RX ORDER — DOXYCYCLINE HYCLATE 100 MG/1
100 CAPSULE ORAL EVERY 12 HOURS
Status: DISCONTINUED | OUTPATIENT
Start: 2023-05-20 | End: 2023-05-23 | Stop reason: HOSPADM

## 2023-05-20 RX ORDER — BUMETANIDE 1 MG/1
1 TABLET ORAL DAILY
Status: DISCONTINUED | OUTPATIENT
Start: 2023-05-20 | End: 2023-05-21

## 2023-05-20 RX ADMIN — DOXYCYCLINE 100 MG: 100 CAPSULE ORAL at 12:21

## 2023-05-20 RX ADMIN — PANTOPRAZOLE SODIUM 40 MG: 40 TABLET, DELAYED RELEASE ORAL at 05:01

## 2023-05-20 RX ADMIN — LIDOCAINE 1 PATCH: 700 PATCH TOPICAL at 09:47

## 2023-05-20 RX ADMIN — MIDODRINE HYDROCHLORIDE 2.5 MG: 5 TABLET ORAL at 12:21

## 2023-05-20 RX ADMIN — LEVOTHYROXINE SODIUM 75 MCG: 75 TABLET ORAL at 05:01

## 2023-05-20 RX ADMIN — BUMETANIDE 1 MG: 1 TABLET ORAL at 09:46

## 2023-05-20 RX ADMIN — MIDODRINE HYDROCHLORIDE 2.5 MG: 5 TABLET ORAL at 06:28

## 2023-05-20 RX ADMIN — HEPARIN SODIUM 5000 UNITS: 5000 INJECTION INTRAVENOUS; SUBCUTANEOUS at 14:09

## 2023-05-20 RX ADMIN — TRAZODONE HYDROCHLORIDE 50 MG: 50 TABLET ORAL at 21:17

## 2023-05-20 RX ADMIN — MIDODRINE HYDROCHLORIDE 2.5 MG: 5 TABLET ORAL at 17:37

## 2023-05-20 RX ADMIN — POTASSIUM CHLORIDE 10 MEQ: 750 TABLET, EXTENDED RELEASE ORAL at 09:46

## 2023-05-20 RX ADMIN — HEPARIN SODIUM 5000 UNITS: 5000 INJECTION INTRAVENOUS; SUBCUTANEOUS at 05:01

## 2023-05-20 RX ADMIN — DOXYCYCLINE 100 MG: 100 CAPSULE ORAL at 23:07

## 2023-05-20 RX ADMIN — HEPARIN SODIUM 5000 UNITS: 5000 INJECTION INTRAVENOUS; SUBCUTANEOUS at 21:17

## 2023-05-20 RX ADMIN — ACETAMINOPHEN 650 MG: 325 TABLET ORAL at 19:39

## 2023-05-20 NOTE — PLAN OF CARE
Problem: Potential for Falls  Goal: Patient will remain free of falls  Description: INTERVENTIONS:  - Educate patient/family on patient safety including physical limitations  - Instruct patient to call for assistance with activity   - Consult OT/PT to assist with strengthening/mobility   - Keep Call bell within reach  - Keep bed low and locked with side rails adjusted as appropriate  - Keep care items and personal belongings within reach  - Initiate and maintain comfort rounds  - Make Fall Risk Sign visible to staff  - Offer Toileting every 2 Hours, in advance of need  - Initiate/Maintain alarms  - Obtain necessary fall risk management equipment:  - Apply yellow socks and bracelet for high fall risk patients  - Consider moving patient to room near nurses station  Outcome: Progressing     Problem: PAIN - ADULT  Goal: Verbalizes/displays adequate comfort level or baseline comfort level  Description: Interventions:  - Encourage patient to monitor pain and request assistance  - Assess pain using appropriate pain scale  - Administer analgesics based on type and severity of pain and evaluate response  - Implement non-pharmacological measures as appropriate and evaluate response  - Consider cultural and social influences on pain and pain management  - Notify physician/advanced practitioner if interventions unsuccessful or patient reports new pain  Outcome: Progressing     Problem: SAFETY ADULT  Goal: Patient will remain free of falls  Description: INTERVENTIONS:  - Educate patient/family on patient safety including physical limitations  - Instruct patient to call for assistance with activity   - Consult OT/PT to assist with strengthening/mobility   - Keep Call bell within reach  - Keep bed low and locked with side rails adjusted as appropriate  - Keep care items and personal belongings within reach  - Initiate and maintain comfort rounds  - Make Fall Risk Sign visible to staff  - Offer Toileting every 2 Hours, in advance of need  - Initiate/Maintain alarms  - Obtain necessary fall risk management equipment:  - Apply yellow socks and bracelet for high fall risk patients  - Consider moving patient to room near nurses station  Outcome: Progressing  Goal: Maintain or return to baseline ADL function  Description: INTERVENTIONS:  - Educate patient/family on patient safety including physical limitations  - Instruct patient to call for assistance with activity   - Consult OT/PT to assist with strengthening/mobility   - Keep Call bell within reach  - Keep bed low and locked with side rails adjusted as appropriate  - Keep care items and personal belongings within reach  - Initiate and maintain comfort rounds  - Make Fall Risk Sign visible to staff  - Offer Toileting every 2 Hours, in advance of need  - Initiate/Maintain alarms  - Obtain necessary fall risk management equipment:   - Apply yellow socks and bracelet for high fall risk patients  - Consider moving patient to room near nurses station  Outcome: Progressing  Goal: Maintains/Returns to pre admission functional level  Description: INTERVENTIONS:  - Perform BMAT or MOVE assessment daily    - Set and communicate daily mobility goal to care team and patient/family/caregiver     - Collaborate with rehabilitation services on mobility goals if consulted  - Out of bed for toileting  - Record patient progress and toleration of activity level   Outcome: Progressing     Problem: INFECTION - ADULT  Goal: Absence or prevention of progression during hospitalization  Description: INTERVENTIONS:  - Assess and monitor for signs and symptoms of infection  - Monitor lab/diagnostic results  - Monitor all insertion sites, i e  indwelling lines, tubes, and drains  - Monitor endotracheal if appropriate and nasal secretions for changes in amount and color  - [place] appropriate cooling/warming therapies per order  - Administer medications as ordered  - Instruct and encourage patient and family to use good hand hygiene technique  - Identify and instruct in appropriate isolation precautions for identified infection/condition  Outcome: Progressing     Problem: DISCHARGE PLANNING  Goal: Discharge to home or other facility with appropriate resources  Description: INTERVENTIONS:  - Identify barriers to discharge w/patient and caregiver  - Arrange for needed discharge resources and transportation as appropriate  - Identify discharge learning needs (meds, wound care, etc )  - Refer to Case Management Department for coordinating discharge planning if the patient needs post-hospital services based on physician/advanced practitioner order or complex needs related to functional status, cognitive ability, or social support system  Outcome: Progressing     Problem: Knowledge Deficit  Goal: Patient/family/caregiver demonstrates understanding of disease process, treatment plan, medications, and discharge instructions  Description: Complete learning assessment and assess knowledge base    Interventions:  - Provide teaching at level of understanding  - Provide teaching via preferred learning methods  Outcome: Progressing     Problem: Prexisting or High Potential for Compromised Skin Integrity  Goal: Skin integrity is maintained or improved  Description: INTERVENTIONS:  - Identify patients at risk for skin breakdown  - Assess and monitor skin integrity  - Assess and monitor nutrition and hydration status  - Monitor labs   - Assess for incontinence   - Turn and reposition patient  - Assist with mobility/ambulation  - Relieve pressure over bony prominences  - Avoid friction and shearing  - Provide appropriate hygiene as needed including keeping skin clean and dry  - Evaluate need for skin moisturizer/barrier cream  - Collaborate with interdisciplinary team   - Patient/family teaching  - Consider wound care consult   Outcome: Progressing     Problem: MOBILITY - ADULT  Goal: Maintain or return to baseline ADL function  Description: INTERVENTIONS:  - Educate patient/family on patient safety including physical limitations  - Instruct patient to call for assistance with activity   - Consult OT/PT to assist with strengthening/mobility   - Keep Call bell within reach  - Keep bed low and locked with side rails adjusted as appropriate  - Keep care items and personal belongings within reach  - Initiate and maintain comfort rounds  - Make Fall Risk Sign visible to staff  - Offer Toileting every 2 Hours, in advance of need  - Initiate/Maintain alarms  - Obtain necessary fall risk management equipment:   - Apply yellow socks and bracelet for high fall risk patients  - Consider moving patient to room near nurses station  Outcome: Progressing  Goal: Maintains/Returns to pre admission functional level  Description: INTERVENTIONS:  - Perform BMAT or MOVE assessment daily    - Set and communicate daily mobility goal to care team and patient/family/caregiver  - Collaborate with rehabilitation services on mobility goals if consulted  - Out of bed for toileting  - Record patient progress and toleration of activity level   Outcome: Progressing     Problem: Nutrition/Hydration-ADULT  Goal: Nutrient/Hydration intake appropriate for improving, restoring or maintaining nutritional needs  Description: Monitor and assess patient's nutrition/hydration status for malnutrition  Collaborate with interdisciplinary team and initiate plan and interventions as ordered  Monitor patient's weight and dietary intake as ordered or per policy  Utilize nutrition screening tool and intervene as necessary  Determine patient's food preferences and provide high-protein, high-caloric foods as appropriate       INTERVENTIONS:  - Monitor oral intake, urinary output, labs, and treatment plans  - Assess nutrition and hydration status and recommend course of action  - Evaluate amount of meals eaten  - Assist patient with eating if necessary   - Allow adequate time for meals  - Recommend/ encourage appropriate diets, oral nutritional supplements, and vitamin/mineral supplements  - Order, calculate, and assess calorie counts as needed  - Recommend, monitor, and adjust tube feedings and TPN/PPN based on assessed needs  - Assess need for intravenous fluids  - Provide specific nutrition/hydration education as appropriate  - Include patient/family/caregiver in decisions related to nutrition  Outcome: Progressing     Problem: GASTROINTESTINAL - ADULT  Goal: Minimal or absence of nausea and/or vomiting  Description: INTERVENTIONS:  - Administer IV fluids if ordered to ensure adequate hydration  - Maintain NPO status until nausea and vomiting are resolved  - Nasogastric tube if ordered  - Administer ordered antiemetic medications as needed  - Provide nonpharmacologic comfort measures as appropriate  - Advance diet as tolerated, if ordered  - Consider nutrition services referral to assist patient with adequate nutrition and appropriate food choices  Outcome: Progressing  Goal: Maintains or returns to baseline bowel function  Description: INTERVENTIONS:  - Assess bowel function  - Encourage oral fluids to ensure adequate hydration  - Administer IV fluids if ordered to ensure adequate hydration  - Administer ordered medications as needed  - Encourage mobilization and activity  - Consider nutritional services referral to assist patient with adequate nutrition and appropriate food choices  Outcome: Progressing  Goal: Maintains adequate nutritional intake  Description: INTERVENTIONS:  - Monitor percentage of each meal consumed  - Identify factors contributing to decreased intake, treat as appropriate  - Assist with meals as needed  - Monitor I&O, weight, and lab values if indicated  - Obtain nutrition services referral as needed  Outcome: Progressing     Problem: METABOLIC, FLUID AND ELECTROLYTES - ADULT  Goal: Electrolytes maintained within normal limits  Description: INTERVENTIONS:  - Monitor labs and assess patient for signs and symptoms of electrolyte imbalances  - Administer electrolyte replacement as ordered  - Monitor response to electrolyte replacements, including repeat lab results as appropriate  - Instruct patient on fluid and nutrition as appropriate  Outcome: Progressing  Goal: Fluid balance maintained  Description: INTERVENTIONS:  - Monitor labs   - Monitor I/O and WT  - Instruct patient on fluid and nutrition as appropriate  - Assess for signs & symptoms of volume excess or deficit  Outcome: Progressing

## 2023-05-20 NOTE — ASSESSMENT & PLAN NOTE
• Baseline creatinine if 0 8 to 1  • Creatinine is trending down, today 1 34  • Nephrology following, appreciate recommendations  • Bumex discontinued by nephrology on 5/16, and was monitoring off diuretic  • Resumed Bumex 1 mg orally daily 5/20  • Continue midodrine  • Octreotide continued  • Continue to avoid hypotension and nephrotoxins  • Intake and output  • Daily weights

## 2023-05-20 NOTE — PROGRESS NOTES
"Progress Note - Nephrology   Ivey Collet 69 y o  female MRN: [id number]  Unit/Bed#: -01 Encounter: [id number]    A/P:  1  Acute kidney injury, creatinine trends improving  Hypervolemic with LE edema but improving  Etiology felt to be due to prerenal state in the setting of cirrhosis/diuretics versus ischemic ATN versus HRS  Still needs midodrine for lower blood pressure trends  No need for albumin/octreotide at this point  1 48 ->1 34  Urine output not adequately reported  Her weight is down approximately 2 pounds in the past 24 hours she reported significant improvement in urine output  We will change Bumex to 1 mg/day and follow weight  Continue potassium 10 mill meq/day  2   Volume overload, improving  Down 132-> 115 lb since admission  3   Presumed cirrhosis, status post liver biopsy on 5/18, fu with gi  4 anemia /thrombocytopenia in setting of cirrhosis  Management per Primary  Keep type and screen active  May need PRBC transfusion in next 24 hr  If needed, can give additional bumex 1mg x 1 for PRBC  Follow up reason for today's visit:     Acute kidney injury [facility])      Subjective:   Patient seen and examined today  Reports improved urine output yesterday  Denies chest pain, shortness of breath, nausea, vomiting  Weight down ~2 lb in 24 hr     A complete 10 point review of systems was performed and is otherwise negative  Objective:     Vitals: Blood pressure (!) 124/46, pulse 67, temperature 98 °F (36 7 °C), resp  rate 17, height 4' 11\" (1 499 m), weight 52 6 kg (115 lb 15 4 oz), SpO2 94 %  ,Body mass index is 26 kg/m²  Weight (last 2 days)     Date/Time Weight    05/20/23 0600 52 6 (115 96)    05/19/23 0600 53 6 (118 17)    05/18/23 0546 53 9 (118 83)            Intake/Output Summary (Last 24 hours) at 5/20/2023 1401  Last data filed at 5/19/2023 1700  Gross per 24 hour   Intake 120 ml   Output 800 ml   Net -680 ml     I/O last 3 completed shifts:   In: 480 " "[P O :480]  Out: 1100 [Urine:1100]         Physical Exam: BP (!) 124/46   Pulse 67   Temp 98 °F (36 7 °C)   Resp 17   Ht 4' 11\" (1 499 m)   Wt 52 6 kg (115 lb 15 4 oz)   SpO2 94%   BMI 26 00 kg/m²     General Appearance:    Alert, cooperative, no distress, appears stated age   Head:    Normocephalic, without obvious abnormality, atraumatic   Eyes:    Conjunctiva/corneas clear   Ears:    Normal external ears   Nose:   Nares normal, septum midline, mucosa normal, no drainage    or sinus tenderness   Throat:   Lips, mucosa, and tongue normal; teeth and gums normal   Neck:    Back:      Lungs:     Clear to auscultation bilaterally, respirations unlabored   Chest wall:    No tenderness or deformity   Heart:    Regular rate and rhythm, S1 and S2 normal, no murmur, rub   or gallop   Abdomen:     Soft, non-tender, bowel sounds active   Extremities:   Improving moderate edema   Skin:   Skin color, texture, turgor normal, no rashes or lesions   Lymph nodes:   Cervical normal   Neurologic:   CNII-XII intact            Lab, Imaging and other studies: I have personally reviewed pertinent labs  CBC:   Lab Results   Component Value Date    WBC 3 84 (L) 05/20/2023    HGB 7 3 (L) 05/20/2023    HCT 22 1 (L) 05/20/2023     (H) 05/20/2023    PLT 65 (L) 05/20/2023    MCH 34 4 (H) 05/20/2023    MCHC 33 0 05/20/2023    RDW 15 8 (H) 05/20/2023    MPV 11 9 05/20/2023    NRBC 0 05/20/2023     CMP:   Lab Results   Component Value Date    K 3 5 05/20/2023     05/20/2023    CO2 32 05/20/2023    BUN 35 (H) 05/20/2023    CREATININE 1 34 (H) 05/20/2023    CALCIUM 8 6 05/20/2023    AST 46 (H) 05/20/2023    ALT 18 05/20/2023    ALKPHOS 375 (H) 05/20/2023    EGFR 38 05/20/2023           Results from last 7 days   Lab Units 05/20/23  0437 05/19/23  0551 05/18/23  0532   POTASSIUM mmol/L 3 5 3 9 3 4*   CHLORIDE mmol/L 100 98 94*   CO2 mmol/L 32 29 33*   BUN mg/dL 35* 38* 41*   CREATININE mg/dL 1 34* 1 48* 1 61*   CALCIUM mg/dL 8 6 " 8 8 8 6   ALK PHOS U/L 375* 439* 393*   ALT U/L 18 20 19   AST U/L 46* 57* 49*         Phosphorus: No results found for: PHOS  Magnesium: No results found for: MG  Urinalysis: No results found for: COLORU, CLARITYU, SPECGRAV, PHUR, LEUKOCYTESUR, NITRITE, PROTEINUA, GLUCOSEU, KETONESU, BILIRUBINUR, BLOODU  Ionized Calcium: No results found for: CAION  Coagulation: No results found for: PT, INR, APTT  Troponin: No results found for: TROPONINI  ABG: No results found for: PHART, BJI5KAB, PO2ART, CML3TEQ, M4TDTOQZ, BEART, SOURCE  Radiology review:     IMAGING  Procedure: VAS lower limb venous duplex study, complete bilateral    Result Date: 5/19/2023  Narrative:  [facility] REPORT CLINICAL: Indications: Patient presents with bilateral edema,  redness and warmth  Operative History: Coronary artery bypass graft 2021 Risk Factors The patient has history of HTN  FINDINGS:  Right           Impression      GSV Prox Thigh  Not Visualized  GSV Mid Thigh   Not Visualized  GSV Dist Thigh  Not Visualized  GSV Knee        Not Visualized  GSV Mid Calf    Not Visualized     CONCLUSION: Impression: RIGHT LOWER LIMB: No evidence of acute or chronic deep vein thrombosis  Great Saphenous vein harvested  Doppler evaluation shows a normal response to augmentation maneuvers    Popliteal, posterior tibial and anterior tibial arterial Doppler waveform's are triphasic/biphasic  LEFT LOWER LIMB: No evidence of acute or chronic deep vein thrombosis  No evidence of superficial thrombophlebitis noted  Doppler evaluation shows a normal response to augmentation maneuvers  Popliteal, posterior tibial and anterior tibial arterial Doppler waveform's are triphasic/biphasic  TECH NOTE: Pulsatile waveforms noted throughout the venous system which may suggest heart failure or tricuspid valve insufficiency    SIGNATURE: Electronically Signed by: Yael Godoy DO on 2023-05-19 07:10:58 PM    Procedure: IR biopsy transjugular liver    Result Date: 5/19/2023  Narrative: Transjugular liver biopsy Indication: Suspected cirrhosis  Procedure: The right neck was surveyed with ultrasound to assess for vessel patency  The right neck was prepped and draped in sterile fashion  1% lidocaine was used for local anesthetic  The right internal jugular vein was accessed using direct sonographic guidance with a 21-gauge needle  A static sonographic image was saved  A 9 [place] vascular sheath was placed over an Amplatz wire  The right hepatic vein was selected with a 5 [place] multipurpose catheter and 0 035 guidewire  Position was confirmed with CO2 injection  Wedged and free right hepatic vein pressures were obtained x3  Wedged CO2 portogram was performed  The catheter was exchanged over an wire and the sheath was advanced into the central right hepatic vein  A 7 [place] guiding sheath along with mental cannula were advanced into the right hepatic vein and a 19-gauge Quick-core biopsy device was advanced and 4 core specimens were obtained and submitted in formalin  The guiding catheter was removed  The sheath was removed and hemostasis achieved with direct manual compression  Sedation time: 45 minutes Images: 72 Contrast dose: 0 Fluoroscopy time: 5 5 minutes Findings: The right hepatic vein is widely patent  Portal venous system is widely patent  4 full-length core specimens were obtained and submitted  No free extravasation seen following biopsies  Free right hepatic vein pressure measured at 6-7 mmHg  Wedged right hepatic vein pressure measured at 13-14 mmHg  Impression: Impression: Technically successful transjugular liver biopsy   Widely patent right portal vein and portal venous system Mildly elevated portosystemic gradient of 6-8 mmHg Workstation performed: [id number]       Current Facility-Administered Medications   Medication Dose Route Frequency   • acetaminophen (TYLENOL) tablet 650 mg  650 mg Oral Q6H PRN   • barium (READI-CAT 2) suspension 900 mL  900 mL Oral Once in imaging   • bumetanide (BUMEX) tablet 1 mg  1 mg Oral Daily   • dextromethorphan-guaiFENesin (ROBITUSSIN DM) oral syrup 10 mL  10 mL Oral Q6H PRN   • dicyclomine (BENTYL) capsule 10 mg  10 mg Oral TID PRN   • doxycycline hyclate (VIBRAMYCIN) capsule 100 mg  100 mg Oral Q12H   • heparin (porcine) subcutaneous injection 5,000 Units  5,000 Units Subcutaneous Q8H [address]   • HYDROmorphone (DILAUDID) injection 0 5 mg  0 5 mg Intravenous Q4H PRN   • levothyroxine tablet 75 mcg  75 mcg Oral Early Morning   • lidocaine (LIDODERM) 5 % patch 1 patch  1 patch Topical Daily   • midodrine (PROAMATINE) tablet 2 5 mg  2 5 mg Oral TID AC   • ondansetron (ZOFRAN) injection 4 mg  4 mg Intravenous Q6H PRN   • pantoprazole (PROTONIX) EC tablet 40 mg  40 mg Oral Early Morning   • potassium chloride (K-DUR,KLOR-CON) CR tablet 10 mEq  10 mEq Oral Daily   • traZODone (DESYREL) tablet 50 mg  50 mg Oral HS     Medications Discontinued During This Encounter   Medication Reason   • polyethylene glycol (GLYCOLAX) 17 GM/SCOOP powder Therapy completed   • ciprofloxacin (CIPRO) IVPB (premix in 5% dextrose) 400 mg 200 mL    • metroNIDAZOLE (FLAGYL) IVPB (premix) 500 mg 100 mL    • dextrose 5 % and sodium chloride 0 45 % with KCl 20 mEq/L infusion    • sodium chloride 0 9 % infusion    • polyethylene glycol (MIRALAX) packet 17 g    • ciprofloxacin (CIPRO) tablet 500 mg    • metroNIDAZOLE (FLAGYL) tablet 500 mg    • sodium bicarbonate tablet 650 mg    • enoxaparin (LOVENOX) subcutaneous injection 40 mg    • furosemide (LASIX) injection 80 mg    • multi-electrolyte (PLASMALYTE-A/ISOLYTE-S PH 7 4) IV solution    • metoprolol succinate (TOPROL-XL) 24 hr tablet 25 mg    • midodrine (PROAMATINE) tablet 2 5 mg    • enoxaparin (LOVENOX) subcutaneous injection 30 mg    • metoprolol succinate (TOPROL-XL) 24 hr tablet 12 5 mg    • magnesium hydroxide (MILK OF MAGNESIA) oral suspension 30 mL    • polyethylene glycol (MIRALAX) packet 17 g    • morphine injection 4 mg    • multi-electrolyte (PLASMALYTE-A/ISOLYTE-S PH 7 4) IV solution    • heparin (porcine) subcutaneous injection 5,000 Units    • pantoprazole (PROTONIX) EC tablet 40 mg    • albumin human (FLEXBUMIN) 25 % injection 12 5 g    • bumetanide (BUMEX) injection 1 mg    • bumetanide (BUMEX) injection 0 5 mg    • midodrine (PROAMATINE) tablet 5 mg    • pantoprazole (PROTONIX) EC tablet 40 mg    • bumetanide (BUMEX) injection 1 mg    • bumetanide (BUMEX) injection 2 mg    • bumetanide (BUMEX) injection 2 mg    • potassium chloride 20 mEq IVPB (premix)    • octreotide (SandoSTATIN) injection 100 mcg    • bumetanide (BUMEX) tablet 2 mg    • doxycycline (VIBRAMYCIN) 100 mg in sodium chloride 0 9 % 100 mL IVPB    • erythromycin (ILOTYCIN) 0 5 % ophthalmic ointment 0 5 [facility]        Helio, DO      This progress note was produced in part using a dictation device which may document imprecise wording from author's original intent

## 2023-05-20 NOTE — ASSESSMENT & PLAN NOTE
· Baseline appears to be between 11 and 12   · Hemoglobin has been running 7 7 to 8 1 last three days, today 7 3  · Without signs of bleeding  · Plan transfusion PRBC hemoglobin less than 7  · CBC a m

## 2023-05-20 NOTE — ASSESSMENT & PLAN NOTE
· Patient initially with hypotension, improved with midodrine  · Continue midodrine  · Home metoprolol on hold due to hypotension, will continue to monitor  · Home losartan has been held due to ALEJANDRA

## 2023-05-20 NOTE — ASSESSMENT & PLAN NOTE
· Per record nursing reported black tarry stools on 5/8 to 5/9, none since Per discussion with patient  · 5/8 fecal occult blood positive  · Baseline hemoglobin 10-11  · Hemoglobin today 7 3  · No signs of bleeding  · 5/11 had EGD/colonoscopy-see results above  · GI following appreciate recommendations  · Plan transfusion PRBC's for HBG less than 7  · CBC a m

## 2023-05-20 NOTE — PROGRESS NOTES
[facility]  67   Progress Note  Name: Maddi Messina  MRN: [id number]  Unit/Bed#: -01 I Date of Admission: 4/27/2023   Date of Service: 5/20/2023 I Hospital Day: 23    Assessment/Plan   Left lower quadrant abdominal pain  Assessment & Plan  · Presented to the ED with left lower quadrant abdominal pain on 4/27   · Patient reports intermittent pain across lower abdomen with mild tenderness on palpation  · 4/27/2023 CT abdomen: New small amount of free fluid in the abdomen and pelvis compared to 4/18/2023, which can be secondary to cirrhosis or acute inflammatory process in the abdomen and pelvis such as occult acute diverticulitis  Moderate amount of stool in the colon, nonspecific and can represent constipation  · 4/29/2023 obstructive series: Nonobstructive bowel gas pattern  · 5/1/2023 CT abdomen pelvis: Thickened appearance of the wall of the descending colon and sigmoid colon which may represent pseudo thickening from nondistention versus a mild nonspecific colitis in the appropriate clinical setting  There are a few scattered colonic diverticula  · 5/2 flex sig: Minimal inflammation noted, biopsies were normal  · 5/5 KUB: Nonobstructive bowel gas pattern  · 5/11 colonoscopy: Stricture and mucosal friability and shallow ulceration at the ileocecal valve  Single shallow ulcer in the rectosigmoid colon  Most likely explanation for these findings is Crohn's disease  · 5/11 EGD: Small sliding hiatal hernia  Tiny varices at the GE junction  Mild portal hypertensive gastropathy  No blood in the stomach  Normal duodenum  No interventions performed    · Surgery consultation appreciated- now signed off  · GI following, appreciate recommendations  · Pathology from colonoscopy results on chart, reviewed  · 5/15/2023 MRCP: Cirrhosis, pancreatic tail cyst, and small right pleural effusion and moderate body wall edema were noted  · Patient is status post IR guided biopsy done on 5/18/2023-pending reports    * Acute kidney injury [facility])  Assessment & Plan  • Baseline creatinine if 0 8 to 1  • Creatinine is trending down, today 1 34  • Nephrology following, appreciate recommendations  • Bumex discontinued by nephrology on 5/16, and was monitoring off diuretic  • Resumed Bumex 1 mg orally daily 5/20  • Continue midodrine  • Octreotide continued  • Continue to avoid hypotension and nephrotoxins  • Intake and output  • Daily weights        Hyponatremia  Assessment & Plan  · Likely due to cirrhosis   · Resolved    Primary hypertension  Assessment & Plan  · Patient initially with hypotension, improved with midodrine  · Continue midodrine  · Home metoprolol on hold due to hypotension, will continue to monitor  · Home losartan has been held due to ALEJANDRA    PAF (paroxysmal atrial fibrillation) ([facility])  Assessment & Plan  · Rate controlled   · Home metoprolol is on hold due to hypotension  · Continue midodrine      Tarry stools  Assessment & Plan  · Per record nursing reported black tarry stools on 5/8 to 5/9, none since Per discussion with patient  · 5/8 fecal occult blood positive  · Baseline hemoglobin 10-11  · Hemoglobin today 7 3  · No signs of bleeding  · 5/11 had EGD/colonoscopy-see results above  · GI following appreciate recommendations  · Plan transfusion PRBC's for HBG less than 7  · CBC a m  Hx of CABG  Assessment & Plan  · Currently stable  · Losartan on hold due to hypotension    Anemia  Assessment & Plan  · Baseline appears to be between 11 and 12   · Hemoglobin has been running 7 7 to 8 1 last three days, today 7 3  · Without signs of bleeding  · Plan transfusion PRBC hemoglobin less than 7  · CBC a m       Acquired hypothyroidism  Assessment & Plan  · Continue Synthroid    Cellulitis of right lower extremity  Assessment & Plan  · Continue doxycycline  · Venous duplex negative DVT  · Continue to monitor     Thrombocytopenia ([facility] [address] )  Assessment & Plan  · Most likely secondary to cirrhosis  · No signs of active bleeding  · CBC a m  VTE Pharmacologic Prophylaxis:   Pharmacologic: Pharmacologic VTE Prophylaxis contraindicated due to Thrombocytopenia  Mechanical VTE Prophylaxis in Place: Yes    Patient Centered Rounds: I have performed bedside rounds with nursing staff today  Discussions with Specialists or Other Care Team Provider: Nephrology, nursing, case management    Education and Discussions with Family / Patient: Treatment plan discussed with patient understands the plan as has been explained is agreeable to plan as stated  All questions answered her satisfaction  Time Spent for Care: 45 minutes  More than 50% of total time spent on counseling and coordination of care as described above  Current Length of Stay: 23 day(s)    Current Patient Status: Inpatient   Certification Statement: The patient will continue to require additional inpatient hospital stay due to Monitoring H&H, monitoring for bleeding, monitoring diuresis    Discharge Plan: Pending hospital course  Plan is for patient to return back to get this personal-care home once stable for discharge  Code Status: Level 1 - Full Code      Subjective:   Patient states she is feeling great today  Has been up and walking  Denies any pain or discomfort  Objective:     Vitals:   Temp (24hrs), Av 2 °F (36 8 °C), Min:98 °F (36 7 °C), Max:98 4 °F (36 9 °C)    Temp:  [98 °F (36 7 °C)-98 4 °F (36 9 °C)] 98 °F (36 7 °C)  HR:  [67-79] 67  Resp:  [17-19] 17  BP: (124-146)/(43-56) 124/46  SpO2:  [92 %-96 %] 94 %  Body mass index is 26 kg/m²  Input and Output Summary (last 24 hours): Intake/Output Summary (Last 24 hours) at 2023 0934  Last data filed at 2023 1700  Gross per 24 hour   Intake 240 ml   Output 1100 ml   Net -860 ml       Physical Exam:     Physical Exam  Vitals and nursing note reviewed  Constitutional:       General: She is not in acute distress       Appearance: She is not ill-appearing  HENT:      Head: Normocephalic and atraumatic  Nose: Nose normal       Mouth/Throat:      Mouth: Mucous membranes are moist    Cardiovascular:      Rate and Rhythm: Normal rate and regular rhythm  Pulses: Normal pulses  Heart sounds: Normal heart sounds  Pulmonary:      Effort: Pulmonary effort is normal  No respiratory distress  Breath sounds: Normal breath sounds  No wheezing or rales  Abdominal:      General: Bowel sounds are normal  There is no distension  Palpations: Abdomen is soft  Tenderness: There is no abdominal tenderness  There is no guarding  Musculoskeletal:         General: Normal range of motion  Right lower leg: Edema present  Left lower leg: Edema present  Comments: +1 pitting edema bilateral lower extremities   Skin:     General: Skin is warm and dry  Capillary Refill: Capillary refill takes less than 2 seconds  Neurological:      General: No focal deficit present  Mental Status: She is alert and oriented to person, place, and time  Mental status is at baseline  Psychiatric:         Mood and Affect: Mood normal          Behavior: Behavior normal          Thought Content: Thought content normal          Judgment: Judgment normal          Additional Data:     Labs:    Results from last 7 days   Lab Units 05/20/23  0437   WBC Thousand/uL 3 84*   HEMOGLOBIN g/dL 7 3*   HEMATOCRIT % 22 1*   PLATELETS Thousands/uL 65*   NEUTROS PCT % 43   LYMPHS PCT % 32   MONOS PCT % 20*   EOS PCT % 4     Results from last 7 days   Lab Units 05/20/23  0437   POTASSIUM mmol/L 3 5   CHLORIDE mmol/L 100   CO2 mmol/L 32   BUN mg/dL 35*   CREATININE mg/dL 1 34*   CALCIUM mg/dL 8 6   ALK PHOS U/L 375*   ALT U/L 18   AST U/L 46*     Results from last 7 days   Lab Units 05/19/23  0551   INR  1 48*       * I Have Reviewed All Lab Data Listed Above  * Additional Pertinent Lab Tests Reviewed:  [address] 67 Admission Reviewed    Imaging:    Imaging Reports Reviewed Today Include: CT abdomen pelvis, obstructive series, MRCP, flatplate abdomen, chest x-ray, VAS lower extremity  Imaging Personally Reviewed by Myself Includes:      Recent Cultures (last 7 days):           Last 24 Hours Medication List:   Current Facility-Administered Medications   Medication Dose Route Frequency Provider Last Rate   • acetaminophen  650 mg Oral Q6H PRN Ratna Gallegos MD     • barium  900 mL Oral Once in imaging Karen Hernández MD     • bumetanide  1 mg Oral Daily Mynor Hsu DO     • dextromethorphan-guaiFENesin  10 mL Oral Q6H PRN EBEN Hooker     • dicyclomine  10 mg Oral TID PRN Clarita Gowers, MD     • doxycycline hyclate  100 mg Oral Q12H Cristobal Greenfield MD     • erythromycin  0 5 inch Left Eye BID Karen Hernández MD     • heparin (porcine)  5,000 Units Subcutaneous Q8H [address] Cyrusreji Pina MD     • HYDROmorphone  0 5 mg Intravenous Q4H PRN EBEN Hooker     • levothyroxine  75 mcg Oral Early Morning Ratna Gallegos MD     • lidocaine  1 patch Topical Daily EBEN Hooker     • midodrine  2 5 mg Oral TID AC DO Helio     • ondansetron  4 mg Intravenous Q6H PRN Karen Hernández MD     • pantoprazole  40 mg Oral Early Morning [address] [facility] [address] YOVANI Leiva     • potassium chloride  10 mEq Oral Daily DO Helio     • traZODone  50 mg Oral HS Hiram Chambers PA-C          Today, Patient Was Seen By: EBEN Marin    ** Please Note: Dictation voice to text software may have been used in the creation of this document   **

## 2023-05-21 LAB
ANION GAP SERPL CALCULATED.3IONS-SCNC: 8 MMOL/L (ref 4–13)
BASOPHILS # BLD AUTO: 0.03 THOUSANDS/ÂΜL (ref 0–0.1)
BASOPHILS NFR BLD AUTO: 1 % (ref 0–1)
BUN SERPL-MCNC: 32 MG/DL (ref 5–25)
CALCIUM SERPL-MCNC: 8.9 MG/DL (ref 8.4–10.2)
CHLORIDE SERPL-SCNC: 98 MMOL/L (ref 96–108)
CO2 SERPL-SCNC: 31 MMOL/L (ref 21–32)
CREAT SERPL-MCNC: 1.36 MG/DL (ref 0.6–1.3)
EOSINOPHIL # BLD AUTO: 0.19 THOUSAND/ÂΜL (ref 0–0.61)
EOSINOPHIL NFR BLD AUTO: 5 % (ref 0–6)
ERYTHROCYTE [DISTWIDTH] IN BLOOD BY AUTOMATED COUNT: 15.3 % (ref 11.6–15.1)
GFR SERPL CREATININE-BSD FRML MDRD: 37 ML/MIN/1.73SQ M
GLUCOSE SERPL-MCNC: 80 MG/DL (ref 65–140)
HCT VFR BLD AUTO: 24.4 % (ref 34.8–46.1)
HGB BLD-MCNC: 8.1 G/DL (ref 11.5–15.4)
IMM GRANULOCYTES # BLD AUTO: 0.01 THOUSAND/UL (ref 0–0.2)
IMM GRANULOCYTES NFR BLD AUTO: 0 % (ref 0–2)
LYMPHOCYTES # BLD AUTO: 1.13 THOUSANDS/ÂΜL (ref 0.6–4.47)
LYMPHOCYTES NFR BLD AUTO: 29 % (ref 14–44)
MAGNESIUM SERPL-MCNC: 1.7 MG/DL (ref 1.9–2.7)
MCH RBC QN AUTO: 34.5 PG (ref 26.8–34.3)
MCHC RBC AUTO-ENTMCNC: 33.2 G/DL (ref 31.4–37.4)
MCV RBC AUTO: 104 FL (ref 82–98)
MONOCYTES # BLD AUTO: 0.65 THOUSAND/ÂΜL (ref 0.17–1.22)
MONOCYTES NFR BLD AUTO: 17 % (ref 4–12)
NEUTROPHILS # BLD AUTO: 1.83 THOUSANDS/ÂΜL (ref 1.85–7.62)
NEUTS SEG NFR BLD AUTO: 48 % (ref 43–75)
NRBC BLD AUTO-RTO: 0 /100 WBCS
PLATELET # BLD AUTO: 66 THOUSANDS/UL (ref 149–390)
PMV BLD AUTO: 12 FL (ref 8.9–12.7)
POTASSIUM SERPL-SCNC: 3.3 MMOL/L (ref 3.5–5.3)
RBC # BLD AUTO: 2.35 MILLION/UL (ref 3.81–5.12)
SODIUM SERPL-SCNC: 137 MMOL/L (ref 135–147)
WBC # BLD AUTO: 3.84 THOUSAND/UL (ref 4.31–10.16)

## 2023-05-21 PROCEDURE — 99232 SBSQ HOSP IP/OBS MODERATE 35: CPT | Performed by: INTERNAL MEDICINE

## 2023-05-21 PROCEDURE — 99232 SBSQ HOSP IP/OBS MODERATE 35: CPT

## 2023-05-21 PROCEDURE — 83735 ASSAY OF MAGNESIUM: CPT | Performed by: INTERNAL MEDICINE

## 2023-05-21 PROCEDURE — 80048 BASIC METABOLIC PNL TOTAL CA: CPT

## 2023-05-21 PROCEDURE — 85025 COMPLETE CBC W/AUTO DIFF WBC: CPT

## 2023-05-21 RX ORDER — POTASSIUM CHLORIDE 20 MEQ/1
20 TABLET, EXTENDED RELEASE ORAL ONCE
Status: COMPLETED | OUTPATIENT
Start: 2023-05-21 | End: 2023-05-21

## 2023-05-21 RX ORDER — BUMETANIDE 1 MG/1
1 TABLET ORAL ONCE
Status: COMPLETED | OUTPATIENT
Start: 2023-05-21 | End: 2023-05-21

## 2023-05-21 RX ORDER — POTASSIUM CHLORIDE 750 MG/1
10 TABLET, EXTENDED RELEASE ORAL DAILY
Status: DISCONTINUED | OUTPATIENT
Start: 2023-05-22 | End: 2023-05-21

## 2023-05-21 RX ORDER — POTASSIUM CHLORIDE 20 MEQ/1
20 TABLET, EXTENDED RELEASE ORAL DAILY
Status: DISCONTINUED | OUTPATIENT
Start: 2023-05-22 | End: 2023-05-23 | Stop reason: HOSPADM

## 2023-05-21 RX ORDER — OXYCODONE HYDROCHLORIDE 5 MG/1
5 TABLET ORAL 3 TIMES DAILY PRN
Status: DISCONTINUED | OUTPATIENT
Start: 2023-05-21 | End: 2023-05-23 | Stop reason: HOSPADM

## 2023-05-21 RX ORDER — ONDANSETRON 4 MG/1
4 TABLET, ORALLY DISINTEGRATING ORAL EVERY 6 HOURS PRN
Status: DISCONTINUED | OUTPATIENT
Start: 2023-05-21 | End: 2023-05-23 | Stop reason: HOSPADM

## 2023-05-21 RX ORDER — BUMETANIDE 1 MG/1
2 TABLET ORAL DAILY
Status: DISCONTINUED | OUTPATIENT
Start: 2023-05-22 | End: 2023-05-23 | Stop reason: HOSPADM

## 2023-05-21 RX ADMIN — TRAZODONE HYDROCHLORIDE 50 MG: 50 TABLET ORAL at 21:23

## 2023-05-21 RX ADMIN — DOXYCYCLINE 100 MG: 100 CAPSULE ORAL at 11:40

## 2023-05-21 RX ADMIN — HEPARIN SODIUM 5000 UNITS: 5000 INJECTION INTRAVENOUS; SUBCUTANEOUS at 21:23

## 2023-05-21 RX ADMIN — MIDODRINE HYDROCHLORIDE 2.5 MG: 5 TABLET ORAL at 17:06

## 2023-05-21 RX ADMIN — HEPARIN SODIUM 5000 UNITS: 5000 INJECTION INTRAVENOUS; SUBCUTANEOUS at 05:23

## 2023-05-21 RX ADMIN — BUMETANIDE 1 MG: 1 TABLET ORAL at 10:33

## 2023-05-21 RX ADMIN — LIDOCAINE 1 PATCH: 700 PATCH TOPICAL at 08:46

## 2023-05-21 RX ADMIN — PANTOPRAZOLE SODIUM 40 MG: 40 TABLET, DELAYED RELEASE ORAL at 05:23

## 2023-05-21 RX ADMIN — POTASSIUM CHLORIDE 20 MEQ: 1500 TABLET, EXTENDED RELEASE ORAL at 08:46

## 2023-05-21 RX ADMIN — HEPARIN SODIUM 5000 UNITS: 5000 INJECTION INTRAVENOUS; SUBCUTANEOUS at 14:25

## 2023-05-21 RX ADMIN — BUMETANIDE 1 MG: 1 TABLET ORAL at 08:46

## 2023-05-21 RX ADMIN — POTASSIUM CHLORIDE 20 MEQ: 1500 TABLET, EXTENDED RELEASE ORAL at 11:41

## 2023-05-21 RX ADMIN — OXYCODONE HYDROCHLORIDE 5 MG: 5 TABLET ORAL at 21:50

## 2023-05-21 RX ADMIN — MIDODRINE HYDROCHLORIDE 2.5 MG: 5 TABLET ORAL at 08:46

## 2023-05-21 RX ADMIN — MIDODRINE HYDROCHLORIDE 2.5 MG: 5 TABLET ORAL at 11:40

## 2023-05-21 RX ADMIN — DOXYCYCLINE 100 MG: 100 CAPSULE ORAL at 23:21

## 2023-05-21 RX ADMIN — LEVOTHYROXINE SODIUM 75 MCG: 75 TABLET ORAL at 05:23

## 2023-05-21 NOTE — PROGRESS NOTES
"Progress Note - Nephrology   Lianne Shikha 69 y o  female MRN: [id number]  Unit/Bed#: -01 Encounter: [id number]    A/P:  1  Acute kidney injury, creatinine trend stable at 1 3  Etiology prerenal vs ATN vs HRS  Tx with HRS protocol  Urine output not accurately documented  Bumex changed to 1 mg/day hoping to achieve lowest diuretic dose  However, she does not have ample urine output with this  Her weights are unchanged  Give additional Bumex 1 mg today  Change PO bumex back to 2mg/day  Change potassium to 20 meq /day  Continue midodrine 2 5mg Q 8  Check mag  Will needs close FU at DC  Fu CMP/Mag in 3-4 days  FU nephrology 1-2 weeks  2  Volume overload 2/2 cirrhosis/ALEJANDRA  Weight unchanged in past 24 hr  Still has significant LE edema  Does not have sufficient diuretic response with Bumex 1 mg/day, change back to 2mg/day  Assess diuretic needs as we go  Weight 132->115 lb  3   Presumed cirrhosis, status post liver biopsy on 5/18  Follow-up with GI     4   Anemia/thrombocytopenia in the setting of cirrhosis  Management per primary keep type and screen active  May need blood transfusion  5   Hypokalemia, recommend potassium 20 meq x 2  Change to 20meq/day for now  Assess potassium needs as we go  Will need chem in 3-4 days as op  Will need nephrology fu in 1-2 weeks as op  Follow up reason for today's visit:     Acute kidney injury [facility])      Subjective:   Patient seen and examined today  Still reports lower extremity edema  She does have increased urine output with dose of 1 mg  However, her weights are unchanged  Denies chest pain, shortness of breath  A complete 10 point review of systems was performed and is otherwise negative  Objective:     Vitals: Blood pressure (!) 133/48, pulse 70, temperature 97 9 °F (36 6 °C), resp  rate 15, height 4' 11\" (1 499 m), weight 52 3 kg (115 lb 4 8 oz), SpO2 94 %  ,Body mass index is 25 85 kg/m²      Weight (last 2 days)     " "Date/Time Weight    05/21/23 0600 52 3 (115 3)    05/20/23 0600 52 6 (115 96)    05/19/23 0600 53 6 (118 17)            Intake/Output Summary (Last 24 hours) at 5/21/2023 1043  Last data filed at 5/21/2023 0600  Gross per 24 hour   Intake 480 ml   Output 300 ml   Net 180 ml     I/O last 3 completed shifts: In: 18 [P O :480]  Out: 300 [Urine:300]         Physical Exam: BP (!) 133/48   Pulse 70   Temp 97 9 °F (36 6 °C)   Resp 15   Ht 4' 11\" (1 499 m)   Wt 52 3 kg (115 lb 4 8 oz)   SpO2 94%   BMI 25 85 kg/m²     General Appearance:    Alert, cooperative, no distress, appears stated age   Head:    Normocephalic, without obvious abnormality, atraumatic   Eyes:    Conjunctiva/corneas clear   Ears:    Normal external ears   Nose:   Nares normal, septum midline, mucosa normal, no drainage    or sinus tenderness   Throat:   Lips, mucosa, and tongue normal; teeth and gums normal   Neck:    Back:      Lungs:     Clear to auscultation bilaterally, respirations unlabored   Chest wall:    No tenderness or deformity   Heart:    Regular rate and rhythm, S1 and S2 normal, no murmur, rub   or gallop   Abdomen:     Soft, non-tender, bowel sounds active   Extremities:   + 2 -3 BL LE edema    Skin:   Skin color, texture, turgor normal, no rashes or lesions   Lymph nodes:   Cervical normal   Neurologic:   CNII-XII intact            Lab, Imaging and other studies: I have personally reviewed pertinent labs  CBC:   Lab Results   Component Value Date    WBC 3 84 (L) 05/21/2023    HGB 8 1 (L) 05/21/2023    HCT 24 4 (L) 05/21/2023     (H) 05/21/2023    PLT 66 (L) 05/21/2023    MCH 34 5 (H) 05/21/2023    MCHC 33 2 05/21/2023    RDW 15 3 (H) 05/21/2023    MPV 12 0 05/21/2023    NRBC 0 05/21/2023     CMP:   Lab Results   Component Value Date    K 3 3 (L) 05/21/2023    CL 98 05/21/2023    CO2 31 05/21/2023    BUN 32 (H) 05/21/2023    CREATININE 1 36 (H) 05/21/2023    CALCIUM 8 9 05/21/2023    EGFR 37 05/21/2023           Results " from last 7 days   Lab Units 05/21/23  0502 05/20/23  0437 05/19/23  0551 05/18/23  0532   POTASSIUM mmol/L 3 3* 3 5 3 9 3 4*   CHLORIDE mmol/L 98 100 98 94*   CO2 mmol/L 31 32 29 33*   BUN mg/dL 32* 35* 38* 41*   CREATININE mg/dL 1 36* 1 34* 1 48* 1 61*   CALCIUM mg/dL 8 9 8 6 8 8 8 6   ALK PHOS U/L  --  375* 439* 393*   ALT U/L  --  18 20 19   AST U/L  --  46* 57* 49*         Phosphorus: No results found for: PHOS  Magnesium: No results found for: MG  Urinalysis: No results found for: COLORU, CLARITYU, SPECGRAV, PHUR, LEUKOCYTESUR, NITRITE, PROTEINUA, GLUCOSEU, KETONESU, BILIRUBINUR, BLOODU  Ionized Calcium: No results found for: CAION  Coagulation: No results found for: PT, INR, APTT  Troponin: No results found for: TROPONINI  ABG: No results found for: PHART, GHI7DYM, PO2ART, GQO4JYJ, R3CFAUNM, BEART, SOURCE  Radiology review:     IMAGING  Procedure: VAS lower limb venous duplex study, complete bilateral    Result Date: 5/19/2023  Narrative:  [facility] REPORT CLINICAL: Indications: Patient presents with bilateral edema,  redness and warmth  Operative History: Coronary artery bypass graft 2021 Risk Factors The patient has history of HTN  FINDINGS:  Right           Impression      GSV Prox Thigh  Not Visualized  GSV Mid Thigh   Not Visualized  GSV Dist Thigh  Not Visualized  GSV Knee        Not Visualized  GSV Mid Calf    Not Visualized     CONCLUSION: Impression: RIGHT LOWER LIMB: No evidence of acute or chronic deep vein thrombosis  Great Saphenous vein harvested  Doppler evaluation shows a normal response to augmentation maneuvers    Popliteal, posterior tibial and anterior tibial arterial Doppler waveform's are triphasic/biphasic  LEFT LOWER LIMB: No evidence of acute or chronic deep vein thrombosis  No evidence of superficial thrombophlebitis noted  Doppler evaluation shows a normal response to augmentation maneuvers   Popliteal, posterior tibial and anterior tibial arterial Doppler waveform's are triphasic/biphasic  TECH NOTE: Pulsatile waveforms noted throughout the venous system which may suggest heart failure or tricuspid valve insufficiency  SIGNATURE: Electronically Signed by: Misael Hutchins DO on 2023-05-19 07:10:58 PM    Procedure: IR biopsy transjugular liver    Result Date: 5/19/2023  Narrative: Transjugular liver biopsy Indication: Suspected cirrhosis  Procedure: The right neck was surveyed with ultrasound to assess for vessel patency  The right neck was prepped and draped in sterile fashion  1% lidocaine was used for local anesthetic  The right internal jugular vein was accessed using direct sonographic guidance with a 21-gauge needle  A static sonographic image was saved  A 9 Vietnamese vascular sheath was placed over an Amplatz wire  The right hepatic vein was selected with a 5 [place] Muna multipurpose catheter and 0 035 guidewire  Position was confirmed with CO2 injection  Wedged and free right hepatic vein pressures were obtained x3  Wedged CO2 portogram was performed  The catheter was exchanged over an wire and the sheath was advanced into the central right hepatic vein  A 7 [place] Muna guiding sheath along with mental cannula were advanced into the right hepatic vein and a 19-gauge Quick-core biopsy device was advanced and 4 core specimens were obtained and submitted in formalin  The guiding catheter was removed  The sheath was removed and hemostasis achieved with direct manual compression  Sedation time: 45 minutes Images: 72 Contrast dose: 0 Fluoroscopy time: 5 5 minutes Findings: The right hepatic vein is widely patent  Portal venous system is widely patent  4 full-length core specimens were obtained and submitted  No free extravasation seen following biopsies  Free right hepatic vein pressure measured at 6-7 mmHg  Wedged right hepatic vein pressure measured at 13-14 mmHg  Impression: Impression: Technically successful transjugular liver biopsy   Widely patent right portal vein and portal venous system Mildly elevated portosystemic gradient of 6-8 mmHg Workstation performed: [id number]       Current Facility-Administered Medications   Medication Dose Route Frequency   • acetaminophen (TYLENOL) tablet 650 mg  650 mg Oral Q6H PRN   • barium (READI-CAT 2) suspension 900 mL  900 mL Oral Once in imaging   • bumetanide (BUMEX) tablet 1 mg  1 mg Oral Daily   • dextromethorphan-guaiFENesin (ROBITUSSIN DM) oral syrup 10 mL  10 mL Oral Q6H PRN   • dicyclomine (BENTYL) capsule 10 mg  10 mg Oral TID PRN   • doxycycline hyclate (VIBRAMYCIN) capsule 100 mg  100 mg Oral Q12H   • heparin (porcine) subcutaneous injection 5,000 Units  5,000 Units Subcutaneous Q8H [address]   • HYDROmorphone (DILAUDID) injection 0 5 mg  0 5 mg Intravenous Q4H PRN   • levothyroxine tablet 75 mcg  75 mcg Oral Early Morning   • lidocaine (LIDODERM) 5 % patch 1 patch  1 patch Topical Daily   • midodrine (PROAMATINE) tablet 2 5 mg  2 5 mg Oral TID AC   • ondansetron (ZOFRAN) injection 4 mg  4 mg Intravenous Q6H PRN   • pantoprazole (PROTONIX) EC tablet 40 mg  40 mg Oral Early Morning   • [START ON 5/22/2023] potassium chloride (K-DUR,KLOR-CON) CR tablet 20 mEq  20 mEq Oral Daily   • traZODone (DESYREL) tablet 50 mg  50 mg Oral HS     Medications Discontinued During This Encounter   Medication Reason   • polyethylene glycol (GLYCOLAX) 17 GM/SCOOP powder Therapy completed   • ciprofloxacin (CIPRO) IVPB (premix in 5% dextrose) 400 mg 200 mL    • metroNIDAZOLE (FLAGYL) IVPB (premix) 500 mg 100 mL    • dextrose 5 % and sodium chloride 0 45 % with KCl 20 mEq/L infusion    • sodium chloride 0 9 % infusion    • polyethylene glycol (MIRALAX) packet 17 g    • ciprofloxacin (CIPRO) tablet 500 mg    • metroNIDAZOLE (FLAGYL) tablet 500 mg    • sodium bicarbonate tablet 650 mg    • enoxaparin (LOVENOX) subcutaneous injection 40 mg    • furosemide (LASIX) injection 80 mg    • multi-electrolyte (PLASMALYTE-A/ISOLYTE-S PH 7 4) IV solution    • metoprolol succinate (TOPROL-XL) 24 hr tablet 25 mg    • midodrine (PROAMATINE) tablet 2 5 mg    • enoxaparin (LOVENOX) subcutaneous injection 30 mg    • metoprolol succinate (TOPROL-XL) 24 hr tablet 12 5 mg    • magnesium hydroxide (MILK OF MAGNESIA) oral suspension 30 mL    • polyethylene glycol (MIRALAX) packet 17 g    • morphine injection 4 mg    • multi-electrolyte (PLASMALYTE-A/ISOLYTE-S PH 7 4) IV solution    • heparin (porcine) subcutaneous injection 5,000 Units    • pantoprazole (PROTONIX) EC tablet 40 mg    • albumin human (FLEXBUMIN) 25 % injection 12 5 g    • bumetanide (BUMEX) injection 1 mg    • bumetanide (BUMEX) injection 0 5 mg    • midodrine (PROAMATINE) tablet 5 mg    • pantoprazole (PROTONIX) EC tablet 40 mg    • bumetanide (BUMEX) injection 1 mg    • bumetanide (BUMEX) injection 2 mg    • bumetanide (BUMEX) injection 2 mg    • potassium chloride 20 mEq IVPB (premix)    • octreotide (SandoSTATIN) injection 100 mcg    • bumetanide (BUMEX) tablet 2 mg    • doxycycline (VIBRAMYCIN) 100 mg in sodium chloride 0 9 % 100 mL IVPB    • erythromycin (ILOTYCIN) 0 5 % ophthalmic ointment 0 5 inch    • potassium chloride (K-DUR,KLOR-CON) CR tablet 10 mEq    • potassium chloride (K-DUR,KLOR-CON) CR tablet 10 mEq        Frnacine Nicholson,       This progress note was produced in part using a dictation device which may document imprecise wording from author's original intent

## 2023-05-21 NOTE — PLAN OF CARE
Problem: Potential for Falls  Goal: Patient will remain free of falls  Description: INTERVENTIONS:  - Educate patient/family on patient safety including physical limitations  - Instruct patient to call for assistance with activity   - Consult OT/PT to assist with strengthening/mobility   - Keep Call bell within reach  - Keep bed low and locked with side rails adjusted as appropriate  - Keep care items and personal belongings within reach  - Initiate and maintain comfort rounds  - Make Fall Risk Sign visible to staff  - Offer Toileting every 2 Hours, in advance of need  - Initiate/Maintain alarms  - Obtain necessary fall risk management equipment:  - Apply yellow socks and bracelet for high fall risk patients  - Consider moving patient to room near nurses station  Outcome: Progressing     Problem: PAIN - ADULT  Goal: Verbalizes/displays adequate comfort level or baseline comfort level  Description: Interventions:  - Encourage patient to monitor pain and request assistance  - Assess pain using appropriate pain scale  - Administer analgesics based on type and severity of pain and evaluate response  - Implement non-pharmacological measures as appropriate and evaluate response  - Consider cultural and social influences on pain and pain management  - Notify physician/advanced practitioner if interventions unsuccessful or patient reports new pain  Outcome: Progressing     Problem: SAFETY ADULT  Goal: Patient will remain free of falls  Description: INTERVENTIONS:  - Educate patient/family on patient safety including physical limitations  - Instruct patient to call for assistance with activity   - Consult OT/PT to assist with strengthening/mobility   - Keep Call bell within reach  - Keep bed low and locked with side rails adjusted as appropriate  - Keep care items and personal belongings within reach  - Initiate and maintain comfort rounds  - Make Fall Risk Sign visible to staff  - Offer Toileting every 2 Hours, in advance of need  - Initiate/Maintain alarms  - Obtain necessary fall risk management equipment:  - Apply yellow socks and bracelet for high fall risk patients  - Consider moving patient to room near nurses station  Outcome: Progressing  Goal: Maintain or return to baseline ADL function  Description: INTERVENTIONS:  - Educate patient/family on patient safety including physical limitations  - Instruct patient to call for assistance with activity   - Consult OT/PT to assist with strengthening/mobility   - Keep Call bell within reach  - Keep bed low and locked with side rails adjusted as appropriate  - Keep care items and personal belongings within reach  - Initiate and maintain comfort rounds  - Make Fall Risk Sign visible to staff  - Offer Toileting every 2 Hours, in advance of need  - Initiate/Maintain alarms  - Obtain necessary fall risk management equipment:   - Apply yellow socks and bracelet for high fall risk patients  - Consider moving patient to room near nurses station  Outcome: Progressing  Goal: Maintains/Returns to pre admission functional level  Description: INTERVENTIONS:  - Perform BMAT or MOVE assessment daily    - Set and communicate daily mobility goal to care team and patient/family/caregiver     - Collaborate with rehabilitation services on mobility goals if consulted  - Out of bed for toileting  - Record patient progress and toleration of activity level   Outcome: Progressing     Problem: INFECTION - ADULT  Goal: Absence or prevention of progression during hospitalization  Description: INTERVENTIONS:  - Assess and monitor for signs and symptoms of infection  - Monitor lab/diagnostic results  - Monitor all insertion sites, i e  indwelling lines, tubes, and drains  - Monitor endotracheal if appropriate and nasal secretions for changes in amount and color  - [place] appropriate cooling/warming therapies per order  - Administer medications as ordered  - Instruct and encourage patient and family to use good hand hygiene technique  - Identify and instruct in appropriate isolation precautions for identified infection/condition  Outcome: Progressing     Problem: DISCHARGE PLANNING  Goal: Discharge to home or other facility with appropriate resources  Description: INTERVENTIONS:  - Identify barriers to discharge w/patient and caregiver  - Arrange for needed discharge resources and transportation as appropriate  - Identify discharge learning needs (meds, wound care, etc )  - Refer to Case Management Department for coordinating discharge planning if the patient needs post-hospital services based on physician/advanced practitioner order or complex needs related to functional status, cognitive ability, or social support system  Outcome: Progressing     Problem: Knowledge Deficit  Goal: Patient/family/caregiver demonstrates understanding of disease process, treatment plan, medications, and discharge instructions  Description: Complete learning assessment and assess knowledge base    Interventions:  - Provide teaching at level of understanding  - Provide teaching via preferred learning methods  Outcome: Progressing     Problem: Prexisting or High Potential for Compromised Skin Integrity  Goal: Skin integrity is maintained or improved  Description: INTERVENTIONS:  - Identify patients at risk for skin breakdown  - Assess and monitor skin integrity  - Assess and monitor nutrition and hydration status  - Monitor labs   - Assess for incontinence   - Turn and reposition patient  - Assist with mobility/ambulation  - Relieve pressure over bony prominences  - Avoid friction and shearing  - Provide appropriate hygiene as needed including keeping skin clean and dry  - Evaluate need for skin moisturizer/barrier cream  - Collaborate with interdisciplinary team   - Patient/family teaching  - Consider wound care consult   Outcome: Progressing     Problem: MOBILITY - ADULT  Goal: Maintain or return to baseline ADL function  Description: INTERVENTIONS:  - Educate patient/family on patient safety including physical limitations  - Instruct patient to call for assistance with activity   - Consult OT/PT to assist with strengthening/mobility   - Keep Call bell within reach  - Keep bed low and locked with side rails adjusted as appropriate  - Keep care items and personal belongings within reach  - Initiate and maintain comfort rounds  - Make Fall Risk Sign visible to staff  - Offer Toileting every 2 Hours, in advance of need  - Initiate/Maintain alarms  - Obtain necessary fall risk management equipment:   - Apply yellow socks and bracelet for high fall risk patients  - Consider moving patient to room near nurses station  Outcome: Progressing  Goal: Maintains/Returns to pre admission functional level  Description: INTERVENTIONS:  - Perform BMAT or MOVE assessment daily    - Set and communicate daily mobility goal to care team and patient/family/caregiver  - Collaborate with rehabilitation services on mobility goals if consulted  - Out of bed for toileting  - Record patient progress and toleration of activity level   Outcome: Progressing     Problem: Nutrition/Hydration-ADULT  Goal: Nutrient/Hydration intake appropriate for improving, restoring or maintaining nutritional needs  Description: Monitor and assess patient's nutrition/hydration status for malnutrition  Collaborate with interdisciplinary team and initiate plan and interventions as ordered  Monitor patient's weight and dietary intake as ordered or per policy  Utilize nutrition screening tool and intervene as necessary  Determine patient's food preferences and provide high-protein, high-caloric foods as appropriate       INTERVENTIONS:  - Monitor oral intake, urinary output, labs, and treatment plans  - Assess nutrition and hydration status and recommend course of action  - Evaluate amount of meals eaten  - Assist patient with eating if necessary   - Allow adequate time for meals  - Recommend/ encourage appropriate diets, oral nutritional supplements, and vitamin/mineral supplements  - Order, calculate, and assess calorie counts as needed  - Recommend, monitor, and adjust tube feedings and TPN/PPN based on assessed needs  - Assess need for intravenous fluids  - Provide specific nutrition/hydration education as appropriate  - Include patient/family/caregiver in decisions related to nutrition  Outcome: Progressing     Problem: GASTROINTESTINAL - ADULT  Goal: Minimal or absence of nausea and/or vomiting  Description: INTERVENTIONS:  - Administer IV fluids if ordered to ensure adequate hydration  - Maintain NPO status until nausea and vomiting are resolved  - Nasogastric tube if ordered  - Administer ordered antiemetic medications as needed  - Provide nonpharmacologic comfort measures as appropriate  - Advance diet as tolerated, if ordered  - Consider nutrition services referral to assist patient with adequate nutrition and appropriate food choices  Outcome: Progressing  Goal: Maintains or returns to baseline bowel function  Description: INTERVENTIONS:  - Assess bowel function  - Encourage oral fluids to ensure adequate hydration  - Administer IV fluids if ordered to ensure adequate hydration  - Administer ordered medications as needed  - Encourage mobilization and activity  - Consider nutritional services referral to assist patient with adequate nutrition and appropriate food choices  Outcome: Progressing  Goal: Maintains adequate nutritional intake  Description: INTERVENTIONS:  - Monitor percentage of each meal consumed  - Identify factors contributing to decreased intake, treat as appropriate  - Assist with meals as needed  - Monitor I&O, weight, and lab values if indicated  - Obtain nutrition services referral as needed  Outcome: Progressing     Problem: METABOLIC, FLUID AND ELECTROLYTES - ADULT  Goal: Electrolytes maintained within normal limits  Description: INTERVENTIONS:  - Monitor labs and assess patient for signs and symptoms of electrolyte imbalances  - Administer electrolyte replacement as ordered  - Monitor response to electrolyte replacements, including repeat lab results as appropriate  - Instruct patient on fluid and nutrition as appropriate  Outcome: Progressing  Goal: Fluid balance maintained  Description: INTERVENTIONS:  - Monitor labs   - Monitor I/O and WT  - Instruct patient on fluid and nutrition as appropriate  - Assess for signs & symptoms of volume excess or deficit  Outcome: Progressing

## 2023-05-21 NOTE — PROGRESS NOTES
[facility]  Progress Note  Name: Diana Uriostegui  MRN: [id number]  Unit/Bed#: -01 I Date of Admission: 4/27/2023   Date of Service: 5/21/2023 I Hospital Day: 24    Assessment/Plan   Left lower quadrant abdominal pain  Assessment & Plan  · Presented to the ED with left lower quadrant abdominal pain on 4/27   · Patient reports intermittent pain across lower abdomen with mild tenderness on palpation  · 4/27/2023 CT abdomen: New small amount of free fluid in the abdomen and pelvis compared to 4/18/2023, which can be secondary to cirrhosis or acute inflammatory process in the abdomen and pelvis such as occult acute diverticulitis  Moderate amount of stool in the colon, nonspecific and can represent constipation  · 4/29/2023 obstructive series: Nonobstructive bowel gas pattern  · 5/1/2023 CT abdomen pelvis: Thickened appearance of the wall of the descending colon and sigmoid colon which may represent pseudo thickening from nondistention versus a mild nonspecific colitis in the appropriate clinical setting  There are a few scattered colonic diverticula  · 5/2 flex sig: Minimal inflammation noted, biopsies were normal  · 5/5 KUB: Nonobstructive bowel gas pattern  · 5/11 colonoscopy: Stricture and mucosal friability and shallow ulceration at the ileocecal valve  Single shallow ulcer in the rectosigmoid colon  Most likely explanation for these findings is Crohn's disease  · 5/11 EGD: Small sliding hiatal hernia  Tiny varices at the GE junction  Mild portal hypertensive gastropathy  No blood in the stomach  Normal duodenum  No interventions performed    · Surgery consultation appreciated- now signed off  · GI following, appreciate recommendations  · Pathology from colonoscopy results on chart, reviewed  · 5/15/2023 MRCP: Cirrhosis, pancreatic tail cyst, and small right pleural effusion and moderate body wall edema were noted  · Patient is status post IR guided biopsy done on 5/18/2023-pending reports    * Acute kidney injury [facility])  Assessment & Plan  • Baseline creatinine if 0 8 to 1  • Creatinine is trending downward slowly  • Nephrology following, appreciate recommendations  • Bumex discontinued by nephrology on 5/16, and was monitoring off diuretic  • Resumed Bumex 1 mg orally daily 5/20  • Continue midodrine  • Octreotide discontinued  • Continue to avoid hypotension and nephrotoxins  • Intake and output  • Daily weights        Hyponatremia  Assessment & Plan  · Likely due to cirrhosis   · Resolved    Primary hypertension  Assessment & Plan  · Patient initially with hypotension, improved with midodrine  · Continue midodrine  · Home metoprolol on hold due to hypotension, will continue to monitor  · Home losartan has been held due to ALEJANDRA    PAF (paroxysmal atrial fibrillation) ([facility])  Assessment & Plan  · Rate controlled   · Home metoprolol is on hold due to hypotension  · Continue midodrine      Tarry stools  Assessment & Plan  · Per record nursing reported black tarry stools on 5/8 to 5/9, none since Per discussion with patient  · 5/8 fecal occult blood positive  · Baseline hemoglobin 10-11  · Hemoglobin today 8 1  · No signs of bleeding  · 5/11 had EGD/colonoscopy-see results above  · GI following appreciate recommendations  · Plan transfusion PRBC's for HBG less than 7  · CBC a m  Hx of CABG  Assessment & Plan  · Currently stable  · Losartan on hold due to hypotension    Anemia  Assessment & Plan  · Baseline appears to be between 11 and 12   · Hemoglobin remained stable  · Without signs of bleeding  · Continue ferrous sulfate as ordered  · Plan transfusion PRBC hemoglobin less than 7  · CBC a m       Acquired hypothyroidism  Assessment & Plan  · Continue Synthroid    Cellulitis of right lower extremity  Assessment & Plan  · Continue doxycycline  · Venous duplex negative DVT  · Continue to monitor     Thrombocytopenia ([address] )  Assessment & Plan  · Most likely secondary to cirrhosis  · No signs of active bleeding  · CBC a m  VTE Pharmacologic Prophylaxis:   Pharmacologic: Pharmacologic VTE Prophylaxis contraindicated due to Thrombocytopenia  Mechanical VTE Prophylaxis in Place: Yes    Patient Centered Rounds: I have performed bedside rounds with nursing staff today  Discussions with Specialists or Other Care Team Provider: Nephrology, nursing, case management    Education and Discussions with Family / Patient: Treatment plan discussed with patient understands the plan as has been explained is agreeable to plan as stated  All questions answered her satisfaction  Time Spent for Care: 45 minutes  More than 50% of total time spent on counseling and coordination of care as described above  Current Length of Stay: 24 day(s)    Current Patient Status: Inpatient   Certification Statement: The patient will continue to require additional inpatient hospital stay due to Monitoring H&H, monitoring for bleeding, monitoring diuresis    Discharge Plan: Pending hospital course  Plan is for patient to return back to get this personal-care home once stable for discharge  Tentative discharge next 24-48 hours  Code Status: Level 1 - Full Code      Subjective:   Patient states she is feeling great today  Has been up and walking  Denies any pain or discomfort  Objective:     Vitals:   Temp (24hrs), Av 8 °F (36 6 °C), Min:97 7 °F (36 5 °C), Max:97 9 °F (36 6 °C)    Temp:  [97 7 °F (36 5 °C)-97 9 °F (36 6 °C)] 97 9 °F (36 6 °C)  HR:  [65-70] 70  Resp:  [15-18] 15  BP: (133-165)/(48-58) 133/48  SpO2:  [94 %-95 %] 94 %  Body mass index is 25 85 kg/m²  Input and Output Summary (last 24 hours): Intake/Output Summary (Last 24 hours) at 2023 0942  Last data filed at 2023 0600  Gross per 24 hour   Intake 480 ml   Output 300 ml   Net 180 ml       Physical Exam:     Physical Exam  Vitals and nursing note reviewed     Constitutional:       General: She is not in acute distress  Appearance: She is not ill-appearing  HENT:      Head: Normocephalic and atraumatic  Nose: Nose normal       Mouth/Throat:      Mouth: Mucous membranes are moist    Cardiovascular:      Rate and Rhythm: Normal rate and regular rhythm  Pulses: Normal pulses  Heart sounds: Normal heart sounds  Pulmonary:      Effort: Pulmonary effort is normal  No respiratory distress  Breath sounds: Normal breath sounds  No wheezing or rales  Abdominal:      General: Bowel sounds are normal  There is no distension  Palpations: Abdomen is soft  Tenderness: There is no abdominal tenderness  There is no guarding  Musculoskeletal:         General: Normal range of motion  Right lower leg: Edema present  Left lower leg: Edema present  Comments: +1 pitting edema bilateral lower extremities   Skin:     General: Skin is warm and dry  Capillary Refill: Capillary refill takes less than 2 seconds  Neurological:      General: No focal deficit present  Mental Status: She is alert and oriented to person, place, and time  Mental status is at baseline  Psychiatric:         Mood and Affect: Mood normal          Behavior: Behavior normal          Thought Content: Thought content normal          Judgment: Judgment normal          Additional Data:     Labs:    Results from last 7 days   Lab Units 05/21/23  0502   WBC Thousand/uL 3 84*   HEMOGLOBIN g/dL 8 1*   HEMATOCRIT % 24 4*   PLATELETS Thousands/uL 66*   NEUTROS PCT % 48   LYMPHS PCT % 29   MONOS PCT % 17*   EOS PCT % 5     Results from last 7 days   Lab Units 05/21/23  0502 05/20/23  0437   POTASSIUM mmol/L 3 3* 3 5   CHLORIDE mmol/L 98 100   CO2 mmol/L 31 32   BUN mg/dL 32* 35*   CREATININE mg/dL 1 36* 1 34*   CALCIUM mg/dL 8 9 8 6   ALK PHOS U/L  --  375*   ALT U/L  --  18   AST U/L  --  46*     Results from last 7 days   Lab Units 05/19/23  0551   INR  1 48*       * I Have Reviewed All Lab Data Listed Above    * Additional Pertinent Lab Tests Reviewed: [address] Admission Reviewed    Imaging:    Imaging Reports Reviewed Today Include: CT abdomen pelvis, obstructive series, MRCP, flatplate abdomen, chest x-ray, VAS lower extremity  Imaging Personally Reviewed by Myself Includes:      Recent Cultures (last 7 days):           Last 24 Hours Medication List:   Current Facility-Administered Medications   Medication Dose Route Frequency Provider Last Rate   • acetaminophen  650 mg Oral Q6H PRN Zay Rivera MD     • barium  900 mL Oral Once in imaging Danford Meckel, MD     • bumetanide  1 mg Oral Daily [facility] DO Shadi     • dextromethorphan-guaiFENesin  10 mL Oral Q6H PRN EBEN Hooker     • dicyclomine  10 mg Oral TID PRN Federico Delgado MD     • doxycycline hyclate  100 mg Oral Q12H Emmy Venegas MD     • heparin (porcine)  5,000 Units Subcutaneous Q8H [address] Cyrus Pina MD     • HYDROmorphone  0 5 mg Intravenous Q4H PRN EBEN Hooker     • levothyroxine  75 mcg Oral Early Morning Zay Rivera MD     • lidocaine  1 patch Topical Daily EBEN Hooker     • midodrine  2 5 mg Oral TID YUE Cadet DO     • ondansetron  4 mg Intravenous Q6H PRN Danford Meckel, MD     • pantoprazole  40 mg Oral Early Morning YOVANI Gupta     • [START ON 5/22/2023] potassium chloride  10 mEq Oral Daily EBEN Hooker     • traZODone  50 mg Oral LAVELL Lopez PA-C          Today, Patient Was Seen By: EBEN Long    ** Please Note: Dictation voice to text software may have been used in the creation of this document   **

## 2023-05-21 NOTE — ASSESSMENT & PLAN NOTE
· Presented to the ED with left lower quadrant abdominal pain on 4/27   · Patient reports intermittent pain across lower abdomen with mild tenderness on palpation  · CT abdomen 4/27/2023: New small amount of free fluid in the abdomen and pelvis compared to 4/18/2023, which can be secondary to cirrhosis or acute inflammatory process in the abdomen and pelvis such as occult acute diverticulitis  Moderate amount of stool in the colon, nonspecific and can represent constipation  · Obstruction series 4/29/2023: Nonobstructive bowel gas pattern  · CT abdomen pelvis 5/1/2023: Thickened appearance of the wall of the descending colon and sigmoid colon which may represent pseudo thickening from nondistention versus a mild nonspecific colitis in the appropriate clinical setting  There are a few scattered colonic diverticula  · Flexible sigmoidoscopy 5/2/2023: Minimal inflammation noted, biopsies were normal  · KUB 5/5/2023: Nonobstructive bowel gas pattern  · Colonoscopy 5/11/2023: Stricture and mucosal friability and shallow ulceration at the ileocecal valve  Single shallow ulcer in the rectosigmoid colon  Most likely explanation for these findings is Crohn's disease  · EGD 5/11/2023: Small sliding hiatal hernia  Tiny varices at the GE junction  Mild portal hypertensive gastropathy  No blood in the stomach  Normal duodenum   No interventions performed  · MRCP 5/15/2023: Cirrhosis, pancreatic tail cyst, and small right pleural effusion, and moderate body wall edema were noted  · Status post IR guided liver biopsy done on 5/18/2023-pending reports  · Surgery consultation appreciated-and has now signed off  · GI following, appreciate recommendations  · Pathology from colonoscopy results on chart, reviewed

## 2023-05-21 NOTE — PLAN OF CARE
Problem: Potential for Falls  Goal: Patient will remain free of falls  Description: INTERVENTIONS:  - Educate patient/family on patient safety including physical limitations  - Instruct patient to call for assistance with activity   - Consult OT/PT to assist with strengthening/mobility   - Keep Call bell within reach  - Keep bed low and locked with side rails adjusted as appropriate  - Keep care items and personal belongings within reach  - Initiate and maintain comfort rounds  - Make Fall Risk Sign visible to staff  - Offer Toileting every 2 Hours, in advance of need  - Initiate/Maintain alarms  - Obtain necessary fall risk management equipment:  - Apply yellow socks and bracelet for high fall risk patients  - Consider moving patient to room near nurses station  Outcome: Progressing     Problem: PAIN - ADULT  Goal: Verbalizes/displays adequate comfort level or baseline comfort level  Description: Interventions:  - Encourage patient to monitor pain and request assistance  - Assess pain using appropriate pain scale  - Administer analgesics based on type and severity of pain and evaluate response  - Implement non-pharmacological measures as appropriate and evaluate response  - Consider cultural and social influences on pain and pain management  - Notify physician/advanced practitioner if interventions unsuccessful or patient reports new pain  Outcome: Progressing     Problem: SAFETY ADULT  Goal: Patient will remain free of falls  Description: INTERVENTIONS:  - Educate patient/family on patient safety including physical limitations  - Instruct patient to call for assistance with activity   - Consult OT/PT to assist with strengthening/mobility   - Keep Call bell within reach  - Keep bed low and locked with side rails adjusted as appropriate  - Keep care items and personal belongings within reach  - Initiate and maintain comfort rounds  - Make Fall Risk Sign visible to staff  - Offer Toileting every 2 Hours, in advance of need  - Initiate/Maintain alarms  - Obtain necessary fall risk management equipment:  - Apply yellow socks and bracelet for high fall risk patients  - Consider moving patient to room near nurses station  Outcome: Progressing  Goal: Maintain or return to baseline ADL function  Description: INTERVENTIONS:  - Educate patient/family on patient safety including physical limitations  - Instruct patient to call for assistance with activity   - Consult OT/PT to assist with strengthening/mobility   - Keep Call bell within reach  - Keep bed low and locked with side rails adjusted as appropriate  - Keep care items and personal belongings within reach  - Initiate and maintain comfort rounds  - Make Fall Risk Sign visible to staff  - Offer Toileting every 2 Hours, in advance of need  - Initiate/Maintain alarms  - Obtain necessary fall risk management equipment:   - Apply yellow socks and bracelet for high fall risk patients  - Consider moving patient to room near nurses station  Outcome: Progressing  Goal: Maintains/Returns to pre admission functional level  Description: INTERVENTIONS:  - Perform BMAT or MOVE assessment daily    - Set and communicate daily mobility goal to care team and patient/family/caregiver     - Collaborate with rehabilitation services on mobility goals if consulted  - Out of bed for toileting  - Record patient progress and toleration of activity level   Outcome: Progressing     Problem: INFECTION - ADULT  Goal: Absence or prevention of progression during hospitalization  Description: INTERVENTIONS:  - Assess and monitor for signs and symptoms of infection  - Monitor lab/diagnostic results  - Monitor all insertion sites, i e  indwelling lines, tubes, and drains  - Monitor endotracheal if appropriate and nasal secretions for changes in amount and color  - Nelson appropriate cooling/warming therapies per order  - Administer medications as ordered  - Instruct and encourage patient and family to use good hand hygiene technique  - Identify and instruct in appropriate isolation precautions for identified infection/condition  Outcome: Progressing     Problem: DISCHARGE PLANNING  Goal: Discharge to home or other facility with appropriate resources  Description: INTERVENTIONS:  - Identify barriers to discharge w/patient and caregiver  - Arrange for needed discharge resources and transportation as appropriate  - Identify discharge learning needs (meds, wound care, etc )  - Refer to Case Management Department for coordinating discharge planning if the patient needs post-hospital services based on physician/advanced practitioner order or complex needs related to functional status, cognitive ability, or social support system  Outcome: Progressing     Problem: Knowledge Deficit  Goal: Patient/family/caregiver demonstrates understanding of disease process, treatment plan, medications, and discharge instructions  Description: Complete learning assessment and assess knowledge base    Interventions:  - Provide teaching at level of understanding  - Provide teaching via preferred learning methods  Outcome: Progressing     Problem: Prexisting or High Potential for Compromised Skin Integrity  Goal: Skin integrity is maintained or improved  Description: INTERVENTIONS:  - Identify patients at risk for skin breakdown  - Assess and monitor skin integrity  - Assess and monitor nutrition and hydration status  - Monitor labs   - Assess for incontinence   - Turn and reposition patient  - Assist with mobility/ambulation  - Relieve pressure over bony prominences  - Avoid friction and shearing  - Provide appropriate hygiene as needed including keeping skin clean and dry  - Evaluate need for skin moisturizer/barrier cream  - Collaborate with interdisciplinary team   - Patient/family teaching  - Consider wound care consult   Outcome: Progressing     Problem: MOBILITY - ADULT  Goal: Maintain or return to baseline ADL function  Description: INTERVENTIONS:  - Educate patient/family on patient safety including physical limitations  - Instruct patient to call for assistance with activity   - Consult OT/PT to assist with strengthening/mobility   - Keep Call bell within reach  - Keep bed low and locked with side rails adjusted as appropriate  - Keep care items and personal belongings within reach  - Initiate and maintain comfort rounds  - Make Fall Risk Sign visible to staff  - Offer Toileting every 2 Hours, in advance of need  - Initiate/Maintain alarms  - Obtain necessary fall risk management equipment:   - Apply yellow socks and bracelet for high fall risk patients  - Consider moving patient to room near nurses station  Outcome: Progressing  Goal: Maintains/Returns to pre admission functional level  Description: INTERVENTIONS:  - Perform BMAT or MOVE assessment daily    - Set and communicate daily mobility goal to care team and patient/family/caregiver  - Collaborate with rehabilitation services on mobility goals if consulted  - Out of bed for toileting  - Record patient progress and toleration of activity level   Outcome: Progressing     Problem: Nutrition/Hydration-ADULT  Goal: Nutrient/Hydration intake appropriate for improving, restoring or maintaining nutritional needs  Description: Monitor and assess patient's nutrition/hydration status for malnutrition  Collaborate with interdisciplinary team and initiate plan and interventions as ordered  Monitor patient's weight and dietary intake as ordered or per policy  Utilize nutrition screening tool and intervene as necessary  Determine patient's food preferences and provide high-protein, high-caloric foods as appropriate       INTERVENTIONS:  - Monitor oral intake, urinary output, labs, and treatment plans  - Assess nutrition and hydration status and recommend course of action  - Evaluate amount of meals eaten  - Assist patient with eating if necessary   - Allow adequate time for meals  - Recommend/ encourage appropriate diets, oral nutritional supplements, and vitamin/mineral supplements  - Order, calculate, and assess calorie counts as needed  - Recommend, monitor, and adjust tube feedings and TPN/PPN based on assessed needs  - Assess need for intravenous fluids  - Provide specific nutrition/hydration education as appropriate  - Include patient/family/caregiver in decisions related to nutrition  Outcome: Progressing     Problem: GASTROINTESTINAL - ADULT  Goal: Minimal or absence of nausea and/or vomiting  Description: INTERVENTIONS:  - Administer IV fluids if ordered to ensure adequate hydration  - Maintain NPO status until nausea and vomiting are resolved  - Nasogastric tube if ordered  - Administer ordered antiemetic medications as needed  - Provide nonpharmacologic comfort measures as appropriate  - Advance diet as tolerated, if ordered  - Consider nutrition services referral to assist patient with adequate nutrition and appropriate food choices  Outcome: Progressing  Goal: Maintains or returns to baseline bowel function  Description: INTERVENTIONS:  - Assess bowel function  - Encourage oral fluids to ensure adequate hydration  - Administer IV fluids if ordered to ensure adequate hydration  - Administer ordered medications as needed  - Encourage mobilization and activity  - Consider nutritional services referral to assist patient with adequate nutrition and appropriate food choices  Outcome: Progressing  Goal: Maintains adequate nutritional intake  Description: INTERVENTIONS:  - Monitor percentage of each meal consumed  - Identify factors contributing to decreased intake, treat as appropriate  - Assist with meals as needed  - Monitor I&O, weight, and lab values if indicated  - Obtain nutrition services referral as needed  Outcome: Progressing     Problem: METABOLIC, FLUID AND ELECTROLYTES - ADULT  Goal: Electrolytes maintained within normal limits  Description: INTERVENTIONS:  - Monitor labs and assess patient for signs and symptoms of electrolyte imbalances  - Administer electrolyte replacement as ordered  - Monitor response to electrolyte replacements, including repeat lab results as appropriate  - Instruct patient on fluid and nutrition as appropriate  Outcome: Progressing  Goal: Fluid balance maintained  Description: INTERVENTIONS:  - Monitor labs   - Monitor I/O and WT  - Instruct patient on fluid and nutrition as appropriate  - Assess for signs & symptoms of volume excess or deficit  Outcome: Progressing

## 2023-05-21 NOTE — ASSESSMENT & PLAN NOTE
· Per record, nursing reported black tarry stools on 5/8 to 5/9, none since per discussion with patient  · 5/8/2023 fecal occult blood positive  · Baseline hemoglobin 10-11  · No signs of bleeding  · 5/11 had EGD/colonoscopy-see results above  · GI following appreciate recommendations

## 2023-05-21 NOTE — ASSESSMENT & PLAN NOTE
· Patient initially with hypotension, improved with midodrine  · Home metoprolol on hold due to hypotension  · Home losartan is on hold due to ALEJANDRA  · Nephrology recommendations appreciated  Patient has had higher blood pressures    May not continue to need midodrine  · Monitor blood pressure closely, adjust medications as needed

## 2023-05-21 NOTE — ASSESSMENT & PLAN NOTE
· Hemoglobin is remaining stable at 7-8 , over prior to admission the beginning of May 2023, baseline hemoglobin was around 11  · No signs of acute or overt bleeding  · Daily CBC and trend hemoglobin  · Was previously on iron supplementation at home    Resume when appropriate

## 2023-05-21 NOTE — ASSESSMENT & PLAN NOTE
• Baseline creatinine around 0 9  • Bumex discontinued by nephrology on 5/16   Was monitoring off diuretic, however, resumed Bumex 1 mg PO on 5/20/2023, now Bumex increased to 2 mg daily  • Daily metabolic panel and trend creatinine  • Continue to avoid hypotension and nephrotoxins  · Monitor I&O

## 2023-05-22 ENCOUNTER — TELEPHONE (OUTPATIENT)
Dept: GASTROENTEROLOGY | Facility: CLINIC | Age: 78
End: 2023-05-22

## 2023-05-22 LAB
ALBUMIN SERPL BCP-MCNC: 3.3 G/DL (ref 3.5–5)
ALP SERPL-CCNC: 433 U/L (ref 34–104)
ALT SERPL W P-5'-P-CCNC: 19 U/L (ref 7–52)
ANION GAP SERPL CALCULATED.3IONS-SCNC: 6 MMOL/L (ref 4–13)
AST SERPL W P-5'-P-CCNC: 51 U/L (ref 13–39)
BASOPHILS # BLD AUTO: 0.04 THOUSANDS/ÂΜL (ref 0–0.1)
BASOPHILS NFR BLD AUTO: 1 % (ref 0–1)
BILIRUB SERPL-MCNC: 1.73 MG/DL (ref 0.2–1)
BUN SERPL-MCNC: 29 MG/DL (ref 5–25)
CALCIUM ALBUM COR SERPL-MCNC: 9.3 MG/DL (ref 8.3–10.1)
CALCIUM SERPL-MCNC: 8.7 MG/DL (ref 8.4–10.2)
CHLORIDE SERPL-SCNC: 100 MMOL/L (ref 96–108)
CO2 SERPL-SCNC: 31 MMOL/L (ref 21–32)
CREAT SERPL-MCNC: 1.12 MG/DL (ref 0.6–1.3)
EOSINOPHIL # BLD AUTO: 0.23 THOUSAND/ÂΜL (ref 0–0.61)
EOSINOPHIL NFR BLD AUTO: 5 % (ref 0–6)
ERYTHROCYTE [DISTWIDTH] IN BLOOD BY AUTOMATED COUNT: 15.3 % (ref 11.6–15.1)
GFR SERPL CREATININE-BSD FRML MDRD: 47 ML/MIN/1.73SQ M
GLUCOSE SERPL-MCNC: 79 MG/DL (ref 65–140)
HCT VFR BLD AUTO: 25.2 % (ref 34.8–46.1)
HGB BLD-MCNC: 8.4 G/DL (ref 11.5–15.4)
IMM GRANULOCYTES # BLD AUTO: 0.01 THOUSAND/UL (ref 0–0.2)
IMM GRANULOCYTES NFR BLD AUTO: 0 % (ref 0–2)
INR PPP: 1.46 (ref 0.84–1.19)
LYMPHOCYTES # BLD AUTO: 1.27 THOUSANDS/ÂΜL (ref 0.6–4.47)
LYMPHOCYTES NFR BLD AUTO: 28 % (ref 14–44)
MCH RBC QN AUTO: 34.9 PG (ref 26.8–34.3)
MCHC RBC AUTO-ENTMCNC: 33.3 G/DL (ref 31.4–37.4)
MCV RBC AUTO: 105 FL (ref 82–98)
MONOCYTES # BLD AUTO: 0.82 THOUSAND/ÂΜL (ref 0.17–1.22)
MONOCYTES NFR BLD AUTO: 18 % (ref 4–12)
NEUTROPHILS # BLD AUTO: 2.23 THOUSANDS/ÂΜL (ref 1.85–7.62)
NEUTS SEG NFR BLD AUTO: 48 % (ref 43–75)
NRBC BLD AUTO-RTO: 0 /100 WBCS
PLATELET # BLD AUTO: 74 THOUSANDS/UL (ref 149–390)
PMV BLD AUTO: 12 FL (ref 8.9–12.7)
POTASSIUM SERPL-SCNC: 3.9 MMOL/L (ref 3.5–5.3)
PROT SERPL-MCNC: 6.7 G/DL (ref 6.4–8.4)
PROTHROMBIN TIME: 17.7 SECONDS (ref 11.6–14.5)
RBC # BLD AUTO: 2.41 MILLION/UL (ref 3.81–5.12)
SODIUM SERPL-SCNC: 137 MMOL/L (ref 135–147)
WBC # BLD AUTO: 4.6 THOUSAND/UL (ref 4.31–10.16)

## 2023-05-22 PROCEDURE — 97116 GAIT TRAINING THERAPY: CPT

## 2023-05-22 PROCEDURE — 85025 COMPLETE CBC W/AUTO DIFF WBC: CPT

## 2023-05-22 PROCEDURE — 80053 COMPREHEN METABOLIC PANEL: CPT

## 2023-05-22 PROCEDURE — 99233 SBSQ HOSP IP/OBS HIGH 50: CPT | Performed by: NURSE PRACTITIONER

## 2023-05-22 PROCEDURE — 97110 THERAPEUTIC EXERCISES: CPT

## 2023-05-22 PROCEDURE — 85610 PROTHROMBIN TIME: CPT

## 2023-05-22 PROCEDURE — 99232 SBSQ HOSP IP/OBS MODERATE 35: CPT | Performed by: INTERNAL MEDICINE

## 2023-05-22 RX ADMIN — HEPARIN SODIUM 5000 UNITS: 5000 INJECTION INTRAVENOUS; SUBCUTANEOUS at 05:19

## 2023-05-22 RX ADMIN — LIDOCAINE 1 PATCH: 700 PATCH TOPICAL at 08:55

## 2023-05-22 RX ADMIN — MIDODRINE HYDROCHLORIDE 2.5 MG: 5 TABLET ORAL at 12:13

## 2023-05-22 RX ADMIN — POTASSIUM CHLORIDE 20 MEQ: 1500 TABLET, EXTENDED RELEASE ORAL at 08:56

## 2023-05-22 RX ADMIN — MIDODRINE HYDROCHLORIDE 2.5 MG: 5 TABLET ORAL at 06:06

## 2023-05-22 RX ADMIN — HEPARIN SODIUM 5000 UNITS: 5000 INJECTION INTRAVENOUS; SUBCUTANEOUS at 14:21

## 2023-05-22 RX ADMIN — MIDODRINE HYDROCHLORIDE 2.5 MG: 5 TABLET ORAL at 17:09

## 2023-05-22 RX ADMIN — DOXYCYCLINE 100 MG: 100 CAPSULE ORAL at 23:22

## 2023-05-22 RX ADMIN — PANTOPRAZOLE SODIUM 40 MG: 40 TABLET, DELAYED RELEASE ORAL at 05:19

## 2023-05-22 RX ADMIN — OXYCODONE HYDROCHLORIDE 5 MG: 5 TABLET ORAL at 21:16

## 2023-05-22 RX ADMIN — HEPARIN SODIUM 5000 UNITS: 5000 INJECTION INTRAVENOUS; SUBCUTANEOUS at 21:16

## 2023-05-22 RX ADMIN — BUMETANIDE 2 MG: 1 TABLET ORAL at 08:56

## 2023-05-22 RX ADMIN — TRAZODONE HYDROCHLORIDE 50 MG: 50 TABLET ORAL at 21:16

## 2023-05-22 RX ADMIN — LEVOTHYROXINE SODIUM 75 MCG: 75 TABLET ORAL at 05:19

## 2023-05-22 RX ADMIN — DOXYCYCLINE 100 MG: 100 CAPSULE ORAL at 12:13

## 2023-05-22 NOTE — PHYSICAL THERAPY NOTE
Physical Therapy Treatment Session Note    Patient's Name: Ashley Krueger    Admitting Diagnosis  Diverticulitis [K57 92]  Abdominal pain [R10 9]    Problem List  Patient Active Problem List   Diagnosis    Acute kidney injury ([address] )    Diarrhea of presumed infectious origin    Primary hypertension    Acquired hypothyroidism    Irritable bowel syndrome with diarrhea    Abnormal CT scan    Superior mesenteric artery stenosis (HCC)    Left lower quadrant abdominal pain    Hyponatremia    PAF (paroxysmal atrial fibrillation) (HCC)    Hx of CABG    Anemia    Tarry stools    Thrombocytopenia (HCC)    Cellulitis of right lower extremity       Past Medical History  Past Medical History:   Diagnosis Date    Anemia     Atrial fibrillation (HCC)     Depression     GI (gastrointestinal bleed)     Hypomagnesemia 4/29/2023    Ischemic colitis [facility])        Past Surgical History  Past Surgical History:   Procedure Laterality Date    APPENDECTOMY      CARDIAC SURGERY      CHOLECYSTECTOMY      HIP SURGERY Right     Partial replacement    HYSTERECTOMY      IR BIOPSY TRANSJUGULAR LIVER  5/18/2023 05/22/23 0726   PT Last Visit   PT Visit Date 05/22/23   Note Type   Note Type Treatment   Pain Assessment   Pain Assessment Tool 0-10   Pain Score 7   Pain Location/Orientation Orientation: Bilateral;Orientation: Lower; Location: Back   Pain Radiating Towards yes, BLEs   Pain Onset/Description Onset: Ongoing;Frequency: Constant/Continuous; Descriptor: Aching;Descriptor: Sore  (reports warmness to BLEs,however B dopplers were negative)   Effect of Pain on Daily Activities yes   Patient's Stated Pain Goal No pain   Hospital Pain Intervention(s) Repositioned; Ambulation/increased activity; Emotional support; Environmental changes; Heat applied   Multiple Pain Sites No   Restrictions/Precautions   Weight Bearing Precautions Per Order No   Other Precautions Chair Alarm; Bed Alarm; Fall Risk;Pain   General   Chart Reviewed Yes   Response to Previous "Treatment Patient with no complaints from previous session  Family/Caregiver Present No   Cognition   Overall Cognitive Status WFL   Arousal/Participation Alert; Cooperative   Attention Within functional limits   Orientation Level Oriented X4   Memory Within functional limits   Following Commands Follows all commands and directions without difficulty   Comments Gabrielle Johnson was agreeable to PT treatment sessin, very pleasant  Subjective   Subjective 'I may go home today, that be great\"   Bed Mobility   Supine to Sit 6  Modified independent   Additional items Bedrails;HOB elevated   Sit to Supine   (DNT as Gabrielle Johnson was sitting out of bed on the recliner upon conclusion )   Additional Comments Gabrielle Johnson denied lightheadedness/dizziness with positional changes  Transfers   Sit to Stand 6  Modified independent   Additional items Bedrails   Stand to Sit 6  Modified independent   Additional items Bedrails   Stand pivot 5  Supervision   Additional items Assist x 1;Verbal cues   Additional Comments Verbal cues for base of support widening for stability during turns  Ambulation/Elevation   Gait pattern Improper Weight shift; Forward Flexion; Short stride   Gait Assistance 5  Supervision  (distant)   Additional items Assist x 1;Verbal cues   Assistive Device Rolling walker   Distance 250 feet   Stair Management Assistance Not tested   Ambulation/Elevation Additional Comments Verbal cues for appropriate RW to torso ratio  No observed balance perturbations with gair activities  Balance   Static Sitting Good   Dynamic Sitting Good   Static Standing Fair +   Dynamic Standing Fair +   Ambulatory Fair   Endurance Deficit   Endurance Deficit No   Activity Tolerance   Activity Tolerance Patient tolerated treatment well   Nurse Made Aware Yes, nursing staff was informed of treatment session outcome  Exercises   Quad Sets Sitting;25 reps;AROM; Bilateral   Heelslides Sitting;25 reps;AROM; Bilateral   Glute Sets Sitting;25 reps;AROM; Bilateral " Hip Flexion Sitting;25 reps;AROM; Bilateral   Hip Abduction Sitting;25 reps;AROM; Bilateral   Hip Adduction Sitting;25 reps;AROM; Bilateral   Knee AROM Long Arc Quad Sitting;25 reps;AROM; Bilateral   Ankle Pumps Sitting;25 reps;AROM; Bilateral   Assessment   Prognosis Good   Problem List Decreased strength; Impaired balance;Decreased mobility;Pain   Assessment Pt seen for PT treatment session this date with interventions consisting of gait training to advance toward previous level of function and reduce the risk of medical complications w/ emphasis on improving pt's ability to ambulate level surfaces x 250 feet with distant S provided by therapist with RW and Therapeutic exercise to improve BLE stability consisting of: AROM 25 reps B LE in sitting position  Pt agreeable to PT treatment session upon arrival, pt found supine in bed w/ HOB elevated, A&O x 4  In comparison to previous session, pt with improvements in ambulatory distance  Post session: pt returned back to recliner, chair alarm engaged, all needs in reach and RN notified of session findings/recommendations  Continue to recommend return to facility with rehabilitation services at time of d/c in order to maximize pt's functional independence and safety w/ mobility  Pt continues to be functioning below baseline level, and remains limited 2* factors listed above and including pain, gait deviations, impaired balance  PT will continue to see pt during current hospitalization in order to address the deficits listed above and provide interventions consistent w/ POC in effort to achieve LTGs  Barriers to Discharge None   Goals   Patient Goals to go home soon   LTG Expiration Date 05/22/23   Long Term Goal #1 LTGs remain appropriate   PT Treatment Day 4   Plan   Treatment/Interventions Functional transfer training;LE strengthening/ROM; Therapeutic exercise;Patient/family training;Equipment eval/education;Gait training;Spoke to nursing   Progress Improving as expected PT Frequency 3-5x/wk   Recommendation   PT Discharge Recommendation Return to facility with rehabilitation services  (maintained recommendation)   Equipment Recommended [facility] Recommended Wheeled walker   Change/add to [employer]? No   Additional Comments Upon conclusion, Héctor Prescott was sitting out of bed on the recliner  All of her needs were within reach     AM-PAC Basic Mobility Inpatient   Turning in Flat Bed Without Bedrails 4   Lying on Back to Sitting on Edge of Flat Bed Without Bedrails 4   Moving Bed to Chair 3   Standing Up From Chair Using Arms 3   Walk in Room 3   Climb 3-5 Stairs With Railing 3   Basic Mobility Inpatient Raw Score 20   Basic Mobility Standardized Score 43 99   Highest Level Of Mobility   JH-HLM Goal 6: Walk 10 steps or more   JH-HLM Achieved 8: Walk 250 feet ot more     Treatment Time: 3249-9642    Kevyn Guajardo, PT

## 2023-05-22 NOTE — PROGRESS NOTES
[facility]  Progress Note  Name: Helder Booth  MRN: [id number]  Unit/Bed#: -01 I Date of Admission: 4/27/2023   Date of Service: 5/22/2023 I Hospital Day: 25    Assessment/Plan      * Acute kidney injury [facility])  Assessment & Plan  • Baseline creatinine around 0 9  • Bumex discontinued by nephrology on 5/16  Was monitoring off diuretic, however, resumed Bumex 1 mg PO on 5/20/2023, now Bumex increased to 2 mg daily  • Daily metabolic panel and trend creatinine  • Continue to avoid hypotension and nephrotoxins  · Monitor I&O    Left lower quadrant abdominal pain  Assessment & Plan  · Presented to the ED with left lower quadrant abdominal pain on 4/27   · Patient reports intermittent pain across lower abdomen with mild tenderness on palpation  · CT abdomen 4/27/2023: New small amount of free fluid in the abdomen and pelvis compared to 4/18/2023, which can be secondary to cirrhosis or acute inflammatory process in the abdomen and pelvis such as occult acute diverticulitis  Moderate amount of stool in the colon, nonspecific and can represent constipation  · Obstruction series 4/29/2023: Nonobstructive bowel gas pattern  · CT abdomen pelvis 5/1/2023: Thickened appearance of the wall of the descending colon and sigmoid colon which may represent pseudo thickening from nondistention versus a mild nonspecific colitis in the appropriate clinical setting  There are a few scattered colonic diverticula  · Flexible sigmoidoscopy 5/2/2023: Minimal inflammation noted, biopsies were normal  · KUB 5/5/2023: Nonobstructive bowel gas pattern  · Colonoscopy 5/11/2023: Stricture and mucosal friability and shallow ulceration at the ileocecal valve  Single shallow ulcer in the rectosigmoid colon  Most likely explanation for these findings is Crohn's disease  · EGD 5/11/2023: Small sliding hiatal hernia  Tiny varices at the GE junction  Mild portal hypertensive gastropathy   No blood in the stomach  Normal duodenum  No interventions performed  · MRCP 5/15/2023: Cirrhosis, pancreatic tail cyst, and small right pleural effusion, and moderate body wall edema were noted  · Status post IR guided liver biopsy done on 5/18/2023-pending reports  · Surgery consultation appreciated-and has now signed off  · GI following, appreciate recommendations  · Pathology from colonoscopy results on chart, reviewed    Anemia  Assessment & Plan  · Hemoglobin is remaining stable at 7-8 , over prior to admission the beginning of May 2023, baseline hemoglobin was around 11  · No signs of acute or overt bleeding  · Daily CBC and trend hemoglobin  · Was previously on iron supplementation at home  Resume when appropriate    Cellulitis of right lower extremity  Assessment & Plan  · Continue doxycycline  · Venous duplex negative DVT  · Continue to monitor     Thrombocytopenia (HCC)  Assessment & Plan  · Most likely secondary to cirrhosis  · No signs of active bleeding  · CBC a m  Tarry stools  Assessment & Plan  · Per record, nursing reported black tarry stools on 5/8 to 5/9, none since per discussion with patient  · 5/8/2023 fecal occult blood positive  · Baseline hemoglobin 10-11  · No signs of bleeding  · 5/11 had EGD/colonoscopy-see results above  · GI following appreciate recommendations      Hx of CABG  Assessment & Plan  · Currently stable  · Losartan on hold due to hypotension    PAF (paroxysmal atrial fibrillation) (HCC)  Assessment & Plan  · Rate controlled   · Home metoprolol is on hold due to hypotension    Hyponatremia  Assessment & Plan  · Likely due to cirrhosis   · Resolved    Acquired hypothyroidism  Assessment & Plan  · Continue levothyroxine    Primary hypertension  Assessment & Plan  · Patient initially with hypotension, improved with midodrine  · Home metoprolol on hold due to hypotension  · Home losartan is on hold due to ALEJANDRA  · Nephrology recommendations appreciated    Patient has had higher blood pressures  May not continue to need midodrine  · Monitor blood pressure closely, adjust medications as needed            VTE Pharmacologic Prophylaxis: VTE Score: 3 Moderate Risk (Score 3-4) - Pharmacological DVT Prophylaxis Contraindicated  Sequential Compression Devices Ordered  Patient Centered Rounds: I performed bedside rounds with nursing staff today  Discussions with Specialists or Other Care Team Provider: BEN    Education and Discussions with Family / Patient: Patient declined call to   Total Time Spent on Date of Encounter in care of patient: 45 minutes This time was spent on one or more of the following: performing physical exam; counseling and coordination of care; obtaining or reviewing history; documenting in the medical record; reviewing/ordering tests, medications or procedures; communicating with other healthcare professionals and discussing with patient's family/caregivers  Current Length of Stay: 25 day(s)  Current Patient Status: Inpatient   Certification Statement: The patient will continue to require additional inpatient hospital stay due to ALEJANDRA  Discharge Plan: Anticipate discharge tomorrow to prior assisted or independent living facility  Code Status: Level 1 - Full Code    Subjective:   Patient seen and examined  No complaints offered  Objective:     Vitals:   Temp (24hrs), Av °F (36 7 °C), Min:97 9 °F (36 6 °C), Max:98 1 °F (36 7 °C)    Temp:  [97 9 °F (36 6 °C)-98 1 °F (36 7 °C)] 98 1 °F (36 7 °C)  HR:  [67] 67  Resp:  [18] 18  BP: (116-155)/(35-58) 155/58  SpO2:  [97 %] 97 %  Body mass index is 25 21 kg/m²  Input and Output Summary (last 24 hours): Intake/Output Summary (Last 24 hours) at 2023 1520  Last data filed at 2023 1100  Gross per 24 hour   Intake 300 ml   Output 350 ml   Net -50 ml       Physical Exam:   Physical Exam  Vitals and nursing note reviewed  HENT:      Head: Normocephalic and atraumatic        Mouth/Throat: Mouth: Mucous membranes are moist       Pharynx: Oropharynx is clear  Eyes:      Pupils: Pupils are equal, round, and reactive to light  Cardiovascular:      Rate and Rhythm: Normal rate and regular rhythm  Pulmonary:      Effort: Pulmonary effort is normal  No respiratory distress  Breath sounds: Normal breath sounds  Abdominal:      General: Bowel sounds are normal       Palpations: Abdomen is soft  Tenderness: There is no abdominal tenderness  Musculoskeletal:      Cervical back: Neck supple  Right lower leg: Edema present  Left lower leg: Edema present  Skin:     General: Skin is warm and dry  Capillary Refill: Capillary refill takes less than 2 seconds  Neurological:      General: No focal deficit present  Mental Status: She is alert           Additional Data:     Labs:  Results from last 7 days   Lab Units 05/22/23  0518   WBC Thousand/uL 4 60   HEMOGLOBIN g/dL 8 4*   HEMATOCRIT % 25 2*   PLATELETS Thousands/uL 74*   NEUTROS PCT % 48   LYMPHS PCT % 28   MONOS PCT % 18*   EOS PCT % 5     Results from last 7 days   Lab Units 05/22/23  0518   SODIUM mmol/L 137   POTASSIUM mmol/L 3 9   CHLORIDE mmol/L 100   CO2 mmol/L 31   BUN mg/dL 29*   CREATININE mg/dL 1 12   ANION GAP mmol/L 6   CALCIUM mg/dL 8 7   ALBUMIN g/dL 3 3*   TOTAL BILIRUBIN mg/dL 1 73*   ALK PHOS U/L 433*   ALT U/L 19   AST U/L 51*   GLUCOSE RANDOM mg/dL 79     Results from last 7 days   Lab Units 05/22/23  0518   INR  1 46*                   Lines/Drains:  Invasive Devices     None                       Imaging: Reviewed radiology reports from this admission including: abdominal/pelvic CT, MRI abdomen/MRCP, xray(s) and procedure reports    Recent Cultures (last 7 days):         Last 24 Hours Medication List:   Current Facility-Administered Medications   Medication Dose Route Frequency Provider Last Rate   • acetaminophen  650 mg Oral Q6H PRN Kj Iglesias MD     • barium  900 mL Oral Once in imaging Karen Hernández MD     • bumetanide  2 mg Oral Daily Mynor Hsu, DO     • dextromethorphan-guaiFENesin  10 mL Oral Q6H PRN EBEN Hooker     • dicyclomine  10 mg Oral TID PRN Clarita Gowers, MD     • doxycycline hyclate  100 mg Oral Q12H Cristobal Greenfield MD     • heparin (porcine)  5,000 Units Subcutaneous Q8H Salvador Georges MD     • levothyroxine  75 mcg Oral Early Morning Ratna Gallegos MD     • lidocaine  1 patch Topical Daily EBEN Hooker     • midodrine  2 5 mg Oral TID [facility] Helio DO     • ondansetron  4 mg Oral Q6H PRN Chey Madison PA-C     • oxyCODONE  5 mg Oral TID PRN Chey Madison PA-C     • pantoprazole  40 mg Oral Early Morning YOVANI Gupta     • potassium chloride  20 mEq Oral Daily DO Helio     • traZODone  50 mg Oral HS Hiram Chambers PA-C          Today, Patient Was Seen By: EBEN Guallpa    **Please Note: This note may have been constructed using a voice recognition system  **

## 2023-05-22 NOTE — PLAN OF CARE
Problem: PHYSICAL THERAPY ADULT  Goal: Performs mobility at highest level of function for planned discharge setting  See evaluation for individualized goals  Description: Treatment/Interventions: Functional transfer training, Endurance training, Therapeutic exercise, Gait training, Bed mobility          See flowsheet documentation for full assessment, interventions and recommendations  Outcome: Progressing  Note: Prognosis: Good  Problem List: Decreased strength, Impaired balance, Decreased mobility, Pain  Assessment: Pt seen for PT treatment session this date with interventions consisting of gait training to advance toward previous level of function and reduce the risk of medical complications w/ emphasis on improving pt's ability to ambulate level surfaces x 250 feet with distant S provided by therapist with RW and Therapeutic exercise to improve BLE stability consisting of: AROM 25 reps B LE in sitting position  Pt agreeable to PT treatment session upon arrival, pt found supine in bed w/ HOB elevated, A&O x 4  In comparison to previous session, pt with improvements in ambulatory distance  Post session: pt returned back to recliner, chair alarm engaged, all needs in reach and RN notified of session findings/recommendations  Continue to recommend return to facility with rehabilitation services at time of d/c in order to maximize pt's functional independence and safety w/ mobility  Pt continues to be functioning below baseline level, and remains limited 2* factors listed above and including pain, gait deviations, impaired balance  PT will continue to see pt during current hospitalization in order to address the deficits listed above and provide interventions consistent w/ POC in effort to achieve LTGs  Barriers to Discharge: None     PT Discharge Recommendation: Return to facility with rehabilitation services (maintained recommendation)    See flowsheet documentation for full assessment

## 2023-05-22 NOTE — NUTRITION
05/22/23 1526   Biochemical Data,Medical Tests, and Procedures   Biochemical Data/Medical Tests/Procedures Lab values reviewed; Meds reviewed   Labs (Comment) 5/22/23 H&H 8 4/25 2, BUN 29, AST 51, total bilirubin 1 73, Mg 1 7   Meds (Comment) bumex, heparin, levothyroxine, protonix, potassium chloride   Nutrition-Focused Physical Exam   Nutrition-Focused Physical Exam Findings RN skin assessment reviewed;Edema   Nutrition-Focused Physical Exam Findings Pt with trace BL UE edema, +1 right LE edema, +2 left LE edema at present  Medical-Related Concerns PMH reviewed   Current PO Intake   Current Diet Order Regular diet, thin liquids, 1500 mL fluid restriction, 2 g Na, lactose restriction   Nutrition Supplements Ensure Plant-based  (BID)   Current Meal Intake 25-50%;50-75%;%   Intake Supplements %   Estimated calorie intake compared to estimated need Nutrient needs are met  PES Statement   Problem Continue previous diagnosis  (improving)   Recommendations/Interventions   Malnutrition/BMI Present No   Summary Nutrition follow up assessment  Liver biopsy 5/18 - results pending  Weight loss noted this hospital admission, likely impacted by fluid status however weight readings have fluctuated between 112# - 135#  Pt with trace BL UE edema, +1 right LE edema, +2 left LE edema at present  Will continue to monitor  Ordered for Regular diet, thin liquids, 1500 mL fluid restriction, 2 g Na , lactose restriction  Meal intakes documented at %  Also ordered for Ensure Plant Based BID  Pt states “they’re telling me I have to eat more, I can only eat so much ” Pt reports drinking Ensure Plant Based nutrition supplements daily  RD contacted kitchen to send both chocolate and vanilla at breakfast as pt enjoys mixing flavors  RD continues to follow     Interventions/Recommendations Continue current diet order;Supplement continue;Monitor I & O's   Education Assessment   Education Education not indicated at this time   Patient Nutrition Goals   Goal Adequate hydration; Adequate intake;Meet PO needs

## 2023-05-22 NOTE — PROGRESS NOTES
Progress Note - Nephrology   Jena Oar 69 y o  female MRN: [id number]  Unit/Bed#: -01 Encounter: [id number]    A/P:  1  Acute kidney injury   - Max creatinine was 1 61 mg/dL and has improved to 1 12 mg/dL  -Nonoliguric and urine output since admission is -7393 mils   - Tolerating Bumex 2 mg daily with improvement in kidney indices   - Continue current treatment with daily weights and I/O   - Receiving midodrine 2 5 mg 3 times a day and blood pressure this morning is 116/35    - However, has had higher blood pressures and may not need midodrine at all  May have been started due to suspected hepatorenal syndrome  2  Volume overload due to cirrhosis and acute kidney injury   -  Lost 1 3 kg in 24 hours   - continue daily weights with standing scale only    3  Suspected cirrhosis   - Has portal hypertension and had a liver biopsy on May 18  Results are pending at the time of this dictation    4  Anemia and thrombocytopenia   - Likely due to underlying liver disease    - Monitor hemoglobin closely    5  Hypokalemia   - Responded to potassium supplementation with a potassium of 3 9mEq/dL      Follow up reason for today's visit: Acute kidney injury, volume overload    Acute kidney injury [facility])    Patient Active Problem List   Diagnosis   • Acute kidney injury ([facility] [address] )   • Diarrhea of presumed infectious origin   • Primary hypertension   • Acquired hypothyroidism   • Irritable bowel syndrome with diarrhea   • Abnormal CT scan   • Superior mesenteric artery stenosis (HCC)   • Left lower quadrant abdominal pain   • Hyponatremia   • PAF (paroxysmal atrial fibrillation) (HCC)   • Hx of CABG   • Anemia   • Tarry stools   • Thrombocytopenia (HCC)   • Cellulitis of right lower extremity         Subjective:   Complains of a frontal headache, intermittent chest discomfort which resolves quickly, has dyspnea on exertion when ambulating in the halls and minimal orthopnea    No shortness of breath on rest   Has nausea "and has had loose bowel movements after meals  She is voiding well  She complains of low back pain secondary to multiple compression fractures  The remainder of the 10 point review of systems is negative    Objective:     Vitals: Blood pressure (!) 116/35, pulse 67, temperature 97 9 °F (36 6 °C), resp  rate 18, height 4' 11\" (1 499 m), weight 51 kg (112 lb 7 oz), SpO2 97 %  ,Body mass index is 25 21 kg/m²      Weight (last 2 days)     Date/Time Weight    05/22/23 0531 51 (112 43)    05/21/23 0600 52 3 (115 3)    05/20/23 0600 52 6 (115 96)            Intake/Output Summary (Last 24 hours) at 5/22/2023 1122  Last data filed at 5/22/2023 4217  Gross per 24 hour   Intake 240 ml   Output 350 ml   Net -110 ml     I/O last 3 completed shifts:  In: -   Out: 650 [Urine:650]         Physical Exam: BP (!) 116/35   Pulse 67   Temp 97 9 °F (36 6 °C)   Resp 18   Ht 4' 11\" (1 499 m)   Wt 51 kg (112 lb 7 oz)   SpO2 97%   BMI 25 21 kg/m²     General Appearance:    Alert, cooperative, no distress, appears stated age   Head:    Normocephalic, without obvious abnormality, atraumatic   Eyes:    Conjunctiva/corneas clear   Ears:    Normal external ears   Nose:   Nares normal, septum midline, mucosa normal, no drainage    or sinus tenderness   Throat:   Lips, mucosa, and tongue normal; teeth and gums normal   Neck:   Supple, symmetrical, trachea midline, no adenopathy;        thyroid:  No enlargement/tenderness/nodules; no carotid    bruit or JVD   Back:     Symmetric, no curvature, ROM normal, no CVA tenderness   Lungs:     Clear to auscultation bilaterally, respirations unlabored   Chest wall:    No tenderness or deformity    - has tenderness over lower lumbar spine and lying on a heating pad   Heart:    Regular rate and rhythm, S1 and S2 normal, no murmur, rub   or gallop   Abdomen:      Distended and firm with mild lower abd tenderness   Extremities:   Extremities normal, atraumatic,has 2+ edema   Skin:   Skin color,has " ecchymoses   Lymph nodes:   Cervical normal   Neurologic:   CNII-XII intact            Lab, Imaging and other studies: I have personally reviewed pertinent labs  CBC:   Lab Results   Component Value Date    WBC 4 60 05/22/2023    HGB 8 4 (L) 05/22/2023    HCT 25 2 (L) 05/22/2023     (H) 05/22/2023    PLT 74 (L) 05/22/2023    MCH 34 9 (H) 05/22/2023    MCHC 33 3 05/22/2023    RDW 15 3 (H) 05/22/2023    MPV 12 0 05/22/2023    NRBC 0 05/22/2023     CMP:   Lab Results   Component Value Date    K 3 9 05/22/2023     05/22/2023    CO2 31 05/22/2023    BUN 29 (H) 05/22/2023    CREATININE 1 12 05/22/2023    CALCIUM 8 7 05/22/2023    AST 51 (H) 05/22/2023    ALT 19 05/22/2023    ALKPHOS 433 (H) 05/22/2023    EGFR 47 05/22/2023         Results from last 7 days   Lab Units 05/22/23  0518 05/21/23  0502 05/20/23  0437 05/19/23  0551   POTASSIUM mmol/L 3 9 3 3* 3 5 3 9   CHLORIDE mmol/L 100 98 100 98   CO2 mmol/L 31 31 32 29   BUN mg/dL 29* 32* 35* 38*   CREATININE mg/dL 1 12 1 36* 1 34* 1 48*   CALCIUM mg/dL 8 7 8 9 8 6 8 8   ALK PHOS U/L 433*  --  375* 439*   ALT U/L 19  --  18 20   AST U/L 51*  --  46* 57*         Phosphorus: No results found for: PHOS  Magnesium: No results found for: MG  Urinalysis: No results found for: COLORU, CLARITYU, SPECGRAV, PHUR, LEUKOCYTESUR, NITRITE, PROTEINUA, GLUCOSEU, KETONESU, BILIRUBINUR, BLOODU  Ionized Calcium: No results found for: CAION  Coagulation:   Lab Results   Component Value Date    INR 1 46 (H) 05/22/2023     Troponin: No results found for: TROPONINI  ABG: No results found for: PHART, XBG0QOE, PO2ART, GVC3OFQ, F1KNJITI, BEART, SOURCE  Radiology review:     IMAGING  Procedure: VAS lower limb venous duplex study, complete bilateral    Result Date: 5/19/2023  Narrative:  [facility] REPORT CLINICAL: Indications: Patient presents with bilateral edema,  redness and warmth   Operative History: Coronary artery bypass graft 2021 Risk Factors The patient has history of HTN   FINDINGS:  Right           Impression      GSV Prox Thigh  Not Visualized  GSV Mid Thigh   Not Visualized  GSV Dist Thigh  Not Visualized  GSV Knee        Not Visualized  GSV Mid Calf    Not Visualized     CONCLUSION: Impression: RIGHT LOWER LIMB: No evidence of acute or chronic deep vein thrombosis  Great Saphenous vein harvested  Doppler evaluation shows a normal response to augmentation maneuvers    Popliteal, posterior tibial and anterior tibial arterial Doppler waveform's are triphasic/biphasic  LEFT LOWER LIMB: No evidence of acute or chronic deep vein thrombosis  No evidence of superficial thrombophlebitis noted  Doppler evaluation shows a normal response to augmentation maneuvers  Popliteal, posterior tibial and anterior tibial arterial Doppler waveform's are triphasic/biphasic  TECH NOTE: Pulsatile waveforms noted throughout the venous system which may suggest heart failure or tricuspid valve insufficiency    SIGNATURE: Electronically Signed by: Criss Faye DO on 2023-05-19 07:10:58 PM      Current Facility-Administered Medications   Medication Dose Route Frequency   • acetaminophen (TYLENOL) tablet 650 mg  650 mg Oral Q6H PRN   • barium (READI-CAT 2) suspension 900 mL  900 mL Oral Once in imaging   • bumetanide (BUMEX) tablet 2 mg  2 mg Oral Daily   • dextromethorphan-guaiFENesin (ROBITUSSIN DM) oral syrup 10 mL  10 mL Oral Q6H PRN   • dicyclomine (BENTYL) capsule 10 mg  10 mg Oral TID PRN   • doxycycline hyclate (VIBRAMYCIN) capsule 100 mg  100 mg Oral Q12H   • heparin (porcine) subcutaneous injection 5,000 Units  5,000 Units Subcutaneous Q8H [facility]   • levothyroxine tablet 75 mcg  75 mcg Oral Early Morning   • lidocaine (LIDODERM) 5 % patch 1 patch  1 patch Topical Daily   • midodrine (PROAMATINE) tablet 2 5 mg  2 5 mg Oral TID AC   • ondansetron (ZOFRAN-ODT) dispersible tablet 4 mg  4 mg Oral Q6H PRN   • oxyCODONE (ROXICODONE) IR tablet 5 mg  5 mg Oral TID PRN   • pantoprazole (PROTONIX) EC tablet 40 mg  40 mg Oral Early Morning   • potassium chloride (K-DUR,KLOR-CON) CR tablet 20 mEq  20 mEq Oral Daily   • traZODone (DESYREL) tablet 50 mg  50 mg Oral HS     Medications Discontinued During This Encounter   Medication Reason   • polyethylene glycol (GLYCOLAX) 17 GM/SCOOP powder Therapy completed   • ciprofloxacin (CIPRO) IVPB (premix in 5% dextrose) 400 mg 200 mL    • metroNIDAZOLE (FLAGYL) IVPB (premix) 500 mg 100 mL    • dextrose 5 % and sodium chloride 0 45 % with KCl 20 mEq/L infusion    • sodium chloride 0 9 % infusion    • polyethylene glycol (MIRALAX) packet 17 g    • ciprofloxacin (CIPRO) tablet 500 mg    • metroNIDAZOLE (FLAGYL) tablet 500 mg    • sodium bicarbonate tablet 650 mg    • enoxaparin (LOVENOX) subcutaneous injection 40 mg    • furosemide (LASIX) injection 80 mg    • multi-electrolyte (PLASMALYTE-A/ISOLYTE-S PH 7 4) IV solution    • metoprolol succinate (TOPROL-XL) 24 hr tablet 25 mg    • midodrine (PROAMATINE) tablet 2 5 mg    • enoxaparin (LOVENOX) subcutaneous injection 30 mg    • metoprolol succinate (TOPROL-XL) 24 hr tablet 12 5 mg    • magnesium hydroxide (MILK OF MAGNESIA) oral suspension 30 mL    • polyethylene glycol (MIRALAX) packet 17 g    • morphine injection 4 mg    • multi-electrolyte (PLASMALYTE-A/ISOLYTE-S PH 7 4) IV solution    • heparin (porcine) subcutaneous injection 5,000 Units    • pantoprazole (PROTONIX) EC tablet 40 mg    • albumin human (FLEXBUMIN) 25 % injection 12 5 g    • bumetanide (BUMEX) injection 1 mg    • bumetanide (BUMEX) injection 0 5 mg    • midodrine (PROAMATINE) tablet 5 mg    • pantoprazole (PROTONIX) EC tablet 40 mg    • bumetanide (BUMEX) injection 1 mg    • bumetanide (BUMEX) injection 2 mg    • bumetanide (BUMEX) injection 2 mg    • potassium chloride 20 mEq IVPB (premix)    • octreotide (SandoSTATIN) injection 100 mcg    • bumetanide (BUMEX) tablet 2 mg    • doxycycline (VIBRAMYCIN) 100 mg in sodium chloride 0 9 % 100 mL IVPB    • erythromycin (ILOTYCIN) 0 5 % ophthalmic ointment 0 5 inch    • potassium chloride (K-DUR,KLOR-CON) CR tablet 10 mEq    • potassium chloride (K-DUR,KLOR-CON) CR tablet 10 mEq    • bumetanide (BUMEX) tablet 1 mg    • ondansetron (ZOFRAN) injection 4 mg    • HYDROmorphone (DILAUDID) injection 0 5 mg        Christian Davalos MD      This progress note was produced in part using a dictation device which may document imprecise wording from author's original intent

## 2023-05-22 NOTE — CASE MANAGEMENT
Case Management Discharge Planning Note    Patient name Rian Buckley  Location [address] [id number]/-[id number] MRN [id number]  : 1945 Date 2023       Current Admission Date: 2023  Current Admission Diagnosis:Acute kidney injury [facility])   Patient Active Problem List    Diagnosis Date Noted   • Thrombocytopenia ([facility] [address] ) 2023   • Cellulitis of right lower extremity 2023   • Tarry stools 2023   • Hyponatremia 2023   • Anemia 2023   • Left lower quadrant abdominal pain 2023   • Superior mesenteric artery stenosis ([facility] [address] ) 2023   • Acute kidney injury ([facility] [address] ) 2023   • Diarrhea of presumed infectious origin 2023   • Primary hypertension 2023   • Acquired hypothyroidism 2023   • Irritable bowel syndrome with diarrhea 2023   • Abnormal CT scan 2023   • Hx of CABG 2023   • PAF (paroxysmal atrial fibrillation) ([facility] [address] ) 2022      LOS (days): 25  Geometric Mean LOS (GMLOS) (days): 3 80  Days to GMLOS:-21 3     OBJECTIVE:  Risk of Unplanned Readmission Score: 27 44         Current admission status: Inpatient   Preferred Pharmacy:   [address] [place] [address]  [facility]   [place] [address]  Phone: [phone] Fax: [phone]    Primary Care Provider: Stu Shetty MD    Primary Insurance: MEDICARE  Secondary Insurance: Ivonne Hutchinson    DISCHARGE DETAILS:    Discharge planning discussed with[de-identified] patient  & sister was called at 25 :02pm [place] & Kyle Mitchel was ccalled at [facility] Galeazzi at 13:18 pm  Freedom of Choice: Yes  Comments - Freedom of Choice: recommendation is to return to  the facility with rehab services- rx for therapy will be needed- no covid test needed-  avs needs to be faxed b prior to d//c fax # [phone]  CM contacted family/caregiver?: Yes             Contacts  Patient Contacts: Ernst Paris (Sister)   [phone] (Mobile)  Relationship to Patient[de-identified] Family (sister)  Contact Method: Phone  Phone Number: Rachel Shell (Sister)   [phone] (Mobile) message was left for her to call to discuss d/c plan  Reason/Outcome: Discharge [address]         Is the patient interested in [facility] at discharge?: No    DME Referral Provided  Referral made for DME?: No    Other Referral/Resources/Interventions Provided:  Interventions: Assisted Living, Outpatient OT, Outpatient PT  Referral Comments: return to [facility] with rehab at the facility- rx is needed- avs to be faxed prior to d/c    Would you like to participate in our [address] service program?  : No - Declined    Treatment Team Recommendation: Assisted Living ([address] Natasha Barreto & outpt follow up 13:00 w/c Natalia Vidal)     Transport at Discharge : [facility]  Dispatcher Contacted: Yes  Number/Name of Dispatcher: [phone]  [facility]  [place]  ETA of Transport (Date): 05/22/23  ETA of Transport (Time): 1300     Transfer Mode: Wheelchair        IMM Given (Date):: 05/22/23  IMM Given to[de-identified] Patient  Family notified[de-identified] message left for the sister  Additional Comments: pt is aware there is a fee for the transport

## 2023-05-22 NOTE — PLAN OF CARE
Problem: Potential for Falls  Goal: Patient will remain free of falls  Description: INTERVENTIONS:  - Educate patient/family on patient safety including physical limitations  - Instruct patient to call for assistance with activity   - Consult OT/PT to assist with strengthening/mobility   - Keep Call bell within reach  - Keep bed low and locked with side rails adjusted as appropriate  - Keep care items and personal belongings within reach  - Initiate and maintain comfort rounds  - Make Fall Risk Sign visible to staff  - Offer Toileting every 2 Hours, in advance of need  - Initiate/Maintain alarms  - Obtain necessary fall risk management equipment:  - Apply yellow socks and bracelet for high fall risk patients  - Consider moving patient to room near nurses station  Outcome: Progressing     Problem: PAIN - ADULT  Goal: Verbalizes/displays adequate comfort level or baseline comfort level  Description: Interventions:  - Encourage patient to monitor pain and request assistance  - Assess pain using appropriate pain scale  - Administer analgesics based on type and severity of pain and evaluate response  - Implement non-pharmacological measures as appropriate and evaluate response  - Consider cultural and social influences on pain and pain management  - Notify physician/advanced practitioner if interventions unsuccessful or patient reports new pain  Outcome: Progressing     Problem: SAFETY ADULT  Goal: Patient will remain free of falls  Description: INTERVENTIONS:  - Educate patient/family on patient safety including physical limitations  - Instruct patient to call for assistance with activity   - Consult OT/PT to assist with strengthening/mobility   - Keep Call bell within reach  - Keep bed low and locked with side rails adjusted as appropriate  - Keep care items and personal belongings within reach  - Initiate and maintain comfort rounds  - Make Fall Risk Sign visible to staff  - Offer Toileting every 2 Hours, in advance of need  - Initiate/Maintain alarms  - Obtain necessary fall risk management equipment:  - Apply yellow socks and bracelet for high fall risk patients  - Consider moving patient to room near nurses station  Outcome: Progressing  Goal: Maintain or return to baseline ADL function  Description: INTERVENTIONS:  - Educate patient/family on patient safety including physical limitations  - Instruct patient to call for assistance with activity   - Consult OT/PT to assist with strengthening/mobility   - Keep Call bell within reach  - Keep bed low and locked with side rails adjusted as appropriate  - Keep care items and personal belongings within reach  - Initiate and maintain comfort rounds  - Make Fall Risk Sign visible to staff  - Offer Toileting every 2 Hours, in advance of need  - Initiate/Maintain alarms  - Obtain necessary fall risk management equipment:   - Apply yellow socks and bracelet for high fall risk patients  - Consider moving patient to room near nurses station  Outcome: Progressing  Goal: Maintains/Returns to pre admission functional level  Description: INTERVENTIONS:  - Perform BMAT or MOVE assessment daily    - Set and communicate daily mobility goal to care team and patient/family/caregiver     - Collaborate with rehabilitation services on mobility goals if consulted  - Out of bed for toileting  - Record patient progress and toleration of activity level   Outcome: Progressing     Problem: INFECTION - ADULT  Goal: Absence or prevention of progression during hospitalization  Description: INTERVENTIONS:  - Assess and monitor for signs and symptoms of infection  - Monitor lab/diagnostic results  - Monitor all insertion sites, i e  indwelling lines, tubes, and drains  - Monitor endotracheal if appropriate and nasal secretions for changes in amount and color  - [place] appropriate cooling/warming therapies per order  - Administer medications as ordered  - Instruct and encourage patient and family to use good hand hygiene technique  - Identify and instruct in appropriate isolation precautions for identified infection/condition  Outcome: Progressing     Problem: DISCHARGE PLANNING  Goal: Discharge to home or other facility with appropriate resources  Description: INTERVENTIONS:  - Identify barriers to discharge w/patient and caregiver  - Arrange for needed discharge resources and transportation as appropriate  - Identify discharge learning needs (meds, wound care, etc )  - Refer to Case Management Department for coordinating discharge planning if the patient needs post-hospital services based on physician/advanced practitioner order or complex needs related to functional status, cognitive ability, or social support system  Outcome: Progressing     Problem: Knowledge Deficit  Goal: Patient/family/caregiver demonstrates understanding of disease process, treatment plan, medications, and discharge instructions  Description: Complete learning assessment and assess knowledge base    Interventions:  - Provide teaching at level of understanding  - Provide teaching via preferred learning methods  Outcome: Progressing     Problem: Prexisting or High Potential for Compromised Skin Integrity  Goal: Skin integrity is maintained or improved  Description: INTERVENTIONS:  - Identify patients at risk for skin breakdown  - Assess and monitor skin integrity  - Assess and monitor nutrition and hydration status  - Monitor labs   - Assess for incontinence   - Turn and reposition patient  - Assist with mobility/ambulation  - Relieve pressure over bony prominences  - Avoid friction and shearing  - Provide appropriate hygiene as needed including keeping skin clean and dry  - Evaluate need for skin moisturizer/barrier cream  - Collaborate with interdisciplinary team   - Patient/family teaching  - Consider wound care consult   Outcome: Progressing     Problem: MOBILITY - ADULT  Goal: Maintain or return to baseline ADL function  Description: INTERVENTIONS:  - Educate patient/family on patient safety including physical limitations  - Instruct patient to call for assistance with activity   - Consult OT/PT to assist with strengthening/mobility   - Keep Call bell within reach  - Keep bed low and locked with side rails adjusted as appropriate  - Keep care items and personal belongings within reach  - Initiate and maintain comfort rounds  - Make Fall Risk Sign visible to staff  - Offer Toileting every 2 Hours, in advance of need  - Initiate/Maintain alarms  - Obtain necessary fall risk management equipment:   - Apply yellow socks and bracelet for high fall risk patients  - Consider moving patient to room near nurses station  Outcome: Progressing  Goal: Maintains/Returns to pre admission functional level  Description: INTERVENTIONS:  - Perform BMAT or MOVE assessment daily    - Set and communicate daily mobility goal to care team and patient/family/caregiver  - Collaborate with rehabilitation services on mobility goals if consulted  - Out of bed for toileting  - Record patient progress and toleration of activity level   Outcome: Progressing     Problem: Nutrition/Hydration-ADULT  Goal: Nutrient/Hydration intake appropriate for improving, restoring or maintaining nutritional needs  Description: Monitor and assess patient's nutrition/hydration status for malnutrition  Collaborate with interdisciplinary team and initiate plan and interventions as ordered  Monitor patient's weight and dietary intake as ordered or per policy  Utilize nutrition screening tool and intervene as necessary  Determine patient's food preferences and provide high-protein, high-caloric foods as appropriate       INTERVENTIONS:  - Monitor oral intake, urinary output, labs, and treatment plans  - Assess nutrition and hydration status and recommend course of action  - Evaluate amount of meals eaten  - Assist patient with eating if necessary   - Allow adequate time for meals  - Recommend/ encourage appropriate diets, oral nutritional supplements, and vitamin/mineral supplements  - Order, calculate, and assess calorie counts as needed  - Recommend, monitor, and adjust tube feedings and TPN/PPN based on assessed needs  - Assess need for intravenous fluids  - Provide specific nutrition/hydration education as appropriate  - Include patient/family/caregiver in decisions related to nutrition  Outcome: Progressing     Problem: GASTROINTESTINAL - ADULT  Goal: Minimal or absence of nausea and/or vomiting  Description: INTERVENTIONS:  - Administer IV fluids if ordered to ensure adequate hydration  - Maintain NPO status until nausea and vomiting are resolved  - Nasogastric tube if ordered  - Administer ordered antiemetic medications as needed  - Provide nonpharmacologic comfort measures as appropriate  - Advance diet as tolerated, if ordered  - Consider nutrition services referral to assist patient with adequate nutrition and appropriate food choices  Outcome: Progressing  Goal: Maintains or returns to baseline bowel function  Description: INTERVENTIONS:  - Assess bowel function  - Encourage oral fluids to ensure adequate hydration  - Administer IV fluids if ordered to ensure adequate hydration  - Administer ordered medications as needed  - Encourage mobilization and activity  - Consider nutritional services referral to assist patient with adequate nutrition and appropriate food choices  Outcome: Progressing  Goal: Maintains adequate nutritional intake  Description: INTERVENTIONS:  - Monitor percentage of each meal consumed  - Identify factors contributing to decreased intake, treat as appropriate  - Assist with meals as needed  - Monitor I&O, weight, and lab values if indicated  - Obtain nutrition services referral as needed  Outcome: Progressing

## 2023-05-22 NOTE — PLAN OF CARE
Problem: Potential for Falls  Goal: Patient will remain free of falls  Description: INTERVENTIONS:  - Educate patient/family on patient safety including physical limitations  - Instruct patient to call for assistance with activity   - Consult OT/PT to assist with strengthening/mobility   - Keep Call bell within reach  - Keep bed low and locked with side rails adjusted as appropriate  - Keep care items and personal belongings within reach  - Initiate and maintain comfort rounds  - Make Fall Risk Sign visible to staff  - Offer Toileting every 2 Hours, in advance of need  - Initiate/Maintain alarms  - Obtain necessary fall risk management equipment:  - Apply yellow socks and bracelet for high fall risk patients  - Consider moving patient to room near nurses station  Outcome: Progressing     Problem: PAIN - ADULT  Goal: Verbalizes/displays adequate comfort level or baseline comfort level  Description: Interventions:  - Encourage patient to monitor pain and request assistance  - Assess pain using appropriate pain scale  - Administer analgesics based on type and severity of pain and evaluate response  - Implement non-pharmacological measures as appropriate and evaluate response  - Consider cultural and social influences on pain and pain management  - Notify physician/advanced practitioner if interventions unsuccessful or patient reports new pain  Outcome: Progressing     Problem: SAFETY ADULT  Goal: Patient will remain free of falls  Description: INTERVENTIONS:  - Educate patient/family on patient safety including physical limitations  - Instruct patient to call for assistance with activity   - Consult OT/PT to assist with strengthening/mobility   - Keep Call bell within reach  - Keep bed low and locked with side rails adjusted as appropriate  - Keep care items and personal belongings within reach  - Initiate and maintain comfort rounds  - Make Fall Risk Sign visible to staff  - Offer Toileting every 2 Hours, in advance of need  - Initiate/Maintain alarms  - Obtain necessary fall risk management equipment:  - Apply yellow socks and bracelet for high fall risk patients  - Consider moving patient to room near nurses station  Outcome: Progressing  Goal: Maintain or return to baseline ADL function  Description: INTERVENTIONS:  - Educate patient/family on patient safety including physical limitations  - Instruct patient to call for assistance with activity   - Consult OT/PT to assist with strengthening/mobility   - Keep Call bell within reach  - Keep bed low and locked with side rails adjusted as appropriate  - Keep care items and personal belongings within reach  - Initiate and maintain comfort rounds  - Make Fall Risk Sign visible to staff  - Offer Toileting every 2 Hours, in advance of need  - Initiate/Maintain alarms  - Obtain necessary fall risk management equipment:   - Apply yellow socks and bracelet for high fall risk patients  - Consider moving patient to room near nurses station  Outcome: Progressing  Goal: Maintains/Returns to pre admission functional level  Description: INTERVENTIONS:  - Perform BMAT or MOVE assessment daily    - Set and communicate daily mobility goal to care team and patient/family/caregiver     - Collaborate with rehabilitation services on mobility goals if consulted  - Out of bed for toileting  - Record patient progress and toleration of activity level   Outcome: Progressing     Problem: INFECTION - ADULT  Goal: Absence or prevention of progression during hospitalization  Description: INTERVENTIONS:  - Assess and monitor for signs and symptoms of infection  - Monitor lab/diagnostic results  - Monitor all insertion sites, i e  indwelling lines, tubes, and drains  - Monitor endotracheal if appropriate and nasal secretions for changes in amount and color  - [place] appropriate cooling/warming therapies per order  - Administer medications as ordered  - Instruct and encourage patient and family to use good hand hygiene technique  - Identify and instruct in appropriate isolation precautions for identified infection/condition  Outcome: Progressing     Problem: DISCHARGE PLANNING  Goal: Discharge to home or other facility with appropriate resources  Description: INTERVENTIONS:  - Identify barriers to discharge w/patient and caregiver  - Arrange for needed discharge resources and transportation as appropriate  - Identify discharge learning needs (meds, wound care, etc )  - Refer to Case Management Department for coordinating discharge planning if the patient needs post-hospital services based on physician/advanced practitioner order or complex needs related to functional status, cognitive ability, or social support system  Outcome: Progressing     Problem: Knowledge Deficit  Goal: Patient/family/caregiver demonstrates understanding of disease process, treatment plan, medications, and discharge instructions  Description: Complete learning assessment and assess knowledge base    Interventions:  - Provide teaching at level of understanding  - Provide teaching via preferred learning methods  Outcome: Progressing     Problem: Prexisting or High Potential for Compromised Skin Integrity  Goal: Skin integrity is maintained or improved  Description: INTERVENTIONS:  - Identify patients at risk for skin breakdown  - Assess and monitor skin integrity  - Assess and monitor nutrition and hydration status  - Monitor labs   - Assess for incontinence   - Turn and reposition patient  - Assist with mobility/ambulation  - Relieve pressure over bony prominences  - Avoid friction and shearing  - Provide appropriate hygiene as needed including keeping skin clean and dry  - Evaluate need for skin moisturizer/barrier cream  - Collaborate with interdisciplinary team   - Patient/family teaching  - Consider wound care consult   Outcome: Progressing     Problem: MOBILITY - ADULT  Goal: Maintain or return to baseline ADL function  Description: INTERVENTIONS:  - Educate patient/family on patient safety including physical limitations  - Instruct patient to call for assistance with activity   - Consult OT/PT to assist with strengthening/mobility   - Keep Call bell within reach  - Keep bed low and locked with side rails adjusted as appropriate  - Keep care items and personal belongings within reach  - Initiate and maintain comfort rounds  - Make Fall Risk Sign visible to staff  - Offer Toileting every 2 Hours, in advance of need  - Initiate/Maintain alarms  - Obtain necessary fall risk management equipment:   - Apply yellow socks and bracelet for high fall risk patients  - Consider moving patient to room near nurses station  Outcome: Progressing  Goal: Maintains/Returns to pre admission functional level  Description: INTERVENTIONS:  - Perform BMAT or MOVE assessment daily    - Set and communicate daily mobility goal to care team and patient/family/caregiver  - Collaborate with rehabilitation services on mobility goals if consulted  - Out of bed for toileting  - Record patient progress and toleration of activity level   Outcome: Progressing     Problem: Nutrition/Hydration-ADULT  Goal: Nutrient/Hydration intake appropriate for improving, restoring or maintaining nutritional needs  Description: Monitor and assess patient's nutrition/hydration status for malnutrition  Collaborate with interdisciplinary team and initiate plan and interventions as ordered  Monitor patient's weight and dietary intake as ordered or per policy  Utilize nutrition screening tool and intervene as necessary  Determine patient's food preferences and provide high-protein, high-caloric foods as appropriate       INTERVENTIONS:  - Monitor oral intake, urinary output, labs, and treatment plans  - Assess nutrition and hydration status and recommend course of action  - Evaluate amount of meals eaten  - Assist patient with eating if necessary   - Allow adequate time for meals  - Recommend/ encourage appropriate diets, oral nutritional supplements, and vitamin/mineral supplements  - Order, calculate, and assess calorie counts as needed  - Recommend, monitor, and adjust tube feedings and TPN/PPN based on assessed needs  - Assess need for intravenous fluids  - Provide specific nutrition/hydration education as appropriate  - Include patient/family/caregiver in decisions related to nutrition  Outcome: Progressing     Problem: GASTROINTESTINAL - ADULT  Goal: Minimal or absence of nausea and/or vomiting  Description: INTERVENTIONS:  - Administer IV fluids if ordered to ensure adequate hydration  - Maintain NPO status until nausea and vomiting are resolved  - Nasogastric tube if ordered  - Administer ordered antiemetic medications as needed  - Provide nonpharmacologic comfort measures as appropriate  - Advance diet as tolerated, if ordered  - Consider nutrition services referral to assist patient with adequate nutrition and appropriate food choices  Outcome: Progressing  Goal: Maintains or returns to baseline bowel function  Description: INTERVENTIONS:  - Assess bowel function  - Encourage oral fluids to ensure adequate hydration  - Administer IV fluids if ordered to ensure adequate hydration  - Administer ordered medications as needed  - Encourage mobilization and activity  - Consider nutritional services referral to assist patient with adequate nutrition and appropriate food choices  Outcome: Progressing  Goal: Maintains adequate nutritional intake  Description: INTERVENTIONS:  - Monitor percentage of each meal consumed  - Identify factors contributing to decreased intake, treat as appropriate  - Assist with meals as needed  - Monitor I&O, weight, and lab values if indicated  - Obtain nutrition services referral as needed  Outcome: Progressing     Problem: METABOLIC, FLUID AND ELECTROLYTES - ADULT  Goal: Electrolytes maintained within normal limits  Description: INTERVENTIONS:  - Monitor labs and assess patient for signs and symptoms of electrolyte imbalances  - Administer electrolyte replacement as ordered  - Monitor response to electrolyte replacements, including repeat lab results as appropriate  - Instruct patient on fluid and nutrition as appropriate  Outcome: Progressing  Goal: Fluid balance maintained  Description: INTERVENTIONS:  - Monitor labs   - Monitor I/O and WT  - Instruct patient on fluid and nutrition as appropriate  - Assess for signs & symptoms of volume excess or deficit  Outcome: Progressing

## 2023-05-22 NOTE — PLAN OF CARE
Problem: Potential for Falls  Goal: Patient will remain free of falls  Description: INTERVENTIONS:  - Educate patient/family on patient safety including physical limitations  - Instruct patient to call for assistance with activity   - Consult OT/PT to assist with strengthening/mobility   - Keep Call bell within reach  - Keep bed low and locked with side rails adjusted as appropriate  - Keep care items and personal belongings within reach  - Initiate and maintain comfort rounds  - Make Fall Risk Sign visible to staff  - Offer Toileting every 2 Hours, in advance of need  - Initiate/Maintain alarms  - Obtain necessary fall risk management equipment:  - Apply yellow socks and bracelet for high fall risk patients  - Consider moving patient to room near nurses station  Outcome: Progressing     Problem: PAIN - ADULT  Goal: Verbalizes/displays adequate comfort level or baseline comfort level  Description: Interventions:  - Encourage patient to monitor pain and request assistance  - Assess pain using appropriate pain scale  - Administer analgesics based on type and severity of pain and evaluate response  - Implement non-pharmacological measures as appropriate and evaluate response  - Consider cultural and social influences on pain and pain management  - Notify physician/advanced practitioner if interventions unsuccessful or patient reports new pain  Outcome: Progressing     Problem: SAFETY ADULT  Goal: Patient will remain free of falls  Description: INTERVENTIONS:  - Educate patient/family on patient safety including physical limitations  - Instruct patient to call for assistance with activity   - Consult OT/PT to assist with strengthening/mobility   - Keep Call bell within reach  - Keep bed low and locked with side rails adjusted as appropriate  - Keep care items and personal belongings within reach  - Initiate and maintain comfort rounds  - Make Fall Risk Sign visible to staff  - Offer Toileting every 2 Hours, in advance of need  - Initiate/Maintain alarms  - Obtain necessary fall risk management equipment:  - Apply yellow socks and bracelet for high fall risk patients  - Consider moving patient to room near nurses station  Outcome: Progressing  Goal: Maintain or return to baseline ADL function  Description: INTERVENTIONS:  - Educate patient/family on patient safety including physical limitations  - Instruct patient to call for assistance with activity   - Consult OT/PT to assist with strengthening/mobility   - Keep Call bell within reach  - Keep bed low and locked with side rails adjusted as appropriate  - Keep care items and personal belongings within reach  - Initiate and maintain comfort rounds  - Make Fall Risk Sign visible to staff  - Offer Toileting every 2 Hours, in advance of need  - Initiate/Maintain alarms  - Obtain necessary fall risk management equipment:   - Apply yellow socks and bracelet for high fall risk patients  - Consider moving patient to room near nurses station  Outcome: Progressing  Goal: Maintains/Returns to pre admission functional level  Description: INTERVENTIONS:  - Perform BMAT or MOVE assessment daily    - Set and communicate daily mobility goal to care team and patient/family/caregiver     - Collaborate with rehabilitation services on mobility goals if consulted  - Out of bed for toileting  - Record patient progress and toleration of activity level   Outcome: Progressing     Problem: INFECTION - ADULT  Goal: Absence or prevention of progression during hospitalization  Description: INTERVENTIONS:  - Assess and monitor for signs and symptoms of infection  - Monitor lab/diagnostic results  - Monitor all insertion sites, i e  indwelling lines, tubes, and drains  - Monitor endotracheal if appropriate and nasal secretions for changes in amount and color  - [place] appropriate cooling/warming therapies per order  - Administer medications as ordered  - Instruct and encourage patient and family to use good hand hygiene technique  - Identify and instruct in appropriate isolation precautions for identified infection/condition  Outcome: Progressing     Problem: DISCHARGE PLANNING  Goal: Discharge to home or other facility with appropriate resources  Description: INTERVENTIONS:  - Identify barriers to discharge w/patient and caregiver  - Arrange for needed discharge resources and transportation as appropriate  - Identify discharge learning needs (meds, wound care, etc )  - Refer to Case Management Department for coordinating discharge planning if the patient needs post-hospital services based on physician/advanced practitioner order or complex needs related to functional status, cognitive ability, or social support system  Outcome: Progressing     Problem: Knowledge Deficit  Goal: Patient/family/caregiver demonstrates understanding of disease process, treatment plan, medications, and discharge instructions  Description: Complete learning assessment and assess knowledge base    Interventions:  - Provide teaching at level of understanding  - Provide teaching via preferred learning methods  Outcome: Progressing     Problem: Prexisting or High Potential for Compromised Skin Integrity  Goal: Skin integrity is maintained or improved  Description: INTERVENTIONS:  - Identify patients at risk for skin breakdown  - Assess and monitor skin integrity  - Assess and monitor nutrition and hydration status  - Monitor labs   - Assess for incontinence   - Turn and reposition patient  - Assist with mobility/ambulation  - Relieve pressure over bony prominences  - Avoid friction and shearing  - Provide appropriate hygiene as needed including keeping skin clean and dry  - Evaluate need for skin moisturizer/barrier cream  - Collaborate with interdisciplinary team   - Patient/family teaching  - Consider wound care consult   Outcome: Progressing     Problem: MOBILITY - ADULT  Goal: Maintain or return to baseline ADL function  Description: INTERVENTIONS:  - Educate patient/family on patient safety including physical limitations  - Instruct patient to call for assistance with activity   - Consult OT/PT to assist with strengthening/mobility   - Keep Call bell within reach  - Keep bed low and locked with side rails adjusted as appropriate  - Keep care items and personal belongings within reach  - Initiate and maintain comfort rounds  - Make Fall Risk Sign visible to staff  - Offer Toileting every 2 Hours, in advance of need  - Initiate/Maintain alarms  - Obtain necessary fall risk management equipment:   - Apply yellow socks and bracelet for high fall risk patients  - Consider moving patient to room near nurses station  Outcome: Progressing  Goal: Maintains/Returns to pre admission functional level  Description: INTERVENTIONS:  - Perform BMAT or MOVE assessment daily    - Set and communicate daily mobility goal to care team and patient/family/caregiver  - Collaborate with rehabilitation services on mobility goals if consulted  - Out of bed for toileting  - Record patient progress and toleration of activity level   Outcome: Progressing     Problem: Nutrition/Hydration-ADULT  Goal: Nutrient/Hydration intake appropriate for improving, restoring or maintaining nutritional needs  Description: Monitor and assess patient's nutrition/hydration status for malnutrition  Collaborate with interdisciplinary team and initiate plan and interventions as ordered  Monitor patient's weight and dietary intake as ordered or per policy  Utilize nutrition screening tool and intervene as necessary  Determine patient's food preferences and provide high-protein, high-caloric foods as appropriate       INTERVENTIONS:  - Monitor oral intake, urinary output, labs, and treatment plans  - Assess nutrition and hydration status and recommend course of action  - Evaluate amount of meals eaten  - Assist patient with eating if necessary   - Allow adequate time for meals  - Recommend/ encourage appropriate diets, oral nutritional supplements, and vitamin/mineral supplements  - Order, calculate, and assess calorie counts as needed  - Recommend, monitor, and adjust tube feedings and TPN/PPN based on assessed needs  - Assess need for intravenous fluids  - Provide specific nutrition/hydration education as appropriate  - Include patient/family/caregiver in decisions related to nutrition  Outcome: Progressing     Problem: GASTROINTESTINAL - ADULT  Goal: Minimal or absence of nausea and/or vomiting  Description: INTERVENTIONS:  - Administer IV fluids if ordered to ensure adequate hydration  - Maintain NPO status until nausea and vomiting are resolved  - Nasogastric tube if ordered  - Administer ordered antiemetic medications as needed  - Provide nonpharmacologic comfort measures as appropriate  - Advance diet as tolerated, if ordered  - Consider nutrition services referral to assist patient with adequate nutrition and appropriate food choices  Outcome: Progressing  Goal: Maintains or returns to baseline bowel function  Description: INTERVENTIONS:  - Assess bowel function  - Encourage oral fluids to ensure adequate hydration  - Administer IV fluids if ordered to ensure adequate hydration  - Administer ordered medications as needed  - Encourage mobilization and activity  - Consider nutritional services referral to assist patient with adequate nutrition and appropriate food choices  Outcome: Progressing  Goal: Maintains adequate nutritional intake  Description: INTERVENTIONS:  - Monitor percentage of each meal consumed  - Identify factors contributing to decreased intake, treat as appropriate  - Assist with meals as needed  - Monitor I&O, weight, and lab values if indicated  - Obtain nutrition services referral as needed  Outcome: Progressing     Problem: METABOLIC, FLUID AND ELECTROLYTES - ADULT  Goal: Electrolytes maintained within normal limits  Description: INTERVENTIONS:  - Monitor labs and assess patient for signs and symptoms of electrolyte imbalances  - Administer electrolyte replacement as ordered  - Monitor response to electrolyte replacements, including repeat lab results as appropriate  - Instruct patient on fluid and nutrition as appropriate  Outcome: Progressing  Goal: Fluid balance maintained  Description: INTERVENTIONS:  - Monitor labs   - Monitor I/O and WT  - Instruct patient on fluid and nutrition as appropriate  - Assess for signs & symptoms of volume excess or deficit  Outcome: Progressing

## 2023-05-22 NOTE — TELEPHONE ENCOUNTER
LVM asking for nursing home to call back and get pt scheduled  ----- Message from Marty Green RN sent at 5/19/2023 11:52 AM EDT -----    ----- Message -----  From: Benton Khanna PA-C  Sent: 5/19/2023  11:32 AM EDT  To: [facility]    Please assist patient with scheduling a hospital follow-up with both GI and hepatology, preferably within the next 2 weeks  Thank you!

## 2023-05-23 VITALS
WEIGHT: 111.11 LBS | OXYGEN SATURATION: 93 % | SYSTOLIC BLOOD PRESSURE: 154 MMHG | BODY MASS INDEX: 22.4 KG/M2 | HEIGHT: 59 IN | DIASTOLIC BLOOD PRESSURE: 57 MMHG | HEART RATE: 75 BPM | TEMPERATURE: 98.5 F | RESPIRATION RATE: 17 BRPM

## 2023-05-23 LAB
ALBUMIN SERPL BCP-MCNC: 3.1 G/DL (ref 3.5–5)
ALP SERPL-CCNC: 386 U/L (ref 34–104)
ALT SERPL W P-5'-P-CCNC: 19 U/L (ref 7–52)
ANION GAP SERPL CALCULATED.3IONS-SCNC: 8 MMOL/L (ref 4–13)
AST SERPL W P-5'-P-CCNC: 53 U/L (ref 13–39)
BASOPHILS # BLD AUTO: 0.03 THOUSANDS/ÂΜL (ref 0–0.1)
BASOPHILS NFR BLD AUTO: 1 % (ref 0–1)
BILIRUB SERPL-MCNC: 1.76 MG/DL (ref 0.2–1)
BUN SERPL-MCNC: 31 MG/DL (ref 5–25)
CALCIUM ALBUM COR SERPL-MCNC: 9.5 MG/DL (ref 8.3–10.1)
CALCIUM SERPL-MCNC: 8.8 MG/DL (ref 8.4–10.2)
CHLORIDE SERPL-SCNC: 99 MMOL/L (ref 96–108)
CO2 SERPL-SCNC: 29 MMOL/L (ref 21–32)
CREAT SERPL-MCNC: 1.21 MG/DL (ref 0.6–1.3)
EOSINOPHIL # BLD AUTO: 0.16 THOUSAND/ÂΜL (ref 0–0.61)
EOSINOPHIL NFR BLD AUTO: 4 % (ref 0–6)
ERYTHROCYTE [DISTWIDTH] IN BLOOD BY AUTOMATED COUNT: 15.4 % (ref 11.6–15.1)
GFR SERPL CREATININE-BSD FRML MDRD: 43 ML/MIN/1.73SQ M
GLUCOSE SERPL-MCNC: 78 MG/DL (ref 65–140)
HCT VFR BLD AUTO: 22.8 % (ref 34.8–46.1)
HGB BLD-MCNC: 7.4 G/DL (ref 11.5–15.4)
IMM GRANULOCYTES # BLD AUTO: 0 THOUSAND/UL (ref 0–0.2)
IMM GRANULOCYTES NFR BLD AUTO: 0 % (ref 0–2)
INR PPP: 1.55 (ref 0.84–1.19)
LYMPHOCYTES # BLD AUTO: 1.43 THOUSANDS/ÂΜL (ref 0.6–4.47)
LYMPHOCYTES NFR BLD AUTO: 32 % (ref 14–44)
MCH RBC QN AUTO: 33.8 PG (ref 26.8–34.3)
MCHC RBC AUTO-ENTMCNC: 32.5 G/DL (ref 31.4–37.4)
MCV RBC AUTO: 104 FL (ref 82–98)
MONOCYTES # BLD AUTO: 0.92 THOUSAND/ÂΜL (ref 0.17–1.22)
MONOCYTES NFR BLD AUTO: 21 % (ref 4–12)
NEUTROPHILS # BLD AUTO: 1.89 THOUSANDS/ÂΜL (ref 1.85–7.62)
NEUTS SEG NFR BLD AUTO: 42 % (ref 43–75)
NRBC BLD AUTO-RTO: 0 /100 WBCS
PLATELET # BLD AUTO: 67 THOUSANDS/UL (ref 149–390)
PMV BLD AUTO: 12.2 FL (ref 8.9–12.7)
POTASSIUM SERPL-SCNC: 4.1 MMOL/L (ref 3.5–5.3)
PROT SERPL-MCNC: 6.7 G/DL (ref 6.4–8.4)
PROTHROMBIN TIME: 18.6 SECONDS (ref 11.6–14.5)
RBC # BLD AUTO: 2.19 MILLION/UL (ref 3.81–5.12)
SODIUM SERPL-SCNC: 136 MMOL/L (ref 135–147)
WBC # BLD AUTO: 4.43 THOUSAND/UL (ref 4.31–10.16)

## 2023-05-23 PROCEDURE — 85025 COMPLETE CBC W/AUTO DIFF WBC: CPT | Performed by: NURSE PRACTITIONER

## 2023-05-23 PROCEDURE — 99239 HOSP IP/OBS DSCHRG MGMT >30: CPT | Performed by: NURSE PRACTITIONER

## 2023-05-23 PROCEDURE — 85610 PROTHROMBIN TIME: CPT | Performed by: NURSE PRACTITIONER

## 2023-05-23 PROCEDURE — 99232 SBSQ HOSP IP/OBS MODERATE 35: CPT | Performed by: INTERNAL MEDICINE

## 2023-05-23 PROCEDURE — NC001 PR NO CHARGE: Performed by: INTERNAL MEDICINE

## 2023-05-23 PROCEDURE — 97116 GAIT TRAINING THERAPY: CPT

## 2023-05-23 PROCEDURE — 97110 THERAPEUTIC EXERCISES: CPT

## 2023-05-23 PROCEDURE — 80053 COMPREHEN METABOLIC PANEL: CPT | Performed by: NURSE PRACTITIONER

## 2023-05-23 RX ORDER — POTASSIUM CHLORIDE 20 MEQ/1
20 TABLET, EXTENDED RELEASE ORAL DAILY
Refills: 0
Start: 2023-05-24

## 2023-05-23 RX ORDER — OXYCODONE HYDROCHLORIDE 5 MG/1
5 TABLET ORAL 3 TIMES DAILY PRN
Qty: 20 TABLET | Refills: 0 | Status: SHIPPED | OUTPATIENT
Start: 2023-05-23 | End: 2023-06-02

## 2023-05-23 RX ORDER — BUDESONIDE 3 MG/1
9 CAPSULE, COATED PELLETS ORAL DAILY
Qty: 90 CAPSULE | Refills: 0 | Status: SHIPPED | OUTPATIENT
Start: 2023-05-24 | End: 2023-06-23

## 2023-05-23 RX ORDER — DOXYCYCLINE HYCLATE 100 MG/1
100 CAPSULE ORAL EVERY 12 HOURS SCHEDULED
Qty: 7 CAPSULE | Refills: 0 | Status: SHIPPED | OUTPATIENT
Start: 2023-05-23 | End: 2023-05-26

## 2023-05-23 RX ORDER — BUMETANIDE 2 MG/1
2 TABLET ORAL DAILY
Qty: 30 TABLET | Refills: 0 | Status: SHIPPED | OUTPATIENT
Start: 2023-05-24 | End: 2023-06-23

## 2023-05-23 RX ORDER — BUDESONIDE 3 MG/1
9 CAPSULE, COATED PELLETS ORAL DAILY
Status: DISCONTINUED | OUTPATIENT
Start: 2023-05-24 | End: 2023-05-23 | Stop reason: HOSPADM

## 2023-05-23 RX ORDER — BUDESONIDE 3 MG/1
9 CAPSULE, COATED PELLETS ORAL DAILY
Refills: 0
Start: 2023-05-24 | End: 2023-05-23 | Stop reason: SDUPTHER

## 2023-05-23 RX ORDER — DOXYCYCLINE HYCLATE 100 MG/1
100 CAPSULE ORAL EVERY 12 HOURS SCHEDULED
Qty: 7 CAPSULE | Refills: 0
Start: 2023-05-23 | End: 2023-05-23 | Stop reason: SDUPTHER

## 2023-05-23 RX ORDER — MIDODRINE HYDROCHLORIDE 2.5 MG/1
2.5 TABLET ORAL
Refills: 0
Start: 2023-05-23 | End: 2023-05-23 | Stop reason: SDUPTHER

## 2023-05-23 RX ORDER — PANTOPRAZOLE SODIUM 40 MG/1
40 TABLET, DELAYED RELEASE ORAL
Refills: 0
Start: 2023-05-24

## 2023-05-23 RX ORDER — MIDODRINE HYDROCHLORIDE 2.5 MG/1
2.5 TABLET ORAL
Qty: 90 TABLET | Refills: 0 | Status: SHIPPED | OUTPATIENT
Start: 2023-05-23 | End: 2023-06-22

## 2023-05-23 RX ORDER — BUMETANIDE 2 MG/1
2 TABLET ORAL DAILY
Refills: 0
Start: 2023-05-24 | End: 2023-05-23 | Stop reason: SDUPTHER

## 2023-05-23 RX ADMIN — MIDODRINE HYDROCHLORIDE 2.5 MG: 5 TABLET ORAL at 06:06

## 2023-05-23 RX ADMIN — POTASSIUM CHLORIDE 20 MEQ: 1500 TABLET, EXTENDED RELEASE ORAL at 09:31

## 2023-05-23 RX ADMIN — PANTOPRAZOLE SODIUM 40 MG: 40 TABLET, DELAYED RELEASE ORAL at 05:28

## 2023-05-23 RX ADMIN — BUMETANIDE 2 MG: 1 TABLET ORAL at 09:31

## 2023-05-23 RX ADMIN — DOXYCYCLINE 100 MG: 100 CAPSULE ORAL at 11:53

## 2023-05-23 RX ADMIN — LEVOTHYROXINE SODIUM 75 MCG: 75 TABLET ORAL at 05:28

## 2023-05-23 RX ADMIN — HEPARIN SODIUM 5000 UNITS: 5000 INJECTION INTRAVENOUS; SUBCUTANEOUS at 05:28

## 2023-05-23 RX ADMIN — LIDOCAINE 1 PATCH: 700 PATCH TOPICAL at 09:33

## 2023-05-23 NOTE — NURSING NOTE
Pt D/C to [facility]  AVS reviewed with pt and report called to [facility] [place]  All questions and concerns addressed

## 2023-05-23 NOTE — NURSING NOTE
Pt bathed oral care done OOB walking around hallway with therapy  into recliner call bell in reach RN aware

## 2023-05-23 NOTE — CASE MANAGEMENT
Case Management Discharge Planning Note    Patient name Lucina Lazo  Location [address] [id number]/-[id number] MRN [id number]  : 1945 Date 2023       Current Admission Date: 2023  Current Admission Diagnosis:Acute kidney injury [facility])   Patient Active Problem List    Diagnosis Date Noted   • Thrombocytopenia ([facility] [address] ) 2023   • Cellulitis of right lower extremity 2023   • Tarry stools 2023   • Hyponatremia 2023   • Anemia 2023   • Left lower quadrant abdominal pain 2023   • Superior mesenteric artery stenosis ([facility] [address] ) 2023   • Acute kidney injury ([facility] [address] ) 2023   • Diarrhea of presumed infectious origin 2023   • Primary hypertension 2023   • Acquired hypothyroidism 2023   • Irritable bowel syndrome with diarrhea 2023   • Abnormal CT scan 2023   • Hx of CABG 2023   • PAF (paroxysmal atrial fibrillation) ([facility] [address] ) 2022      LOS (days): 26  Geometric Mean LOS (GMLOS) (days): 3 80  Days to GMLOS:-22     OBJECTIVE:  Risk of Unplanned Readmission Score: 27 68         Current admission status: Inpatient   Preferred Pharmacy:   [address] [place] [address]  [facility]   [place] [address]  Phone: [phone] Fax: [phone]    Primary Care Provider: Dayanara Bolton MD    Primary Insurance: MEDICARE  Secondary Insurance: Marcuscellrajeev Burris    DISCHARGE DETAILS:    Discharge planning discussed with[de-identified] Patient [facility] of Choice: Yes    Treatment Team Recommendation: Facility Return  Discharge Destination Plan[de-identified] Facility Return     Additional Comments: Met with patient at bedside to discuss discharge planning  Call to [facility] and updated on discharge plan  AVS faxed to [facility]     Update to [facility] and Nurse Azael Mccabe transport time    Accepting Facility Name, [address] : [facility]  Receiving Facility/Agency Phone Number: [phone]  Facility/Agency Fax Number: [phone]

## 2023-05-23 NOTE — PLAN OF CARE
Problem: Potential for Falls  Goal: Patient will remain free of falls  Description: INTERVENTIONS:  - Educate patient/family on patient safety including physical limitations  - Instruct patient to call for assistance with activity   - Consult OT/PT to assist with strengthening/mobility   - Keep Call bell within reach  - Keep bed low and locked with side rails adjusted as appropriate  - Keep care items and personal belongings within reach  - Initiate and maintain comfort rounds  - Make Fall Risk Sign visible to staff  - Offer Toileting every 2 Hours, in advance of need  - Initiate/Maintain alarms  - Obtain necessary fall risk management equipment:  - Apply yellow socks and bracelet for high fall risk patients  - Consider moving patient to room near nurses station  Outcome: Progressing     Problem: PAIN - ADULT  Goal: Verbalizes/displays adequate comfort level or baseline comfort level  Description: Interventions:  - Encourage patient to monitor pain and request assistance  - Assess pain using appropriate pain scale  - Administer analgesics based on type and severity of pain and evaluate response  - Implement non-pharmacological measures as appropriate and evaluate response  - Consider cultural and social influences on pain and pain management  - Notify physician/advanced practitioner if interventions unsuccessful or patient reports new pain  Outcome: Progressing     Problem: SAFETY ADULT  Goal: Patient will remain free of falls  Description: INTERVENTIONS:  - Educate patient/family on patient safety including physical limitations  - Instruct patient to call for assistance with activity   - Consult OT/PT to assist with strengthening/mobility   - Keep Call bell within reach  - Keep bed low and locked with side rails adjusted as appropriate  - Keep care items and personal belongings within reach  - Initiate and maintain comfort rounds  - Make Fall Risk Sign visible to staff  - Offer Toileting every 2 Hours, in advance of need  - Initiate/Maintain alarms  - Obtain necessary fall risk management equipment:  - Apply yellow socks and bracelet for high fall risk patients  - Consider moving patient to room near nurses station  Outcome: Progressing  Goal: Maintain or return to baseline ADL function  Description: INTERVENTIONS:  - Educate patient/family on patient safety including physical limitations  - Instruct patient to call for assistance with activity   - Consult OT/PT to assist with strengthening/mobility   - Keep Call bell within reach  - Keep bed low and locked with side rails adjusted as appropriate  - Keep care items and personal belongings within reach  - Initiate and maintain comfort rounds  - Make Fall Risk Sign visible to staff  - Offer Toileting every 2 Hours, in advance of need  - Initiate/Maintain alarms  - Obtain necessary fall risk management equipment:   - Apply yellow socks and bracelet for high fall risk patients  - Consider moving patient to room near nurses station  Outcome: Progressing  Goal: Maintains/Returns to pre admission functional level  Description: INTERVENTIONS:  - Perform BMAT or MOVE assessment daily    - Set and communicate daily mobility goal to care team and patient/family/caregiver     - Collaborate with rehabilitation services on mobility goals if consulted  - Out of bed for toileting  - Record patient progress and toleration of activity level   Outcome: Progressing     Problem: INFECTION - ADULT  Goal: Absence or prevention of progression during hospitalization  Description: INTERVENTIONS:  - Assess and monitor for signs and symptoms of infection  - Monitor lab/diagnostic results  - Monitor all insertion sites, i e  indwelling lines, tubes, and drains  - Monitor endotracheal if appropriate and nasal secretions for changes in amount and color  - [place] appropriate cooling/warming therapies per order  - Administer medications as ordered  - Instruct and encourage patient and family to use good hand hygiene technique  - Identify and instruct in appropriate isolation precautions for identified infection/condition  Outcome: Progressing     Problem: DISCHARGE PLANNING  Goal: Discharge to home or other facility with appropriate resources  Description: INTERVENTIONS:  - Identify barriers to discharge w/patient and caregiver  - Arrange for needed discharge resources and transportation as appropriate  - Identify discharge learning needs (meds, wound care, etc )  - Refer to Case Management Department for coordinating discharge planning if the patient needs post-hospital services based on physician/advanced practitioner order or complex needs related to functional status, cognitive ability, or social support system  Outcome: Progressing     Problem: Knowledge Deficit  Goal: Patient/family/caregiver demonstrates understanding of disease process, treatment plan, medications, and discharge instructions  Description: Complete learning assessment and assess knowledge base    Interventions:  - Provide teaching at level of understanding  - Provide teaching via preferred learning methods  Outcome: Progressing     Problem: Prexisting or High Potential for Compromised Skin Integrity  Goal: Skin integrity is maintained or improved  Description: INTERVENTIONS:  - Identify patients at risk for skin breakdown  - Assess and monitor skin integrity  - Assess and monitor nutrition and hydration status  - Monitor labs   - Assess for incontinence   - Turn and reposition patient  - Assist with mobility/ambulation  - Relieve pressure over bony prominences  - Avoid friction and shearing  - Provide appropriate hygiene as needed including keeping skin clean and dry  - Evaluate need for skin moisturizer/barrier cream  - Collaborate with interdisciplinary team   - Patient/family teaching  - Consider wound care consult   Outcome: Progressing     Problem: MOBILITY - ADULT  Goal: Maintain or return to baseline ADL function  Description: INTERVENTIONS:  - Educate patient/family on patient safety including physical limitations  - Instruct patient to call for assistance with activity   - Consult OT/PT to assist with strengthening/mobility   - Keep Call bell within reach  - Keep bed low and locked with side rails adjusted as appropriate  - Keep care items and personal belongings within reach  - Initiate and maintain comfort rounds  - Make Fall Risk Sign visible to staff  - Offer Toileting every 2 Hours, in advance of need  - Initiate/Maintain alarms  - Obtain necessary fall risk management equipment:   - Apply yellow socks and bracelet for high fall risk patients  - Consider moving patient to room near nurses station  Outcome: Progressing  Goal: Maintains/Returns to pre admission functional level  Description: INTERVENTIONS:  - Perform BMAT or MOVE assessment daily    - Set and communicate daily mobility goal to care team and patient/family/caregiver  - Collaborate with rehabilitation services on mobility goals if consulted  - Out of bed for toileting  - Record patient progress and toleration of activity level   Outcome: Progressing     Problem: Nutrition/Hydration-ADULT  Goal: Nutrient/Hydration intake appropriate for improving, restoring or maintaining nutritional needs  Description: Monitor and assess patient's nutrition/hydration status for malnutrition  Collaborate with interdisciplinary team and initiate plan and interventions as ordered  Monitor patient's weight and dietary intake as ordered or per policy  Utilize nutrition screening tool and intervene as necessary  Determine patient's food preferences and provide high-protein, high-caloric foods as appropriate       INTERVENTIONS:  - Monitor oral intake, urinary output, labs, and treatment plans  - Assess nutrition and hydration status and recommend course of action  - Evaluate amount of meals eaten  - Assist patient with eating if necessary   - Allow adequate time for meals  - Recommend/ encourage appropriate diets, oral nutritional supplements, and vitamin/mineral supplements  - Order, calculate, and assess calorie counts as needed  - Recommend, monitor, and adjust tube feedings and TPN/PPN based on assessed needs  - Assess need for intravenous fluids  - Provide specific nutrition/hydration education as appropriate  - Include patient/family/caregiver in decisions related to nutrition  Outcome: Progressing     Problem: GASTROINTESTINAL - ADULT  Goal: Minimal or absence of nausea and/or vomiting  Description: INTERVENTIONS:  - Administer IV fluids if ordered to ensure adequate hydration  - Maintain NPO status until nausea and vomiting are resolved  - Nasogastric tube if ordered  - Administer ordered antiemetic medications as needed  - Provide nonpharmacologic comfort measures as appropriate  - Advance diet as tolerated, if ordered  - Consider nutrition services referral to assist patient with adequate nutrition and appropriate food choices  Outcome: Progressing  Goal: Maintains or returns to baseline bowel function  Description: INTERVENTIONS:  - Assess bowel function  - Encourage oral fluids to ensure adequate hydration  - Administer IV fluids if ordered to ensure adequate hydration  - Administer ordered medications as needed  - Encourage mobilization and activity  - Consider nutritional services referral to assist patient with adequate nutrition and appropriate food choices  Outcome: Progressing  Goal: Maintains adequate nutritional intake  Description: INTERVENTIONS:  - Monitor percentage of each meal consumed  - Identify factors contributing to decreased intake, treat as appropriate  - Assist with meals as needed  - Monitor I&O, weight, and lab values if indicated  - Obtain nutrition services referral as needed  Outcome: Progressing     Problem: METABOLIC, FLUID AND ELECTROLYTES - ADULT  Goal: Electrolytes maintained within normal limits  Description: INTERVENTIONS:  - Monitor labs and assess patient for signs and symptoms of electrolyte imbalances  - Administer electrolyte replacement as ordered  - Monitor response to electrolyte replacements, including repeat lab results as appropriate  - Instruct patient on fluid and nutrition as appropriate  Outcome: Progressing  Goal: Fluid balance maintained  Description: INTERVENTIONS:  - Monitor labs   - Monitor I/O and WT  - Instruct patient on fluid and nutrition as appropriate  - Assess for signs & symptoms of volume excess or deficit  Outcome: Progressing

## 2023-05-23 NOTE — PROGRESS NOTES
"Progress Note - Nephrology   Wilbern Dose 69 y o  female MRN: [id number]  Unit/Bed#: -01 Encounter: [id number]    A/P:  1  Acute kidney injury   - Peak creatinine was 1 61 mg/dL  He is ranging between 1 1 and 1 2 mg/dL   - Nonoliguric: Has diuresed 11 4 kg since admission with marked improvements in exercise capacity and breathing   - Would benefit from a hospital follow-up with nephrology  - Appears stable for discharge from a renal point of view    2  Volume overload due to cirrhosis and acute kidney injury   - Diuresing well with Bumex 2 mg daily   - We will need outpatient weight monitoring and salt avoidance   - Check a BMP in 1 week after discharge    3  Cirrhosis   - Had a liver biopsy on 5/18 and results are pending   - Will follow-up with GI    4  Anemia and thrombocytopenia   -Hemoglobin is 7 4 and will need to be monitored closely  - Suggest hematology follow-up   - Pancytopenia likely related to liver disease, however, would suggest that hematology evaluation    5  Hypokalemia   - Resolved      Follow up reason for today's visit: Acute kidney injury/volume overload    Acute kidney injury [facility])    Patient Active Problem List   Diagnosis   • Acute kidney injury ([facility] [address] )   • Diarrhea of presumed infectious origin   • Primary hypertension   • Acquired hypothyroidism   • Irritable bowel syndrome with diarrhea   • Abnormal CT scan   • Superior mesenteric artery stenosis (HCC)   • Left lower quadrant abdominal pain   • Hyponatremia   • PAF (paroxysmal atrial fibrillation) (HCC)   • Hx of CABG   • Anemia   • Tarry stools   • Thrombocytopenia (HCC)   • Cellulitis of right lower extremity         Subjective:   Feels much improved today and was able to ambulate in the halls without dyspnea  Remainder of 10 point review of systems was unremarkable  Objective:     Vitals: Blood pressure (!) 138/41, pulse 75, temperature 98 5 °F (36 9 °C), temperature source Oral, resp   rate 17, height 4' 11\" " "(1 499 m), weight 50 4 kg (111 lb 1 8 oz), SpO2 93 %  ,Body mass index is 24 91 kg/m²  Weight (last 2 days)     Date/Time Weight    05/23/23 0548 50 4 (111 11)    05/22/23 0531 51 (112 43)    05/21/23 0600 52 3 (115 3)            Intake/Output Summary (Last 24 hours) at 5/23/2023 1041  Last data filed at 5/23/2023 0900  Gross per 24 hour   Intake 760 ml   Output 100 ml   Net 660 ml     I/O last 3 completed shifts: In: 5 [P O :540]  Out: 350 [Urine:350]         Physical Exam: BP (!) 138/41 (BP Location: Left arm)   Pulse 75   Temp 98 5 °F (36 9 °C) (Oral)   Resp 17   Ht 4' 11\" (1 499 m)   Wt 50 4 kg (111 lb 1 8 oz)   SpO2 93%   BMI 24 91 kg/m²     General Appearance:    Alert, cooperative, no distress, appears stated age   Head:    Normocephalic, without obvious abnormality, atraumatic   Eyes:    Conjunctiva/corneas clear   Ears:    Normal external ears   Nose:   Nares normal, septum midline, mucosa normal, no drainage    or sinus tenderness   Throat:   Lips, mucosa, and tongue normal; teeth and gums normal   Neck:   Supple, symmetrical, trachea midline, no adenopathy;        thyroid:  No enlargement/tenderness/nodules; no carotid    bruit or JVD   Back:     Symmetric, no curvature, ROM normal, no CVA tenderness   Lungs:     Clear to auscultation bilaterally, respirations unlabored   Chest wall:    No tenderness or deformity   Heart:    Regular rate and rhythm, S1 and S2 normal, no murmur, rub   or gallop   Abdomen:     Softer, non-tender, bowel sounds active   Extremities:   Extremities normal, atraumatic, no cyanosis or edema   Skin:   Skin color, texture,improved   Lymph nodes:   Cervical normal   Neurologic:   CNII-XII intact            Lab, Imaging and other studies: I have personally reviewed pertinent labs    CBC:   Lab Results   Component Value Date    WBC 4 43 05/23/2023    HGB 7 4 (L) 05/23/2023    HCT 22 8 (L) 05/23/2023     (H) 05/23/2023    PLT 67 (L) 05/23/2023    MCH 33 8 05/23/2023 " MCHC 32 5 05/23/2023    RDW 15 4 (H) 05/23/2023    MPV 12 2 05/23/2023    NRBC 0 05/23/2023     CMP:   Lab Results   Component Value Date    K 4 1 05/23/2023    CL 99 05/23/2023    CO2 29 05/23/2023    BUN 31 (H) 05/23/2023    CREATININE 1 21 05/23/2023    CALCIUM 8 8 05/23/2023    AST 53 (H) 05/23/2023    ALT 19 05/23/2023    ALKPHOS 386 (H) 05/23/2023    EGFR 43 05/23/2023         Results from last 7 days   Lab Units 05/23/23  0544 05/22/23  0518 05/21/23  0502 05/20/23  0437   POTASSIUM mmol/L 4 1 3 9 3 3* 3 5   CHLORIDE mmol/L 99 100 98 100   CO2 mmol/L 29 31 31 32   BUN mg/dL 31* 29* 32* 35*   CREATININE mg/dL 1 21 1 12 1 36* 1 34*   CALCIUM mg/dL 8 8 8 7 8 9 8 6   ALK PHOS U/L 386* 433*  --  375*   ALT U/L 19 19  --  18   AST U/L 53* 51*  --  46*         Phosphorus: No results found for: PHOS  Magnesium: No results found for: MG  Urinalysis: No results found for: COLORU, CLARITYU, SPECGRAV, PHUR, LEUKOCYTESUR, NITRITE, PROTEINUA, GLUCOSEU, KETONESU, BILIRUBINUR, BLOODU  Ionized Calcium: No results found for: CAION  Coagulation:   Lab Results   Component Value Date    INR 1 55 (H) 05/23/2023     Troponin: No results found for: TROPONINI  ABG: No results found for: PHART, GAK6MVB, PO2ART, PFS3WQN, Z4SDOUCZ, BEART, SOURCE  Radiology review:     IMAGING  No results found      Current Facility-Administered Medications   Medication Dose Route Frequency   • acetaminophen (TYLENOL) tablet 650 mg  650 mg Oral Q6H PRN   • barium (READI-CAT 2) suspension 900 mL  900 mL Oral Once in imaging   • bumetanide (BUMEX) tablet 2 mg  2 mg Oral Daily   • dextromethorphan-guaiFENesin (ROBITUSSIN DM) oral syrup 10 mL  10 mL Oral Q6H PRN   • dicyclomine (BENTYL) capsule 10 mg  10 mg Oral TID PRN   • doxycycline hyclate (VIBRAMYCIN) capsule 100 mg  100 mg Oral Q12H   • heparin (porcine) subcutaneous injection 5,000 Units  5,000 Units Subcutaneous Q8H [address]   • levothyroxine tablet 75 mcg  75 mcg Oral Early Morning   • lidocaine (LIDODERM) 5 % patch 1 patch  1 patch Topical Daily   • midodrine (PROAMATINE) tablet 2 5 mg  2 5 mg Oral TID AC   • ondansetron (ZOFRAN-ODT) dispersible tablet 4 mg  4 mg Oral Q6H PRN   • oxyCODONE (ROXICODONE) IR tablet 5 mg  5 mg Oral TID PRN   • pantoprazole (PROTONIX) EC tablet 40 mg  40 mg Oral Early Morning   • potassium chloride (K-DUR,KLOR-CON) CR tablet 20 mEq  20 mEq Oral Daily   • traZODone (DESYREL) tablet 50 mg  50 mg Oral HS     Medications Discontinued During This Encounter   Medication Reason   • polyethylene glycol (GLYCOLAX) 17 GM/SCOOP powder Therapy completed   • ciprofloxacin (CIPRO) IVPB (premix in 5% dextrose) 400 mg 200 mL    • metroNIDAZOLE (FLAGYL) IVPB (premix) 500 mg 100 mL    • dextrose 5 % and sodium chloride 0 45 % with KCl 20 mEq/L infusion    • sodium chloride 0 9 % infusion    • polyethylene glycol (MIRALAX) packet 17 g    • ciprofloxacin (CIPRO) tablet 500 mg    • metroNIDAZOLE (FLAGYL) tablet 500 mg    • sodium bicarbonate tablet 650 mg    • enoxaparin (LOVENOX) subcutaneous injection 40 mg    • furosemide (LASIX) injection 80 mg    • multi-electrolyte (PLASMALYTE-A/ISOLYTE-S PH 7 4) IV solution    • metoprolol succinate (TOPROL-XL) 24 hr tablet 25 mg    • midodrine (PROAMATINE) tablet 2 5 mg    • enoxaparin (LOVENOX) subcutaneous injection 30 mg    • metoprolol succinate (TOPROL-XL) 24 hr tablet 12 5 mg    • magnesium hydroxide (MILK OF MAGNESIA) oral suspension 30 mL    • polyethylene glycol (MIRALAX) packet 17 g    • morphine injection 4 mg    • multi-electrolyte (PLASMALYTE-A/ISOLYTE-S PH 7 4) IV solution    • heparin (porcine) subcutaneous injection 5,000 Units    • pantoprazole (PROTONIX) EC tablet 40 mg    • albumin human (FLEXBUMIN) 25 % injection 12 5 g    • bumetanide (BUMEX) injection 1 mg    • bumetanide (BUMEX) injection 0 5 mg    • midodrine (PROAMATINE) tablet 5 mg    • pantoprazole (PROTONIX) EC tablet 40 mg    • bumetanide (BUMEX) injection 1 mg    • bumetanide (BUMEX) injection 2 mg    • bumetanide (BUMEX) injection 2 mg    • potassium chloride 20 mEq IVPB (premix)    • octreotide (SandoSTATIN) injection 100 mcg    • bumetanide (BUMEX) tablet 2 mg    • doxycycline (VIBRAMYCIN) 100 mg in sodium chloride 0 9 % 100 mL IVPB    • erythromycin (ILOTYCIN) 0 5 % ophthalmic ointment 0 5 inch    • potassium chloride (K-DUR,KLOR-CON) CR tablet 10 mEq    • potassium chloride (K-DUR,KLOR-CON) CR tablet 10 mEq    • bumetanide (BUMEX) tablet 1 mg    • ondansetron (ZOFRAN) injection 4 mg    • HYDROmorphone (DILAUDID) injection 0 5 mg        Amari Medrano MD      This progress note was produced in part using a dictation device which may document imprecise wording from author's original intent

## 2023-05-23 NOTE — PLAN OF CARE
Problem: Potential for Falls  Goal: Patient will remain free of falls  Description: INTERVENTIONS:  - Educate patient/family on patient safety including physical limitations  - Instruct patient to call for assistance with activity   - Consult OT/PT to assist with strengthening/mobility   - Keep Call bell within reach  - Keep bed low and locked with side rails adjusted as appropriate  - Keep care items and personal belongings within reach  - Initiate and maintain comfort rounds  - Make Fall Risk Sign visible to staff  - Offer Toileting every 2 Hours, in advance of need  - Initiate/Maintain alarms  - Obtain necessary fall risk management equipment:  - Apply yellow socks and bracelet for high fall risk patients  - Consider moving patient to room near nurses station  5/23/2023 1302 by Jeffery Campbell RN  Outcome: Adequate for Discharge  5/23/2023 1012 by Jeffery Campbell RN  Outcome: Progressing     Problem: PAIN - ADULT  Goal: Verbalizes/displays adequate comfort level or baseline comfort level  Description: Interventions:  - Encourage patient to monitor pain and request assistance  - Assess pain using appropriate pain scale  - Administer analgesics based on type and severity of pain and evaluate response  - Implement non-pharmacological measures as appropriate and evaluate response  - Consider cultural and social influences on pain and pain management  - Notify physician/advanced practitioner if interventions unsuccessful or patient reports new pain  5/23/2023 1302 by Jeffery Campbell RN  Outcome: Adequate for Discharge  5/23/2023 1012 by Jeffery Campbell RN  Outcome: Progressing     Problem: SAFETY ADULT  Goal: Patient will remain free of falls  Description: INTERVENTIONS:  - Educate patient/family on patient safety including physical limitations  - Instruct patient to call for assistance with activity   - Consult OT/PT to assist with strengthening/mobility   - Keep Call bell within reach  - Keep bed low and locked with side rails adjusted as appropriate  - Keep care items and personal belongings within reach  - Initiate and maintain comfort rounds  - Make Fall Risk Sign visible to staff  - Offer Toileting every 2 Hours, in advance of need  - Initiate/Maintain alarms  - Obtain necessary fall risk management equipment:  - Apply yellow socks and bracelet for high fall risk patients  - Consider moving patient to room near nurses station  5/23/2023 1302 by Ton Chavez RN  Outcome: Adequate for Discharge  5/23/2023 1012 by Ton Chavez RN  Outcome: Progressing  Goal: Maintain or return to baseline ADL function  Description: INTERVENTIONS:  - Educate patient/family on patient safety including physical limitations  - Instruct patient to call for assistance with activity   - Consult OT/PT to assist with strengthening/mobility   - Keep Call bell within reach  - Keep bed low and locked with side rails adjusted as appropriate  - Keep care items and personal belongings within reach  - Initiate and maintain comfort rounds  - Make Fall Risk Sign visible to staff  - Offer Toileting every 2 Hours, in advance of need  - Initiate/Maintain alarms  - Obtain necessary fall risk management equipment:   - Apply yellow socks and bracelet for high fall risk patients  - Consider moving patient to room near nurses station  5/23/2023 1302 by Ton Chavez RN  Outcome: Adequate for Discharge  5/23/2023 1012 by Ton Chavez RN  Outcome: Progressing  Goal: Maintains/Returns to pre admission functional level  Description: INTERVENTIONS:  - Perform BMAT or MOVE assessment daily    - Set and communicate daily mobility goal to care team and patient/family/caregiver     - Collaborate with rehabilitation services on mobility goals if consulted  - Out of bed for toileting  - Record patient progress and toleration of activity level   5/23/2023 1302 by Ton Chavez RN  Outcome: Adequate for Discharge  5/23/2023 1012 by Mike Gibbs Sherman Fay RN  Outcome: Progressing     Problem: INFECTION - ADULT  Goal: Absence or prevention of progression during hospitalization  Description: INTERVENTIONS:  - Assess and monitor for signs and symptoms of infection  - Monitor lab/diagnostic results  - Monitor all insertion sites, i e  indwelling lines, tubes, and drains  - Monitor endotracheal if appropriate and nasal secretions for changes in amount and color  - Louann appropriate cooling/warming therapies per order  - Administer medications as ordered  - Instruct and encourage patient and family to use good hand hygiene technique  - Identify and instruct in appropriate isolation precautions for identified infection/condition  5/23/2023 1302 by Frank Davila RN  Outcome: Adequate for Discharge  5/23/2023 1012 by Frakn Davila RN  Outcome: Progressing     Problem: DISCHARGE PLANNING  Goal: Discharge to home or other facility with appropriate resources  Description: INTERVENTIONS:  - Identify barriers to discharge w/patient and caregiver  - Arrange for needed discharge resources and transportation as appropriate  - Identify discharge learning needs (meds, wound care, etc )  - Refer to Case Management Department for coordinating discharge planning if the patient needs post-hospital services based on physician/advanced practitioner order or complex needs related to functional status, cognitive ability, or social support system  5/23/2023 1302 by Frank Davila RN  Outcome: Adequate for Discharge  5/23/2023 1012 by Frank Davila RN  Outcome: Progressing     Problem: Knowledge Deficit  Goal: Patient/family/caregiver demonstrates understanding of disease process, treatment plan, medications, and discharge instructions  Description: Complete learning assessment and assess knowledge base    Interventions:  - Provide teaching at level of understanding  - Provide teaching via preferred learning methods  5/23/2023 1302 by Frank Davila RN  Outcome: Adequate for Discharge  5/23/2023 1012 by Tiago Cano RN  Outcome: Progressing     Problem: Prexisting or High Potential for Compromised Skin Integrity  Goal: Skin integrity is maintained or improved  Description: INTERVENTIONS:  - Identify patients at risk for skin breakdown  - Assess and monitor skin integrity  - Assess and monitor nutrition and hydration status  - Monitor labs   - Assess for incontinence   - Turn and reposition patient  - Assist with mobility/ambulation  - Relieve pressure over bony prominences  - Avoid friction and shearing  - Provide appropriate hygiene as needed including keeping skin clean and dry  - Evaluate need for skin moisturizer/barrier cream  - Collaborate with interdisciplinary team   - Patient/family teaching  - Consider wound care consult   5/23/2023 1302 by Tiago Cano RN  Outcome: Adequate for Discharge  5/23/2023 1012 by Tiago Cano RN  Outcome: Progressing     Problem: MOBILITY - ADULT  Goal: Maintain or return to baseline ADL function  Description: INTERVENTIONS:  - Educate patient/family on patient safety including physical limitations  - Instruct patient to call for assistance with activity   - Consult OT/PT to assist with strengthening/mobility   - Keep Call bell within reach  - Keep bed low and locked with side rails adjusted as appropriate  - Keep care items and personal belongings within reach  - Initiate and maintain comfort rounds  - Make Fall Risk Sign visible to staff  - Offer Toileting every 2 Hours, in advance of need  - Initiate/Maintain alarms  - Obtain necessary fall risk management equipment:   - Apply yellow socks and bracelet for high fall risk patients  - Consider moving patient to room near nurses station  5/23/2023 1302 by Tiago Cano RN  Outcome: Adequate for Discharge  5/23/2023 1012 by Tiago Cano RN  Outcome: Progressing  Goal: Maintains/Returns to pre admission functional level  Description: INTERVENTIONS:  - Perform BMAT or MOVE assessment daily    - Set and communicate daily mobility goal to care team and patient/family/caregiver  - Collaborate with rehabilitation services on mobility goals if consulted  - Out of bed for toileting  - Record patient progress and toleration of activity level   5/23/2023 1302 by Jayden Zhou RN  Outcome: Adequate for Discharge  5/23/2023 1012 by Jayden Zhou RN  Outcome: Progressing     Problem: Nutrition/Hydration-ADULT  Goal: Nutrient/Hydration intake appropriate for improving, restoring or maintaining nutritional needs  Description: Monitor and assess patient's nutrition/hydration status for malnutrition  Collaborate with interdisciplinary team and initiate plan and interventions as ordered  Monitor patient's weight and dietary intake as ordered or per policy  Utilize nutrition screening tool and intervene as necessary  Determine patient's food preferences and provide high-protein, high-caloric foods as appropriate       INTERVENTIONS:  - Monitor oral intake, urinary output, labs, and treatment plans  - Assess nutrition and hydration status and recommend course of action  - Evaluate amount of meals eaten  - Assist patient with eating if necessary   - Allow adequate time for meals  - Recommend/ encourage appropriate diets, oral nutritional supplements, and vitamin/mineral supplements  - Order, calculate, and assess calorie counts as needed  - Recommend, monitor, and adjust tube feedings and TPN/PPN based on assessed needs  - Assess need for intravenous fluids  - Provide specific nutrition/hydration education as appropriate  - Include patient/family/caregiver in decisions related to nutrition  5/23/2023 1302 by Jayden Zhou RN  Outcome: Adequate for Discharge  5/23/2023 1012 by Jayden Zhou RN  Outcome: Progressing     Problem: GASTROINTESTINAL - ADULT  Goal: Minimal or absence of nausea and/or vomiting  Description: INTERVENTIONS:  - Administer IV fluids if ordered to ensure adequate hydration  - Maintain NPO status until nausea and vomiting are resolved  - Nasogastric tube if ordered  - Administer ordered antiemetic medications as needed  - Provide nonpharmacologic comfort measures as appropriate  - Advance diet as tolerated, if ordered  - Consider nutrition services referral to assist patient with adequate nutrition and appropriate food choices  5/23/2023 1302 by Beverly Alvarenga RN  Outcome: Adequate for Discharge  5/23/2023 1012 by Beverly Alvarenga RN  Outcome: Progressing  Goal: Maintains or returns to baseline bowel function  Description: INTERVENTIONS:  - Assess bowel function  - Encourage oral fluids to ensure adequate hydration  - Administer IV fluids if ordered to ensure adequate hydration  - Administer ordered medications as needed  - Encourage mobilization and activity  - Consider nutritional services referral to assist patient with adequate nutrition and appropriate food choices  5/23/2023 1302 by Beverly Alvarenga RN  Outcome: Adequate for Discharge  5/23/2023 1012 by Beverly Alvarenga RN  Outcome: Progressing  Goal: Maintains adequate nutritional intake  Description: INTERVENTIONS:  - Monitor percentage of each meal consumed  - Identify factors contributing to decreased intake, treat as appropriate  - Assist with meals as needed  - Monitor I&O, weight, and lab values if indicated  - Obtain nutrition services referral as needed  5/23/2023 1302 by Beverly Alvarenga RN  Outcome: Adequate for Discharge  5/23/2023 1012 by Beverly Alvarenga RN  Outcome: Progressing     Problem: METABOLIC, FLUID AND ELECTROLYTES - ADULT  Goal: Electrolytes maintained within normal limits  Description: INTERVENTIONS:  - Monitor labs and assess patient for signs and symptoms of electrolyte imbalances  - Administer electrolyte replacement as ordered  - Monitor response to electrolyte replacements, including repeat lab results as appropriate  - Instruct patient on fluid and nutrition as appropriate  5/23/2023 1302 by Tian Kerns RN  Outcome: Adequate for Discharge  5/23/2023 1012 by Tian Kerns RN  Outcome: Progressing  Goal: Fluid balance maintained  Description: INTERVENTIONS:  - Monitor labs   - Monitor I/O and WT  - Instruct patient on fluid and nutrition as appropriate  - Assess for signs & symptoms of volume excess or deficit  5/23/2023 1302 by Tian Kerns RN  Outcome: Adequate for Discharge  5/23/2023 1012 by Tain Kerns RN  Outcome: Progressing

## 2023-05-23 NOTE — ASSESSMENT & PLAN NOTE
· Patient initially with hypotension, improved with midodrine  · Home metoprolol restarted   · Home losartan held due to ALEJANDRA  · Nephrology recommendations appreciated  Patient has had higher blood pressures  May not continue to need midodrine  · Monitor blood pressure daily  Follow-up with PCP in 1 week    Evaluate for continued need for midodrine, or restarting losartan or alternative agent

## 2023-05-23 NOTE — DISCHARGE SUMMARY
[address] 46  Discharge- Alysia Norris 1945, 69 y o  female MRN: [id number]  Unit/Bed#: -01 Encounter: [id number]  Primary Care Provider: Ricco Overton MD   Date and time admitted to hospital: 4/27/2023 11:58 AM    * Acute kidney injury [facility])  Assessment & Plan  • Baseline creatinine around 0 9-1 0  Today is 1 2  • Bumex discontinued by nephrology on 5/16  Was monitoring off diuretic, however, resumed Bumex 1 mg PO on 5/20/2023, now Bumex increased to 2 mg daily  • Caution with nephrotoxins  · Monitor I&O  · Check BMP in 1 week  · Continue outpatient follow-up    Left lower quadrant abdominal pain  Assessment & Plan  · Presented to the ED with left lower quadrant abdominal pain on 4/27   · Patient reports intermittent pain across lower abdomen with mild tenderness on palpation  · CT abdomen 4/27/2023: New small amount of free fluid in the abdomen and pelvis compared to 4/18/2023, which can be secondary to cirrhosis or acute inflammatory process in the abdomen and pelvis such as occult acute diverticulitis  Moderate amount of stool in the colon, nonspecific and can represent constipation  · Obstruction series 4/29/2023: Nonobstructive bowel gas pattern  · CT abdomen pelvis 5/1/2023: Thickened appearance of the wall of the descending colon and sigmoid colon which may represent pseudo thickening from nondistention versus a mild nonspecific colitis in the appropriate clinical setting  There are a few scattered colonic diverticula  · Flexible sigmoidoscopy 5/2/2023: Minimal inflammation noted, biopsies were normal  · KUB 5/5/2023: Nonobstructive bowel gas pattern  · Colonoscopy 5/11/2023: Stricture and mucosal friability and shallow ulceration at the ileocecal valve  Single shallow ulcer in the rectosigmoid colon  Most likely explanation for these findings is Crohn's disease  · EGD 5/11/2023: Small sliding hiatal hernia  Tiny varices at the GE junction   Mild portal hypertensive gastropathy  No blood in the stomach  Normal duodenum  No interventions performed  · MRCP 5/15/2023: Cirrhosis, pancreatic tail cyst, and small right pleural effusion, and moderate body wall edema were noted  · Status post IR guided liver biopsy done on 5/18/2023-pending reports  This will need to be followed up as an outpatient  Will place in discharge instructions  · Surgery consultation appreciated-and has now signed off  · GI following, appreciate recommendations    Patient to start budesonide  · Pathology from colonoscopy results on chart, reviewed    Anemia  Assessment & Plan  · Hemoglobin is remaining stable at 7-8 , prior to admission the beginning of May 2023, baseline hemoglobin was around 11  · No signs of acute or overt bleeding  · Daily CBC and trend hemoglobin  · Resume oral iron supplementation    Cellulitis of right lower extremity  Assessment & Plan  · Continue doxycycline through 5/26/2023  · Venous duplex negative DVT  · Continue to monitor     Thrombocytopenia ([address] )  Assessment & Plan  · Most likely secondary to cirrhosis  · No signs of active bleeding  · CBC in 1 week      Tarry stools  Assessment & Plan  · Per record, nursing reported black tarry stools on 5/8 to 5/9, none since per discussion with patient  · 5/8/2023 fecal occult blood positive  · Baseline hemoglobin 10-11  · No signs of bleeding  · 5/11 had EGD/colonoscopy-see results above  · GI following appreciate recommendations      Hx of CABG  Assessment & Plan  · Currently stable  · Losartan held due to ALEJANDRA, hypotension  · Continue metoprolol    PAF (paroxysmal atrial fibrillation) (HCC)  Assessment & Plan  · Rate controlled   · Home metoprolol resumed    Hyponatremia  Assessment & Plan  · Likely due to cirrhosis   · Resolved    Acquired hypothyroidism  Assessment & Plan  · Continue levothyroxine    Primary hypertension  Assessment & Plan  · Patient initially with hypotension, improved with midodrine  · Home metoprolol restarted   · Home losartan held due to ALEJANDRA  · Nephrology recommendations appreciated  Patient has had higher blood pressures  May not continue to need midodrine  · Monitor blood pressure daily  Follow-up with PCP in 1 week  Evaluate for continued need for midodrine, or restarting losartan or alternative agent    Hypomagnesemia-resolved as of 5/8/2023  Assessment & Plan  · 5/5 serum magnesium 1 8, repleted  · Resolved with repletion      Medical Problems     Resolved Problems  Date Reviewed: 5/23/2023          Resolved    Hypomagnesemia 5/8/2023     Resolved by  Jocelyn Miller PA-C        Discharging Physician / Practitioner: EBEN Beck  PCP: Alexandria Taveras MD  Admission Date:   Admission Orders (From admission, onward)     Ordered        04/27/23 1637  Place in Observation  Once,   Status:  Canceled            04/27/23 1639  Inpatient Admission  Once                      Discharge Date: 05/23/23    Consultations During Hospital Stay:  · Interventional radiology, gastroenterology, nephrology    Procedures Performed:     · Flexible sigmoidoscopy 5/3/2023: Mild abnormal mucosa with erosion in the rectum  · EGD 5/11/2023: 2 cm sliding hiatal hernia, small and minimal varices in the lower third of the esophagus with no bleeding, mild portal hypertensive gastropathy in the gastric fundus  No blood seen in the duodenum appeared normal  · Colonoscopy 5/11/2023: The entire colon appeared normal   The IVC was stenotic and friable with a shallow ulcer  Initially this was mistaken for stricture because the opening did not have the appearance of a normal ICV  An upper scope was used to reach the stricture  Upon intubation small bowel was seen suggesting that this was the ICV    Terminal ileum appeared normal   A single shallow ulcer was seen in the rectosigmoid colon  · IR biopsy transjugular liver 5/18/2023: Results pending    Significant Findings / Test Results:     · CT abdomen and pelvis 4/27/2023: Small amount of free fluid in the abdomen and pelvis compared to 4/18/2023 which can be secondary to cirrhosis or acute inflammatory process  0 6 cm pancreatic tail cystic lesion  Indeterminate 0 4 cm osteolytic lesion of the right aspect of L3 vertebral body  Incidental finding report completed by surgery primary service at that time  · Obstruction series 4/29/2023: Nonobstructive bowel gas pattern  · CT abdomen and pelvis with IV contrast 5/1/2023: Thickened appearance of the descending colon and sigmoid colon  Anasarca and small right pleural effusion  Hepatic contour nodularity with heterogenous appearance can be seen with cirrhosis  · MRI lumbar spine 5/1/2023: Multiple acute compression deformities, multilevel spondylosis, recently noted lucent lesion in the L3 vertebra correlates to a hemangioma, a benign finding  · Abdomen 1 view 5/5/2023: Nonobstructive bowel gas pattern  · Chest x-ray 5/5/2023: Mild pulmonary venous congestion with trace effusions  · Upper quadrant ultrasound with liver Doppler 5/10/2023: No portal venous thrombus  · MRI abdomen 5/15/2023: Cirrhosis  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, 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  Small right pleural effusion and moderate body wall edema  · VAS lower limb venous duplex study, complete 5/19/2023: No evidence of acute or chronic deep vein thrombosis in the right and left lower limb    Test Results Pending at Discharge (will require follow up): · Liver biopsy    Complications: Develop tarry stools, later underwent flexible sigmoidoscopy, volume overload due to cirrhosis and ALEJANDRA, hyponatremia    Reason for Admission: Abdominal pain    Hospital Course:   Chaparro Pinto is a 69 y o  female patient who originally presented to the hospital on 4/27/2023 due to abdominal pain    CT of the abdomen pelvis done in the ER showed new small amount of free fluid in the abdomen and pelvis which could be secondary to cirrhosis or "acute inflammatory process, also a pancreatic tail cystic lesion and indeterminate osteolytic lesion in the L3 vertebral body  She was seen by surgery and GI and admitted for conservative management of diverticulitis  IV antibiotics were initiated  Medicine and nephrology were consulted for history of anemia, acidosis, atrial fibrillation, CABG, electrolyte abnormalities including hyponatremia, and hypertension  She continued with abdominal pain and had a flexible sigmoidoscopy on 5/2/2023, which showed mild abnormal mucosa with erosion in the rectum  Per surgery patient appears to be manifesting acute hepatorenal syndrome and the patient was transferred to the medicine service on 5/5/2023  Patient was diagnosed with cirrhosis  Her hemoglobin began to downtrend and she had black tarry stools  Patient had EGD and colonoscopy 5/10/2023, which showed evidence for Crohn's disease  She was started on diuretics for hyponatremia due to hypervolemic state, and also midodrine  Work-up for cirrhosis ruled out autoimmune hepatitis, alpha 1 antitrypsin deficiency, Po's disease, celiac disease, hemochromatosis and chronic hep B and C  She had an IR consultation for liver biopsy, with final results still pending at discharge  Patient was started on budesonide for Crohn's disease and per GI, clerical team is reaching out to patient's care facility to schedule outpatient follow-up with GI and hepatology  This is a brief discharge summary  Please see full patient chart for additional details  Condition at Discharge: stable    Discharge Day Visit / Exam:   Subjective: Patient seen and examined    She states she feels better and is very happy about going home  Vitals: Blood Pressure: (!) 138/41 (05/23/23 0933)  Pulse: 75 (05/23/23 0708)  Temperature: 98 5 °F (36 9 °C) (05/23/23 0708)  Temp Source: Oral (05/23/23 0708)  Respirations: 17 (05/23/23 0708)  Height: 4' 11\" (149 9 cm) (05/11/23 1300)  Weight - Scale: " 50 4 kg (111 lb 1 8 oz) (05/23/23 0574)  SpO2: 93 % (05/23/23 0708)    Exam:   Physical Exam  Vitals and nursing note reviewed  Constitutional:       General: She is not in acute distress  HENT:      Head: Normocephalic and atraumatic  Mouth/Throat:      Mouth: Mucous membranes are moist       Pharynx: Oropharynx is clear  Eyes:      Pupils: Pupils are equal, round, and reactive to light  Cardiovascular:      Rate and Rhythm: Normal rate and regular rhythm  Pulses: Normal pulses  Pulmonary:      Effort: Pulmonary effort is normal  No respiratory distress  Breath sounds: Normal breath sounds  Abdominal:      General: Bowel sounds are normal       Palpations: Abdomen is soft  Tenderness: There is no abdominal tenderness  Musculoskeletal:      Cervical back: Neck supple  Right lower leg: Edema present  Left lower leg: Edema present  Comments: LE edema improved   Skin:     General: Skin is warm and dry  Capillary Refill: Capillary refill takes less than 2 seconds  Neurological:      General: No focal deficit present  Mental Status: She is alert  Discussion with Family: Patient declined call to   Discharge instructions/Information to patient and family:   See after visit summary for information provided to patient and family  Provisions for Follow-Up Care:  See after visit summary for information related to follow-up care and any pertinent home health orders  Disposition:   Assisted Living Facility at [facility] Readmission: No     Discharge Statement:  I spent 45 minutes discharging the patient  This time was spent on the day of discharge  I had direct contact with the patient on the day of discharge  Greater than 50% of the total time was spent examining patient, answering all patient questions, arranging and discussing plan of care with patient as well as directly providing post-discharge instructions    Additional time then spent on discharge activities  Discharge Medications:  See after visit summary for reconciled discharge medications provided to patient and/or family        **Please Note: This note may have been constructed using a voice recognition system**

## 2023-05-23 NOTE — PLAN OF CARE
Problem: PHYSICAL THERAPY ADULT  Goal: Performs mobility at highest level of function for planned discharge setting  See evaluation for individualized goals  Description: Treatment/Interventions: Functional transfer training, Endurance training, Therapeutic exercise, Gait training, Bed mobility          See flowsheet documentation for full assessment, interventions and recommendations  Outcome: Progressing  Note: Prognosis: Good  Problem List: Decreased strength, Decreased mobility, Pain  Assessment: Pt seen for PT treatment session this date with interventions consisting of gait training to advance toward previous of function and reduce the risk of medical complications w/ emphasis on improving pt's ability to ambulate level surfaces x 325 feet with close S provided by therapist with RW and Therapeutic exercise to improve strength and stability with directional changes consisting of: AROM 30 reps B LE in sitting position  Pt agreeable to PT treatment session upon arrival, pt found supine in bed w/ HOB elevated, A&O x 4  In comparison to previous session, pt with improvements in ambulatory distance  Post session: pt returned back to recliner, chair alarm engaged, all needs in reach and RN notified of session findings/recommendations  Continue to recommend return to facility with rehabilitation services at time of d/c in order to maximize pt's functional independence and safety w/ mobility  Pt continues to be functioning below baseline level, and remains limited 2* factors listed above and including gait deviations, pain, impaired balance  PT will continue to see pt during current hospitalization in order to address the deficits listed above and provide interventions consistent w/ POC in effort to achieve LTGs  Barriers to Discharge: None     PT Discharge Recommendation: Return to facility with rehabilitation services (maintained recommendation)    See flowsheet documentation for full assessment

## 2023-05-23 NOTE — ASSESSMENT & PLAN NOTE
• Baseline creatinine around 0 9-1 0  Today is 1 2  • Bumex discontinued by nephrology on 5/16   Was monitoring off diuretic, however, resumed Bumex 1 mg PO on 5/20/2023, now Bumex increased to 2 mg daily  • Caution with nephrotoxins  · Monitor I&O  · Check BMP in 1 week  · Continue outpatient follow-up

## 2023-05-23 NOTE — ASSESSMENT & PLAN NOTE
· Presented to the ED with left lower quadrant abdominal pain on 4/27   · Patient reports intermittent pain across lower abdomen with mild tenderness on palpation  · CT abdomen 4/27/2023: New small amount of free fluid in the abdomen and pelvis compared to 4/18/2023, which can be secondary to cirrhosis or acute inflammatory process in the abdomen and pelvis such as occult acute diverticulitis  Moderate amount of stool in the colon, nonspecific and can represent constipation  · Obstruction series 4/29/2023: Nonobstructive bowel gas pattern  · CT abdomen pelvis 5/1/2023: Thickened appearance of the wall of the descending colon and sigmoid colon which may represent pseudo thickening from nondistention versus a mild nonspecific colitis in the appropriate clinical setting  There are a few scattered colonic diverticula  · Flexible sigmoidoscopy 5/2/2023: Minimal inflammation noted, biopsies were normal  · KUB 5/5/2023: Nonobstructive bowel gas pattern  · Colonoscopy 5/11/2023: Stricture and mucosal friability and shallow ulceration at the ileocecal valve  Single shallow ulcer in the rectosigmoid colon  Most likely explanation for these findings is Crohn's disease  · EGD 5/11/2023: Small sliding hiatal hernia  Tiny varices at the GE junction  Mild portal hypertensive gastropathy  No blood in the stomach  Normal duodenum  No interventions performed  · MRCP 5/15/2023: Cirrhosis, pancreatic tail cyst, and small right pleural effusion, and moderate body wall edema were noted  · Status post IR guided liver biopsy done on 5/18/2023-pending reports  This will need to be followed up as an outpatient  Will place in discharge instructions  · Surgery consultation appreciated-and has now signed off  · GI following, appreciate recommendations    Patient to start budesonide  · Pathology from colonoscopy results on chart, reviewed

## 2023-05-23 NOTE — QUICK NOTE
After reviewing colonic biopsies with ordering provider and IBD specialist, Dr Jesus Manuel Santillan, recommended to begin budesonide for the treatment of potential Crohn's ds  Medication not on formulary  Spoke with pharmacy and verbally ordered budesonide 3 mg capsules to take 3 capsules (9 mg total) once daily in the morning  Patient is to continue this Rx upon discharge  SLIM made aware  Clerical team has reached out to patient's care facility in attempts to schedule an outpatient hospital follow-up with both GI and hepatology  Please contact with any questions or concerns

## 2023-05-23 NOTE — ASSESSMENT & PLAN NOTE
· Hemoglobin is remaining stable at 7-8 , prior to admission the beginning of May 2023, baseline hemoglobin was around 11  · No signs of acute or overt bleeding  · Daily CBC and trend hemoglobin  · Resume oral iron supplementation

## 2023-06-01 PROCEDURE — 88307 TISSUE EXAM BY PATHOLOGIST: CPT | Performed by: PATHOLOGY

## 2023-06-01 PROCEDURE — 88342 IMHCHEM/IMCYTCHM 1ST ANTB: CPT | Performed by: PATHOLOGY

## 2023-06-01 PROCEDURE — 88313 SPECIAL STAINS GROUP 2: CPT | Performed by: PATHOLOGY

## 2023-06-01 PROCEDURE — 88341 IMHCHEM/IMCYTCHM EA ADD ANTB: CPT | Performed by: PATHOLOGY

## 2023-06-06 ENCOUNTER — CONSULT (OUTPATIENT)
Dept: NEPHROLOGY | Facility: CLINIC | Age: 78
End: 2023-06-06
Payer: MEDICARE

## 2023-06-06 VITALS
WEIGHT: 110 LBS | OXYGEN SATURATION: 98 % | DIASTOLIC BLOOD PRESSURE: 60 MMHG | HEART RATE: 55 BPM | SYSTOLIC BLOOD PRESSURE: 158 MMHG | HEIGHT: 59 IN | BODY MASS INDEX: 22.18 KG/M2

## 2023-06-06 DIAGNOSIS — E87.1 HYPONATREMIA: ICD-10-CM

## 2023-06-06 DIAGNOSIS — N17.9 ACUTE KIDNEY INJURY (HCC): ICD-10-CM

## 2023-06-06 DIAGNOSIS — E87.6 HYPOKALEMIA: ICD-10-CM

## 2023-06-06 DIAGNOSIS — E22.2 SIADH (SYNDROME OF INAPPROPRIATE ADH PRODUCTION) (HCC): ICD-10-CM

## 2023-06-06 DIAGNOSIS — R60.0 BILATERAL LOWER EXTREMITY EDEMA: ICD-10-CM

## 2023-06-06 DIAGNOSIS — R93.2 ABNORMAL CT OF LIVER: ICD-10-CM

## 2023-06-06 DIAGNOSIS — N18.32 STAGE 3B CHRONIC KIDNEY DISEASE (HCC): Primary | ICD-10-CM

## 2023-06-06 DIAGNOSIS — I95.89 OTHER SPECIFIED HYPOTENSION: ICD-10-CM

## 2023-06-06 PROBLEM — I95.9 ARTERIAL HYPOTENSION: Status: ACTIVE | Noted: 2023-06-06

## 2023-06-06 PROCEDURE — 99214 OFFICE O/P EST MOD 30 MIN: CPT | Performed by: INTERNAL MEDICINE

## 2023-06-06 RX ORDER — FUROSEMIDE 40 MG/1
TABLET ORAL
COMMUNITY
Start: 2023-04-10 | End: 2023-06-06

## 2023-06-06 RX ORDER — ONDANSETRON HYDROCHLORIDE 8 MG/1
TABLET, FILM COATED ORAL
COMMUNITY
Start: 2023-06-03

## 2023-06-06 RX ORDER — LORATADINE 10 MG/1
10 TABLET ORAL DAILY
COMMUNITY

## 2023-06-06 NOTE — ASSESSMENT & PLAN NOTE
Patient remains at a creatinine above typical baseline  Unfortunately she may have lost a permanent amount of kidney function due to acute tubular necrosis during her complicated hospital stay  We will continue to optimize care in general and avoid potential for toxins

## 2023-06-06 NOTE — ASSESSMENT & PLAN NOTE
Lab Results   Component Value Date    CREATININE 1 21 05/23/2023    CREATININE 1 12 05/22/2023    CREATININE 1 36 (H) 05/21/2023    EGFR 43 05/23/2023    EGFR 47 05/22/2023    EGFR 37 05/21/2023     Although the patient has a creatinine with a an estimated GFR that is around or above 45 mL/min, she most likely has lower creatinine clearance due to small body habitus at less than 1 73 m² body surface area, and loss of muscle mass  Continue to optimize care avoid potential nephrotoxins

## 2023-06-06 NOTE — ASSESSMENT & PLAN NOTE
This is most likely associated with the patient's underlying liver process, potentially cirrhosis  She is currently on midodrine 2 5 mg 3 times a day  However blood pressures today in the office are elevated  We will follow blood pressures at her facility and if they remain consistently over 655 mmHg systolic, we would likely reduce the midodrine down to twice daily, and if this does not help to reduce blood pressures enough either the medication will need to be discontinued or given once daily in the morning

## 2023-06-06 NOTE — PROGRESS NOTES
[facility] [place], [place]    Name: Itzel Alfred  YOB: 1945      Assessment/Plan:    Stage 3b chronic kidney disease [facility])  Lab Results   Component Value Date    CREATININE 1 21 05/23/2023    CREATININE 1 12 05/22/2023    CREATININE 1 36 (H) 05/21/2023    EGFR 43 05/23/2023    EGFR 47 05/22/2023    EGFR 37 05/21/2023     Although the patient has a creatinine with a an estimated GFR that is around or above 45 mL/min, she most likely has lower creatinine clearance due to small body habitus at less than 1 73 m² body surface area, and loss of muscle mass  Continue to optimize care avoid potential nephrotoxins  Acute kidney injury [facility])  Patient remains at a creatinine above typical baseline  Unfortunately she may have lost a permanent amount of kidney function due to acute tubular necrosis during her complicated hospital stay  We will continue to optimize care in general and avoid potential for toxins  Arterial hypotension  This is most likely associated with the patient's underlying liver process, potentially cirrhosis  She is currently on midodrine 2 5 mg 3 times a day  However blood pressures today in the office are elevated  We will follow blood pressures at her facility and if they remain consistently over 540 mmHg systolic, we would likely reduce the midodrine down to twice daily, and if this does not help to reduce blood pressures enough either the medication will need to be discontinued or given once daily in the morning  SIADH (syndrome of inappropriate ADH production) ([facility] [address] )  Mechanism of action of underlying physiology was reviewed with the patient, who understood clinical revalence  She will try to continue to limit total fluid intake, and although I did not give a specific amount of ounces, patient self restricts to what sounds to be less than 32 ounces a day      Bilateral lower extremity edema  Continue with low-sodium diet, continue with bumetanide 2 mg daily  Hypokalemia  Continue with potassium supplementation given daily Bumex use  Follow-up blood work, may be able to reduce potassium dosing  We also discussed potentially given the patient alternative natural potassium supplements such as blackstrap molasses 2 tablespoons once daily  Problem List Items Addressed This Visit        Endocrine    SIADH (syndrome of inappropriate ADH production) ([facility] [address] )     Mechanism of action of underlying physiology was reviewed with the patient, who understood clinical revalence  She will try to continue to limit total fluid intake, and although I did not give a specific amount of ounces, patient self restricts to what sounds to be less than 32 ounces a day  Cardiovascular and Mediastinum    Arterial hypotension     This is most likely associated with the patient's underlying liver process, potentially cirrhosis  She is currently on midodrine 2 5 mg 3 times a day  However blood pressures today in the office are elevated  We will follow blood pressures at her facility and if they remain consistently over 717 mmHg systolic, we would likely reduce the midodrine down to twice daily, and if this does not help to reduce blood pressures enough either the medication will need to be discontinued or given once daily in the morning  Genitourinary    Acute kidney injury [facility])     Patient remains at a creatinine above typical baseline  Unfortunately she may have lost a permanent amount of kidney function due to acute tubular necrosis during her complicated hospital stay  We will continue to optimize care in general and avoid potential for toxins           Stage 3b chronic kidney disease [facility]) - Primary     Lab Results   Component Value Date    CREATININE 1 21 05/23/2023    CREATININE 1 12 05/22/2023    CREATININE 1 36 (H) 05/21/2023    EGFR 43 05/23/2023    EGFR 47 05/22/2023    EGFR 37 05/21/2023     Although the patient has a creatinine with a an estimated GFR that is around or above 45 mL/min, she most likely has lower creatinine clearance due to small body habitus at less than 1 73 m² body surface area, and loss of muscle mass  Continue to optimize care avoid potential nephrotoxins  Relevant Orders    Albumin / creatinine urine ratio    Urinalysis with microscopic    Comprehensive metabolic panel    CBC and differential    Magnesium    Phosphorus    PTH, intact       Other    Hyponatremia    Abnormal CT of liver    Hypokalemia     Continue with potassium supplementation given daily Bumex use  Follow-up blood work, may be able to reduce potassium dosing  We also discussed potentially given the patient alternative natural potassium supplements such as blackstrap molasses 2 tablespoons once daily  Bilateral lower extremity edema     Continue with low-sodium diet, continue with bumetanide 2 mg daily  Thank you for allowing us to continue to manage and help in the care of your patient  We we will check labs in 4-6 weeks to follow-up electrolytes as well as kidney function  We asked for the facility to fax over the most recent labs for review  I also asked for the facility to fax over weekly blood pressures for now until we know that the blood pressures are appropriate  We will see the patient back in August and then most likely in November, and by that time my hope is that the patient has been stabilized from the renal standpoint  Subjective:      Patient ID: Frantz Bedoya is a 69 y o  female  Patient presents for posthospital follow-up  We reviewed the patient's hospitalization in detail, which was fairly extensive given her length of stay and several medical conditions that were managed along the way  Specifically from the renal standpoint, the patient was in hepatorenal syndrome for nearly 2 weeks, treated with midodrine/octreotide/albumin infusions    She was then aggressively diuresed due to significant volume overloaded state, with very close monitoring of hemodynamics, and kidney function as well as electrolytes with diuresis  Most recent creatinine is 1 2 mg/dL, which was at the time of discharge  She had labs at [facility], however, they are currently not available  Patient is taking all medications as prescribed with no specific side effects at this time  Patient believes her bilateral lower extreme edema has significantly improved  She is currently using a's compression with good control of the bilateral lower extremity edema which appears to now be isolated just cephalad to the knees  She is on a low-sodium diet, taking Bumex daily as prescribed  The following portions of the patient's history were reviewed and updated as appropriate: allergies, current medications, past family history, past medical history, past social history, past surgical history and problem list     Review of Systems   All other systems reviewed and are negative          Social History     Socioeconomic History   • Marital status: Single     Spouse name: None   • Number of children: None   • Years of education: None   • Highest education level: None   Occupational History   • None   Tobacco Use   • Smoking status: Former     Packs/day: 0 50     Types: Cigarettes     Quit date: 1993     Years since quittin 0   • Smokeless tobacco: Never   Vaping Use   • Vaping Use: Never used   Substance and Sexual Activity   • Alcohol use: Never   • Drug use: Never   • Sexual activity: Not Currently     Partners: Male   Other Topics Concern   • None   Social History Narrative   • None     Social Determinants of Health     Financial Resource Strain: Not on file   Food Insecurity: No Food Insecurity (2023)    Hunger Vital Sign    • Worried About Running Out of Food in the Last Year: Never true    • Ran Out of Food in the Last Year: Never true   Transportation Needs: No Transportation Needs (2023) PRAPARE - Transportation    • Lack of Transportation (Medical): No    • Lack of Transportation (Non-Medical):  No   Physical Activity: Not on file   Stress: Not on file   Social Connections: Not on file   Intimate Partner Violence: Not on file   Housing Stability: Low Risk  (4/28/2023)    Housing Stability Vital Sign    • Unable to Pay for Housing in the Last Year: No    • Number of Places Lived in the Last Year: 1    • Unstable Housing in the Last Year: No     Past Medical History:   Diagnosis Date   • Anemia    • Atrial fibrillation ([address] )    • Depression    • GI (gastrointestinal bleed)    • Hypomagnesemia 4/29/2023   • Ischemic colitis [facility])      Past Surgical History:   Procedure Laterality Date   • APPENDECTOMY     • CARDIAC SURGERY     • CHOLECYSTECTOMY     • HIP SURGERY Right 2022    Partial replacement   • HYSTERECTOMY     • IR BIOPSY TRANSJUGULAR LIVER  05/18/2023       Current Outpatient Medications:   •  aspirin (ECOTRIN LOW STRENGTH) 81 mg EC tablet, Take 81 mg by mouth daily, Disp: , Rfl:   •  budesonide (ENTOCORT EC) 3 MG capsule, Take 3 capsules (9 mg total) by mouth daily Do not start before May 24, 2023 , Disp: 90 capsule, Rfl: 0  •  bumetanide (BUMEX) 2 mg tablet, Take 1 tablet (2 mg total) by mouth daily Do not start before May 24, 2023 , Disp: 30 tablet, Rfl: 0  •  levothyroxine 75 mcg tablet, Take 75 mcg by mouth daily, Disp: , Rfl:   •  loratadine (CLARITIN) 10 mg tablet, Take 10 mg by mouth daily As needed, Disp: , Rfl:   •  metoprolol succinate (TOPROL-XL) 25 mg 24 hr tablet, Take 25 mg by mouth 2 (two) times a day, Disp: , Rfl:   •  midodrine (PROAMATINE) 2 5 mg tablet, Take 1 tablet (2 5 mg total) by mouth 3 (three) times a day before meals, Disp: 90 tablet, Rfl: 0  •  omeprazole (PriLOSEC) 40 MG capsule, Take 40 mg by mouth daily, Disp: , Rfl:   •  ondansetron (ZOFRAN) 8 mg tablet, As needed, Disp: , Rfl:   •  pantoprazole (PROTONIX) 40 mg tablet, Take 1 tablet (40 mg total) by mouth daily "in the early morning Do not start before May 24, 2023 , Disp: , Rfl: 0  •  polyvinyl alcohol (LIQUIFILM TEARS) 1 4 % ophthalmic solution, 1 drop 2 (two) times a day, Disp: , Rfl:   •  potassium chloride (K-DUR,KLOR-CON) 20 mEq tablet, Take 1 tablet (20 mEq total) by mouth daily Do not start before May 24, 2023 , Disp: , Rfl: 0  •  saccharomyces boulardii (FLORASTOR) 250 mg capsule, Take 250 mg by mouth 2 (two) times a day, Disp: , Rfl:   •  traZODone (DESYREL) 50 mg tablet, Take 50 mg by mouth, Disp: , Rfl:   •  acetaminophen (TYLENOL) 650 mg CR tablet, Take by mouth (Patient not taking: Reported on 6/6/2023), Disp: , Rfl:     Lab Results   Component Value Date    AGAP 8 05/23/2023    ALKPHOS 386 (H) 05/23/2023    ALT 19 05/23/2023    AST 53 (H) 05/23/2023    BUN 31 (H) 05/23/2023    CALCIUM 8 8 05/23/2023    CL 99 05/23/2023    CO2 29 05/23/2023    CREATININE 1 21 05/23/2023    EGFR 43 05/23/2023    GLUC 78 05/23/2023    GLUF 80 04/19/2023    K 4 1 05/23/2023    SODIUM 136 05/23/2023    TBILI 1 76 (H) 05/23/2023    TP 6 7 05/23/2023     Lab Results   Component Value Date    HCT 22 8 (L) 05/23/2023    HGB 7 4 (L) 05/23/2023     (H) 05/23/2023    PLT 67 (L) 05/23/2023    WBC 4 43 05/23/2023     No results found for: \"CHOLESTEROL\"  No results found for: \"HDL\"  No results found for: \"LDLCALC\"  No results found for: \"TRIG\"  No results found for: \"CHOLHDL\"  Lab Results   Component Value Date    [id number] 2 809 05/08/2023     Lab Results   Component Value Date    CALCIUM 8 8 05/23/2023    PHOS 2 8 05/13/2023     No results found for: \"SPEP\", \"UPEP\"  No results found for: \"FCMP63ZAB\", \"MICROALBUR\"        Objective:      /60 (BP Location: Left arm, Patient Position: Sitting, Cuff Size: Standard) Comment: 162/60 Right  Pulse 55   Ht 4' 11\" (1 499 m)   Wt 49 9 kg (110 lb)   SpO2 98%   BMI 22 22 kg/m²          Physical Exam  Vitals reviewed  Constitutional:       General: She is not in acute distress      " Appearance: She is well-developed  HENT:      Head: Normocephalic and atraumatic  Eyes:      Conjunctiva/sclera: Conjunctivae normal    Cardiovascular:      Rate and Rhythm: Normal rate and regular rhythm  Pulmonary:      Effort: Pulmonary effort is normal       Breath sounds: Normal breath sounds  Abdominal:      Palpations: Abdomen is soft  Musculoskeletal:      Cervical back: Neck supple  Right lower leg: Edema present  Left lower leg: Edema present  Skin:     General: Skin is warm  Findings: No rash  Neurological:      Mental Status: She is alert and oriented to person, place, and time  Cranial Nerves: No cranial nerve deficit     Psychiatric:         Behavior: Behavior normal

## 2023-06-06 NOTE — ASSESSMENT & PLAN NOTE
Mechanism of action of underlying physiology was reviewed with the patient, who understood clinical revalence  She will try to continue to limit total fluid intake, and although I did not give a specific amount of ounces, patient self restricts to what sounds to be less than 32 ounces a day

## 2023-06-06 NOTE — ASSESSMENT & PLAN NOTE
Continue with potassium supplementation given daily Bumex use  Follow-up blood work, may be able to reduce potassium dosing  We also discussed potentially given the patient alternative natural potassium supplements such as blackstrap molasses 2 tablespoons once daily

## 2023-06-13 DIAGNOSIS — R93.2 ABNORMAL LIVER DIAGNOSTIC IMAGING: Primary | ICD-10-CM

## 2023-06-13 NOTE — RESULT ENCOUNTER NOTE
Dear staff: Please kindly communicate with patient that result from liver biopsy show possible drug-induced liver injury  She currently does not seem to be on any medication that is high risk for causing liver injury  I recommend repeating liver enzymes in 1 to 2 weeks  I am ordering the labs today  Continue to follow-up in the GI and liver office as scheduled

## 2023-06-15 ENCOUNTER — DOCUMENTATION (OUTPATIENT)
Dept: GASTROENTEROLOGY | Facility: CLINIC | Age: 78
End: 2023-06-15

## 2023-06-17 PROBLEM — R19.7 DIARRHEA OF PRESUMED INFECTIOUS ORIGIN: Status: RESOLVED | Noted: 2023-04-18 | Resolved: 2023-06-17

## 2023-07-10 ENCOUNTER — HOSPITAL ENCOUNTER (INPATIENT)
Facility: HOSPITAL | Age: 78
LOS: 3 days | Discharge: HOME | DRG: 385 | End: 2023-07-14
Attending: FAMILY MEDICINE | Admitting: HOSPITALIST
Payer: MEDICARE

## 2023-07-10 ENCOUNTER — APPOINTMENT (EMERGENCY)
Dept: CT IMAGING | Facility: HOSPITAL | Age: 78
DRG: 385 | End: 2023-07-10
Payer: MEDICARE

## 2023-07-10 DIAGNOSIS — K92.2 LOWER GI BLEED: ICD-10-CM

## 2023-07-10 DIAGNOSIS — K52.9 IBD (INFLAMMATORY BOWEL DISEASE): ICD-10-CM

## 2023-07-10 DIAGNOSIS — R10.9 ABDOMINAL PAIN: ICD-10-CM

## 2023-07-10 DIAGNOSIS — K62.5 RECTAL BLEEDING: Primary | ICD-10-CM

## 2023-07-10 PROBLEM — R10.32 LEFT LOWER QUADRANT ABDOMINAL PAIN: Status: RESOLVED | Noted: 2023-04-28 | Resolved: 2023-07-10

## 2023-07-10 PROBLEM — L03.115 CELLULITIS OF RIGHT LOWER EXTREMITY: Status: RESOLVED | Noted: 2023-05-19 | Resolved: 2023-07-10

## 2023-07-10 PROBLEM — I95.9 ARTERIAL HYPOTENSION: Status: RESOLVED | Noted: 2023-06-06 | Resolved: 2023-07-10

## 2023-07-10 PROBLEM — E03.9 ACQUIRED HYPOTHYROIDISM: Chronic | Status: ACTIVE | Noted: 2023-04-18

## 2023-07-10 PROBLEM — E87.1 HYPONATREMIA: Status: RESOLVED | Noted: 2023-04-29 | Resolved: 2023-07-10

## 2023-07-10 PROBLEM — K92.1 TARRY STOOLS: Status: RESOLVED | Noted: 2023-05-08 | Resolved: 2023-07-10

## 2023-07-10 PROBLEM — E87.6 HYPOKALEMIA: Status: RESOLVED | Noted: 2023-06-06 | Resolved: 2023-07-10

## 2023-07-10 PROBLEM — N17.9 ACUTE KIDNEY INJURY (HCC): Status: RESOLVED | Noted: 2023-04-18 | Resolved: 2023-07-10

## 2023-07-10 PROBLEM — E22.2 SIADH (SYNDROME OF INAPPROPRIATE ADH PRODUCTION) (HCC): Chronic | Status: ACTIVE | Noted: 2023-06-06

## 2023-07-10 PROBLEM — I48.0 PAF (PAROXYSMAL ATRIAL FIBRILLATION) (HCC): Chronic | Status: ACTIVE | Noted: 2022-12-01

## 2023-07-10 PROBLEM — N18.32 STAGE 3B CHRONIC KIDNEY DISEASE (HCC): Chronic | Status: ACTIVE | Noted: 2023-06-06

## 2023-07-10 PROBLEM — R60.0 BILATERAL LOWER EXTREMITY EDEMA: Chronic | Status: ACTIVE | Noted: 2023-06-06

## 2023-07-10 LAB
ALBUMIN SERPL BCP-MCNC: 2.6 G/DL (ref 3.5–5)
ALP SERPL-CCNC: 305 U/L (ref 34–104)
ALT SERPL W P-5'-P-CCNC: 30 U/L (ref 7–52)
ANION GAP SERPL CALCULATED.3IONS-SCNC: 9 MMOL/L
AST SERPL W P-5'-P-CCNC: 40 U/L (ref 13–39)
BASOPHILS # BLD AUTO: 0.02 THOUSANDS/ÂΜL (ref 0–0.1)
BASOPHILS NFR BLD AUTO: 0 % (ref 0–1)
BILIRUB SERPL-MCNC: 1.2 MG/DL (ref 0.2–1)
BUN SERPL-MCNC: 44 MG/DL (ref 5–25)
CALCIUM ALBUM COR SERPL-MCNC: 9.5 MG/DL (ref 8.3–10.1)
CALCIUM SERPL-MCNC: 8.4 MG/DL (ref 8.4–10.2)
CHLORIDE SERPL-SCNC: 103 MMOL/L (ref 96–108)
CO2 SERPL-SCNC: 25 MMOL/L (ref 21–32)
CREAT SERPL-MCNC: 1.14 MG/DL (ref 0.6–1.3)
EOSINOPHIL # BLD AUTO: 0.03 THOUSAND/ÂΜL (ref 0–0.61)
EOSINOPHIL NFR BLD AUTO: 0 % (ref 0–6)
ERYTHROCYTE [DISTWIDTH] IN BLOOD BY AUTOMATED COUNT: 14.3 % (ref 11.6–15.1)
GFR SERPL CREATININE-BSD FRML MDRD: 46 ML/MIN/1.73SQ M
GLUCOSE SERPL-MCNC: 125 MG/DL (ref 65–140)
HCT VFR BLD AUTO: 31.8 % (ref 34.8–46.1)
HGB BLD-MCNC: 10.4 G/DL (ref 11.5–15.4)
IMM GRANULOCYTES # BLD AUTO: 0.03 THOUSAND/UL (ref 0–0.2)
IMM GRANULOCYTES NFR BLD AUTO: 0 % (ref 0–2)
INR PPP: 1.09 (ref 0.84–1.19)
LYMPHOCYTES # BLD AUTO: 0.72 THOUSANDS/ÂΜL (ref 0.6–4.47)
LYMPHOCYTES NFR BLD AUTO: 7 % (ref 14–44)
MCH RBC QN AUTO: 34.8 PG (ref 26.8–34.3)
MCHC RBC AUTO-ENTMCNC: 32.7 G/DL (ref 31.4–37.4)
MCV RBC AUTO: 106 FL (ref 82–98)
MONOCYTES # BLD AUTO: 1.34 THOUSAND/ÂΜL (ref 0.17–1.22)
MONOCYTES NFR BLD AUTO: 12 % (ref 4–12)
NEUTROPHILS # BLD AUTO: 8.78 THOUSANDS/ÂΜL (ref 1.85–7.62)
NEUTS SEG NFR BLD AUTO: 81 % (ref 43–75)
NRBC BLD AUTO-RTO: 0 /100 WBCS
PLATELET # BLD AUTO: 89 THOUSANDS/UL (ref 149–390)
PMV BLD AUTO: 11.1 FL (ref 8.9–12.7)
POTASSIUM SERPL-SCNC: 3.6 MMOL/L (ref 3.5–5.3)
PROT SERPL-MCNC: 6 G/DL (ref 6.4–8.4)
PROTHROMBIN TIME: 14.2 SECONDS (ref 11.6–14.5)
RBC # BLD AUTO: 2.99 MILLION/UL (ref 3.81–5.12)
SODIUM SERPL-SCNC: 137 MMOL/L (ref 135–147)
WBC # BLD AUTO: 10.92 THOUSAND/UL (ref 4.31–10.16)

## 2023-07-10 PROCEDURE — 85025 COMPLETE CBC W/AUTO DIFF WBC: CPT | Performed by: FAMILY MEDICINE

## 2023-07-10 PROCEDURE — G1004 CDSM NDSC: HCPCS

## 2023-07-10 PROCEDURE — 82272 OCCULT BLD FECES 1-3 TESTS: CPT

## 2023-07-10 PROCEDURE — 99285 EMERGENCY DEPT VISIT HI MDM: CPT

## 2023-07-10 PROCEDURE — 99223 1ST HOSP IP/OBS HIGH 75: CPT | Performed by: PHYSICIAN ASSISTANT

## 2023-07-10 PROCEDURE — C9113 INJ PANTOPRAZOLE SODIUM, VIA: HCPCS | Performed by: FAMILY MEDICINE

## 2023-07-10 PROCEDURE — 96375 TX/PRO/DX INJ NEW DRUG ADDON: CPT

## 2023-07-10 PROCEDURE — 96374 THER/PROPH/DIAG INJ IV PUSH: CPT

## 2023-07-10 PROCEDURE — 36415 COLL VENOUS BLD VENIPUNCTURE: CPT | Performed by: FAMILY MEDICINE

## 2023-07-10 PROCEDURE — 99285 EMERGENCY DEPT VISIT HI MDM: CPT | Performed by: FAMILY MEDICINE

## 2023-07-10 PROCEDURE — 74178 CT ABD&PLV WO CNTR FLWD CNTR: CPT

## 2023-07-10 PROCEDURE — 85610 PROTHROMBIN TIME: CPT | Performed by: FAMILY MEDICINE

## 2023-07-10 PROCEDURE — 80053 COMPREHEN METABOLIC PANEL: CPT | Performed by: FAMILY MEDICINE

## 2023-07-10 PROCEDURE — 96361 HYDRATE IV INFUSION ADD-ON: CPT

## 2023-07-10 RX ORDER — POTASSIUM CHLORIDE 20 MEQ/1
20 TABLET, EXTENDED RELEASE ORAL DAILY
Status: DISCONTINUED | OUTPATIENT
Start: 2023-07-11 | End: 2023-07-14 | Stop reason: HOSPADM

## 2023-07-10 RX ORDER — METOPROLOL SUCCINATE 25 MG/1
25 TABLET, EXTENDED RELEASE ORAL 2 TIMES DAILY
Status: DISCONTINUED | OUTPATIENT
Start: 2023-07-11 | End: 2023-07-14 | Stop reason: HOSPADM

## 2023-07-10 RX ORDER — BUMETANIDE 1 MG/1
2 TABLET ORAL DAILY
Status: DISCONTINUED | OUTPATIENT
Start: 2023-07-11 | End: 2023-07-14 | Stop reason: HOSPADM

## 2023-07-10 RX ORDER — ACETAMINOPHEN 325 MG/1
650 TABLET ORAL EVERY 4 HOURS PRN
Status: DISCONTINUED | OUTPATIENT
Start: 2023-07-10 | End: 2023-07-14 | Stop reason: HOSPADM

## 2023-07-10 RX ORDER — TRAZODONE HYDROCHLORIDE 100 MG/1
100 TABLET ORAL
Status: DISCONTINUED | OUTPATIENT
Start: 2023-07-10 | End: 2023-07-14 | Stop reason: HOSPADM

## 2023-07-10 RX ORDER — LOSARTAN POTASSIUM 25 MG/1
25 TABLET ORAL DAILY
Status: DISCONTINUED | OUTPATIENT
Start: 2023-07-11 | End: 2023-07-14 | Stop reason: HOSPADM

## 2023-07-10 RX ORDER — LEVOTHYROXINE SODIUM 0.07 MG/1
75 TABLET ORAL
Status: DISCONTINUED | OUTPATIENT
Start: 2023-07-11 | End: 2023-07-14 | Stop reason: HOSPADM

## 2023-07-10 RX ORDER — SACCHAROMYCES BOULARDII 250 MG
250 CAPSULE ORAL 2 TIMES DAILY
Status: DISCONTINUED | OUTPATIENT
Start: 2023-07-11 | End: 2023-07-14 | Stop reason: HOSPADM

## 2023-07-10 RX ORDER — ONDANSETRON 2 MG/ML
4 INJECTION INTRAMUSCULAR; INTRAVENOUS EVERY 6 HOURS PRN
Status: DISCONTINUED | OUTPATIENT
Start: 2023-07-10 | End: 2023-07-14 | Stop reason: HOSPADM

## 2023-07-10 RX ORDER — PANTOPRAZOLE SODIUM 40 MG/1
40 TABLET, DELAYED RELEASE ORAL
Status: DISCONTINUED | OUTPATIENT
Start: 2023-07-11 | End: 2023-07-14 | Stop reason: HOSPADM

## 2023-07-10 RX ORDER — SODIUM CHLORIDE 9 MG/ML
100 INJECTION, SOLUTION INTRAVENOUS CONTINUOUS
Status: DISCONTINUED | OUTPATIENT
Start: 2023-07-11 | End: 2023-07-14 | Stop reason: HOSPADM

## 2023-07-10 RX ORDER — MINERAL OIL AND PETROLATUM 150; 830 MG/G; MG/G
1 OINTMENT OPHTHALMIC 2 TIMES DAILY
Status: DISCONTINUED | OUTPATIENT
Start: 2023-07-10 | End: 2023-07-11

## 2023-07-10 RX ORDER — MINERAL OIL AND PETROLATUM 150; 830 MG/G; MG/G
1 OINTMENT OPHTHALMIC 2 TIMES DAILY
Status: ON HOLD | COMMUNITY

## 2023-07-10 RX ORDER — PANTOPRAZOLE SODIUM 40 MG/10ML
40 INJECTION, POWDER, LYOPHILIZED, FOR SOLUTION INTRAVENOUS ONCE
Status: COMPLETED | OUTPATIENT
Start: 2023-07-10 | End: 2023-07-10

## 2023-07-10 RX ORDER — FENTANYL CITRATE 50 UG/ML
25 INJECTION, SOLUTION INTRAMUSCULAR; INTRAVENOUS ONCE
Status: COMPLETED | OUTPATIENT
Start: 2023-07-10 | End: 2023-07-10

## 2023-07-10 RX ORDER — BUMETANIDE 2 MG/1
2 TABLET ORAL DAILY
Status: ON HOLD | COMMUNITY

## 2023-07-10 RX ORDER — LOSARTAN POTASSIUM 25 MG/1
25 TABLET ORAL DAILY
Status: ON HOLD | COMMUNITY

## 2023-07-10 RX ADMIN — SODIUM CHLORIDE 1000 ML: 0.9 INJECTION, SOLUTION INTRAVENOUS at 16:54

## 2023-07-10 RX ADMIN — MORPHINE SULFATE 2 MG: 2 INJECTION, SOLUTION INTRAMUSCULAR; INTRAVENOUS at 23:29

## 2023-07-10 RX ADMIN — IOHEXOL 100 ML: 350 INJECTION, SOLUTION INTRAVENOUS at 17:52

## 2023-07-10 RX ADMIN — PANTOPRAZOLE SODIUM 40 MG: 40 INJECTION, POWDER, FOR SOLUTION INTRAVENOUS at 16:57

## 2023-07-10 RX ADMIN — FENTANYL CITRATE 25 MCG: 50 INJECTION, SOLUTION INTRAMUSCULAR; INTRAVENOUS at 17:52

## 2023-07-10 NOTE — LETTER
July 14, 2023     Mehdi Mcmahan [facility]    Patient: Mandeep Boyle   YOB: 1945   Date of Visit: 7/10/2023       Dear  [facility]:    Thank you for referring Kade Barragan to me for evaluation. Below are my notes for this consultation. If you have questions, please do not hesitate to call me. I look forward to following your patient along with you. Sincerely,        No name on file        CC: No Recipients    Farnaz Jaramillo MD  7/13/2023  9:51 AM  Signed  [address]  Progress Note  Name: Eagle Pineda  MRN: [id number]  Unit/Bed#: -01 I Date of Admission: 7/10/2023   Date of Service: 7/13/2023 I Hospital Day: 2    Assessment/Plan   * Rectal bleeding  Assessment & Plan  Persists -patient reports having had ongoing episodes of mixed blood per rectum -most recent episode earlier this morning  Acute blood loss anemia -arrival hemoglobin-10.4  Hemoglobin dropped to 8.5, this morning it is up to 11.1.   Patient did not receive a blood transfusion, suspect that she might be hemoconcentrated in view of receiving GoLytely overnight as part of the preparation for her colonoscopy  We will repeat an H&H now  Patient is status post a flexible sigmoidoscopy on 7/12/2023- see the full report for details  Patient is to go for a colonoscopy later today  Continued management as per GI    SIADH (syndrome of inappropriate ADH production) ([address])  Assessment & Plan  Sodium currently 132  Outpatient follow-up with nephrology  Continue to monitor BMPs while here in house    Acquired hypothyroidism  Assessment & Plan  Continue levothyroxine    PAF (paroxysmal atrial fibrillation) (HCC)  Assessment & Plan  Rate controlled with Toprol-XL  Anticoagulation on hold in view of GI bleed, of note patient only takes a baby aspirin    Stage 3b chronic kidney disease [facility])  Assessment & Plan  Lab Results   Component Value Date    EGFR 71 07/13/2023    EGFR 51 07/12/2023    EGFR 53 07/11/2023    CREATININE 0.80 2023    CREATININE 1.06 2023    CREATININE 1.02 2023   Creatinine stable  Continue to monitor daily BMPs    Bilateral lower extremity edema  Assessment & Plan  Continue Bumex    Primary hypertension  Assessment & Plan  Blood pressure stable  Continue Toprol-XL, continue losartan            VTE Prophylaxis:  Pharmacologic VTE Prophylaxis contraindicated due to GI bleeding, SCDs only    Patient Centered Rounds: I have performed bedside rounds with nursing staff today. Discussions with Specialists or Other Care Team Provider: Case management, nursing, GI  Education and Discussions with Family / Patient: Attempts to call the patient's brother Amy Boles at phone #[phone] at [phone] answer    Current Length of Stay: 2 day(s)    Current Patient Status: Inpatient   Certification Statement: The patient will continue to require additional inpatient hospital stay due to Need for colonoscopy    Discharge Plan: Discharge planning once cleared by GI    Code Status: Level 3 - DNAR and DNI    Subjective:   Patient seen, resting in bed, feels fatigued, tired, and reports that she has been up all night in view of the GoLytely preparation    Objective:     Vitals:   Temp (24hrs), Av.3 °F (36.3 °C), Min:96.9 °F (36.1 °C), Max:97.5 °F (36.4 °C)    Temp:  [96.9 °F (36.1 °C)-97.5 °F (36.4 °C)] 97.2 °F (36.2 °C)  HR:  [52-66] 61  Resp:  [16-20] 18  BP: (112-174)/(39-70) 171/64  SpO2:  [93 %-100 %] 93 %  Body mass index is 21.86 kg/m². Input and Output Summary (last 24 hours): Intake/Output Summary (Last 24 hours) at 2023 0947  Last data filed at 2023 1821  Gross per 24 hour   Intake 220 ml   Output --   Net 220 ml       Physical Exam:   Physical Exam  Constitutional:       General: She is not in acute distress. Appearance: Normal appearance. She is normal weight. She is not ill-appearing. HENT:      Head: Normocephalic and atraumatic.       Nose: Nose normal.      Mouth/Throat: Mouth: Mucous membranes are moist.   Eyes:      Extraocular Movements: Extraocular movements intact. Pupils: Pupils are equal, round, and reactive to light. Cardiovascular:      Rate and Rhythm: Normal rate and regular rhythm. Pulses: Normal pulses. Heart sounds: Normal heart sounds. No murmur heard. No friction rub. No gallop. Pulmonary:      Effort: Pulmonary effort is normal. No respiratory distress. Breath sounds: Normal breath sounds. No wheezing, rhonchi or rales. Abdominal:      General: There is no distension. Palpations: Abdomen is soft. There is no mass. Tenderness: There is no abdominal tenderness. Hernia: No hernia is present. Musculoskeletal:         General: No swelling or tenderness. Normal range of motion. Cervical back: Normal range of motion and neck supple. No rigidity. Right lower leg: No edema. Left lower leg: No edema. Skin:     General: Skin is warm. Capillary Refill: Capillary refill takes less than 2 seconds. Findings: No erythema or rash. Neurological:      General: No focal deficit present. Mental Status: She is alert and oriented to person, place, and time. Mental status is at baseline. Cranial Nerves: No cranial nerve deficit. Motor: No weakness.    Psychiatric:         Mood and Affect: Mood normal.         Behavior: Behavior normal.         Additional Data:     Labs:    Results from last 7 days   Lab Units 07/13/23  0828   WBC Thousand/uL 5.45   HEMOGLOBIN g/dL 11.1*   HEMATOCRIT % 33.2*   PLATELETS Thousands/uL 96*   NEUTROS PCT % 63   LYMPHS PCT % 22   MONOS PCT % 12   EOS PCT % 3     Results from last 7 days   Lab Units 07/13/23  0828   SODIUM mmol/L 132*   POTASSIUM mmol/L 3.5   CHLORIDE mmol/L 97   CO2 mmol/L 28   BUN mg/dL 20   CREATININE mg/dL 0.80   CALCIUM mg/dL 7.9*   ALK PHOS U/L 292*   ALT U/L 31   AST U/L 39     Results from last 7 days   Lab Units 07/10/23  1654   INR  1.09 * I Have Reviewed All Lab Data Listed Above. * Additional Pertinent Lab Tests Reviewed: [address] Admission  Reviewed    Imaging:  Imaging Reports Reviewed Today Include: Flexible sigmoidoscopy imaging and reports reviewed   Recent Cultures (last 7 days):           Last 24 Hours Medication List:   Current Facility-Administered Medications   Medication Dose Route Frequency Provider Last Rate   • acetaminophen  650 mg Oral Q4H PRN Linda Dang PA-C     • bumetanide  2 mg Oral Daily Linda aDng PA-C     • glycerin-hypromellose-  1 drop Both Eyes Q4H PRN Linda Dang PA-C     • levothyroxine  75 mcg Oral Early Morning Linda Dang PA-C     • losartan  25 mg Oral Daily Linda Dang PA-C     • metoprolol succinate  25 mg Oral BID Linda Dang PA-C     • morphine injection  2 mg Intravenous Q4H PRN Linda Dang PA-C     • ondansetron  4 mg Intravenous Q6H PRN Linda Dang PA-C     • pantoprazole  40 mg Oral Early Morning Linda Dang PA-C     • polyethylene glycol  17 g Oral Daily Jillene Vaibhav, DO     • potassium chloride  20 mEq Oral Daily Linda Dang PA-C     • saccharomyces boulardii  250 mg Oral BID Linda Dang PA-C     • sodium chloride  100 mL/hr Intravenous Continuous Lnida Dang PA-C 100 mL/hr (07/12/23 1917)   • traZODone  100 mg Oral HS Linda Dang PA-C          Today, Patient Was Seen By: Dalila Horner MD    ** Please Note: Dictation voice to text software may have been used in the creation of this document. Lay Hernandez MD  7/12/2023  2:57 PM  Signed  73-year-old with atrial fibrillation currently not on anticoagulation, presented with abdominal pain and hematochezia. Previously known rectosigmoid ulcer. Plan to do flexible sigmoidoscopy to evaluate today.     Lay Hernandez MD  Gastroenterology  [facility] SPECIALTY [facility] [place]  Date: July 12, 2023]      Archer Cushing, MD  7/12/2023  9:42 AM  Signed  [address]  Progress Note  Name: Cherylann Duane  MRN: [id number]  Unit/Bed#: -63 I Date of Admission: 7/10/2023   Date of Service: 7/12/2023 I Hospital Day: 1    Assessment/Plan   * Rectal bleeding  Assessment & Plan  Persists -patient reports having had ongoing episodes of mixed blood per rectum -most recent episode earlier this morning  Acute blood loss anemia -arrival hemoglobin-10.4, current hemoglobin 8.5. Patient remains hemodynamically stable. Will transfuse if the hemoglobin drops to below 7.5 and or if hemodynamic instability ensues. Status post GI evaluation  Plan is for a flexible sigmoidoscopy later this afternoon  Of note, the patient has had 2 EGDs, and 2 colonoscopies within the past 5 months, and 1 flexible sigmoidoscopy in May of this year. No specific etiology was found.   Continue Protonix  If the flexible sigmoidoscopy is nonrevealing, patient may benefit from an outpatient video capsule endoscopy-we will review with GI  DC Planning once cleared by GI    SIADH (syndrome of inappropriate ADH production) ([facility])  Assessment & Plan  Sodium currently 135  Outpatient follow-up with nephrology  Continue to monitor BMPs while here in house    Acquired hypothyroidism  Assessment & Plan  Continue levothyroxine    PAF (paroxysmal atrial fibrillation) ([facility])  Assessment & Plan  Rate controlled with Toprol-XL  Anticoagulation on hold in view of GI bleed, of note patient only takes a baby aspirin    Stage 3b chronic kidney disease [facility])  Assessment & Plan  Lab Results   Component Value Date    EGFR 51 07/12/2023    EGFR 53 07/11/2023    EGFR 46 07/10/2023    CREATININE 1.06 07/12/2023    CREATININE 1.02 07/11/2023    CREATININE 1.14 07/10/2023   Creatinine stable  Continue to monitor daily BMPs    Bilateral lower extremity edema  Assessment & Plan  Continue Bumex    Primary hypertension  Assessment & Plan  Blood pressure stable  Continue Toprol-XL, continue losartan            VTE Prophylaxis:  Pharmacologic VTE Prophylaxis contraindicated due to GI bleeding, SCDs only    Patient Centered Rounds: I have performed bedside rounds with nursing staff today. Discussions with Specialists or Other Care Team Provider: GI, case management, nursing  Education and Discussions with Family / Patient: Patient was brought up to par    Current Length of Stay: 1 day(s)    Current Patient Status: Inpatient   Certification Statement: The patient will continue to require additional inpatient hospital stay due to The need for a flexible sigmoidoscopy    Discharge Plan: Discharge planning once cleared by GI    Code Status: Level 3 - DNAR and DNI    Subjective:   Patient seen, sitting up on side of her bed, looks and feels well, but unfortunately continues to have mixed bloody bowel movements of both bright red and dark blood    Objective:     Vitals:   Temp (24hrs), Av.9 °F (36.6 °C), Min:97.7 °F (36.5 °C), Max:98.2 °F (36.8 °C)    Temp:  [97.7 °F (36.5 °C)-98.2 °F (36.8 °C)] 98.2 °F (36.8 °C)  HR:  [59-71] 59  Resp:  [17-20] 20  BP: (125-175)/(37-70) 139/42  SpO2:  [94 %-97 %] 97 %  Body mass index is 21.86 kg/m². Input and Output Summary (last 24 hours): Intake/Output Summary (Last 24 hours) at 2023 0654  Last data filed at 2023 1837  Gross per 24 hour   Intake 0 ml   Output --   Net 0 ml       Physical Exam:   Physical Exam  Constitutional:       General: She is not in acute distress. Appearance: Normal appearance. She is normal weight. She is not ill-appearing. HENT:      Head: Normocephalic and atraumatic. Nose: Nose normal.      Mouth/Throat:      Mouth: Mucous membranes are moist.   Eyes:      Extraocular Movements: Extraocular movements intact. Pupils: Pupils are equal, round, and reactive to light.    Cardiovascular:      Rate and Rhythm: Normal rate and regular rhythm. Pulses: Normal pulses. Heart sounds: Normal heart sounds. No murmur heard. No friction rub. No gallop. Pulmonary:      Effort: Pulmonary effort is normal. No respiratory distress. Breath sounds: Normal breath sounds. No wheezing, rhonchi or rales. Abdominal:      General: There is no distension. Palpations: Abdomen is soft. There is no mass. Tenderness: There is no abdominal tenderness. Hernia: No hernia is present. Musculoskeletal:         General: No swelling or tenderness. Normal range of motion. Cervical back: Normal range of motion and neck supple. No rigidity. Right lower leg: No edema. Left lower leg: No edema. Skin:     General: Skin is warm. Capillary Refill: Capillary refill takes less than 2 seconds. Findings: No erythema or rash. Neurological:      General: No focal deficit present. Mental Status: She is alert and oriented to person, place, and time. Mental status is at baseline. Cranial Nerves: No cranial nerve deficit. Motor: No weakness. Psychiatric:         Mood and Affect: Mood normal.         Behavior: Behavior normal.         Additional Data:     Labs:    Results from last 7 days   Lab Units 07/12/23  0503   WBC Thousand/uL 7.40   HEMOGLOBIN g/dL 8.5*   HEMATOCRIT % 26.0*   PLATELETS Thousands/uL 76*   NEUTROS PCT % 67   LYMPHS PCT % 19   MONOS PCT % 13*   EOS PCT % 1     Results from last 7 days   Lab Units 07/12/23  0503 07/11/23  0603 07/10/23  1654   SODIUM mmol/L 135   < > 137   POTASSIUM mmol/L 4.0   < > 3.6   CHLORIDE mmol/L 104   < > 103   CO2 mmol/L 27   < > 25   BUN mg/dL 33*   < > 44*   CREATININE mg/dL 1.06   < > 1.14   CALCIUM mg/dL 7.8*   < > 8.4   ALK PHOS U/L  --   --  305*   ALT U/L  --   --  30   AST U/L  --   --  40*    < > = values in this interval not displayed.      Results from last 7 days   Lab Units 07/10/23  1654   INR  1.09               * I Have Reviewed All Lab Data Listed Above. * Additional Pertinent Lab Tests Reviewed: [address] Admission  Reviewed    Imaging:  Imaging Reports Reviewed Today Include: None    Recent Cultures (last 7 days):           Last 24 Hours Medication List:   Current Facility-Administered Medications   Medication Dose Route Frequency Provider Last Rate   • acetaminophen  650 mg Oral Q4H PRN Shania Sinning, PA-C     • bumetanide  2 mg Oral Daily Shania Sinning, PA-C     • glycerin-hypromellose-  1 drop Both Eyes Q4H PRN Shania Sinning, PA-C     • levothyroxine  75 mcg Oral Early Morning Shania Sinning, PA-C     • losartan  25 mg Oral Daily Shania Sinning, PA-C     • metoprolol succinate  25 mg Oral BID Shania Sinning, PA-C     • morphine injection  2 mg Intravenous Q4H PRN Shania Sinning, PA-C     • ondansetron  4 mg Intravenous Q6H PRN Shania Sinning, PA-C     • pantoprazole  40 mg Oral Early Morning Shania Sinning, PA-C     • polyethylene glycol  17 g Oral Daily Millie Bustamante DO     • potassium chloride  20 mEq Oral Daily Shania Sinning, PA-C     • saccharomyces boulardii  250 mg Oral BID Shania Sinning, PA-C     • sodium chloride  100 mL/hr Intravenous Continuous Shania Sinning, PA-C 100 mL/hr (07/12/23 0522)   • traZODone  100 mg Oral HS Shania Sinany, PA-C          Today, Patient Was Seen By: Saman Pelayo MD    ** Please Note: Dictation voice to text software may have been used in the creation of this document.  **    Saman Pelayo MD  7/11/2023  8:31 AM  Signed  [id number]  Progress Note  Name: Conor Eye  MRN: [id number]  Unit/Bed#: ED 02 I Date of Admission: 7/10/2023   Date of Service: 7/11/2023 I Hospital Day: 0    Assessment/Plan   * Rectal bleeding  Assessment & Plan  Persists -patient reports having had ongoing episodes of mixed blood per rectum -most recent episode earlier this morning  Acute blood loss anemia -arrival hemoglobin-10.4, current hemoglobin 9.4. Patient remains hemodynamically stable. Will transfuse if the hemoglobin drops to below 7.5 and or if hemodynamic instability ensues. Await formal GI input  Of note, the patient has had 2 EGDs, and 2 colonoscopies within the past 5 months, and 1 flexible sigmoidoscopy in May of this year. No specific etiology was found. Differential includes a hemorrhoidal bleed versus a self-limited diverticular bleed versus other  Continue Protonix, IV fluid, and n.p.o. status until seen by GI  Repeat blood work in the a.m. SIADH (syndrome of inappropriate ADH production) ([facility])  Assessment & Plan  Sodium currently 140  Outpatient follow-up with nephrology  Continue to monitor BMPs while here in house    Acquired hypothyroidism  Assessment & Plan  Continue levothyroxine    PAF (paroxysmal atrial fibrillation) ([facility])  Assessment & Plan  Rate controlled with Toprol-XL  Anticoagulation on hold in view of GI bleed, of note patient only takes a baby aspirin    Stage 3b chronic kidney disease [facility])  Assessment & Plan  Lab Results   Component Value Date    EGFR 53 07/11/2023    EGFR 46 07/10/2023    EGFR 43 05/23/2023    CREATININE 1.02 07/11/2023    CREATININE 1.14 07/10/2023    CREATININE 1.21 05/23/2023   Creatinine stable  Continue to monitor daily BMPs    Bilateral lower extremity edema  Assessment & Plan  Continue Bumex            VTE Prophylaxis:  Pharmacologic VTE Prophylaxis contraindicated due to GI bleeding, SCDs only    Patient Centered Rounds: I have performed bedside rounds with nursing staff today.     Discussions with Specialists or Other Care Team Provider: GI, case management, nursing  Education and Discussions with Family / Patient: Patient was brought up to [facility]    Current Length of Stay: 0 day(s)    Current Patient Status: Observation   Certification Statement: The patient will continue to require additional inpatient hospital stay due to The management of ongoing GI bleeding, and the need for a GI evaluation    Discharge Plan: Discharge planning once cleared by GI    Code Status: Level 3 - DNAR and DNI    Subjective:   Patient seen and examined, resting in bed, reports having had a mixed bloody bowel movement earlier this morning. Is very anxious about why she continues to bleed. Objective:     Vitals:   Temp (24hrs), Av.1 °F (36.7 °C), Min:97.9 °F (36.6 °C), Max:98.3 °F (36.8 °C)    Temp:  [97.9 °F (36.6 °C)-98.3 °F (36.8 °C)] 98.3 °F (36.8 °C)  HR:  [62-71] 71  Resp:  [18] 18  BP: (130-170)/(62-72) 156/67  SpO2:  [93 %-97 %] 97 %  Body mass index is 22.22 kg/m². Input and Output Summary (last 24 hours): Intake/Output Summary (Last 24 hours) at 2023  Last data filed at 7/10/2023 2029  Gross per 24 hour   Intake 1000 ml   Output --   Net 1000 ml       Physical Exam:   Physical Exam  Constitutional:       General: She is not in acute distress. Appearance: Normal appearance. She is normal weight. She is not ill-appearing. HENT:      Head: Normocephalic and atraumatic. Nose: Nose normal.      Mouth/Throat:      Mouth: Mucous membranes are moist.   Eyes:      Extraocular Movements: Extraocular movements intact. Pupils: Pupils are equal, round, and reactive to light. Cardiovascular:      Rate and Rhythm: Normal rate and regular rhythm. Pulses: Normal pulses. Heart sounds: Normal heart sounds. No murmur heard. No friction rub. No gallop. Pulmonary:      Effort: Pulmonary effort is normal. No respiratory distress. Breath sounds: Normal breath sounds. No wheezing, rhonchi or rales. Abdominal:      General: There is no distension. Palpations: Abdomen is soft. There is no mass. Tenderness: There is no abdominal tenderness. Hernia: No hernia is present.       Comments: Mild left lower quadrant tenderness to palpation, no rebound, no guarding, bowel sounds normoactive x4 quadrants   Musculoskeletal:         General: No swelling or tenderness. Normal range of motion. Cervical back: Normal range of motion and neck supple. No rigidity. Right lower leg: No edema. Left lower leg: No edema. Skin:     General: Skin is warm. Capillary Refill: Capillary refill takes less than 2 seconds. Findings: No erythema or rash. Neurological:      General: No focal deficit present. Mental Status: She is alert and oriented to person, place, and time. Mental status is at baseline. Cranial Nerves: No cranial nerve deficit. Motor: No weakness. Psychiatric:         Mood and Affect: Mood normal.         Behavior: Behavior normal.         Additional Data:     Labs:    Results from last 7 days   Lab Units 07/11/23  0603 07/10/23  1654   WBC Thousand/uL 9.21 10.92*   HEMOGLOBIN g/dL 9.4* 10.4*   HEMATOCRIT % 28.8* 31.8*   PLATELETS Thousands/uL 79* 89*   NEUTROS PCT %  --  81*   LYMPHS PCT %  --  7*   MONOS PCT %  --  12   EOS PCT %  --  0     Results from last 7 days   Lab Units 07/11/23  0603 07/10/23  1654   SODIUM mmol/L 140 137   POTASSIUM mmol/L 3.6 3.6   CHLORIDE mmol/L 107 103   CO2 mmol/L 26 25   BUN mg/dL 37* 44*   CREATININE mg/dL 1.02 1.14   CALCIUM mg/dL 8.0* 8.4   ALK PHOS U/L  --  305*   ALT U/L  --  30   AST U/L  --  40*     Results from last 7 days   Lab Units 07/10/23  1654   INR  1.09               * I Have Reviewed All Lab Data Listed Above. * Additional Pertinent Lab Tests Reviewed:  [address] [place] [address] Admission  Reviewed    Imaging:  Imaging Reports Reviewed Today Include: None    Recent Cultures (last 7 days):           Last 24 Hours Medication List:   Current Facility-Administered Medications   Medication Dose Route Frequency Provider Last Rate   • acetaminophen  650 mg Oral Q4H PRN Pedro Durham PA-C     • artificial tear  1 Application Both Eyes BID Milon Nightingale YOVANI Anderson     • bumetanide  2 mg Oral Daily Isabel Masker, [place]     • levothyroxine  75 mcg Oral Early Morning Isabel Masker, [place]     • losartan  25 mg Oral Daily Isabel Masker, [place]     • metoprolol succinate  25 mg Oral BID Isabel Masker, PA-C     • morphine injection  2 mg Intravenous Q4H PRN Isabel Masker, PA-C     • ondansetron  4 mg Intravenous Q6H PRN Isabel Masker, PA-C     • pantoprazole  40 mg Oral Early Morning Isabel Masker, PA-C     • potassium chloride  20 mEq Oral Daily Isabel Masker, PA-C     • saccharomyces boulardii  250 mg Oral BID Isabel Masker, PA-C     • sodium chloride  100 mL/hr Intravenous Continuous Isabel Masker, PA-C 100 mL/hr (07/11/23 0034)   • traZODone  100 mg Oral HS Isabel Masker, PA-C          Today, Patient Was Seen By: Archer Cushing, MD    ** Please Note: Dictation voice to text software may have been used in the creation of this document.  **

## 2023-07-10 NOTE — ED PROVIDER NOTES
History  Chief Complaint   Patient presents with   • Rectal Bleeding     Began this morning       HPI  60-year-old female with a history of diverticulosis liver cirrhosis and diverticulitis presented with complaint of abdominal pain and rectal bleed that started this morning. Patient states she noticed a bright red blood per rectum since this morning. Denies any nausea vomiting at this time. Patient otherwise stable awake alert oriented GCS 15. Prior to Admission Medications   Prescriptions Last Dose Informant Patient Reported? Taking?    BUDESONIDE PO   Yes Yes   Sig: Take 9 mg by mouth daily EC 3 mg cap 3 caps orally q am   acetaminophen (TYLENOL) 650 mg CR tablet 7/10/2023 Care Giver Yes Yes   Sig: Take by mouth   artificial tear (LUBRIFRESH P.M.) 83-15 % ophthalmic ointment   Yes Yes   Sig: Administer 1 Application to both eyes 2 (two) times a day   aspirin (ECOTRIN LOW STRENGTH) 81 mg EC tablet 7/10/2023 Care Giver Yes Yes   Sig: Take 81 mg by mouth daily   bumetanide (BUMEX) 2 mg tablet  Care Giver No No   Sig: Take 1 tablet (2 mg total) by mouth daily Do not start before May 24, 2023.   bumetanide (BUMEX) 2 mg tablet Unknown  Yes No   Sig: Take 2 mg by mouth daily   levothyroxine 75 mcg tablet Unknown Care Giver Yes No   Sig: Take 75 mcg by mouth daily   losartan (COZAAR) 25 mg tablet   Yes Yes   Sig: Take 25 mg by mouth daily Losartan POT 25 mg q am   metoprolol succinate (TOPROL-XL) 25 mg 24 hr tablet Unknown Care Giver Yes No   Sig: Take 25 mg by mouth 2 (two) times a day   midodrine (PROAMATINE) 2.5 mg tablet  Care Giver No No   Sig: Take 1 tablet (2.5 mg total) by mouth 3 (three) times a day before meals   omeprazole (PriLOSEC) 40 MG capsule Unknown Care Giver Yes No   Sig: Take 40 mg by mouth daily   ondansetron (ZOFRAN) 8 mg tablet Unknown Care Giver Yes No   Sig: As needed   potassium chloride (K-DUR,KLOR-CON) 20 mEq tablet Unknown Care Giver No No   Sig: Take 1 tablet (20 mEq total) by mouth daily Do not start before May 24, 2023. saccharomyces boulardii (FLORASTOR) 250 mg capsule Unknown Care Giver Yes No   Sig: Take 250 mg by mouth 2 (two) times a day   traZODone (DESYREL) 50 mg tablet  Care Giver Yes No   Sig: Take 100 mg by mouth daily at bedtime      Facility-Administered Medications: None       Past Medical History:   Diagnosis Date   • Anemia    • Atrial fibrillation (HCC)    • Depression    • GI (gastrointestinal bleed)    • Hypomagnesemia 2023   • Ischemic colitis [facility])        Past Surgical History:   Procedure Laterality Date   • APPENDECTOMY     • CARDIAC SURGERY     • CHOLECYSTECTOMY     • HIP SURGERY Right     Partial replacement   • HYSTERECTOMY     • IR BIOPSY TRANSJUGULAR LIVER  2023       Family History   Problem Relation Age of Onset   • No Known Problems Mother      I have reviewed and agree with the history as documented. E-Cigarette/Vaping   • E-Cigarette Use Never User      E-Cigarette/Vaping Substances   • Nicotine No    • THC No    • CBD No    • Flavoring No    • Other No    • Unknown No      Social History     Tobacco Use   • Smoking status: Former     Packs/day: 0.50     Types: Cigarettes     Quit date: 1993     Years since quittin.1   • Smokeless tobacco: Never   Vaping Use   • Vaping Use: Never used   Substance Use Topics   • Alcohol use: Never   • Drug use: Never       Review of Systems   Constitutional: Negative. HENT: Negative. Eyes: Negative. Respiratory: Negative. Cardiovascular: Negative. Gastrointestinal: Positive for abdominal pain and blood in stool. Endocrine: Negative. Genitourinary: Negative. Musculoskeletal: Negative. Skin: Negative. Allergic/Immunologic: Negative. Neurological: Negative. Hematological: Negative. Psychiatric/Behavioral: Negative. Physical Exam  Physical Exam  Vitals and nursing note reviewed. Constitutional:       Appearance: She is well-developed.    HENT:      Head: Normocephalic and atraumatic. Right Ear: External ear normal.      Left Ear: External ear normal.      Nose: Nose normal.      Mouth/Throat:      Mouth: Mucous membranes are moist.      Pharynx: No oropharyngeal exudate. Eyes:      General: No scleral icterus. Right eye: No discharge. Left eye: No discharge. Conjunctiva/sclera: Conjunctivae normal.      Pupils: Pupils are equal, round, and reactive to light. Cardiovascular:      Rate and Rhythm: Normal rate and regular rhythm. Pulses: Normal pulses. Heart sounds: Normal heart sounds. Pulmonary:      Effort: Pulmonary effort is normal. No respiratory distress. Breath sounds: Normal breath sounds. No wheezing. Abdominal:      General: Bowel sounds are normal.      Palpations: Abdomen is soft. Tenderness: There is abdominal tenderness. Genitourinary:     Rectum: Guaiac result positive. Musculoskeletal:         General: Normal range of motion. Cervical back: Normal range of motion and neck supple. Lymphadenopathy:      Cervical: No cervical adenopathy. Skin:     General: Skin is warm and dry. Capillary Refill: Capillary refill takes less than 2 seconds. Neurological:      General: No focal deficit present. Mental Status: She is alert and oriented to person, place, and time.    Psychiatric:         Mood and Affect: Mood normal.         Behavior: Behavior normal.         Vital Signs  ED Triage Vitals [07/10/23 1307]   Temperature Pulse Respirations Blood Pressure SpO2   97.9 °F (36.6 °C) 62 18 130/62 96 %      Temp Source Heart Rate Source Patient Position - Orthostatic VS BP Location FiO2 (%)   Temporal Monitor Sitting Left arm --      Pain Score       10 - Worst Possible Pain           Vitals:    07/11/23 0748 07/11/23 1229 07/11/23 1604 07/11/23 1640   BP: 156/67 162/69 157/70 (!) 175/62   Pulse: 71 71 70 59   Patient Position - Orthostatic VS: Sitting            Visual Acuity      ED Medications  Medications   acetaminophen (TYLENOL) tablet 650 mg (has no administration in time range)   ondansetron (ZOFRAN) injection 4 mg (has no administration in time range)   sodium chloride 0.9 % infusion (100 mL/hr Intravenous New Bag 7/11/23 0034)   artificial tear (LUBRIFRESH P.M.) ophthalmic ointment 1 Application (1 Application Both Eyes Given 7/11/23 0817)   bumetanide (BUMEX) tablet 2 mg (2 mg Oral Given 7/11/23 0814)   levothyroxine tablet 75 mcg (75 mcg Oral Given 7/11/23 0604)   losartan (COZAAR) tablet 25 mg (25 mg Oral Given 7/11/23 0813)   metoprolol succinate (TOPROL-XL) 24 hr tablet 25 mg (25 mg Oral Given 7/11/23 0813)   pantoprazole (PROTONIX) EC tablet 40 mg (40 mg Oral Given 7/11/23 0604)   potassium chloride (K-DUR,KLOR-CON) CR tablet 20 mEq (20 mEq Oral Not Given 7/11/23 0813)   saccharomyces boulardii (FLORASTOR) capsule 250 mg (250 mg Oral Given 7/11/23 0817)   traZODone (DESYREL) tablet 100 mg (100 mg Oral Given 7/11/23 0030)   morphine injection 2 mg (2 mg Intravenous Given 7/11/23 1250)   polyethylene glycol (MIRALAX) packet 17 g (17 g Oral Given 7/11/23 1559)   sodium chloride 0.9 % bolus 1,000 mL (0 mL Intravenous Stopped 7/10/23 2029)   pantoprazole (PROTONIX) injection 40 mg (40 mg Intravenous Given 7/10/23 1657)   fentanyl citrate (PF) 100 MCG/2ML 25 mcg (25 mcg Intravenous Given 7/10/23 1752)   iohexol (OMNIPAQUE) 350 MG/ML injection (SINGLE-DOSE) 100 mL (100 mL Intravenous Given 7/10/23 1752)       Diagnostic Studies  Results Reviewed     Procedure Component Value Units Date/Time    Hemoglobin and hematocrit, blood [[id number]]  (Abnormal) Collected: 07/11/23 1559    Lab Status: Final result Specimen: Blood from Arm, Left Updated: 07/11/23 1607     Hemoglobin 9.0 g/dL      Hematocrit 27.5 %     Basic metabolic panel [[id number]]  (Abnormal) Collected: 07/11/23 0603    Lab Status: Final result Specimen: Blood from Arm, Right Updated: 07/11/23 0639     Sodium 140 mmol/L Potassium 3.6 mmol/L      Chloride 107 mmol/L      CO2 26 mmol/L      ANION GAP 7 mmol/L      BUN 37 mg/dL      Creatinine 1.02 mg/dL      Glucose 96 mg/dL      Glucose, Fasting 96 mg/dL      Calcium 8.0 mg/dL      eGFR 53 ml/min/1.73sq m     Narrative:      [facility] ([facility]D):   •  Stage 1 with normal or high GFR (GFR > 90 mL/min/1.73 square meters)  •  Stage 2 Mild CKD (GFR = 60-89 mL/min/1.73 square meters)  •  Stage 3A Moderate CKD (GFR = 45-59 mL/min/1.73 square meters)  •  Stage 3B Moderate CKD (GFR = 30-44 mL/min/1.73 square meters)  •  Stage 4 Severe CKD (GFR = 15-29 mL/min/1.73 square meters)  •  Stage 5 End Stage CKD (GFR <15 mL/min/1.73 square meters)  Note: GFR calculation is accurate only with a steady state creatinine    CBC (With Platelets) [[id number]]  (Abnormal) Collected: 07/11/23 0603    Lab Status: Final result Specimen: Blood from Arm, Right Updated: 07/11/23 0625     WBC 9.21 Thousand/uL      RBC 2.72 Million/uL      Hemoglobin 9.4 g/dL      Hematocrit 28.8 %       fL      MCH 34.6 pg      MCHC 32.6 g/dL      RDW 14.5 %      Platelets 79 Thousands/uL      MPV 11.6 fL     MRSA culture [[id number]] Collected: 07/11/23 0031    Lab Status:  In process Specimen: Nares from Nose Updated: 07/11/23 0044    Comprehensive metabolic panel [[id number]]  (Abnormal) Collected: 07/10/23 1654    Lab Status: Final result Specimen: Blood from Arm, Right Updated: 07/10/23 1714     Sodium 137 mmol/L      Potassium 3.6 mmol/L      Chloride 103 mmol/L      CO2 25 mmol/L      ANION GAP 9 mmol/L      BUN 44 mg/dL      Creatinine 1.14 mg/dL      Glucose 125 mg/dL      Calcium 8.4 mg/dL      Corrected Calcium 9.5 mg/dL      AST 40 U/L      ALT 30 U/L      Alkaline Phosphatase 305 U/L      Total Protein 6.0 g/dL      Albumin 2.6 g/dL      Total Bilirubin 1.20 mg/dL      eGFR 46 ml/min/1.73sq m     Narrative:      [place] guidelines for Chronic Kidney Disease (CKD):   •  Stage 1 with normal or high GFR (GFR > 90 mL/min/1.73 square meters)  •  Stage 2 Mild CKD (GFR = 60-89 mL/min/1.73 square meters)  •  Stage 3A Moderate CKD (GFR = 45-59 mL/min/1.73 square meters)  •  Stage 3B Moderate CKD (GFR = 30-44 mL/min/1.73 square meters)  •  Stage 4 Severe CKD (GFR = 15-29 mL/min/1.73 square meters)  •  Stage 5 End Stage CKD (GFR <15 mL/min/1.73 square meters)  Note: GFR calculation is accurate only with a steady state creatinine    Protime-INR [[id number]]  (Normal) Collected: 07/10/23 1654    Lab Status: Final result Specimen: Blood from Arm, Right Updated: 07/10/23 1710     Protime 14.2 seconds      INR 1.09    CBC and differential [[id number]]  (Abnormal) Collected: 07/10/23 1654    Lab Status: Final result Specimen: Blood from Arm, Right Updated: 07/10/23 1700     WBC 10.92 Thousand/uL      RBC 2.99 Million/uL      Hemoglobin 10.4 g/dL      Hematocrit 31.8 %       fL      MCH 34.8 pg      MCHC 32.7 g/dL      RDW 14.3 %      MPV 11.1 fL      Platelets 89 Thousands/uL      nRBC 0 /100 WBCs      Neutrophils Relative 81 %      Immat GRANS % 0 %      Lymphocytes Relative 7 %      Monocytes Relative 12 %      Eosinophils Relative 0 %      Basophils Relative 0 %      Neutrophils Absolute 8.78 Thousands/µL      Immature Grans Absolute 0.03 Thousand/uL      Lymphocytes Absolute 0.72 Thousands/µL      Monocytes Absolute 1.34 Thousand/µL      Eosinophils Absolute 0.03 Thousand/µL      Basophils Absolute 0.02 Thousands/µL                  CT high volume bleeding scan abdomen pelvis   Final Result by Kiki Mustafa MD (07/10 1958)      No CT evidence of active high volume gastrointestinal hemorrhage. 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 Stable hiatal hernia. Stable cirrhosis.       Multiple chronic vertebral body compression deformities for example L4 and L5 with interval development of compression deformity at L1 that is either chronic or subacute. Correlate for focal back pain. Workstation performed: [id number]                    Procedures  Procedures         ED Course  ED Course as of 07/11/23 1655   Mon Jul 10, 2023   1738 Pt care transfer to Dr. Sarah Ayala 22yo+    Flowsheet Row Most Recent Value   Initial Alcohol Screen: US AUDIT-C     1. How often do you have a drink containing alcohol? 0 Filed at: 07/10/2023 1438   2. How many drinks containing alcohol do you have on a typical day you are drinking? 0 Filed at: 07/10/2023 1438   3a. Male UNDER 65: How often do you have five or more drinks on one occasion? 0 Filed at: 07/10/2023 1438   3b. FEMALE Any Age, or MALE 65+: How often do you have 4 or more drinks on one occassion? 0 Filed at: 07/10/2023 1438   Audit-C Score 0 Filed at: 07/10/2023 1438   ADAL: How many times in the past year have you. .. Used an illegal drug or used a prescription medication for non-medical reasons? Never Filed at: 07/10/2023 1438                    Medical Decision Making  20-year-old female with history of diverticulosis is into ascites today secondary to liver cirrhosis presented to ED with complaint of rectal bleed started this morning. History is taken from patient and EMS. I will obtain labs CBC BMP CT abdomen pelvis. Patient is a difficult stick multiple attempts to place the ultrasound-guided IVs with multiple providers unable. We will reach out to critical care for additional help. Labs reviewed and his hemoglobin is stable. Pending CT. Concerning for diverticulosis bleed/colitis/obstruction. Patient care transferred to Dr. Mega العلي. Amount and/or Complexity of Data Reviewed  Labs: ordered. Radiology: ordered. Risk  Prescription drug management. Decision regarding hospitalization.           Disposition  Final diagnoses:   Rectal bleeding   Abdominal pain   Lower GI bleed     Time reflects when diagnosis was documented in both MDM as applicable and the Disposition within this note     Time User Action Codes Description Comment    7/10/2023  4:52 PM Octaviano Duran Add [K62.5] Rectal bleeding     7/10/2023  4:52 PM Octaviano Duran Add [R10.9] Abdominal pain     7/10/2023  8:11 PM Raulito Santillan Add [K92.2] Lower GI bleed       ED Disposition     ED Disposition   Admit    Condition   Stable    Date/Time   Mon Jul 10, 2023  8:12 PM    Comment   Case was discussed with PA and the patient's admission status was agreed to be to the service of Dr. Ulises Powell Name, [address] [place] [address]      RN Documentation    [address] Name, [address] [place] [address]      Follow-up Information    None         Current Discharge Medication List      CONTINUE these medications which have NOT CHANGED    Details   acetaminophen (TYLENOL) 650 mg CR tablet Take by mouth      artificial tear (LUBRIFRESH P.M.) 83-15 % ophthalmic ointment Administer 1 Application to both eyes 2 (two) times a day      aspirin (ECOTRIN LOW STRENGTH) 81 mg EC tablet Take 81 mg by mouth daily      BUDESONIDE PO Take 9 mg by mouth daily EC 3 mg cap 3 caps orally q am      losartan (COZAAR) 25 mg tablet Take 25 mg by mouth daily Losartan POT 25 mg q am      bumetanide (BUMEX) 2 mg tablet Take 2 mg by mouth daily      levothyroxine 75 mcg tablet Take 75 mcg by mouth daily      metoprolol succinate (TOPROL-XL) 25 mg 24 hr tablet Take 25 mg by mouth 2 (two) times a day      midodrine (PROAMATINE) 2.5 mg tablet Take 1 tablet (2.5 mg total) by mouth 3 (three) times a day before meals  Qty: 90 tablet, Refills: 0    Associated Diagnoses: Hepatic cirrhosis, unspecified hepatic cirrhosis type, unspecified whether ascites present (HCC)      omeprazole (PriLOSEC) 40 MG capsule Take 40 mg by mouth daily      ondansetron (ZOFRAN) 8 mg tablet As needed      potassium chloride (K-DUR,KLOR-CON) 20 mEq tablet Take 1 tablet (20 mEq total) by mouth daily Do not start before May 24, 2023. Refills: 0    Associated Diagnoses: Acute kidney injury (HCC)      saccharomyces boulardii (FLORASTOR) 250 mg capsule Take 250 mg by mouth 2 (two) times a day      traZODone (DESYREL) 50 mg tablet Take 100 mg by mouth daily at bedtime             No discharge procedures on file.     PDMP Review     None          ED Provider  Electronically Signed by           Christopher Cassidy MD  07/11/23 9244

## 2023-07-10 NOTE — ED NOTES
Multiple unsuccessful IV insertions via ultrasound by Dr. Edwin Galvez and FALGUNI Rowe RN  07/10/23 0523

## 2023-07-10 NOTE — ED NOTES
Unable to obtain IV at this time. Will have ultrasound IV placed by Dr. Nathaniel Pizano.       Chito Russell RN  07/10/23 7302

## 2023-07-10 NOTE — ED NOTES
Patient placed on bed pan to urinate. Patient repositioned in bed. Call bell within reach. Will continue to monitor.       oYel Rowe RN  07/10/23 0035

## 2023-07-11 LAB
ANION GAP SERPL CALCULATED.3IONS-SCNC: 7 MMOL/L
BUN SERPL-MCNC: 37 MG/DL (ref 5–25)
CALCIUM SERPL-MCNC: 8 MG/DL (ref 8.4–10.2)
CHLORIDE SERPL-SCNC: 107 MMOL/L (ref 96–108)
CO2 SERPL-SCNC: 26 MMOL/L (ref 21–32)
CREAT SERPL-MCNC: 1.02 MG/DL (ref 0.6–1.3)
ERYTHROCYTE [DISTWIDTH] IN BLOOD BY AUTOMATED COUNT: 14.5 % (ref 11.6–15.1)
GFR SERPL CREATININE-BSD FRML MDRD: 53 ML/MIN/1.73SQ M
GLUCOSE P FAST SERPL-MCNC: 96 MG/DL (ref 65–99)
GLUCOSE SERPL-MCNC: 96 MG/DL (ref 65–140)
HCT VFR BLD AUTO: 27.5 % (ref 34.8–46.1)
HCT VFR BLD AUTO: 28.8 % (ref 34.8–46.1)
HGB BLD-MCNC: 9 G/DL (ref 11.5–15.4)
HGB BLD-MCNC: 9.4 G/DL (ref 11.5–15.4)
MCH RBC QN AUTO: 34.6 PG (ref 26.8–34.3)
MCHC RBC AUTO-ENTMCNC: 32.6 G/DL (ref 31.4–37.4)
MCV RBC AUTO: 106 FL (ref 82–98)
PLATELET # BLD AUTO: 79 THOUSANDS/UL (ref 149–390)
PMV BLD AUTO: 11.6 FL (ref 8.9–12.7)
POTASSIUM SERPL-SCNC: 3.6 MMOL/L (ref 3.5–5.3)
RBC # BLD AUTO: 2.72 MILLION/UL (ref 3.81–5.12)
SODIUM SERPL-SCNC: 140 MMOL/L (ref 135–147)
WBC # BLD AUTO: 9.21 THOUSAND/UL (ref 4.31–10.16)

## 2023-07-11 PROCEDURE — 99232 SBSQ HOSP IP/OBS MODERATE 35: CPT | Performed by: HOSPITALIST

## 2023-07-11 PROCEDURE — 36415 COLL VENOUS BLD VENIPUNCTURE: CPT | Performed by: PHYSICIAN ASSISTANT

## 2023-07-11 PROCEDURE — 80048 BASIC METABOLIC PNL TOTAL CA: CPT | Performed by: PHYSICIAN ASSISTANT

## 2023-07-11 PROCEDURE — 99222 1ST HOSP IP/OBS MODERATE 55: CPT | Performed by: INTERNAL MEDICINE

## 2023-07-11 PROCEDURE — 87147 CULTURE TYPE IMMUNOLOGIC: CPT | Performed by: PHYSICIAN ASSISTANT

## 2023-07-11 PROCEDURE — 85014 HEMATOCRIT: CPT | Performed by: HOSPITALIST

## 2023-07-11 PROCEDURE — 87081 CULTURE SCREEN ONLY: CPT | Performed by: PHYSICIAN ASSISTANT

## 2023-07-11 PROCEDURE — 85027 COMPLETE CBC AUTOMATED: CPT | Performed by: PHYSICIAN ASSISTANT

## 2023-07-11 PROCEDURE — 85018 HEMOGLOBIN: CPT | Performed by: HOSPITALIST

## 2023-07-11 RX ORDER — POLYETHYLENE GLYCOL 3350 17 G/17G
17 POWDER, FOR SOLUTION ORAL DAILY
Status: DISCONTINUED | OUTPATIENT
Start: 2023-07-11 | End: 2023-07-14 | Stop reason: HOSPADM

## 2023-07-11 RX ADMIN — WHITE PETROLATUM 57.7 %-MINERAL OIL 31.9 % EYE OINTMENT 1 APPLICATION: at 00:30

## 2023-07-11 RX ADMIN — LEVOTHYROXINE SODIUM 75 MCG: 75 TABLET ORAL at 06:04

## 2023-07-11 RX ADMIN — BUMETANIDE 2 MG: 1 TABLET ORAL at 08:14

## 2023-07-11 RX ADMIN — POLYETHYLENE GLYCOL 3350 17 G: 17 POWDER, FOR SOLUTION ORAL at 15:59

## 2023-07-11 RX ADMIN — METOPROLOL SUCCINATE 25 MG: 50 TABLET, EXTENDED RELEASE ORAL at 18:21

## 2023-07-11 RX ADMIN — PANTOPRAZOLE SODIUM 40 MG: 40 TABLET, DELAYED RELEASE ORAL at 06:04

## 2023-07-11 RX ADMIN — MORPHINE SULFATE 2 MG: 2 INJECTION, SOLUTION INTRAMUSCULAR; INTRAVENOUS at 17:12

## 2023-07-11 RX ADMIN — WHITE PETROLATUM 57.7 %-MINERAL OIL 31.9 % EYE OINTMENT 1 APPLICATION: at 08:17

## 2023-07-11 RX ADMIN — SODIUM CHLORIDE 100 ML/HR: 0.9 INJECTION, SOLUTION INTRAVENOUS at 00:34

## 2023-07-11 RX ADMIN — MORPHINE SULFATE 2 MG: 2 INJECTION, SOLUTION INTRAMUSCULAR; INTRAVENOUS at 06:19

## 2023-07-11 RX ADMIN — TRAZODONE HYDROCHLORIDE 100 MG: 100 TABLET ORAL at 00:30

## 2023-07-11 RX ADMIN — Medication 250 MG: at 18:21

## 2023-07-11 RX ADMIN — METOPROLOL SUCCINATE 25 MG: 50 TABLET, EXTENDED RELEASE ORAL at 08:13

## 2023-07-11 RX ADMIN — LOSARTAN POTASSIUM 25 MG: 25 TABLET, FILM COATED ORAL at 08:13

## 2023-07-11 RX ADMIN — TRAZODONE HYDROCHLORIDE 100 MG: 100 TABLET ORAL at 22:00

## 2023-07-11 RX ADMIN — MORPHINE SULFATE 2 MG: 2 INJECTION, SOLUTION INTRAMUSCULAR; INTRAVENOUS at 12:50

## 2023-07-11 RX ADMIN — Medication 250 MG: at 08:17

## 2023-07-11 NOTE — ASSESSMENT & PLAN NOTE
· Sodium currently 137  · Patient follows with nephrology  · Self fluid restricts approximately 32 ounces per day

## 2023-07-11 NOTE — ASSESSMENT & PLAN NOTE
Patient presented to ED with rectal bleeding starting this morning. His history of crohn's, ischemic colitis and diverticulitis  · Hgb currently 10.4 - monitor  · GI consult  · NPO   · IVF  · CT: No CT evidence of active high volume gastrointestinal hemorrhage.

## 2023-07-11 NOTE — PLAN OF CARE
Problem: GASTROINTESTINAL - ADULT  Goal: Minimal or absence of nausea and/or vomiting  Description: INTERVENTIONS:  - Administer IV fluids if ordered to ensure adequate hydration  - Maintain NPO status until nausea and vomiting are resolved  - Nasogastric tube if ordered  - Administer ordered antiemetic medications as needed  - Provide nonpharmacologic comfort measures as appropriate  - Advance diet as tolerated, if ordered  - Consider nutrition services referral to assist patient with adequate nutrition and appropriate food choices  Outcome: Progressing   Patient currently denies any nausea

## 2023-07-11 NOTE — H&P
[address]  H&P  Name: Stephanie Wagner 69 y.o. female I MRN: [id number]  Unit/Bed#: ED 02 I Date of Admission: 7/10/2023   Date of Service: 7/10/2023 I Hospital Day: 0      Assessment/Plan   * Rectal bleeding  Assessment & Plan  Patient presented to ED with rectal bleeding starting this morning. His history of crohn's, ischemic colitis and diverticulitis  · Hgb currently 10.4 - monitor  · GI consult  · NPO   · IVF  · CT: No CT evidence of active high volume gastrointestinal hemorrhage. Bilateral lower extremity edema  Assessment & Plan  · Patient on Bumex    Stage 3b chronic kidney disease [facility])  Assessment & Plan  Lab Results   Component Value Date    EGFR 46 07/10/2023    EGFR 43 05/23/2023    EGFR 47 05/22/2023    CREATININE 1.14 07/10/2023    CREATININE 1.21 05/23/2023    CREATININE 1.12 05/22/2023   · Creatinine stable    SIADH (syndrome of inappropriate ADH production) ([address])  Assessment & Plan  · Sodium currently 137  · Patient follows with nephrology  · Self fluid restricts approximately 32 ounces per day    PAF (paroxysmal atrial fibrillation) ([address])  Assessment & Plan  · Continue rate control with hold parameters  · Patient is typically on aspirin, hold for now    Acquired hypothyroidism  Assessment & Plan  · Continue levothyroxine        VTE Pharmacologic Prophylaxis: VTE Score: 3 Moderate Risk (Score 3-4) - Pharmacological DVT Prophylaxis Contraindicated. Sequential Compression Devices Ordered. Code Status:DNR/DNI  Discussion with patient    Anticipated Length of Stay: Patient will be admitted on an observation basis with an anticipated length of stay of less than 2 midnights secondary to Lab monitoring, specialist input.     Total Time Spent on Date of Encounter in care of patient: 75 minutes This time was spent on one or more of the following: performing physical exam; counseling and coordination of care; obtaining or reviewing history; documenting in the medical record; reviewing/ordering tests, medications or procedures; communicating with other healthcare professionals and discussing with patient's family/caregivers. Chief Complaint: Rectal bleeding    History of Present Illness:  Stephanie Wagner is a 69 y.o. female with a PMH of paroxysmal atrial fibrillation, depression, ischemic colitis, crohn's, diverticulitis, SIADH, hypothyroidism, hypertension with chronic kidney disease stage IIIb, lower extremity edema on Bumex who presents with rectal bleeding. Previously worked up for blood in stool was diagnosed with crohn's. Now having blood in stool bright red and some dark "thought it was cherries" Has pain LLQ, pain 8/10, stabbing in nature. Had decreased PO intake today. Review of Systems:  Review of Systems   Constitutional: Positive for chills. Negative for fever. HENT: Negative for rhinorrhea, sore throat and trouble swallowing. Eyes: Negative for discharge and redness. Respiratory: Negative for cough and shortness of breath. Cardiovascular: Positive for leg swelling. Negative for chest pain. Gastrointestinal: Positive for abdominal pain, blood in stool, diarrhea and nausea. Negative for vomiting. Genitourinary: Negative for dysuria and hematuria. Musculoskeletal: Positive for back pain. Negative for neck pain. Skin: Negative for rash and wound. Neurological: Positive for weakness and headaches. Negative for dizziness. Psychiatric/Behavioral: Negative for confusion.        Past Medical and Surgical History:   Past Medical History:   Diagnosis Date   • Anemia    • Atrial fibrillation ([address])    • Depression    • GI (gastrointestinal bleed)    • Hypomagnesemia 4/29/2023   • Ischemic colitis [facility])        Past Surgical History:   Procedure Laterality Date   • APPENDECTOMY     • CARDIAC SURGERY     • CHOLECYSTECTOMY     • HIP SURGERY Right 2022    Partial replacement   • HYSTERECTOMY     • IR BIOPSY TRANSJUGULAR LIVER  05/18/2023 Meds/Allergies:  Prior to Admission medications    Medication Sig Start Date End Date Taking? Authorizing Provider   acetaminophen (TYLENOL) 650 mg CR tablet Take by mouth  Patient not taking: Reported on 6/6/2023    Historical Provider, MD   aspirin (ECOTRIN LOW STRENGTH) 81 mg EC tablet Take 81 mg by mouth daily    Historical Provider, MD   bumetanide (BUMEX) 2 mg tablet Take 1 tablet (2 mg total) by mouth daily Do not start before May 24, 2023. 5/24/23 6/23/23  EBEN Murray   levothyroxine 75 mcg tablet Take 75 mcg by mouth daily    Historical Provider, MD   loratadine (CLARITIN) 10 mg tablet Take 10 mg by mouth daily As needed    Historical Provider, MD   metoprolol succinate (TOPROL-XL) 25 mg 24 hr tablet Take 25 mg by mouth 2 (two) times a day    Historical Provider, MD   midodrine (PROAMATINE) 2.5 mg tablet Take 1 tablet (2.5 mg total) by mouth 3 (three) times a day before meals 5/23/23 6/22/23  EBEN Murray   omeprazole (PriLOSEC) 40 MG capsule Take 40 mg by mouth daily    Historical Provider, MD   ondansetron (ZOFRAN) 8 mg tablet As needed 6/3/23   Historical Provider, MD   pantoprazole (PROTONIX) 40 mg tablet Take 1 tablet (40 mg total) by mouth daily in the early morning Do not start before May 24, 2023. 5/24/23   EBEN Murray   polyvinyl alcohol (LIQUIFILM TEARS) 1.4 % ophthalmic solution 1 drop 2 (two) times a day    Historical Provider, MD   potassium chloride (K-DUR,KLOR-CON) 20 mEq tablet Take 1 tablet (20 mEq total) by mouth daily Do not start before May 24, 2023. 5/24/23   Neena MachadodsEBEN   saccharomyces boulardii (FLORASTOR) 250 mg capsule Take 250 mg by mouth 2 (two) times a day    Historical Provider, MD   traZODone (DESYREL) 50 mg tablet Take 50 mg by mouth    Historical Provider, MD PARMAR have reviewed home medications with patient personally. Allergies:    Allergies   Allergen Reactions   • Ceftaroline GI Intolerance   • Diphenhydramine Hyperactivity • Influenza Vaccines Other (See Comments) and Vomiting     nausea   • Lactose - Food Allergy Diarrhea       Social History:  Marital Status: Single   Occupation: Retired  Patient Pre-hospital Living Situation: [address] [place] [address] care  Patient Pre-hospital Level of Mobility: walks with walker  Patient Pre-hospital Diet Restrictions: low sodium, 1200 ml fluid restriction, lactose free  Substance Use History:   Social History     Substance and Sexual Activity   Alcohol Use Never     Social History     Tobacco Use   Smoking Status Former   • Packs/day: 0.50   • Types: Cigarettes   • Quit date: 1993   • Years since quittin.1   Smokeless Tobacco Never     Social History     Substance and Sexual Activity   Drug Use Never       Family History:  Family History   Problem Relation Age of Onset   • No Known Problems Mother        Physical Exam:     Vitals:   Blood Pressure: 170/72 (07/10/23 1830)  Pulse: 71 (07/10/23 1830)  Temperature: 97.9 °F (36.6 °C) (07/10/23 1307)  Temp Source: Temporal (07/10/23 130)  Respirations: 18 (07/10/23 1830)  Height: 4' 11" (149.9 cm) (07/10/23 1307)  Weight - Scale: 49.9 kg (110 lb) (07/10/23 1307)  SpO2: 93 % (07/10/23 1830)    Physical Exam  Vitals reviewed. Constitutional:       Comments: Elderly  female ill appearing and in distress from pain     HENT:      Head: Normocephalic and atraumatic. Nose: Nose normal.   Eyes:      General:         Right eye: No discharge. Left eye: No discharge. Extraocular Movements: Extraocular movements intact. Conjunctiva/sclera: Conjunctivae normal.   Cardiovascular:      Rate and Rhythm: Normal rate and regular rhythm. Pulmonary:      Effort: Pulmonary effort is normal. No respiratory distress. Breath sounds: Normal breath sounds. No wheezing. Abdominal:      General: Bowel sounds are normal. There is no distension. Palpations: Abdomen is soft. Tenderness:  There is abdominal tenderness in the left lower quadrant. There is no guarding. Musculoskeletal:         General: No swelling or tenderness. Normal range of motion. Cervical back: Normal range of motion. Right lower leg: No edema. Left lower leg: No edema. Skin:     General: Skin is warm and dry. Capillary Refill: Capillary refill takes less than 2 seconds. Neurological:      General: No focal deficit present. Mental Status: She is alert. Mental status is at baseline. Psychiatric:         Mood and Affect: Mood normal.         Behavior: Behavior normal.        Additional Data:     Lab Results:  Results from last 7 days   Lab Units 07/10/23  1654   WBC Thousand/uL 10.92*   HEMOGLOBIN g/dL 10.4*   HEMATOCRIT % 31.8*   PLATELETS Thousands/uL 89*   NEUTROS PCT % 81*   LYMPHS PCT % 7*   MONOS PCT % 12   EOS PCT % 0     Results from last 7 days   Lab Units 07/10/23  1654   SODIUM mmol/L 137   POTASSIUM mmol/L 3.6   CHLORIDE mmol/L 103   CO2 mmol/L 25   BUN mg/dL 44*   CREATININE mg/dL 1.14   ANION GAP mmol/L 9   CALCIUM mg/dL 8.4   ALBUMIN g/dL 2.6*   TOTAL BILIRUBIN mg/dL 1.20*   ALK PHOS U/L 305*   ALT U/L 30   AST U/L 40*   GLUCOSE RANDOM mg/dL 125     Results from last 7 days   Lab Units 07/10/23  1654   INR  1.09       Lines/Drains:  Invasive Devices     Peripheral Intravenous Line  Duration           Peripheral IV 07/10/23 Left;Upper;Ventral (anterior) Arm <1 day    Peripheral IV 07/10/23 Right;Upper;Ventral (anterior) Arm <1 day              Imaging: Reviewed radiology reports from this admission including: abdominal/pelvic CT  CT high volume bleeding scan abdomen pelvis   Final Result by Luci Navarro MD (07/10 1958)      No CT evidence of active high volume gastrointestinal hemorrhage. 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 Stable hiatal hernia. Stable cirrhosis.       Multiple chronic vertebral body compression deformities for example L4 and L5 with interval development of compression deformity at L1 that is either chronic or subacute. Correlate for focal back pain. Workstation performed: [id number]             EKG and Other Studies Reviewed on Admission:   · EKG: No EKG obtained. ** Please Note: This note has been constructed using a voice recognition system.  **

## 2023-07-11 NOTE — CASE MANAGEMENT
Case Management Assessment & Discharge Planning Note    Patient name Fatoumata Esteban  Location ED 02/ED 02 MRN [id number]  : 1945 Date 2023       Current Admission Date: 7/10/2023  Current Admission Diagnosis:Rectal bleeding   Patient Active Problem List    Diagnosis Date Noted   • Rectal bleeding 07/10/2023   • SIADH (syndrome of inappropriate ADH production) ([address]) 2023   • Abnormal CT of liver 2023   • Stage 3b chronic kidney disease ([address]) 2023   • Bilateral lower extremity edema 2023   • Thrombocytopenia ([address]) 2023   • Anemia 2023   • Superior mesenteric artery stenosis ([address]) 2023   • Primary hypertension 2023   • Acquired hypothyroidism 2023   • Irritable bowel syndrome with diarrhea 2023   • Abnormal CT scan 2023   • Hx of CABG 2023   • PAF (paroxysmal atrial fibrillation) ([address]) 2022      LOS (days): 0  Geometric Mean LOS (GMLOS) (days):   Days to GMLOS:     OBJECTIVE:       Current admission status: Observation    [facility] [address]  [address] [place] [address] [place] [address]  Phone: [phone] Fax: [phone]    Primary Care Provider: Mandeep Toro MD    Primary Insurance: MEDICARE  Secondary Insurance: [employer]ET[employer]    ASSESSMENT:  [place] Proxies    There are no active Health Care Proxies on file.        Advance Directives  Does patient have a Health Care POA?: Yes  Does patient have Advance Directives?: Yes  Advance Directives: Living will, Power of  for health care  Primary Contact: Amy Braxton (Sister)   [phone] (Mobile)    Readmission Root Cause  30 Day Readmission: No    Patient Information  Admitted from[de-identified] Facility [facility])  Mental Status: Alert  During Assessment patient was accompanied by: Not accompanied during assessment  Assessment information provided by[de-identified] Patient  Support Systems: Self, Family members, Other (Comment), Friend (Staff at [address] of Residence: [address] [facility] do you live in?: [address] [facility] entry access options.  Select all that apply.: No steps to enter home  Type of Current Residence: Facility  Upon entering residence, is there a bedroom on the main floor (no further steps)?: Yes  Upon entering residence, is there a bathroom on the main floor (no further steps)?: Yes  In the last 12 months, was there a time when you were not able to pay the mortgage or rent on time?: No  In the last 12 months, how many places have you lived?: 1  In the last 12 months, was there a time when you did not have a steady place to sleep or slept in a shelter (including now)?: No  Homeless/housing insecurity resource given?: N/A  Living Arrangements: Other (Comment) [facility])  Is patient a ?: No    Activities of Daily Living Prior to Admission  Functional Status: Independent  Completes ADLs independently?: Yes  Ambulates independently?: Yes  Does patient use assisted devices?: Yes  Assisted Devices (DME) used: [employer]  Does patient currently own DME?: Yes  What DME does the patient currently own?: [place] [employer]  Does patient have a history of Outpatient Therapy (PT/OT)?: Yes  Does the patient have a history of Short-Term Rehab?: No  Does patient have a history of HHC?: Yes  Does patient currently have [address]?: No    Patient Information Continued  Income Source: Pension/assisted  Does patient have prescription coverage?: Yes  Within the past 12 months, you worried that your food would run out before you got the money to buy more.: Never true  Within the past 12 months, the food you bought just didn't last and you didn't have money to get more.: Never true  Food insecurity resource given?: N/A  Does patient receive dialysis treatments?: No  Does patient have a history of substance abuse?: No  Does patient have a history of Mental Health Diagnosis?: No    Means of Transportation  Means of Transport to [facility][de-identified] None  In the past 12 months, has lack of transportation kept you from medical appointments or from getting medications?: No  In the past 12 months, has lack of transportation kept you from meetings, work, or from getting things needed for daily living?: No    DISCHARGE DETAILS:    Discharge planning discussed with[de-identified] Patient and sister Trenton Voss and Josefina Young from [facility]  Freedom of Choice: Yes     CM contacted family/caregiver?: Yes  Were Treatment Team discharge recommendations reviewed with patient/caregiver?: Yes  Did patient/caregiver verbalize understanding of patient care needs?: Yes  Were patient/caregiver advised of the risks associated with not following Treatment Team discharge recommendations?: Yes    Contacts  Patient Contacts: Dimple Portillo (Sister)   [phone] (Mobile)  Relationship to Patient[de-identified] Family  Contact Method: Phone  Phone Number: Dimple Portillo (Sister)   [phone] (  Reason/Outcome: Discharge Planning, Emergency Contact       Additional Comments: Met with patient at bedside in ED. Patient trying to contact sister Trenton Voss. Call to sister Trenton Voss and updated that patient in hospital.  Call to Josefina Young at [facility] to discuss discharge.   CM department following thru discharge    Accepting Facility Name, [address] [place] [address] : [facility]  Receiving Facility/Agency Phone Number: Nurse Josefina Young [phone]  Facility/Agency Fax Number: [phone]

## 2023-07-11 NOTE — CONSULTS
[facility] - Inpatient Consultation  Nehemiah Howell 69 y.o. female MRN: [id number]  Unit/Bed#: ED 02 Encounter: [id number]    Reason for Consult / Principal Problem:   Rectal bleeding    ASSESSMENT & PLAN:    69-year-old female with past medical history significant for ischemic colitis, diverticulitis, history of CAD status post CABG in 2021 who presented to the ED for 1 episode of rectal bleeding on 7/10. GI has been consulted for further evaluation and management. 1. Hematochezia  Patient with one episode of BRBPR with stable hemoglobin. Maroon stool noted on LANCE. Patient adamant that she does not want repeat colonoscopy at this time. · Etiology possibly ischemic vs. Inflammatory vs. Diverticular vs. 2.2 constipation  · Will do flexible sigmoidoscopy tomorrow  · Clear liquid diet today  · NPO at midnight  · Enema x2 AM of 7/11  · Monitor stool output  · Q8 H/H    2. Constipation  Noted on CT scan. Predominantly in rectum and L side of colon where pain is noted  · Initiate bowel regimen with miralax daily  · Enemas for flex sig as above  · Monitor output    3. Cirrhosis  Patient with new diagnosis of cirrhosis 5/2023 of unclear etiology s/p biopsy significant for mixed portal inflammation and ductular reaction, minimal lobular inflammation, mild chronic cholestasis, mild portal expansion and periportal fibrosis, no significant steatosis. Patient underwent autoimmune work-up which was negative.   · Varices  · Patient underwent EGD on 5/11/2023 notable for tiny varices at the GE junction with mild portal hypertensive gastropathy  · Ascites  · No ascites currently  · HE  · No signs or symptoms of hepatic encephalopathy at this time  · Continue to monitor  · [address]  · AFP on 5/8/2023 within normal limits  · Most recent imaging MRI on 5/15/2023 with morphologic features of cirrhosis without suspicious mass  · CT with contrast on 5/1/2023 demonstrates contour nodularity of the liver with heterogeneous appearance without mass  · Transplant  · Has appointment with hepatology Dr. Cornelia Stinson in August for further evaluation  ______________________________________________________________________    HISTORY OF PRESENT ILLNESS:   69-year-old female with past medical history significant for ischemic colitis, diverticulitis, cirrhosis, history of CAD status post CABG in 2021 who presented to the ED for concern of rectal bleeding that started on the evening of 7/9 into 7/10. Patient reports she woke up from sleep around 2:30 AM with significant abdominal pain and had bright red bloody bowel movement filling the toilet with blood. Since that time, patient has not had any further bowel movements. However, does note blood every time she wipes or strains to have a bowel movement. Patient does note associated left-sided abdominal pain, but denies nausea/vomiting, change in appetite, recent NSAID use, history of rectal bleeding. Of note,    Patient recently evaluated in the hospital in May 2023 persistent diarrhea with associated abdominal pain. During that admission patient underwent flex sig on 5/3 with negative biopsies and EGD/colon on 5/11. Colonoscopy significant for friable mucosa and shallow ulcer at the ileocecal valve as well as a single ulcer in the rectosigmoid area concerning for Crohn's disease. Biopsies at that time significant for mild lamina propria expansion with mixed inflammation and crypt distortion. Dr. Tavo Mann recommended initiating budesonide, however, patient does not believe she ever started this medication. In the ED, patient with hemoglobin of 10.4, which is higher than her current baseline. She also underwent CT high-volume scan which was without acute finding.     REVIEW OF SYSTEMS:    CONSTITUTIONAL: Denies any fever, chills, rigors, and weight loss  HEENT: No earache or tinnitus, denies hearing loss or visual disturbances  CARDIOVASCULAR: No chest pain or palpitations RESPIRATORY: Denies any cough, hemoptysis, shortness of breath or dyspnea on exertion  GASTROINTESTINAL: As noted in the History of Present Illness   GENITOURINARY: No problems with urination, denies any hematuria or dysuria  NEUROLOGIC: No dizziness or vertigo, denies headaches   MUSCULOSKELETAL: Denies any muscle or joint pain   SKIN: Denies skin rashes or itching   ENDOCRINE: Denies excessive thirst, denies intolerance to heat or cold  PSYCHOSOCIAL: Denies depression or anxiety, denies any recent memory loss     Historical Information   Past Medical History:   Diagnosis Date   • Anemia    • Atrial fibrillation (HCC)    • Depression    • GI (gastrointestinal bleed)    • Hypomagnesemia 2023   • Ischemic colitis [facility])      Past Surgical History:   Procedure Laterality Date   • APPENDECTOMY     • CARDIAC SURGERY     • CHOLECYSTECTOMY     • HIP SURGERY Right     Partial replacement   • HYSTERECTOMY     • IR BIOPSY TRANSJUGULAR LIVER  2023     Social History   Social History     Substance and Sexual Activity   Alcohol Use Never     Social History     Substance and Sexual Activity   Drug Use Never     Social History     Tobacco Use   Smoking Status Former   • Packs/day: 0.50   • Types: Cigarettes   • Quit date: 1993   • Years since quittin.1   Smokeless Tobacco Never     Family History   Problem Relation Age of Onset   • No Known Problems Mother        Meds/Allergies   (Not in a hospital admission)    Current Facility-Administered Medications   Medication Dose Route Frequency   • acetaminophen (TYLENOL) tablet 650 mg  650 mg Oral Q4H PRN   • artificial tear (LUBRIFRESH P.M.) ophthalmic ointment 1 Application  1 Application Both Eyes BID   • bumetanide (BUMEX) tablet 2 mg  2 mg Oral Daily   • levothyroxine tablet 75 mcg  75 mcg Oral Early Morning   • losartan (COZAAR) tablet 25 mg  25 mg Oral Daily   • metoprolol succinate (TOPROL-XL) 24 hr tablet 25 mg  25 mg Oral BID   • morphine injection 2 mg  2 mg Intravenous Q4H PRN   • ondansetron (ZOFRAN) injection 4 mg  4 mg Intravenous Q6H PRN   • pantoprazole (PROTONIX) EC tablet 40 mg  40 mg Oral Early Morning   • potassium chloride (K-DUR,KLOR-CON) CR tablet 20 mEq  20 mEq Oral Daily   • saccharomyces boulardii (FLORASTOR) capsule 250 mg  250 mg Oral BID   • sodium chloride 0.9 % infusion  100 mL/hr Intravenous Continuous   • traZODone (DESYREL) tablet 100 mg  100 mg Oral HS     Allergies   Allergen Reactions   • Ceftaroline GI Intolerance   • Diphenhydramine Hyperactivity   • Influenza Vaccines Other (See Comments) and Vomiting     nausea   • Lactose - Food Allergy Diarrhea       PHYSICAL EXAM:      Objective   Blood pressure 156/67, pulse 71, temperature 98.3 °F (36.8 °C), temperature source Oral, resp. rate 18, height 4' 11" (1.499 m), weight 49.9 kg (110 lb), SpO2 97 %. Body mass index is 22.22 kg/m². Intake/Output Summary (Last 24 hours) at 7/11/2023 1122  Last data filed at 7/10/2023 2029  Gross per 24 hour   Intake 1000 ml   Output --   Net 1000 ml       General Appearance:   Alert, cooperative, no distress   HEENT:   Normocephalic, atraumatic, anicteric     Neck:   Supple, symmetrical, trachea midline   Lungs:   Equal chest rise, respirations unlabored    Heart:   Regular rate and rhythm   Abdomen:   Left sided tenderness, non-distended; normal bowel sounds; no masses, no organomegaly    Rectal:   Maroon stool noted on LANCE   Extremities:   No cyanosis, clubbing or edema    Neuro:    Moves all 4 extremities    Skin:   No jaundice, rashes, or lesions      LAB RESULTS:     Admission on 07/10/2023   Component Date Value   • WBC 07/10/2023 10.92 (H)    • RBC 07/10/2023 2.99 (L)    • Hemoglobin 07/10/2023 10.4 (L)    • Hematocrit 07/10/2023 31.8 (L)    • MCV 07/10/2023 106 (H)    • MCH 07/10/2023 34.8 (H)    • MCHC 07/10/2023 32.7    • RDW 07/10/2023 14.3    • MPV 07/10/2023 11.1    • Platelets 70/80/7246 89 (L)    • nRBC 07/10/2023 0    • Neutrophils Relative 07/10/2023 81 (H)    • Immat GRANS % 07/10/2023 0    • Lymphocytes Relative 07/10/2023 7 (L)    • Monocytes Relative 07/10/2023 12    • Eosinophils Relative 07/10/2023 0    • Basophils Relative 07/10/2023 0    • Neutrophils Absolute 07/10/2023 8.78 (H)    • Immature Grans Absolute 07/10/2023 0.03    • Lymphocytes Absolute 07/10/2023 0.72    • Monocytes Absolute 07/10/2023 1.34 (H)    • Eosinophils Absolute 07/10/2023 0.03    • Basophils Absolute 07/10/2023 0.02    • Sodium 07/10/2023 137    • Potassium 07/10/2023 3.6    • Chloride 07/10/2023 103    • CO2 07/10/2023 25    • ANION GAP 07/10/2023 9    • BUN 07/10/2023 44 (H)    • Creatinine 07/10/2023 1.14    • Glucose 07/10/2023 125    • Calcium 07/10/2023 8.4    • Corrected Calcium 07/10/2023 9.5    • AST 07/10/2023 40 (H)    • ALT 07/10/2023 30    • Alkaline Phosphatase 07/10/2023 305 (H)    • Total Protein 07/10/2023 6.0 (L)    • Albumin 07/10/2023 2.6 (L)    • Total Bilirubin 07/10/2023 1.20 (H)    • eGFR 07/10/2023 46    • Protime 07/10/2023 14.2    • INR 07/10/2023 1.09    • Sodium 07/11/2023 140    • Potassium 07/11/2023 3.6    • Chloride 07/11/2023 107    • CO2 07/11/2023 26    • ANION GAP 07/11/2023 7    • BUN 07/11/2023 37 (H)    • Creatinine 07/11/2023 1.02    • Glucose 07/11/2023 96    • Glucose, Fasting 07/11/2023 96    • Calcium 07/11/2023 8.0 (L)    • eGFR 07/11/2023 53    • WBC 07/11/2023 9.21    • RBC 07/11/2023 2.72 (L)    • Hemoglobin 07/11/2023 9.4 (L)    • Hematocrit 07/11/2023 28.8 (L)    • MCV 07/11/2023 106 (H)    • MCH 07/11/2023 34.6 (H)    • MCHC 07/11/2023 32.6    • RDW 07/11/2023 14.5    • Platelets 85/09/9993 79 (L)    • MPV 07/11/2023 11.6        RADIOLOGY RESULTS: I have personally reviewed pertinent imaging studies.

## 2023-07-11 NOTE — PROGRESS NOTES
[id number] [facility]  Progress Note  Name: Jayce Laws  MRN: [id number]  Unit/Bed#: ED 02 I Date of Admission: 7/10/2023   Date of Service: 7/11/2023 I Hospital Day: 0    Assessment/Plan   * Rectal bleeding  Assessment & Plan  · Persists -patient reports having had ongoing episodes of mixed blood per rectum -most recent episode earlier this morning  · Acute blood loss anemia -arrival hemoglobin-10.4, current hemoglobin 9.4. Patient remains hemodynamically stable. Will transfuse if the hemoglobin drops to below 7.5 and or if hemodynamic instability ensues. · Await formal GI input  · Of note, the patient has had 2 EGDs, and 2 colonoscopies within the past 5 months, and 1 flexible sigmoidoscopy in May of this year. No specific etiology was found. · Differential includes a hemorrhoidal bleed versus a self-limited diverticular bleed versus other  · Continue Protonix, IV fluid, and n.p.o. status until seen by GI  · Repeat blood work in the a.m.     SIADH (syndrome of inappropriate ADH production) ([facility])  Assessment & Plan  · Sodium currently 140  · Outpatient follow-up with nephrology  · Continue to monitor BMPs while here in house    Acquired hypothyroidism  Assessment & Plan  · Continue levothyroxine    PAF (paroxysmal atrial fibrillation) ([facility])  Assessment & Plan  · Rate controlled with Toprol-XL  · Anticoagulation on hold in view of GI bleed, of note patient only takes a baby aspirin    Stage 3b chronic kidney disease [facility])  Assessment & Plan  Lab Results   Component Value Date    EGFR 53 07/11/2023    EGFR 46 07/10/2023    EGFR 43 05/23/2023    CREATININE 1.02 07/11/2023    CREATININE 1.14 07/10/2023    CREATININE 1.21 05/23/2023   · Creatinine stable  · Continue to monitor daily BMPs    Bilateral lower extremity edema  Assessment & Plan  · Continue Bumex             VTE Prophylaxis:  Pharmacologic VTE Prophylaxis contraindicated due to GI bleeding, SCDs only    Patient Centered Rounds: I have performed bedside rounds with nursing staff today. Discussions with Specialists or Other Care Team Provider: GI, case management, nursing  Education and Discussions with Family / Patient: Patient was brought up to par    Current Length of Stay: 0 day(s)    Current Patient Status: Observation   Certification Statement: The patient will continue to require additional inpatient hospital stay due to The management of ongoing GI bleeding, and the need for a GI evaluation    Discharge Plan: Discharge planning once cleared by GI    Code Status: Level 3 - DNAR and DNI    Subjective:   Patient seen and examined, resting in bed, reports having had a mixed bloody bowel movement earlier this morning. Is very anxious about why she continues to bleed. Objective:     Vitals:   Temp (24hrs), Av.1 °F (36.7 °C), Min:97.9 °F (36.6 °C), Max:98.3 °F (36.8 °C)    Temp:  [97.9 °F (36.6 °C)-98.3 °F (36.8 °C)] 98.3 °F (36.8 °C)  HR:  [62-71] 71  Resp:  [18] 18  BP: (130-170)/(62-72) 156/67  SpO2:  [93 %-97 %] 97 %  Body mass index is 22.22 kg/m². Input and Output Summary (last 24 hours): Intake/Output Summary (Last 24 hours) at 2023  Last data filed at 7/10/2023 2029  Gross per 24 hour   Intake 1000 ml   Output --   Net 1000 ml       Physical Exam:   Physical Exam  Constitutional:       General: She is not in acute distress. Appearance: Normal appearance. She is normal weight. She is not ill-appearing. HENT:      Head: Normocephalic and atraumatic. Nose: Nose normal.      Mouth/Throat:      Mouth: Mucous membranes are moist.   Eyes:      Extraocular Movements: Extraocular movements intact. Pupils: Pupils are equal, round, and reactive to light. Cardiovascular:      Rate and Rhythm: Normal rate and regular rhythm. Pulses: Normal pulses. Heart sounds: Normal heart sounds. No murmur heard. No friction rub. No gallop.    Pulmonary:      Effort: Pulmonary effort is normal. No respiratory distress. Breath sounds: Normal breath sounds. No wheezing, rhonchi or rales. Abdominal:      General: There is no distension. Palpations: Abdomen is soft. There is no mass. Tenderness: There is no abdominal tenderness. Hernia: No hernia is present. Comments: Mild left lower quadrant tenderness to palpation, no rebound, no guarding, bowel sounds normoactive x4 quadrants   Musculoskeletal:         General: No swelling or tenderness. Normal range of motion. Cervical back: Normal range of motion and neck supple. No rigidity. Right lower leg: No edema. Left lower leg: No edema. Skin:     General: Skin is warm. Capillary Refill: Capillary refill takes less than 2 seconds. Findings: No erythema or rash. Neurological:      General: No focal deficit present. Mental Status: She is alert and oriented to person, place, and time. Mental status is at baseline. Cranial Nerves: No cranial nerve deficit. Motor: No weakness. Psychiatric:         Mood and Affect: Mood normal.         Behavior: Behavior normal.         Additional Data:     Labs:    Results from last 7 days   Lab Units 07/11/23  0603 07/10/23  1654   WBC Thousand/uL 9.21 10.92*   HEMOGLOBIN g/dL 9.4* 10.4*   HEMATOCRIT % 28.8* 31.8*   PLATELETS Thousands/uL 79* 89*   NEUTROS PCT %  --  81*   LYMPHS PCT %  --  7*   MONOS PCT %  --  12   EOS PCT %  --  0     Results from last 7 days   Lab Units 07/11/23  0603 07/10/23  1654   SODIUM mmol/L 140 137   POTASSIUM mmol/L 3.6 3.6   CHLORIDE mmol/L 107 103   CO2 mmol/L 26 25   BUN mg/dL 37* 44*   CREATININE mg/dL 1.02 1.14   CALCIUM mg/dL 8.0* 8.4   ALK PHOS U/L  --  305*   ALT U/L  --  30   AST U/L  --  40*     Results from last 7 days   Lab Units 07/10/23  1654   INR  1.09               * I Have Reviewed All Lab Data Listed Above. * Additional Pertinent Lab Tests Reviewed:  [facility] Admission Reviewed    Imaging:  Imaging Reports Reviewed Today Include: None    Recent Cultures (last 7 days):           Last 24 Hours Medication List:   Current Facility-Administered Medications   Medication Dose Route Frequency Provider Last Rate   • acetaminophen  650 mg Oral Q4H PRN Lucille Larson PA-C     • artificial tear  1 Application Both Eyes BID Lucille Larson PA-C     • bumetanide  2 mg Oral Daily Lucille Larson PA-C     • levothyroxine  75 mcg Oral Early Morning Lucille Larson PA-C     • losartan  25 mg Oral Daily Lucille Larson PA-C     • metoprolol succinate  25 mg Oral BID Lucille Larson PA-C     • morphine injection  2 mg Intravenous Q4H PRN Lucille Larson PA-C     • ondansetron  4 mg Intravenous Q6H PRN Lucille Larson PA-C     • pantoprazole  40 mg Oral Early Morning Lucille Larson PA-C     • potassium chloride  20 mEq Oral Daily Lucille Larson PA-C     • saccharomyces boulardii  250 mg Oral BID Lucille Larson PA-C     • sodium chloride  100 mL/hr Intravenous Continuous Lucille Larson PA-C 100 mL/hr (07/11/23 0034)   • traZODone  100 mg Oral HS Lucille Larson PA-C          Today, Patient Was Seen By: Dony Rodriguez MD    ** Please Note: Dictation voice to text software may have been used in the creation of this document.  **

## 2023-07-11 NOTE — ASSESSMENT & PLAN NOTE
· Rate controlled with Toprol-XL  · Anticoagulation on hold in view of GI bleed, of note patient only takes a baby aspirin

## 2023-07-11 NOTE — ASSESSMENT & PLAN NOTE
· Sodium currently 140  · Outpatient follow-up with nephrology  · Continue to monitor BMPs while here in house

## 2023-07-11 NOTE — ASSESSMENT & PLAN NOTE
Lab Results   Component Value Date    EGFR 53 07/11/2023    EGFR 46 07/10/2023    EGFR 43 05/23/2023    CREATININE 1.02 07/11/2023    CREATININE 1.14 07/10/2023    CREATININE 1.21 05/23/2023   · Creatinine stable  · Continue to monitor daily BMPs

## 2023-07-11 NOTE — ASSESSMENT & PLAN NOTE
Lab Results   Component Value Date    EGFR 46 07/10/2023    EGFR 43 05/23/2023    EGFR 47 05/22/2023    CREATININE 1.14 07/10/2023    CREATININE 1.21 05/23/2023    CREATININE 1.12 05/22/2023   · Creatinine stable

## 2023-07-11 NOTE — ASSESSMENT & PLAN NOTE
· Persists -patient reports having had ongoing episodes of mixed blood per rectum -most recent episode earlier this morning  · Acute blood loss anemia -arrival hemoglobin-10.4, current hemoglobin 9.4. Patient remains hemodynamically stable. Will transfuse if the hemoglobin drops to below 7.5 and or if hemodynamic instability ensues. · Await formal GI input  · Of note, the patient has had 2 EGDs, and 2 colonoscopies within the past 5 months, and 1 flexible sigmoidoscopy in May of this year. No specific etiology was found. · Differential includes a hemorrhoidal bleed versus a self-limited diverticular bleed versus other  · Continue Protonix, IV fluid, and n.p.o. status until seen by GI  · Repeat blood work in the a.m.

## 2023-07-12 ENCOUNTER — ANESTHESIA EVENT (INPATIENT)
Dept: PERIOP | Facility: HOSPITAL | Age: 78
DRG: 385 | End: 2023-07-12
Payer: MEDICARE

## 2023-07-12 ENCOUNTER — ANESTHESIA (INPATIENT)
Dept: PERIOP | Facility: HOSPITAL | Age: 78
DRG: 385 | End: 2023-07-12
Payer: MEDICARE

## 2023-07-12 ENCOUNTER — APPOINTMENT (OUTPATIENT)
Dept: PERIOP | Facility: HOSPITAL | Age: 78
DRG: 385 | End: 2023-07-12
Payer: MEDICARE

## 2023-07-12 LAB
ANION GAP SERPL CALCULATED.3IONS-SCNC: 4 MMOL/L
BASOPHILS # BLD AUTO: 0.02 THOUSANDS/ÂΜL (ref 0–0.1)
BASOPHILS NFR BLD AUTO: 0 % (ref 0–1)
BUN SERPL-MCNC: 33 MG/DL (ref 5–25)
CALCIUM SERPL-MCNC: 7.8 MG/DL (ref 8.4–10.2)
CHLORIDE SERPL-SCNC: 104 MMOL/L (ref 96–108)
CO2 SERPL-SCNC: 27 MMOL/L (ref 21–32)
CREAT SERPL-MCNC: 1.06 MG/DL (ref 0.6–1.3)
EOSINOPHIL # BLD AUTO: 0.1 THOUSAND/ÂΜL (ref 0–0.61)
EOSINOPHIL NFR BLD AUTO: 1 % (ref 0–6)
ERYTHROCYTE [DISTWIDTH] IN BLOOD BY AUTOMATED COUNT: 14.3 % (ref 11.6–15.1)
GFR SERPL CREATININE-BSD FRML MDRD: 51 ML/MIN/1.73SQ M
GLUCOSE SERPL-MCNC: 78 MG/DL (ref 65–140)
HCT VFR BLD AUTO: 26 % (ref 34.8–46.1)
HCT VFR BLD AUTO: 26.6 % (ref 34.8–46.1)
HGB BLD-MCNC: 8.5 G/DL (ref 11.5–15.4)
HGB BLD-MCNC: 8.8 G/DL (ref 11.5–15.4)
IMM GRANULOCYTES # BLD AUTO: 0.02 THOUSAND/UL (ref 0–0.2)
IMM GRANULOCYTES NFR BLD AUTO: 0 % (ref 0–2)
LG PLATELETS BLD QL SMEAR: PRESENT
LYMPHOCYTES # BLD AUTO: 1.4 THOUSANDS/ÂΜL (ref 0.6–4.47)
LYMPHOCYTES NFR BLD AUTO: 19 % (ref 14–44)
MCH RBC QN AUTO: 35 PG (ref 26.8–34.3)
MCHC RBC AUTO-ENTMCNC: 32.7 G/DL (ref 31.4–37.4)
MCV RBC AUTO: 107 FL (ref 82–98)
MONOCYTES # BLD AUTO: 0.97 THOUSAND/ÂΜL (ref 0.17–1.22)
MONOCYTES NFR BLD AUTO: 13 % (ref 4–12)
MRSA NOSE QL CULT: ABNORMAL
MRSA NOSE QL CULT: ABNORMAL
NEUTROPHILS # BLD AUTO: 4.89 THOUSANDS/ÂΜL (ref 1.85–7.62)
NEUTS SEG NFR BLD AUTO: 67 % (ref 43–75)
NRBC BLD AUTO-RTO: 0 /100 WBCS
PLATELET # BLD AUTO: 76 THOUSANDS/UL (ref 149–390)
PLATELET BLD QL SMEAR: ABNORMAL
PMV BLD AUTO: 11.8 FL (ref 8.9–12.7)
POTASSIUM SERPL-SCNC: 4 MMOL/L (ref 3.5–5.3)
RBC # BLD AUTO: 2.43 MILLION/UL (ref 3.81–5.12)
SODIUM SERPL-SCNC: 135 MMOL/L (ref 135–147)
WBC # BLD AUTO: 7.4 THOUSAND/UL (ref 4.31–10.16)

## 2023-07-12 PROCEDURE — 0DJD8ZZ INSPECTION OF LOWER INTESTINAL TRACT, VIA NATURAL OR ARTIFICIAL OPENING ENDOSCOPIC: ICD-10-PCS | Performed by: INTERNAL MEDICINE

## 2023-07-12 PROCEDURE — 85025 COMPLETE CBC W/AUTO DIFF WBC: CPT | Performed by: HOSPITALIST

## 2023-07-12 PROCEDURE — 80048 BASIC METABOLIC PNL TOTAL CA: CPT | Performed by: HOSPITALIST

## 2023-07-12 PROCEDURE — 45330 DIAGNOSTIC SIGMOIDOSCOPY: CPT | Performed by: INTERNAL MEDICINE

## 2023-07-12 PROCEDURE — 85018 HEMOGLOBIN: CPT | Performed by: HOSPITALIST

## 2023-07-12 PROCEDURE — 99232 SBSQ HOSP IP/OBS MODERATE 35: CPT | Performed by: HOSPITALIST

## 2023-07-12 PROCEDURE — 85014 HEMATOCRIT: CPT | Performed by: HOSPITALIST

## 2023-07-12 RX ORDER — LIDOCAINE HYDROCHLORIDE 20 MG/ML
INJECTION, SOLUTION EPIDURAL; INFILTRATION; INTRACAUDAL; PERINEURAL AS NEEDED
Status: DISCONTINUED | OUTPATIENT
Start: 2023-07-12 | End: 2023-07-12

## 2023-07-12 RX ORDER — BISACODYL 5 MG/1
10 TABLET, DELAYED RELEASE ORAL ONCE
Status: COMPLETED | OUTPATIENT
Start: 2023-07-12 | End: 2023-07-12

## 2023-07-12 RX ORDER — PROPOFOL 10 MG/ML
INJECTION, EMULSION INTRAVENOUS AS NEEDED
Status: DISCONTINUED | OUTPATIENT
Start: 2023-07-12 | End: 2023-07-12

## 2023-07-12 RX ORDER — GLYCOPYRROLATE 0.2 MG/ML
INJECTION INTRAMUSCULAR; INTRAVENOUS AS NEEDED
Status: DISCONTINUED | OUTPATIENT
Start: 2023-07-12 | End: 2023-07-12

## 2023-07-12 RX ADMIN — POLYETHYLENE GLYCOL 3350, SODIUM SULFATE ANHYDROUS, SODIUM BICARBONATE, SODIUM CHLORIDE, POTASSIUM CHLORIDE 4000 ML: 236; 22.74; 6.74; 5.86; 2.97 POWDER, FOR SOLUTION ORAL at 16:35

## 2023-07-12 RX ADMIN — LIDOCAINE HYDROCHLORIDE 50 MG: 20 INJECTION, SOLUTION EPIDURAL; INFILTRATION; INTRACAUDAL; PERINEURAL at 14:56

## 2023-07-12 RX ADMIN — GLYCOPYRROLATE 0.2 MCG: 0.2 INJECTION, SOLUTION INTRAMUSCULAR; INTRAVENOUS at 15:03

## 2023-07-12 RX ADMIN — Medication 250 MG: at 09:14

## 2023-07-12 RX ADMIN — METOPROLOL SUCCINATE 25 MG: 50 TABLET, EXTENDED RELEASE ORAL at 17:53

## 2023-07-12 RX ADMIN — MORPHINE SULFATE 2 MG: 2 INJECTION, SOLUTION INTRAMUSCULAR; INTRAVENOUS at 20:19

## 2023-07-12 RX ADMIN — TRAZODONE HYDROCHLORIDE 100 MG: 100 TABLET ORAL at 22:23

## 2023-07-12 RX ADMIN — SODIUM CHLORIDE: 0.9 INJECTION, SOLUTION INTRAVENOUS at 14:43

## 2023-07-12 RX ADMIN — SODIUM CHLORIDE 100 ML/HR: 0.9 INJECTION, SOLUTION INTRAVENOUS at 05:22

## 2023-07-12 RX ADMIN — BISACODYL 10 MG: 5 TABLET, COATED ORAL at 16:34

## 2023-07-12 RX ADMIN — LOSARTAN POTASSIUM 25 MG: 25 TABLET, FILM COATED ORAL at 09:14

## 2023-07-12 RX ADMIN — MORPHINE SULFATE 2 MG: 2 INJECTION, SOLUTION INTRAMUSCULAR; INTRAVENOUS at 05:24

## 2023-07-12 RX ADMIN — POTASSIUM CHLORIDE 20 MEQ: 1500 TABLET, EXTENDED RELEASE ORAL at 09:18

## 2023-07-12 RX ADMIN — PROPOFOL 50 MG: 10 INJECTION, EMULSION INTRAVENOUS at 14:56

## 2023-07-12 RX ADMIN — PANTOPRAZOLE SODIUM 40 MG: 40 TABLET, DELAYED RELEASE ORAL at 05:18

## 2023-07-12 RX ADMIN — Medication 250 MG: at 17:54

## 2023-07-12 RX ADMIN — BUMETANIDE 2 MG: 1 TABLET ORAL at 09:13

## 2023-07-12 RX ADMIN — LEVOTHYROXINE SODIUM 75 MCG: 75 TABLET ORAL at 05:18

## 2023-07-12 RX ADMIN — SODIUM CHLORIDE 100 ML/HR: 0.9 INJECTION, SOLUTION INTRAVENOUS at 19:17

## 2023-07-12 RX ADMIN — PROPOFOL 50 MG: 10 INJECTION, EMULSION INTRAVENOUS at 15:01

## 2023-07-12 RX ADMIN — METOPROLOL SUCCINATE 25 MG: 50 TABLET, EXTENDED RELEASE ORAL at 09:14

## 2023-07-12 NOTE — ANESTHESIA PREPROCEDURE EVALUATION
Procedure:  FLEXIBLE SIGMOIDOSCOPY    Relevant Problems   CARDIO   (+) PAF (paroxysmal atrial fibrillation) (HCC)   (+) Primary hypertension      ENDO   (+) Acquired hypothyroidism      GI/HEPATIC   (+) Rectal bleeding      /RENAL   (+) Stage 3b chronic kidney disease (HCC)      HEMATOLOGY   (+) Anemia   (+) Thrombocytopenia (HCC)      Endocrine   (+) SIADH (syndrome of inappropriate ADH production) ([address])      TTE 5/2023  LVEF 66%  RV dilated, normal function  Biatratrial enlargement  Mild AS  Moderate to severe TR    Physical Exam    Airway    Mallampati score: II  TM Distance: >3 FB  Neck ROM: full     Dental   No notable dental hx     Cardiovascular      Pulmonary      Other Findings        Anesthesia Plan  ASA Score- 3     Anesthesia Type- IV sedation with anesthesia with ASA Monitors. Additional Monitors:   Airway Plan:           Plan Factors-    Chart reviewed. EKG reviewed. Existing labs reviewed. Patient summary reviewed. Induction-     Postoperative Plan-     Informed Consent- Anesthetic plan and risks discussed with patient. I personally reviewed this patient with the CRNA. Discussed and agreed on the Anesthesia Plan with the CRNA. Neo Nguyen

## 2023-07-12 NOTE — NURSING NOTE
Patient awake and alert, no complaints of pain. No nausea or abdominal discomfort. No s/s bleeding.   Call bell in reach

## 2023-07-12 NOTE — ANESTHESIA POSTPROCEDURE EVALUATION
Post-Op Assessment Note    CV Status:  Stable  Pain Score: 0    Pain management: adequate     Mental Status:  Awake and sleepy   Hydration Status:  Euvolemic   PONV Controlled:  Controlled   Airway Patency:  Patent      Post Op Vitals Reviewed: Yes      Staff: CRNA         No notable events documented.     BP  104/60   Temp     Pulse  60   Resp 12   SpO2   96

## 2023-07-12 NOTE — PROGRESS NOTES
[address]  Progress Note  Name: Conor Fitzgerald  MRN: [id number]  Unit/Bed#: -01 I Date of Admission: 7/10/2023   Date of Service: 7/12/2023 I Hospital Day: 1    Assessment/Plan   * Rectal bleeding  Assessment & Plan  · Persists -patient reports having had ongoing episodes of mixed blood per rectum -most recent episode earlier this morning  · Acute blood loss anemia -arrival hemoglobin-10.4, current hemoglobin 8.5. Patient remains hemodynamically stable. Will transfuse if the hemoglobin drops to below 7.5 and or if hemodynamic instability ensues. · Status post GI evaluation  · Plan is for a flexible sigmoidoscopy later this afternoon  · Of note, the patient has had 2 EGDs, and 2 colonoscopies within the past 5 months, and 1 flexible sigmoidoscopy in May of this year. No specific etiology was found.   · Continue Protonix  · If the flexible sigmoidoscopy is nonrevealing, patient may benefit from an outpatient video capsule endoscopy-we will review with GI  · DC Planning once cleared by GI    SIADH (syndrome of inappropriate ADH production) ([facility])  Assessment & Plan  · Sodium currently 135  · Outpatient follow-up with nephrology  · Continue to monitor BMPs while here in house    Acquired hypothyroidism  Assessment & Plan  · Continue levothyroxine    PAF (paroxysmal atrial fibrillation) ([facility])  Assessment & Plan  · Rate controlled with Toprol-XL  · Anticoagulation on hold in view of GI bleed, of note patient only takes a baby aspirin    Stage 3b chronic kidney disease [facility])  Assessment & Plan  Lab Results   Component Value Date    EGFR 51 07/12/2023    EGFR 53 07/11/2023    EGFR 46 07/10/2023    CREATININE 1.06 07/12/2023    CREATININE 1.02 07/11/2023    CREATININE 1.14 07/10/2023   · Creatinine stable  · Continue to monitor daily BMPs    Bilateral lower extremity edema  Assessment & Plan  · Continue Bumex    Primary hypertension  Assessment & Plan  · Blood pressure stable  · Continue Toprol-XL, continue losartan             VTE Prophylaxis:  Pharmacologic VTE Prophylaxis contraindicated due to GI bleeding, SCDs only    Patient Centered Rounds: I have performed bedside rounds with nursing staff today. Discussions with Specialists or Other Care Team Provider: GI, case management, nursing  Education and Discussions with Family / Patient: Patient was brought up to par    Current Length of Stay: 1 day(s)    Current Patient Status: Inpatient   Certification Statement: The patient will continue to require additional inpatient hospital stay due to The need for a flexible sigmoidoscopy    Discharge Plan: Discharge planning once cleared by GI    Code Status: Level 3 - DNAR and DNI    Subjective:   Patient seen, sitting up on side of her bed, looks and feels well, but unfortunately continues to have mixed bloody bowel movements of both bright red and dark blood    Objective:     Vitals:   Temp (24hrs), Av.9 °F (36.6 °C), Min:97.7 °F (36.5 °C), Max:98.2 °F (36.8 °C)    Temp:  [97.7 °F (36.5 °C)-98.2 °F (36.8 °C)] 98.2 °F (36.8 °C)  HR:  [59-71] 59  Resp:  [17-20] 20  BP: (125-175)/(37-70) 139/42  SpO2:  [94 %-97 %] 97 %  Body mass index is 21.86 kg/m². Input and Output Summary (last 24 hours): Intake/Output Summary (Last 24 hours) at 2023 6000  Last data filed at 2023 1837  Gross per 24 hour   Intake 0 ml   Output --   Net 0 ml       Physical Exam:   Physical Exam  Constitutional:       General: She is not in acute distress. Appearance: Normal appearance. She is normal weight. She is not ill-appearing. HENT:      Head: Normocephalic and atraumatic. Nose: Nose normal.      Mouth/Throat:      Mouth: Mucous membranes are moist.   Eyes:      Extraocular Movements: Extraocular movements intact. Pupils: Pupils are equal, round, and reactive to light. Cardiovascular:      Rate and Rhythm: Normal rate and regular rhythm. Pulses: Normal pulses.       Heart sounds: Normal heart sounds. No murmur heard. No friction rub. No gallop. Pulmonary:      Effort: Pulmonary effort is normal. No respiratory distress. Breath sounds: Normal breath sounds. No wheezing, rhonchi or rales. Abdominal:      General: There is no distension. Palpations: Abdomen is soft. There is no mass. Tenderness: There is no abdominal tenderness. Hernia: No hernia is present. Musculoskeletal:         General: No swelling or tenderness. Normal range of motion. Cervical back: Normal range of motion and neck supple. No rigidity. Right lower leg: No edema. Left lower leg: No edema. Skin:     General: Skin is warm. Capillary Refill: Capillary refill takes less than 2 seconds. Findings: No erythema or rash. Neurological:      General: No focal deficit present. Mental Status: She is alert and oriented to person, place, and time. Mental status is at baseline. Cranial Nerves: No cranial nerve deficit. Motor: No weakness. Psychiatric:         Mood and Affect: Mood normal.         Behavior: Behavior normal.         Additional Data:     Labs:    Results from last 7 days   Lab Units 07/12/23  0503   WBC Thousand/uL 7.40   HEMOGLOBIN g/dL 8.5*   HEMATOCRIT % 26.0*   PLATELETS Thousands/uL 76*   NEUTROS PCT % 67   LYMPHS PCT % 19   MONOS PCT % 13*   EOS PCT % 1     Results from last 7 days   Lab Units 07/12/23  0503 07/11/23  0603 07/10/23  1654   SODIUM mmol/L 135   < > 137   POTASSIUM mmol/L 4.0   < > 3.6   CHLORIDE mmol/L 104   < > 103   CO2 mmol/L 27   < > 25   BUN mg/dL 33*   < > 44*   CREATININE mg/dL 1.06   < > 1.14   CALCIUM mg/dL 7.8*   < > 8.4   ALK PHOS U/L  --   --  305*   ALT U/L  --   --  30   AST U/L  --   --  40*    < > = values in this interval not displayed. Results from last 7 days   Lab Units 07/10/23  1654   INR  1.09               * I Have Reviewed All Lab Data Listed Above. * Additional Pertinent Lab Tests Reviewed:  All Labs For Current Hospital Admission  Reviewed    Imaging:  Imaging Reports Reviewed Today Include: None    Recent Cultures (last 7 days):           Last 24 Hours Medication List:   Current Facility-Administered Medications   Medication Dose Route Frequency Provider Last Rate   • acetaminophen  650 mg Oral Q4H PRN Jarrod Myrick PA-C     • bumetanide  2 mg Oral Daily Jarrod Myrick PA-C     • glycerin-hypromellose-  1 drop Both Eyes Q4H PRN Jarrod Myrick PA-C     • levothyroxine  75 mcg Oral Early Morning Jarrod Myrick PA-C     • losartan  25 mg Oral Daily Jarrod Myrick PA-C     • metoprolol succinate  25 mg Oral BID Jarrod Myrick PA-C     • morphine injection  2 mg Intravenous Q4H PRN Jarrod Myrick PA-C     • ondansetron  4 mg Intravenous Q6H PRN Jarrod Myrick PA-C     • pantoprazole  40 mg Oral Early Morning Jarrod Myrick PA-C     • polyethylene glycol  17 g Oral Daily Murali Miller, DO     • potassium chloride  20 mEq Oral Daily Jarrod Myrick PA-C     • saccharomyces boulardii  250 mg Oral BID Jarrod Myrick PA-C     • sodium chloride  100 mL/hr Intravenous Continuous Jarrod Myrick PA-C 100 mL/hr (07/12/23 0522)   • traZODone  100 mg Oral HS Jarrod Myrick PA-C          Today, Patient Was Seen By: Roger Glass MD    ** Please Note: Dictation voice to text software may have been used in the creation of this document.  **

## 2023-07-12 NOTE — ASSESSMENT & PLAN NOTE
Lab Results   Component Value Date    EGFR 51 07/12/2023    EGFR 53 07/11/2023    EGFR 46 07/10/2023    CREATININE 1.06 07/12/2023    CREATININE 1.02 07/11/2023    CREATININE 1.14 07/10/2023   · Creatinine stable  · Continue to monitor daily BMPs

## 2023-07-12 NOTE — NURSING NOTE
Patient bumped R forearm when getting out of bed to commode, skin tear noted, cleansed, adaptic, 4x4 and paper tape applied

## 2023-07-12 NOTE — ASSESSMENT & PLAN NOTE
· Persists -patient reports having had ongoing episodes of mixed blood per rectum -most recent episode earlier this morning  · Acute blood loss anemia -arrival hemoglobin-10.4, current hemoglobin 8.5. Patient remains hemodynamically stable. Will transfuse if the hemoglobin drops to below 7.5 and or if hemodynamic instability ensues. · Status post GI evaluation  · Plan is for a flexible sigmoidoscopy later this afternoon  · Of note, the patient has had 2 EGDs, and 2 colonoscopies within the past 5 months, and 1 flexible sigmoidoscopy in May of this year. No specific etiology was found.   · Continue Protonix  · If the flexible sigmoidoscopy is nonrevealing, patient may benefit from an outpatient video capsule endoscopy-we will review with GI  · DC Planning once cleared by GI

## 2023-07-12 NOTE — ASSESSMENT & PLAN NOTE
· Sodium currently 135  · Outpatient follow-up with nephrology  · Continue to monitor BMPs while here in house

## 2023-07-12 NOTE — PLAN OF CARE
Problem: Potential for Falls  Goal: Patient will remain free of falls  Description: INTERVENTIONS:  - Educate patient/family on patient safety including physical limitations  - Instruct patient to call for assistance with activity   - Consult OT/PT to assist with strengthening/mobility   - Keep Call bell within reach  - Keep bed low and locked with side rails adjusted as appropriate  - Keep care items and personal belongings within reach  - Initiate and maintain comfort rounds  - Make Fall Risk Sign visible to staff  - Offer Toileting in advance of need  - Initiate/Maintain bed alarm  - Obtain necessary fall risk management equipment  - Apply yellow socks and bracelet for high fall risk patients  - Consider moving patient to room near nurses station  Outcome: Progressing     Problem: MOBILITY - ADULT  Goal: Maintain or return to baseline ADL function  Description: INTERVENTIONS:  -  Assess patient's ability to carry out ADLs; assess patient's baseline for ADL function and identify physical deficits which impact ability to perform ADLs (bathing, care of mouth/teeth, toileting, grooming, dressing, etc.)  - Assess/evaluate cause of self-care deficits   - Assess range of motion  - Assess patient's mobility; develop plan if impaired  - Assess patient's need for assistive devices and provide as appropriate  - Encourage maximum independence but intervene and supervise when necessary  - Involve family in performance of ADLs  - Assess for home care needs following discharge   - Consider OT consult to assist with ADL evaluation and planning for discharge  - Provide patient education as appropriate  Outcome: Progressing  Goal: Maintains/Returns to pre admission functional level  Description: INTERVENTIONS:  - Perform BMAT or MOVE assessment daily.   - Set and communicate daily mobility goal to care team and patient/family/caregiver.    - Collaborate with rehabilitation services on mobility goals if consulted  - Out of bed for toileting  - Record patient progress and toleration of activity level   Outcome: Progressing     Problem: Nutrition/Hydration-ADULT  Goal: Nutrient/Hydration intake appropriate for improving, restoring or maintaining nutritional needs  Description: Monitor and assess patient's nutrition/hydration status for malnutrition. Collaborate with interdisciplinary team and initiate plan and interventions as ordered. Monitor patient's weight and dietary intake as ordered or per policy. Utilize nutrition screening tool and intervene as necessary. Determine patient's food preferences and provide high-protein, high-caloric foods as appropriate.      INTERVENTIONS:  - Monitor oral intake, urinary output, labs, and treatment plans  - Assess nutrition and hydration status and recommend course of action  - Evaluate amount of meals eaten  - Assist patient with eating if necessary   - Allow adequate time for meals  - Recommend/ encourage appropriate diets, oral nutritional supplements, and vitamin/mineral supplements  - Order, calculate, and assess calorie counts as needed  - Recommend, monitor, and adjust tube feedings and TPN/PPN based on assessed needs  - Assess need for intravenous fluids  - Provide specific nutrition/hydration education as appropriate  - Include patient/family/caregiver in decisions related to nutrition  Outcome: Progressing     Problem: GASTROINTESTINAL - ADULT  Goal: Minimal or absence of nausea and/or vomiting  Description: INTERVENTIONS:  - Administer IV fluids if ordered to ensure adequate hydration  - Maintain NPO status until nausea and vomiting are resolved  - Nasogastric tube if ordered  - Administer ordered antiemetic medications as needed  - Provide nonpharmacologic comfort measures as appropriate  - Advance diet as tolerated, if ordered  - Consider nutrition services referral to assist patient with adequate nutrition and appropriate food choices  Outcome: Progressing  Goal: Maintains or returns to baseline bowel function  Description: INTERVENTIONS:  - Assess bowel function  - Encourage oral fluids to ensure adequate hydration  - Administer IV fluids if ordered to ensure adequate hydration  - Administer ordered medications as needed  - Encourage mobilization and activity  - Consider nutritional services referral to assist patient with adequate nutrition and appropriate food choices  Outcome: Progressing  Goal: Maintains adequate nutritional intake  Description: INTERVENTIONS:  - Monitor percentage of each meal consumed  - Identify factors contributing to decreased intake, treat as appropriate  - Assist with meals as needed  - Monitor I&O, weight, and lab values if indicated  - Obtain nutrition services referral as needed  Outcome: Progressing     Problem: PAIN - ADULT  Goal: Verbalizes/displays adequate comfort level or baseline comfort level  Description: Interventions:  - Encourage patient to monitor pain and request assistance  - Assess pain using appropriate pain scale  - Administer analgesics based on type and severity of pain and evaluate response  - Implement non-pharmacological measures as appropriate and evaluate response  - Consider cultural and social influences on pain and pain management  - Notify physician/advanced practitioner if interventions unsuccessful or patient reports new pain  Outcome: Progressing     Problem: INFECTION - ADULT  Goal: Absence or prevention of progression during hospitalization  Description: INTERVENTIONS:  - Assess and monitor for signs and symptoms of infection  - Monitor lab/diagnostic results  - Monitor all insertion sites, i.e. indwelling lines, tubes, and drains  - Monitor endotracheal if appropriate and nasal secretions for changes in amount and color  - [place] appropriate cooling/warming therapies per order  - Administer medications as ordered  - Instruct and encourage patient and family to use good hand hygiene technique  - Identify and instruct in appropriate isolation precautions for identified infection/condition  Outcome: Progressing     Problem: DISCHARGE PLANNING  Goal: Discharge to home or other facility with appropriate resources  Description: INTERVENTIONS:  - Identify barriers to discharge w/patient and caregiver  - Arrange for needed discharge resources and transportation as appropriate  - Identify discharge learning needs (meds, wound care, etc.)  - Refer to Case Management Department for coordinating discharge planning if the patient needs post-hospital services based on physician/advanced practitioner order or complex needs related to functional status, cognitive ability, or social support system  Outcome: Progressing     Problem: Knowledge Deficit  Goal: Patient/family/caregiver demonstrates understanding of disease process, treatment plan, medications, and discharge instructions  Description: Complete learning assessment and assess knowledge base.   Interventions:  - Provide teaching at level of understanding  - Provide teaching via preferred learning methods  Outcome: Progressing     Problem: HEMATOLOGIC - ADULT  Goal: Maintains hematologic stability  Description: INTERVENTIONS  - Assess for signs and symptoms of bleeding or hemorrhage  - Monitor labs  - Administer supportive blood products/factors as ordered and appropriate  Outcome: Progressing

## 2023-07-12 NOTE — PLAN OF CARE
Problem: MOBILITY - ADULT  Goal: Maintain or return to baseline ADL function  Description: INTERVENTIONS:  -  Assess patient's ability to carry out ADLs; assess patient's baseline for ADL function and identify physical deficits which impact ability to perform ADLs (bathing, care of mouth/teeth, toileting, grooming, dressing, etc.)  - Assess/evaluate cause of self-care deficits   - Assess range of motion  - Assess patient's mobility; develop plan if impaired  - Assess patient's need for assistive devices and provide as appropriate  - Encourage maximum independence but intervene and supervise when necessary  - Involve family in performance of ADLs  - Assess for home care needs following discharge   - Consider OT consult to assist with ADL evaluation and planning for discharge  - Provide patient education as appropriate  Outcome: Progressing     Problem: GASTROINTESTINAL - ADULT  Goal: Maintains or returns to baseline bowel function  Description: INTERVENTIONS:  - Assess bowel function  - Encourage oral fluids to ensure adequate hydration  - Administer IV fluids if ordered to ensure adequate hydration  - Administer ordered medications as needed  - Encourage mobilization and activity  - Consider nutritional services referral to assist patient with adequate nutrition and appropriate food choices  Outcome: Progressing     Problem: PAIN - ADULT  Goal: Verbalizes/displays adequate comfort level or baseline comfort level  Description: Interventions:  - Encourage patient to monitor pain and request assistance  - Assess pain using appropriate pain scale  - Administer analgesics based on type and severity of pain and evaluate response  - Implement non-pharmacological measures as appropriate and evaluate response  - Consider cultural and social influences on pain and pain management  - Notify physician/advanced practitioner if interventions unsuccessful or patient reports new pain  Outcome: Progressing     Problem: HEMATOLOGIC - ADULT  Goal: Maintains hematologic stability  Description: INTERVENTIONS  - Assess for signs and symptoms of bleeding or hemorrhage  - Monitor labs  - Administer supportive blood products/factors as ordered and appropriate  Outcome: Progressing

## 2023-07-12 NOTE — PROGRESS NOTES
52-year-old with atrial fibrillation currently not on anticoagulation, presented with abdominal pain and hematochezia. Previously known rectosigmoid ulcer. Plan to do flexible sigmoidoscopy to evaluate today.     Rom Mcnair MD  Gastroenterology  [facility]  Date: July 12, 2023]

## 2023-07-13 ENCOUNTER — ANESTHESIA (INPATIENT)
Dept: GASTROENTEROLOGY | Facility: HOSPITAL | Age: 78
DRG: 385 | End: 2023-07-13
Payer: MEDICARE

## 2023-07-13 ENCOUNTER — APPOINTMENT (INPATIENT)
Dept: GASTROENTEROLOGY | Facility: HOSPITAL | Age: 78
DRG: 385 | End: 2023-07-13
Payer: MEDICARE

## 2023-07-13 ENCOUNTER — ANESTHESIA EVENT (INPATIENT)
Dept: GASTROENTEROLOGY | Facility: HOSPITAL | Age: 78
DRG: 385 | End: 2023-07-13
Payer: MEDICARE

## 2023-07-13 LAB
ALBUMIN SERPL BCP-MCNC: 2.6 G/DL (ref 3.5–5)
ALP SERPL-CCNC: 292 U/L (ref 34–104)
ALT SERPL W P-5'-P-CCNC: 31 U/L (ref 7–52)
ANION GAP SERPL CALCULATED.3IONS-SCNC: 7 MMOL/L
AST SERPL W P-5'-P-CCNC: 39 U/L (ref 13–39)
BASOPHILS # BLD AUTO: 0.02 THOUSANDS/ÂΜL (ref 0–0.1)
BASOPHILS NFR BLD AUTO: 0 % (ref 0–1)
BILIRUB SERPL-MCNC: 1.37 MG/DL (ref 0.2–1)
BUN SERPL-MCNC: 20 MG/DL (ref 5–25)
CALCIUM ALBUM COR SERPL-MCNC: 9 MG/DL (ref 8.3–10.1)
CALCIUM SERPL-MCNC: 7.9 MG/DL (ref 8.4–10.2)
CHLORIDE SERPL-SCNC: 97 MMOL/L (ref 96–108)
CO2 SERPL-SCNC: 28 MMOL/L (ref 21–32)
CREAT SERPL-MCNC: 0.8 MG/DL (ref 0.6–1.3)
EOSINOPHIL # BLD AUTO: 0.15 THOUSAND/ÂΜL (ref 0–0.61)
EOSINOPHIL NFR BLD AUTO: 3 % (ref 0–6)
ERYTHROCYTE [DISTWIDTH] IN BLOOD BY AUTOMATED COUNT: 13.3 % (ref 11.6–15.1)
FLUAV RNA RESP QL NAA+PROBE: NEGATIVE
FLUBV RNA RESP QL NAA+PROBE: NEGATIVE
GFR SERPL CREATININE-BSD FRML MDRD: 71 ML/MIN/1.73SQ M
GLUCOSE SERPL-MCNC: 97 MG/DL (ref 65–140)
HCT VFR BLD AUTO: 33.2 % (ref 34.8–46.1)
HCT VFR BLD AUTO: 36 % (ref 34.8–46.1)
HGB BLD-MCNC: 11.1 G/DL (ref 11.5–15.4)
HGB BLD-MCNC: 12.1 G/DL (ref 11.5–15.4)
IMM GRANULOCYTES # BLD AUTO: 0.02 THOUSAND/UL (ref 0–0.2)
IMM GRANULOCYTES NFR BLD AUTO: 0 % (ref 0–2)
LYMPHOCYTES # BLD AUTO: 1.2 THOUSANDS/ÂΜL (ref 0.6–4.47)
LYMPHOCYTES NFR BLD AUTO: 22 % (ref 14–44)
MCH RBC QN AUTO: 34.7 PG (ref 26.8–34.3)
MCHC RBC AUTO-ENTMCNC: 33.4 G/DL (ref 31.4–37.4)
MCV RBC AUTO: 104 FL (ref 82–98)
MONOCYTES # BLD AUTO: 0.67 THOUSAND/ÂΜL (ref 0.17–1.22)
MONOCYTES NFR BLD AUTO: 12 % (ref 4–12)
NEUTROPHILS # BLD AUTO: 3.39 THOUSANDS/ÂΜL (ref 1.85–7.62)
NEUTS SEG NFR BLD AUTO: 63 % (ref 43–75)
NRBC BLD AUTO-RTO: 0 /100 WBCS
PLATELET # BLD AUTO: 96 THOUSANDS/UL (ref 149–390)
PMV BLD AUTO: 11 FL (ref 8.9–12.7)
POTASSIUM SERPL-SCNC: 3.5 MMOL/L (ref 3.5–5.3)
PROT SERPL-MCNC: 6.1 G/DL (ref 6.4–8.4)
RBC # BLD AUTO: 3.2 MILLION/UL (ref 3.81–5.12)
RSV RNA RESP QL NAA+PROBE: NEGATIVE
SARS-COV-2 RNA RESP QL NAA+PROBE: NEGATIVE
SODIUM SERPL-SCNC: 132 MMOL/L (ref 135–147)
WBC # BLD AUTO: 5.45 THOUSAND/UL (ref 4.31–10.16)

## 2023-07-13 PROCEDURE — 0241U HB NFCT DS VIR RESP RNA 4 TRGT: CPT | Performed by: HOSPITALIST

## 2023-07-13 PROCEDURE — 99232 SBSQ HOSP IP/OBS MODERATE 35: CPT | Performed by: HOSPITALIST

## 2023-07-13 PROCEDURE — 0DBH8ZX EXCISION OF CECUM, VIA NATURAL OR ARTIFICIAL OPENING ENDOSCOPIC, DIAGNOSTIC: ICD-10-PCS | Performed by: INTERNAL MEDICINE

## 2023-07-13 PROCEDURE — 85018 HEMOGLOBIN: CPT | Performed by: HOSPITALIST

## 2023-07-13 PROCEDURE — 0DBK8ZX EXCISION OF ASCENDING COLON, VIA NATURAL OR ARTIFICIAL OPENING ENDOSCOPIC, DIAGNOSTIC: ICD-10-PCS | Performed by: INTERNAL MEDICINE

## 2023-07-13 PROCEDURE — 0DBN8ZX EXCISION OF SIGMOID COLON, VIA NATURAL OR ARTIFICIAL OPENING ENDOSCOPIC, DIAGNOSTIC: ICD-10-PCS | Performed by: INTERNAL MEDICINE

## 2023-07-13 PROCEDURE — 85014 HEMATOCRIT: CPT | Performed by: HOSPITALIST

## 2023-07-13 PROCEDURE — 80053 COMPREHEN METABOLIC PANEL: CPT | Performed by: STUDENT IN AN ORGANIZED HEALTH CARE EDUCATION/TRAINING PROGRAM

## 2023-07-13 PROCEDURE — 88305 TISSUE EXAM BY PATHOLOGIST: CPT | Performed by: STUDENT IN AN ORGANIZED HEALTH CARE EDUCATION/TRAINING PROGRAM

## 2023-07-13 PROCEDURE — 85025 COMPLETE CBC W/AUTO DIFF WBC: CPT | Performed by: HOSPITALIST

## 2023-07-13 PROCEDURE — 88342 IMHCHEM/IMCYTCHM 1ST ANTB: CPT | Performed by: STUDENT IN AN ORGANIZED HEALTH CARE EDUCATION/TRAINING PROGRAM

## 2023-07-13 RX ORDER — SODIUM CHLORIDE, SODIUM LACTATE, POTASSIUM CHLORIDE, CALCIUM CHLORIDE 600; 310; 30; 20 MG/100ML; MG/100ML; MG/100ML; MG/100ML
125 INJECTION, SOLUTION INTRAVENOUS CONTINUOUS
Status: DISCONTINUED | OUTPATIENT
Start: 2023-07-13 | End: 2023-07-14 | Stop reason: HOSPADM

## 2023-07-13 RX ORDER — PROPOFOL 10 MG/ML
INJECTION, EMULSION INTRAVENOUS AS NEEDED
Status: DISCONTINUED | OUTPATIENT
Start: 2023-07-13 | End: 2023-07-13

## 2023-07-13 RX ADMIN — MORPHINE SULFATE 2 MG: 2 INJECTION, SOLUTION INTRAMUSCULAR; INTRAVENOUS at 16:14

## 2023-07-13 RX ADMIN — ONDANSETRON 4 MG: 2 INJECTION INTRAMUSCULAR; INTRAVENOUS at 00:36

## 2023-07-13 RX ADMIN — SODIUM CHLORIDE, SODIUM LACTATE, POTASSIUM CHLORIDE, AND CALCIUM CHLORIDE: .6; .31; .03; .02 INJECTION, SOLUTION INTRAVENOUS at 11:53

## 2023-07-13 RX ADMIN — ONDANSETRON 4 MG: 2 INJECTION INTRAMUSCULAR; INTRAVENOUS at 08:21

## 2023-07-13 RX ADMIN — PANTOPRAZOLE SODIUM 40 MG: 40 TABLET, DELAYED RELEASE ORAL at 05:00

## 2023-07-13 RX ADMIN — LEVOTHYROXINE SODIUM 75 MCG: 75 TABLET ORAL at 05:00

## 2023-07-13 RX ADMIN — METOPROLOL SUCCINATE 25 MG: 50 TABLET, EXTENDED RELEASE ORAL at 17:39

## 2023-07-13 RX ADMIN — MORPHINE SULFATE 2 MG: 2 INJECTION, SOLUTION INTRAMUSCULAR; INTRAVENOUS at 08:21

## 2023-07-13 RX ADMIN — PROPOFOL 50 MG: 10 INJECTION, EMULSION INTRAVENOUS at 12:07

## 2023-07-13 RX ADMIN — Medication 250 MG: at 08:12

## 2023-07-13 RX ADMIN — PROPOFOL 30 MG: 10 INJECTION, EMULSION INTRAVENOUS at 12:16

## 2023-07-13 RX ADMIN — MORPHINE SULFATE 2 MG: 2 INJECTION, SOLUTION INTRAMUSCULAR; INTRAVENOUS at 00:36

## 2023-07-13 RX ADMIN — LOSARTAN POTASSIUM 25 MG: 25 TABLET, FILM COATED ORAL at 08:12

## 2023-07-13 RX ADMIN — Medication 250 MG: at 17:39

## 2023-07-13 RX ADMIN — METOPROLOL SUCCINATE 25 MG: 50 TABLET, EXTENDED RELEASE ORAL at 08:12

## 2023-07-13 RX ADMIN — TRAZODONE HYDROCHLORIDE 100 MG: 100 TABLET ORAL at 21:25

## 2023-07-13 RX ADMIN — POTASSIUM CHLORIDE 20 MEQ: 1500 TABLET, EXTENDED RELEASE ORAL at 08:12

## 2023-07-13 RX ADMIN — PROPOFOL 20 MG: 10 INJECTION, EMULSION INTRAVENOUS at 12:26

## 2023-07-13 RX ADMIN — BUMETANIDE 2 MG: 1 TABLET ORAL at 08:12

## 2023-07-13 RX ADMIN — PROPOFOL 50 MG: 10 INJECTION, EMULSION INTRAVENOUS at 12:12

## 2023-07-13 NOTE — ASSESSMENT & PLAN NOTE
Lab Results   Component Value Date    EGFR 71 07/13/2023    EGFR 51 07/12/2023    EGFR 53 07/11/2023    CREATININE 0.80 07/13/2023    CREATININE 1.06 07/12/2023    CREATININE 1.02 07/11/2023   · Creatinine stable  · Continue to monitor daily BMPs

## 2023-07-13 NOTE — PLAN OF CARE
Problem: MOBILITY - ADULT  Goal: Maintain or return to baseline ADL function  Description: INTERVENTIONS:  -  Assess patient's ability to carry out ADLs; assess patient's baseline for ADL function and identify physical deficits which impact ability to perform ADLs (bathing, care of mouth/teeth, toileting, grooming, dressing, etc.)  - Assess/evaluate cause of self-care deficits   - Assess range of motion  - Assess patient's mobility; develop plan if impaired  - Assess patient's need for assistive devices and provide as appropriate  - Encourage maximum independence but intervene and supervise when necessary  - Involve family in performance of ADLs  - Assess for home care needs following discharge   - Consider OT consult to assist with ADL evaluation and planning for discharge  - Provide patient education as appropriate  Outcome: Progressing   Patient walks to the bathroom

## 2023-07-13 NOTE — ANESTHESIA POSTPROCEDURE EVALUATION
Post-Op Assessment Note    CV Status:  Stable  Pain Score: 0    Pain management: adequate     Mental Status:  Alert   Hydration Status:  Stable   PONV Controlled:  Controlled   Airway Patency:  Patent      Post Op Vitals Reviewed: Yes      Staff: CRNA         No notable events documented.     BP  100/57   Temp     Pulse 57   Resp   20   SpO2   98

## 2023-07-13 NOTE — PROGRESS NOTES
[address]  Progress Note  Name: Shannon Carballo  MRN: [id number]  Unit/Bed#: -01 I Date of Admission: 7/10/2023   Date of Service: 7/13/2023 I Hospital Day: 2    Assessment/Plan   * Rectal bleeding  Assessment & Plan  · Persists -patient reports having had ongoing episodes of mixed blood per rectum -most recent episode earlier this morning  · Acute blood loss anemia -arrival hemoglobin-10.4  · Hemoglobin dropped to 8.5, this morning it is up to 11.1. Patient did not receive a blood transfusion, suspect that she might be hemoconcentrated in view of receiving GoLytely overnight as part of the preparation for her colonoscopy  · We will repeat an H&H now  · Patient is status post a flexible sigmoidoscopy on 7/12/2023- see the full report for details  · Patient is to go for a colonoscopy later today  · Continued management as per GI    SIADH (syndrome of inappropriate ADH production) ([address])  Assessment & Plan  · Sodium currently 132  · Outpatient follow-up with nephrology  · Continue to monitor BMPs while here in house    Acquired hypothyroidism  Assessment & Plan  · Continue levothyroxine    PAF (paroxysmal atrial fibrillation) ([address])  Assessment & Plan  · Rate controlled with Toprol-XL  · Anticoagulation on hold in view of GI bleed, of note patient only takes a baby aspirin    Stage 3b chronic kidney disease [facility])  Assessment & Plan  Lab Results   Component Value Date    EGFR 71 07/13/2023    EGFR 51 07/12/2023    EGFR 53 07/11/2023    CREATININE 0.80 07/13/2023    CREATININE 1.06 07/12/2023    CREATININE 1.02 07/11/2023   · Creatinine stable  · Continue to monitor daily BMPs    Bilateral lower extremity edema  Assessment & Plan  · Continue Bumex    Primary hypertension  Assessment & Plan  · Blood pressure stable  · Continue Toprol-XL, continue losartan             VTE Prophylaxis:  Pharmacologic VTE Prophylaxis contraindicated due to GI bleeding, SCDs only    Patient Centered Rounds:  I have performed bedside rounds with nursing staff today. Discussions with Specialists or Other Care Team Provider: Case management, nursing, GI  Education and Discussions with Family / Patient: Attempts to call the patient's brother Kashmir Kuhn at phone #[phone] at [phone] answer    Current Length of Stay: 2 day(s)    Current Patient Status: Inpatient   Certification Statement: The patient will continue to require additional inpatient hospital stay due to Need for colonoscopy    Discharge Plan: Discharge planning once cleared by GI    Code Status: Level 3 - DNAR and DNI    Subjective:   Patient seen, resting in bed, feels fatigued, tired, and reports that she has been up all night in view of the GoLytely preparation    Objective:     Vitals:   Temp (24hrs), Av.3 °F (36.3 °C), Min:96.9 °F (36.1 °C), Max:97.5 °F (36.4 °C)    Temp:  [96.9 °F (36.1 °C)-97.5 °F (36.4 °C)] 97.2 °F (36.2 °C)  HR:  [52-66] 61  Resp:  [16-20] 18  BP: (112-174)/(39-70) 171/64  SpO2:  [93 %-100 %] 93 %  Body mass index is 21.86 kg/m². Input and Output Summary (last 24 hours): Intake/Output Summary (Last 24 hours) at 2023 0947  Last data filed at 2023 1821  Gross per 24 hour   Intake 220 ml   Output --   Net 220 ml       Physical Exam:   Physical Exam  Constitutional:       General: She is not in acute distress. Appearance: Normal appearance. She is normal weight. She is not ill-appearing. HENT:      Head: Normocephalic and atraumatic. Nose: Nose normal.      Mouth/Throat:      Mouth: Mucous membranes are moist.   Eyes:      Extraocular Movements: Extraocular movements intact. Pupils: Pupils are equal, round, and reactive to light. Cardiovascular:      Rate and Rhythm: Normal rate and regular rhythm. Pulses: Normal pulses. Heart sounds: Normal heart sounds. No murmur heard. No friction rub. No gallop. Pulmonary:      Effort: Pulmonary effort is normal. No respiratory distress.       Breath sounds: Normal breath sounds. No wheezing, rhonchi or rales. Abdominal:      General: There is no distension. Palpations: Abdomen is soft. There is no mass. Tenderness: There is no abdominal tenderness. Hernia: No hernia is present. Musculoskeletal:         General: No swelling or tenderness. Normal range of motion. Cervical back: Normal range of motion and neck supple. No rigidity. Right lower leg: No edema. Left lower leg: No edema. Skin:     General: Skin is warm. Capillary Refill: Capillary refill takes less than 2 seconds. Findings: No erythema or rash. Neurological:      General: No focal deficit present. Mental Status: She is alert and oriented to person, place, and time. Mental status is at baseline. Cranial Nerves: No cranial nerve deficit. Motor: No weakness. Psychiatric:         Mood and Affect: Mood normal.         Behavior: Behavior normal.         Additional Data:     Labs:    Results from last 7 days   Lab Units 07/13/23  0828   WBC Thousand/uL 5.45   HEMOGLOBIN g/dL 11.1*   HEMATOCRIT % 33.2*   PLATELETS Thousands/uL 96*   NEUTROS PCT % 63   LYMPHS PCT % 22   MONOS PCT % 12   EOS PCT % 3     Results from last 7 days   Lab Units 07/13/23  0828   SODIUM mmol/L 132*   POTASSIUM mmol/L 3.5   CHLORIDE mmol/L 97   CO2 mmol/L 28   BUN mg/dL 20   CREATININE mg/dL 0.80   CALCIUM mg/dL 7.9*   ALK PHOS U/L 292*   ALT U/L 31   AST U/L 39     Results from last 7 days   Lab Units 07/10/23  1654   INR  1.09               * I Have Reviewed All Lab Data Listed Above. * Additional Pertinent Lab Tests Reviewed:  [facility] Admission  Reviewed    Imaging:  Imaging Reports Reviewed Today Include: Flexible sigmoidoscopy imaging and reports reviewed   Recent Cultures (last 7 days):           Last 24 Hours Medication List:   Current Facility-Administered Medications   Medication Dose Route Frequency Provider Last Rate   • acetaminophen  650 mg Oral Q4H PRN Xu Ivy PA-C     • bumetanide  2 mg Oral Daily Dianna Tabor     • glycerin-hypromellose-  1 drop Both Eyes Q4H PRN Xu Ivy PA-C     • levothyroxine  75 mcg Oral Early Morning Xu Ivy PA-C     • losartan  25 mg Oral Daily Dianna Tabor     • metoprolol succinate  25 mg Oral BID Xu Ivy PA-C     • morphine injection  2 mg Intravenous Q4H PRN Xu Ivy PA-C     • ondansetron  4 mg Intravenous Q6H PRN Xu Ivy PA-C     • pantoprazole  40 mg Oral Early Morning Xu Ivy PA-C     • polyethylene glycol  17 g Oral Daily Opal Fat, DO     • potassium chloride  20 mEq Oral Daily Xu Ivy PA-C     • saccharomyces boulardii  250 mg Oral BID Xu Ivy PA-C     • sodium chloride  100 mL/hr Intravenous Continuous Xu Ivy PA-C 100 mL/hr (07/12/23 1917)   • traZODone  100 mg Oral HS Xu Ivy PA-C          Today, Patient Was Seen By: Jason Mccurdy MD    ** Please Note: Dictation voice to text software may have been used in the creation of this document.  **

## 2023-07-13 NOTE — ASSESSMENT & PLAN NOTE
· Persists -patient reports having had ongoing episodes of mixed blood per rectum -most recent episode earlier this morning  · Acute blood loss anemia -arrival hemoglobin-10.4  · Hemoglobin dropped to 8.5, this morning it is up to 11.1.   Patient did not receive a blood transfusion, suspect that she might be hemoconcentrated in view of receiving GoLytely overnight as part of the preparation for her colonoscopy  · We will repeat an H&H now  · Patient is status post a flexible sigmoidoscopy on 7/12/2023- see the full report for details  · Patient is to go for a colonoscopy later today  · Continued management as per GI

## 2023-07-13 NOTE — PLAN OF CARE
Problem: Potential for Falls  Goal: Patient will remain free of falls  Description: INTERVENTIONS:  - Educate patient/family on patient safety including physical limitations  - Instruct patient to call for assistance with activity   - Consult OT/PT to assist with strengthening/mobility   - Keep Call bell within reach  - Keep bed low and locked with side rails adjusted as appropriate  - Keep care items and personal belongings within reach  - Initiate and maintain comfort rounds  - Make Fall Risk Sign visible to staff  - Offer Toileting in advance of need  - Initiate/Maintain bed alarm  - Obtain necessary fall risk management equipment  - Apply yellow socks and bracelet for high fall risk patients  - Consider moving patient to room near nurses station  Outcome: Progressing     Problem: MOBILITY - ADULT  Goal: Maintain or return to baseline ADL function  Description: INTERVENTIONS:  -  Assess patient's ability to carry out ADLs; assess patient's baseline for ADL function and identify physical deficits which impact ability to perform ADLs (bathing, care of mouth/teeth, toileting, grooming, dressing, etc.)  - Assess/evaluate cause of self-care deficits   - Assess range of motion  - Assess patient's mobility; develop plan if impaired  - Assess patient's need for assistive devices and provide as appropriate  - Encourage maximum independence but intervene and supervise when necessary  - Involve family in performance of ADLs  - Assess for home care needs following discharge   - Consider OT consult to assist with ADL evaluation and planning for discharge  - Provide patient education as appropriate  Outcome: Progressing  Goal: Maintains/Returns to pre admission functional level  Description: INTERVENTIONS:  - Perform BMAT or MOVE assessment daily.   - Set and communicate daily mobility goal to care team and patient/family/caregiver.    - Collaborate with rehabilitation services on mobility goals if consulted  - Out of bed for toileting  - Record patient progress and toleration of activity level   Outcome: Progressing     Problem: Nutrition/Hydration-ADULT  Goal: Nutrient/Hydration intake appropriate for improving, restoring or maintaining nutritional needs  Description: Monitor and assess patient's nutrition/hydration status for malnutrition. Collaborate with interdisciplinary team and initiate plan and interventions as ordered. Monitor patient's weight and dietary intake as ordered or per policy. Utilize nutrition screening tool and intervene as necessary. Determine patient's food preferences and provide high-protein, high-caloric foods as appropriate.      INTERVENTIONS:  - Monitor oral intake, urinary output, labs, and treatment plans  - Assess nutrition and hydration status and recommend course of action  - Evaluate amount of meals eaten  - Assist patient with eating if necessary   - Allow adequate time for meals  - Recommend/ encourage appropriate diets, oral nutritional supplements, and vitamin/mineral supplements  - Order, calculate, and assess calorie counts as needed  - Recommend, monitor, and adjust tube feedings and TPN/PPN based on assessed needs  - Assess need for intravenous fluids  - Provide specific nutrition/hydration education as appropriate  - Include patient/family/caregiver in decisions related to nutrition  Outcome: Progressing     Problem: GASTROINTESTINAL - ADULT  Goal: Minimal or absence of nausea and/or vomiting  Description: INTERVENTIONS:  - Administer IV fluids if ordered to ensure adequate hydration  - Maintain NPO status until nausea and vomiting are resolved  - Nasogastric tube if ordered  - Administer ordered antiemetic medications as needed  - Provide nonpharmacologic comfort measures as appropriate  - Advance diet as tolerated, if ordered  - Consider nutrition services referral to assist patient with adequate nutrition and appropriate food choices  Outcome: Progressing  Goal: Maintains or returns to baseline bowel function  Description: INTERVENTIONS:  - Assess bowel function  - Encourage oral fluids to ensure adequate hydration  - Administer IV fluids if ordered to ensure adequate hydration  - Administer ordered medications as needed  - Encourage mobilization and activity  - Consider nutritional services referral to assist patient with adequate nutrition and appropriate food choices  Outcome: Progressing  Goal: Maintains adequate nutritional intake  Description: INTERVENTIONS:  - Monitor percentage of each meal consumed  - Identify factors contributing to decreased intake, treat as appropriate  - Assist with meals as needed  - Monitor I&O, weight, and lab values if indicated  - Obtain nutrition services referral as needed  Outcome: Progressing     Problem: PAIN - ADULT  Goal: Verbalizes/displays adequate comfort level or baseline comfort level  Description: Interventions:  - Encourage patient to monitor pain and request assistance  - Assess pain using appropriate pain scale  - Administer analgesics based on type and severity of pain and evaluate response  - Implement non-pharmacological measures as appropriate and evaluate response  - Consider cultural and social influences on pain and pain management  - Notify physician/advanced practitioner if interventions unsuccessful or patient reports new pain  Outcome: Progressing     Problem: INFECTION - ADULT  Goal: Absence or prevention of progression during hospitalization  Description: INTERVENTIONS:  - Assess and monitor for signs and symptoms of infection  - Monitor lab/diagnostic results  - Monitor all insertion sites, i.e. indwelling lines, tubes, and drains  - Monitor endotracheal if appropriate and nasal secretions for changes in amount and color  - [place] appropriate cooling/warming therapies per order  - Administer medications as ordered  - Instruct and encourage patient and family to use good hand hygiene technique  - Identify and instruct in appropriate isolation precautions for identified infection/condition  Outcome: Progressing     Problem: DISCHARGE PLANNING  Goal: Discharge to home or other facility with appropriate resources  Description: INTERVENTIONS:  - Identify barriers to discharge w/patient and caregiver  - Arrange for needed discharge resources and transportation as appropriate  - Identify discharge learning needs (meds, wound care, etc.)  - Refer to Case Management Department for coordinating discharge planning if the patient needs post-hospital services based on physician/advanced practitioner order or complex needs related to functional status, cognitive ability, or social support system  Outcome: Progressing     Problem: Knowledge Deficit  Goal: Patient/family/caregiver demonstrates understanding of disease process, treatment plan, medications, and discharge instructions  Description: Complete learning assessment and assess knowledge base.   Interventions:  - Provide teaching at level of understanding  - Provide teaching via preferred learning methods  Outcome: Progressing     Problem: HEMATOLOGIC - ADULT  Goal: Maintains hematologic stability  Description: INTERVENTIONS  - Assess for signs and symptoms of bleeding or hemorrhage  - Monitor labs  - Administer supportive blood products/factors as ordered and appropriate  Outcome: Progressing

## 2023-07-13 NOTE — ASSESSMENT & PLAN NOTE
· Sodium currently 132  · Outpatient follow-up with nephrology  · Continue to monitor BMPs while here in house

## 2023-07-13 NOTE — CASE MANAGEMENT
Case Management Discharge Planning Note    Patient name Mark Phillips  Location [address] [facility] [id number] MRN [id number]  : 1945 Date 2023       Current Admission Date: 7/10/2023  Current Admission Diagnosis:Rectal bleeding   Patient Active Problem List    Diagnosis Date Noted   • Rectal bleeding 07/10/2023   • SIADH (syndrome of inappropriate ADH production) ([address]) 2023   • Abnormal CT of liver 2023   • Stage 3b chronic kidney disease ([address]) 2023   • Bilateral lower extremity edema 2023   • Thrombocytopenia ([address]) 2023   • Anemia 2023   • Superior mesenteric artery stenosis ([address]) 2023   • Primary hypertension 2023   • Acquired hypothyroidism 2023   • Irritable bowel syndrome with diarrhea 2023   • Abnormal CT scan 2023   • Hx of CABG 2023   • PAF (paroxysmal atrial fibrillation) ([address]) 2022      LOS (days): 2  Geometric Mean LOS (GMLOS) (days): 3.00  Days to GMLOS:1     OBJECTIVE:  Risk of Unplanned Readmission Score: 17.42         Current admission status: Inpatient   Preferred Pharmacy:   [id number] [address] AFFILIATED WITH [place] [address]  Phone: [phone] Fax: [phone]    Primary Care Provider: Shelli Dasilva MD    Primary Insurance: MEDICARE  Secondary Insurance: Orlando Huitron    DISCHARGE DETAILS:    Discharge planning discussed with[de-identified] Patient and Martin Christians from [address] of Choice: Yes     CM contacted family/caregiver?: Yes  Were Treatment Team discharge recommendations reviewed with patient/caregiver?: Yes  Did patient/caregiver verbalize understanding of patient care needs?: Yes  Were patient/caregiver advised of the risks associated with not following Treatment Team discharge recommendations?: Yes    Other Referral/Resources/Interventions Provided:  Interventions: Facility Return         Treatment Team Recommendation: Facility Return     Transport at Discharge : Wheelchair Noemi Frank  Dispatcher Contacted: Yes  Number/Name of Dispatcher: via [address] by Dave and Unit #): Guy  ETA of Transport (Date): 07/13/23  ETA of Transport (Time): 1300     Additional Comments: Call to [facility] and spoke with nurse Kristina Manuel updated on discharge plan. Will need transportation    Accepting Facility Name, [address] [place] [address] Street : [facility]  Receiving Facility/Agency Phone Number: [phone]  Facility/Agency Fax Number: [phone]

## 2023-07-13 NOTE — ANESTHESIA PREPROCEDURE EVALUATION
Procedure:  COLONOSCOPY    Relevant Problems   CARDIO   (+) PAF (paroxysmal atrial fibrillation) (HCC)   (+) Primary hypertension      ENDO   (+) Acquired hypothyroidism      GI/HEPATIC   (+) Rectal bleeding      /RENAL   (+) Stage 3b chronic kidney disease (HCC)      HEMATOLOGY   (+) Anemia   (+) Thrombocytopenia (HCC)        Physical Exam    Airway    Mallampati score: III  TM Distance: >3 FB  Neck ROM: full     Dental   No notable dental hx     Cardiovascular  Murmur, Cardiovascular exam normal    Pulmonary  Pulmonary exam normal     Other Findings    Elects to continue DNR for procedure    Anesthesia Plan  ASA Score- 3     Anesthesia Type- IV sedation with anesthesia with ASA Monitors. Additional Monitors:   Airway Plan:           Plan Factors-Exercise tolerance (METS): >4 METS. Chart reviewed. Existing labs reviewed. Patient summary reviewed. Patient is not a current smoker. Induction- intravenous. Postoperative Plan-     Informed Consent- Anesthetic plan and risks discussed with patient. I personally reviewed this patient with the CRNA. Discussed and agreed on the Anesthesia Plan with the CRNA. Moy Richmond

## 2023-07-14 VITALS
RESPIRATION RATE: 16 BRPM | WEIGHT: 111 LBS | HEART RATE: 68 BPM | OXYGEN SATURATION: 96 % | SYSTOLIC BLOOD PRESSURE: 146 MMHG | HEIGHT: 59 IN | DIASTOLIC BLOOD PRESSURE: 43 MMHG | BODY MASS INDEX: 22.38 KG/M2 | TEMPERATURE: 98 F

## 2023-07-14 LAB
ANION GAP SERPL CALCULATED.3IONS-SCNC: 6 MMOL/L
BASOPHILS # BLD AUTO: 0.02 THOUSANDS/ÂΜL (ref 0–0.1)
BASOPHILS NFR BLD AUTO: 0 % (ref 0–1)
BUN SERPL-MCNC: 17 MG/DL (ref 5–25)
CALCIUM SERPL-MCNC: 7.7 MG/DL (ref 8.4–10.2)
CHLORIDE SERPL-SCNC: 99 MMOL/L (ref 96–108)
CO2 SERPL-SCNC: 27 MMOL/L (ref 21–32)
CREAT SERPL-MCNC: 1.04 MG/DL (ref 0.6–1.3)
EOSINOPHIL # BLD AUTO: 0.09 THOUSAND/ÂΜL (ref 0–0.61)
EOSINOPHIL NFR BLD AUTO: 2 % (ref 0–6)
ERYTHROCYTE [DISTWIDTH] IN BLOOD BY AUTOMATED COUNT: 13.3 % (ref 11.6–15.1)
GFR SERPL CREATININE-BSD FRML MDRD: 52 ML/MIN/1.73SQ M
GLUCOSE SERPL-MCNC: 106 MG/DL (ref 65–140)
HCT VFR BLD AUTO: 30.6 % (ref 34.8–46.1)
HGB BLD-MCNC: 10.2 G/DL (ref 11.5–15.4)
IMM GRANULOCYTES # BLD AUTO: 0.01 THOUSAND/UL (ref 0–0.2)
IMM GRANULOCYTES NFR BLD AUTO: 0 % (ref 0–2)
LYMPHOCYTES # BLD AUTO: 1.05 THOUSANDS/ÂΜL (ref 0.6–4.47)
LYMPHOCYTES NFR BLD AUTO: 23 % (ref 14–44)
MCH RBC QN AUTO: 34.5 PG (ref 26.8–34.3)
MCHC RBC AUTO-ENTMCNC: 33.3 G/DL (ref 31.4–37.4)
MCV RBC AUTO: 103 FL (ref 82–98)
MONOCYTES # BLD AUTO: 0.59 THOUSAND/ÂΜL (ref 0.17–1.22)
MONOCYTES NFR BLD AUTO: 13 % (ref 4–12)
NEUTROPHILS # BLD AUTO: 2.83 THOUSANDS/ÂΜL (ref 1.85–7.62)
NEUTS SEG NFR BLD AUTO: 62 % (ref 43–75)
NRBC BLD AUTO-RTO: 0 /100 WBCS
PLATELET # BLD AUTO: 83 THOUSANDS/UL (ref 149–390)
PMV BLD AUTO: 10.8 FL (ref 8.9–12.7)
POTASSIUM SERPL-SCNC: 3.8 MMOL/L (ref 3.5–5.3)
RBC # BLD AUTO: 2.96 MILLION/UL (ref 3.81–5.12)
SODIUM SERPL-SCNC: 132 MMOL/L (ref 135–147)
WBC # BLD AUTO: 4.59 THOUSAND/UL (ref 4.31–10.16)

## 2023-07-14 PROCEDURE — 85025 COMPLETE CBC W/AUTO DIFF WBC: CPT | Performed by: INTERNAL MEDICINE

## 2023-07-14 PROCEDURE — 99239 HOSP IP/OBS DSCHRG MGMT >30: CPT | Performed by: HOSPITALIST

## 2023-07-14 PROCEDURE — 80048 BASIC METABOLIC PNL TOTAL CA: CPT | Performed by: INTERNAL MEDICINE

## 2023-07-14 RX ADMIN — Medication 250 MG: at 09:00

## 2023-07-14 RX ADMIN — BUMETANIDE 2 MG: 1 TABLET ORAL at 09:00

## 2023-07-14 RX ADMIN — MORPHINE SULFATE 2 MG: 2 INJECTION, SOLUTION INTRAMUSCULAR; INTRAVENOUS at 11:23

## 2023-07-14 RX ADMIN — PANTOPRAZOLE SODIUM 40 MG: 40 TABLET, DELAYED RELEASE ORAL at 06:00

## 2023-07-14 RX ADMIN — LOSARTAN POTASSIUM 25 MG: 25 TABLET, FILM COATED ORAL at 09:00

## 2023-07-14 RX ADMIN — METOPROLOL SUCCINATE 25 MG: 50 TABLET, EXTENDED RELEASE ORAL at 09:00

## 2023-07-14 RX ADMIN — LEVOTHYROXINE SODIUM 75 MCG: 75 TABLET ORAL at 05:59

## 2023-07-14 RX ADMIN — POTASSIUM CHLORIDE 20 MEQ: 1500 TABLET, EXTENDED RELEASE ORAL at 09:00

## 2023-07-14 NOTE — ASSESSMENT & PLAN NOTE
· Rate controlled with Toprol-XL-continue the same  · Okay for discharge on a baby aspirin as the patient was taking prior to arrival as per GI

## 2023-07-14 NOTE — DISCHARGE INSTR - AVS FIRST PAGE
Dear Nehemiah Howell,     It was our pleasure to care for you here at [facility], [address] [facility]. It is our hope that we were always able to exceed the expected standards for your care during your stay. You were hospitalized due to rectal bleeding. You were cared for on the medical/surgical floor by Michael Gil MD with the [facility] who covers for your primary care physician (PCP), Anuja Beltran MD, while you were hospitalized. You were additionally seen by the [facility] gastroenterology associates-Dr. Umu Cisneros. If you have any questions or concerns related to this hospitalization, you may contact us at [phone]. For follow up as well as any medication refills, we recommend that you follow up with your primary care physician. A registered nurse will reach out to you by phone within a few days after your discharge to answer any additional questions that you may have after going home. However, at this time we provide for you here, the most important instructions / recommendations at discharge:     Notable Medication Adjustments -   Okay to resume all preadmission medications at the preadmission doses  Testing Required after Discharge -   To be further determined in the outpatient setting by your PCP, and/or GI  Important follow up information -   Please follow-up with the providers as outlined in this discharge packet  Other Instructions -   If you do not hear from Dr. Cathie Cortes office within 1 week, please call and seek biopsy results  Please ensure that your primary care provider continues to monitor your blood work in the outpatient setting  Please review this entire after visit summary as additional general instructions including medication list, appointments, activity, diet, any pertinent wound care, and other additional recommendations from your care team that may be provided for you.       Sincerely,     Michael Gil MD

## 2023-07-14 NOTE — NURSING NOTE
Patient left via wheel chair Laurey Cabot to personal care home. Discharge instructions were discussed and all questions answered.

## 2023-07-14 NOTE — DISCHARGE SUMMARY
[address]  Discharge- Belinda Duvall 1945, 69 y.o. female MRN: [id number]  Unit/Bed#: -01 Encounter: [id number]  Primary Care Provider: Andre Cannon MD   Date and time admitted to hospital: 7/10/2023  1:28 PM    * Rectal bleeding  Assessment & Plan  · Persists -patient reports having had ongoing episodes of mixed blood per rectum -most recent episode earlier this morning  · Acute blood loss anemia -arrival hemoglobin-10.4, lowest hemoglobin 8.5, current hemoglobin back up to 10.2  · Patient is status post a flexible sigmoidoscopy on 7/12/2023- see the full report for details  · Patient is status post a colonoscopy on 7/13/2023- possible Crohn's disease, possible self-limited diverticular bleed  · Case reviewed with GI, no further inpatient testing, treatment, and or work-up is needed  · GI will be following up with the patient in the near future to review biopsy results from the procedure  · Okay for discharge home on all of the preadmit meds at preadmit doses    SIADH (syndrome of inappropriate ADH production) ([address])  Assessment & Plan  · Sodium currently 132  · Outpatient follow-up with nephrology      Acquired hypothyroidism  Assessment & Plan  · Continue levothyroxine post discharge    PAF (paroxysmal atrial fibrillation) ([address])  Assessment & Plan  · Rate controlled with Toprol-XL-continue the same  · Okay for discharge on a baby aspirin as the patient was taking prior to arrival as per GI    Stage 3b chronic kidney disease [facility])  Assessment & Plan  Lab Results   Component Value Date    EGFR 52 07/14/2023    EGFR 71 07/13/2023    EGFR 51 07/12/2023    CREATININE 1.04 07/14/2023    CREATININE 0.80 07/13/2023    CREATININE 1.06 07/12/2023   · Renal function has remained stable  · We will need continued periodic outpatient BMP monitoring    Bilateral lower extremity edema  Assessment & Plan  · Continue Bumex post discharge    Primary hypertension  Assessment & Plan  · Blood pressure stable  · Continue Toprol-XL, continue losartan postdischarge        Discharging Physician / Practitioner: Axel Tripathi MD  PCP: Lucinda Hawk MD  Admission Date:   Admission Orders (From admission, onward)     Ordered        07/11/23 1525  Inpatient Admission  Once            07/10/23 2016  Place in Observation  Once                      Discharge Date: 07/14/23    Medical Problems     Resolved Problems  Date Reviewed: 7/10/2023   None         Consultations During Hospital Stay:  · Gastroenterology    Procedures Performed:   · 7/12/2023-flexible sigmoidoscopy by GI-please see the report for full details  · 7/13/2023-colonoscopy by GI-please see the report for full details    Significant Findings / Test Results:   · CT high-volume bleeding scan abdomen pelvis-No CT evidence of active high volume gastrointestinal hemorrhage. 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 Stable hiatal hernia. Stable cirrhosis. Multiple chronic vertebral body compression deformities for example L4 and L5 with interval development of compression deformity at L1 that is either chronic or subacute. Correlate for focal back pain. Incidental Findings:   · None    Test Results Pending at Discharge (will require follow up): · None     Outpatient Tests Requested:  · None    Complications:    • None    Reason for Admission: GI bleed    Hospital Course:     Brina Fuentes is a 69 y.o. female patient who originally presented to the hospital on 7/10/2023 due to rectal bleeding. Please refer to the initial history and physical examination completed by Chayito Singh PA-C for the initial presenting features and complaints. In brief, the patient is a 69-year-old female who was admitted to the hospital from her local personal care facility for the further evaluation of rectal bleeding. After being admitted to the hospital she had a drop in hemoglobin and hematocrit but never required any blood transfusions.   She was seen in conjunction with GI. On 7/12/2023-a flexible sigmoidoscopy was performed with no clear-cut source of bleeding identified. On 7/13/2023, she had a colonoscopy performed and there was suspicion for both severe diverticulosis, and inflammatory bowel disease. Biopsies were taken. She was deemed medically stable for discharge on 7/14/2023. She will follow-up in the near future in the outpatient setting with her PCP and GI.  GI will be reviewing the biopsy results. No changes were made to any of her preadmission medications, and/or to the preadmission doses. Please see above list of diagnoses and related plan for additional information. Condition at Discharge: good     Discharge Day Visit / Exam:     Subjective: Patient seen, looks and feels well, no new complaints  Vitals: Blood Pressure: (!) 146/43 (07/14/23 0709)  Pulse: 68 (07/14/23 0709)  Temperature: 98 °F (36.7 °C) (07/14/23 0709)  Temp Source: Oral (07/13/23 1615)  Respirations: 16 (07/14/23 0709)  Height: 4' 11" (149.9 cm) (07/13/23 1038)  Weight - Scale: 50.3 kg (111 lb) (07/13/23 1038)  SpO2: 96 % (07/14/23 0709)  Exam:   Physical Exam  Constitutional:       General: She is not in acute distress. Appearance: Normal appearance. She is normal weight. She is not ill-appearing. HENT:      Head: Normocephalic and atraumatic. Nose: Nose normal.      Mouth/Throat:      Mouth: Mucous membranes are moist.   Eyes:      Extraocular Movements: Extraocular movements intact. Pupils: Pupils are equal, round, and reactive to light. Cardiovascular:      Rate and Rhythm: Normal rate and regular rhythm. Pulses: Normal pulses. Heart sounds: Normal heart sounds. No murmur heard. No friction rub. No gallop. Pulmonary:      Effort: Pulmonary effort is normal. No respiratory distress. Breath sounds: Normal breath sounds. No wheezing, rhonchi or rales. Abdominal:      General: There is no distension.       Palpations: Abdomen is soft. There is no mass. Tenderness: There is no abdominal tenderness. Hernia: No hernia is present. Musculoskeletal:         General: No swelling or tenderness. Normal range of motion. Cervical back: Normal range of motion and neck supple. No rigidity. Right lower leg: No edema. Left lower leg: No edema. Skin:     General: Skin is warm. Capillary Refill: Capillary refill takes less than 2 seconds. Findings: No erythema or rash. Neurological:      General: No focal deficit present. Mental Status: She is alert and oriented to person, place, and time. Mental status is at baseline. Cranial Nerves: No cranial nerve deficit. Motor: No weakness. Psychiatric:         Mood and Affect: Mood normal.         Behavior: Behavior normal.         Discussion with Family: Since family was brought up to par by GI    Discharge instructions/Information to patient and family:   See after visit summary for information provided to patient and family. Provisions for Follow-Up Care:  See after visit summary for information related to follow-up care and any pertinent home health orders. Disposition:     Home      Planned Readmission:   • None     Discharge Statement:  I spent 45 minutes discharging the patient. This time was spent on the day of discharge. I had direct contact with the patient on the day of discharge. Greater than 50% of the total time was spent examining patient, answering all patient questions, arranging and discussing plan of care with patient as well as directly providing post-discharge instructions. Additional time then spent on discharge activities. Discharge Medications:  See after visit summary for reconciled discharge medications provided to patient and family.       ** Please Note: This note has been constructed using a voice recognition system **

## 2023-07-14 NOTE — ASSESSMENT & PLAN NOTE
· Persists -patient reports having had ongoing episodes of mixed blood per rectum -most recent episode earlier this morning  · Acute blood loss anemia -arrival hemoglobin-10.4, lowest hemoglobin 8.5, current hemoglobin back up to 10.2  · Patient is status post a flexible sigmoidoscopy on 7/12/2023- see the full report for details  · Patient is status post a colonoscopy on 7/13/2023- possible Crohn's disease, possible self-limited diverticular bleed  · Case reviewed with GI, no further inpatient testing, treatment, and or work-up is needed  · GI will be following up with the patient in the near future to review biopsy results from the procedure  · Okay for discharge home on all of the preadmit meds at preadmit doses

## 2023-07-14 NOTE — PLAN OF CARE
Problem: Potential for Falls  Goal: Patient will remain free of falls  Description: INTERVENTIONS:  - Educate patient/family on patient safety including physical limitations  - Instruct patient to call for assistance with activity   - Consult OT/PT to assist with strengthening/mobility   - Keep Call bell within reach  - Keep bed low and locked with side rails adjusted as appropriate  - Keep care items and personal belongings within reach  - Initiate and maintain comfort rounds  - Make Fall Risk Sign visible to staff  - Offer Toileting in advance of need  - Initiate/Maintain bed alarm  - Obtain necessary fall risk management equipment  - Apply yellow socks and bracelet for high fall risk patients  - Consider moving patient to room near nurses station  Outcome: Progressing     Problem: MOBILITY - ADULT  Goal: Maintain or return to baseline ADL function  Description: INTERVENTIONS:  -  Assess patient's ability to carry out ADLs; assess patient's baseline for ADL function and identify physical deficits which impact ability to perform ADLs (bathing, care of mouth/teeth, toileting, grooming, dressing, etc.)  - Assess/evaluate cause of self-care deficits   - Assess range of motion  - Assess patient's mobility; develop plan if impaired  - Assess patient's need for assistive devices and provide as appropriate  - Encourage maximum independence but intervene and supervise when necessary  - Involve family in performance of ADLs  - Assess for home care needs following discharge   - Consider OT consult to assist with ADL evaluation and planning for discharge  - Provide patient education as appropriate  Outcome: Progressing  Goal: Maintains/Returns to pre admission functional level  Description: INTERVENTIONS:  - Perform BMAT or MOVE assessment daily.   - Set and communicate daily mobility goal to care team and patient/family/caregiver.    - Collaborate with rehabilitation services on mobility goals if consulted  - Out of bed for toileting  - Record patient progress and toleration of activity level   Outcome: Progressing     Problem: Nutrition/Hydration-ADULT  Goal: Nutrient/Hydration intake appropriate for improving, restoring or maintaining nutritional needs  Description: Monitor and assess patient's nutrition/hydration status for malnutrition. Collaborate with interdisciplinary team and initiate plan and interventions as ordered. Monitor patient's weight and dietary intake as ordered or per policy. Utilize nutrition screening tool and intervene as necessary. Determine patient's food preferences and provide high-protein, high-caloric foods as appropriate.      INTERVENTIONS:  - Monitor oral intake, urinary output, labs, and treatment plans  - Assess nutrition and hydration status and recommend course of action  - Evaluate amount of meals eaten  - Assist patient with eating if necessary   - Allow adequate time for meals  - Recommend/ encourage appropriate diets, oral nutritional supplements, and vitamin/mineral supplements  - Order, calculate, and assess calorie counts as needed  - Recommend, monitor, and adjust tube feedings and TPN/PPN based on assessed needs  - Assess need for intravenous fluids  - Provide specific nutrition/hydration education as appropriate  - Include patient/family/caregiver in decisions related to nutrition  Outcome: Progressing     Problem: GASTROINTESTINAL - ADULT  Goal: Minimal or absence of nausea and/or vomiting  Description: INTERVENTIONS:  - Administer IV fluids if ordered to ensure adequate hydration  - Maintain NPO status until nausea and vomiting are resolved  - Nasogastric tube if ordered  - Administer ordered antiemetic medications as needed  - Provide nonpharmacologic comfort measures as appropriate  - Advance diet as tolerated, if ordered  - Consider nutrition services referral to assist patient with adequate nutrition and appropriate food choices  Outcome: Progressing  Goal: Maintains or returns to baseline bowel function  Description: INTERVENTIONS:  - Assess bowel function  - Encourage oral fluids to ensure adequate hydration  - Administer IV fluids if ordered to ensure adequate hydration  - Administer ordered medications as needed  - Encourage mobilization and activity  - Consider nutritional services referral to assist patient with adequate nutrition and appropriate food choices  Outcome: Progressing  Goal: Maintains adequate nutritional intake  Description: INTERVENTIONS:  - Monitor percentage of each meal consumed  - Identify factors contributing to decreased intake, treat as appropriate  - Assist with meals as needed  - Monitor I&O, weight, and lab values if indicated  - Obtain nutrition services referral as needed  Outcome: Progressing     Problem: PAIN - ADULT  Goal: Verbalizes/displays adequate comfort level or baseline comfort level  Description: Interventions:  - Encourage patient to monitor pain and request assistance  - Assess pain using appropriate pain scale  - Administer analgesics based on type and severity of pain and evaluate response  - Implement non-pharmacological measures as appropriate and evaluate response  - Consider cultural and social influences on pain and pain management  - Notify physician/advanced practitioner if interventions unsuccessful or patient reports new pain  Outcome: Progressing     Problem: INFECTION - ADULT  Goal: Absence or prevention of progression during hospitalization  Description: INTERVENTIONS:  - Assess and monitor for signs and symptoms of infection  - Monitor lab/diagnostic results  - Monitor all insertion sites, i.e. indwelling lines, tubes, and drains  - Monitor endotracheal if appropriate and nasal secretions for changes in amount and color  - [place] appropriate cooling/warming therapies per order  - Administer medications as ordered  - Instruct and encourage patient and family to use good hand hygiene technique  - Identify and instruct in appropriate isolation precautions for identified infection/condition  Outcome: Progressing     Problem: DISCHARGE PLANNING  Goal: Discharge to home or other facility with appropriate resources  Description: INTERVENTIONS:  - Identify barriers to discharge w/patient and caregiver  - Arrange for needed discharge resources and transportation as appropriate  - Identify discharge learning needs (meds, wound care, etc.)  - Refer to Case Management Department for coordinating discharge planning if the patient needs post-hospital services based on physician/advanced practitioner order or complex needs related to functional status, cognitive ability, or social support system  Outcome: Progressing     Problem: Knowledge Deficit  Goal: Patient/family/caregiver demonstrates understanding of disease process, treatment plan, medications, and discharge instructions  Description: Complete learning assessment and assess knowledge base.   Interventions:  - Provide teaching at level of understanding  - Provide teaching via preferred learning methods  Outcome: Progressing     Problem: HEMATOLOGIC - ADULT  Goal: Maintains hematologic stability  Description: INTERVENTIONS  - Assess for signs and symptoms of bleeding or hemorrhage  - Monitor labs  - Administer supportive blood products/factors as ordered and appropriate  Outcome: Progressing

## 2023-07-14 NOTE — ASSESSMENT & PLAN NOTE
Lab Results   Component Value Date    EGFR 52 07/14/2023    EGFR 71 07/13/2023    EGFR 51 07/12/2023    CREATININE 1.04 07/14/2023    CREATININE 0.80 07/13/2023    CREATININE 1.06 07/12/2023   · Renal function has remained stable  · We will need continued periodic outpatient BMP monitoring

## 2023-07-14 NOTE — PLAN OF CARE
Problem: MOBILITY - ADULT  Goal: Maintain or return to baseline ADL function  Description: INTERVENTIONS:  -  Assess patient's ability to carry out ADLs; assess patient's baseline for ADL function and identify physical deficits which impact ability to perform ADLs (bathing, care of mouth/teeth, toileting, grooming, dressing, etc.)  - Assess/evaluate cause of self-care deficits   - Assess range of motion  - Assess patient's mobility; develop plan if impaired  - Assess patient's need for assistive devices and provide as appropriate  - Encourage maximum independence but intervene and supervise when necessary  - Involve family in performance of ADLs  - Assess for home care needs following discharge   - Consider OT consult to assist with ADL evaluation and planning for discharge  - Provide patient education as appropriate  Outcome: Progressing  Patient washed self up for the day and walks to the bathroom

## 2023-07-24 PROCEDURE — 88305 TISSUE EXAM BY PATHOLOGIST: CPT | Performed by: STUDENT IN AN ORGANIZED HEALTH CARE EDUCATION/TRAINING PROGRAM

## 2023-07-24 PROCEDURE — 88342 IMHCHEM/IMCYTCHM 1ST ANTB: CPT | Performed by: STUDENT IN AN ORGANIZED HEALTH CARE EDUCATION/TRAINING PROGRAM

## 2023-07-25 NOTE — RESULT ENCOUNTER NOTE
I called her with her results. She is currently on budesonide for possible Crohn's disease but these biopsies seem more consistent with ischemic colitis. We will continue budesonide in case this is Crohn's disease and we will repeat the colonoscopy in 6 months to document complete healing. If this is ischemic colitis I expect it to heal on its own. If it is Crohn's disease and has not healed after 6 months of budesonide then we will try another medication.

## 2023-07-28 ENCOUNTER — APPOINTMENT (EMERGENCY)
Dept: CT IMAGING | Facility: HOSPITAL | Age: 78
End: 2023-07-28
Payer: MEDICARE

## 2023-07-28 ENCOUNTER — HOSPITAL ENCOUNTER (INPATIENT)
Facility: HOSPITAL | Age: 78
LOS: 3 days | Discharge: NURSING, REHAB OR LONG-TERM CARE | End: 2023-08-01
Attending: EMERGENCY MEDICINE | Admitting: HOSPITALIST
Payer: MEDICARE

## 2023-07-28 DIAGNOSIS — K50.10 CROHN'S COLITIS (HCC): ICD-10-CM

## 2023-07-28 DIAGNOSIS — K62.5 BRBPR (BRIGHT RED BLOOD PER RECTUM): ICD-10-CM

## 2023-07-28 DIAGNOSIS — K92.1 MELENA: ICD-10-CM

## 2023-07-28 DIAGNOSIS — K92.2 GI BLEED: Primary | ICD-10-CM

## 2023-07-28 PROBLEM — D69.6 THROMBOCYTOPENIA (HCC): Status: RESOLVED | Noted: 2023-05-19 | Resolved: 2023-07-28

## 2023-07-28 PROBLEM — I10 PRIMARY HYPERTENSION: Chronic | Status: ACTIVE | Noted: 2023-04-18

## 2023-07-28 LAB
ABO GROUP BLD: NORMAL
ALBUMIN SERPL BCP-MCNC: 2.9 G/DL (ref 3.5–5)
ALP SERPL-CCNC: 250 U/L (ref 34–104)
ALT SERPL W P-5'-P-CCNC: 25 U/L (ref 7–52)
ANION GAP SERPL CALCULATED.3IONS-SCNC: 9 MMOL/L
APTT PPP: 35 SECONDS (ref 23–37)
AST SERPL W P-5'-P-CCNC: 33 U/L (ref 13–39)
BASOPHILS # BLD AUTO: 0.02 THOUSANDS/ÂΜL (ref 0–0.1)
BASOPHILS NFR BLD AUTO: 0 % (ref 0–1)
BILIRUB SERPL-MCNC: 0.91 MG/DL (ref 0.2–1)
BLD GP AB SCN SERPL QL: NEGATIVE
BUN SERPL-MCNC: 42 MG/DL (ref 5–25)
CALCIUM ALBUM COR SERPL-MCNC: 9.3 MG/DL (ref 8.3–10.1)
CALCIUM SERPL-MCNC: 8.4 MG/DL (ref 8.4–10.2)
CHLORIDE SERPL-SCNC: 98 MMOL/L (ref 96–108)
CO2 SERPL-SCNC: 28 MMOL/L (ref 21–32)
CREAT SERPL-MCNC: 1.33 MG/DL (ref 0.6–1.3)
EOSINOPHIL # BLD AUTO: 0.02 THOUSAND/ÂΜL (ref 0–0.61)
EOSINOPHIL NFR BLD AUTO: 0 % (ref 0–6)
ERYTHROCYTE [DISTWIDTH] IN BLOOD BY AUTOMATED COUNT: 13.6 % (ref 11.6–15.1)
GFR SERPL CREATININE-BSD FRML MDRD: 38 ML/MIN/1.73SQ M
GLUCOSE SERPL-MCNC: 108 MG/DL (ref 65–140)
HCT VFR BLD AUTO: 30.7 % (ref 34.8–46.1)
HGB BLD-MCNC: 10.2 G/DL (ref 11.5–15.4)
IMM GRANULOCYTES # BLD AUTO: 0.09 THOUSAND/UL (ref 0–0.2)
IMM GRANULOCYTES NFR BLD AUTO: 1 % (ref 0–2)
INR PPP: 1.1 (ref 0.84–1.19)
LYMPHOCYTES # BLD AUTO: 0.88 THOUSANDS/ÂΜL (ref 0.6–4.47)
LYMPHOCYTES NFR BLD AUTO: 7 % (ref 14–44)
MCH RBC QN AUTO: 34.3 PG (ref 26.8–34.3)
MCHC RBC AUTO-ENTMCNC: 33.2 G/DL (ref 31.4–37.4)
MCV RBC AUTO: 103 FL (ref 82–98)
MONOCYTES # BLD AUTO: 1.44 THOUSAND/ÂΜL (ref 0.17–1.22)
MONOCYTES NFR BLD AUTO: 11 % (ref 4–12)
NEUTROPHILS # BLD AUTO: 10.77 THOUSANDS/ÂΜL (ref 1.85–7.62)
NEUTS SEG NFR BLD AUTO: 81 % (ref 43–75)
NRBC BLD AUTO-RTO: 0 /100 WBCS
PLATELET # BLD AUTO: 150 THOUSANDS/UL (ref 149–390)
PMV BLD AUTO: 10.1 FL (ref 8.9–12.7)
POTASSIUM SERPL-SCNC: 4.3 MMOL/L (ref 3.5–5.3)
PROT SERPL-MCNC: 6.5 G/DL (ref 6.4–8.4)
PROTHROMBIN TIME: 14.2 SECONDS (ref 11.6–14.5)
RBC # BLD AUTO: 2.97 MILLION/UL (ref 3.81–5.12)
RH BLD: POSITIVE
SODIUM SERPL-SCNC: 135 MMOL/L (ref 135–147)
SPECIMEN EXPIRATION DATE: NORMAL
WBC # BLD AUTO: 13.22 THOUSAND/UL (ref 4.31–10.16)

## 2023-07-28 PROCEDURE — 85730 THROMBOPLASTIN TIME PARTIAL: CPT | Performed by: EMERGENCY MEDICINE

## 2023-07-28 PROCEDURE — 99223 1ST HOSP IP/OBS HIGH 75: CPT | Performed by: PHYSICIAN ASSISTANT

## 2023-07-28 PROCEDURE — 86900 BLOOD TYPING SEROLOGIC ABO: CPT | Performed by: EMERGENCY MEDICINE

## 2023-07-28 PROCEDURE — 74177 CT ABD & PELVIS W/CONTRAST: CPT

## 2023-07-28 PROCEDURE — 99284 EMERGENCY DEPT VISIT MOD MDM: CPT

## 2023-07-28 PROCEDURE — 85025 COMPLETE CBC W/AUTO DIFF WBC: CPT | Performed by: EMERGENCY MEDICINE

## 2023-07-28 PROCEDURE — 86850 RBC ANTIBODY SCREEN: CPT | Performed by: EMERGENCY MEDICINE

## 2023-07-28 PROCEDURE — 99285 EMERGENCY DEPT VISIT HI MDM: CPT | Performed by: EMERGENCY MEDICINE

## 2023-07-28 PROCEDURE — 82272 OCCULT BLD FECES 1-3 TESTS: CPT

## 2023-07-28 PROCEDURE — 85610 PROTHROMBIN TIME: CPT | Performed by: EMERGENCY MEDICINE

## 2023-07-28 PROCEDURE — G1004 CDSM NDSC: HCPCS

## 2023-07-28 PROCEDURE — 36415 COLL VENOUS BLD VENIPUNCTURE: CPT | Performed by: EMERGENCY MEDICINE

## 2023-07-28 PROCEDURE — 80053 COMPREHEN METABOLIC PANEL: CPT | Performed by: EMERGENCY MEDICINE

## 2023-07-28 PROCEDURE — 86901 BLOOD TYPING SEROLOGIC RH(D): CPT | Performed by: EMERGENCY MEDICINE

## 2023-07-28 RX ORDER — ACETAMINOPHEN 325 MG/1
650 TABLET ORAL EVERY 4 HOURS PRN
Status: DISCONTINUED | OUTPATIENT
Start: 2023-07-28 | End: 2023-08-01 | Stop reason: HOSPADM

## 2023-07-28 RX ORDER — METOPROLOL SUCCINATE 25 MG/1
25 TABLET, EXTENDED RELEASE ORAL 2 TIMES DAILY
Status: DISCONTINUED | OUTPATIENT
Start: 2023-07-28 | End: 2023-08-01 | Stop reason: HOSPADM

## 2023-07-28 RX ORDER — LOSARTAN POTASSIUM 25 MG/1
25 TABLET ORAL DAILY
Status: DISCONTINUED | OUTPATIENT
Start: 2023-07-29 | End: 2023-08-01 | Stop reason: HOSPADM

## 2023-07-28 RX ORDER — TRAZODONE HYDROCHLORIDE 100 MG/1
100 TABLET ORAL
Status: DISCONTINUED | OUTPATIENT
Start: 2023-07-28 | End: 2023-08-01 | Stop reason: HOSPADM

## 2023-07-28 RX ORDER — ACETAMINOPHEN 325 MG/1
650 TABLET ORAL ONCE
Status: COMPLETED | OUTPATIENT
Start: 2023-07-28 | End: 2023-07-28

## 2023-07-28 RX ORDER — MINERAL OIL AND PETROLATUM 150; 830 MG/G; MG/G
1 OINTMENT OPHTHALMIC 2 TIMES DAILY
Status: DISCONTINUED | OUTPATIENT
Start: 2023-07-28 | End: 2023-08-01 | Stop reason: HOSPADM

## 2023-07-28 RX ORDER — LEVOTHYROXINE SODIUM 0.07 MG/1
75 TABLET ORAL DAILY
Status: DISCONTINUED | OUTPATIENT
Start: 2023-07-29 | End: 2023-08-01 | Stop reason: HOSPADM

## 2023-07-28 RX ORDER — SACCHAROMYCES BOULARDII 250 MG
250 CAPSULE ORAL 2 TIMES DAILY
Status: DISCONTINUED | OUTPATIENT
Start: 2023-07-28 | End: 2023-08-01 | Stop reason: HOSPADM

## 2023-07-28 RX ORDER — PANTOPRAZOLE SODIUM 40 MG/1
40 TABLET, DELAYED RELEASE ORAL
Status: DISCONTINUED | OUTPATIENT
Start: 2023-07-29 | End: 2023-08-01 | Stop reason: HOSPADM

## 2023-07-28 RX ORDER — MIDODRINE HYDROCHLORIDE 5 MG/1
2.5 TABLET ORAL
Status: DISCONTINUED | OUTPATIENT
Start: 2023-07-29 | End: 2023-08-01 | Stop reason: HOSPADM

## 2023-07-28 RX ORDER — ONDANSETRON 2 MG/ML
4 INJECTION INTRAMUSCULAR; INTRAVENOUS EVERY 6 HOURS PRN
Status: DISCONTINUED | OUTPATIENT
Start: 2023-07-28 | End: 2023-08-01 | Stop reason: HOSPADM

## 2023-07-28 RX ADMIN — IOHEXOL 100 ML: 350 INJECTION, SOLUTION INTRAVENOUS at 17:37

## 2023-07-28 RX ADMIN — TRAZODONE HYDROCHLORIDE 100 MG: 100 TABLET ORAL at 22:09

## 2023-07-28 RX ADMIN — METOPROLOL SUCCINATE 25 MG: 50 TABLET, EXTENDED RELEASE ORAL at 22:07

## 2023-07-28 RX ADMIN — ACETAMINOPHEN 650 MG: 325 TABLET ORAL at 18:53

## 2023-07-28 RX ADMIN — WHITE PETROLATUM 57.7 %-MINERAL OIL 31.9 % EYE OINTMENT 1 APPLICATION: at 22:09

## 2023-07-28 RX ADMIN — Medication 250 MG: at 22:09

## 2023-07-28 NOTE — ED PROVIDER NOTES
History  Chief Complaint   Patient presents with   • Black or Bloody Stool     Patient comes in with black/bloody stool for one day. Patient had a recent admission for a similar issue. HPI    This is a very pleasant, sick appearing, 60-year-old female presents via EMS with a chief complaint of dark bloody stool x1 day. The chart note the patient was discharged on the 14th of this month for 4-day admission and secondary to rectal bleeding. Patient has a past medical history paroxysmal atrial fibrillation, acquired hypothyroidism, SIADH, stage IIIb chronic kidney disease, lower extremity edema, primary hypertension that last admission patient had acute blood loss anemia with arrival hemoglobin 10.1 lowest hemoglobin during the admission was 8.5. Status post flexible sigmoidoscopy on the 12th with status post colonoscopy on the 13th this month possible Crohn's disease  Prior to Admission Medications   Prescriptions Last Dose Informant Patient Reported? Taking?    BUDESONIDE PO   Yes No   Sig: Take 9 mg by mouth daily EC 3 mg cap 3 caps orally q am   acetaminophen (TYLENOL) 650 mg CR tablet  Care Giver Yes No   Sig: Take by mouth   artificial tear (LUBRIFRESH P.M.) 83-15 % ophthalmic ointment   Yes No   Sig: Administer 1 Application to both eyes 2 (two) times a day   aspirin (ECOTRIN LOW STRENGTH) 81 mg EC tablet  Care Giver Yes No   Sig: Take 81 mg by mouth daily   bumetanide (BUMEX) 2 mg tablet  Care Giver No No   Sig: Take 1 tablet (2 mg total) by mouth daily Do not start before May 24, 2023.   bumetanide (BUMEX) 2 mg tablet   Yes No   Sig: Take 2 mg by mouth daily   levothyroxine 75 mcg tablet  Care Giver Yes No   Sig: Take 75 mcg by mouth daily   losartan (COZAAR) 25 mg tablet   Yes No   Sig: Take 25 mg by mouth daily Losartan POT 25 mg q am   metoprolol succinate (TOPROL-XL) 25 mg 24 hr tablet  Care Giver Yes No   Sig: Take 25 mg by mouth 2 (two) times a day   midodrine (PROAMATINE) 2.5 mg tablet  Care Giver No No   Sig: Take 1 tablet (2.5 mg total) by mouth 3 (three) times a day before meals   omeprazole (PriLOSEC) 40 MG capsule  Care Giver Yes No   Sig: Take 40 mg by mouth daily   ondansetron (ZOFRAN) 8 mg tablet  Care Giver Yes No   Sig: As needed   potassium chloride (K-DUR,KLOR-CON) 20 mEq tablet  Care Giver No No   Sig: Take 1 tablet (20 mEq total) by mouth daily Do not start before May 24, 2023. saccharomyces boulardii (FLORASTOR) 250 mg capsule  Care Giver Yes No   Sig: Take 250 mg by mouth 2 (two) times a day   traZODone (DESYREL) 50 mg tablet  Care Giver Yes No   Sig: Take 100 mg by mouth daily at bedtime      Facility-Administered Medications: None       Past Medical History:   Diagnosis Date   • Anemia    • Atrial fibrillation (HCC)    • Depression    • GI (gastrointestinal bleed)    • Hypomagnesemia 2023   • Ischemic colitis [facility])        Past Surgical History:   Procedure Laterality Date   • APPENDECTOMY     • CARDIAC SURGERY     • CHOLECYSTECTOMY     • HIP SURGERY Right     Partial replacement   • HYSTERECTOMY     • IR BIOPSY TRANSJUGULAR LIVER  2023       Family History   Problem Relation Age of Onset   • No Known Problems Mother      I have reviewed and agree with the history as documented. E-Cigarette/Vaping   • E-Cigarette Use Never User      E-Cigarette/Vaping Substances   • Nicotine No    • THC No    • CBD No    • Flavoring No    • Other No    • Unknown No      Social History     Tobacco Use   • Smoking status: Former     Packs/day: 0.50     Types: Cigarettes     Quit date: 1993     Years since quittin.2   • Smokeless tobacco: Never   Vaping Use   • Vaping Use: Never used   Substance Use Topics   • Alcohol use: Never   • Drug use: Never       Review of Systems    Physical Exam  Physical Exam  Vitals and nursing note reviewed. Constitutional:       Appearance: Normal appearance. She is normal weight. HENT:      Head: Normocephalic and atraumatic.       Right Ear: External ear normal.      Left Ear: External ear normal.      Nose: Nose normal.      Mouth/Throat:      Mouth: Mucous membranes are moist.      Pharynx: Oropharynx is clear. Eyes:      Extraocular Movements: Extraocular movements intact. Conjunctiva/sclera: Conjunctivae normal.      Pupils: Pupils are equal, round, and reactive to light. Cardiovascular:      Rate and Rhythm: Normal rate and regular rhythm. Pulses: Normal pulses. Heart sounds: Normal heart sounds. Pulmonary:      Effort: Pulmonary effort is normal.      Breath sounds: Normal breath sounds. Abdominal:      General: Abdomen is flat. Bowel sounds are normal.      Palpations: Abdomen is soft. Genitourinary:     Rectum: Guaiac result positive. Musculoskeletal:         General: Normal range of motion. Cervical back: Normal range of motion. Skin:     General: Skin is warm. Capillary Refill: Capillary refill takes less than 2 seconds. Neurological:      General: No focal deficit present. Mental Status: She is alert and oriented to person, place, and time. Mental status is at baseline. Psychiatric:         Mood and Affect: Mood normal.         Behavior: Behavior normal.         Thought Content:  Thought content normal.         Judgment: Judgment normal.         Vital Signs  ED Triage Vitals   Temperature Pulse Respirations Blood Pressure SpO2   07/28/23 1551 07/28/23 1550 07/28/23 1550 07/28/23 1550 07/28/23 1550   98.9 °F (37.2 °C) 80 16 132/58 95 %      Temp Source Heart Rate Source Patient Position - Orthostatic VS BP Location FiO2 (%)   07/28/23 1551 07/28/23 1550 -- -- --   Oral Monitor         Pain Score       07/28/23 1550       10 - Worst Possible Pain           Vitals:    07/28/23 1550 07/28/23 1855 07/28/23 1900   BP: 132/58 (!) 186/76 (!) 186/76   Pulse: 80 78 82         Visual Acuity      ED Medications  Medications   iohexol (OMNIPAQUE) 350 MG/ML injection (SINGLE-DOSE) 100 mL (100 mL Intravenous Given 7/28/23 1737)   acetaminophen (TYLENOL) tablet 650 mg (650 mg Oral Given 7/28/23 1853)       Diagnostic Studies  Results Reviewed     Procedure Component Value Units Date/Time    Comprehensive metabolic panel [[id number]]  (Abnormal) Collected: 07/28/23 1649    Lab Status: Final result Specimen: Blood from Arm, Right Updated: 07/28/23 1713     Sodium 135 mmol/L      Potassium 4.3 mmol/L      Chloride 98 mmol/L      CO2 28 mmol/L      ANION GAP 9 mmol/L      BUN 42 mg/dL      Creatinine 1.33 mg/dL      Glucose 108 mg/dL      Calcium 8.4 mg/dL      Corrected Calcium 9.3 mg/dL      AST 33 U/L      ALT 25 U/L      Alkaline Phosphatase 250 U/L      Total Protein 6.5 g/dL      Albumin 2.9 g/dL      Total Bilirubin 0.91 mg/dL      eGFR 38 ml/min/1.73sq m     Narrative:      [facility] (CKD):   •  Stage 1 with normal or high GFR (GFR > 90 mL/min/1.73 square meters)  •  Stage 2 Mild CKD (GFR = 60-89 mL/min/1.73 square meters)  •  Stage 3A Moderate CKD (GFR = 45-59 mL/min/1.73 square meters)  •  Stage 3B Moderate CKD (GFR = 30-44 mL/min/1.73 square meters)  •  Stage 4 Severe CKD (GFR = 15-29 mL/min/1.73 square meters)  •  Stage 5 End Stage CKD (GFR <15 mL/min/1.73 square meters)  Note: GFR calculation is accurate only with a steady state creatinine    Protime-INR [[id number]]  (Normal) Collected: 07/28/23 1649    Lab Status: Final result Specimen: Blood from Arm, Right Updated: 07/28/23 1712     Protime 14.2 seconds      INR 1.10    APTT [[id number]]  (Normal) Collected: 07/28/23 1649    Lab Status: Final result Specimen: Blood from Arm, Right Updated: 07/28/23 1712     PTT 35 seconds     CBC and differential [[id number]]  (Abnormal) Collected: 07/28/23 1649    Lab Status: Final result Specimen: Blood from Arm, Right Updated: 07/28/23 1655     WBC 13.22 Thousand/uL      RBC 2.97 Million/uL      Hemoglobin 10.2 g/dL      Hematocrit 30.7 %       fL      MCH 34.3 pg      MCHC 33.2 g/dL      RDW 13.6 %      MPV 10.1 fL      Platelets 525 Thousands/uL      nRBC 0 /100 WBCs      Neutrophils Relative 81 %      Immat GRANS % 1 %      Lymphocytes Relative 7 %      Monocytes Relative 11 %      Eosinophils Relative 0 %      Basophils Relative 0 %      Neutrophils Absolute 10.77 Thousands/µL      Immature Grans Absolute 0.09 Thousand/uL      Lymphocytes Absolute 0.88 Thousands/µL      Monocytes Absolute 1.44 Thousand/µL      Eosinophils Absolute 0.02 Thousand/µL      Basophils Absolute 0.02 Thousands/µL                  CT abdomen pelvis with contrast   Final Result by Rodolfo Milligan MD (07/28 1833)         1. Gastric wall thickening with hyperemia which could be related to portal hypertension or underlying gastritis of other etiologies. 2. No inflammatory changes or bowel obstruction. 3. Cirrhosis. 4. 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 The study was marked in [facility] for immediate notification. .         Workstation performed: [id number]                    Procedures  Procedures         ED Course  ED Course as of 07/28/23 1949 Fri Jul 28, 2023   1559 Patient is seen and evaluated, orders placed, prior history of blood loss anemia with recent colonoscopy and sigmoidoscopy last admission this calendar month, was feeling well after discharge started yesterday with left-sided lower abdominal pain dysuria and 1 bright red blood per rectum bowel movement. Differential diagnosis in this patient is as follows Crohn's flare (mentioned in ED summary) vs. diverticulitis versus diverticular bleed. Plan: Baseline labs, CT of the abdomen pelvis. 1657 Patient is a 13.22 white count but hemoglobin hematocrit are essentially unchanged from 2 weeks ago. 1715 Noted creatinine 1.33, weeks ago 1.04 as baseline   1910 Melena noted on exam: H/H stable, GI contacted via tiger text. 2141 Case reviewed with Dr. Vivien Higgins GI on call for this campus, reviewed [facility] text noted that he felt that she may be hemoconcentrated based on her labs, I think it is reasonable for an observation admission and serial hemoglobin hematocrit checks. Consider patient to be seen in the morning by his service determine if the patient needs a repeat colonoscopy upon this admission, felt that the patient's Crohn's disease may actually be coming ischemic colitis based on the pathological reports. Recommended supportive care and monitoring for now. 8523 Reviewed with patient and she is aware that she may need to stay in the hospital for H&H, patient appears to be hemoconcentrated with elevated creatinine of 1.33, contact hospital service for admission. SBIRT 22yo+    Flowsheet Row Most Recent Value   Initial Alcohol Screen: US AUDIT-C     1. How often do you have a drink containing alcohol? 0 Filed at: 07/28/2023 1549   2. How many drinks containing alcohol do you have on a typical day you are drinking? 0 Filed at: 07/28/2023 1549   3a. Male UNDER 65: How often do you have five or more drinks on one occasion? 0 Filed at: 07/28/2023 1549   3b. FEMALE Any Age, or MALE 65+: How often do you have 4 or more drinks on one occassion? 0 Filed at: 07/28/2023 1549   Audit-C Score 0 Filed at: 07/28/2023 1549   ADAL: How many times in the past year have you. .. Used an illegal drug or used a prescription medication for non-medical reasons?  Never Filed at: 07/28/2023 1549                    Medical Decision Making  This is a very pleasant 52-year-old female presents emergency department with a possible early onset Crohn's based on pathology from a prior colonoscopy after discussion with GI, presents with melanotic stool, heme positive stool exam, stable H&H, recently was just admitted to the hospital for acute blood loss anemia, H&H stable, due to the colonoscopy results done earlier this month GI suggest the patient be admitted for observation possible repeat colonoscopy, concerned about colitis related to Crohn's disease. Admit for observation for serial H/H. Portions of the record may have been created with voice recognition software. Occasional wrong word or "sound a like" substitutions may have occurred due to the inherent limitations of voice recognition software. Read the chart carefully and recognize, using context, where substitutions have occurred. Amount and/or Complexity of Data Reviewed  Labs: ordered. Radiology: ordered. Risk  OTC drugs. Prescription drug management. Decision regarding hospitalization. Disposition  Final diagnoses:   GI bleed   Melena   Crohn's colitis ([address])     Time reflects when diagnosis was documented in both MDM as applicable and the Disposition within this note     Time User Action Codes Description Comment    7/28/2023  7:08 PM Jennifer East [K92.2] GI bleed     7/28/2023  7:46 PM Murphy Fleming [K92.1] Melena     7/28/2023  7:46 PM Jennifer East [K50.10] Crohn's colitis [facility])       ED Disposition     ED Disposition   Admit    Condition   Stable    Date/Time   Fri Jul 28, 2023  7:47 PM    Comment   Case was discussed with JAZMYNE Anderson PA-C  and the patient's admission status was agreed to be Admission Status: observation status to the service of Dr. Anh Alan . Follow-up Information    None         Patient's Medications   Discharge Prescriptions    No medications on file       No discharge procedures on file.     PDMP Review     None          ED Provider  Electronically Signed by           Gaye Bazzi III, DO  07/28/23 1944

## 2023-07-28 NOTE — LETTER
August 1, 2023     [facility]    Patient: Ana Cristina Valdez   YOB: 1945   Date of Visit: 7/28/2023       Dear  [facility]:    Thank you for referring Abbie Perez to me for evaluation. Below are my notes for this consultation. If you have questions, please do not hesitate to call me. I look forward to following your patient along with you. Sincerely,        No name on file        CC: No Recipients    Perlie Brittle, [address] [place] [address]  7/31/2023  3:04 PM  Attested  [address]  Progress Note  Name: Zack Dowell  MRN: [id number]  Unit/Bed#: -01 I Date of Admission: 7/28/2023   Date of Service: 7/31/2023 I Hospital Day: 2    Assessment/Plan   * BRBPR (bright red blood per rectum)  Assessment & Plan  Patient presented to the ED secondary to large bloody bowel movement at her assisted living facility. She was recently hospitalized for GI bleed and had a flex sigmoidoscopy on July 12 and colonoscopy on July 13. Possible Crohn's disease. CT with no acute findings. ED reviewed w/ GI and may need repeat colonoscopy  Hemoglobin 10.2 on arrival  CT: Gastric wall thickening with hyperemia which could be related to portal hypertension or underlying gastritis of other etiologies. No inflammatory changes or bowel obstruction. Cirrhosis. 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 The study was marked in [facility] for immediate notification. Patient had normal bowel movement this morning, denied bleeding  GI following, appreciate recommendations. Will increase to cardiac diet as recommended by GI and monitor  H/H remains stable  Continue Protonix  CBC a.m. Planning discharge to [facility] if tolerating diet and no further bleeding in a.m.       Stage 3b chronic kidney disease [facility])  Assessment & Plan  Lab Results   Component Value Date    EGFR 45 07/30/2023    EGFR 45 07/29/2023    EGFR 38 07/28/2023 CREATININE 1.16 07/30/2023    CREATININE 1.16 07/29/2023    CREATININE 1.33 (H) 07/28/2023   Creatinine on arrival 1.33  Patient's baseline creatinine appears to be around 1.2  Currently better than baseline  Continue to avoid nephrotoxins and hypotension  BMP a.m. Primary hypertension  Assessment & Plan  BP controlled on current regimen  Continue home losartan and metoprolol   Continue to monitor BP per protocol    PAF (paroxysmal atrial fibrillation) ([facility])  Assessment & Plan  Currently rate controlled   Continue metoprolol with hold parameters   Patient is typically on aspirin, will hold for now    SIADH (syndrome of inappropriate ADH production) ([facility])  Assessment & Plan  Sodium 135 on arrival  Serum sodium 133, improved from yesterday  Added home Bumex to regimen yesterday, as well as developing lower extremity edema and blood pressure improved  Appears euvolemic on exam  Continue to monitor intake and output  Daily weight  BMP a.m. Acquired hypothyroidism  Assessment & Plan  Continue levothyroxine    Bilateral lower extremity edema  Assessment & Plan  Appears euvolemic on exam  Trace edema lower extremities, improved since home Bumex initiated yesterday  Continue Bumex  Monitor intake and output  Daily weights  Encourage elevation of legs when out of bed in chair    Chronic low back pain  Assessment & Plan  No complaints of back pain today  Continue pain regimen  Added aqua K-pad  Monitor effectiveness of pain regimen          VTE Pharmacologic Prophylaxis:   Pharmacologic: Pharmacologic VTE Prophylaxis contraindicated due to Presented with bright red bleeding from rectum  Mechanical VTE Prophylaxis in Place: Yes    Patient Centered Rounds: I have performed bedside rounds with nursing staff today.     Discussions with Specialists or Other Care Team Provider: GI, nursing, case management    Education and Discussions with Family / Patient: Treatment plan discussed with patient who understands the plan as has been explained and is agreeable to the plan as stated. All questions answered to satisfaction. Time Spent for Care: 40 minutes. More than 50% of total time spent on counseling and coordination of care as described above. Current Length of Stay: 2 day(s)    Current Patient Status: Inpatient   Certification Statement: The patient will continue to require additional inpatient hospital stay due to Monitoring for bleeding, increased diet today per GI we will monitor tolerance to diet, repeat labs in a.m. Discharge Plan: Patient had normal bowel movement today without bleeding. will increase diet as recommended by GI. Monitor tolerance to diet. Monitor for further bleeding. Hopeful discharge to personal-care home tomorrow pending a.m. labs and tolerance to diet. GI is planning outpatient colonoscopy in 6 months at this point unless there is further bleeding. Code Status: Level 3 - DNAR and DNI      Subjective:   Out of bed in chair resting comfortably. States she is hoping to go home soon. She is starting get hungry. She denies any abdominal pain or discomfort. States she had a bowel movement this morning without bleeding. Objective:     Vitals:   Temp (24hrs), Av.6 °F (36.4 °C), Min:97.5 °F (36.4 °C), Max:97.7 °F (36.5 °C)    Temp:  [97.5 °F (36.4 °C)-97.7 °F (36.5 °C)] 97.6 °F (36.4 °C)  HR:  [54-63] 63  Resp:  [18] 18  BP: (122-148)/(41-48) 122/43  SpO2:  [95 %-98 %] 98 %  Body mass index is 22.93 kg/m². Input and Output Summary (last 24 hours): Intake/Output Summary (Last 24 hours) at 2023 1500  Last data filed at 2023 1214  Gross per 24 hour   Intake 840 ml   Output 1650 ml   Net -810 ml       Physical Exam:     Physical Exam  Constitutional:       General: She is not in acute distress. Appearance: Normal appearance. She is normal weight. She is not ill-appearing. HENT:      Head: Normocephalic and atraumatic.       Nose: Nose normal.      Mouth/Throat: Mouth: Mucous membranes are moist.   Cardiovascular:      Rate and Rhythm: Normal rate and regular rhythm. Pulses: Normal pulses. Heart sounds: Normal heart sounds. Pulmonary:      Effort: Pulmonary effort is normal. No respiratory distress. Breath sounds: Normal breath sounds. No wheezing or rales. Abdominal:      General: Bowel sounds are normal. There is no distension. Palpations: Abdomen is soft. Tenderness: There is no abdominal tenderness. There is no guarding. Musculoskeletal:         General: Normal range of motion. Right lower leg: Edema present. Left lower leg: Edema present. Comments: Trace edema bilateral lower extremities   Skin:     General: Skin is warm and dry. Capillary Refill: Capillary refill takes less than 2 seconds. Neurological:      General: No focal deficit present. Mental Status: She is alert and oriented to person, place, and time. Mental status is at baseline. Psychiatric:         Mood and Affect: Mood normal.         Behavior: Behavior normal.         Thought Content: Thought content normal.         Judgment: Judgment normal.         Additional Data:     Labs:    Results from last 7 days   Lab Units 07/31/23  0829 07/31/23  0502   WBC Thousand/uL  --  5.99   HEMOGLOBIN g/dL 8.8* 9.4*   HEMATOCRIT % 26.0* 28.4*   PLATELETS Thousands/uL  --  120*   NEUTROS PCT %  --  56   LYMPHS PCT %  --  23   MONOS PCT %  --  16*   EOS PCT %  --  3     Results from last 7 days   Lab Units 07/31/23  0502 07/29/23  0651 07/28/23  1649   POTASSIUM mmol/L 3.4*   < > 4.3   CHLORIDE mmol/L 98   < > 98   CO2 mmol/L 27   < > 28   BUN mg/dL 30*   < > 42*   CREATININE mg/dL 1.14   < > 1.33*   CALCIUM mg/dL 7.9*   < > 8.4   ALK PHOS U/L  --   --  250*   ALT U/L  --   --  25   AST U/L  --   --  33    < > = values in this interval not displayed.      Results from last 7 days   Lab Units 07/28/23  1649   INR  1.10       * I Have Reviewed All Lab Data Listed Above.  * Additional Pertinent Lab Tests Reviewed: [address] Admission Reviewed    Imaging:    Imaging Reports Reviewed Today Include: CT abdomen pelvis  Imaging Personally Reviewed by Myself Includes: CT abdomen pelvis    Recent Cultures (last 7 days):           Last 24 Hours Medication List:   Current Facility-Administered Medications   Medication Dose Route Frequency Provider Last Rate   • acetaminophen  650 mg Oral Q4H PRN Dandy Fernandez PA-C     • artificial tear  1 Application Both Eyes BID Dandy Fernandez PA-C     • bumetanide  2 mg Oral Daily EBEN Hooker     • levothyroxine  75 mcg Oral Daily Dandy Fernandez PA-C     • losartan  25 mg Oral Daily Dandy Fernandez PA-C     • metoprolol succinate  25 mg Oral BID Dandy Fernandez PA-C     • midodrine  2.5 mg Oral TID AC Dandy Fernandez PA-C     • ondansetron  4 mg Intravenous Q6H PRN Dandy Fernandez PA-C     • pantoprazole  40 mg Oral Early Morning Dandy Fernandez PA-C     • polyethylene glycol  17 g Oral Daily Bonifacio Robertson MD     • saccharomyces boulardii  250 mg Oral BID Dandy Fernandez PA-C     • traZODone  100 mg Oral HS Dandy Fernandez PA-C          Today, Patient Was Seen By: EBEN Herrera    ** Please Note: Dictation voice to text software may have been used in the creation of this document. **        Attestation signed by Teresa Cannon MD at 8/1/2023  7:37 AM:     I was the supervising/collaborating physician. I acknowledge the AP's documentation and services provided. I was available by phone, if needed, for consultation. Teresa Cannon MD 08/01/23    Bonifacio Robertson MD  7/31/2023  9:50 AM  Attested [facility]  Progress Note  Encounter: [id number]    PATIENT INFO     Name: Shelby Abraham  YOB: 1945   Age: 69 y.o.    Sex: female   MRN: [id number]  Unit/Bed#: -01    ASSESSMENT & PLAN     Bárbara Campbell is a 69 y.o. female with complaint of hematochezia, anemia, colitis (ischemic versus Crohn's), and compensated cirrhosis of unclear etiology. 1. Hematochezia - I suspect that the source of bleeding is again from the inflamed mucosa in the right colon although the etiology for this remains unclear. Her hemoglobin has remained stable over the past 1-2 days. Given her recent colonoscopy, low suspicion for diverticular bleeding although she was noted to have sigmoid diverticulosis. Last BM on Saturday; if the next one is not indicative of GI bleeding, then we can advance diet and discharge the patient today with outpatient follow-up  No plan at this time to repeat colonoscopy; however, if there is ongoing signs of bleeding and/or continued decline in hemoglobin indicative of ongoing GI blood loss, repeat colonoscopy this admission may be considered  Management of colitis as noted below     2. Colits, ischemic vs Crohn's - was taking budesonide empirically as outpatient. Not currently on budesonide this admission. It is unclear if this is truly representative of Crohn's disease versus possible ischemic colitis based on prior pathology results. Hold budesonide during admission  Plan to resume budesonide at time of discharge  Would defer starting prednisone at this time  Avoid NSAIDs   Tentative plan to repeat outpatient colonoscopy in 6 months to document healing  If this is ischemic colitis, anticipate healing on its own with supportive care  Avoid constipation as this can worsen potential ischemia  Follow clinically     3. Anemia - Hemoglobin remains between 8.5-9.5, no new BMs yet to determine continued GI bleeding. Less likely to be ongoing active bleeding but will need continued close monitoring.   Check CBC in AM  Follow clinically and monitor for signs of overt GI bleeding as noted above  Transfuse for hemoglobin less than 7 4. Cirrhosis - new diagnosis as of 5/2023 of unclear etiology. Biopsies showed significant mixed portal inflammation and ductular reaction, minimal lobular inflammation, mild chronic cholestasis, mild portal expansion and periportal fibrosis, no significant steatosis. Autoimmune work-up was negative. Patient's MELD Score is: 9    MELD Score 90-day mortality   =9 1.9%   10-19 6.0%   20-29 19.6%   30-39 52.6%   =40 71.3%     MELD Score Component Values:  Component Value Date   Creatinine: 1.16 mg/dL 7/30/2023   Dialysis at least twice   in the past week  Or CVVHD for =24 hours   in the past week:     No    Bilirubin, total: 0.91 mg/dL 7/28/2023   INR: 1.1 7/28/2023   Sodium: 131 mmol/L 7/30/2023     Ascites:  No evidence of ascites  Follow clinically  Avoid NSAIDs  Avoid shellfish  Portal hypertension:  EGD on 5/11/2023 with notable tiny varices at the GE junction with mild portal hypertensive gastropathy  No indication for nonselective beta-blockade at this time  Follow clinically  PSE:  No evidence of encephalopathy  Follow clinically  HCC screening:  AFP on 5/8/2023 within normal limits  MRI on 5/15/2023 with morphologic features of cirrhosis without suspicious mass  Continue HCC screening every 6 months with dedicated liver ultrasound and AFP level  Transplant candidacy:  Outpatient follow-up with hepatology in August SUBJECTIVE     Patient seen and evaluated at bedside. She says her last BM was Saturday night, dark brown, no red or black noted. Yesterday when wiping only a pink tinge when wiping but no blood. No other signs of overt GI bleeding. Tolerating full liquid diet, though wants solids. Also wants to go home. Denies any abdominal pain, nausea, vomiting. OBJECTIVE     Objective   Blood pressure (!) 133/48, pulse 61, temperature 97.5 °F (36.4 °C), resp. rate 18, height 4' 11" (1.499 m), weight 51.5 kg (113 lb 8.6 oz), SpO2 97 %. Body mass index is 22.93 kg/m².     Intake/Output Summary (Last 24 hours) at 7/31/2023 0927  Last data filed at 7/31/2023 0515  Gross per 24 hour   Intake --   Output 1400 ml   Net -1400 ml     Medication Administration - last 24 hours from 07/30/2023 0927 to 07/31/2023 5933         Date/Time Order Dose Route Action Action by     07/31/2023 0839 EDT acetaminophen (TYLENOL) tablet 650 mg 650 mg Oral Given Caridad Funes     07/30/2023 1952 EDT acetaminophen (TYLENOL) tablet 650 mg 650 mg Oral Given Suzanne Matthews RN     07/31/2023 0845 EDT artificial tear (LUBRIFRESH P.M.) ophthalmic ointment 1 Application 1 Application Both Eyes Given Herminiabijal Jensen     07/30/2023 2141 EDT artificial tear (LUBRIFRESH P.M.) ophthalmic ointment 1 Application 1 Application Both Eyes Given Suzanne Matthews RN     07/30/2023 7785 EDT artificial tear (LUBRIFRESH P.M.) ophthalmic ointment 1 Application 1 Application Both Eyes Given Melody Landaverde RN     07/31/2023 0503 EDT levothyroxine tablet 75 mcg 75 mcg Oral Given Suzanne Matthews RN     07/31/2023 1907 EDT losartan (COZAAR) tablet 25 mg 25 mg Oral Given Caridad Funes     07/30/2023 0596 EDT losartan (COZAAR) tablet 25 mg 25 mg Oral Given Melody Landaverde RN     07/31/2023 6419 EDT metoprolol succinate (TOPROL-XL) 24 hr tablet 25 mg 25 mg Oral Given Caridad Funes     07/30/2023 1720 EDT metoprolol succinate (TOPROL-XL) 24 hr tablet 25 mg 25 mg Oral Given Melody Landaverde RN     07/30/2023 3317 EDT metoprolol succinate (TOPROL-XL) 24 hr tablet 25 mg 25 mg Oral Given Melody Landaverde RN     07/31/2023 3617 EDT pantoprazole (PROTONIX) EC tablet 40 mg 40 mg Oral Given Caridad Funes     07/31/2023 0840 EDT midodrine (PROAMATINE) tablet 2.5 mg 2.5 mg Oral Given Caridad Funes     07/30/2023 1557 EDT midodrine (PROAMATINE) tablet 2.5 mg 2.5 mg Oral Not Given Melody Landaverde RN     07/30/2023 1232 EDT midodrine (PROAMATINE) tablet 2.5 mg 2.5 mg Oral Not Given Melody Landaverde RN     07/31/2023 1015 EDT saccharomyces boulardii (FLORASTOR) capsule 250 mg 250 mg Oral Given Herminia Jensen 07/30/2023 1720 EDT saccharomyces boulardii (FLORASTOR) capsule 250 mg 250 mg Oral Given Juan Glover, STARR     07/30/2023 8064 EDT saccharomyces boulardii (FLORASTOR) capsule 250 mg 250 mg Oral Given Juan Adalberto, RN     07/30/2023 2141 EDT traZODone (DESYREL) tablet 100 mg 100 mg Oral Given Brenda Torres RN     07/31/2023 3470 EDT bumetanide (BUMEX) tablet 2 mg 2 mg Oral Given Caridad Funes     07/30/2023 2607 EDT bumetanide (BUMEX) tablet 2 mg 2 mg Oral Given Juan Glover, RN            General Appearance:   Alert, cooperative, no distress; elderly, frail-appearing female   HEENT:   Normocephalic, atraumatic, anicteric     Lungs:   Equal chest rise, respirations unlabored    Heart:   Regular rate and rhythm, +murmur   Abdomen:   Soft, non-tender, non-distended; normal bowel sounds; no masses, no organomegaly    Rectal:   Deferred    Extremities:   No cyanosis or clubbing. 2+ pitting BLE edema up to knees   Neuro:    Moves all 4 extremities    Skin:   No jaundice, rashes, or lesions      Invasive Devices       Peripheral Intravenous Line  Duration             Peripheral IV 07/28/23 Left Arm 2 days                    LABORATORY RESULTS     Admission on 07/28/2023   Component Date Value   • Protime 07/28/2023 14.2    • INR 07/28/2023 1.10    • PTT 07/28/2023 35    • WBC 07/28/2023 13.22 (H)    • RBC 07/28/2023 2.97 (L)    • Hemoglobin 07/28/2023 10.2 (L)    • Hematocrit 07/28/2023 30.7 (L)    • MCV 07/28/2023 103 (H)    • MCH 07/28/2023 34.3    • MCHC 07/28/2023 33.2    • RDW 07/28/2023 13.6    • MPV 07/28/2023 10.1    • Platelets 59/00/6190 150    • nRBC 07/28/2023 0    • Neutrophils Relative 07/28/2023 81 (H)    • Immat GRANS % 07/28/2023 1    • Lymphocytes Relative 07/28/2023 7 (L)    • Monocytes Relative 07/28/2023 11    • Eosinophils Relative 07/28/2023 0    • Basophils Relative 07/28/2023 0    • Neutrophils Absolute 07/28/2023 10.77 (H)    • Immature Grans Absolute 07/28/2023 0.09    • Lymphocytes Absolute 07/28/2023 0.88    • Monocytes Absolute 07/28/2023 1.44 (H)    • Eosinophils Absolute 07/28/2023 0.02    • Basophils Absolute 07/28/2023 0.02    • Sodium 07/28/2023 135    • Potassium 07/28/2023 4.3    • Chloride 07/28/2023 98    • CO2 07/28/2023 28    • ANION GAP 07/28/2023 9    • BUN 07/28/2023 42 (H)    • Creatinine 07/28/2023 1.33 (H)    • Glucose 07/28/2023 108    • Calcium 07/28/2023 8.4    • Corrected Calcium 07/28/2023 9.3    • AST 07/28/2023 33    • ALT 07/28/2023 25    • Alkaline Phosphatase 07/28/2023 250 (H)    • Total Protein 07/28/2023 6.5    • Albumin 07/28/2023 2.9 (L)    • Total Bilirubin 07/28/2023 0.91    • eGFR 07/28/2023 38    • ABO Grouping 07/28/2023 O    • Rh Factor 07/28/2023 Positive    • Antibody Screen 07/28/2023 Negative    • Specimen Expiration Date 07/28/2023 [id number]    • Sodium 07/29/2023 134 (L)    • Potassium 07/29/2023 3.6    • Chloride 07/29/2023 99    • CO2 07/29/2023 29    • ANION GAP 07/29/2023 6    • BUN 07/29/2023 40 (H)    • Creatinine 07/29/2023 1.16    • Glucose 07/29/2023 94    • Glucose, Fasting 07/29/2023 94    • Calcium 07/29/2023 8.3 (L)    • eGFR 07/29/2023 45    • WBC 07/29/2023 10.41 (H)    • RBC 07/29/2023 2.52 (L)    • Hemoglobin 07/29/2023 8.5 (L)    • Hematocrit 07/29/2023 25.8 (L)    • MCV 07/29/2023 102 (H)    • MCH 07/29/2023 33.7    • MCHC 07/29/2023 32.9    • RDW 07/29/2023 13.3    • Platelets 56/41/0063 106 (L)    • MPV 07/29/2023 10.1    • Hemoglobin 07/29/2023 9.5 (L)    • Hematocrit 07/29/2023 28.8 (L)    • Hemoglobin 07/29/2023 8.8 (L)    • Hematocrit 07/29/2023 27.2 (L)    • Hemoglobin 07/30/2023 8.4 (L)    • Hematocrit 07/30/2023 25.2 (L)    • Hemoglobin 07/30/2023 8.4 (L)    • Hematocrit 07/30/2023 25.3 (L)    • Sodium 07/30/2023 131 (L)    • Potassium 07/30/2023 3.6    • Chloride 07/30/2023 98    • CO2 07/30/2023 28    • ANION GAP 07/30/2023 5    • BUN 07/30/2023 35 (H)    • Creatinine 07/30/2023 1.16    • Glucose 07/30/2023 78    • Calcium 07/30/2023 8.0 (L)    • eGFR 07/30/2023 45    • Hemoglobin 07/30/2023 9.3 (L)    • Hematocrit 07/30/2023 28.4 (L)    • Hemoglobin 07/31/2023 8.8 (L)    • Hematocrit 07/31/2023 26.0 (L)    • WBC 07/31/2023 5.99    • RBC 07/31/2023 2.77 (L)    • Hemoglobin 07/31/2023 9.4 (L)    • Hematocrit 07/31/2023 28.4 (L)    • MCV 07/31/2023 103 (H)    • MCH 07/31/2023 33.9    • MCHC 07/31/2023 33.1    • RDW 07/31/2023 13.2    • MPV 07/31/2023 10.0    • Platelets 61/67/9414 120 (L)    • nRBC 07/31/2023 0    • Neutrophils Relative 07/31/2023 56    • Immat GRANS % 07/31/2023 1    • Lymphocytes Relative 07/31/2023 23    • Monocytes Relative 07/31/2023 16 (H)    • Eosinophils Relative 07/31/2023 3    • Basophils Relative 07/31/2023 1    • Neutrophils Absolute 07/31/2023 3.44    • Immature Grans Absolute 07/31/2023 0.03    • Lymphocytes Absolute 07/31/2023 1.35    • Monocytes Absolute 07/31/2023 0.97    • Eosinophils Absolute 07/31/2023 0.17    • Basophils Absolute 07/31/2023 0.03    • Sodium 07/31/2023 133 (L)    • Potassium 07/31/2023 3.4 (L)    • Chloride 07/31/2023 98    • CO2 07/31/2023 27    • ANION GAP 07/31/2023 8    • BUN 07/31/2023 30 (H)    • Creatinine 07/31/2023 1.14    • Glucose 07/31/2023 92    • Calcium 07/31/2023 7.9 (L)    • eGFR 07/31/2023 46      CT abdomen pelvis with contrast    Result Date: 7/28/2023  Narrative: CT ABDOMEN AND PELVIS WITH IV CONTRAST INDICATION:   LLQ abdominal pain L sided abdominal pain. Dark stools, colonic ulcerations on colonoscopy COMPARISON: CT abdomen and pelvis July 10, 2023 TECHNIQUE:  CT examination of the abdomen and pelvis was performed. Multiplanar 2D reformatted images were created from the source data. This examination, like all CT scans performed in the [facility], was performed utilizing techniques to minimize radiation dose exposure, including the use of iterative reconstruction and automated exposure control.  Radiation dose length product (DLP) for this visit:  554.13 mGy-cm IV Contrast:  100 mL of iohexol (OMNIPAQUE) Enteric Contrast:  Enteric contrast was not administered. FINDINGS: ABDOMEN LOWER CHEST: Bilateral basilar atelectasis unchanged. Coronary artery calcifications and atherosclerotic disease of the thoracic aorta. Hiatal hernia. LIVER/BILIARY TREE: Hepatic steatosis. Hepatic surface nodularity with hypertrophy of the caudate lobe suggesting underlying cirrhosis. No change in pneumobilia. GALLBLADDER:  No calcified gallstones. No pericholecystic inflammatory change. SPLEEN:  Unremarkable. 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 Measurements reported on MRI were an underestimate. A tubular low-attenuation structure extends in the pancreatic tail from the level of the cyst which appears to be multilocular. ADRENAL GLANDS:  Unremarkable. KIDNEYS/URETERS: Renal vascular calcifications. No hydronephrosis. STOMACH AND BOWEL: There is suggestion of some thickening of the gastric wall. Hyperemia of the gastric wall is demonstrated which can be secondary to underlying portal hypertension or gastritis. Small bowel loops show normal caliber. Colonic diverticulosis is seen without associated diverticulitis. APPENDIX: History of appendectomy. ABDOMINOPELVIC CAVITY:  No ascites. No pneumoperitoneum. No lymphadenopathy. VESSELS:  Atherosclerotic changes are present. No evidence of aneurysm. PELVIS REPRODUCTIVE ORGANS: Hysterectomy. Pelvic wall floor relaxation. URINARY BLADDER:  Unremarkable. ABDOMINAL WALL/INGUINAL REGIONS: Body wall edema. OSSEOUS STRUCTURES: Total right hip replacement. Osteoarthritis of the left hip. Lumbar spondylosis with old compression fractures at L1, L4 and L5 as well as compression fractures at T11 and T12. Impression: 1. Gastric wall thickening with hyperemia which could be related to portal hypertension or underlying gastritis of other etiologies.  2. No inflammatory changes or bowel obstruction. 3. Cirrhosis. 4. 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 The study was marked in [facility] for immediate notification. . Workstation performed: [id number]     Colonoscopy    Addendum Date: 7/13/2023 Addendum:   Bonifacio C[place] [id number] [address]: 7/13/23 PHYSICIAN(S): Attending: Nelma Lesches, MD Fellow: No Staff Documented INDICATION: Rectal bleeding POST-OP DIAGNOSIS: See the impression below. HISTORY: Prior colonoscopy: Less than 3 years ago. It is being repeated at an interval of less than 3 years because: This colonoscopy is being performed for a diagnostic indication BOWEL PREPARATION: Golytely/Colyte/Trilyte PREPROCEDURE: Informed consent was obtained for the procedure, including sedation. Risks including but not limited to bleeding, infection, perforation, adverse drug reaction and aspiration were explained in detail. Also explained about less than 100% sensitivity with the exam and other alternatives. The patient was placed in the left lateral decubitus position. Procedure: Colonoscopy DETAILS OF PROCEDURE: Patient was taken to the procedure room where a time out was performed to confirm correct patient and correct procedure. The patient underwent monitored anesthesia care, which was administered by an anesthesia professional. The patient's blood pressure, heart rate, level of consciousness, oxygen, respirations and ECG were monitored throughout the procedure. A digital rectal exam was performed. The scope was introduced through the anus and advanced to the cecum. Retroflexion was performed in the rectum. The quality of bowel preparation was evaluated using the [facility] Bowel Preparation Scale with scores of: right colon = 2, transverse colon = 2, left colon = 2. The total BBPS score was 6. Bowel prep was adequate.  The patient experienced no blood loss. The procedure was not difficult. The patient tolerated the procedure well. There were no apparent adverse events. ANESTHESIA INFORMATION: ASA: III Anesthesia Type: IV Sedation with Anesthesia MEDICATIONS: No administrations occurring from 1029 to 1246 on 07/13/23 FINDINGS: Moderate, localized erythematous, friable and ulcerated mucosa in the cecum and ascending colon; performed 8 cold forceps biopsies to rule out IBD Diverticula of moderate severity in the sigmoid colon The ileocecal valve, hepatic flexure, transverse colon, splenic flexure, descending colon, rectosigmoid and rectum appeared normal. Performed multiple forceps biopsies in the rectosigmoid EVENTS: Procedure Events Event Event Time ENDO CECUM REACHED 7/13/2023 12:16 PM ENDO SCOPE OUT TIME 7/13/2023 12:36 PM SPECIMENS: ID Type Source Tests Collected by Time Destination 1 : bx's Tissue Large Intestine, Cecum TISSUE EXAM Charlie De Anda MD 7/13/2023 12:22 PM  2 : bx Tissue Large Intestine, Right/Ascending Colon TISSUE EXAM Charlie De Anda MD 7/13/2023 12:27 PM  3 : recto-sigmoid bx Tissue Rectum TISSUE EXAM Charlie De Anda MD 7/13/2023 12:32 PM  EQUIPMENT: Colonoscope -PCF-H190L IMPRESSION: Moderate, localized erythematous, friable and ulcerated mucosa in the cecum and ascending colon; performed cold forceps biopsies to rule out IBD Diverticulosis of moderate severity in the sigmoid colon The ileocecal valve, hepatic flexure, transverse colon, splenic flexure, descending colon, rectosigmoid and rectum appeared normal. Performed forceps biopsies in the rectosigmoid RECOMMENDATION:  Repeat colonoscopy in 6 months  Inflammatory bowel disease  If biopsies are not consistent with inflammatory bowel disease and symptoms resolved, then repeat colonoscopy is not necessary. Return to floor and resume diet. Follow-up in our office for further evaluation and management.  If you don't have an appointment scheduled, please call [phone] to schedule your appointment. Moi Sanderson MD     Result Date: 7/13/2023  Narrative: Table formatting from the original result was not included. [address] [facility] [address] [place] [address] [address] [place] [id number] [address]: 7/13/23 PHYSICIAN(S): Attending: Moi Sanderson MD Fellow: No Staff Documented INDICATION: Rectal bleeding POST-OP DIAGNOSIS: See the impression below. HISTORY: Prior colonoscopy: Less than 3 years ago. It is being repeated at an interval of less than 3 years because: This colonoscopy is being performed for a diagnostic indication BOWEL PREPARATION: Golytely/Colyte/Trilyte PREPROCEDURE: Informed consent was obtained for the procedure, including sedation. Risks including but not limited to bleeding, infection, perforation, adverse drug reaction and aspiration were explained in detail. Also explained about less than 100% sensitivity with the exam and other alternatives. The patient was placed in the left lateral decubitus position. Procedure: Colonoscopy DETAILS OF PROCEDURE: Patient was taken to the procedure room where a time out was performed to confirm correct patient and correct procedure. The patient underwent monitored anesthesia care, which was administered by an anesthesia professional. The patient's blood pressure, heart rate, level of consciousness, oxygen, respirations and ECG were monitored throughout the procedure. A digital rectal exam was performed. The scope was introduced through the anus and advanced to the cecum. Retroflexion was performed in the rectum. The quality of bowel preparation was evaluated using the [facility] Bowel Preparation Scale with scores of: right colon = 2, transverse colon = 2, left colon = 2. The total BBPS score was 6. Bowel prep was adequate. The patient experienced no blood loss. The procedure was not difficult. The patient tolerated the procedure well. There were no apparent adverse events.  ANESTHESIA INFORMATION: ASA: III Anesthesia Type: IV Sedation with Anesthesia MEDICATIONS: No administrations occurring from 1029 to 1246 on 07/13/23 FINDINGS: Moderate, localized erythematous, friable and ulcerated mucosa in the cecum and ascending colon; performed 8 cold forceps biopsies to rule out IBD Diverticula of moderate severity in the sigmoid colon The ileocecal valve, hepatic flexure, transverse colon, splenic flexure, descending colon, rectosigmoid and rectum appeared normal. Performed multiple forceps biopsies in the rectosigmoid EVENTS: Procedure Events Event Event Time ENDO CECUM REACHED 7/13/2023 12:16 PM ENDO SCOPE OUT TIME 7/13/2023 12:36 PM SPECIMENS: ID Type Source Tests Collected by Time Destination 1 : bx's Tissue Large Intestine, Cecum TISSUE EXAM Machelle Merrill MD 7/13/2023 12:22 PM  2 : bx Tissue Large Intestine, Right/Ascending Colon TISSUE EXAM Machelle Merrill MD 7/13/2023 12:27 PM  3 : recto-sigmoid bx Tissue Rectum TISSUE EXAM Machelle Merrill MD 7/13/2023 12:32 PM  EQUIPMENT: Colonoscope -PCF-H190L     Impression: Moderate, localized erythematous, friable and ulcerated mucosa in the cecum and ascending colon; performed cold forceps biopsies to rule out IBD Diverticulosis of moderate severity in the sigmoid colon The ileocecal valve, hepatic flexure, transverse colon, splenic flexure, descending colon, rectosigmoid and rectum appeared normal. Performed forceps biopsies in the rectosigmoid RECOMMENDATION:  Repeat colonoscopy in 6 months  Inflammatory bowel disease  If biopsies are not consistent with inflammatory bowel disease and symptoms resolved, then repeat colonoscopy is not necessary. Machelle Merrill MD     Flexible Sigmoidoscopy    Result Date: 7/12/2023  Narrative: Table formatting from the original result was not included.  [address] [facility] [address] [facility] [address] [place] [address] [phone] DATE OF SERVICE: 7/12/23 PHYSICIAN(S): Attending: Radha Medrano MD Fellow: Nely Broderick DO INDICATION: Rectal bleeding POST-OP DIAGNOSIS: See the impression below. HISTORY: Prior colonoscopy: Less than 3 years ago. It is being repeated at an interval of less than 3 years because: This colonoscopy is being performed for a diagnostic indication BOWEL PREPARATION: Enema PREPROCEDURE: Informed consent was obtained for the procedure, including sedation. Risks including but not limited to bleeding, infection, perforation, adverse drug reaction and aspiration were explained in detail. Also explained about less than 100% sensitivity with the exam and other alternatives. The patient was placed in the left lateral decubitus position. Procedure: Colonoscopy DETAILS OF PROCEDURE: Patient was taken to the procedure room where a time out was performed to confirm correct patient and correct procedure. The patient underwent monitored anesthesia care, which was administered by an anesthesia professional. The patient's blood pressure, heart rate, level of consciousness, oxygen and respirations were monitored throughout the procedure. A digital rectal exam was performed. The scope was introduced through the anus and advanced to the sigmoid colon. Retroflexion was not performed due to narrow vault. The quality of bowel preparation was evaluated using the [facility] Bowel Preparation Scale with scores of: right colon = 0, transverse colon = 0, left colon = 1. Bowel prep was not adequate. The patient experienced no blood loss. The procedure was difficult due to patient discomfort. The patient tolerated the procedure well. There were no apparent adverse events. The total BBPS score was 1. ANESTHESIA INFORMATION: ASA: III Anesthesia Type: IV Sedation with Anesthesia MEDICATIONS: sodium chloride 0.9 % infusion 100 mL*  *From user-documented volume (Totals for administrations occurring from 1451 to 1512 on 07/12/23) FINDINGS: Scope was advanced to proximal sigmoid colon-30 cm from the anal verge.   Solid and liquid stool mixed with blood was seen along the way. Scope could not be advanced further at solid stool was completely obstructing the lumen. Likely source of bleeding is in the right side of the colon or superior. EVENTS: Procedure Events Event Event Time ENDO SCOPE OUT TIME 7/12/2023  3:09 PM SPECIMENS: * No specimens in log * EQUIPMENT: Colonoscope - flex sig done with the EGD scope     Impression: Scope was advanced to proximal sigmoid colon-30 cm from the anal verge. Solid and liquid stool mixed with blood was seen along the way. Scope could not be advanced further at solid stool was completely obstructing the lumen. Likely source of bleeding RECOMMENDATION:  There is no recommended follow-up for this procedure. Plan for colonoscopy tomorrow. Initiate prep. Clear liquid diet. N.p.o. after midnight. Naomy Schwarz MD     CT high volume bleeding scan abdomen pelvis    Result Date: 7/10/2023  Narrative: CT ABDOMEN AND PELVIS - WITHOUT AND WITH IV CONTRAST INDICATION:   Gi bleed. COMPARISON: CT abdomen/pelvis from 5/1/2023. TECHNIQUE:  CT examination of the abdomen and pelvis was performed both prior to and after the administration of intravenous contrast. Multiplanar 2D reformatted images were created from the source data. Radiation dose length product (DLP) for this visit:  1642.86 mGy-cm . This examination, like all CT scans performed in the [facility], was performed utilizing techniques to minimize radiation dose exposure, including the use of iterative reconstruction and automated exposure control. IV Contrast:  100 mL of iohexol (OMNIPAQUE) Enteric Contrast:  Enteric contrast was not administered. FINDINGS: ABDOMEN BOWEL:There is no active extravasation of intravenous contrast into the lumen of stomach, small bowel, or large bowel loops. There is no abnormal bowel wall thickening or pathologic bowel wall enhancement. Stable hiatal hernia.  Age-appropriate atherosclerotic disease in the mesenteric vessels, without complete occlusion. LOWER CHEST: Bibasilar atelectasis. Heart top normal size. LIVER/BILIARY TREE: The liver demonstrates cirrhotic morphology. Within the limitations of this examination there is no evidence of suspicious hepatic mass. No biliary dilatation. Stable pneumobilia. Portal vein is patent. GALLBLADDER: Removed. SPLEEN:  Unremarkable. PANCREAS: Stable cyst in the pancreatic tail measuring 8 mm. ADRENAL GLANDS:  Unremarkable. KIDNEYS/URETERS:  Unremarkable. No hydronephrosis. APPENDIX:  No findings to suggest appendicitis. ABDOMINOPELVIC CAVITY:  No ascites. No pneumoperitoneum. No lymphadenopathy. VESSELS: Atherosclerotic changes are present. No evidence of aneurysm. PELVIS REPRODUCTIVE ORGANS: Surgical changes of prior hysterectomy. URINARY BLADDER:  Unremarkable. ABDOMINAL WALL/INGUINAL REGIONS:  Unremarkable. OSSEOUS STRUCTURES: Multiple chronic vertebral body compression deformities for example L4 and L5 with interval development of compression deformity at L1 that is either chronic or subacute. Correlate for focal back pain. Intact right hip arthroplasty. Impression: No CT evidence of active high volume gastrointestinal hemorrhage. 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 Stable hiatal hernia. Stable cirrhosis. Multiple chronic vertebral body compression deformities for example L4 and L5 with interval development of compression deformity at L1 that is either chronic or subacute. Correlate for focal back pain. Workstation performed: [id number]       RADIOLOGY RESULTS: I have personally reviewed pertinent imaging studies. Jamee Parrish, PGY-4  Attestation signed by Abi Seay MD at 8/1/2023  7:40 AM:    Standard Teaching Supervising Statement    I have reviewed the note performed and documented by the Fellow. I personally performed the required components/examined the patient.  I agree with the Fellow's findings and plan of care with the following additions/exceptions: Patient seen and evaluated on 7/31/2023    Patient is a 69-year-old female presented with hematochezia identified to have colitis. Patient also has history of compensated cirrhosis of unclear etiology. Patient has previous history of colonoscopy with similar presentation presented with over acute GI bleeding which was short in duration. On presentation patient had leukocytosis but hemoglobin was approximately around 10 which was consistent with recent discharge. Over bleeding resolved and was transient. We discussed differential diagnosis is likely secondary to ischemic colitis of the right side versus infectious colitis versus less likely given bloody diarrhea and short duration. Patient has remote history of smoking but has stopped smoking for several years. Colonoscopy performed 7/13/2023-   Moderate, localized erythematous, friable and ulcerated mucosa in the cecum and ascending colon; performed 8 cold forceps biopsies to rule out IBD  Diverticula of moderate severity in the sigmoid colon  The ileocecal valve, hepatic flexure, transverse colon, splenic flexure, descending colon, rectosigmoid and rectum appeared normal.  Performed multiple forceps biopsies in the rectosigmoid     Recent colonoscopy identified ischemic colitis versus Crohn's disease. Since it was unclear the etiology budesonide was started with a repeat colonoscopy planned in 6 months. With repeat episode this patient likely has ischemic colitis of the right side. We discussed this tends to be at times associated worse prognosis.   Possible cause for her ischemic colitis may be dehydration as she only gets about 3 to 4 cups of water daily.    -Discussed importance of oral hydration  -Smoking cessation took place decades ago discussed continuing the importance of the  -Repeat colonoscopy in 6 months  -Follow-up in the office for work-up of cirrhosis and management    No further intervention planned by our team      Kaycee Piña MD        [facility], EBEN  7/30/2023  9:19 AM  Attested  [address]  Progress Note  Name: Nehemiah Cassidy  MRN: [id number]  Unit/Bed#: MS Cruz01 I Date of Admission: 7/28/2023   Date of Service: 7/30/2023 I Hospital Day: 1    Assessment/Plan   * BRBPR (bright red blood per rectum)  Assessment & Plan  Patient presented to the ED secondary to large bloody bowel movement at her assisted living facility. She was recently hospitalized for GI bleed and had a flex sigmoidoscopy on July 12 and colonoscopy on July 13. Possible Crohn's disease. CT with no acute findings. ED reviewed w/ GI and may need repeat colonoscopy  Hemoglobin 10.2 on arrival  CT: Gastric wall thickening with hyperemia which could be related to portal hypertension or underlying gastritis of other etiologies. No inflammatory changes or bowel obstruction. Cirrhosis. 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 The study was marked in [facility] for immediate notification. Patient reported 1 bowel movement overnight that had small amount of "dark blood"   GI following, appreciate recommendations  Have been monitoring H/H closely (every 6 hours), hemoglobin has remained stable  Will decrease H/H monitoring to every 12 hours  Continue to monitor for bleeding  Continue IV Protonix  Patient was on clear liquids, increase to full liquids by GI as of 7/29, tolerating same    Stage 3b chronic kidney disease [facility])  Assessment & Plan  Lab Results   Component Value Date    EGFR 45 07/30/2023    EGFR 45 07/29/2023    EGFR 38 07/28/2023    CREATININE 1.16 07/30/2023    CREATININE 1.16 07/29/2023    CREATININE 1.33 (H) 07/28/2023   Creatinine on arrival 1.33  Patient's baseline creatinine appears to be around 1.2  Currently better than baseline  Continue to avoid nephrotoxins and hypotension  BMP a.m.       Primary hypertension  Assessment & Plan  Blood pressure really elevated yesterday, now well controlled on current regimen  Continue home losartan and metoprolol   Continue to monitor BP per protocol    PAF (paroxysmal atrial fibrillation) ([address])  Assessment & Plan  Currently rate controlled   Continue metoprolol with hold parameters   Patient is typically on aspirin, will hold for now    SIADH (syndrome of inappropriate ADH production) ([facility])  Assessment & Plan  Sodium 135 on arrival  Serum sodium today 131  Although appears euvolemic, does have lower extremity edema today. May be a bit volume overloaded contributing to drop in serum sodium  Resumed patient's home Bumex today  Continue to monitor intake and output  Daily weight  If no improvement tomorrow, consider nephrology consult  BMP a.m. Acquired hypothyroidism  Assessment & Plan  Continue levothyroxine    Bilateral lower extremity edema  Assessment & Plan  Appears euvolemic on exam  Does have +1 edema of lower extremities today on exam  Will resume home Bumex  Monitor intake and output  Daily weights  Encourage elevation of legs when out of bed in chair    Chronic low back pain  Assessment & Plan  Reports chronic low back pain 5/10, no worse than baseline per patient  Continue pain regimen  Added aqua K-pad  Monitor effectiveness of pain regimen          VTE Pharmacologic Prophylaxis:   Pharmacologic: Pharmacologic VTE Prophylaxis contraindicated due to Presented with bright red bleeding from rectum  Mechanical VTE Prophylaxis in Place: Yes    Patient Centered Rounds: I have performed bedside rounds with nursing staff today. Discussions with Specialists or Other Care Team Provider: GI, nursing, case management    Education and Discussions with Family / Patient: Treatment plan discussed with patient who understands the plan as has been explained and is agreeable to the plan as stated. All questions answered to satisfaction. Time Spent for Care: 39 minutes.   More than 50% of total time spent on counseling and coordination of care as described above. Current Length of Stay: 1 day(s)    Current Patient Status: Inpatient   Certification Statement: The patient will continue to require additional inpatient hospital stay due to Monitoring H/H, monitoring for bleeding, monitoring tolerance to diet with advancement    Discharge Plan: Patient is from personal-care home. Presented with rectal bleeding. GI has been following closely, H&H monitored closely. Hemoglobin seems to have stabilized. Patient did report 1 stool overnight with some "dark bleeding" unwitnessed by myself. We will continue to monitor for bleeding, monitor labs, monitor tolerance to diet as it is increased. We will consult PT/OT for recommendations as to whether patient is appropriate to return to personal-care home. Case management following. Hopeful discharge next 24-48 hours. Code Status: Level 3 - DNAR and DNI      Subjective:   Patient out of bed in chair. Denies any abdominal pain, nausea or discomfort. Tolerating full liquid diet. Did complain of some chronic low back pain today. Added aqua K-pad at patient's request.      Objective:     Vitals:   Temp (24hrs), Av °F (36.7 °C), Min:97.7 °F (36.5 °C), Max:98.5 °F (36.9 °C)    Temp:  [97.7 °F (36.5 °C)-98.5 °F (36.9 °C)] 98.2 °F (36.8 °C)  HR:  [53-65] 54  Resp:  [17-20] 19  BP: (125-166)/(36-55) 129/41  SpO2:  [92 %-100 %] 97 %  Body mass index is 22.42 kg/m². Input and Output Summary (last 24 hours): Intake/Output Summary (Last 24 hours) at 2023 0915  Last data filed at 2023 5802  Gross per 24 hour   Intake 590 ml   Output 400 ml   Net 190 ml       Physical Exam:     Physical Exam  Constitutional:       General: She is not in acute distress. Appearance: Normal appearance. She is normal weight. She is not ill-appearing. HENT:      Head: Normocephalic and atraumatic.       Nose: Nose normal.      Mouth/Throat:      Mouth: Mucous membranes are moist.   Cardiovascular: Rate and Rhythm: Regular rhythm. Bradycardia present. Pulses: Normal pulses. Heart sounds: Normal heart sounds. Pulmonary:      Effort: Pulmonary effort is normal. No respiratory distress. Breath sounds: Normal breath sounds. No wheezing or rales. Abdominal:      General: Bowel sounds are normal. There is no distension. Palpations: Abdomen is soft. Tenderness: There is no abdominal tenderness. There is no guarding. Musculoskeletal:         General: Normal range of motion. Right lower leg: Edema present. Left lower leg: Edema present. Comments: +1 edema bilateral lower extremities   Skin:     General: Skin is warm and dry. Capillary Refill: Capillary refill takes less than 2 seconds. Neurological:      General: No focal deficit present. Mental Status: She is alert and oriented to person, place, and time. Mental status is at baseline. Psychiatric:         Mood and Affect: Mood normal.         Behavior: Behavior normal.         Thought Content: Thought content normal.         Judgment: Judgment normal.         Additional Data:     Labs:    Results from last 7 days   Lab Units 07/30/23  0734 07/29/23  1207 07/29/23 0651 07/28/23  1649   WBC Thousand/uL  --   --  10.41* 13.22*   HEMOGLOBIN g/dL 8.4*   < > 8.5* 10.2*   HEMATOCRIT % 25.3*   < > 25.8* 30.7*   PLATELETS Thousands/uL  --   --  106* 150   NEUTROS PCT %  --   --   --  81*   LYMPHS PCT %  --   --   --  7*   MONOS PCT %  --   --   --  11   EOS PCT %  --   --   --  0    < > = values in this interval not displayed. Results from last 7 days   Lab Units 07/30/23  0734 07/29/23  0651 07/28/23  1649   POTASSIUM mmol/L 3.6   < > 4.3   CHLORIDE mmol/L 98   < > 98   CO2 mmol/L 28   < > 28   BUN mg/dL 35*   < > 42*   CREATININE mg/dL 1.16   < > 1.33*   CALCIUM mg/dL 8.0*   < > 8.4   ALK PHOS U/L  --   --  250*   ALT U/L  --   --  25   AST U/L  --   --  33    < > = values in this interval not displayed. Results from last 7 days   Lab Units 07/28/23  1649   INR  1.10       * I Have Reviewed All Lab Data Listed Above. * Additional Pertinent Lab Tests Reviewed: [address] Admission Reviewed    Imaging:    Imaging Reports Reviewed Today Include: CT abdomen pelvis  Imaging Personally Reviewed by Myself Includes: CT abdomen pelvis    Recent Cultures (last 7 days):           Last 24 Hours Medication List:   Current Facility-Administered Medications   Medication Dose Route Frequency Provider Last Rate   • acetaminophen  650 mg Oral Q4H PRN Curly HoltsYOVANI     • artificial tear  1 Application Both Eyes BID Curly Holts, YOVANI     • bumetanide  2 mg Oral Daily EBEN Hooker     • levothyroxine  75 mcg Oral Daily Curly Holts, YOVANI     • losartan  25 mg Oral Daily Curly HoltsYOVANI     • metoprolol succinate  25 mg Oral BID Curly Holts, YOVANI     • midodrine  2.5 mg Oral TID AC Curly HoltsYOVANI     • ondansetron  4 mg Intravenous Q6H PRN Curly Holts, YOVANI     • pantoprazole  40 mg Oral Early Morning Curly Holts, YOVANI     • saccharomyces boulardii  250 mg Oral BID Curly Holts, YOVANI     • traZODone  100 mg Oral HS Curly Holts, YOVANI          Today, Patient Was Seen By: EBEN Florian    ** Please Note: Dictation voice to text software may have been used in the creation of this document. **        Attestation signed by Luisana Rodriguez MD at 7/30/2023 10:00 AM:     I was the supervising/collaborating physician on [address] [place] [address]. I acknowledge the AP's documentation and services provided. I was available by phone, if needed, for consultation.     Luisana Rodriguez MD 07/30/23    Bj Allen DO  7/30/2023 11:51 AM  Signed    [facility]  Progress Note  Encounter: [id number]    PATIENT INFO     Name: Chris Estrella  Date of Birth: 1945   Age: 69 y.o. Sex: female   MRN: [id number]  Unit/Bed#: -01    ASSESSMENT & PLAN     Dong Johnson is a 69 y.o. female with complaint of hematochezia, anemia, colitis (ischemic versus Crohn's), and compensated cirrhosis of unclear etiology. 1. Hematochezia - I suspect that the source of bleeding is again from the inflamed mucosa in the right colon although the etiology for this remains unclear. She did have slight decline in hemoglobin but fortunately does appear to have improved on most recent check. Given her recent colonoscopy, low suspicion for diverticular bleeding although she was noted to have sigmoid diverticulosis. Appears to be slowing down given relative stability of her hemoglobin. Continue to monitor for the next 24 hours  Monitor stool output  Monitor blood counts  No plan at this time to repeat colonoscopy; however, if there is ongoing signs of bleeding and/or continued decline in hemoglobin indicative of ongoing GI blood loss, repeat colonoscopy this admission may be considered  Maintain full liquid diet for now in case bowel prep is needed  Management of colitis as noted below     2. Colits, ischemic vs Crohn's - was taking budesonide empirically as outpatient. Not currently on budesonide this admission. It is unclear if this is truly representative of Crohn's disease versus possible ischemic colitis based on prior pathology results. Hold budesonide during admission  Plan to resume budesonide at time of discharge  Would defer starting prednisone at this time  Avoid NSAIDs  Management of hematochezia as noted above  Tentative plan to repeat outpatient colonoscopy in 6 months to document healing  If this is ischemic colitis, anticipate healing on its own with supportive care  Avoid constipation as this can worsen potential ischemia  Follow clinically     3.  Anemia - hemoglobin did improve to 9.5 yesterday afternoon but gradually decreased to 8.5 and has remained relatively stable in that range. 1 reported black/red stool overnight which I suspect is residual content. Less likely to be ongoing active bleeding but will need continued close monitoring. Check CBC in AM  Follow clinically and monitor for signs of overt GI bleeding as noted above  Transfuse for hemoglobin less than 7     4. Cirrhosis - new diagnosis as of 5/2023 of unclear etiology. Biopsies showed significant mixed portal inflammation and ductular reaction, minimal lobular inflammation, mild chronic cholestasis, mild portal expansion and periportal fibrosis, no significant steatosis. Autoimmune work-up was negative. Patient's MELD Score is: 9    MELD Score 90-day mortality   =9 1.9%   10-19 6.0%   20-29 19.6%   30-39 52.6%   =40 71.3%     MELD Score Component Values:  Component Value Date   Creatinine: 1.16 mg/dL 7/30/2023   Dialysis at least twice   in the past week  Or CVVHD for =24 hours   in the past week:     No    Bilirubin, total: 0.91 mg/dL 7/28/2023   INR: 1.1 7/28/2023   Sodium: 131 mmol/L 7/30/2023     Ascites:  No evidence of ascites  Follow clinically  Avoid NSAIDs  Avoid shellfish  Portal hypertension:  EGD on 5/11/2023 with notable tiny varices at the GE junction with mild portal hypertensive gastropathy  No indication for nonselective beta-blockade at this time  Follow clinically  PSE:  No evidence of encephalopathy  Follow clinically  HCC screening:  AFP on 5/8/2023 within normal limits  MRI on 5/15/2023 with morphologic features of cirrhosis without suspicious mass  Continue HCC screening every 6 months with dedicated liver ultrasound and AFP level  Transplant candidacy:  Outpatient follow-up with hepatology in August SUBJECTIVE     Patient seen and evaluated at bedside. 1 reported episode of black/red bowel movement overnight. No other signs of overt GI bleeding. Tolerating full liquid diet. Denies any abdominal pain, nausea, vomiting.     OBJECTIVE     Objective   Blood pressure (!) 129/41, pulse (!) 54, temperature 98.2 °F (36.8 °C), resp. rate 19, height 4' 11" (1.499 m), weight 50.3 kg (111 lb), SpO2 97 %. Body mass index is 22.42 kg/m².     Intake/Output Summary (Last 24 hours) at 7/30/2023 0908  Last data filed at 7/30/2023 0811  Gross per 24 hour   Intake 590 ml   Output 400 ml   Net 190 ml     Medication Administration - last 24 hours from 07/29/2023 0908 to 07/30/2023 0908         Date/Time Order Dose Route Action Action by     07/30/2023 0739 EDT acetaminophen (TYLENOL) tablet 650 mg 650 mg Oral Given Froilan Car RN     07/29/2023 2154 EDT artificial tear (LUBRIFRESH P.M.) ophthalmic ointment 1 Application 1 Application Both Eyes Not Given Fuad Monaco RN     07/29/2023 0874 EDT artificial tear (LUBRIFRESH P.M.) ophthalmic ointment 1 Application 1 Application Both Eyes Given Jasen Almanza     07/30/2023 5044 EDT levothyroxine tablet 75 mcg 75 mcg Oral Given Fuad Monaco RN     07/29/2023 0915 EDT losartan (COZAAR) tablet 25 mg 25 mg Oral Given Jasen Almanza     07/29/2023 1705 EDT metoprolol succinate (TOPROL-XL) 24 hr tablet 25 mg 25 mg Oral Given Froilan Car RN     07/29/2023 0915 EDT metoprolol succinate (TOPROL-XL) 24 hr tablet 25 mg 25 mg Oral Given Jasen Almanza     07/30/2023 2001 EDT pantoprazole (PROTONIX) EC tablet 40 mg 40 mg Oral Given Froilan Car RN     07/30/2023 4123 EDT midodrine (PROAMATINE) tablet 2.5 mg 0 mg Oral Hold Froilan Car RN     07/29/2023 1524 EDT midodrine (PROAMATINE) tablet 2.5 mg 2.5 mg Oral Not Given Froilan Car RN     07/29/2023 1147 EDT midodrine (PROAMATINE) tablet 2.5 mg 2.5 mg Oral Not Given Froilan Car RN     07/29/2023 1705 EDT saccharomyces boulardii (FLORASTOR) capsule 250 mg 250 mg Oral Given Froilan Car RN     07/29/2023 9201 EDT saccharomyces boulardii (FLORASTOR) capsule 250 mg 250 mg Oral Given Jasen Almanza     07/29/2023 2135 EDT traZODone (DESYREL) tablet 100 mg 100 mg Oral Given [facility] Annelise Sparks RN            General Appearance:   Alert, cooperative, no distress; elderly, frail-appearing female   HEENT:   Normocephalic, atraumatic, anicteric     Lungs:   Equal chest rise, respirations unlabored    Heart:   Regular rate and rhythm, +murmur   Abdomen:   Soft, non-tender, non-distended; normal bowel sounds; no masses, no organomegaly    Rectal:   Deferred    Extremities:   No cyanosis, clubbing or edema    Neuro:    Moves all 4 extremities    Skin:   No jaundice, rashes, or lesions      Invasive Devices       Peripheral Intravenous Line  Duration             Peripheral IV 07/28/23 Left Arm 1 day                    LABORATORY RESULTS     Admission on 07/28/2023   Component Date Value   • Protime 07/28/2023 14.2    • INR 07/28/2023 1.10    • PTT 07/28/2023 35    • WBC 07/28/2023 13.22 (H)    • RBC 07/28/2023 2.97 (L)    • Hemoglobin 07/28/2023 10.2 (L)    • Hematocrit 07/28/2023 30.7 (L)    • MCV 07/28/2023 103 (H)    • MCH 07/28/2023 34.3    • MCHC 07/28/2023 33.2    • RDW 07/28/2023 13.6    • MPV 07/28/2023 10.1    • Platelets 27/33/3221 150    • nRBC 07/28/2023 0    • Neutrophils Relative 07/28/2023 81 (H)    • Immat GRANS % 07/28/2023 1    • Lymphocytes Relative 07/28/2023 7 (L)    • Monocytes Relative 07/28/2023 11    • Eosinophils Relative 07/28/2023 0    • Basophils Relative 07/28/2023 0    • Neutrophils Absolute 07/28/2023 10.77 (H)    • Immature Grans Absolute 07/28/2023 0.09    • Lymphocytes Absolute 07/28/2023 0.88    • Monocytes Absolute 07/28/2023 1.44 (H)    • Eosinophils Absolute 07/28/2023 0.02    • Basophils Absolute 07/28/2023 0.02    • Sodium 07/28/2023 135    • Potassium 07/28/2023 4.3    • Chloride 07/28/2023 98    • CO2 07/28/2023 28    • ANION GAP 07/28/2023 9    • BUN 07/28/2023 42 (H)    • Creatinine 07/28/2023 1.33 (H)    • Glucose 07/28/2023 108    • Calcium 07/28/2023 8.4    • Corrected Calcium 07/28/2023 9.3    • AST 07/28/2023 33    • ALT 07/28/2023 25    • Alkaline Phosphatase 07/28/2023 250 (H)    • Total Protein 07/28/2023 6.5    • Albumin 07/28/2023 2.9 (L)    • Total Bilirubin 07/28/2023 0.91    • eGFR 07/28/2023 38    • ABO Grouping 07/28/2023 O    • Rh Factor 07/28/2023 Positive    • Antibody Screen 07/28/2023 Negative    • Specimen Expiration Date 07/28/2023 [id number]    • Sodium 07/29/2023 134 (L)    • Potassium 07/29/2023 3.6    • Chloride 07/29/2023 99    • CO2 07/29/2023 29    • ANION GAP 07/29/2023 6    • BUN 07/29/2023 40 (H)    • Creatinine 07/29/2023 1.16    • Glucose 07/29/2023 94    • Glucose, Fasting 07/29/2023 94    • Calcium 07/29/2023 8.3 (L)    • eGFR 07/29/2023 45    • WBC 07/29/2023 10.41 (H)    • RBC 07/29/2023 2.52 (L)    • Hemoglobin 07/29/2023 8.5 (L)    • Hematocrit 07/29/2023 25.8 (L)    • MCV 07/29/2023 102 (H)    • MCH 07/29/2023 33.7    • MCHC 07/29/2023 32.9    • RDW 07/29/2023 13.3    • Platelets 08/01/7436 106 (L)    • MPV 07/29/2023 10.1    • Hemoglobin 07/29/2023 9.5 (L)    • Hematocrit 07/29/2023 28.8 (L)    • Hemoglobin 07/29/2023 8.8 (L)    • Hematocrit 07/29/2023 27.2 (L)    • Hemoglobin 07/30/2023 8.4 (L)    • Hematocrit 07/30/2023 25.2 (L)    • Hemoglobin 07/30/2023 8.4 (L)    • Hematocrit 07/30/2023 25.3 (L)    • Sodium 07/30/2023 131 (L)    • Potassium 07/30/2023 3.6    • Chloride 07/30/2023 98    • CO2 07/30/2023 28    • ANION GAP 07/30/2023 5    • BUN 07/30/2023 35 (H)    • Creatinine 07/30/2023 1.16    • Glucose 07/30/2023 78    • Calcium 07/30/2023 8.0 (L)    • eGFR 07/30/2023 45      CT abdomen pelvis with contrast    Result Date: 7/28/2023  Narrative: CT ABDOMEN AND PELVIS WITH IV CONTRAST INDICATION:   LLQ abdominal pain L sided abdominal pain. Dark stools, colonic ulcerations on colonoscopy COMPARISON: CT abdomen and pelvis July 10, 2023 TECHNIQUE:  CT examination of the abdomen and pelvis was performed. Multiplanar 2D reformatted images were created from the source data.  This examination, like all CT scans performed in the [facility], was performed utilizing techniques to minimize radiation dose exposure, including the use of iterative reconstruction and automated exposure control. Radiation dose length product (DLP) for this visit:  554.13 mGy-cm IV Contrast:  100 mL of iohexol (OMNIPAQUE) Enteric Contrast:  Enteric contrast was not administered. FINDINGS: ABDOMEN LOWER CHEST: Bilateral basilar atelectasis unchanged. Coronary artery calcifications and atherosclerotic disease of the thoracic aorta. Hiatal hernia. LIVER/BILIARY TREE: Hepatic steatosis. Hepatic surface nodularity with hypertrophy of the caudate lobe suggesting underlying cirrhosis. No change in pneumobilia. GALLBLADDER:  No calcified gallstones. No pericholecystic inflammatory change. SPLEEN:  Unremarkable. 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 Measurements reported on MRI were an underestimate. A tubular low-attenuation structure extends in the pancreatic tail from the level of the cyst which appears to be multilocular. ADRENAL GLANDS:  Unremarkable. KIDNEYS/URETERS: Renal vascular calcifications. No hydronephrosis. STOMACH AND BOWEL: There is suggestion of some thickening of the gastric wall. Hyperemia of the gastric wall is demonstrated which can be secondary to underlying portal hypertension or gastritis. Small bowel loops show normal caliber. Colonic diverticulosis is seen without associated diverticulitis. APPENDIX: History of appendectomy. ABDOMINOPELVIC CAVITY:  No ascites. No pneumoperitoneum. No lymphadenopathy. VESSELS:  Atherosclerotic changes are present. No evidence of aneurysm. PELVIS REPRODUCTIVE ORGANS: Hysterectomy. Pelvic wall floor relaxation. URINARY BLADDER:  Unremarkable. ABDOMINAL WALL/INGUINAL REGIONS: Body wall edema. OSSEOUS STRUCTURES: Total right hip replacement. Osteoarthritis of the left hip.  Lumbar spondylosis with old compression fractures at L1, L4 and L5 as well as compression fractures at T11 and T12. Impression: 1. Gastric wall thickening with hyperemia which could be related to portal hypertension or underlying gastritis of other etiologies. 2. No inflammatory changes or bowel obstruction. 3. Cirrhosis. 4. 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 The study was marked in [facility] for immediate notification. . Workstation performed: [id number]     Colonoscopy    Addendum Date: 7/13/2023 Addendum:   Deshawn [place] [id number] [address]: 7/13/23 PHYSICIAN(S): Attending: Scarlett Cole MD Fellow: No Staff Documented INDICATION: Rectal bleeding POST-OP DIAGNOSIS: See the impression below. HISTORY: Prior colonoscopy: Less than 3 years ago. It is being repeated at an interval of less than 3 years because: This colonoscopy is being performed for a diagnostic indication BOWEL PREPARATION: Golytely/Colyte/Trilyte PREPROCEDURE: Informed consent was obtained for the procedure, including sedation. Risks including but not limited to bleeding, infection, perforation, adverse drug reaction and aspiration were explained in detail. Also explained about less than 100% sensitivity with the exam and other alternatives. The patient was placed in the left lateral decubitus position. Procedure: Colonoscopy DETAILS OF PROCEDURE: Patient was taken to the procedure room where a time out was performed to confirm correct patient and correct procedure. The patient underwent monitored anesthesia care, which was administered by an anesthesia professional. The patient's blood pressure, heart rate, level of consciousness, oxygen, respirations and ECG were monitored throughout the procedure. A digital rectal exam was performed. The scope was introduced through the anus and advanced to the cecum. Retroflexion was performed in the rectum.  The quality of bowel preparation was evaluated using the [facility] Bowel Preparation Scale with scores of: right colon = 2, transverse colon = 2, left colon = 2. The total BBPS score was 6. Bowel prep was adequate. The patient experienced no blood loss. The procedure was not difficult. The patient tolerated the procedure well. There were no apparent adverse events.  ANESTHESIA INFORMATION: ASA: III Anesthesia Type: IV Sedation with Anesthesia MEDICATIONS: No administrations occurring from 1029 to 1246 on 07/13/23 FINDINGS: Moderate, localized erythematous, friable and ulcerated mucosa in the cecum and ascending colon; performed 8 cold forceps biopsies to rule out IBD Diverticula of moderate severity in the sigmoid colon The ileocecal valve, hepatic flexure, transverse colon, splenic flexure, descending colon, rectosigmoid and rectum appeared normal. Performed multiple forceps biopsies in the rectosigmoid EVENTS: Procedure Events Event Event Time ENDO CECUM REACHED 7/13/2023 12:16 PM ENDO SCOPE OUT TIME 7/13/2023 12:36 PM SPECIMENS: ID Type Source Tests Collected by Time Destination 1 : bx's Tissue Large Intestine, Cecum TISSUE EXAM Machelle Merrill MD 7/13/2023 12:22 PM  2 : bx Tissue Large Intestine, Right/Ascending Colon TISSUE EXAM Machelle Merrill MD 7/13/2023 12:27 PM  3 : recto-sigmoid bx Tissue Rectum TISSUE EXAM Machelle Merrill MD 7/13/2023 12:32 PM  EQUIPMENT: Colonoscope -PCF-H190L IMPRESSION: Moderate, localized erythematous, friable and ulcerated mucosa in the cecum and ascending colon; performed cold forceps biopsies to rule out IBD Diverticulosis of moderate severity in the sigmoid colon The ileocecal valve, hepatic flexure, transverse colon, splenic flexure, descending colon, rectosigmoid and rectum appeared normal. Performed forceps biopsies in the rectosigmoid RECOMMENDATION:  Repeat colonoscopy in 6 months  Inflammatory bowel disease  If biopsies are not consistent with inflammatory bowel disease and symptoms resolved, then repeat colonoscopy is not necessary. Return to floor and resume diet. Follow-up in our office for further evaluation and management. If you don't have an appointment scheduled, please call [phone] to schedule your appointment. Vibha Carlin MD     Result Date: 7/13/2023  Narrative: Table formatting from the original result was not included. [address] [facility]  [address] [id number] [address] [place] [address] Place: 7/13/23 PHYSICIAN(S): Attending: Vibha Carlin MD Fellow: No Staff Documented INDICATION: Rectal bleeding POST-OP DIAGNOSIS: See the impression below. HISTORY: Prior colonoscopy: Less than 3 years ago. It is being repeated at an interval of less than 3 years because: This colonoscopy is being performed for a diagnostic indication BOWEL PREPARATION: Golytely/Colyte/Trilyte PREPROCEDURE: Informed consent was obtained for the procedure, including sedation. Risks including but not limited to bleeding, infection, perforation, adverse drug reaction and aspiration were explained in detail. Also explained about less than 100% sensitivity with the exam and other alternatives. The patient was placed in the left lateral decubitus position. Procedure: Colonoscopy DETAILS OF PROCEDURE: Patient was taken to the procedure room where a time out was performed to confirm correct patient and correct procedure. The patient underwent monitored anesthesia care, which was administered by an anesthesia professional. The patient's blood pressure, heart rate, level of consciousness, oxygen, respirations and ECG were monitored throughout the procedure. A digital rectal exam was performed. The scope was introduced through the anus and advanced to the cecum. Retroflexion was performed in the rectum. The quality of bowel preparation was evaluated using the [facility] Bowel Preparation Scale with scores of: right colon = 2, transverse colon = 2, left colon = 2. The total BBPS score was 6.  Bowel prep was adequate. The patient experienced no blood loss. The procedure was not difficult. The patient tolerated the procedure well. There were no apparent adverse events. ANESTHESIA INFORMATION: ASA: III Anesthesia Type: IV Sedation with Anesthesia MEDICATIONS: No administrations occurring from 1029 to 1246 on 07/13/23 FINDINGS: Moderate, localized erythematous, friable and ulcerated mucosa in the cecum and ascending colon; performed 8 cold forceps biopsies to rule out IBD Diverticula of moderate severity in the sigmoid colon The ileocecal valve, hepatic flexure, transverse colon, splenic flexure, descending colon, rectosigmoid and rectum appeared normal. Performed multiple forceps biopsies in the rectosigmoid EVENTS: Procedure Events Event Event Time ENDO CECUM REACHED 7/13/2023 12:16 PM ENDO SCOPE OUT TIME 7/13/2023 12:36 PM SPECIMENS: ID Type Source Tests Collected by Time Destination 1 : bx's Tissue Large Intestine, Cecum TISSUE EXAM Charlie De Anda MD 7/13/2023 12:22 PM  2 : bx Tissue Large Intestine, Right/Ascending Colon TISSUE EXAM Charlie De Anda MD 7/13/2023 12:27 PM  3 : recto-sigmoid bx Tissue Rectum TISSUE EXAM Charlie De Anda MD 7/13/2023 12:32 PM  EQUIPMENT: Colonoscope -PCF-H190L     Impression: Moderate, localized erythematous, friable and ulcerated mucosa in the cecum and ascending colon; performed cold forceps biopsies to rule out IBD Diverticulosis of moderate severity in the sigmoid colon The ileocecal valve, hepatic flexure, transverse colon, splenic flexure, descending colon, rectosigmoid and rectum appeared normal. Performed forceps biopsies in the rectosigmoid RECOMMENDATION:  Repeat colonoscopy in 6 months  Inflammatory bowel disease  If biopsies are not consistent with inflammatory bowel disease and symptoms resolved, then repeat colonoscopy is not necessary. Charlie De Anda MD     Flexible Sigmoidoscopy    Result Date: 7/12/2023  Narrative: Table formatting from the original result was not included. 2500 [facility] [address] [place] [address] Dr Bashir [facility] [id number] [phone] DATE OF SERVICE: 7/12/23 PHYSICIAN(S): Attending: Susanne Segura MD Fellow: Murali Miller DO INDICATION: Rectal bleeding POST-OP DIAGNOSIS: See the impression below. HISTORY: Prior colonoscopy: Less than 3 years ago. It is being repeated at an interval of less than 3 years because: This colonoscopy is being performed for a diagnostic indication BOWEL PREPARATION: Enema PREPROCEDURE: Informed consent was obtained for the procedure, including sedation. Risks including but not limited to bleeding, infection, perforation, adverse drug reaction and aspiration were explained in detail. Also explained about less than 100% sensitivity with the exam and other alternatives. The patient was placed in the left lateral decubitus position. Procedure: Colonoscopy DETAILS OF PROCEDURE: Patient was taken to the procedure room where a time out was performed to confirm correct patient and correct procedure. The patient underwent monitored anesthesia care, which was administered by an anesthesia professional. The patient's blood pressure, heart rate, level of consciousness, oxygen and respirations were monitored throughout the procedure. A digital rectal exam was performed. The scope was introduced through the anus and advanced to the sigmoid colon. Retroflexion was not performed due to narrow vault. The quality of bowel preparation was evaluated using the [facility] Bowel Preparation Scale with scores of: right colon = 0, transverse colon = 0, left colon = 1. Bowel prep was not adequate. The patient experienced no blood loss. The procedure was difficult due to patient discomfort. The patient tolerated the procedure well. There were no apparent adverse events. The total BBPS score was 1.  ANESTHESIA INFORMATION: ASA: III Anesthesia Type: IV Sedation with Anesthesia MEDICATIONS: sodium chloride 0.9 % infusion 100 mL*  *From user-documented volume (Totals for administrations occurring from 1451 to 1512 on 07/12/23) FINDINGS: Scope was advanced to proximal sigmoid colon-30 cm from the anal verge. Solid and liquid stool mixed with blood was seen along the way. Scope could not be advanced further at solid stool was completely obstructing the lumen. Likely source of bleeding is in the right side of the colon or superior. EVENTS: Procedure Events Event Event Time ENDO SCOPE OUT TIME 7/12/2023  3:09 PM SPECIMENS: * No specimens in log * EQUIPMENT: Colonoscope - flex sig done with the EGD scope     Impression: Scope was advanced to proximal sigmoid colon-30 cm from the anal verge. Solid and liquid stool mixed with blood was seen along the way. Scope could not be advanced further at solid stool was completely obstructing the lumen. Likely source of bleeding RECOMMENDATION:  There is no recommended follow-up for this procedure. Plan for colonoscopy tomorrow. Initiate prep. Clear liquid diet. N.p.o. after midnight. Katie Riddle MD     CT high volume bleeding scan abdomen pelvis    Result Date: 7/10/2023  Narrative: CT ABDOMEN AND PELVIS - WITHOUT AND WITH IV CONTRAST INDICATION:   Gi bleed. COMPARISON: CT abdomen/pelvis from 5/1/2023. TECHNIQUE:  CT examination of the abdomen and pelvis was performed both prior to and after the administration of intravenous contrast. Multiplanar 2D reformatted images were created from the source data. Radiation dose length product (DLP) for this visit:  1642.86 mGy-cm . This examination, like all CT scans performed in the [facility], was performed utilizing techniques to minimize radiation dose exposure, including the use of iterative reconstruction and automated exposure control. IV Contrast:  100 mL of iohexol (OMNIPAQUE) Enteric Contrast:  Enteric contrast was not administered.  FINDINGS: ABDOMEN BOWEL:There is no active extravasation of intravenous contrast into the lumen of stomach, small bowel, or large bowel loops. There is no abnormal bowel wall thickening or pathologic bowel wall enhancement. Stable hiatal hernia. Age-appropriate atherosclerotic disease in the mesenteric vessels, without complete occlusion. LOWER CHEST: Bibasilar atelectasis. Heart top normal size. LIVER/BILIARY TREE: The liver demonstrates cirrhotic morphology. Within the limitations of this examination there is no evidence of suspicious hepatic mass. No biliary dilatation. Stable pneumobilia. Portal vein is patent. GALLBLADDER: Removed. SPLEEN:  Unremarkable. PANCREAS: Stable cyst in the pancreatic tail measuring 8 mm. ADRENAL GLANDS:  Unremarkable. KIDNEYS/URETERS:  Unremarkable. No hydronephrosis. APPENDIX:  No findings to suggest appendicitis. ABDOMINOPELVIC CAVITY:  No ascites. No pneumoperitoneum. No lymphadenopathy. VESSELS: Atherosclerotic changes are present. No evidence of aneurysm. PELVIS REPRODUCTIVE ORGANS: Surgical changes of prior hysterectomy. URINARY BLADDER:  Unremarkable. ABDOMINAL WALL/INGUINAL REGIONS:  Unremarkable. OSSEOUS STRUCTURES: Multiple chronic vertebral body compression deformities for example L4 and L5 with interval development of compression deformity at L1 that is either chronic or subacute. Correlate for focal back pain. Intact right hip arthroplasty. Impression: No CT evidence of active high volume gastrointestinal hemorrhage. 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 Stable hiatal hernia. Stable cirrhosis. Multiple chronic vertebral body compression deformities for example L4 and L5 with interval development of compression deformity at L1 that is either chronic or subacute. Correlate for focal back pain. Workstation performed: [id number]       RADIOLOGY RESULTS: I have personally reviewed pertinent imaging studies. Alethea Choudhary D.O. [address] [facility]  Division of Gastroenterology & Hepatology  Available on [email or web address]    ** Please Note: This note is constructed using a voice recognition dictation system. **      EBEN Hooker  7/29/2023 10:06 AM  Attested  [address]  Progress Note  Name: Lisa Corcoran  MRN: [id number]  Unit/Bed#: ED 20 I Date of Admission: 7/28/2023   Date of Service: 7/29/2023 I Hospital Day: 0    Assessment/Plan   * BRBPR (bright red blood per rectum)  Assessment & Plan  Patient presented to the ED secondary to large bloody bowel movement at her assisted living facility. She was recently hospitalized for GI bleed and had a flex sigmoidoscopy on July 12 and colonoscopy on July 13. Possible Crohn's disease. CT with no acute findings. ED reviewed w/ GI and may need repeat colonoscopy  Hemoglobin 10.2 on arrival  Hemoglobin this a.m. 8.5  CT: Gastric wall thickening with hyperemia which could be related to portal hypertension or underlying gastritis of other etiologies. No inflammatory changes or bowel obstruction. Cirrhosis. 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 The study was marked in [facility] for immediate notification. No signs of active bleeding currently, however, patient states that he did have a bowel movement this a.m. with some bleeding.   She states that the blood was not bright red, "was on the darker side"  GI consulted, appreciate recommendations  We will continue to monitor H/H closely, every 6 hours next H&H at noon  Continue to monitor for bleeding  Continue IV Protonix  Patient was on clear liquids, increase to full liquids by GI    Stage 3b chronic kidney disease [facility])  Assessment & Plan  Lab Results   Component Value Date    EGFR 45 07/29/2023    EGFR 38 07/28/2023    EGFR 52 07/14/2023    CREATININE 1.16 07/29/2023    CREATININE 1.33 (H) 07/28/2023    CREATININE 1.04 07/14/2023   Creatinine on arrival 1.33  Patient's baseline creatinine appears to be around 1.2  Currently better than baseline  Continue to avoid nephrotoxins and hypotension  BMP a.m. Primary hypertension  Assessment & Plan  Blood pressure slightly elevated today   Continue home losartan and metoprolol   Will add as needed hydralazine for systolic blood pressure over 170  Continue to monitor BP per protocol    PAF (paroxysmal atrial fibrillation) ([facility])  Assessment & Plan  Currently rate controlled   Continue metoprolol with hold parameters   Patient is typically on aspirin, will hold for now    SIADH (syndrome of inappropriate ADH production) ([facility])  Assessment & Plan  Sodium 135 on arrival  Serum sodium today 134  Patient follows with nephrology in outpatient setting, will continue on discharge  BMP a.m. Acquired hypothyroidism  Assessment & Plan  Continue levothyroxine    Bilateral lower extremity edema  Assessment & Plan  Appears euvolemic on exam  We will hold home Bumex for now  Encourage elevation of legs when out of bed in chair            VTE Pharmacologic Prophylaxis:   Pharmacologic: Pharmacologic VTE Prophylaxis contraindicated due to Presented with bright red bleeding from rectum  Mechanical VTE Prophylaxis in Place: Yes    Patient Centered Rounds: I have performed bedside rounds with nursing staff today. Discussions with Specialists or Other Care Team Provider: GI, nursing, case management    Education and Discussions with Family / Patient: Treatment plan discussed with patient who understands the plan as has been explained and is agreeable to the plan as stated. All questions answered to satisfaction. Time Spent for Care: 40 minutes. More than 50% of total time spent on counseling and coordination of care as described above.     Current Length of Stay: 0 day(s)    Current Patient Status: Observation   Certification Statement: The patient will continue to require additional inpatient hospital stay due to Frequent monitoring of H/H, monitoring for bleeding, IV PPI    Discharge Plan: Patient is from personal-care home. She presented with bright red rectal bleeding last evening. There was a drop in her hemoglobin this morning from 10.2 yesterday to 8.5.  GI is following. We will continue to monitor H/H closely (every 6 hours) and monitor for further bleeding. GI is following, patient did have recent colonoscopy  to possible Crohn's. GI explained to her if she has further bleeding may need to repeat colonoscopy. Code Status: Level 3 - DNAR and DNI      Subjective:   Pleasant, denies pain or discomfort. Denies any nausea. Tolerating clear liquids. Did report some rectal bleeding this morning, unwitnessed by myself, patient stated it was "on the darker side"    Objective:     Vitals:   Temp (24hrs), Av.7 °F (37.1 °C), Min:98.4 °F (36.9 °C), Max:98.9 °F (37.2 °C)    Temp:  [98.4 °F (36.9 °C)-98.9 °F (37.2 °C)] 98.4 °F (36.9 °C)  HR:  [60-82] 60  Resp:  [16-18] 18  BP: (132-193)/(58-80) 171/76  SpO2:  [95 %-97 %] 96 %  Body mass index is 22.42 kg/m². Input and Output Summary (last 24 hours):     No intake or output data in the 24 hours ending 23 1000    Physical Exam:     Physical Exam  Constitutional:       General: She is not in acute distress. Appearance: Normal appearance. She is normal weight. She is not ill-appearing. HENT:      Head: Normocephalic and atraumatic. Nose: Nose normal.      Mouth/Throat:      Mouth: Mucous membranes are moist.   Cardiovascular:      Rate and Rhythm: Normal rate and regular rhythm. Pulses: Normal pulses. Heart sounds: Normal heart sounds. Pulmonary:      Effort: Pulmonary effort is normal. No respiratory distress. Breath sounds: Normal breath sounds. No wheezing or rales. Abdominal:      General: Bowel sounds are normal. There is no distension. Palpations: Abdomen is soft. Tenderness: There is no abdominal tenderness. There is no guarding. Musculoskeletal:      Right lower leg: Edema present.       Left lower leg: Edema present. Skin:     General: Skin is warm and dry. Capillary Refill: Capillary refill takes less than 2 seconds. Neurological:      General: No focal deficit present. Mental Status: She is alert and oriented to person, place, and time. Mental status is at baseline. Psychiatric:         Mood and Affect: Mood normal.         Behavior: Behavior normal.         Thought Content: Thought content normal.         Judgment: Judgment normal.         Additional Data:     Labs:    Results from last 7 days   Lab Units 07/29/23  0651 07/28/23  1649   WBC Thousand/uL 10.41* 13.22*   HEMOGLOBIN g/dL 8.5* 10.2*   HEMATOCRIT % 25.8* 30.7*   PLATELETS Thousands/uL 106* 150   NEUTROS PCT %  --  81*   LYMPHS PCT %  --  7*   MONOS PCT %  --  11   EOS PCT %  --  0     Results from last 7 days   Lab Units 07/29/23  0651 07/28/23  1649   POTASSIUM mmol/L 3.6 4.3   CHLORIDE mmol/L 99 98   CO2 mmol/L 29 28   BUN mg/dL 40* 42*   CREATININE mg/dL 1.16 1.33*   CALCIUM mg/dL 8.3* 8.4   ALK PHOS U/L  --  250*   ALT U/L  --  25   AST U/L  --  33     Results from last 7 days   Lab Units 07/28/23  1649   INR  1.10       * I Have Reviewed All Lab Data Listed Above. * Additional Pertinent Lab Tests Reviewed:  [address] [place] [address] Admission Reviewed    Imaging:    Imaging Reports Reviewed Today Include: CT abdomen pelvis  Imaging Personally Reviewed by Myself Includes: CT abdomen pelvis    Recent Cultures (last 7 days):           Last 24 Hours Medication List:   Current Facility-Administered Medications   Medication Dose Route Frequency Provider Last Rate   • acetaminophen  650 mg Oral Q4H PRN Waynaranthony Capps PA-C     • artificial tear  1 Application Both Eyes BID Waynaranthony Capps PA-C     • levothyroxine  75 mcg Oral Daily Nellie Capps PA-C     • losartan  25 mg Oral Daily Nellie Capps PA-C     • metoprolol succinate  25 mg Oral BID Nellie Capps PA-C     • midodrine 2.5 mg Oral TID AC Doreen Mcgill PA-C     • ondansetron  4 mg Intravenous Q6H PRN Faustine Pride, YOVANI     • pantoprazole  40 mg Oral Early Morning Faustine PrideYOVANI     • saccharomyces boulardii  250 mg Oral BID Faustine Pride, YOVANI     • traZODone  100 mg Oral HS Faustine PriYOVANI [place]          Today, Patient Was Seen By: EBEN Campbell    ** Please Note: Dictation voice to text software may have been used in the creation of this document. **        Attestation signed by Sueann Boxer, MD at 7/29/2023 12:20 PM:     I was the supervising/collaborating physician on [facility] . I acknowledge the AP's documentation and services provided. I was available by phone, if needed, for consultation.     Sueann Boxer, MD 07/29/23

## 2023-07-29 LAB
ANION GAP SERPL CALCULATED.3IONS-SCNC: 6 MMOL/L
BUN SERPL-MCNC: 40 MG/DL (ref 5–25)
CALCIUM SERPL-MCNC: 8.3 MG/DL (ref 8.4–10.2)
CHLORIDE SERPL-SCNC: 99 MMOL/L (ref 96–108)
CO2 SERPL-SCNC: 29 MMOL/L (ref 21–32)
CREAT SERPL-MCNC: 1.16 MG/DL (ref 0.6–1.3)
ERYTHROCYTE [DISTWIDTH] IN BLOOD BY AUTOMATED COUNT: 13.3 % (ref 11.6–15.1)
GFR SERPL CREATININE-BSD FRML MDRD: 45 ML/MIN/1.73SQ M
GLUCOSE P FAST SERPL-MCNC: 94 MG/DL (ref 65–99)
GLUCOSE SERPL-MCNC: 94 MG/DL (ref 65–140)
HCT VFR BLD AUTO: 25.8 % (ref 34.8–46.1)
HCT VFR BLD AUTO: 27.2 % (ref 34.8–46.1)
HCT VFR BLD AUTO: 28.8 % (ref 34.8–46.1)
HGB BLD-MCNC: 8.5 G/DL (ref 11.5–15.4)
HGB BLD-MCNC: 8.8 G/DL (ref 11.5–15.4)
HGB BLD-MCNC: 9.5 G/DL (ref 11.5–15.4)
MCH RBC QN AUTO: 33.7 PG (ref 26.8–34.3)
MCHC RBC AUTO-ENTMCNC: 32.9 G/DL (ref 31.4–37.4)
MCV RBC AUTO: 102 FL (ref 82–98)
PLATELET # BLD AUTO: 106 THOUSANDS/UL (ref 149–390)
PMV BLD AUTO: 10.1 FL (ref 8.9–12.7)
POTASSIUM SERPL-SCNC: 3.6 MMOL/L (ref 3.5–5.3)
RBC # BLD AUTO: 2.52 MILLION/UL (ref 3.81–5.12)
SODIUM SERPL-SCNC: 134 MMOL/L (ref 135–147)
WBC # BLD AUTO: 10.41 THOUSAND/UL (ref 4.31–10.16)

## 2023-07-29 PROCEDURE — 80048 BASIC METABOLIC PNL TOTAL CA: CPT | Performed by: PHYSICIAN ASSISTANT

## 2023-07-29 PROCEDURE — 87081 CULTURE SCREEN ONLY: CPT | Performed by: HOSPITALIST

## 2023-07-29 PROCEDURE — 36415 COLL VENOUS BLD VENIPUNCTURE: CPT | Performed by: PHYSICIAN ASSISTANT

## 2023-07-29 PROCEDURE — 85014 HEMATOCRIT: CPT

## 2023-07-29 PROCEDURE — 99223 1ST HOSP IP/OBS HIGH 75: CPT | Performed by: STUDENT IN AN ORGANIZED HEALTH CARE EDUCATION/TRAINING PROGRAM

## 2023-07-29 PROCEDURE — 85018 HEMOGLOBIN: CPT

## 2023-07-29 PROCEDURE — 85027 COMPLETE CBC AUTOMATED: CPT | Performed by: PHYSICIAN ASSISTANT

## 2023-07-29 PROCEDURE — 87147 CULTURE TYPE IMMUNOLOGIC: CPT | Performed by: HOSPITALIST

## 2023-07-29 PROCEDURE — 99233 SBSQ HOSP IP/OBS HIGH 50: CPT

## 2023-07-29 RX ORDER — HYDRALAZINE HYDROCHLORIDE 20 MG/ML
5 INJECTION INTRAMUSCULAR; INTRAVENOUS EVERY 6 HOURS PRN
Status: DISCONTINUED | OUTPATIENT
Start: 2023-07-29 | End: 2023-07-30

## 2023-07-29 RX ADMIN — PANTOPRAZOLE SODIUM 40 MG: 40 TABLET, DELAYED RELEASE ORAL at 06:50

## 2023-07-29 RX ADMIN — TRAZODONE HYDROCHLORIDE 100 MG: 100 TABLET ORAL at 21:35

## 2023-07-29 RX ADMIN — MIDODRINE HYDROCHLORIDE 2.5 MG: 5 TABLET ORAL at 06:50

## 2023-07-29 RX ADMIN — LEVOTHYROXINE SODIUM 75 MCG: 75 TABLET ORAL at 06:50

## 2023-07-29 RX ADMIN — LOSARTAN POTASSIUM 25 MG: 25 TABLET, FILM COATED ORAL at 09:15

## 2023-07-29 RX ADMIN — WHITE PETROLATUM 57.7 %-MINERAL OIL 31.9 % EYE OINTMENT 1 APPLICATION: at 09:16

## 2023-07-29 RX ADMIN — Medication 250 MG: at 17:05

## 2023-07-29 RX ADMIN — METOPROLOL SUCCINATE 25 MG: 50 TABLET, EXTENDED RELEASE ORAL at 17:05

## 2023-07-29 RX ADMIN — METOPROLOL SUCCINATE 25 MG: 50 TABLET, EXTENDED RELEASE ORAL at 09:15

## 2023-07-29 RX ADMIN — Medication 250 MG: at 09:14

## 2023-07-29 NOTE — ASSESSMENT & PLAN NOTE
Patient presented to the ED secondary to large bloody bowel movement at her assisted living facility. She was recently hospitalized for GI bleed and had a flex sigmoidoscopy on July 12 and colonoscopy on July 13. Possible Crohn's disease. CT with no acute findings. ED reviewed w/ GI and may need repeat colonoscopy  · Hemoglobin 10.2 on arrival  · Hemoglobin this a.m. 8.5  · CT: Gastric wall thickening with hyperemia which could be related to portal hypertension or underlying gastritis of other etiologies. No inflammatory changes or bowel obstruction. Cirrhosis. 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 The study was marked in [facility] for immediate notification. · No signs of active bleeding currently, however, patient states that he did have a bowel movement this a.m. with some bleeding.   She states that the blood was not bright red, "was on the darker side"  · GI consulted, appreciate recommendations  · We will continue to monitor H/H closely, every 6 hours next H&H at noon  · Continue to monitor for bleeding  · Continue IV Protonix  · Patient was on clear liquids, increase to full liquids by GI

## 2023-07-29 NOTE — ASSESSMENT & PLAN NOTE
· Currently rate controlled   · Continue metoprolol with hold parameters   · Patient is typically on aspirin, will hold for now

## 2023-07-29 NOTE — ASSESSMENT & PLAN NOTE
· Appears euvolemic on exam  · We will hold home Bumex for now  · Encourage elevation of legs when out of bed in chair

## 2023-07-29 NOTE — ASSESSMENT & PLAN NOTE
· Sodium 135 on arrival  · Serum sodium today 134  · Patient follows with nephrology in outpatient setting, will continue on discharge  · BMP a.m.

## 2023-07-29 NOTE — ASSESSMENT & PLAN NOTE
Lab Results   Component Value Date    EGFR 45 07/29/2023    EGFR 38 07/28/2023    EGFR 52 07/14/2023    CREATININE 1.16 07/29/2023    CREATININE 1.33 (H) 07/28/2023    CREATININE 1.04 07/14/2023   · Creatinine on arrival 1.33  · Patient's baseline creatinine appears to be around 1.2  · Currently better than baseline  · Continue to avoid nephrotoxins and hypotension  · BMP a.m.

## 2023-07-29 NOTE — ASSESSMENT & PLAN NOTE
· Sodium 135  · Patient follows with nephrology  · Patient fluid restricts approximately 32 ounces per day

## 2023-07-29 NOTE — H&P
[address] [facility]  H&P  Name: Kyleigh Young 69 y.o. female I MRN: [id number]  Unit/Bed#: ED 17 I Date of Admission: 7/28/2023   Date of Service: 7/28/2023 I Hospital Day: 0      Assessment/Plan   * BRBPR (bright red blood per rectum)  Assessment & Plan  Patient presented to the ED secondary to large bloody bowel movement at her assisted living facility. She was recently hospitalized for GI bleed and had a flex sigmoidoscopy on July 12 and colonoscopy on July 13. Possible Crohn's disease. CT with no acute findings. ED reviewed w/ GI and may need repeat colonoscopy  · Hemoglobin currently 10.2, Serial labs  · GI consult  · CT: Gastric wall thickening with hyperemia which could be related to portal hypertension or underlying gastritis of other etiologies. No inflammatory changes or bowel obstruction. Cirrhosis. 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 The study was marked in [facility] for immediate notification.      Bilateral lower extremity edema  Assessment & Plan  · Resume Bumex at discharge    Stage 3b chronic kidney disease [facility])  Assessment & Plan  Lab Results   Component Value Date    EGFR 38 07/28/2023    EGFR 52 07/14/2023    EGFR 71 07/13/2023    CREATININE 1.33 (H) 07/28/2023    CREATININE 1.04 07/14/2023    CREATININE 0.80 07/13/2023   · Creatinine up slightly  · Monitor    SIADH (syndrome of inappropriate ADH production) ([address])  Assessment & Plan  · Sodium 135  · Patient follows with nephrology  · Patient fluid restricts approximately 32 ounces per day    PAF (paroxysmal atrial fibrillation) ([address])  Assessment & Plan  · Continue rate control with hold parameters  · Patient is typically on aspirin, will hold for now    Acquired hypothyroidism  Assessment & Plan  · Continue levothyroxine    Primary hypertension  Assessment & Plan  · Continue home medication with hold parameters    VTE Pharmacologic Prophylaxis: VTE Score: 3 Moderate Risk (Score 3-4) - Pharmacological DVT Prophylaxis Contraindicated. Sequential Compression Devices Ordered. Code Status: DNR/DNI  Discussion with patient    Anticipated Length of Stay: Patient will be admitted on an observation basis with an anticipated length of stay of less than 2 midnights secondary to Monitoring, specialist input. Total Time Spent on Date of Encounter in care of patient: 75 minutes This time was spent on one or more of the following: performing physical exam; counseling and coordination of care; obtaining or reviewing history; documenting in the medical record; reviewing/ordering tests, medications or procedures; communicating with other healthcare professionals and discussing with patient's family/caregivers. Chief Complaint: Bright red blood per rectum    History of Present Illness:  Ralph Ackerman is a 69 y.o. female with a PMH of paroxysmal atrial fibrillation, depression, ischemic colitis, Crohn's diverticulitis, SIADH, hypothyroidism, hypertension, chronic kidney disease stage IIIb, lower extremity edema, recent hospitalization for GI bleed who presents with right red blood per rectum. Facility paper reports large clot was passed. During her recent hospital stay she was seen by GI she had a flex sigmoidoscopy performed with no clear-cut source of bleeding identified on 7/13/2023 she had a colonoscopy performed suspicious for both diverticulosis and inflammatory bowel disease. Biopsies were obtained. She was discharged home and returns today secondary bright red blood per rectum, ED provider reviewed with GI and they may want to repeat her colonoscopy. She notes leg swelling, back pain, nausea, abdominal pain left lower quadrant, has headaches and weakness. She was heme occult positive in ED. Review of Systems:  Review of Systems   Constitutional: Positive for chills. Negative for fever.    HENT: Negative for rhinorrhea, sore throat and trouble swallowing. Eyes: Negative for discharge and redness. Respiratory: Negative for cough and shortness of breath. Cardiovascular: Positive for leg swelling. Negative for chest pain. Gastrointestinal: Positive for abdominal pain, blood in stool and nausea. Negative for diarrhea and vomiting. Genitourinary: Negative for dysuria and hematuria. Musculoskeletal: Positive for back pain. Negative for neck pain. Neurological: Positive for weakness and headaches. Negative for dizziness. Psychiatric/Behavioral: Negative for agitation and confusion. Past Medical and Surgical History:   Past Medical History:   Diagnosis Date   • Anemia    • Atrial fibrillation ([address])    • Depression    • GI (gastrointestinal bleed)    • Hypomagnesemia 4/29/2023   • Ischemic colitis [facility])        Past Surgical History:   Procedure Laterality Date   • APPENDECTOMY     • CARDIAC SURGERY     • CHOLECYSTECTOMY     • COLONOSCOPY  07/13/2023   • FLEXIBLE SIGMOIDOSCOPY  07/12/2023   • HIP SURGERY Right 2022    Partial replacement   • HYSTERECTOMY     • IR BIOPSY TRANSJUGULAR LIVER  05/18/2023       Meds/Allergies:  Prior to Admission medications    Medication Sig Start Date End Date Taking?  Authorizing Provider   acetaminophen (TYLENOL) 650 mg CR tablet Take by mouth    Historical Provider, MD   artificial tear (LUBRIFRESH P.M.) 83-15 % ophthalmic ointment Administer 1 Application to both eyes 2 (two) times a day    Historical Provider, MD   aspirin (ECOTRIN LOW STRENGTH) 81 mg EC tablet Take 81 mg by mouth daily    Historical Provider, MD   BUDESONIDE PO Take 9 mg by mouth daily EC 3 mg cap 3 caps orally q am    Historical Provider, MD   bumetanide (BUMEX) 2 mg tablet Take 1 tablet (2 mg total) by mouth daily Do not start before May 24, 2023. 5/24/23 6/23/23  EBEN Smart   bumetanide (BUMEX) 2 mg tablet Take 2 mg by mouth daily    Historical Provider, MD   levothyroxine 75 mcg tablet Take 75 mcg by mouth daily Historical Provider, MD   losartan (COZAAR) 25 mg tablet Take 25 mg by mouth daily Losartan POT 25 mg q am    Historical Provider, MD   metoprolol succinate (TOPROL-XL) 25 mg 24 hr tablet Take 25 mg by mouth 2 (two) times a day    Historical Provider, MD   midodrine (PROAMATINE) 2.5 mg tablet Take 1 tablet (2.5 mg total) by mouth 3 (three) times a day before meals 23  EBEN Gill   omeprazole (PriLOSEC) 40 MG capsule Take 40 mg by mouth daily    Historical Provider, MD   ondansetron [facility]) 8 mg tablet As needed 6/3/23   Historical Provider, MD   potassium chloride (K-DUR,KLOR-CON) 20 mEq tablet Take 1 tablet (20 mEq total) by mouth daily Do not start before May 24, 2023. 23   EBEN Gill   saccharomyces boulardii (FLORASTOR) 250 mg capsule Take 250 mg by mouth 2 (two) times a day    Historical Provider, MD   traZODone (DESYREL) 50 mg tablet Take 100 mg by mouth daily at bedtime    Historical Provider, MD     I have reviewed home medications using recent Epic encounter. Allergies:    Allergies   Allergen Reactions   • Ceftaroline GI Intolerance   • Diphenhydramine Hyperactivity   • Influenza Vaccines Other (See Comments) and Vomiting     nausea   • Lactose - Food Allergy Diarrhea       Social History:  Marital Status: Single   Occupation: Retired  Patient Pre-hospital Living Situation: Assisted Living [facility] personal care  Patient Pre-hospital Level of Mobility: walks with walker  Patient Pre-hospital Diet Restrictions: Low-sodium 1200 mL fluid restriction  Substance Use History:   Social History     Substance and Sexual Activity   Alcohol Use Never     Social History     Tobacco Use   Smoking Status Former   • Packs/day: 0.50   • Types: Cigarettes   • Quit date: 1993   • Years since quittin.2   Smokeless Tobacco Never     Social History     Substance and Sexual Activity   Drug Use Never       Family History:  Family History   Problem Relation Age of Onset   • No Known Problems Mother        Physical Exam:     Vitals:   Blood Pressure: (!) 186/76 (07/28/23 1900)  Pulse: 82 (07/28/23 1900)  Temperature: 98.9 °F (37.2 °C) (07/28/23 1551)  Temp Source: Oral (07/28/23 1551)  Respirations: 16 (07/28/23 1900)  SpO2: 95 % (07/28/23 1900)    Physical Exam  Vitals reviewed. Constitutional:       Appearance: Normal appearance. Comments: Frail elderly  female   HENT:      Head: Normocephalic and atraumatic. Nose: Nose normal.   Eyes:      General:         Right eye: No discharge. Left eye: No discharge. Extraocular Movements: Extraocular movements intact. Conjunctiva/sclera: Conjunctivae normal.   Cardiovascular:      Rate and Rhythm: Normal rate and regular rhythm. Pulmonary:      Effort: Pulmonary effort is normal. No respiratory distress. Breath sounds: Normal breath sounds. No wheezing. Abdominal:      General: Bowel sounds are normal. There is no distension. Palpations: Abdomen is soft. Tenderness: There is abdominal tenderness in the left lower quadrant. There is no guarding. Musculoskeletal:         General: No swelling or tenderness. Normal range of motion. Cervical back: Normal range of motion. Right lower leg: Edema present. Left lower leg: Edema present. Skin:     General: Skin is warm and dry. Capillary Refill: Capillary refill takes less than 2 seconds. Comments: Scattered areas of bruising on extremities   Neurological:      General: No focal deficit present. Mental Status: She is alert and oriented to person, place, and time. Mental status is at baseline. Motor: Weakness present. Psychiatric:         Mood and Affect: Mood normal.         Behavior: Behavior normal.         Thought Content:  Thought content normal.         Judgment: Judgment normal.            Additional Data:     Lab Results:  Results from last 7 days   Lab Units 07/28/23  1649   WBC Thousand/uL 13.22* HEMOGLOBIN g/dL 10.2*   HEMATOCRIT % 30.7*   PLATELETS Thousands/uL 150   NEUTROS PCT % 81*   LYMPHS PCT % 7*   MONOS PCT % 11   EOS PCT % 0     Results from last 7 days   Lab Units 07/28/23  1649   SODIUM mmol/L 135   POTASSIUM mmol/L 4.3   CHLORIDE mmol/L 98   CO2 mmol/L 28   BUN mg/dL 42*   CREATININE mg/dL 1.33*   ANION GAP mmol/L 9   CALCIUM mg/dL 8.4   ALBUMIN g/dL 2.9*   TOTAL BILIRUBIN mg/dL 0.91   ALK PHOS U/L 250*   ALT U/L 25   AST U/L 33   GLUCOSE RANDOM mg/dL 108     Results from last 7 days   Lab Units 07/28/23  1649   INR  1.10       Lines/Drains:  Invasive Devices     Peripheral Intravenous Line  Duration           Peripheral IV 07/28/23 Left Arm <1 day              Imaging: Reviewed radiology reports from this admission including: abdominal/pelvic CT  CT abdomen pelvis with contrast   Final Result by Niurka Shelton MD (07/28 1833)         1. Gastric wall thickening with hyperemia which could be related to portal hypertension or underlying gastritis of other etiologies. 2. No inflammatory changes or bowel obstruction. 3. Cirrhosis. 4. 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 The study was marked in [facility] for immediate notification. .         Workstation performed: [id number]             EKG and Other Studies Reviewed on Admission:   · EKG: No EKG obtained. ** Please Note: This note has been constructed using a voice recognition system.  **

## 2023-07-29 NOTE — ASSESSMENT & PLAN NOTE
Patient presented to the ED secondary to large bloody bowel movement at her assisted living facility. She was recently hospitalized for GI bleed and had a flex sigmoidoscopy on July 12 and colonoscopy on July 13. Possible Crohn's disease. CT with no acute findings. ED reviewed w/ GI and may need repeat colonoscopy  · Hemoglobin currently 10.2, Serial labs  · GI consult  · CT: Gastric wall thickening with hyperemia which could be related to portal hypertension or underlying gastritis of other etiologies. No inflammatory changes or bowel obstruction. Cirrhosis. 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 The study was marked in [facility] for immediate notification.

## 2023-07-29 NOTE — ASSESSMENT & PLAN NOTE
· Blood pressure slightly elevated today   · Continue home losartan and metoprolol   · Will add as needed hydralazine for systolic blood pressure over 170  · Continue to monitor BP per protocol

## 2023-07-29 NOTE — PLAN OF CARE

## 2023-07-29 NOTE — ASSESSMENT & PLAN NOTE
Lab Results   Component Value Date    EGFR 38 07/28/2023    EGFR 52 07/14/2023    EGFR 71 07/13/2023    CREATININE 1.33 (H) 07/28/2023    CREATININE 1.04 07/14/2023    CREATININE 0.80 07/13/2023   · Creatinine up slightly  · Monitor

## 2023-07-29 NOTE — CONSULTS
[facility]  Consultation Note  Encounter: [id number]    PATIENT INFO     Name: Tia Singh  YOB: 1945   Age: 69 y.o. Sex: female   MRN: [id number]  Unit/Bed#: [facility]     Rectal bleeding    ASSESSMENT & PLAN     Tia Singh is a 69 y.o. female with complaint of hematochezia, anemia, colitis (ischemic versus Crohn's), and compensated cirrhosis of unclear etiology. I suspect that the hematochezia is secondary to the underlying colitis/inflammation seen previously on colonoscopy. Although there was decline in hemoglobin, this is currently improving. Suspect that bleeding has currently stopped. No clinical stigmata of chronic liver disease although there was mention of some evidence of mild portal hypertension noted on endoscopy as noted below. 1. Hematochezia - I suspect that the source of bleeding is again from the inflamed mucosa in the right colon although the etiology for this remains unclear. She did have slight decline in hemoglobin but fortunately does appear to have improved on most recent check. Given her recent colonoscopy, low suspicion for diverticular bleeding although she was noted to have sigmoid diverticulosis.   · Continue to monitor blood counts and follow clinically  · Follow stool output for any evidence of recurrent GI bleeding  · I discussed the possibility of repeat colonoscopy should there be evidence of further bleeding with the patient; although she is reluctant to undergo colonoscopy again given her recent hospitalization and fairly recent endoscopic evaluations, she did verbalize understanding that should colonoscopy be necessary to address bleeding, she is in agreement  · Hopefully, bleeding resolves and hemoglobin continues to improve in which case patient could safely be discharged with outpatient follow-up  · However, if there is recurrent bleeding, we will discuss possible repeat colonoscopy this admission  · Okay to advance diet to full liquids for today    2. Colits, ischemic vs Crohn's - was taking budesonide empirically as outpatient. Not currently on budesonide this admission. It is unclear if this is truly representative of Crohn's disease versus possible ischemic colitis. · Hold budesonide for now  · Would hold off on prednisone at this time as well  · Management of hematochezia as noted above  · Patient should resume budesonide upon discharge  · Tentative plan for repeat colonoscopy as outpatient in 6 months to document complete healing  · If this is ischemic colitis, I would anticipate that this will heal on its own as well with supportive care  · Follow clinically  · Avoid constipation as this can worsen potential ischemia if this is truly ischemic colitis  · Avoid NSAIDs    3. Anemia - hemoglobin 9.5 this afternoon although did show initial drop to 8.5 early this morning. Secondary to above. Overall improving. · Continue to monitor blood counts  · Transfuse as needed for hemoglobin less than 7  · Management otherwise as noted above    4. Cirrhosis - new diagnosis as of 5/2023 of unclear etiology. Biopsies showed significant mixed portal inflammation and ductular reaction, minimal lobular inflammation, mild chronic cholestasis, mild portal expansion and periportal fibrosis, no significant steatosis. Autoimmune work-up was negative. Patient's MELD Score is: 9    MELD Score 90-day mortality   ?9 1.9%   10-19 6.0%   20-29 19.6%   30-39 52.6%   ? 40 71.3%     MELD Score Component Values:  Component Value Date   Creatinine: 1.16 mg/dL 7/29/2023   Dialysis at least twice   in the past week  Or CVVHD for ? 24 hours   in the past week:     No    Bilirubin, total: 0.91 mg/dL 7/28/2023   INR: 1.1 7/28/2023   Sodium: 134 mmol/L 7/29/2023     · Ascites:  · No evidence of ascites  · Follow clinically  · Avoid NSAIDs  · Avoid shellfish  · Portal hypertension:  · EGD on 5/11/2023 with notable tiny varices at the GE junction with mild portal hypertensive gastropathy  · No indication for nonselective beta-blockade at this time  · Follow clinically  · PSE:  · No evidence of encephalopathy  · Follow clinically  · [address] screening:  · AFP on 5/8/2023 within normal limits  · MRI on 5/15/2023 with morphologic features of cirrhosis without suspicious mass  · Continue HCC screening every 6 months with dedicated liver ultrasound and AFP level  · Transplant candidacy:  · Outpatient follow-up with hepatology in August    Recommendations discussed with primary team via [facility] text. HISTORY OF PRESENT ILLNESS       Poly Colby is a 69 y.o. female with complaint of episode of hematochezia. Patient has past medical history significant for CAD status post CABG in 2021, compensated cirrhosis of unclear etiology (diagnosed 5/2023), and questionable Crohn's disease versus ischemic colitis with recent hospitalization for similar presentation. Patient was recently hospitalized with concern of hematochezia. During that hospitalization, she underwent endoscopic evaluation. Colonoscopy on 7/13 revealed erythematous, friable, and ulcerated mucosa in the cecum and ascending colon. Biopsies were obtained. Cecal biopsies showed severe chronic active colitis. CMV immunostain was negative. Features included cryptitis, ulceration, and architectural distortion possibly from ischemia versus IBD versus drug-induced colitis. Biopsy of the ascending colon showed ischemic colitis pattern. Patient was empirically started on budesonide for concern for underlying Crohn's disease and was eventually discharged. However, she presented back from her facility with a reported episode of bright red blood per rectum. In the ED, patient was shown to have stable hemoglobin although this may have been slightly hemoconcentrated. She also had evidence of acute kidney injury. Occult blood was positive.   Patient was admitted following discussion with ED for further monitoring and care. As of this morning, patient reports that she has had small amounts of possible bright red blood upon wiping but no significant episodes of hematochezia. Hemoglobin did show a slight decrease this morning to 8.5 but has since improved to 9.5. She denies any significant abdominal pain but she does have some left-sided abdominal discomfort. She also notes back pain. She denies any nausea or vomiting, dysphagia or odynophagia.     REVIEW OF SYSTEMS     CONSTITUTIONAL: Denies any fever, chills, rigors, and weight loss  HEENT: No earache or tinnitus, denies hearing loss or visual disturbances  CARDIOVASCULAR: No chest pain or palpitations  RESPIRATORY: Denies any cough, hemoptysis, shortness of breath or dyspnea on exertion  GASTROINTESTINAL: As noted in the History of Present Illness  GENITOURINARY: No problems with urination, denies any hematuria or dysuria  NEUROLOGIC: No dizziness or vertigo, denies headaches   MUSCULOSKELETAL: +back pain   SKIN: Denies skin rashes or itching  ENDOCRINE: Denies excessive thirst, denies intolerance to heat or cold  PSYCHOSOCIAL: Denies depression or anxiety, denies any recent memory loss     Historical Information   Past Medical History:   Diagnosis Date   • Anemia    • Atrial fibrillation (HCC)    • Depression    • GI (gastrointestinal bleed)    • Hypomagnesemia 4/29/2023   • Ischemic colitis [facility])      Past Surgical History:   Procedure Laterality Date   • APPENDECTOMY     • CARDIAC SURGERY     • CHOLECYSTECTOMY     • COLONOSCOPY  07/13/2023   • FLEXIBLE SIGMOIDOSCOPY  07/12/2023   • HIP SURGERY Right 2022    Partial replacement   • HYSTERECTOMY     • IR BIOPSY TRANSJUGULAR LIVER  05/18/2023     Social History   Social History     Substance and Sexual Activity   Alcohol Use Never     Social History     Substance and Sexual Activity   Drug Use Never     Social History     Tobacco Use   Smoking Status Former   • Packs/day: 0.50   • Types: Cigarettes   • Quit date: 1993   • Years since quittin.2   Smokeless Tobacco Never     Family History   Problem Relation Age of Onset   • No Known Problems Mother        MEDICATIONS & ALLERGIES     Meds/Allergies   (Not in a hospital admission)    Current Facility-Administered Medications   Medication Dose Route Frequency   • acetaminophen (TYLENOL) tablet 650 mg  650 mg Oral Q4H PRN   • artificial tear (LUBRIFRESH P.M.) ophthalmic ointment 1 Application  1 Application Both Eyes BID   • levothyroxine tablet 75 mcg  75 mcg Oral Daily   • losartan (COZAAR) tablet 25 mg  25 mg Oral Daily   • metoprolol succinate (TOPROL-XL) 24 hr tablet 25 mg  25 mg Oral BID   • midodrine (PROAMATINE) tablet 2.5 mg  2.5 mg Oral TID AC   • ondansetron (ZOFRAN) injection 4 mg  4 mg Intravenous Q6H PRN   • pantoprazole (PROTONIX) EC tablet 40 mg  40 mg Oral Early Morning   • saccharomyces boulardii (FLORASTOR) capsule 250 mg  250 mg Oral BID   • traZODone (DESYREL) tablet 100 mg  100 mg Oral HS     Allergies   Allergen Reactions   • Ceftaroline GI Intolerance   • Diphenhydramine Hyperactivity   • Influenza Vaccines Other (See Comments) and Vomiting     nausea   • Lactose - Food Allergy Diarrhea       PHYSICAL EXAM     Objective   Blood pressure (!) 193/77, pulse 60, temperature 98.4 °F (36.9 °C), temperature source Temporal, resp. rate 18, height 4' 11" (1.499 m), weight 50.3 kg (111 lb), SpO2 96 %. Body mass index is 22.42 kg/m².   No intake or output data in the 24 hours ending 23 0848  Medication Administration - last 24 hours from 2023 0848 to 2023 0848       Date/Time Order Dose Route Action Action by     2023 1737 EDT iohexol (OMNIPAQUE) 350 MG/ML injection (SINGLE-DOSE) 100 mL 100 mL Intravenous Given Angelica Peters     2023 1853 EDT acetaminophen (TYLENOL) tablet 650 mg 650 mg Oral Given Scarlet Gagnon RN     2023 2209 EDT artificial tear (LUBRIFRESH P.M.) ophthalmic ointment 1 Application 1 Application Both Eyes Given Linda Ramirez     07/29/2023 5164 EDT levothyroxine tablet 75 mcg 75 mcg Oral Given Ivonne Alonso RN     07/28/2023 2207 EDT metoprolol succinate (TOPROL-XL) 24 hr tablet 25 mg 25 mg Oral Given STARR Ramirez     07/29/2023 2841 EDT pantoprazole (PROTONIX) EC tablet 40 mg 40 mg Oral Given Ivonne Alonso RN     07/29/2023 2953 EDT midodrine (PROAMATINE) tablet 2.5 mg 2.5 mg Oral Given Ivonne Alonso RN     07/28/2023 2209 EDT saccharomyces boulardii (FLORASTOR) capsule 250 mg 250 mg Oral Given STARR Ramirez     07/28/2023 2209 EDT traZODone (DESYREL) tablet 100 mg 100 mg Oral Given STARR Ramirez          General Appearance:   Alert, cooperative, no distress; pleasant   HEENT:   Normocephalic, atraumatic, anicteric     Lungs:   Equal chest rise, respirations unlabored    Heart:   Regular rate and rhythm   Abdomen:   Soft, mild discomfort on palpation in LUQ, non-distended; normal bowel sounds; no masses, no organomegaly    Rectal:   Deferred    Extremities:   No cyanosis, clubbing or edema    Neuro:    Moves all 4 extremities    Skin:   No jaundice, rashes, or lesions      Invasive Devices     Peripheral Intravenous Line  Duration           Peripheral IV 07/28/23 Left Arm <1 day                LABORATORY RESULTS     Admission on 07/28/2023   Component Date Value   • Protime 07/28/2023 14.2    • INR 07/28/2023 1.10    • PTT 07/28/2023 35    • WBC 07/28/2023 13.22 (H)    • RBC 07/28/2023 2.97 (L)    • Hemoglobin 07/28/2023 10.2 (L)    • Hematocrit 07/28/2023 30.7 (L)    • MCV 07/28/2023 103 (H)    • MCH 07/28/2023 34.3    • MCHC 07/28/2023 33.2    • RDW 07/28/2023 13.6    • MPV 07/28/2023 10.1    • Platelets 41/78/7926 150    • nRBC 07/28/2023 0    • Neutrophils Relative 07/28/2023 81 (H)    • Immat GRANS % 07/28/2023 1    • Lymphocytes Relative 07/28/2023 7 (L)    • Monocytes Relative 07/28/2023 11    • Eosinophils Relative 07/28/2023 0    • Basophils Relative 07/28/2023 0    • Neutrophils Absolute 07/28/2023 10.77 (H)    • Immature Grans Absolute 07/28/2023 0.09    • Lymphocytes Absolute 07/28/2023 0.88    • Monocytes Absolute 07/28/2023 1.44 (H)    • Eosinophils Absolute 07/28/2023 0.02    • Basophils Absolute 07/28/2023 0.02    • Sodium 07/28/2023 135    • Potassium 07/28/2023 4.3    • Chloride 07/28/2023 98    • CO2 07/28/2023 28    • ANION GAP 07/28/2023 9    • BUN 07/28/2023 42 (H)    • Creatinine 07/28/2023 1.33 (H)    • Glucose 07/28/2023 108    • Calcium 07/28/2023 8.4    • Corrected Calcium 07/28/2023 9.3    • AST 07/28/2023 33    • ALT 07/28/2023 25    • Alkaline Phosphatase 07/28/2023 250 (H)    • Total Protein 07/28/2023 6.5    • Albumin 07/28/2023 2.9 (L)    • Total Bilirubin 07/28/2023 0.91    • eGFR 07/28/2023 38    • ABO Grouping 07/28/2023 O    • Rh Factor 07/28/2023 Positive    • Antibody Screen 07/28/2023 Negative    • Specimen Expiration Date 07/28/2023 [id number]    • Sodium 07/29/2023 134 (L)    • Potassium 07/29/2023 3.6    • Chloride 07/29/2023 99    • CO2 07/29/2023 29    • ANION GAP 07/29/2023 6    • BUN 07/29/2023 40 (H)    • Creatinine 07/29/2023 1.16    • Glucose 07/29/2023 94    • Glucose, Fasting 07/29/2023 94    • Calcium 07/29/2023 8.3 (L)    • eGFR 07/29/2023 45    • WBC 07/29/2023 10.41 (H)    • RBC 07/29/2023 2.52 (L)    • Hemoglobin 07/29/2023 8.5 (L)    • Hematocrit 07/29/2023 25.8 (L)    • MCV 07/29/2023 102 (H)    • MCH 07/29/2023 33.7    • MCHC 07/29/2023 32.9    • RDW 07/29/2023 13.3    • Platelets 70/10/0473 106 (L)    • MPV 07/29/2023 10.1      CT abdomen pelvis with contrast    Result Date: 7/28/2023  Narrative: CT ABDOMEN AND PELVIS WITH IV CONTRAST INDICATION:   LLQ abdominal pain L sided abdominal pain. Dark stools, colonic ulcerations on colonoscopy COMPARISON: CT abdomen and pelvis July 10, 2023 TECHNIQUE:  CT examination of the abdomen and pelvis was performed.  Multiplanar 2D reformatted images were created from the source data. This examination, like all CT scans performed in the [facility], was performed utilizing techniques to minimize radiation dose exposure, including the use of iterative reconstruction and automated exposure control. Radiation dose length product (DLP) for this visit:  554.13 mGy-cm IV Contrast:  100 mL of iohexol (OMNIPAQUE) Enteric Contrast:  Enteric contrast was not administered. FINDINGS: ABDOMEN LOWER CHEST: Bilateral basilar atelectasis unchanged. Coronary artery calcifications and atherosclerotic disease of the thoracic aorta. Hiatal hernia. LIVER/BILIARY TREE: Hepatic steatosis. Hepatic surface nodularity with hypertrophy of the caudate lobe suggesting underlying cirrhosis. No change in pneumobilia. GALLBLADDER:  No calcified gallstones. No pericholecystic inflammatory change. SPLEEN:  Unremarkable. 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 Measurements reported on MRI were an underestimate. A tubular low-attenuation structure extends in the pancreatic tail from the level of the cyst which appears to be multilocular. ADRENAL GLANDS:  Unremarkable. KIDNEYS/URETERS: Renal vascular calcifications. No hydronephrosis. STOMACH AND BOWEL: There is suggestion of some thickening of the gastric wall. Hyperemia of the gastric wall is demonstrated which can be secondary to underlying portal hypertension or gastritis. Small bowel loops show normal caliber. Colonic diverticulosis is seen without associated diverticulitis. APPENDIX: History of appendectomy. ABDOMINOPELVIC CAVITY:  No ascites. No pneumoperitoneum. No lymphadenopathy. VESSELS:  Atherosclerotic changes are present. No evidence of aneurysm. PELVIS REPRODUCTIVE ORGANS: Hysterectomy. Pelvic wall floor relaxation. URINARY BLADDER:  Unremarkable. ABDOMINAL WALL/INGUINAL REGIONS: Body wall edema. OSSEOUS STRUCTURES: Total right hip replacement. Osteoarthritis of the left hip.  Lumbar spondylosis with old compression fractures at L1, L4 and L5 as well as compression fractures at T11 and T12. Impression: 1. Gastric wall thickening with hyperemia which could be related to portal hypertension or underlying gastritis of other etiologies. 2. No inflammatory changes or bowel obstruction. 3. Cirrhosis. 4. 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 The study was marked in [facility] for immediate notification. . Workstation performed: [id number]     Colonoscopy    Addendum Date: 7/13/2023 Addendum:   [place] [id number] [address]: 7/13/23 PHYSICIAN(S): Attending: Jazmín Bray MD Fellow: No Staff Documented INDICATION: Rectal bleeding POST-OP DIAGNOSIS: See the impression below. HISTORY: Prior colonoscopy: Less than 3 years ago. It is being repeated at an interval of less than 3 years because: This colonoscopy is being performed for a diagnostic indication BOWEL PREPARATION: Golytely/Colyte/Trilyte PREPROCEDURE: Informed consent was obtained for the procedure, including sedation. Risks including but not limited to bleeding, infection, perforation, adverse drug reaction and aspiration were explained in detail. Also explained about less than 100% sensitivity with the exam and other alternatives. The patient was placed in the left lateral decubitus position. Procedure: Colonoscopy DETAILS OF PROCEDURE: Patient was taken to the procedure room where a time out was performed to confirm correct patient and correct procedure. The patient underwent monitored anesthesia care, which was administered by an anesthesia professional. The patient's blood pressure, heart rate, level of consciousness, oxygen, respirations and ECG were monitored throughout the procedure. A digital rectal exam was performed. The scope was introduced through the anus and advanced to the cecum. Retroflexion was performed in the rectum. The quality of bowel preparation was evaluated using the [facility] Bowel Preparation Scale with scores of: right colon = 2, transverse colon = 2, left colon = 2. The total BBPS score was 6. Bowel prep was adequate. The patient experienced no blood loss. The procedure was not difficult. The patient tolerated the procedure well. There were no apparent adverse events.  ANESTHESIA INFORMATION: ASA: III Anesthesia Type: IV Sedation with Anesthesia MEDICATIONS: No administrations occurring from 1029 to 1246 on 07/13/23 FINDINGS: Moderate, localized erythematous, friable and ulcerated mucosa in the cecum and ascending colon; performed 8 cold forceps biopsies to rule out IBD Diverticula of moderate severity in the sigmoid colon The ileocecal valve, hepatic flexure, transverse colon, splenic flexure, descending colon, rectosigmoid and rectum appeared normal. Performed multiple forceps biopsies in the rectosigmoid EVENTS: Procedure Events Event Event Time ENDO CECUM REACHED 7/13/2023 12:16 PM ENDO SCOPE OUT TIME 7/13/2023 12:36 PM SPECIMENS: ID Type Source Tests Collected by Time Destination 1 : bx's Tissue Large Intestine, Cecum TISSUE EXAM Aye Watters MD 7/13/2023 12:22 PM  2 : bx Tissue Large Intestine, Right/Ascending Colon TISSUE EXAM Aye Watters MD 7/13/2023 12:27 PM  3 : recto-sigmoid bx Tissue Rectum TISSUE EXAM Aye Watters MD 7/13/2023 12:32 PM  EQUIPMENT: Colonoscope -[id number]-H190L IMPRESSION: Moderate, localized erythematous, friable and ulcerated mucosa in the cecum and ascending colon; performed cold forceps biopsies to rule out IBD Diverticulosis of moderate severity in the sigmoid colon The ileocecal valve, hepatic flexure, transverse colon, splenic flexure, descending colon, rectosigmoid and rectum appeared normal. Performed forceps biopsies in the rectosigmoid RECOMMENDATION:  Repeat colonoscopy in 6 months  Inflammatory bowel disease  If biopsies are not consistent with inflammatory bowel disease and symptoms resolved, then repeat colonoscopy is not necessary. Return to floor and resume diet. Follow-up in our office for further evaluation and management. If you don't have an appointment scheduled, please call [phone] to schedule your appointment. Lara Shelton MD     Result Date: 7/13/2023  Narrative: Table formatting from the original result was not included. [address]  [place] [id number] [address]: 7/13/23 PHYSICIAN(S): Attending: Lara Shelton MD Fellow: No Staff Documented INDICATION: Rectal bleeding POST-OP DIAGNOSIS: See the impression below. HISTORY: Prior colonoscopy: Less than 3 years ago. It is being repeated at an interval of less than 3 years because: This colonoscopy is being performed for a diagnostic indication BOWEL PREPARATION: Golytely/Colyte/Trilyte PREPROCEDURE: Informed consent was obtained for the procedure, including sedation. Risks including but not limited to bleeding, infection, perforation, adverse drug reaction and aspiration were explained in detail. Also explained about less than 100% sensitivity with the exam and other alternatives. The patient was placed in the left lateral decubitus position. Procedure: Colonoscopy DETAILS OF PROCEDURE: Patient was taken to the procedure room where a time out was performed to confirm correct patient and correct procedure. The patient underwent monitored anesthesia care, which was administered by an anesthesia professional. The patient's blood pressure, heart rate, level of consciousness, oxygen, respirations and ECG were monitored throughout the procedure. A digital rectal exam was performed. The scope was introduced through the anus and advanced to the cecum. Retroflexion was performed in the rectum.  The quality of bowel preparation was evaluated using the [facility] Bowel Preparation Scale with scores of: right colon = 2, transverse colon = 2, left colon = 2. The total BBPS score was 6. Bowel prep was adequate. The patient experienced no blood loss. The procedure was not difficult. The patient tolerated the procedure well. There were no apparent adverse events. ANESTHESIA INFORMATION: ASA: III Anesthesia Type: IV Sedation with Anesthesia MEDICATIONS: No administrations occurring from 1029 to 1246 on 07/13/23 FINDINGS: Moderate, localized erythematous, friable and ulcerated mucosa in the cecum and ascending colon; performed 8 cold forceps biopsies to rule out IBD Diverticula of moderate severity in the sigmoid colon The ileocecal valve, hepatic flexure, transverse colon, splenic flexure, descending colon, rectosigmoid and rectum appeared normal. Performed multiple forceps biopsies in the rectosigmoid EVENTS: Procedure Events Event Event Time ENDO CECUM REACHED 7/13/2023 12:16 PM ENDO SCOPE OUT TIME 7/13/2023 12:36 PM SPECIMENS: ID Type Source Tests Collected by Time Destination 1 : bx's Tissue Large Intestine, Cecum TISSUE EXAM Reva Molina MD 7/13/2023 12:22 PM  2 : bx Tissue Large Intestine, Right/Ascending Colon TISSUE EXAM Reva Molina MD 7/13/2023 12:27 PM  3 : recto-sigmoid bx Tissue Rectum TISSUE EXAM Reva Molina MD 7/13/2023 12:32 PM  EQUIPMENT: Colonoscope -[id number]-[id number]     Impression: Moderate, localized erythematous, friable and ulcerated mucosa in the cecum and ascending colon; performed cold forceps biopsies to rule out IBD Diverticulosis of moderate severity in the sigmoid colon The ileocecal valve, hepatic flexure, transverse colon, splenic flexure, descending colon, rectosigmoid and rectum appeared normal. Performed forceps biopsies in the rectosigmoid RECOMMENDATION:  Repeat colonoscopy in 6 months  Inflammatory bowel disease  If biopsies are not consistent with inflammatory bowel disease and symptoms resolved, then repeat colonoscopy is not necessary.   Reva Molina MD     Flexible Sigmoidoscopy    Result Date: 7/12/2023  Narrative: Table formatting from the original result was not included. [address] [employer] [address] [place] [address]  [facility] [id number] [phone] DATE OF SERVICE: 7/12/23 PHYSICIAN(S): Attending: Kathi Fernandez MD Fellow: Rosa Elena Gutierrez DO INDICATION: Rectal bleeding POST-OP DIAGNOSIS: See the impression below. HISTORY: Prior colonoscopy: Less than 3 years ago. It is being repeated at an interval of less than 3 years because: This colonoscopy is being performed for a diagnostic indication BOWEL PREPARATION: Enema PREPROCEDURE: Informed consent was obtained for the procedure, including sedation. Risks including but not limited to bleeding, infection, perforation, adverse drug reaction and aspiration were explained in detail. Also explained about less than 100% sensitivity with the exam and other alternatives. The patient was placed in the left lateral decubitus position. Procedure: Colonoscopy DETAILS OF PROCEDURE: Patient was taken to the procedure room where a time out was performed to confirm correct patient and correct procedure. The patient underwent monitored anesthesia care, which was administered by an anesthesia professional. The patient's blood pressure, heart rate, level of consciousness, oxygen and respirations were monitored throughout the procedure. A digital rectal exam was performed. The scope was introduced through the anus and advanced to the sigmoid colon. Retroflexion was not performed due to narrow vault. The quality of bowel preparation was evaluated using the [facility] Bowel Preparation Scale with scores of: right colon = 0, transverse colon = 0, left colon = 1. Bowel prep was not adequate. The patient experienced no blood loss. The procedure was difficult due to patient discomfort. The patient tolerated the procedure well. There were no apparent adverse events. The total BBPS score was 1.  ANESTHESIA INFORMATION: ASA: III Anesthesia Type: IV Sedation with Anesthesia MEDICATIONS: sodium chloride 0.9 % infusion 100 mL*  *From user-documented volume (Totals for administrations occurring from 1451 to 1512 on 07/12/23) FINDINGS: Scope was advanced to proximal sigmoid colon-30 cm from the anal verge. Solid and liquid stool mixed with blood was seen along the way. Scope could not be advanced further at solid stool was completely obstructing the lumen. Likely source of bleeding is in the right side of the colon or superior. EVENTS: Procedure Events Event Event Time ENDO SCOPE OUT TIME 7/12/2023  3:09 PM SPECIMENS: * No specimens in log * EQUIPMENT: Colonoscope - flex sig done with the EGD scope     Impression: Scope was advanced to proximal sigmoid colon-30 cm from the anal verge. Solid and liquid stool mixed with blood was seen along the way. Scope could not be advanced further at solid stool was completely obstructing the lumen. Likely source of bleeding RECOMMENDATION:  There is no recommended follow-up for this procedure. Plan for colonoscopy tomorrow. Initiate prep. Clear liquid diet. N.p.o. after midnight. Guillermo Lemons MD     CT high volume bleeding scan abdomen pelvis    Result Date: 7/10/2023  Narrative: CT ABDOMEN AND PELVIS - WITHOUT AND WITH IV CONTRAST INDICATION:   Gi bleed. COMPARISON: CT abdomen/pelvis from 5/1/2023. TECHNIQUE:  CT examination of the abdomen and pelvis was performed both prior to and after the administration of intravenous contrast. Multiplanar 2D reformatted images were created from the source data. Radiation dose length product (DLP) for this visit:  1642.86 mGy-cm . This examination, like all CT scans performed in the [facility], was performed utilizing techniques to minimize radiation dose exposure, including the use of iterative reconstruction and automated exposure control. IV Contrast:  100 mL of iohexol (OMNIPAQUE) Enteric Contrast:  Enteric contrast was not administered.  FINDINGS: ABDOMEN BOWEL:There is no active extravasation of intravenous contrast into the lumen of stomach, small bowel, or large bowel loops. There is no abnormal bowel wall thickening or pathologic bowel wall enhancement. Stable hiatal hernia. Age-appropriate atherosclerotic disease in the mesenteric vessels, without complete occlusion. LOWER CHEST: Bibasilar atelectasis. Heart top normal size. LIVER/BILIARY TREE: The liver demonstrates cirrhotic morphology. Within the limitations of this examination there is no evidence of suspicious hepatic mass. No biliary dilatation. Stable pneumobilia. Portal vein is patent. GALLBLADDER: Removed. SPLEEN:  Unremarkable. PANCREAS: Stable cyst in the pancreatic tail measuring 8 mm. ADRENAL GLANDS:  Unremarkable. KIDNEYS/URETERS:  Unremarkable. No hydronephrosis. APPENDIX:  No findings to suggest appendicitis. ABDOMINOPELVIC CAVITY:  No ascites. No pneumoperitoneum. No lymphadenopathy. VESSELS: Atherosclerotic changes are present. No evidence of aneurysm. PELVIS REPRODUCTIVE ORGANS: Surgical changes of prior hysterectomy. URINARY BLADDER:  Unremarkable. ABDOMINAL WALL/INGUINAL REGIONS:  Unremarkable. OSSEOUS STRUCTURES: Multiple chronic vertebral body compression deformities for example L4 and L5 with interval development of compression deformity at L1 that is either chronic or subacute. Correlate for focal back pain. Intact right hip arthroplasty. Impression: No CT evidence of active high volume gastrointestinal hemorrhage. 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 Stable hiatal hernia. Stable cirrhosis. Multiple chronic vertebral body compression deformities for example L4 and L5 with interval development of compression deformity at L1 that is either chronic or subacute. Correlate for focal back pain. Workstation performed: [id number]       RADIOLOGY RESULTS: I have personally reviewed pertinent imaging studies. Nikita Suresh D.O.   [id number] [facility]  Division of Gastroenterology & Hepatology  Available on Read Shaq Holm@"[email or web address]    ** Please Note: This note is constructed using a voice recognition dictation system.  **

## 2023-07-29 NOTE — PROGRESS NOTES
[id number] [facility]  Progress Note  Name: Ron Beckham  MRN: [id number]  Unit/Bed#: ED 20 I Date of Admission: 7/28/2023   Date of Service: 7/29/2023 I Hospital Day: 0    Assessment/Plan   * BRBPR (bright red blood per rectum)  Assessment & Plan  Patient presented to the ED secondary to large bloody bowel movement at her assisted living facility. She was recently hospitalized for GI bleed and had a flex sigmoidoscopy on July 12 and colonoscopy on July 13. Possible Crohn's disease. CT with no acute findings. ED reviewed w/ GI and may need repeat colonoscopy  · Hemoglobin 10.2 on arrival  · Hemoglobin this a.m. 8.5  · CT: Gastric wall thickening with hyperemia which could be related to portal hypertension or underlying gastritis of other etiologies. No inflammatory changes or bowel obstruction. Cirrhosis. 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 The study was marked in [facility] for immediate notification. · No signs of active bleeding currently, however, patient states that he did have a bowel movement this a.m. with some bleeding. She states that the blood was not bright red, "was on the darker side"  · GI consulted, appreciate recommendations  · We will continue to monitor H/H closely, every 6 hours next H&H at noon  · Continue to monitor for bleeding  · Continue IV Protonix  · Patient was on clear liquids, increase to full liquids by GI    Stage 3b chronic kidney disease [facility])  Assessment & Plan  Lab Results   Component Value Date    EGFR 45 07/29/2023    EGFR 38 07/28/2023    EGFR 52 07/14/2023    CREATININE 1.16 07/29/2023    CREATININE 1.33 (H) 07/28/2023    CREATININE 1.04 07/14/2023   · Creatinine on arrival 1.33  · Patient's baseline creatinine appears to be around 1.2  · Currently better than baseline  · Continue to avoid nephrotoxins and hypotension  · BMP a.m.       Primary hypertension  Assessment & Plan  · Blood pressure slightly elevated today   · Continue home losartan and metoprolol   · Will add as needed hydralazine for systolic blood pressure over 170  · Continue to monitor BP per protocol    PAF (paroxysmal atrial fibrillation) ([facility])  Assessment & Plan  · Currently rate controlled   · Continue metoprolol with hold parameters   · Patient is typically on aspirin, will hold for now    SIADH (syndrome of inappropriate ADH production) ([facility])  Assessment & Plan  · Sodium 135 on arrival  · Serum sodium today 134  · Patient follows with nephrology in outpatient setting, will continue on discharge  · BMP a.m. Acquired hypothyroidism  Assessment & Plan  · Continue levothyroxine    Bilateral lower extremity edema  Assessment & Plan  · Appears euvolemic on exam  · We will hold home Bumex for now  · Encourage elevation of legs when out of bed in chair             VTE Pharmacologic Prophylaxis:   Pharmacologic: Pharmacologic VTE Prophylaxis contraindicated due to Presented with bright red bleeding from rectum  Mechanical VTE Prophylaxis in Place: Yes    Patient Centered Rounds: I have performed bedside rounds with nursing staff today. Discussions with Specialists or Other Care Team Provider: GI, nursing, case management    Education and Discussions with Family / Patient: Treatment plan discussed with patient who understands the plan as has been explained and is agreeable to the plan as stated. All questions answered to satisfaction. Time Spent for Care: 40 minutes. More than 50% of total time spent on counseling and coordination of care as described above. Current Length of Stay: 0 day(s)    Current Patient Status: Observation   Certification Statement: The patient will continue to require additional inpatient hospital stay due to Frequent monitoring of H/H, monitoring for bleeding, IV PPI    Discharge Plan: Patient is from personal-care home. She presented with bright red rectal bleeding last evening. There was a drop in her hemoglobin this morning from 10.2 yesterday to 8.5.  GI is following. We will continue to monitor H/H closely (every 6 hours) and monitor for further bleeding. GI is following, patient did have recent colonoscopy  to possible Crohn's. GI explained to her if she has further bleeding may need to repeat colonoscopy. Code Status: Level 3 - DNAR and DNI      Subjective:   Pleasant, denies pain or discomfort. Denies any nausea. Tolerating clear liquids. Did report some rectal bleeding this morning, unwitnessed by myself, patient stated it was "on the darker side"    Objective:     Vitals:   Temp (24hrs), Av.7 °F (37.1 °C), Min:98.4 °F (36.9 °C), Max:98.9 °F (37.2 °C)    Temp:  [98.4 °F (36.9 °C)-98.9 °F (37.2 °C)] 98.4 °F (36.9 °C)  HR:  [60-82] 60  Resp:  [16-18] 18  BP: (132-193)/(58-80) 171/76  SpO2:  [95 %-97 %] 96 %  Body mass index is 22.42 kg/m². Input and Output Summary (last 24 hours):     No intake or output data in the 24 hours ending 23 1000    Physical Exam:     Physical Exam  Constitutional:       General: She is not in acute distress. Appearance: Normal appearance. She is normal weight. She is not ill-appearing. HENT:      Head: Normocephalic and atraumatic. Nose: Nose normal.      Mouth/Throat:      Mouth: Mucous membranes are moist.   Cardiovascular:      Rate and Rhythm: Normal rate and regular rhythm. Pulses: Normal pulses. Heart sounds: Normal heart sounds. Pulmonary:      Effort: Pulmonary effort is normal. No respiratory distress. Breath sounds: Normal breath sounds. No wheezing or rales. Abdominal:      General: Bowel sounds are normal. There is no distension. Palpations: Abdomen is soft. Tenderness: There is no abdominal tenderness. There is no guarding. Musculoskeletal:      Right lower leg: Edema present. Left lower leg: Edema present. Skin:     General: Skin is warm and dry.       Capillary Refill: Capillary refill takes less than 2 seconds. Neurological:      General: No focal deficit present. Mental Status: She is alert and oriented to person, place, and time. Mental status is at baseline. Psychiatric:         Mood and Affect: Mood normal.         Behavior: Behavior normal.         Thought Content: Thought content normal.         Judgment: Judgment normal.         Additional Data:     Labs:    Results from last 7 days   Lab Units 07/29/23  0651 07/28/23  1649   WBC Thousand/uL 10.41* 13.22*   HEMOGLOBIN g/dL 8.5* 10.2*   HEMATOCRIT % 25.8* 30.7*   PLATELETS Thousands/uL 106* 150   NEUTROS PCT %  --  81*   LYMPHS PCT %  --  7*   MONOS PCT %  --  11   EOS PCT %  --  0     Results from last 7 days   Lab Units 07/29/23  0651 07/28/23  1649   POTASSIUM mmol/L 3.6 4.3   CHLORIDE mmol/L 99 98   CO2 mmol/L 29 28   BUN mg/dL 40* 42*   CREATININE mg/dL 1.16 1.33*   CALCIUM mg/dL 8.3* 8.4   ALK PHOS U/L  --  250*   ALT U/L  --  25   AST U/L  --  33     Results from last 7 days   Lab Units 07/28/23  1649   INR  1.10       * I Have Reviewed All Lab Data Listed Above. * Additional Pertinent Lab Tests Reviewed:  [address] Admission Reviewed    Imaging:    Imaging Reports Reviewed Today Include: CT abdomen pelvis  Imaging Personally Reviewed by Myself Includes: CT abdomen pelvis    Recent Cultures (last 7 days):           Last 24 Hours Medication List:   Current Facility-Administered Medications   Medication Dose Route Frequency Provider Last Rate   • acetaminophen  650 mg Oral Q4H PRN Montez Kennedy PA-C     • artificial tear  1 Application Both Eyes BID Montez Kennedy PA-C     • levothyroxine  75 mcg Oral Daily Montez Kennedy PA-C     • losartan  25 mg Oral Daily Montez Kennedy PA-C     • metoprolol succinate  25 mg Oral BID Montez Kennedy PA-C     • midodrine  2.5 mg Oral TID AC Doreen Bowles PA-C     • ondansetron  4 mg Intravenous Q6H PRN Nikki Perea PA-C     • pantoprazole  40 mg Oral Early Morning Nikki Perea PA-C     • saccharomyces boulardii  250 mg Oral BID Nikki Perea PA-C     • traZODone  100 mg Oral HS Nikki Perea PA-C          Today, Patient Was Seen By: EBEN Montoya    ** Please Note: Dictation voice to text software may have been used in the creation of this document.  **

## 2023-07-30 PROBLEM — M54.50 CHRONIC LOW BACK PAIN: Status: ACTIVE | Noted: 2023-07-30

## 2023-07-30 PROBLEM — G89.29 CHRONIC LOW BACK PAIN: Status: ACTIVE | Noted: 2023-07-30

## 2023-07-30 LAB
ANION GAP SERPL CALCULATED.3IONS-SCNC: 5 MMOL/L
BUN SERPL-MCNC: 35 MG/DL (ref 5–25)
CALCIUM SERPL-MCNC: 8 MG/DL (ref 8.4–10.2)
CHLORIDE SERPL-SCNC: 98 MMOL/L (ref 96–108)
CO2 SERPL-SCNC: 28 MMOL/L (ref 21–32)
CREAT SERPL-MCNC: 1.16 MG/DL (ref 0.6–1.3)
GFR SERPL CREATININE-BSD FRML MDRD: 45 ML/MIN/1.73SQ M
GLUCOSE SERPL-MCNC: 78 MG/DL (ref 65–140)
HCT VFR BLD AUTO: 25.2 % (ref 34.8–46.1)
HCT VFR BLD AUTO: 25.3 % (ref 34.8–46.1)
HCT VFR BLD AUTO: 28.4 % (ref 34.8–46.1)
HGB BLD-MCNC: 8.4 G/DL (ref 11.5–15.4)
HGB BLD-MCNC: 8.4 G/DL (ref 11.5–15.4)
HGB BLD-MCNC: 9.3 G/DL (ref 11.5–15.4)
POTASSIUM SERPL-SCNC: 3.6 MMOL/L (ref 3.5–5.3)
SODIUM SERPL-SCNC: 131 MMOL/L (ref 135–147)

## 2023-07-30 PROCEDURE — 99232 SBSQ HOSP IP/OBS MODERATE 35: CPT | Performed by: STUDENT IN AN ORGANIZED HEALTH CARE EDUCATION/TRAINING PROGRAM

## 2023-07-30 PROCEDURE — 85018 HEMOGLOBIN: CPT

## 2023-07-30 PROCEDURE — 99233 SBSQ HOSP IP/OBS HIGH 50: CPT

## 2023-07-30 PROCEDURE — 80048 BASIC METABOLIC PNL TOTAL CA: CPT

## 2023-07-30 PROCEDURE — 85014 HEMATOCRIT: CPT

## 2023-07-30 RX ORDER — BUMETANIDE 1 MG/1
2 TABLET ORAL DAILY
Status: DISCONTINUED | OUTPATIENT
Start: 2023-07-30 | End: 2023-08-01 | Stop reason: HOSPADM

## 2023-07-30 RX ADMIN — BUMETANIDE 2 MG: 1 TABLET ORAL at 09:37

## 2023-07-30 RX ADMIN — WHITE PETROLATUM 57.7 %-MINERAL OIL 31.9 % EYE OINTMENT 1 APPLICATION: at 09:38

## 2023-07-30 RX ADMIN — ACETAMINOPHEN 650 MG: 325 TABLET ORAL at 07:39

## 2023-07-30 RX ADMIN — ACETAMINOPHEN 650 MG: 325 TABLET ORAL at 19:52

## 2023-07-30 RX ADMIN — PANTOPRAZOLE SODIUM 40 MG: 40 TABLET, DELAYED RELEASE ORAL at 07:39

## 2023-07-30 RX ADMIN — LOSARTAN POTASSIUM 25 MG: 25 TABLET, FILM COATED ORAL at 09:37

## 2023-07-30 RX ADMIN — Medication 250 MG: at 17:20

## 2023-07-30 RX ADMIN — METOPROLOL SUCCINATE 25 MG: 50 TABLET, EXTENDED RELEASE ORAL at 17:20

## 2023-07-30 RX ADMIN — TRAZODONE HYDROCHLORIDE 100 MG: 100 TABLET ORAL at 21:41

## 2023-07-30 RX ADMIN — WHITE PETROLATUM 57.7 %-MINERAL OIL 31.9 % EYE OINTMENT 1 APPLICATION: at 21:41

## 2023-07-30 RX ADMIN — LEVOTHYROXINE SODIUM 75 MCG: 75 TABLET ORAL at 05:41

## 2023-07-30 RX ADMIN — Medication 250 MG: at 09:37

## 2023-07-30 RX ADMIN — METOPROLOL SUCCINATE 25 MG: 50 TABLET, EXTENDED RELEASE ORAL at 09:38

## 2023-07-30 NOTE — PLAN OF CARE
Problem: PAIN - ADULT  Goal: Verbalizes/displays adequate comfort level or baseline comfort level  Description: Interventions:  - Encourage patient to monitor pain and request assistance  - Assess pain using appropriate pain scale  - Administer analgesics based on type and severity of pain and evaluate response  - Implement non-pharmacological measures as appropriate and evaluate response  - Consider cultural and social influences on pain and pain management  - Notify physician/advanced practitioner if interventions unsuccessful or patient reports new pain  Outcome: Progressing   Aqua k pad placed per order for pt c/o back pain, chronic.

## 2023-07-30 NOTE — PLAN OF CARE
Problem: PAIN - ADULT  Goal: Verbalizes/displays adequate comfort level or baseline comfort level  Description: Interventions:  - Encourage patient to monitor pain and request assistance  - Assess pain using appropriate pain scale  - Administer analgesics based on type and severity of pain and evaluate response  - Implement non-pharmacological measures as appropriate and evaluate response  - Consider cultural and social influences on pain and pain management  - Notify physician/advanced practitioner if interventions unsuccessful or patient reports new pain  Outcome: Progressing     Problem: INFECTION - ADULT  Goal: Absence or prevention of progression during hospitalization  Description: INTERVENTIONS:  - Assess and monitor for signs and symptoms of infection  - Monitor lab/diagnostic results  - Monitor all insertion sites, i.e. indwelling lines, tubes, and drains  - Monitor endotracheal if appropriate and nasal secretions for changes in amount and color  - [place] appropriate cooling/warming therapies per order  - Administer medications as ordered  - Instruct and encourage patient and family to use good hand hygiene technique  - Identify and instruct in appropriate isolation precautions for identified infection/condition  Outcome: Progressing  Goal: Absence of fever/infection during neutropenic period  Description: INTERVENTIONS:  - Monitor WBC    Outcome: Progressing     Problem: SAFETY ADULT  Goal: Patient will remain free of falls  Description: INTERVENTIONS:  - Educate patient/family on patient safety including physical limitations  - Instruct patient to call for assistance with activity   - Consult OT/PT to assist with strengthening/mobility   - Keep Call bell within reach  - Keep bed low and locked with side rails adjusted as appropriate  - Keep care items and personal belongings within reach  - Initiate and maintain comfort rounds  - Make Fall Risk Sign visible to staff  - Offer Toileting every 2 Hours, in advance of need  - Initiate/Maintain 2alarm  - Obtain necessary fall risk management equipment: 2  - Apply yellow socks and bracelet for high fall risk patients  - Consider moving patient to room near nurses station  Outcome: Progressing  Goal: Maintain or return to baseline ADL function  Description: INTERVENTIONS:  -  Assess patient's ability to carry out ADLs; assess patient's baseline for ADL function and identify physical deficits which impact ability to perform ADLs (bathing, care of mouth/teeth, toileting, grooming, dressing, etc.)  - Assess/evaluate cause of self-care deficits   - Assess range of motion  - Assess patient's mobility; develop plan if impaired  - Assess patient's need for assistive devices and provide as appropriate  - Encourage maximum independence but intervene and supervise when necessary  - Involve family in performance of ADLs  - Assess for home care needs following discharge   - Consider OT consult to assist with ADL evaluation and planning for discharge  - Provide patient education as appropriate  Outcome: Progressing  Goal: Maintains/Returns to pre admission functional level  Description: INTERVENTIONS:  - Perform BMAT or MOVE assessment daily.   - Set and communicate daily mobility goal to care team and patient/family/caregiver. - Collaborate with rehabilitation services on mobility goals if consulted  - Perform Range of Motion 2 times a day. - Reposition patient every 2 hours.   - Dangle patient 2 times a day  - Stand patient 2 times a day  - Ambulate patient 2 times a day  - Out of bed to chair 2 times a day   - Out of bed for meals 2 times a day  - Out of bed for toileting  - Record patient progress and toleration of activity level   Outcome: Progressing     Problem: DISCHARGE PLANNING  Goal: Discharge to home or other facility with appropriate resources  Description: INTERVENTIONS:  - Identify barriers to discharge w/patient and caregiver  - Arrange for needed discharge resources and transportation as appropriate  - Identify discharge learning needs (meds, wound care, etc.)  - Arrange for interpretive services to assist at discharge as needed  - Refer to Case Management Department for coordinating discharge planning if the patient needs post-hospital services based on physician/advanced practitioner order or complex needs related to functional status, cognitive ability, or social support system  Outcome: Progressing     Problem: Knowledge Deficit  Goal: Patient/family/caregiver demonstrates understanding of disease process, treatment plan, medications, and discharge instructions  Description: Complete learning assessment and assess knowledge base.   Interventions:  - Provide teaching at level of understanding  - Provide teaching via preferred learning methods  Outcome: Progressing     Problem: GASTROINTESTINAL - ADULT  Goal: Minimal or absence of nausea and/or vomiting  Description: INTERVENTIONS:  - Administer IV fluids if ordered to ensure adequate hydration  - Maintain NPO status until nausea and vomiting are resolved  - Nasogastric tube if ordered  - Administer ordered antiemetic medications as needed  - Provide nonpharmacologic comfort measures as appropriate  - Advance diet as tolerated, if ordered  - Consider nutrition services referral to assist patient with adequate nutrition and appropriate food choices  Outcome: Progressing  Goal: Maintains or returns to baseline bowel function  Description: INTERVENTIONS:  - Assess bowel function  - Encourage oral fluids to ensure adequate hydration  - Administer IV fluids if ordered to ensure adequate hydration  - Administer ordered medications as needed  - Encourage mobilization and activity  - Consider nutritional services referral to assist patient with adequate nutrition and appropriate food choices  Outcome: Progressing  Goal: Maintains adequate nutritional intake  Description: INTERVENTIONS:  - Monitor percentage of each meal consumed  - Identify factors contributing to decreased intake, treat as appropriate  - Assist with meals as needed  - Monitor I&O, weight, and lab values if indicated  - Obtain nutrition services referral as needed  Outcome: Progressing  Goal: Oral mucous membranes remain intact  Description: INTERVENTIONS  - Assess oral mucosa and hygiene practices  - Implement preventative oral hygiene regimen  - Implement oral medicated treatments as ordered  - Initiate Nutrition services referral as needed  Outcome: Progressing     Problem: GENITOURINARY - ADULT  Goal: Maintains or returns to baseline urinary function  Description: INTERVENTIONS:  - Assess urinary function  - Encourage oral fluids to ensure adequate hydration if ordered  - Administer IV fluids as ordered to ensure adequate hydration  - Administer ordered medications as needed  - Offer frequent toileting  - Follow urinary retention protocol if ordered  Outcome: Progressing     Problem: METABOLIC, FLUID AND ELECTROLYTES - ADULT  Goal: Electrolytes maintained within normal limits  Description: INTERVENTIONS:  - Monitor labs and assess patient for signs and symptoms of electrolyte imbalances  - Administer electrolyte replacement as ordered  - Monitor response to electrolyte replacements, including repeat lab results as appropriate  - Instruct patient on fluid and nutrition as appropriate  Outcome: Progressing     Problem: SKIN/TISSUE INTEGRITY - ADULT  Goal: Skin Integrity remains intact(Skin Breakdown Prevention)  Description: Assess:  -Perform Elvis assessment every 2  -Clean and moisturize skin every 2  -Inspect skin when repositioning, toileting, and assisting with ADLS  -Assess under medical devices such as 2 every 2  -Assess extremities for adequate circulation and sensation     Bed Management:  -Have minimal linens on bed & keep smooth, unwrinkled  -Change linens as needed when moist or perspiring  -Avoid sitting or lying in one position for more than 2 hours while in bed  -Keep HOB at 2degrees     Toileting:  -Offer bedside commode  -Assess for incontinence every 2  -Use incontinent care products after each incontinent episode such as 2    Activity:  -Mobilize patient 2 times a day  -Encourage activity and walks on unit  -Encourage or provide ROM exercises   -Turn and reposition patient every 2 Hours  -Use appropriate equipment to lift or move patient in bed  -Instruct/ Assist with weight shifting every 2 when out of bed in chair  -Consider limitation of chair time 2 hour intervals    Skin Care:  -Avoid use of baby powder, tape, friction and shearing, hot water or constrictive clothing  -Relieve pressure over bony prominences using 2  -Do not massage red bony areas    Next Steps:  -Teach patient strategies to minimize risks such as 2   -Consider consults to  interdisciplinary teams such as 2  Outcome: Progressing  Goal: Incision(s), wounds(s) or drain site(s) healing without S/S of infection  Description: INTERVENTIONS  - Assess and document dressing, incision, wound bed, drain sites and surrounding tissue  - Provide patient and family education  - Perform skin care/dressing changes every 2  Outcome: Progressing  Goal: Pressure injury heals and does not worsen  Description: Interventions:  - Implement low air loss mattress or specialty surface (Criteria met)  - Apply silicone foam dressing  - Instruct/assist with weight shifting every 2 minutes when in chair   - Limit chair time to 2 hour intervals  - Use special pressure reducing interventions such as 2 when in chair   - Apply fecal or urinary incontinence containment device   - Perform passive or active ROM every 2  - Turn and reposition patient & offload bony prominences every 2 hours   - Utilize friction reducing device or surface for transfers   - Consider consults to  interdisciplinary teams such as 2  - Use incontinent care products after each incontinent episode such as 2  - Consider nutrition services referral as needed  Outcome: Progressing     Problem: HEMATOLOGIC - ADULT  Goal: Maintains hematologic stability  Description: INTERVENTIONS  - Assess for signs and symptoms of bleeding or hemorrhage  - Monitor labs  - Administer supportive blood products/factors as ordered and appropriate  Outcome: Progressing     Problem: MUSCULOSKELETAL - ADULT  Goal: Maintain or return mobility to safest level of function  Description: INTERVENTIONS:  - Assess patient's ability to carry out ADLs; assess patient's baseline for ADL function and identify physical deficits which impact ability to perform ADLs (bathing, care of mouth/teeth, toileting, grooming, dressing, etc.)  - Assess/evaluate cause of self-care deficits   - Assess range of motion  - Assess patient's mobility  - Assess patient's need for assistive devices and provide as appropriate  - Encourage maximum independence but intervene and supervise when necessary  - Involve family in performance of ADLs  - Assess for home care needs following discharge   - Consider OT consult to assist with ADL evaluation and planning for discharge  - Provide patient education as appropriate  Outcome: Progressing

## 2023-07-30 NOTE — PROGRESS NOTES
[address]  Progress Note  Name: Aureilo Alvarado  MRN: [id number]  Unit/Bed#: -01 I Date of Admission: 7/28/2023   Date of Service: 7/30/2023 I Hospital Day: 1    Assessment/Plan   * BRBPR (bright red blood per rectum)  Assessment & Plan  · Patient presented to the ED secondary to large bloody bowel movement at her assisted living facility. She was recently hospitalized for GI bleed and had a flex sigmoidoscopy on July 12 and colonoscopy on July 13. Possible Crohn's disease. CT with no acute findings. ED reviewed w/ GI and may need repeat colonoscopy  · Hemoglobin 10.2 on arrival  · CT: Gastric wall thickening with hyperemia which could be related to portal hypertension or underlying gastritis of other etiologies. No inflammatory changes or bowel obstruction. Cirrhosis. 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 The study was marked in [facility] for immediate notification. · Patient reported 1 bowel movement overnight that had small amount of "dark blood"   · GI following, appreciate recommendations  · Have been monitoring H/H closely (every 6 hours), hemoglobin has remained stable  · Will decrease H/H monitoring to every 12 hours  · Continue to monitor for bleeding  · Continue IV Protonix  · Patient was on clear liquids, increase to full liquids by GI as of 7/29, tolerating same    Stage 3b chronic kidney disease [facility])  Assessment & Plan  Lab Results   Component Value Date    EGFR 45 07/30/2023    EGFR 45 07/29/2023    EGFR 38 07/28/2023    CREATININE 1.16 07/30/2023    CREATININE 1.16 07/29/2023    CREATININE 1.33 (H) 07/28/2023   · Creatinine on arrival 1.33  · Patient's baseline creatinine appears to be around 1.2  · Currently better than baseline  · Continue to avoid nephrotoxins and hypotension  · BMP a.m.       Primary hypertension  Assessment & Plan  · Blood pressure really elevated yesterday, now well controlled on current regimen  · Continue home losartan and metoprolol   · Continue to monitor BP per protocol    PAF (paroxysmal atrial fibrillation) ([facility])  Assessment & Plan  · Currently rate controlled   · Continue metoprolol with hold parameters   · Patient is typically on aspirin, will hold for now    SIADH (syndrome of inappropriate ADH production) ([facility])  Assessment & Plan  · Sodium 135 on arrival  · Serum sodium today 131  · Although appears euvolemic, does have lower extremity edema today. May be a bit volume overloaded contributing to drop in serum sodium  · Resumed patient's home Bumex today  · Continue to monitor intake and output  · Daily weight  · If no improvement tomorrow, consider nephrology consult  · BMP a.m. Acquired hypothyroidism  Assessment & Plan  · Continue levothyroxine    Bilateral lower extremity edema  Assessment & Plan  · Appears euvolemic on exam  · Does have +1 edema of lower extremities today on exam  · Will resume home Bumex  · Monitor intake and output  · Daily weights  · Encourage elevation of legs when out of bed in chair    Chronic low back pain  Assessment & Plan  · Reports chronic low back pain 5/10, no worse than baseline per patient  · Continue pain regimen  · Added aqua K-pad  · Monitor effectiveness of pain regimen           VTE Pharmacologic Prophylaxis:   Pharmacologic: Pharmacologic VTE Prophylaxis contraindicated due to Presented with bright red bleeding from rectum  Mechanical VTE Prophylaxis in Place: Yes    Patient Centered Rounds: I have performed bedside rounds with nursing staff today. Discussions with Specialists or Other Care Team Provider: GI, nursing, case management    Education and Discussions with Family / Patient: Treatment plan discussed with patient who understands the plan as has been explained and is agreeable to the plan as stated. All questions answered to satisfaction. Time Spent for Care: 39 minutes.   More than 50% of total time spent on counseling and coordination of care as described above. Current Length of Stay: 1 day(s)    Current Patient Status: Inpatient   Certification Statement: The patient will continue to require additional inpatient hospital stay due to Monitoring H/H, monitoring for bleeding, monitoring tolerance to diet with advancement    Discharge Plan: Patient is from personal-care home. Presented with rectal bleeding. GI has been following closely, H&H monitored closely. Hemoglobin seems to have stabilized. Patient did report 1 stool overnight with some "dark bleeding" unwitnessed by myself. We will continue to monitor for bleeding, monitor labs, monitor tolerance to diet as it is increased. We will consult PT/OT for recommendations as to whether patient is appropriate to return to personal-care home. Case management following. Hopeful discharge next 24-48 hours. Code Status: Level 3 - DNAR and DNI      Subjective:   Patient out of bed in chair. Denies any abdominal pain, nausea or discomfort. Tolerating full liquid diet. Did complain of some chronic low back pain today. Added aqua K-pad at patient's request.      Objective:     Vitals:   Temp (24hrs), Av °F (36.7 °C), Min:97.7 °F (36.5 °C), Max:98.5 °F (36.9 °C)    Temp:  [97.7 °F (36.5 °C)-98.5 °F (36.9 °C)] 98.2 °F (36.8 °C)  HR:  [53-65] 54  Resp:  [17-20] 19  BP: (125-166)/(36-55) 129/41  SpO2:  [92 %-100 %] 97 %  Body mass index is 22.42 kg/m². Input and Output Summary (last 24 hours): Intake/Output Summary (Last 24 hours) at 2023 0915  Last data filed at 2023 7445  Gross per 24 hour   Intake 590 ml   Output 400 ml   Net 190 ml       Physical Exam:     Physical Exam  Constitutional:       General: She is not in acute distress. Appearance: Normal appearance. She is normal weight. She is not ill-appearing. HENT:      Head: Normocephalic and atraumatic.       Nose: Nose normal.      Mouth/Throat:      Mouth: Mucous membranes are moist.   Cardiovascular:      Rate and Rhythm: Regular rhythm. Bradycardia present. Pulses: Normal pulses. Heart sounds: Normal heart sounds. Pulmonary:      Effort: Pulmonary effort is normal. No respiratory distress. Breath sounds: Normal breath sounds. No wheezing or rales. Abdominal:      General: Bowel sounds are normal. There is no distension. Palpations: Abdomen is soft. Tenderness: There is no abdominal tenderness. There is no guarding. Musculoskeletal:         General: Normal range of motion. Right lower leg: Edema present. Left lower leg: Edema present. Comments: +1 edema bilateral lower extremities   Skin:     General: Skin is warm and dry. Capillary Refill: Capillary refill takes less than 2 seconds. Neurological:      General: No focal deficit present. Mental Status: She is alert and oriented to person, place, and time. Mental status is at baseline. Psychiatric:         Mood and Affect: Mood normal.         Behavior: Behavior normal.         Thought Content: Thought content normal.         Judgment: Judgment normal.         Additional Data:     Labs:    Results from last 7 days   Lab Units 07/30/23  0734 07/29/23  1207 07/29/23  0651 07/28/23  1649   WBC Thousand/uL  --   --  10.41* 13.22*   HEMOGLOBIN g/dL 8.4*   < > 8.5* 10.2*   HEMATOCRIT % 25.3*   < > 25.8* 30.7*   PLATELETS Thousands/uL  --   --  106* 150   NEUTROS PCT %  --   --   --  81*   LYMPHS PCT %  --   --   --  7*   MONOS PCT %  --   --   --  11   EOS PCT %  --   --   --  0    < > = values in this interval not displayed.      Results from last 7 days   Lab Units 07/30/23  0734 07/29/23  0651 07/28/23  1649   POTASSIUM mmol/L 3.6   < > 4.3   CHLORIDE mmol/L 98   < > 98   CO2 mmol/L 28   < > 28   BUN mg/dL 35*   < > 42*   CREATININE mg/dL 1.16   < > 1.33*   CALCIUM mg/dL 8.0*   < > 8.4   ALK PHOS U/L  --   --  250*   ALT U/L  --   --  25   AST U/L  --   --  33 < > = values in this interval not displayed. Results from last 7 days   Lab Units 07/28/23  1649   INR  1.10       * I Have Reviewed All Lab Data Listed Above. * Additional Pertinent Lab Tests Reviewed: [address] Admission Reviewed    Imaging:    Imaging Reports Reviewed Today Include: CT abdomen pelvis  Imaging Personally Reviewed by Myself Includes: CT abdomen pelvis    Recent Cultures (last 7 days):           Last 24 Hours Medication List:   Current Facility-Administered Medications   Medication Dose Route Frequency Provider Last Rate   • acetaminophen  650 mg Oral Q4H PRN Kalie Espinoza PA-C     • artificial tear  1 Application Both Eyes BID Kalie Espinoza PA-C     • bumetanide  2 mg Oral Daily EBEN Hooker     • levothyroxine  75 mcg Oral Daily Kalie Espinoza PA-C     • losartan  25 mg Oral Daily Kalie Espinoza PA-C     • metoprolol succinate  25 mg Oral BID Kalie Espinoza PA-C     • midodrine  2.5 mg Oral TID AC Kalie Espinoza PA-C     • ondansetron  4 mg Intravenous Q6H PRN Kalie Espinoza PA-C     • pantoprazole  40 mg Oral Early Morning Kalie Espinoza PA-C     • saccharomyces boulardii  250 mg Oral BID Kalie Espinoza PA-C     • traZODone  100 mg Oral HS Kalie Espinoza PA-C          Today, Patient Was Seen By: EBEN Nava    ** Please Note: Dictation voice to text software may have been used in the creation of this document.  **

## 2023-07-30 NOTE — QUICK NOTE
Updated patients sister Jn Colin at number on chart to patients current assessment and treatment plan.  Spoke with her at length, all questions answered to satisfaction

## 2023-07-30 NOTE — ASSESSMENT & PLAN NOTE
· Scented for evaluation of large bloody bowel movement at her personal care home  · Colonoscopy (7/13): Moderate, localized erythematous, friable and ulcerated mucosa in the cecum and ascending colon; performed 8 cold forceps biopsies to rule out IBD. Diverticula of moderate severity in the sigmoid colon. The ileocecal valve, hepatic flexure, transverse colon, splenic flexure, descending colon, rectosigmoid and rectum appeared normal.  · CT abd/pelvis (7/28): Gastric wall thickening with hyperemia which could be related to portal hypertension or underlying gastritis of other etiologies. No inflammatory changes or bowel obstruction. Cirrhosis. 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   · Hemoglobin has stabilized around 8.1 without any further active bleeding noted; lower eating oral diet  · Evaluated by GI; recommendations appreciate  · Taking adequate oral hydration  · Graham colonoscopy in 6 months  · Further outpatient follow-up with hepatology for cirrhosis work-up and further management

## 2023-07-30 NOTE — ASSESSMENT & PLAN NOTE
· Blood pressure is adequately controlled at this time  · Continue Bumex, Toprol (as above), and Losartan 25mg daily

## 2023-07-30 NOTE — ASSESSMENT & PLAN NOTE
Lab Results   Component Value Date    EGFR 45 07/30/2023    EGFR 45 07/29/2023    EGFR 38 07/28/2023    CREATININE 1.16 07/30/2023    CREATININE 1.16 07/29/2023    CREATININE 1.33 (H) 07/28/2023   · Creatinine on arrival 1.33  · Patient's baseline creatinine appears to be around 1.2  · Currently better than baseline  · Continue to avoid nephrotoxins and hypotension  · BMP a.m.

## 2023-07-30 NOTE — PROGRESS NOTES
[facility]  Progress Note  Encounter: [id number]    PATIENT INFO     Name: Brina Fuentes  YOB: 1945   Age: 69 y.o. Sex: female   MRN: [id number]  Unit/Bed#: -01    ASSESSMENT & PLAN     Brina Fuentes is a 69 y.o. female with complaint of hematochezia, anemia, colitis (ischemic versus Crohn's), and compensated cirrhosis of unclear etiology.     1. Hematochezia - I suspect that the source of bleeding is again from the inflamed mucosa in the right colon although the etiology for this remains unclear. She did have slight decline in hemoglobin but fortunately does appear to have improved on most recent check. Given her recent colonoscopy, low suspicion for diverticular bleeding although she was noted to have sigmoid diverticulosis. Appears to be slowing down given relative stability of her hemoglobin. · Continue to monitor for the next 24 hours  · Monitor stool output  · Monitor blood counts  · No plan at this time to repeat colonoscopy; however, if there is ongoing signs of bleeding and/or continued decline in hemoglobin indicative of ongoing GI blood loss, repeat colonoscopy this admission may be considered  · Maintain full liquid diet for now in case bowel prep is needed  · Management of colitis as noted below     2. Colits, ischemic vs Crohn's - was taking budesonide empirically as outpatient. Not currently on budesonide this admission. It is unclear if this is truly representative of Crohn's disease versus possible ischemic colitis based on prior pathology results.   · Hold budesonide during admission  · Plan to resume budesonide at time of discharge  · Would defer starting prednisone at this time  · Avoid NSAIDs  · Management of hematochezia as noted above  · Tentative plan to repeat outpatient colonoscopy in 6 months to document healing  · If this is ischemic colitis, anticipate healing on its own with supportive care  · Avoid constipation as this can worsen potential ischemia  · Follow clinically     3. Anemia - hemoglobin did improve to 9.5 yesterday afternoon but gradually decreased to 8.5 and has remained relatively stable in that range. 1 reported black/red stool overnight which I suspect is residual content. Less likely to be ongoing active bleeding but will need continued close monitoring. · Check CBC in AM  · Follow clinically and monitor for signs of overt GI bleeding as noted above  · Transfuse for hemoglobin less than 7     4. Cirrhosis - new diagnosis as of 5/2023 of unclear etiology. Biopsies showed significant mixed portal inflammation and ductular reaction, minimal lobular inflammation, mild chronic cholestasis, mild portal expansion and periportal fibrosis, no significant steatosis. Autoimmune work-up was negative. Patient's MELD Score is: 9    MELD Score 90-day mortality   ?9 1.9%   10-19 6.0%   20-29 19.6%   30-39 52.6%   ? 40 71.3%     MELD Score Component Values:  Component Value Date   Creatinine: 1.16 mg/dL 7/30/2023   Dialysis at least twice   in the past week  Or CVVHD for ? 24 hours   in the past week:     No    Bilirubin, total: 0.91 mg/dL 7/28/2023   INR: 1.1 7/28/2023   Sodium: 131 mmol/L 7/30/2023     · Ascites:  · No evidence of ascites  · Follow clinically  · Avoid NSAIDs  · Avoid shellfish  · Portal hypertension:  · EGD on 5/11/2023 with notable tiny varices at the GE junction with mild portal hypertensive gastropathy  · No indication for nonselective beta-blockade at this time  · Follow clinically  · PSE:  · No evidence of encephalopathy  · Follow clinically  · [address] screening:  · AFP on 5/8/2023 within normal limits  · MRI on 5/15/2023 with morphologic features of cirrhosis without suspicious mass  · Continue HCC screening every 6 months with dedicated liver ultrasound and AFP level  · Transplant candidacy:  · Outpatient follow-up with hepatology in August SUBJECTIVE     Patient seen and evaluated at bedside.   1 reported episode of black/red bowel movement overnight. No other signs of overt GI bleeding. Tolerating full liquid diet. Denies any abdominal pain, nausea, vomiting. OBJECTIVE     Objective   Blood pressure (!) 129/41, pulse (!) 54, temperature 98.2 °F (36.8 °C), resp. rate 19, height 4' 11" (1.499 m), weight 50.3 kg (111 lb), SpO2 97 %. Body mass index is 22.42 kg/m².     Intake/Output Summary (Last 24 hours) at 7/30/2023 0908  Last data filed at 7/30/2023 0811  Gross per 24 hour   Intake 590 ml   Output 400 ml   Net 190 ml     Medication Administration - last 24 hours from 07/29/2023 0908 to 07/30/2023 0908       Date/Time Order Dose Route Action Action by     07/30/2023 0739 EDT acetaminophen (TYLENOL) tablet 650 mg 650 mg Oral Given Jeffery Minor RN     07/29/2023 2154 EDT artificial tear (LUBRIFRESH P.M.) ophthalmic ointment 1 Application 1 Application Both Eyes Not Given Grisel Cruz RN     07/29/2023 4334 EDT artificial tear (LUBRIFRESH P.M.) ophthalmic ointment 1 Application 1 Application Both Eyes Given Claudene Coombs     07/30/2023 5135 EDT levothyroxine tablet 75 mcg 75 mcg Oral Given Grisel Cruz RN     07/29/2023 0915 EDT losartan (COZAAR) tablet 25 mg 25 mg Oral Given Claudene Coombs     07/29/2023 1705 EDT metoprolol succinate (TOPROL-XL) 24 hr tablet 25 mg 25 mg Oral Given Jeffery Minor RN     07/29/2023 0915 EDT metoprolol succinate (TOPROL-XL) 24 hr tablet 25 mg 25 mg Oral Given Claudene Coombs     07/30/2023 5823 EDT pantoprazole (PROTONIX) EC tablet 40 mg 40 mg Oral Given Jeffery Minor RN     07/30/2023 6275 EDT midodrine (PROAMATINE) tablet 2.5 mg 0 mg Oral Hold Jeffery Minor RN     07/29/2023 1524 EDT midodrine (PROAMATINE) tablet 2.5 mg 2.5 mg Oral Not Given Jeffery Minor RN     07/29/2023 1147 EDT midodrine (PROAMATINE) tablet 2.5 mg 2.5 mg Oral Not Given Jeffery Minor RN     07/29/2023 1705 EDT saccharomyces boulardii (FLORASTOR) capsule 250 mg 250 mg Oral Given Nicole GUARDADO Sarah Bustillo RN     07/29/2023 9181 EDT saccharomyces boulardii (FLORASTOR) capsule 250 mg 250 mg Oral Given Arielle Pate     07/29/2023 2135 EDT traZODone (DESYREL) tablet 100 mg 100 mg Oral Given Crescencio Martinez RN          General Appearance:   Alert, cooperative, no distress; elderly, frail-appearing female   HEENT:   Normocephalic, atraumatic, anicteric     Lungs:   Equal chest rise, respirations unlabored    Heart:   Regular rate and rhythm, +murmur   Abdomen:   Soft, non-tender, non-distended; normal bowel sounds; no masses, no organomegaly    Rectal:   Deferred    Extremities:   No cyanosis, clubbing or edema    Neuro:    Moves all 4 extremities    Skin:   No jaundice, rashes, or lesions      Invasive Devices     Peripheral Intravenous Line  Duration           Peripheral IV 07/28/23 Left Arm 1 day                LABORATORY RESULTS     Admission on 07/28/2023   Component Date Value   • Protime 07/28/2023 14.2    • INR 07/28/2023 1.10    • PTT 07/28/2023 35    • WBC 07/28/2023 13.22 (H)    • RBC 07/28/2023 2.97 (L)    • Hemoglobin 07/28/2023 10.2 (L)    • Hematocrit 07/28/2023 30.7 (L)    • MCV 07/28/2023 103 (H)    • MCH 07/28/2023 34.3    • MCHC 07/28/2023 33.2    • RDW 07/28/2023 13.6    • MPV 07/28/2023 10.1    • Platelets 76/14/8009 150    • nRBC 07/28/2023 0    • Neutrophils Relative 07/28/2023 81 (H)    • Immat GRANS % 07/28/2023 1    • Lymphocytes Relative 07/28/2023 7 (L)    • Monocytes Relative 07/28/2023 11    • Eosinophils Relative 07/28/2023 0    • Basophils Relative 07/28/2023 0    • Neutrophils Absolute 07/28/2023 10.77 (H)    • Immature Grans Absolute 07/28/2023 0.09    • Lymphocytes Absolute 07/28/2023 0.88    • Monocytes Absolute 07/28/2023 1.44 (H)    • Eosinophils Absolute 07/28/2023 0.02    • Basophils Absolute 07/28/2023 0.02    • Sodium 07/28/2023 135    • Potassium 07/28/2023 4.3    • Chloride 07/28/2023 98    • CO2 07/28/2023 28    • ANION GAP 07/28/2023 9    • BUN 07/28/2023 42 (H) • Creatinine 07/28/2023 1.33 (H)    • Glucose 07/28/2023 108    • Calcium 07/28/2023 8.4    • Corrected Calcium 07/28/2023 9.3    • AST 07/28/2023 33    • ALT 07/28/2023 25    • Alkaline Phosphatase 07/28/2023 250 (H)    • Total Protein 07/28/2023 6.5    • Albumin 07/28/2023 2.9 (L)    • Total Bilirubin 07/28/2023 0.91    • eGFR 07/28/2023 38    • ABO Grouping 07/28/2023 O    • Rh Factor 07/28/2023 Positive    • Antibody Screen 07/28/2023 Negative    • Specimen Expiration Date 07/28/2023 [id number]    • Sodium 07/29/2023 134 (L)    • Potassium 07/29/2023 3.6    • Chloride 07/29/2023 99    • CO2 07/29/2023 29    • ANION GAP 07/29/2023 6    • BUN 07/29/2023 40 (H)    • Creatinine 07/29/2023 1.16    • Glucose 07/29/2023 94    • Glucose, Fasting 07/29/2023 94    • Calcium 07/29/2023 8.3 (L)    • eGFR 07/29/2023 45    • WBC 07/29/2023 10.41 (H)    • RBC 07/29/2023 2.52 (L)    • Hemoglobin 07/29/2023 8.5 (L)    • Hematocrit 07/29/2023 25.8 (L)    • MCV 07/29/2023 102 (H)    • MCH 07/29/2023 33.7    • MCHC 07/29/2023 32.9    • RDW 07/29/2023 13.3    • Platelets 39/66/2809 106 (L)    • MPV 07/29/2023 10.1    • Hemoglobin 07/29/2023 9.5 (L)    • Hematocrit 07/29/2023 28.8 (L)    • Hemoglobin 07/29/2023 8.8 (L)    • Hematocrit 07/29/2023 27.2 (L)    • Hemoglobin 07/30/2023 8.4 (L)    • Hematocrit 07/30/2023 25.2 (L)    • Hemoglobin 07/30/2023 8.4 (L)    • Hematocrit 07/30/2023 25.3 (L)    • Sodium 07/30/2023 131 (L)    • Potassium 07/30/2023 3.6    • Chloride 07/30/2023 98    • CO2 07/30/2023 28    • ANION GAP 07/30/2023 5    • BUN 07/30/2023 35 (H)    • Creatinine 07/30/2023 1.16    • Glucose 07/30/2023 78    • Calcium 07/30/2023 8.0 (L)    • eGFR 07/30/2023 45      CT abdomen pelvis with contrast    Result Date: 7/28/2023  Narrative: CT ABDOMEN AND PELVIS WITH IV CONTRAST INDICATION:   LLQ abdominal pain L sided abdominal pain.  Dark stools, colonic ulcerations on colonoscopy COMPARISON: CT abdomen and pelvis July 10, 2023 TECHNIQUE:  CT examination of the abdomen and pelvis was performed. Multiplanar 2D reformatted images were created from the source data. This examination, like all CT scans performed in the [facility], was performed utilizing techniques to minimize radiation dose exposure, including the use of iterative reconstruction and automated exposure control. Radiation dose length product (DLP) for this visit:  554.13 mGy-cm IV Contrast:  100 mL of iohexol (OMNIPAQUE) Enteric Contrast:  Enteric contrast was not administered. FINDINGS: ABDOMEN LOWER CHEST: Bilateral basilar atelectasis unchanged. Coronary artery calcifications and atherosclerotic disease of the thoracic aorta. Hiatal hernia. LIVER/BILIARY TREE: Hepatic steatosis. Hepatic surface nodularity with hypertrophy of the caudate lobe suggesting underlying cirrhosis. No change in pneumobilia. GALLBLADDER:  No calcified gallstones. No pericholecystic inflammatory change. SPLEEN:  Unremarkable. 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 Measurements reported on MRI were an underestimate. A tubular low-attenuation structure extends in the pancreatic tail from the level of the cyst which appears to be multilocular. ADRENAL GLANDS:  Unremarkable. KIDNEYS/URETERS: Renal vascular calcifications. No hydronephrosis. STOMACH AND BOWEL: There is suggestion of some thickening of the gastric wall. Hyperemia of the gastric wall is demonstrated which can be secondary to underlying portal hypertension or gastritis. Small bowel loops show normal caliber. Colonic diverticulosis is seen without associated diverticulitis. APPENDIX: History of appendectomy. ABDOMINOPELVIC CAVITY:  No ascites. No pneumoperitoneum. No lymphadenopathy. VESSELS:  Atherosclerotic changes are present. No evidence of aneurysm. PELVIS REPRODUCTIVE ORGANS: Hysterectomy. Pelvic wall floor relaxation. URINARY BLADDER:  Unremarkable.  ABDOMINAL WALL/INGUINAL REGIONS: Body wall edema. OSSEOUS STRUCTURES: Total right hip replacement. Osteoarthritis of the left hip. Lumbar spondylosis with old compression fractures at L1, L4 and L5 as well as compression fractures at T11 and T12. Impression: 1. Gastric wall thickening with hyperemia which could be related to portal hypertension or underlying gastritis of other etiologies. 2. No inflammatory changes or bowel obstruction. 3. Cirrhosis. 4. 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 The study was marked in [facility] for immediate notification. . Workstation performed: [id number]     Colonoscopy    Addendum Date: 7/13/2023 Addendum:   [address] [facility] [id number] [address]: 7/13/23 PHYSICIAN(S): Attending: Bao Sharma MD Fellow: No Staff Documented INDICATION: Rectal bleeding POST-OP DIAGNOSIS: See the impression below. HISTORY: Prior colonoscopy: Less than 3 years ago. It is being repeated at an interval of less than 3 years because: This colonoscopy is being performed for a diagnostic indication BOWEL PREPARATION: Golytely/Colyte/Trilyte PREPROCEDURE: Informed consent was obtained for the procedure, including sedation. Risks including but not limited to bleeding, infection, perforation, adverse drug reaction and aspiration were explained in detail. Also explained about less than 100% sensitivity with the exam and other alternatives. The patient was placed in the left lateral decubitus position. Procedure: Colonoscopy DETAILS OF PROCEDURE: Patient was taken to the procedure room where a time out was performed to confirm correct patient and correct procedure.  The patient underwent monitored anesthesia care, which was administered by an anesthesia professional. The patient's blood pressure, heart rate, level of consciousness, oxygen, respirations and ECG were monitored throughout the procedure. A digital rectal exam was performed. The scope was introduced through the anus and advanced to the cecum. Retroflexion was performed in the rectum. The quality of bowel preparation was evaluated using the [facility] Bowel Preparation Scale with scores of: right colon = 2, transverse colon = 2, left colon = 2. The total BBPS score was 6. Bowel prep was adequate. The patient experienced no blood loss. The procedure was not difficult. The patient tolerated the procedure well. There were no apparent adverse events.  ANESTHESIA INFORMATION: ASA: III Anesthesia Type: IV Sedation with Anesthesia MEDICATIONS: No administrations occurring from 1029 to 1246 on 07/13/23 FINDINGS: Moderate, localized erythematous, friable and ulcerated mucosa in the cecum and ascending colon; performed 8 cold forceps biopsies to rule out IBD Diverticula of moderate severity in the sigmoid colon The ileocecal valve, hepatic flexure, transverse colon, splenic flexure, descending colon, rectosigmoid and rectum appeared normal. Performed multiple forceps biopsies in the rectosigmoid EVENTS: Procedure Events Event Event Time ENDO CECUM REACHED 7/13/2023 12:16 PM ENDO SCOPE OUT TIME 7/13/2023 12:36 PM SPECIMENS: ID Type Source Tests Collected by Time Destination 1 : bx's Tissue Large Intestine, Cecum TISSUE EXAM Javier Downing MD 7/13/2023 12:22 PM  2 : bx Tissue Large Intestine, Right/Ascending Colon TISSUE EXAM Javier Downing MD 7/13/2023 12:27 PM  3 : recto-sigmoid bx Tissue Rectum TISSUE EXAM Javier Downing MD 7/13/2023 12:32 PM  EQUIPMENT: Colonoscope -PCF-H190L IMPRESSION: Moderate, localized erythematous, friable and ulcerated mucosa in the cecum and ascending colon; performed cold forceps biopsies to rule out IBD Diverticulosis of moderate severity in the sigmoid colon The ileocecal valve, hepatic flexure, transverse colon, splenic flexure, descending colon, rectosigmoid and rectum appeared normal. Performed forceps biopsies in the rectosigmoid RECOMMENDATION:  Repeat colonoscopy in 6 months  Inflammatory bowel disease  If biopsies are not consistent with inflammatory bowel disease and symptoms resolved, then repeat colonoscopy is not necessary. Return to floor and resume diet. Follow-up in our office for further evaluation and management. If you don't have an appointment scheduled, please call [phone] to schedule your appointment. Jazmín Bray MD     Result Date: 7/13/2023  Narrative: Table formatting from the original result was not included. [address] [facility] Endoscopy [address] [place] [phone] [address]: 7/13/23 PHYSICIAN(S): Attending: Jazmín Bray MD Fellow: No Staff Documented INDICATION: Rectal bleeding POST-OP DIAGNOSIS: See the impression below. HISTORY: Prior colonoscopy: Less than 3 years ago. It is being repeated at an interval of less than 3 years because: This colonoscopy is being performed for a diagnostic indication BOWEL PREPARATION: Golytely/Colyte/Trilyte PREPROCEDURE: Informed consent was obtained for the procedure, including sedation. Risks including but not limited to bleeding, infection, perforation, adverse drug reaction and aspiration were explained in detail. Also explained about less than 100% sensitivity with the exam and other alternatives. The patient was placed in the left lateral decubitus position. Procedure: Colonoscopy DETAILS OF PROCEDURE: Patient was taken to the procedure room where a time out was performed to confirm correct patient and correct procedure. The patient underwent monitored anesthesia care, which was administered by an anesthesia professional. The patient's blood pressure, heart rate, level of consciousness, oxygen, respirations and ECG were monitored throughout the procedure. A digital rectal exam was performed. The scope was introduced through the anus and advanced to the cecum. Retroflexion was performed in the rectum.  The quality of bowel preparation was evaluated using the [facility] Bowel Preparation Scale with scores of: right colon = 2, transverse colon = 2, left colon = 2. The total BBPS score was 6. Bowel prep was adequate. The patient experienced no blood loss. The procedure was not difficult. The patient tolerated the procedure well. There were no apparent adverse events.  ANESTHESIA INFORMATION: ASA: III Anesthesia Type: IV Sedation with Anesthesia MEDICATIONS: No administrations occurring from 1029 to 1246 on 07/13/23 FINDINGS: Moderate, localized erythematous, friable and ulcerated mucosa in the cecum and ascending colon; performed 8 cold forceps biopsies to rule out IBD Diverticula of moderate severity in the sigmoid colon The ileocecal valve, hepatic flexure, transverse colon, splenic flexure, descending colon, rectosigmoid and rectum appeared normal. Performed multiple forceps biopsies in the rectosigmoid EVENTS: Procedure Events Event Event Time ENDO CECUM REACHED 7/13/2023 12:16 PM ENDO SCOPE OUT TIME 7/13/2023 12:36 PM SPECIMENS: ID Type Source Tests Collected by Time Destination 1 : bx's Tissue Large Intestine, Cecum TISSUE EXAM Manuel Santiago MD 7/13/2023 12:22 PM  2 : bx Tissue Large Intestine, Right/Ascending Colon TISSUE EXAM Manuel Santiago MD 7/13/2023 12:27 PM  3 : recto-sigmoid bx Tissue Rectum TISSUE EXAM Manuel Santiago MD 7/13/2023 12:32 PM  EQUIPMENT: Colonoscope -[id number]-H190L     Impression: Moderate, localized erythematous, friable and ulcerated mucosa in the cecum and ascending colon; performed cold forceps biopsies to rule out IBD Diverticulosis of moderate severity in the sigmoid colon The ileocecal valve, hepatic flexure, transverse colon, splenic flexure, descending colon, rectosigmoid and rectum appeared normal. Performed forceps biopsies in the rectosigmoid RECOMMENDATION:  Repeat colonoscopy in 6 months  Inflammatory bowel disease  If biopsies are not consistent with inflammatory bowel disease and symptoms resolved, then repeat colonoscopy is not necessary. Jacinta Rodríguez MD     Flexible Sigmoidoscopy    Result Date: 7/12/2023  Narrative: Table formatting from the original result was not included. [address] [employer] [facility] [address] [facility] [id number] [phone] DATE OF SERVICE: 7/12/23 PHYSICIAN(S): Attending: Esperanza Velasquez MD Fellow: Dominique Hamman, DO INDICATION: Rectal bleeding POST-OP DIAGNOSIS: See the impression below. HISTORY: Prior colonoscopy: Less than 3 years ago. It is being repeated at an interval of less than 3 years because: This colonoscopy is being performed for a diagnostic indication BOWEL PREPARATION: Enema PREPROCEDURE: Informed consent was obtained for the procedure, including sedation. Risks including but not limited to bleeding, infection, perforation, adverse drug reaction and aspiration were explained in detail. Also explained about less than 100% sensitivity with the exam and other alternatives. The patient was placed in the left lateral decubitus position. Procedure: Colonoscopy DETAILS OF PROCEDURE: Patient was taken to the procedure room where a time out was performed to confirm correct patient and correct procedure. The patient underwent monitored anesthesia care, which was administered by an anesthesia professional. The patient's blood pressure, heart rate, level of consciousness, oxygen and respirations were monitored throughout the procedure. A digital rectal exam was performed. The scope was introduced through the anus and advanced to the sigmoid colon. Retroflexion was not performed due to narrow vault. The quality of bowel preparation was evaluated using the [facility] Bowel Preparation Scale with scores of: right colon = 0, transverse colon = 0, left colon = 1. Bowel prep was not adequate. The patient experienced no blood loss. The procedure was difficult due to patient discomfort. The patient tolerated the procedure well.  There were no apparent adverse events. The total BBPS score was 1. ANESTHESIA INFORMATION: ASA: III Anesthesia Type: IV Sedation with Anesthesia MEDICATIONS: sodium chloride 0.9 % infusion 100 mL*  *From user-documented volume (Totals for administrations occurring from 1451 to 1512 on 07/12/23) FINDINGS: Scope was advanced to proximal sigmoid colon-30 cm from the anal verge. Solid and liquid stool mixed with blood was seen along the way. Scope could not be advanced further at solid stool was completely obstructing the lumen. Likely source of bleeding is in the right side of the colon or superior. EVENTS: Procedure Events Event Event Time ENDO SCOPE OUT TIME 7/12/2023  3:09 PM SPECIMENS: * No specimens in log * EQUIPMENT: Colonoscope - flex sig done with the EGD scope     Impression: Scope was advanced to proximal sigmoid colon-30 cm from the anal verge. Solid and liquid stool mixed with blood was seen along the way. Scope could not be advanced further at solid stool was completely obstructing the lumen. Likely source of bleeding RECOMMENDATION:  There is no recommended follow-up for this procedure. Plan for colonoscopy tomorrow. Initiate prep. Clear liquid diet. N.p.o. after midnight. Susanne Segura MD     CT high volume bleeding scan abdomen pelvis    Result Date: 7/10/2023  Narrative: CT ABDOMEN AND PELVIS - WITHOUT AND WITH IV CONTRAST INDICATION:   Gi bleed. COMPARISON: CT abdomen/pelvis from 5/1/2023. TECHNIQUE:  CT examination of the abdomen and pelvis was performed both prior to and after the administration of intravenous contrast. Multiplanar 2D reformatted images were created from the source data. Radiation dose length product (DLP) for this visit:  1642.86 mGy-cm . This examination, like all CT scans performed in the [facility], was performed utilizing techniques to minimize radiation dose exposure, including the use of iterative reconstruction and automated exposure control.  IV Contrast:  100 mL of iohexol (OMNIPAQUE) Enteric Contrast:  Enteric contrast was not administered. FINDINGS: ABDOMEN BOWEL:There is no active extravasation of intravenous contrast into the lumen of stomach, small bowel, or large bowel loops. There is no abnormal bowel wall thickening or pathologic bowel wall enhancement. Stable hiatal hernia. Age-appropriate atherosclerotic disease in the mesenteric vessels, without complete occlusion. LOWER CHEST: Bibasilar atelectasis. Heart top normal size. LIVER/BILIARY TREE: The liver demonstrates cirrhotic morphology. Within the limitations of this examination there is no evidence of suspicious hepatic mass. No biliary dilatation. Stable pneumobilia. Portal vein is patent. GALLBLADDER: Removed. SPLEEN:  Unremarkable. PANCREAS: Stable cyst in the pancreatic tail measuring 8 mm. ADRENAL GLANDS:  Unremarkable. KIDNEYS/URETERS:  Unremarkable. No hydronephrosis. APPENDIX:  No findings to suggest appendicitis. ABDOMINOPELVIC CAVITY:  No ascites. No pneumoperitoneum. No lymphadenopathy. VESSELS: Atherosclerotic changes are present. No evidence of aneurysm. PELVIS REPRODUCTIVE ORGANS: Surgical changes of prior hysterectomy. URINARY BLADDER:  Unremarkable. ABDOMINAL WALL/INGUINAL REGIONS:  Unremarkable. OSSEOUS STRUCTURES: Multiple chronic vertebral body compression deformities for example L4 and L5 with interval development of compression deformity at L1 that is either chronic or subacute. Correlate for focal back pain. Intact right hip arthroplasty. Impression: No CT evidence of active high volume gastrointestinal hemorrhage. 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 Stable hiatal hernia. Stable cirrhosis. Multiple chronic vertebral body compression deformities for example L4 and L5 with interval development of compression deformity at L1 that is either chronic or subacute. Correlate for focal back pain.  Workstation performed: [id number]       RADIOLOGY RESULTS: I have personally reviewed pertinent imaging studies. Rosa Hassan D.O. [facility]  Division of Gastroenterology & Hepatology  Available on [email or web address]    ** Please Note: This note is constructed using a voice recognition dictation system.  **

## 2023-07-31 LAB
ANION GAP SERPL CALCULATED.3IONS-SCNC: 8 MMOL/L
BASOPHILS # BLD AUTO: 0.03 THOUSANDS/ÂΜL (ref 0–0.1)
BASOPHILS NFR BLD AUTO: 1 % (ref 0–1)
BUN SERPL-MCNC: 30 MG/DL (ref 5–25)
CALCIUM SERPL-MCNC: 7.9 MG/DL (ref 8.4–10.2)
CHLORIDE SERPL-SCNC: 98 MMOL/L (ref 96–108)
CO2 SERPL-SCNC: 27 MMOL/L (ref 21–32)
CREAT SERPL-MCNC: 1.14 MG/DL (ref 0.6–1.3)
EOSINOPHIL # BLD AUTO: 0.17 THOUSAND/ÂΜL (ref 0–0.61)
EOSINOPHIL NFR BLD AUTO: 3 % (ref 0–6)
ERYTHROCYTE [DISTWIDTH] IN BLOOD BY AUTOMATED COUNT: 13.2 % (ref 11.6–15.1)
GFR SERPL CREATININE-BSD FRML MDRD: 46 ML/MIN/1.73SQ M
GLUCOSE SERPL-MCNC: 92 MG/DL (ref 65–140)
HCT VFR BLD AUTO: 26 % (ref 34.8–46.1)
HCT VFR BLD AUTO: 28.4 % (ref 34.8–46.1)
HGB BLD-MCNC: 8.8 G/DL (ref 11.5–15.4)
HGB BLD-MCNC: 9.4 G/DL (ref 11.5–15.4)
IMM GRANULOCYTES # BLD AUTO: 0.03 THOUSAND/UL (ref 0–0.2)
IMM GRANULOCYTES NFR BLD AUTO: 1 % (ref 0–2)
LYMPHOCYTES # BLD AUTO: 1.35 THOUSANDS/ÂΜL (ref 0.6–4.47)
LYMPHOCYTES NFR BLD AUTO: 23 % (ref 14–44)
MCH RBC QN AUTO: 33.9 PG (ref 26.8–34.3)
MCHC RBC AUTO-ENTMCNC: 33.1 G/DL (ref 31.4–37.4)
MCV RBC AUTO: 103 FL (ref 82–98)
MONOCYTES # BLD AUTO: 0.97 THOUSAND/ÂΜL (ref 0.17–1.22)
MONOCYTES NFR BLD AUTO: 16 % (ref 4–12)
MRSA NOSE QL CULT: ABNORMAL
MRSA NOSE QL CULT: ABNORMAL
NEUTROPHILS # BLD AUTO: 3.44 THOUSANDS/ÂΜL (ref 1.85–7.62)
NEUTS SEG NFR BLD AUTO: 56 % (ref 43–75)
NRBC BLD AUTO-RTO: 0 /100 WBCS
PLATELET # BLD AUTO: 120 THOUSANDS/UL (ref 149–390)
PMV BLD AUTO: 10 FL (ref 8.9–12.7)
POTASSIUM SERPL-SCNC: 3.4 MMOL/L (ref 3.5–5.3)
RBC # BLD AUTO: 2.77 MILLION/UL (ref 3.81–5.12)
SODIUM SERPL-SCNC: 133 MMOL/L (ref 135–147)
WBC # BLD AUTO: 5.99 THOUSAND/UL (ref 4.31–10.16)

## 2023-07-31 PROCEDURE — 99233 SBSQ HOSP IP/OBS HIGH 50: CPT

## 2023-07-31 PROCEDURE — 85025 COMPLETE CBC W/AUTO DIFF WBC: CPT

## 2023-07-31 PROCEDURE — 80048 BASIC METABOLIC PNL TOTAL CA: CPT

## 2023-07-31 PROCEDURE — 99232 SBSQ HOSP IP/OBS MODERATE 35: CPT | Performed by: INTERNAL MEDICINE

## 2023-07-31 PROCEDURE — 97163 PT EVAL HIGH COMPLEX 45 MIN: CPT

## 2023-07-31 PROCEDURE — 85018 HEMOGLOBIN: CPT

## 2023-07-31 PROCEDURE — 85014 HEMATOCRIT: CPT

## 2023-07-31 RX ORDER — MUSCLE RUB CREAM 100; 150 MG/G; MG/G
CREAM TOPICAL 4 TIMES DAILY PRN
Status: DISCONTINUED | OUTPATIENT
Start: 2023-07-31 | End: 2023-08-01 | Stop reason: HOSPADM

## 2023-07-31 RX ORDER — POLYETHYLENE GLYCOL 3350 17 G/17G
17 POWDER, FOR SOLUTION ORAL DAILY
Status: DISCONTINUED | OUTPATIENT
Start: 2023-07-31 | End: 2023-08-01 | Stop reason: HOSPADM

## 2023-07-31 RX ADMIN — Medication 250 MG: at 17:03

## 2023-07-31 RX ADMIN — MENTHOL, METHYL SALICYLATE: 10; 15 CREAM TOPICAL at 21:57

## 2023-07-31 RX ADMIN — WHITE PETROLATUM 57.7 %-MINERAL OIL 31.9 % EYE OINTMENT 1 APPLICATION: at 08:45

## 2023-07-31 RX ADMIN — TRAZODONE HYDROCHLORIDE 100 MG: 100 TABLET ORAL at 21:57

## 2023-07-31 RX ADMIN — BUMETANIDE 2 MG: 1 TABLET ORAL at 08:39

## 2023-07-31 RX ADMIN — METOPROLOL SUCCINATE 25 MG: 50 TABLET, EXTENDED RELEASE ORAL at 17:03

## 2023-07-31 RX ADMIN — LOSARTAN POTASSIUM 25 MG: 25 TABLET, FILM COATED ORAL at 08:39

## 2023-07-31 RX ADMIN — ACETAMINOPHEN 650 MG: 325 TABLET ORAL at 08:39

## 2023-07-31 RX ADMIN — Medication 250 MG: at 08:39

## 2023-07-31 RX ADMIN — ACETAMINOPHEN 650 MG: 325 TABLET ORAL at 21:57

## 2023-07-31 RX ADMIN — MIDODRINE HYDROCHLORIDE 2.5 MG: 5 TABLET ORAL at 08:40

## 2023-07-31 RX ADMIN — PANTOPRAZOLE SODIUM 40 MG: 40 TABLET, DELAYED RELEASE ORAL at 08:39

## 2023-07-31 RX ADMIN — METOPROLOL SUCCINATE 25 MG: 50 TABLET, EXTENDED RELEASE ORAL at 08:39

## 2023-07-31 RX ADMIN — LEVOTHYROXINE SODIUM 75 MCG: 75 TABLET ORAL at 05:03

## 2023-07-31 NOTE — CASE MANAGEMENT
Case Management Assessment & Discharge Planning Note    Patient name [facility]  Location [address] [facility] 209/-18 MRN [id number]  : 1945 Date 2023       Current Admission Date: 2023  Current Admission Diagnosis:BRBPR (bright red blood per rectum)   Patient Active Problem List    Diagnosis Date Noted   • Chronic low back pain 2023   • BRBPR (bright red blood per rectum) 07/10/2023   • SIADH (syndrome of inappropriate ADH production) ([address]) 2023   • Abnormal CT of liver 2023   • Stage 3b chronic kidney disease ([address]) 2023   • Bilateral lower extremity edema 2023   • Anemia 2023   • Superior mesenteric artery stenosis ([address]) 2023   • Primary hypertension 2023   • Acquired hypothyroidism 2023   • Irritable bowel syndrome with diarrhea 2023   • Abnormal CT scan 2023   • Hx of CABG 2023   • PAF (paroxysmal atrial fibrillation) ([address]) 2022      LOS (days): 2  Geometric Mean LOS (GMLOS) (days): 3.40  Days to GMLOS:1.3     OBJECTIVE:  PATIENT READMITTED TO HOSPITAL  Risk of Unplanned Readmission Score: 23.61         Current admission status: Inpatient  Referral Reason: VNA    Preferred Pharmacy:   [address] [place] [address] [address]  [address] [place] [address]  Phone: [phone] Fax: [phone]    Primary Care Provider: Radha Conrad MD    Primary Insurance: MEDICARE  Secondary Insurance: [employer]ET[employer]    ASSESSMENT:  [place] Proxies    There are no active Health Care Proxies on file.        Advance Directives  Does patient have a Health Care POA?: Yes  Does patient have Advance Directives?: Yes  Advance Directives: Living will, Power of  for health care  Primary Contact: Garth Ocampo (Sister)   [phone] (Mobile)    Readmission Root Cause  30 Day Readmission: Yes  Who directed you to return to the hospital?: Other (comment) (Facility)  Did you understand whom to contact if you had questions or problems?: Yes  Did you get your prescriptions before you left the hospital?: Yes  Were you able to get your prescriptions filled when you left the hospital?: Yes  Did you take your medications as prescribed?: Yes  Were you able to get to your follow-up appointments?: Yes  During previous admission, was a post-acute recommendation made?: Yes  What post-acute resources were offered?: [facility]  Patient was readmitted due to: [address]: Facility return with home therapy    Patient Information  Admitted from[de-identified] Facility  Mental Status: Alert  During Assessment patient was accompanied by: Not accompanied during assessment  Assessment information provided by[de-identified] Patient  Primary Caregiver: Self  Support Systems: Self, Family members, Other (Comment), Friend (Staff at facility)  [facility]: [address] [place] [address] do you live in?: [address] [place] [address] entry access options.  Select all that apply.: No steps to enter home  Type of Current Residence: Facility  Upon entering residence, is there a bedroom on the main floor (no further steps)?: Yes  Upon entering residence, is there a bathroom on the main floor (no further steps)?: Yes  In the last 12 months, was there a time when you were not able to pay the mortgage or rent on time?: No  In the last 12 months, how many places have you lived?: 1  In the last 12 months, was there a time when you did not have a steady place to sleep or slept in a shelter (including now)?: No  Homeless/housing insecurity resource given?: N/A  Living Arrangements: Other (Comment) [facility])  Is patient a ?: No    Activities of Daily Living Prior to Admission  Functional Status: Independent  Completes ADLs independently?: Yes  Ambulates independently?: Yes  Does patient use assisted devices?: Yes  Assisted Devices (DME) used: [employer]  Does patient currently own DME?: Yes  What DME does the patient currently own?: [employer]  Does patient have a history of Outpatient Therapy (PT/OT)?: Yes  Does the patient have a history of Short-Term Rehab?: No  Does patient have a history of HHC?: Yes  Does patient currently have [address]?: No      Patient Information Continued  Income Source: Pension/long-term  Does patient have prescription coverage?: Yes  Within the past 12 months, you worried that your food would run out before you got the money to buy more.: Never true  Within the past 12 months, the food you bought just didn't last and you didn't have money to get more.: Never true  Food insecurity resource given?: N/A  Does patient receive dialysis treatments?: No  Does patient have a history of substance abuse?: No  Does patient have a history of Mental Health Diagnosis?: No    Means of Transportation  Means of Transport to Appts[de-identified] Family transport  In the past 12 months, has lack of transportation kept you from medical appointments or from getting medications?: No  In the past 12 months, has lack of transportation kept you from meetings, work, or from getting things needed for daily living?: No    DISCHARGE DETAILS:    Discharge planning discussed with[de-identified] Patient and Hafsa Salvador at [address] of Choice: Yes  Comments - Freedom of Choice: Has therapy at facility  CM contacted family/caregiver?: No- see comments (Patient will update sister)  Were Treatment Team discharge recommendations reviewed with patient/caregiver?: Yes  Did patient/caregiver verbalize understanding of patient care needs?: Yes  Were patient/caregiver advised of the risks associated with not following Treatment Team discharge recommendations?: Yes    Contacts  Patient Contacts: Hansel Kim (Sister)   [phone] (Mobile)  Relationship to Patient[de-identified] Family  Contact Method: Phone  Phone Number: Hansel Kim (Sister)   [phone] (  Reason/Outcome: Emergency Contact    Requested [address]         Is the patient interested in [address] at discharge?: No    Other Referral/Resources/Interventions Provided:  Interventions: Facility Return    Treatment Team [facility]      Additional Comments: Met with patient at bedside. Patient well known to this CM.   Plans to return to [facility] with home therapy as prior to admission

## 2023-07-31 NOTE — QUICK NOTE
Attempted to contact patient's Sister Jenna Laguerre at number on chart to update to patient's treatment plan assessment. No answer.

## 2023-07-31 NOTE — PLAN OF CARE
Problem: PHYSICAL THERAPY ADULT  Goal: Performs mobility at highest level of function for planned discharge setting. See evaluation for individualized goals. Description: Treatment/Interventions: Functional transfer training, LE strengthening/ROM, Elevations, Therapeutic exercise, Endurance training, Patient/family training, Equipment eval/education, Bed mobility, Gait training, Compensatory technique education, Spoke to nursing, Spoke to case management  Equipment Recommended: Miryam Gudino       See flowsheet documentation for full assessment, interventions and recommendations. Note: Prognosis: Good  Problem List: Decreased strength, Decreased endurance, Impaired balance, Decreased coordination, Decreased mobility, Pain, Impaired hearing  Assessment: Pt is 69 y.o. female seen for PT evaluation s/p admit [facility] [address] [facility] on 7/28/2023 w/ BRBPR (bright red blood per rectum). PT consulted to assess pt's functional mobility and d/c needs. Order placed for PT eval and tx, w/ up and OOB as tolerated order. Comorbidities affecting pt's physical performance at time of assessment include: weakness,bright red blood per rectum,BLE edema, chronic low back pain, paroxysmal A-fib, HTN,stage 3b CKD,CABG,IBS  . PTA, pt was independent w/ all functional mobility w/ AD utilization. Personal factors affecting pt at time of IE include: ambulating w/ assistive device, inability to navigate community distances, inability to navigate level surfaces w/o external assistance, unable to perform dynamic tasks in community, hearing impairments and inability to perform ADLs. Please find objective findings from PT assessment regarding body systems outlined above with impairments and limitations including weakness, impaired balance, decreased endurance, impaired coordination, gait deviations, pain, decreased activity tolerance, decreased functional mobility tolerance and fall risk. From PT/mobility standpoint, recommendation at time of d/c would be return to facility with rehabilitation services pending progress in order to facilitate return to PLOF. PT Discharge Recommendation: Return to facility with rehabilitation services    See flowsheet documentation for full assessment.

## 2023-07-31 NOTE — PROGRESS NOTES
Clint Hernandez [facility]  Progress Note  Encounter: [id number]    PATIENT INFO     Name: Sushil Fernandes  YOB: 1945   Age: 69 y.o. Sex: female   MRN: [id number]  Unit/Bed#: -01    ASSESSMENT & PLAN     Sushil Fernandes is a 69 y.o. female with complaint of hematochezia, anemia, colitis (ischemic versus Crohn's), and compensated cirrhosis of unclear etiology.      1. Hematochezia - I suspect that the source of bleeding is again from the inflamed mucosa in the right colon although the etiology for this remains unclear. Her hemoglobin has remained stable over the past 1-2 days. Given her recent colonoscopy, low suspicion for diverticular bleeding although she was noted to have sigmoid diverticulosis. · Last BM on Saturday; if the next one is not indicative of GI bleeding, then we can advance diet and discharge the patient today with outpatient follow-up  · No plan at this time to repeat colonoscopy; however, if there is ongoing signs of bleeding and/or continued decline in hemoglobin indicative of ongoing GI blood loss, repeat colonoscopy this admission may be considered  · Management of colitis as noted below     2. Colits, ischemic vs Crohn's - was taking budesonide empirically as outpatient. Not currently on budesonide this admission. It is unclear if this is truly representative of Crohn's disease versus possible ischemic colitis based on prior pathology results. · Hold budesonide during admission  · Plan to resume budesonide at time of discharge  · Would defer starting prednisone at this time  · Avoid NSAIDs   · Tentative plan to repeat outpatient colonoscopy in 6 months to document healing  · If this is ischemic colitis, anticipate healing on its own with supportive care  · Avoid constipation as this can worsen potential ischemia  · Follow clinically     3. Anemia - Hemoglobin remains between 8.5-9.5, no new BMs yet to determine continued GI bleeding.  Less likely to be ongoing active bleeding but will need continued close monitoring. · Check CBC in AM  · Follow clinically and monitor for signs of overt GI bleeding as noted above  · Transfuse for hemoglobin less than 7       4. Cirrhosis - new diagnosis as of 5/2023 of unclear etiology. Biopsies showed significant mixed portal inflammation and ductular reaction, minimal lobular inflammation, mild chronic cholestasis, mild portal expansion and periportal fibrosis, no significant steatosis. Autoimmune work-up was negative. Patient's MELD Score is: 9    MELD Score 90-day mortality   ?9 1.9%   10-19 6.0%   20-29 19.6%   30-39 52.6%   ? 40 71.3%     MELD Score Component Values:  Component Value Date   Creatinine: 1.16 mg/dL 7/30/2023   Dialysis at least twice   in the past week  Or CVVHD for ? 24 hours   in the past week:     No    Bilirubin, total: 0.91 mg/dL 7/28/2023   INR: 1.1 7/28/2023   Sodium: 131 mmol/L 7/30/2023     · Ascites:  · No evidence of ascites  · Follow clinically  · Avoid NSAIDs  · Avoid shellfish  · Portal hypertension:  · EGD on 5/11/2023 with notable tiny varices at the GE junction with mild portal hypertensive gastropathy  · No indication for nonselective beta-blockade at this time  · Follow clinically  · PSE:  · No evidence of encephalopathy  · Follow clinically  · [address] screening:  · AFP on 5/8/2023 within normal limits  · MRI on 5/15/2023 with morphologic features of cirrhosis without suspicious mass  · Continue HCC screening every 6 months with dedicated liver ultrasound and AFP level  · Transplant candidacy:  · Outpatient follow-up with hepatology in August SUBJECTIVE     Patient seen and evaluated at bedside. She says her last BM was Saturday night, dark brown, no red or black noted. Yesterday when wiping only a pink tinge when wiping but no blood. No other signs of overt GI bleeding. Tolerating full liquid diet, though wants solids. Also wants to go home.  Denies any abdominal pain, nausea, vomiting. OBJECTIVE     Objective   Blood pressure (!) 133/48, pulse 61, temperature 97.5 °F (36.4 °C), resp. rate 18, height 4' 11" (1.499 m), weight 51.5 kg (113 lb 8.6 oz), SpO2 97 %. Body mass index is 22.93 kg/m².     Intake/Output Summary (Last 24 hours) at 7/31/2023 0927  Last data filed at 7/31/2023 0515  Gross per 24 hour   Intake --   Output 1400 ml   Net -1400 ml     Medication Administration - last 24 hours from 07/30/2023 0927 to 07/31/2023 7488       Date/Time Order Dose Route Action Action by     07/31/2023 0839 EDT acetaminophen (TYLENOL) tablet 650 mg 650 mg Oral Given Caridad Funes     07/30/2023 1952 EDT acetaminophen (TYLENOL) tablet 650 mg 650 mg Oral Given Ofelia Banda RN     07/31/2023 0845 EDT artificial tear (LUBRIFRESH P.M.) ophthalmic ointment 1 Application 1 Application Both Eyes Given Cinthiae Christophe     07/30/2023 2141 EDT artificial tear (LUBRIFRESH P.M.) ophthalmic ointment 1 Application 1 Application Both Eyes Given Ofelia Banda RN     07/30/2023 2089 EDT artificial tear (LUBRIFRESH P.M.) ophthalmic ointment 1 Application 1 Application Both Eyes Given Divine King RN     07/31/2023 0503 EDT levothyroxine tablet 75 mcg 75 mcg Oral Given Ofelia Banda RN     07/31/2023 3036 EDT losartan (COZAAR) tablet 25 mg 25 mg Oral Given Caridad Funes     07/30/2023 0390 EDT losartan (COZAAR) tablet 25 mg 25 mg Oral Given Divine King RN     07/31/2023 9416 EDT metoprolol succinate (TOPROL-XL) 24 hr tablet 25 mg 25 mg Oral Given Caridad Funes     07/30/2023 1720 EDT metoprolol succinate (TOPROL-XL) 24 hr tablet 25 mg 25 mg Oral Given Divine King RN     07/30/2023 0493 EDT metoprolol succinate (TOPROL-XL) 24 hr tablet 25 mg 25 mg Oral Given Divine King RN     07/31/2023 5570 EDT pantoprazole (PROTONIX) EC tablet 40 mg 40 mg Oral Given Caridad Funes     07/31/2023 0840 EDT midodrine (PROAMATINE) tablet 2.5 mg 2.5 mg Oral Given Caridad Funes     07/30/2023 1557 EDT midodrine (PROAMATINE) tablet 2.5 mg 2.5 mg Oral Not Given Norma Saldivar, STARR     07/30/2023 1232 EDT midodrine (PROAMATINE) tablet 2.5 mg 2.5 mg Oral Not Given Norma Saldivar, STARR     07/31/2023 9160 EDT saccharomyces boulardii (FLORASTOR) capsule 250 mg 250 mg Oral Given Caridad Funes     07/30/2023 1720 EDT saccharomyces boulardii (FLORASTOR) capsule 250 mg 250 mg Oral Given Norma Saldivar, RN     07/30/2023 7731 EDT saccharomyces boulardii (FLORASTOR) capsule 250 mg 250 mg Oral Given Norma Saldivar, RN     07/30/2023 2141 EDT traZODone (DESYREL) tablet 100 mg 100 mg Oral Given Jess Wong RN     07/31/2023 8437 EDT bumetanide (BUMEX) tablet 2 mg 2 mg Oral Given Caridad Funes     07/30/2023 2414 EDT bumetanide (BUMEX) tablet 2 mg 2 mg Oral Given Norma Saldivar, STARR          General Appearance:   Alert, cooperative, no distress; elderly, frail-appearing female   HEENT:   Normocephalic, atraumatic, anicteric     Lungs:   Equal chest rise, respirations unlabored    Heart:   Regular rate and rhythm, +murmur   Abdomen:   Soft, non-tender, non-distended; normal bowel sounds; no masses, no organomegaly    Rectal:   Deferred    Extremities:   No cyanosis or clubbing. 2+ pitting BLE edema up to knees   Neuro:    Moves all 4 extremities    Skin:   No jaundice, rashes, or lesions      Invasive Devices     Peripheral Intravenous Line  Duration           Peripheral IV 07/28/23 Left Arm 2 days                LABORATORY RESULTS     Admission on 07/28/2023   Component Date Value   • Protime 07/28/2023 14.2    • INR 07/28/2023 1.10    • PTT 07/28/2023 35    • WBC 07/28/2023 13.22 (H)    • RBC 07/28/2023 2.97 (L)    • Hemoglobin 07/28/2023 10.2 (L)    • Hematocrit 07/28/2023 30.7 (L)    • MCV 07/28/2023 103 (H)    • MCH 07/28/2023 34.3    • MCHC 07/28/2023 33.2    • RDW 07/28/2023 13.6    • MPV 07/28/2023 10.1    • Platelets 91/47/6055 150    • nRBC 07/28/2023 0    • Neutrophils Relative 07/28/2023 81 (H)    • Immat GRANS % 07/28/2023 1    • Lymphocytes Relative 07/28/2023 7 (L)    • Monocytes Relative 07/28/2023 11    • Eosinophils Relative 07/28/2023 0    • Basophils Relative 07/28/2023 0    • Neutrophils Absolute 07/28/2023 10.77 (H)    • Immature Grans Absolute 07/28/2023 0.09    • Lymphocytes Absolute 07/28/2023 0.88    • Monocytes Absolute 07/28/2023 1.44 (H)    • Eosinophils Absolute 07/28/2023 0.02    • Basophils Absolute 07/28/2023 0.02    • Sodium 07/28/2023 135    • Potassium 07/28/2023 4.3    • Chloride 07/28/2023 98    • CO2 07/28/2023 28    • ANION GAP 07/28/2023 9    • BUN 07/28/2023 42 (H)    • Creatinine 07/28/2023 1.33 (H)    • Glucose 07/28/2023 108    • Calcium 07/28/2023 8.4    • Corrected Calcium 07/28/2023 9.3    • AST 07/28/2023 33    • ALT 07/28/2023 25    • Alkaline Phosphatase 07/28/2023 250 (H)    • Total Protein 07/28/2023 6.5    • Albumin 07/28/2023 2.9 (L)    • Total Bilirubin 07/28/2023 0.91    • eGFR 07/28/2023 38    • ABO Grouping 07/28/2023 O    • Rh Factor 07/28/2023 Positive    • Antibody Screen 07/28/2023 Negative    • Specimen Expiration Date 07/28/2023 [id number]    • Sodium 07/29/2023 134 (L)    • Potassium 07/29/2023 3.6    • Chloride 07/29/2023 99    • CO2 07/29/2023 29    • ANION GAP 07/29/2023 6    • BUN 07/29/2023 40 (H)    • Creatinine 07/29/2023 1.16    • Glucose 07/29/2023 94    • Glucose, Fasting 07/29/2023 94    • Calcium 07/29/2023 8.3 (L)    • eGFR 07/29/2023 45    • WBC 07/29/2023 10.41 (H)    • RBC 07/29/2023 2.52 (L)    • Hemoglobin 07/29/2023 8.5 (L)    • Hematocrit 07/29/2023 25.8 (L)    • MCV 07/29/2023 102 (H)    • MCH 07/29/2023 33.7    • MCHC 07/29/2023 32.9    • RDW 07/29/2023 13.3    • Platelets 01/41/4362 106 (L)    • MPV 07/29/2023 10.1    • Hemoglobin 07/29/2023 9.5 (L)    • Hematocrit 07/29/2023 28.8 (L)    • Hemoglobin 07/29/2023 8.8 (L)    • Hematocrit 07/29/2023 27.2 (L)    • Hemoglobin 07/30/2023 8.4 (L)    • Hematocrit 07/30/2023 25.2 (L)    • Hemoglobin 07/30/2023 8.4 (L)    • Hematocrit 07/30/2023 25.3 (L)    • Sodium 07/30/2023 131 (L)    • Potassium 07/30/2023 3.6    • Chloride 07/30/2023 98    • CO2 07/30/2023 28    • ANION GAP 07/30/2023 5    • BUN 07/30/2023 35 (H)    • Creatinine 07/30/2023 1.16    • Glucose 07/30/2023 78    • Calcium 07/30/2023 8.0 (L)    • eGFR 07/30/2023 45    • Hemoglobin 07/30/2023 9.3 (L)    • Hematocrit 07/30/2023 28.4 (L)    • Hemoglobin 07/31/2023 8.8 (L)    • Hematocrit 07/31/2023 26.0 (L)    • WBC 07/31/2023 5.99    • RBC 07/31/2023 2.77 (L)    • Hemoglobin 07/31/2023 9.4 (L)    • Hematocrit 07/31/2023 28.4 (L)    • MCV 07/31/2023 103 (H)    • MCH 07/31/2023 33.9    • MCHC 07/31/2023 33.1    • RDW 07/31/2023 13.2    • MPV 07/31/2023 10.0    • Platelets 45/40/6938 120 (L)    • nRBC 07/31/2023 0    • Neutrophils Relative 07/31/2023 56    • Immat GRANS % 07/31/2023 1    • Lymphocytes Relative 07/31/2023 23    • Monocytes Relative 07/31/2023 16 (H)    • Eosinophils Relative 07/31/2023 3    • Basophils Relative 07/31/2023 1    • Neutrophils Absolute 07/31/2023 3.44    • Immature Grans Absolute 07/31/2023 0.03    • Lymphocytes Absolute 07/31/2023 1.35    • Monocytes Absolute 07/31/2023 0.97    • Eosinophils Absolute 07/31/2023 0.17    • Basophils Absolute 07/31/2023 0.03    • Sodium 07/31/2023 133 (L)    • Potassium 07/31/2023 3.4 (L)    • Chloride 07/31/2023 98    • CO2 07/31/2023 27    • ANION GAP 07/31/2023 8    • BUN 07/31/2023 30 (H)    • Creatinine 07/31/2023 1.14    • Glucose 07/31/2023 92    • Calcium 07/31/2023 7.9 (L)    • eGFR 07/31/2023 46      CT abdomen pelvis with contrast    Result Date: 7/28/2023  Narrative: CT ABDOMEN AND PELVIS WITH IV CONTRAST INDICATION:   LLQ abdominal pain L sided abdominal pain. Dark stools, colonic ulcerations on colonoscopy COMPARISON: CT abdomen and pelvis July 10, 2023 TECHNIQUE:  CT examination of the abdomen and pelvis was performed. Multiplanar 2D reformatted images were created from the source data.  This examination, like all CT scans performed in the [facility], was performed utilizing techniques to minimize radiation dose exposure, including the use of iterative reconstruction and automated exposure control. Radiation dose length product (DLP) for this visit:  554.13 mGy-cm IV Contrast:  100 mL of iohexol (OMNIPAQUE) Enteric Contrast:  Enteric contrast was not administered. FINDINGS: ABDOMEN LOWER CHEST: Bilateral basilar atelectasis unchanged. Coronary artery calcifications and atherosclerotic disease of the thoracic aorta. Hiatal hernia. LIVER/BILIARY TREE: Hepatic steatosis. Hepatic surface nodularity with hypertrophy of the caudate lobe suggesting underlying cirrhosis. No change in pneumobilia. GALLBLADDER:  No calcified gallstones. No pericholecystic inflammatory change. SPLEEN:  Unremarkable. 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 Measurements reported on MRI were an underestimate. A tubular low-attenuation structure extends in the pancreatic tail from the level of the cyst which appears to be multilocular. ADRENAL GLANDS:  Unremarkable. KIDNEYS/URETERS: Renal vascular calcifications. No hydronephrosis. STOMACH AND BOWEL: There is suggestion of some thickening of the gastric wall. Hyperemia of the gastric wall is demonstrated which can be secondary to underlying portal hypertension or gastritis. Small bowel loops show normal caliber. Colonic diverticulosis is seen without associated diverticulitis. APPENDIX: History of appendectomy. ABDOMINOPELVIC CAVITY:  No ascites. No pneumoperitoneum. No lymphadenopathy. VESSELS:  Atherosclerotic changes are present. No evidence of aneurysm. PELVIS REPRODUCTIVE ORGANS: Hysterectomy. Pelvic wall floor relaxation. URINARY BLADDER:  Unremarkable. ABDOMINAL WALL/INGUINAL REGIONS: Body wall edema. OSSEOUS STRUCTURES: Total right hip replacement. Osteoarthritis of the left hip.  Lumbar spondylosis with old compression fractures at L1, L4 and L5 as well as compression fractures at T11 and T12. Impression: 1. Gastric wall thickening with hyperemia which could be related to portal hypertension or underlying gastritis of other etiologies. 2. No inflammatory changes or bowel obstruction. 3. Cirrhosis. 4. 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 The study was marked in [facility] for immediate notification. . Workstation performed: [id number]     Colonoscopy    Addendum Date: 7/13/2023 Addendum:   Maninder Holder [facility] [id number] [address]: 7/13/23 PHYSICIAN(S): Attending: Percy Hatch MD Fellow: No Staff Documented INDICATION: Rectal bleeding POST-OP DIAGNOSIS: See the impression below. HISTORY: Prior colonoscopy: Less than 3 years ago. It is being repeated at an interval of less than 3 years because: This colonoscopy is being performed for a diagnostic indication BOWEL PREPARATION: Golytely/Colyte/Trilyte PREPROCEDURE: Informed consent was obtained for the procedure, including sedation. Risks including but not limited to bleeding, infection, perforation, adverse drug reaction and aspiration were explained in detail. Also explained about less than 100% sensitivity with the exam and other alternatives. The patient was placed in the left lateral decubitus position. Procedure: Colonoscopy DETAILS OF PROCEDURE: Patient was taken to the procedure room where a time out was performed to confirm correct patient and correct procedure. The patient underwent monitored anesthesia care, which was administered by an anesthesia professional. The patient's blood pressure, heart rate, level of consciousness, oxygen, respirations and ECG were monitored throughout the procedure. A digital rectal exam was performed. The scope was introduced through the anus and advanced to the cecum. Retroflexion was performed in the rectum.  The quality of bowel preparation was evaluated using the [facility] Bowel Preparation Scale with scores of: right colon = 2, transverse colon = 2, left colon = 2. The total BBPS score was 6. Bowel prep was adequate. The patient experienced no blood loss. The procedure was not difficult. The patient tolerated the procedure well. There were no apparent adverse events.  ANESTHESIA INFORMATION: ASA: III Anesthesia Type: IV Sedation with Anesthesia MEDICATIONS: No administrations occurring from 1029 to 1246 on 07/13/23 FINDINGS: Moderate, localized erythematous, friable and ulcerated mucosa in the cecum and ascending colon; performed 8 cold forceps biopsies to rule out IBD Diverticula of moderate severity in the sigmoid colon The ileocecal valve, hepatic flexure, transverse colon, splenic flexure, descending colon, rectosigmoid and rectum appeared normal. Performed multiple forceps biopsies in the rectosigmoid EVENTS: Procedure Events Event Event Time ENDO CECUM REACHED 7/13/2023 12:16 PM ENDO SCOPE OUT TIME 7/13/2023 12:36 PM SPECIMENS: ID Type Source Tests Collected by Time Destination 1 : bx's Tissue Large Intestine, Cecum TISSUE EXAM Burton Trujillo MD 7/13/2023 12:22 PM  2 : bx Tissue Large Intestine, Right/Ascending Colon TISSUE EXAM Burton Trujillo MD 7/13/2023 12:27 PM  3 : recto-sigmoid bx Tissue Rectum TISSUE EXAM Burton Trujillo MD 7/13/2023 12:32 PM  EQUIPMENT: Colonoscope -PCF-H190L IMPRESSION: Moderate, localized erythematous, friable and ulcerated mucosa in the cecum and ascending colon; performed cold forceps biopsies to rule out IBD Diverticulosis of moderate severity in the sigmoid colon The ileocecal valve, hepatic flexure, transverse colon, splenic flexure, descending colon, rectosigmoid and rectum appeared normal. Performed forceps biopsies in the rectosigmoid RECOMMENDATION:  Repeat colonoscopy in 6 months  Inflammatory bowel disease  If biopsies are not consistent with inflammatory bowel disease and symptoms resolved, then repeat colonoscopy is not necessary. Return to floor and resume diet. Follow-up in our office for further evaluation and management. If you don't have an appointment scheduled, please call [phone] to schedule your appointment. Gregorio Mcneill MD     Result Date: 7/13/2023  Narrative: Table formatting from the original result was not included. [address] [facility] [address] [place] [address]  [place] [id number] [address] [place] [address] Place: 7/13/23 PHYSICIAN(S): Attending: Gregorio Mcneill MD Fellow: No Staff Documented INDICATION: Rectal bleeding POST-OP DIAGNOSIS: See the impression below. HISTORY: Prior colonoscopy: Less than 3 years ago. It is being repeated at an interval of less than 3 years because: This colonoscopy is being performed for a diagnostic indication BOWEL PREPARATION: Golytely/Colyte/Trilyte PREPROCEDURE: Informed consent was obtained for the procedure, including sedation. Risks including but not limited to bleeding, infection, perforation, adverse drug reaction and aspiration were explained in detail. Also explained about less than 100% sensitivity with the exam and other alternatives. The patient was placed in the left lateral decubitus position. Procedure: Colonoscopy DETAILS OF PROCEDURE: Patient was taken to the procedure room where a time out was performed to confirm correct patient and correct procedure. The patient underwent monitored anesthesia care, which was administered by an anesthesia professional. The patient's blood pressure, heart rate, level of consciousness, oxygen, respirations and ECG were monitored throughout the procedure. A digital rectal exam was performed. The scope was introduced through the anus and advanced to the cecum. Retroflexion was performed in the rectum. The quality of bowel preparation was evaluated using the [facility] Bowel Preparation Scale with scores of: right colon = 2, transverse colon = 2, left colon = 2. The total BBPS score was 6.  Bowel prep was adequate. The patient experienced no blood loss. The procedure was not difficult. The patient tolerated the procedure well. There were no apparent adverse events. ANESTHESIA INFORMATION: ASA: III Anesthesia Type: IV Sedation with Anesthesia MEDICATIONS: No administrations occurring from 1029 to 1246 on 07/13/23 FINDINGS: Moderate, localized erythematous, friable and ulcerated mucosa in the cecum and ascending colon; performed 8 cold forceps biopsies to rule out IBD Diverticula of moderate severity in the sigmoid colon The ileocecal valve, hepatic flexure, transverse colon, splenic flexure, descending colon, rectosigmoid and rectum appeared normal. Performed multiple forceps biopsies in the rectosigmoid EVENTS: Procedure Events Event Event Time ENDO CECUM REACHED 7/13/2023 12:16 PM ENDO SCOPE OUT TIME 7/13/2023 12:36 PM SPECIMENS: ID Type Source Tests Collected by Time Destination 1 : bx's Tissue Large Intestine, Cecum TISSUE EXAM Deri Sandhoff, MD 7/13/2023 12:22 PM  2 : bx Tissue Large Intestine, Right/Ascending Colon TISSUE EXAM Deri Sandhoff, MD 7/13/2023 12:27 PM  3 : recto-sigmoid bx Tissue Rectum TISSUE EXAM Deri Sandhoff, MD 7/13/2023 12:32 PM  EQUIPMENT: Colonoscope -PCF-H190L     Impression: Moderate, localized erythematous, friable and ulcerated mucosa in the cecum and ascending colon; performed cold forceps biopsies to rule out IBD Diverticulosis of moderate severity in the sigmoid colon The ileocecal valve, hepatic flexure, transverse colon, splenic flexure, descending colon, rectosigmoid and rectum appeared normal. Performed forceps biopsies in the rectosigmoid RECOMMENDATION:  Repeat colonoscopy in 6 months  Inflammatory bowel disease  If biopsies are not consistent with inflammatory bowel disease and symptoms resolved, then repeat colonoscopy is not necessary. Deri Sandhoff, MD     Flexible Sigmoidoscopy    Result Date: 7/12/2023  Narrative: Table formatting from the original result was not included. [address] [facility] Operating Room [address] [facility]  Barbara Garzon [phone] DATE OF SERVICE: 7/12/23 PHYSICIAN(S): Attending: Vinny Cox MD Fellow: Eligio Almeida DO INDICATION: Rectal bleeding POST-OP DIAGNOSIS: See the impression below. HISTORY: Prior colonoscopy: Less than 3 years ago. It is being repeated at an interval of less than 3 years because: This colonoscopy is being performed for a diagnostic indication BOWEL PREPARATION: Enema PREPROCEDURE: Informed consent was obtained for the procedure, including sedation. Risks including but not limited to bleeding, infection, perforation, adverse drug reaction and aspiration were explained in detail. Also explained about less than 100% sensitivity with the exam and other alternatives. The patient was placed in the left lateral decubitus position. Procedure: Colonoscopy DETAILS OF PROCEDURE: Patient was taken to the procedure room where a time out was performed to confirm correct patient and correct procedure. The patient underwent monitored anesthesia care, which was administered by an anesthesia professional. The patient's blood pressure, heart rate, level of consciousness, oxygen and respirations were monitored throughout the procedure. A digital rectal exam was performed. The scope was introduced through the anus and advanced to the sigmoid colon. Retroflexion was not performed due to narrow vault. The quality of bowel preparation was evaluated using the [facility] Bowel Preparation Scale with scores of: right colon = 0, transverse colon = 0, left colon = 1. Bowel prep was not adequate. The patient experienced no blood loss. The procedure was difficult due to patient discomfort. The patient tolerated the procedure well. There were no apparent adverse events. The total BBPS score was 1.  ANESTHESIA INFORMATION: ASA: III Anesthesia Type: IV Sedation with Anesthesia MEDICATIONS: sodium chloride 0.9 % infusion 100 mL*  *From user-documented volume (Totals for administrations occurring from 1451 to 1512 on 07/12/23) FINDINGS: Scope was advanced to proximal sigmoid colon-30 cm from the anal verge. Solid and liquid stool mixed with blood was seen along the way. Scope could not be advanced further at solid stool was completely obstructing the lumen. Likely source of bleeding is in the right side of the colon or superior. EVENTS: Procedure Events Event Event Time ENDO SCOPE OUT TIME 7/12/2023  3:09 PM SPECIMENS: * No specimens in log * EQUIPMENT: Colonoscope - flex sig done with the EGD scope     Impression: Scope was advanced to proximal sigmoid colon-30 cm from the anal verge. Solid and liquid stool mixed with blood was seen along the way. Scope could not be advanced further at solid stool was completely obstructing the lumen. Likely source of bleeding RECOMMENDATION:  There is no recommended follow-up for this procedure. Plan for colonoscopy tomorrow. Initiate prep. Clear liquid diet. N.p.o. after midnight. Cassandra Hodges MD     CT high volume bleeding scan abdomen pelvis    Result Date: 7/10/2023  Narrative: CT ABDOMEN AND PELVIS - WITHOUT AND WITH IV CONTRAST INDICATION:   Gi bleed. COMPARISON: CT abdomen/pelvis from 5/1/2023. TECHNIQUE:  CT examination of the abdomen and pelvis was performed both prior to and after the administration of intravenous contrast. Multiplanar 2D reformatted images were created from the source data. Radiation dose length product (DLP) for this visit:  1642.86 mGy-cm . This examination, like all CT scans performed in the [facility], was performed utilizing techniques to minimize radiation dose exposure, including the use of iterative reconstruction and automated exposure control. IV Contrast:  100 mL of iohexol (OMNIPAQUE) Enteric Contrast:  Enteric contrast was not administered.  FINDINGS: ABDOMEN BOWEL:There is no active extravasation of intravenous contrast into the lumen of stomach, small bowel, or large bowel loops. There is no abnormal bowel wall thickening or pathologic bowel wall enhancement. Stable hiatal hernia. Age-appropriate atherosclerotic disease in the mesenteric vessels, without complete occlusion. LOWER CHEST: Bibasilar atelectasis. Heart top normal size. LIVER/BILIARY TREE: The liver demonstrates cirrhotic morphology. Within the limitations of this examination there is no evidence of suspicious hepatic mass. No biliary dilatation. Stable pneumobilia. Portal vein is patent. GALLBLADDER: Removed. SPLEEN:  Unremarkable. PANCREAS: Stable cyst in the pancreatic tail measuring 8 mm. ADRENAL GLANDS:  Unremarkable. KIDNEYS/URETERS:  Unremarkable. No hydronephrosis. APPENDIX:  No findings to suggest appendicitis. ABDOMINOPELVIC CAVITY:  No ascites. No pneumoperitoneum. No lymphadenopathy. VESSELS: Atherosclerotic changes are present. No evidence of aneurysm. PELVIS REPRODUCTIVE ORGANS: Surgical changes of prior hysterectomy. URINARY BLADDER:  Unremarkable. ABDOMINAL WALL/INGUINAL REGIONS:  Unremarkable. OSSEOUS STRUCTURES: Multiple chronic vertebral body compression deformities for example L4 and L5 with interval development of compression deformity at L1 that is either chronic or subacute. Correlate for focal back pain. Intact right hip arthroplasty. Impression: No CT evidence of active high volume gastrointestinal hemorrhage. 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 Stable hiatal hernia. Stable cirrhosis. Multiple chronic vertebral body compression deformities for example L4 and L5 with interval development of compression deformity at L1 that is either chronic or subacute. Correlate for focal back pain. Workstation performed: [id number]       RADIOLOGY RESULTS: I have personally reviewed pertinent imaging studies.     Lidya Chanel, PGY-4

## 2023-07-31 NOTE — PROGRESS NOTES
[address]  Progress Note  Name: Jacye Laws  MRN: [id number]  Unit/Bed#: -01 I Date of Admission: 7/28/2023   Date of Service: 7/31/2023 I Hospital Day: 2    Assessment/Plan   * BRBPR (bright red blood per rectum)  Assessment & Plan  · Patient presented to the ED secondary to large bloody bowel movement at her assisted living facility. She was recently hospitalized for GI bleed and had a flex sigmoidoscopy on July 12 and colonoscopy on July 13. Possible Crohn's disease. CT with no acute findings. ED reviewed w/ GI and may need repeat colonoscopy  · Hemoglobin 10.2 on arrival  · CT: Gastric wall thickening with hyperemia which could be related to portal hypertension or underlying gastritis of other etiologies. No inflammatory changes or bowel obstruction. Cirrhosis. 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 The study was marked in [facility] for immediate notification. · Patient had normal bowel movement this morning, denied bleeding  · GI following, appreciate recommendations. Will increase to cardiac diet as recommended by GI and monitor  · H/H remains stable  · Continue Protonix  · CBC a.m. · Planning discharge to [facility] if tolerating diet and no further bleeding in a.m. Stage 3b chronic kidney disease [facility])  Assessment & Plan  Lab Results   Component Value Date    EGFR 45 07/30/2023    EGFR 45 07/29/2023    EGFR 38 07/28/2023    CREATININE 1.16 07/30/2023    CREATININE 1.16 07/29/2023    CREATININE 1.33 (H) 07/28/2023   · Creatinine on arrival 1.33  · Patient's baseline creatinine appears to be around 1.2  · Currently better than baseline  · Continue to avoid nephrotoxins and hypotension  · BMP a.m.       Primary hypertension  Assessment & Plan  · BP controlled on current regimen  · Continue home losartan and metoprolol   · Continue to monitor BP per protocol    PAF (paroxysmal atrial fibrillation) ([address])  Assessment & Plan  · Currently rate controlled   · Continue metoprolol with hold parameters   · Patient is typically on aspirin, will hold for now    SIADH (syndrome of inappropriate ADH production) ([facility])  Assessment & Plan  · Sodium 135 on arrival  · Serum sodium 133, improved from yesterday  · Added home Bumex to regimen yesterday, as well as developing lower extremity edema and blood pressure improved  · Appears euvolemic on exam  · Continue to monitor intake and output  · Daily weight  · BMP a.m. Acquired hypothyroidism  Assessment & Plan  · Continue levothyroxine    Bilateral lower extremity edema  Assessment & Plan  · Appears euvolemic on exam  · Trace edema lower extremities, improved since home Bumex initiated yesterday  · Continue Bumex  · Monitor intake and output  · Daily weights  · Encourage elevation of legs when out of bed in chair    Chronic low back pain  Assessment & Plan  · No complaints of back pain today  · Continue pain regimen  · Added aqua K-pad  · Monitor effectiveness of pain regimen           VTE Pharmacologic Prophylaxis:   Pharmacologic: Pharmacologic VTE Prophylaxis contraindicated due to Presented with bright red bleeding from rectum  Mechanical VTE Prophylaxis in Place: Yes    Patient Centered Rounds: I have performed bedside rounds with nursing staff today. Discussions with Specialists or Other Care Team Provider: GI, nursing, case management    Education and Discussions with Family / Patient: Treatment plan discussed with patient who understands the plan as has been explained and is agreeable to the plan as stated. All questions answered to satisfaction. Time Spent for Care: 40 minutes. More than 50% of total time spent on counseling and coordination of care as described above.     Current Length of Stay: 2 day(s)    Current Patient Status: Inpatient   Certification Statement: The patient will continue to require additional inpatient hospital stay due to Monitoring for bleeding, increased diet today per GI we will monitor tolerance to diet, repeat labs in a.m. Discharge Plan: Patient had normal bowel movement today without bleeding. will increase diet as recommended by GI. Monitor tolerance to diet. Monitor for further bleeding. Hopeful discharge to personal-care home tomorrow pending a.m. labs and tolerance to diet. GI is planning outpatient colonoscopy in 6 months at this point unless there is further bleeding. Code Status: Level 3 - DNAR and DNI      Subjective:   Out of bed in chair resting comfortably. States she is hoping to go home soon. She is starting get hungry. She denies any abdominal pain or discomfort. States she had a bowel movement this morning without bleeding. Objective:     Vitals:   Temp (24hrs), Av.6 °F (36.4 °C), Min:97.5 °F (36.4 °C), Max:97.7 °F (36.5 °C)    Temp:  [97.5 °F (36.4 °C)-97.7 °F (36.5 °C)] 97.6 °F (36.4 °C)  HR:  [54-63] 63  Resp:  [18] 18  BP: (122-148)/(41-48) 122/43  SpO2:  [95 %-98 %] 98 %  Body mass index is 22.93 kg/m². Input and Output Summary (last 24 hours): Intake/Output Summary (Last 24 hours) at 2023 1500  Last data filed at 2023 1214  Gross per 24 hour   Intake 840 ml   Output 1650 ml   Net -810 ml       Physical Exam:     Physical Exam  Constitutional:       General: She is not in acute distress. Appearance: Normal appearance. She is normal weight. She is not ill-appearing. HENT:      Head: Normocephalic and atraumatic. Nose: Nose normal.      Mouth/Throat:      Mouth: Mucous membranes are moist.   Cardiovascular:      Rate and Rhythm: Normal rate and regular rhythm. Pulses: Normal pulses. Heart sounds: Normal heart sounds. Pulmonary:      Effort: Pulmonary effort is normal. No respiratory distress. Breath sounds: Normal breath sounds. No wheezing or rales. Abdominal:      General: Bowel sounds are normal. There is no distension. Palpations: Abdomen is soft. Tenderness: There is no abdominal tenderness. There is no guarding. Musculoskeletal:         General: Normal range of motion. Right lower leg: Edema present. Left lower leg: Edema present. Comments: Trace edema bilateral lower extremities   Skin:     General: Skin is warm and dry. Capillary Refill: Capillary refill takes less than 2 seconds. Neurological:      General: No focal deficit present. Mental Status: She is alert and oriented to person, place, and time. Mental status is at baseline. Psychiatric:         Mood and Affect: Mood normal.         Behavior: Behavior normal.         Thought Content: Thought content normal.         Judgment: Judgment normal.         Additional Data:     Labs:    Results from last 7 days   Lab Units 07/31/23  0829 07/31/23  0502   WBC Thousand/uL  --  5.99   HEMOGLOBIN g/dL 8.8* 9.4*   HEMATOCRIT % 26.0* 28.4*   PLATELETS Thousands/uL  --  120*   NEUTROS PCT %  --  56   LYMPHS PCT %  --  23   MONOS PCT %  --  16*   EOS PCT %  --  3     Results from last 7 days   Lab Units 07/31/23  0502 07/29/23  0651 07/28/23  1649   POTASSIUM mmol/L 3.4*   < > 4.3   CHLORIDE mmol/L 98   < > 98   CO2 mmol/L 27   < > 28   BUN mg/dL 30*   < > 42*   CREATININE mg/dL 1.14   < > 1.33*   CALCIUM mg/dL 7.9*   < > 8.4   ALK PHOS U/L  --   --  250*   ALT U/L  --   --  25   AST U/L  --   --  33    < > = values in this interval not displayed. Results from last 7 days   Lab Units 07/28/23  1649   INR  1.10       * I Have Reviewed All Lab Data Listed Above. * Additional Pertinent Lab Tests Reviewed:  [address] Admission Reviewed    Imaging:    Imaging Reports Reviewed Today Include: CT abdomen pelvis  Imaging Personally Reviewed by Myself Includes: CT abdomen pelvis    Recent Cultures (last 7 days):           Last 24 Hours Medication List:   Current Facility-Administered Medications   Medication Dose Route Frequency Provider Last Rate   • acetaminophen  650 mg Oral Q4H PRN Baldomero Trinidad PA-C     • artificial tear  1 Application Both Eyes BID Baldomero Trinidad PA-C     • bumetanide  2 mg Oral Daily EBEN Hooker     • levothyroxine  75 mcg Oral Daily Baldomero Trinidad PA-C     • losartan  25 mg Oral Daily Baldomero Trinidad PA-C     • metoprolol succinate  25 mg Oral BID Baldomero Trinidad PA-C     • midodrine  2.5 mg Oral TID AC Baldomero Trinidad PA-C     • ondansetron  4 mg Intravenous Q6H PRN Baldomero Trinidad PA-C     • pantoprazole  40 mg Oral Early Morning Baldomero Trinidad PA-C     • polyethylene glycol  17 g Oral Daily Jamee Parrish MD     • saccharomyces boulardii  250 mg Oral BID Baldomero Trinidad PA-C     • traZODone  100 mg Oral HS Baldomero Trinidad PA-C          Today, Patient Was Seen By: EBEN Saldivar    ** Please Note: Dictation voice to text software may have been used in the creation of this document.  **

## 2023-07-31 NOTE — PLAN OF CARE
Problem: PAIN - ADULT  Goal: Verbalizes/displays adequate comfort level or baseline comfort level  Description: Interventions:  - Encourage patient to monitor pain and request assistance  - Assess pain using appropriate pain scale  - Administer analgesics based on type and severity of pain and evaluate response  - Implement non-pharmacological measures as appropriate and evaluate response  - Consider cultural and social influences on pain and pain management  - Notify physician/advanced practitioner if interventions unsuccessful or patient reports new pain  Outcome: Not Progressing     Problem: INFECTION - ADULT  Goal: Absence or prevention of progression during hospitalization  Description: INTERVENTIONS:  - Assess and monitor for signs and symptoms of infection  - Monitor lab/diagnostic results  - Monitor all insertion sites, i.e. indwelling lines, tubes, and drains  - Monitor endotracheal if appropriate and nasal secretions for changes in amount and color  - [place] appropriate cooling/warming therapies per order  - Administer medications as ordered  - Instruct and encourage patient and family to use good hand hygiene technique  - Identify and instruct in appropriate isolation precautions for identified infection/condition  Outcome: Not Progressing

## 2023-07-31 NOTE — PHYSICAL THERAPY NOTE
Physical Therapy Evaluation     Patient's Name: Tia Singh    Admitting Diagnosis  Melena [K92.1]  Crohn's colitis ([address]) [K50.10]  GI bleed [K92.2]  BRBPR (bright red blood per rectum) [K62.5]    Problem List  Patient Active Problem List   Diagnosis    Primary hypertension    Acquired hypothyroidism    Irritable bowel syndrome with diarrhea    Abnormal CT scan    Superior mesenteric artery stenosis (HCC)    PAF (paroxysmal atrial fibrillation) (HCC)    Hx of CABG    Anemia    SIADH (syndrome of inappropriate ADH production) ([address])    Abnormal CT of liver    Stage 3b chronic kidney disease (HCC)    Bilateral lower extremity edema    BRBPR (bright red blood per rectum)    Chronic low back pain       Past Medical History  Past Medical History:   Diagnosis Date    Anemia     Atrial fibrillation ([address])     Depression     GI (gastrointestinal bleed)     Hypomagnesemia 4/29/2023    Ischemic colitis [facility])        Past Surgical History  Past Surgical History:   Procedure Laterality Date    APPENDECTOMY      CARDIAC SURGERY      CHOLECYSTECTOMY      COLONOSCOPY  07/13/2023    FLEXIBLE SIGMOIDOSCOPY  07/12/2023    HIP SURGERY Right 2022    Partial replacement    HYSTERECTOMY      IR BIOPSY TRANSJUGULAR LIVER  05/18/2023 07/31/23 0816   PT Last Visit   PT Visit Date 07/31/23   Note Type   Note type Evaluation   Pain Assessment   Pain Assessment Tool 0-10   Pain Score 8   Pain Location/Orientation Orientation: Bilateral;Orientation: Lower; Location: Back   Pain Onset/Description Onset: Ongoing;Frequency: Constant/Continuous; Descriptor: Aching;Descriptor: Sore   Effect of Pain on Daily Activities yes   Patient's Stated Pain Goal No pain   Hospital Pain Intervention(s) Repositioned; Ambulation/increased activity; Emotional support; Environmental changes; Elevated   Multiple Pain Sites No   Restrictions/Precautions   Weight Bearing Precautions Per Order No   Other Precautions Chair Alarm; Bed Alarm; Fall Risk;Pain;Hard of hearing Home Living   Type of Home Assisted living  [facility] PCA)   Home Layout Performs ADLs on one level; Able to live on main level with bedroom/bathroom; Access   [place] Walk-in shower   Bathroom Toilet Standard   Bathroom Equipment Grab bars in shower;Grab bars around toilet   [address]  (baseline rollator usage)   Prior Function   Level of [place] Independent with ADLs; Independent with functional mobility; Needs assistance with [address] staff   Receives Help From Personal care attendant   IADLs Family/Friend/Other provides medication management; Family/Friend/Other provides meals; Family/Friend/Other provides transportation   Falls in the last 6 months 1 to 4  (x 1 with subsequent hip fracture R)   Vocational Retired   General   Family/Caregiver Present No   Cognition   Overall Cognitive Status WFL   Arousal/Participation Alert   Orientation Level Oriented X4   Memory Within functional limits   Following Commands Follows one step commands without difficulty   Comments Leonel Hernandez was agreeable to PT assessment, pleasant. RLE Assessment   RLE Assessment X  (3+/5 gross musculature)   LLE Assessment   LLE Assessment X  (3+/5 gross musculature)   Vision-Basic Assessment   Current Vision Wears glasses only for reading   Vestibular   Spontaneous Nystagmus (-) no evidence of nystagmus at rest in room light   Gaze Holding Nystagmus (-) no evidence of nystagmus   Coordination   Movements are Fluid and Coordinated 0   Coordination and Movement Description Incremental,antalgic mobility requiring increased time. Sharp/Dull   RLE Sharp/Dull Grossly intact   LLE Sharp/Dull Grossly intact   Bed Mobility   Supine to Sit 5  Supervision   Additional items Assist x 1;HOB elevated; Bedrails; Increased time required;Verbal cues   Sit to Supine   (DNT as Leonel Hernandez was sitting out of bed on the recliner upon conclusion.)   Additional Comments Verbal cues for bedrail utilization and proper body mechanics. Maliha Fuchs reported initial dizziness with positional change which resolved within one minute of static rest.   Transfers   Sit to Stand 5  Supervision   Additional items Assist x 1;Bedrails; Increased time required;Verbal cues  (RW utilization)   Stand to Sit 5  Supervision   Additional items Assist x 1;Bedrails; Increased time required;Verbal cues   Stand pivot 5  Supervision   Additional items Assist x 2;Assist x 1; Increased time required;Verbal cues   Additional Comments Verbal cues for proper BUE placement with transitional movements and safety while turning. Ambulation/Elevation   Gait pattern Improper Weight shift; Antalgic; Forward Flexion; Short stride   Gait Assistance 5  Supervision   Additional items Assist x 1;Verbal cues   Assistive Device Rolling walker   Distance 45 feet x 2   Stair Management Assistance Not tested   Ambulation/Elevation Additional Comments Verbal cues for base of support widening and environmental awareness with directional changes. Balance   Static Sitting Good   Dynamic Sitting Fair +   Static Standing Fair +   Dynamic Standing Fair   Ambulatory Fair   Endurance Deficit   Endurance Deficit Yes   Activity Tolerance   Activity Tolerance Patient limited by fatigue;Patient limited by pain   Medical Staff Made Aware Yes, CM was informed of d/c disposition recommendation. Nurse Made Aware yes, Lorene PCA   Assessment   Prognosis Good   Problem List Decreased strength;Decreased endurance; Impaired balance;Decreased coordination;Decreased mobility;Pain; Impaired hearing   Assessment Pt is 69 y.o. female seen for PT evaluation s/p admit to [address] on 7/28/2023 w/ BRBPR (bright red blood per rectum). PT consulted to assess pt's functional mobility and d/c needs. Order placed for PT eval and tx, w/ up and OOB as tolerated order.  Comorbidities affecting pt's physical performance at time of assessment include: weakness,bright red blood per rectum,BLE edema, chronic low back pain, paroxysmal A-fib, HTN,stage 3b CKD,CABG,IBS  . PTA, pt was independent w/ all functional mobility w/ AD utilization. Personal factors affecting pt at time of IE include: ambulating w/ assistive device, inability to navigate community distances, inability to navigate level surfaces w/o external assistance, unable to perform dynamic tasks in community, hearing impairments and inability to perform ADLs. Please find objective findings from PT assessment regarding body systems outlined above with impairments and limitations including weakness, impaired balance, decreased endurance, impaired coordination, gait deviations, pain, decreased activity tolerance, decreased functional mobility tolerance and fall risk. From PT/mobility standpoint, recommendation at time of d/c would be return to facility with rehabilitation services pending progress in order to facilitate return to PLOF. Goals   Patient Goals to get better and to go home soon   LTG Expiration Date 08/10/23   Long Term Goal #1 Patient will complete transfers modified I  to decrease risk of falls, facilitate upright standing posture. BLE strength to greater than/equal to 4/5 gross musculature to increase ability to safely transfer, control descent to chair. Patient will exhibit increase dynamic standing balance to Good 5-7 minutes without LOB modified I to improve endurance. Patient will exhibit increase dynamic ambulatory balance to Good 350-500 feet w/ AD modified I to improve ability to mobilize to toilet, chair and decrease risk for additional medical complications. Patient will exhibit good self monitoring and ability to follow 2 step commands to increase complexity of tasks and resume ADL's without LOB. PT Treatment Day 0   Plan   Treatment/Interventions Functional transfer training;LE strengthening/ROM; Elevations; Therapeutic exercise; Endurance training;Patient/family training;Equipment eval/education; Bed mobility;Gait training; Compensatory technique education;Spoke to nursing;Spoke to case management   PT Frequency 3-5x/wk   Recommendation   PT Discharge Recommendation Return to facility with rehabilitation services   Equipment Recommended [address] walker   Change/add to [facility]? No   Additional Comments Upon conclusion, Earnestine Sevilla was sitting out of bed on the recliner. The chair alarm was engaged and all of her needs within reach. AM-PAC Basic Mobility Inpatient   Turning in Flat Bed Without Bedrails 3   Lying on Back to Sitting on Edge of Flat Bed Without Bedrails 3   Moving Bed to Chair 3   Standing Up From Chair Using Arms 3   Walk in Room 3   Climb 3-5 Stairs With Railing 3   Basic Mobility Inpatient Raw Score 18   Basic Mobility Standardized Score 41.05   Highest Level Of Mobility   JH-HLM Goal 6: Walk 10 steps or more   JH-HLM Achieved 7: Walk 25 feet or more        07/31/23 0816   PT Last Visit   PT Visit Date 07/31/23   Note Type   Note type Evaluation   Pain Assessment   Pain Assessment Tool 0-10   Pain Score 8   Pain Location/Orientation Orientation: Bilateral;Orientation: Lower; Location: Back   Pain Onset/Description Onset: Ongoing;Frequency: Constant/Continuous; Descriptor: Aching;Descriptor: Sore   Effect of Pain on Daily Activities yes   Patient's Stated Pain Goal No pain   Hospital Pain Intervention(s) Repositioned; Ambulation/increased activity; Emotional support; Environmental changes; Elevated   Multiple Pain Sites No   Restrictions/Precautions   Weight Bearing Precautions Per Order No   Other Precautions Chair Alarm; Bed Alarm; Fall Risk;Pain;Hard of hearing   Home Living   Type of Home Assisted living  Shyla ZIMMERMAN   Home Layout Performs ADLs on one level; Able to live on main level with bedroom/bathroom; Access   [place] Walk-in shower   Bathroom Toilet Standard   Bathroom Equipment Grab bars in shower;Grab bars around toilet   Bathroom Accessibility Accessible   Home Equipment Walker  (baseline rollator usage)   Prior Function   Level of [place] Independent with ADLs; Independent with functional mobility; Needs assistance with [address] staff   Receives Help From Personal care attendant   IADLs Family/Friend/Other provides medication management; Family/Friend/Other provides meals; Family/Friend/Other provides transportation   Falls in the last 6 months 1 to 4  (x 1 with subsequent hip fracture R)   Vocational Retired   General   Family/Caregiver Present No   Cognition   Overall Cognitive Status WFL   Arousal/Participation Alert   Orientation Level Oriented X4   Memory Within functional limits   Following Commands Follows one step commands without difficulty   Comments Diann Galaviz was agreeable to PT assessment, pleasant. RLE Assessment   RLE Assessment X  (3+/5 gross musculature)   LLE Assessment   LLE Assessment X  (3+/5 gross musculature)   Vision-Basic Assessment   Current Vision Wears glasses only for reading   Vestibular   Spontaneous Nystagmus (-) no evidence of nystagmus at rest in room light   Gaze Holding Nystagmus (-) no evidence of nystagmus   Coordination   Movements are Fluid and Coordinated 0   Coordination and Movement Description Incremental,antalgic mobility requiring increased time. Sharp/Dull   RLE Sharp/Dull Grossly intact   LLE Sharp/Dull Grossly intact   Bed Mobility   Supine to Sit 5  Supervision   Additional items Assist x 1;HOB elevated; Bedrails; Increased time required;Verbal cues   Sit to Supine   (DNT as Diann Galaviz was sitting out of bed on the recliner upon conclusion.)   Additional Comments Verbal cues for bedrail utilization and proper body mechanics. Diann Galaviz reported initial dizziness with positional change which resolved within one minute of static rest.   Transfers   Sit to Stand 5  Supervision   Additional items Assist x 1;Bedrails; Increased time required;Verbal cues  (RW utilization)   Stand to Sit 5  Supervision Additional items Assist x 1;Bedrails; Increased time required;Verbal cues   Stand pivot 5  Supervision   Additional items Assist x 2;Assist x 1; Increased time required;Verbal cues   Additional Comments Verbal cues for proper BUE placement with transitional movements and safety while turning. Ambulation/Elevation   Gait pattern Improper Weight shift; Antalgic; Forward Flexion; Short stride   Gait Assistance 5  Supervision   Additional items Assist x 1;Verbal cues   Assistive Device Rolling walker   Distance 45 feet x 2   Stair Management Assistance Not tested   Ambulation/Elevation Additional Comments Verbal cues for base of support widening and environmental awareness with directional changes. Balance   Static Sitting Good   Dynamic Sitting Fair +   Static Standing Fair +   Dynamic Standing Fair   Ambulatory Fair   Endurance Deficit   Endurance Deficit Yes   Activity Tolerance   Activity Tolerance Patient limited by fatigue;Patient limited by pain   Medical Staff Made Aware Yes, CM was informed of d/c disposition recommendation. Nurse Made Aware yes, Lorene PCA   Assessment   Prognosis Good   Problem List Decreased strength;Decreased endurance; Impaired balance;Decreased coordination;Decreased mobility;Pain; Impaired hearing   Assessment Pt is 69 y.o. female seen for PT evaluation s/p admit to [address] [place] [address] [place] on 7/28/2023 w/ BRBPR (bright red blood per rectum). PT consulted to assess pt's functional mobility and d/c needs. Order placed for PT eval and tx, w/ up and OOB as tolerated order. Comorbidities affecting pt's physical performance at time of assessment include: weakness,bright red blood per rectum,BLE edema, chronic low back pain, paroxysmal A-fib, HTN,stage 3b CKD,CABG,IBS. PTA, pt was independent w/ all functional mobility w/ AD utilization.  Personal factors affecting pt at time of IE include: ambulating w/ assistive device, inability to navigate community distances, inability to navigate level surfaces w/o external assistance, unable to perform dynamic tasks in community, hearing impairments and inability to perform ADLs. Please find objective findings from PT assessment regarding body systems outlined above with impairments and limitations including weakness, impaired balance, decreased endurance, impaired coordination, gait deviations, pain, decreased activity tolerance, decreased functional mobility tolerance and fall risk. From PT/mobility standpoint, recommendation at time of d/c would be return to facility with rehabilitation services pending progress in order to facilitate return to PLOF. Goals   Patient Goals to get better and to go home soon   LTG Expiration Date 08/10/23   Long Term Goal #1 Patient will complete transfers modified I  to decrease risk of falls, facilitate upright standing posture. BLE strength to greater than/equal to 4/5 gross musculature to increase ability to safely transfer, control descent to chair. Patient will exhibit increase dynamic standing balance to Good 5-7 minutes without LOB modified I to improve endurance. Patient will exhibit increase dynamic ambulatory balance to Good 350-500 feet w/ AD modified I to improve ability to mobilize to toilet, chair and decrease risk for additional medical complications. Patient will exhibit good self monitoring and ability to follow 2 step commands to increase complexity of tasks and resume ADL's without LOB. PT Treatment Day 0   Plan   Treatment/Interventions Functional transfer training;LE strengthening/ROM; Elevations; Therapeutic exercise; Endurance training;Patient/family training;Equipment eval/education; Bed mobility;Gait training; Compensatory technique education;Spoke to nursing;Spoke to case management   PT Frequency 3-5x/wk   Recommendation   PT Discharge Recommendation Return to facility with rehabilitation services   Equipment Recommended [address] walker   Change/add to [facility]? No   Additional Comments Upon conclusion, Shun Mcguire was sitting out of bed on the recliner. The chair alarm was engaged and all of her needs within reach.    AM-PAC Basic Mobility Inpatient   Turning in Flat Bed Without Bedrails 3   Lying on Back to Sitting on Edge of Flat Bed Without Bedrails 3   Moving Bed to Chair 3   Standing Up From Chair Using Arms 3   Walk in Room 3   Climb 3-5 Stairs With Railing 3   Basic Mobility Inpatient Raw Score 18   Basic Mobility Standardized Score 41.05   Highest Level Of Mobility   JH-HLM Goal 6: Walk 10 steps or more   JH-HLM Achieved 7: Walk 25 feet or more     History/Personal Factors/Comorbidities: weakness,bright red blood per rectum,BLE edema, chronic low back pain, paroxysmal A-fib, HTN,stage 3b CKD,CABG,IBS    # of body structures/limitations: muscle weakness, activity intolerance,decreased endurance, impaired balance, gait deviations,pain    Clinical presentation: unstable as seen in pain severity constant in nature, fall risk, progressive symptoms prior to hospitalization, dizziness, hearing impairment    Initial Assessment Time: 6412-2677    Roxy Mccain, PT

## 2023-08-01 VITALS
DIASTOLIC BLOOD PRESSURE: 61 MMHG | TEMPERATURE: 98.1 F | HEART RATE: 69 BPM | SYSTOLIC BLOOD PRESSURE: 174 MMHG | WEIGHT: 112.99 LBS | OXYGEN SATURATION: 96 % | RESPIRATION RATE: 16 BRPM | BODY MASS INDEX: 22.78 KG/M2 | HEIGHT: 59 IN

## 2023-08-01 PROBLEM — K55.9 ISCHEMIC COLITIS (HCC): Status: ACTIVE | Noted: 2023-07-10

## 2023-08-01 LAB
ANION GAP SERPL CALCULATED.3IONS-SCNC: 5 MMOL/L
BASOPHILS # BLD AUTO: 0.03 THOUSANDS/ÂΜL (ref 0–0.1)
BASOPHILS NFR BLD AUTO: 1 % (ref 0–1)
BUN SERPL-MCNC: 24 MG/DL (ref 5–25)
CALCIUM SERPL-MCNC: 7.3 MG/DL (ref 8.4–10.2)
CHLORIDE SERPL-SCNC: 101 MMOL/L (ref 96–108)
CO2 SERPL-SCNC: 28 MMOL/L (ref 21–32)
CREAT SERPL-MCNC: 1.12 MG/DL (ref 0.6–1.3)
EOSINOPHIL # BLD AUTO: 0.18 THOUSAND/ÂΜL (ref 0–0.61)
EOSINOPHIL NFR BLD AUTO: 3 % (ref 0–6)
ERYTHROCYTE [DISTWIDTH] IN BLOOD BY AUTOMATED COUNT: 13.2 % (ref 11.6–15.1)
GFR SERPL CREATININE-BSD FRML MDRD: 47 ML/MIN/1.73SQ M
GLUCOSE SERPL-MCNC: 110 MG/DL (ref 65–140)
HCT VFR BLD AUTO: 24.7 % (ref 34.8–46.1)
HGB BLD-MCNC: 8.1 G/DL (ref 11.5–15.4)
IMM GRANULOCYTES # BLD AUTO: 0.02 THOUSAND/UL (ref 0–0.2)
IMM GRANULOCYTES NFR BLD AUTO: 0 % (ref 0–2)
LYMPHOCYTES # BLD AUTO: 1.23 THOUSANDS/ÂΜL (ref 0.6–4.47)
LYMPHOCYTES NFR BLD AUTO: 21 % (ref 14–44)
MCH RBC QN AUTO: 33.9 PG (ref 26.8–34.3)
MCHC RBC AUTO-ENTMCNC: 32.8 G/DL (ref 31.4–37.4)
MCV RBC AUTO: 103 FL (ref 82–98)
MONOCYTES # BLD AUTO: 1 THOUSAND/ÂΜL (ref 0.17–1.22)
MONOCYTES NFR BLD AUTO: 17 % (ref 4–12)
NEUTROPHILS # BLD AUTO: 3.32 THOUSANDS/ÂΜL (ref 1.85–7.62)
NEUTS SEG NFR BLD AUTO: 58 % (ref 43–75)
NRBC BLD AUTO-RTO: 0 /100 WBCS
PLATELET # BLD AUTO: 112 THOUSANDS/UL (ref 149–390)
PMV BLD AUTO: 10.7 FL (ref 8.9–12.7)
POTASSIUM SERPL-SCNC: 3.5 MMOL/L (ref 3.5–5.3)
RBC # BLD AUTO: 2.39 MILLION/UL (ref 3.81–5.12)
SODIUM SERPL-SCNC: 134 MMOL/L (ref 135–147)
WBC # BLD AUTO: 5.78 THOUSAND/UL (ref 4.31–10.16)

## 2023-08-01 PROCEDURE — 85025 COMPLETE CBC W/AUTO DIFF WBC: CPT

## 2023-08-01 PROCEDURE — 80048 BASIC METABOLIC PNL TOTAL CA: CPT

## 2023-08-01 PROCEDURE — 99239 HOSP IP/OBS DSCHRG MGMT >30: CPT | Performed by: INTERNAL MEDICINE

## 2023-08-01 RX ADMIN — PANTOPRAZOLE SODIUM 40 MG: 40 TABLET, DELAYED RELEASE ORAL at 09:00

## 2023-08-01 RX ADMIN — BUMETANIDE 2 MG: 1 TABLET ORAL at 08:56

## 2023-08-01 RX ADMIN — Medication 250 MG: at 08:56

## 2023-08-01 RX ADMIN — METOPROLOL SUCCINATE 25 MG: 50 TABLET, EXTENDED RELEASE ORAL at 08:56

## 2023-08-01 RX ADMIN — LOSARTAN POTASSIUM 25 MG: 25 TABLET, FILM COATED ORAL at 08:56

## 2023-08-01 RX ADMIN — MENTHOL, METHYL SALICYLATE: 10; 15 CREAM TOPICAL at 08:57

## 2023-08-01 RX ADMIN — LEVOTHYROXINE SODIUM 75 MCG: 75 TABLET ORAL at 05:35

## 2023-08-01 RX ADMIN — WHITE PETROLATUM 57.7 %-MINERAL OIL 31.9 % EYE OINTMENT 1 APPLICATION: at 08:58

## 2023-08-01 NOTE — CASE MANAGEMENT
Case Management Discharge Planning Note    Patient name Mark Phillips  Location [address] [facility] [place] 209/-20 MRN [id number]  : 1945 Date 2023       Current Admission Date: 2023  Current Admission Diagnosis:Ischemic colitis [facility])   Patient Active Problem List    Diagnosis Date Noted   • Chronic low back pain 2023   • Ischemic colitis ([address]) 07/10/2023   • SIADH (syndrome of inappropriate ADH production) ([address]) 2023   • Abnormal CT of liver 2023   • Stage 3b chronic kidney disease ([address]) 2023   • Bilateral lower extremity edema 2023   • Anemia 2023   • Superior mesenteric artery stenosis ([address]) 2023   • Primary hypertension 2023   • Acquired hypothyroidism 2023   • Irritable bowel syndrome with diarrhea 2023   • Abnormal CT scan 2023   • Hx of CABG 2023   • PAF (paroxysmal atrial fibrillation) ([address]) 2022      LOS (days): 3  Geometric Mean LOS (GMLOS) (days): 3.40  Days to GMLOS:0.5     OBJECTIVE:  Risk of Unplanned Readmission Score: 21.57         Current admission status: Inpatient   Preferred Pharmacy:   [address] [place] [address]  Phone: [phone] Fax: [phone]    Primary Care Provider: Shelli Dasilva MD    Primary Insurance: MEDICARE  Secondary Insurance: Orlando Huitron    DISCHARGE DETAILS:    Discharge planning discussed with[de-identified] Patient and Roseanne Infante [address] [place] [address] Avenue of Choice: Yes    Other Referral/Resources/Interventions Provided:  Interventions: Facility Return    Treatment Team Recommendation: Home with [address] [facility]  Discharge Destination Plan[de-identified] Home with [facility] at Discharge : [place] Contacted: Yes  Number/Name of Dispatcher: [employer]  Transported by [employer] and Unit #):  [place]      IMM Given (Date):: 23 (Copy provided to patient and media)  IMM Given to[de-identified] Patient     Additional Comments: Patient medically cleared for discharge. Call to Chase Villarreal from [facility] and left VM left VM on [address] number also.   Update to nurse Neo Aguilar.  [address] [facility] transport scheduled

## 2023-08-01 NOTE — DISCHARGE SUMMARY
[address]  Discharge- Faith Cunningham 1945, 69 y.o. female MRN: [id number]  Unit/Bed#: [facility] Encounter: [id number]  Primary Care Provider: Kervin Mayberry MD   Date and time admitted to hospital: 7/28/2023  3:43 PM    * Ischemic colitis [facility])  Assessment & Plan  · Scented for evaluation of large bloody bowel movement at her personal care home  · Colonoscopy (7/13): Moderate, localized erythematous, friable and ulcerated mucosa in the cecum and ascending colon; performed 8 cold forceps biopsies to rule out IBD. Diverticula of moderate severity in the sigmoid colon. The ileocecal valve, hepatic flexure, transverse colon, splenic flexure, descending colon, rectosigmoid and rectum appeared normal.  · CT abd/pelvis (7/28): Gastric wall thickening with hyperemia which could be related to portal hypertension or underlying gastritis of other etiologies. No inflammatory changes or bowel obstruction. Cirrhosis. 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   · Hemoglobin has stabilized around 8.1 without any further active bleeding noted; lower eating oral diet  · Evaluated by GI; recommendations appreciate  · Taking adequate oral hydration  · Graham colonoscopy in 6 months  · Further outpatient follow-up with hepatology for cirrhosis work-up and further management    Bilateral lower extremity edema  Assessment & Plan  · Appears euvolemic on exam with only trace lower extremity edema  · Continue Bumex 2mg daily    Chronic low back pain  Assessment & Plan  · Continue pain regimen  · Added aqua K-pad  · Monitor effectiveness of pain regimen    SIADH (syndrome of inappropriate ADH production) ([address])  Assessment & Plan  · Na remains stable at this time  · Continue outpatient follow-up with Nephrology     PAF (paroxysmal atrial fibrillation) ([facility])  Assessment & Plan  · Continue home Toprol 25mg BID   · Previously on ASA which has been held given given GI bleed     Primary hypertension  Assessment & Plan  · Blood pressure is adequately controlled at this time  · Continue Bumex, Toprol (as above), and Losartan 25mg daily    Medical Problems     Resolved Problems  Date Reviewed: 7/31/2023   None       Discharging Physician / Practitioner: Zuri العلي DO  PCP: Ellie Sanchez MD  Admission Date:   Admission Orders (From admission, onward)     Ordered        07/29/23 1448  Inpatient Admission  Once            07/1946  Place in Observation  Once                      Discharge Date: 08/01/23    Consultations During Hospital Stay:  · GI    Procedures Performed:   · None    Significant Findings / Test Results:   · CT abd/pelvis (7/28): Gastric wall thickening with hyperemia which could be related to portal hypertension or underlying gastritis of other etiologies. No inflammatory changes or bowel obstruction. Cirrhosis. 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 Incidental Findings:   · None     Test Results Pending at Discharge (will require follow up): · None     Outpatient Tests Requested:  · TBD upon outpatient follow-up with PCP and GI  · Colonoscopy recommended in 6 months    Complications:  None    Reason for Admission: Hematochezia     Hospital Course:   Shobha Crooks is a 69 y.o. female patient who originally presented to the hospital on 7/28/2023 due to hematochezia. Please see H&P as documented by Rachelle Rick for complete details regarding history of presenting illness. In brief, the patient has a PMHx of atrial fibrillation, SIADH, hypothyroidism, and ischemic colitis who presented from her personal care home for evaluation of large bloody bowel movement. She had recent admission for GI bleed and colonoscopy was performed with concern for diverticulosis and IBD.  During admission she was evaluated by GI who noted that given her recurrence her bleeding is likely due to ischemic colitis and she was advised on maintaining adequate hydration. Additionally repeat colonoscopy is recommended in 6 months (though patient is currently refusing further colonoscopies). Prior to discharge, her hgb stabilized and no further active bleeding was identified. She was ultimately discharged back to her personal care home in stable condition. All of her questions were answered prior to departure and she expressed understanding and agreement with this plan. This is a brief discharge summary. Please see full medical record for more details. Please see above list of diagnoses and related plan for additional information. Condition at Discharge: stable    Discharge Day Visit / Exam:   Subjective:  Patient resting comfortably in bed without any acute complaints. No further bleeding noted. No significant over night events reported by nursing. Vitals: Blood Pressure: (!) 174/61 (08/01/23 1205)  Pulse: 69 (08/01/23 1205)  Temperature: 98.1 °F (36.7 °C) (08/01/23 0730)  Temp Source: Oral (08/01/23 0730)  Respirations: 16 (08/01/23 0730)  Height: 4' 11" (149.9 cm) (07/29/23 1148)  Weight - Scale: 51.2 kg (112 lb 15.8 oz) (08/01/23 0600)  SpO2: 96 % (08/01/23 1205)     Exam:   Physical Exam  Vitals and nursing note reviewed. Constitutional:       General: She is not in acute distress. Appearance: She is well-developed and normal weight. HENT:      Head: Normocephalic and atraumatic. Mouth/Throat:      Mouth: Mucous membranes are moist.      Pharynx: Oropharynx is clear. Eyes:      Extraocular Movements: Extraocular movements intact. Conjunctiva/sclera: Conjunctivae normal.   Cardiovascular:      Rate and Rhythm: Normal rate and regular rhythm. Pulses: Normal pulses. Heart sounds: Murmur heard. Pulmonary:      Effort: Pulmonary effort is normal. No respiratory distress. Breath sounds: Normal breath sounds. No wheezing. Abdominal:      General: Bowel sounds are normal. There is no distension.       Palpations: Abdomen is soft. Tenderness: There is no abdominal tenderness. Musculoskeletal:         General: No swelling. Normal range of motion. Cervical back: Normal range of motion and neck supple. Skin:     General: Skin is warm and dry. Capillary Refill: Capillary refill takes less than 2 seconds. Neurological:      General: No focal deficit present. Mental Status: She is alert and oriented to person, place, and time. Mental status is at baseline. Psychiatric:         Mood and Affect: Mood normal.         Behavior: Behavior normal.         Judgment: Judgment normal.          Discussion with Family: Updated  (sister) via phone. Discharge instructions/Information to patient and family:   See after visit summary for information provided to patient and family. Provisions for Follow-Up Care:  See after visit summary for information related to follow-up care and any pertinent home health orders. Disposition:   [facility] / [address] [place] [address] at [facility] Readmission: None     Discharge Statement:  I spent > 35 minutes discharging the patient. This time was spent on the day of discharge. I had direct contact with the patient on the day of discharge. Greater than 50% of the total time was spent examining patient, answering all patient questions, arranging and discussing plan of care with patient as well as directly providing post-discharge instructions. Additional time then spent on discharge activities. Discharge Medications:  See after visit summary for reconciled discharge medications provided to patient and/or family.       **Please Note: This note may have been constructed using a voice recognition system**

## 2023-08-01 NOTE — NURSING NOTE
Patient discharged in stable condition. Discharge instruction, F/U appointments and medications reviewed with patient. Questions addressed.

## 2023-08-01 NOTE — PLAN OF CARE
Problem: PAIN - ADULT  Goal: Verbalizes/displays adequate comfort level or baseline comfort level  Description: Interventions:  - Encourage patient to monitor pain and request assistance  - Assess pain using appropriate pain scale  - Administer analgesics based on type and severity of pain and evaluate response  - Implement non-pharmacological measures as appropriate and evaluate response  - Consider cultural and social influences on pain and pain management  - Notify physician/advanced practitioner if interventions unsuccessful or patient reports new pain  Outcome: Progressing     Problem: INFECTION - ADULT  Goal: Absence or prevention of progression during hospitalization  Description: INTERVENTIONS:  - Assess and monitor for signs and symptoms of infection  - Monitor lab/diagnostic results  - Monitor all insertion sites, i.e. indwelling lines, tubes, and drains  - Monitor endotracheal if appropriate and nasal secretions for changes in amount and color  - [place] appropriate cooling/warming therapies per order  - Administer medications as ordered  - Instruct and encourage patient and family to use good hand hygiene technique  - Identify and instruct in appropriate isolation precautions for identified infection/condition  Outcome: Progressing  Goal: Absence of fever/infection during neutropenic period  Description: INTERVENTIONS:  - Monitor WBC    Outcome: Progressing     Problem: SAFETY ADULT  Goal: Patient will remain free of falls  Description: INTERVENTIONS:  - Educate patient/family on patient safety including physical limitations  - Instruct patient to call for assistance with activity   - Consult OT/PT to assist with strengthening/mobility   - Keep Call bell within reach  - Keep bed low and locked with side rails adjusted as appropriate  - Keep care items and personal belongings within reach  - Initiate and maintain comfort rounds  - Make Fall Risk Sign visible to staff  - Offer Toileting every  Hours, in advance of need  - Initiate/Maintain alarm  - Obtain necessary fall risk management equipment:   - Apply yellow socks and bracelet for high fall risk patients  - Consider moving patient to room near nurses station  Outcome: Progressing  Goal: Maintain or return to baseline ADL function  Description: INTERVENTIONS:  -  Assess patient's ability to carry out ADLs; assess patient's baseline for ADL function and identify physical deficits which impact ability to perform ADLs (bathing, care of mouth/teeth, toileting, grooming, dressing, etc.)  - Assess/evaluate cause of self-care deficits   - Assess range of motion  - Assess patient's mobility; develop plan if impaired  - Assess patient's need for assistive devices and provide as appropriate  - Encourage maximum independence but intervene and supervise when necessary  - Involve family in performance of ADLs  - Assess for home care needs following discharge   - Consider OT consult to assist with ADL evaluation and planning for discharge  - Provide patient education as appropriate  Outcome: Progressing  Goal: Maintains/Returns to pre admission functional level  Description: INTERVENTIONS:  - Perform BMAT or MOVE assessment daily.   - Set and communicate daily mobility goal to care team and patient/family/caregiver. - Collaborate with rehabilitation services on mobility goals if consulted  - Perform Range of Motion  times a day. - Reposition patient every  hours.   - Dangle patient  times a day  - Stand patient  times a day  - Ambulate patient  times a day  - Out of bed to chair  times a day   - Out of bed for meals  times a day  - Out of bed for toileting  - Record patient progress and toleration of activity level   Outcome: Progressing     Problem: DISCHARGE PLANNING  Goal: Discharge to home or other facility with appropriate resources  Description: INTERVENTIONS:  - Identify barriers to discharge w/patient and caregiver  - Arrange for needed discharge resources and transportation as appropriate  - Identify discharge learning needs (meds, wound care, etc.)  - Arrange for interpretive services to assist at discharge as needed  - Refer to Case Management Department for coordinating discharge planning if the patient needs post-hospital services based on physician/advanced practitioner order or complex needs related to functional status, cognitive ability, or social support system  Outcome: Progressing     Problem: Knowledge Deficit  Goal: Patient/family/caregiver demonstrates understanding of disease process, treatment plan, medications, and discharge instructions  Description: Complete learning assessment and assess knowledge base.   Interventions:  - Provide teaching at level of understanding  - Provide teaching via preferred learning methods  Outcome: Progressing     Problem: GASTROINTESTINAL - ADULT  Goal: Minimal or absence of nausea and/or vomiting  Description: INTERVENTIONS:  - Administer IV fluids if ordered to ensure adequate hydration  - Maintain NPO status until nausea and vomiting are resolved  - Nasogastric tube if ordered  - Administer ordered antiemetic medications as needed  - Provide nonpharmacologic comfort measures as appropriate  - Advance diet as tolerated, if ordered  - Consider nutrition services referral to assist patient with adequate nutrition and appropriate food choices  Outcome: Progressing  Goal: Maintains or returns to baseline bowel function  Description: INTERVENTIONS:  - Assess bowel function  - Encourage oral fluids to ensure adequate hydration  - Administer IV fluids if ordered to ensure adequate hydration  - Administer ordered medications as needed  - Encourage mobilization and activity  - Consider nutritional services referral to assist patient with adequate nutrition and appropriate food choices  Outcome: Progressing  Goal: Maintains adequate nutritional intake  Description: INTERVENTIONS:  - Monitor percentage of each meal consumed  - Identify factors contributing to decreased intake, treat as appropriate  - Assist with meals as needed  - Monitor I&O, weight, and lab values if indicated  - Obtain nutrition services referral as needed  Outcome: Progressing  Goal: Oral mucous membranes remain intact  Description: INTERVENTIONS  - Assess oral mucosa and hygiene practices  - Implement preventative oral hygiene regimen  - Implement oral medicated treatments as ordered  - Initiate Nutrition services referral as needed  Outcome: Progressing     Problem: GENITOURINARY - ADULT  Goal: Maintains or returns to baseline urinary function  Description: INTERVENTIONS:  - Assess urinary function  - Encourage oral fluids to ensure adequate hydration if ordered  - Administer IV fluids as ordered to ensure adequate hydration  - Administer ordered medications as needed  - Offer frequent toileting  - Follow urinary retention protocol if ordered  Outcome: Progressing     Problem: METABOLIC, FLUID AND ELECTROLYTES - ADULT  Goal: Electrolytes maintained within normal limits  Description: INTERVENTIONS:  - Monitor labs and assess patient for signs and symptoms of electrolyte imbalances  - Administer electrolyte replacement as ordered  - Monitor response to electrolyte replacements, including repeat lab results as appropriate  - Instruct patient on fluid and nutrition as appropriate  Outcome: Progressing     Problem: SKIN/TISSUE INTEGRITY - ADULT  Goal: Skin Integrity remains intact(Skin Breakdown Prevention)  Description: Assess:  -Perform Elvis assessment every   -Clean and moisturize skin every   -Inspect skin when repositioning, toileting, and assisting with ADLS  -Assess under medical devices such as  every   -Assess extremities for adequate circulation and sensation     Bed Management:  -Have minimal linens on bed & keep smooth, unwrinkled  -Change linens as needed when moist or perspiring  -Avoid sitting or lying in one position for more than  hours while in bed  -Keep HOB at degrees     Toileting:  -Offer bedside commode  -Assess for incontinence every   -Use incontinent care products after each incontinent episode such as     Activity:  -Mobilize patient  times a day  -Encourage activity and walks on unit  -Encourage or provide ROM exercises   -Turn and reposition patient every  Hours  -Use appropriate equipment to lift or move patient in bed  -Instruct/ Assist with weight shifting every  when out of bed in chair  -Consider limitation of chair time  hour intervals    Skin Care:  -Avoid use of baby powder, tape, friction and shearing, hot water or constrictive clothing  -Relieve pressure over bony prominences using   -Do not massage red bony areas    Next Steps:  -Teach patient strategies to minimize risks such as    -Consider consults to  interdisciplinary teams such as   Outcome: Progressing  Goal: Incision(s), wounds(s) or drain site(s) healing without S/S of infection  Description: INTERVENTIONS  - Assess and document dressing, incision, wound bed, drain sites and surrounding tissue  - Provide patient and family education  - Perform skin care/dressing changes every   Outcome: Progressing  Goal: Pressure injury heals and does not worsen  Description: Interventions:  - Implement low air loss mattress or specialty surface (Criteria met)  - Apply silicone foam dressing  - Instruct/assist with weight shifting every  minutes when in chair   - Limit chair time to  hour intervals  - Use special pressure reducing interventions such as  when in chair   - Apply fecal or urinary incontinence containment device   - Perform passive or active ROM every   - Turn and reposition patient & offload bony prominences every  hours   - Utilize friction reducing device or surface for transfers   - Consider consults to  interdisciplinary teams such as   - Use incontinent care products after each incontinent episode such as   - Consider nutrition services referral as needed  Outcome: Progressing     Problem: HEMATOLOGIC - ADULT  Goal: Maintains hematologic stability  Description: INTERVENTIONS  - Assess for signs and symptoms of bleeding or hemorrhage  - Monitor labs  - Administer supportive blood products/factors as ordered and appropriate  Outcome: Progressing     Problem: MUSCULOSKELETAL - ADULT  Goal: Maintain or return mobility to safest level of function  Description: INTERVENTIONS:  - Assess patient's ability to carry out ADLs; assess patient's baseline for ADL function and identify physical deficits which impact ability to perform ADLs (bathing, care of mouth/teeth, toileting, grooming, dressing, etc.)  - Assess/evaluate cause of self-care deficits   - Assess range of motion  - Assess patient's mobility  - Assess patient's need for assistive devices and provide as appropriate  - Encourage maximum independence but intervene and supervise when necessary  - Involve family in performance of ADLs  - Assess for home care needs following discharge   - Consider OT consult to assist with ADL evaluation and planning for discharge  - Provide patient education as appropriate  Outcome: Progressing     Problem: MOBILITY - ADULT  Goal: Maintain or return to baseline ADL function  Description: INTERVENTIONS:  -  Assess patient's ability to carry out ADLs; assess patient's baseline for ADL function and identify physical deficits which impact ability to perform ADLs (bathing, care of mouth/teeth, toileting, grooming, dressing, etc.)  - Assess/evaluate cause of self-care deficits   - Assess range of motion  - Assess patient's mobility; develop plan if impaired  - Assess patient's need for assistive devices and provide as appropriate  - Encourage maximum independence but intervene and supervise when necessary  - Involve family in performance of ADLs  - Assess for home care needs following discharge   - Consider OT consult to assist with ADL evaluation and planning for discharge  - Provide patient education as appropriate  Outcome: Progressing  Goal: Maintains/Returns to pre admission functional level  Description: INTERVENTIONS:  - Perform BMAT or MOVE assessment daily.   - Set and communicate daily mobility goal to care team and patient/family/caregiver. - Collaborate with rehabilitation services on mobility goals if consulted  - Perform Range of Motion  times a day. - Reposition patient every  hours.   - Dangle patient  times a day  - Stand patient  times a day  - Ambulate patient  times a day  - Out of bed to chair  times a day   - Out of bed for meals  times a day  - Out of bed for toileting  - Record patient progress and toleration of activity level   Outcome: Progressing

## 2023-08-01 NOTE — PLAN OF CARE
Problem: PAIN - ADULT  Goal: Verbalizes/displays adequate comfort level or baseline comfort level  Description: Interventions:  - Encourage patient to monitor pain and request assistance  - Assess pain using appropriate pain scale  - Administer analgesics based on type and severity of pain and evaluate response  - Implement non-pharmacological measures as appropriate and evaluate response  - Consider cultural and social influences on pain and pain management  - Notify physician/advanced practitioner if interventions unsuccessful or patient reports new pain  Outcome: Adequate for Discharge     Problem: INFECTION - ADULT  Goal: Absence or prevention of progression during hospitalization  Description: INTERVENTIONS:  - Assess and monitor for signs and symptoms of infection  - Monitor lab/diagnostic results  - Monitor all insertion sites, i.e. indwelling lines, tubes, and drains  - Monitor endotracheal if appropriate and nasal secretions for changes in amount and color  - [place] appropriate cooling/warming therapies per order  - Administer medications as ordered  - Instruct and encourage patient and family to use good hand hygiene technique  - Identify and instruct in appropriate isolation precautions for identified infection/condition  Outcome: Adequate for Discharge  Goal: Absence of fever/infection during neutropenic period  Description: INTERVENTIONS:  - Monitor WBC    Outcome: Adequate for Discharge     Problem: SAFETY ADULT  Goal: Patient will remain free of falls  Description: INTERVENTIONS:  - Educate patient/family on patient safety including physical limitations  - Instruct patient to call for assistance with activity   - Consult OT/PT to assist with strengthening/mobility   - Keep Call bell within reach  - Keep bed low and locked with side rails adjusted as appropriate  - Keep care items and personal belongings within reach  - Initiate and maintain comfort rounds  - Make Fall Risk Sign visible to staff  - Offer Toileting every 2 Hours, in advance of need  - Initiate/Maintain bed  alarm  - Obtain necessary fall risk management equipment  - Apply yellow socks and bracelet for high fall risk patients  - Consider moving patient to room near nurses station  Outcome: Adequate for Discharge  Goal: Maintain or return to baseline ADL function  Description: INTERVENTIONS:  -  Assess patient's ability to carry out ADLs; assess patient's baseline for ADL function and identify physical deficits which impact ability to perform ADLs (bathing, care of mouth/teeth, toileting, grooming, dressing, etc.)  - Assess/evaluate cause of self-care deficits   - Assess range of motion  - Assess patient's mobility; develop plan if impaired  - Assess patient's need for assistive devices and provide as appropriate  - Encourage maximum independence but intervene and supervise when necessary  - Involve family in performance of ADLs  - Assess for home care needs following discharge   - Consider OT consult to assist with ADL evaluation and planning for discharge  - Provide patient education as appropriate  Outcome: Adequate for Discharge  Goal: Maintains/Returns to pre admission functional level  Description: INTERVENTIONS:  - Perform BMAT or MOVE assessment daily.   - Set and communicate daily mobility goal to care team and patient/family/caregiver.    - Collaborate with rehabilitation services on mobility goals if consulted  - Out of bed for toileting  - Record patient progress and toleration of activity level   Outcome: Adequate for Discharge     Problem: DISCHARGE PLANNING  Goal: Discharge to home or other facility with appropriate resources  Description: INTERVENTIONS:  - Identify barriers to discharge w/patient and caregiver  - Arrange for needed discharge resources and transportation as appropriate  - Identify discharge learning needs (meds, wound care, etc.)  - Arrange for interpretive services to assist at discharge as needed  - Refer to Case Management Department for coordinating discharge planning if the patient needs post-hospital services based on physician/advanced practitioner order or complex needs related to functional status, cognitive ability, or social support system  Outcome: Adequate for Discharge     Problem: Knowledge Deficit  Goal: Patient/family/caregiver demonstrates understanding of disease process, treatment plan, medications, and discharge instructions  Description: Complete learning assessment and assess knowledge base.   Interventions:  - Provide teaching at level of understanding  - Provide teaching via preferred learning methods  Outcome: Adequate for Discharge     Problem: GASTROINTESTINAL - ADULT  Goal: Minimal or absence of nausea and/or vomiting  Description: INTERVENTIONS:  - Administer IV fluids if ordered to ensure adequate hydration  - Maintain NPO status until nausea and vomiting are resolved  - Nasogastric tube if ordered  - Administer ordered antiemetic medications as needed  - Provide nonpharmacologic comfort measures as appropriate  - Advance diet as tolerated, if ordered  - Consider nutrition services referral to assist patient with adequate nutrition and appropriate food choices  Outcome: Adequate for Discharge  Goal: Maintains or returns to baseline bowel function  Description: INTERVENTIONS:  - Assess bowel function  - Encourage oral fluids to ensure adequate hydration  - Administer IV fluids if ordered to ensure adequate hydration  - Administer ordered medications as needed  - Encourage mobilization and activity  - Consider nutritional services referral to assist patient with adequate nutrition and appropriate food choices  Outcome: Adequate for Discharge  Goal: Maintains adequate nutritional intake  Description: INTERVENTIONS:  - Monitor percentage of each meal consumed  - Identify factors contributing to decreased intake, treat as appropriate  - Assist with meals as needed  - Monitor I&O, weight, and lab values if indicated  - Obtain nutrition services referral as needed  Outcome: Adequate for Discharge  Goal: Oral mucous membranes remain intact  Description: INTERVENTIONS  - Assess oral mucosa and hygiene practices  - Implement preventative oral hygiene regimen  - Implement oral medicated treatments as ordered  - Initiate Nutrition services referral as needed  Outcome: Adequate for Discharge     Problem: GENITOURINARY - ADULT  Goal: Maintains or returns to baseline urinary function  Description: INTERVENTIONS:  - Assess urinary function  - Encourage oral fluids to ensure adequate hydration if ordered  - Administer IV fluids as ordered to ensure adequate hydration  - Administer ordered medications as needed  - Offer frequent toileting  - Follow urinary retention protocol if ordered  Outcome: Adequate for Discharge     Problem: METABOLIC, FLUID AND ELECTROLYTES - ADULT  Goal: Electrolytes maintained within normal limits  Description: INTERVENTIONS:  - Monitor labs and assess patient for signs and symptoms of electrolyte imbalances  - Administer electrolyte replacement as ordered  - Monitor response to electrolyte replacements, including repeat lab results as appropriate  - Instruct patient on fluid and nutrition as appropriate  Outcome: Adequate for Discharge     Problem: SKIN/TISSUE INTEGRITY - ADULT  Goal: Skin Integrity remains intact(Skin Breakdown Prevention)  Description: Assess:  -Perform Elvis assessment every shift    -Clean and moisturize skin every shift  -Inspect skin when repositioning, toileting, and assisting with ADLS  -Assess extremities for adequate circulation and sensation     Bed Management:  -Have minimal linens on bed & keep smooth, unwrinkled  -Change linens as needed when moist or perspiring  -Avoid sitting or lying in one position for more than 2 hours while in bed  -Keep HOB at 30 degrees     Toileting:  -Offer bedside commode  -Assess for incontinence every 2 hours  -Use incontinent care products after each incontinent episode    Activity:  -Mobilize patient 3 times a day  -Encourage activity and walks on unit  -Encourage or provide ROM exercises   -Turn and reposition patient every 2 Hours  -Use appropriate equipment to lift or move patient in bed  -Instruct/ Assist with weight shifting every 30 min when out of bed in chair  -Consider limitation of chair time 2 hour intervals    Skin Care:  -Avoid use of baby powder, tape, friction and shearing, hot water or constrictive clothing  -Relieve pressure over bony prominences using allevyn  -Do not massage red bony areas    Next Steps:  -Teach patient strategies to minimize risks  -Consider consults to  interdisciplinary teams  Outcome: Adequate for Discharge  Goal: Incision(s), wounds(s) or drain site(s) healing without S/S of infection  Description: INTERVENTIONS  - Assess and document dressing, incision, wound bed, drain sites and surrounding tissue  - Provide patient and family education  - Perform skin care/dressing changes every shift  Outcome: Adequate for Discharge  Goal: Pressure injury heals and does not worsen  Description: Interventions:  - Implement low air loss mattress or specialty surface (Criteria met)  - Apply silicone foam dressing  - Instruct/assist with weight shifting every 30 minutes when in chair   - Limit chair time to 2 hour intervals  - Use special pressure reducing interventions such as waffle when in chair   - Apply fecal or urinary incontinence containment device   - Turn and reposition patient & offload bony prominences every 2hours   - Utilize friction reducing device or surface for transfers   - Consider consults to  interdisciplinary teams  - Use incontinent care products after each incontinent episode  - Consider nutrition services referral as needed  Outcome: Adequate for Discharge     Problem: HEMATOLOGIC - ADULT  Goal: Maintains hematologic stability  Description: INTERVENTIONS  - Assess for signs and symptoms of bleeding or hemorrhage  - Monitor labs  - Administer supportive blood products/factors as ordered and appropriate  Outcome: Adequate for Discharge     Problem: MUSCULOSKELETAL - ADULT  Goal: Maintain or return mobility to safest level of function  Description: INTERVENTIONS:  - Assess patient's ability to carry out ADLs; assess patient's baseline for ADL function and identify physical deficits which impact ability to perform ADLs (bathing, care of mouth/teeth, toileting, grooming, dressing, etc.)  - Assess/evaluate cause of self-care deficits   - Assess range of motion  - Assess patient's mobility  - Assess patient's need for assistive devices and provide as appropriate  - Encourage maximum independence but intervene and supervise when necessary  - Involve family in performance of ADLs  - Assess for home care needs following discharge   - Consider OT consult to assist with ADL evaluation and planning for discharge  - Provide patient education as appropriate  Outcome: Adequate for Discharge     Problem: MOBILITY - ADULT  Goal: Maintain or return to baseline ADL function  Description: INTERVENTIONS:  -  Assess patient's ability to carry out ADLs; assess patient's baseline for ADL function and identify physical deficits which impact ability to perform ADLs (bathing, care of mouth/teeth, toileting, grooming, dressing, etc.)  - Assess/evaluate cause of self-care deficits   - Assess range of motion  - Assess patient's mobility; develop plan if impaired  - Assess patient's need for assistive devices and provide as appropriate  - Encourage maximum independence but intervene and supervise when necessary  - Involve family in performance of ADLs  - Assess for home care needs following discharge   - Consider OT consult to assist with ADL evaluation and planning for discharge  - Provide patient education as appropriate  Outcome: Adequate for Discharge  Goal: Maintains/Returns to pre admission functional level  Description: INTERVENTIONS:  - Perform BMAT or MOVE assessment daily.   - Set and communicate daily mobility goal to care team and patient/family/caregiver.    - Collaborate with rehabilitation services on mobility goals if consulted  - Out of bed for toileting  - Record patient progress and toleration of activity level   Outcome: Adequate for Discharge

## 2023-08-02 ENCOUNTER — OFFICE VISIT (OUTPATIENT)
Dept: NEPHROLOGY | Facility: CLINIC | Age: 78
End: 2023-08-02

## 2023-08-02 VITALS
BODY MASS INDEX: 22.38 KG/M2 | HEART RATE: 68 BPM | HEIGHT: 59 IN | DIASTOLIC BLOOD PRESSURE: 50 MMHG | WEIGHT: 111 LBS | SYSTOLIC BLOOD PRESSURE: 88 MMHG | OXYGEN SATURATION: 99 %

## 2023-08-02 DIAGNOSIS — N17.9 ACUTE KIDNEY INJURY (HCC): Primary | ICD-10-CM

## 2023-08-02 DIAGNOSIS — E22.2 SIADH (SYNDROME OF INAPPROPRIATE ADH PRODUCTION) (HCC): Chronic | ICD-10-CM

## 2023-08-02 DIAGNOSIS — N18.32 STAGE 3B CHRONIC KIDNEY DISEASE (HCC): Chronic | ICD-10-CM

## 2023-08-02 DIAGNOSIS — G89.29 OTHER CHRONIC PAIN: ICD-10-CM

## 2023-08-02 DIAGNOSIS — I10 PRIMARY HYPERTENSION: Chronic | ICD-10-CM

## 2023-08-02 DIAGNOSIS — R60.0 BILATERAL LOWER EXTREMITY EDEMA: ICD-10-CM

## 2023-08-02 DIAGNOSIS — K55.9 ISCHEMIC COLITIS (HCC): ICD-10-CM

## 2023-08-02 NOTE — PATIENT INSTRUCTIONS
Repeat lab work next Wednesday    Please send us BP log     Restrict fluids to 60 ounces per day     Stop midodrine    Hold losartan for SBP <120 mmHg    Check BP at 2000    See Palliative care for pain management/goals of care

## 2023-08-02 NOTE — PROGRESS NOTES
Nephrology   Office Follow-Up  Haidee Cushing 69 y.o. female MRN: [id number]    Encounter: [id number]        [address] [facility] was seen in the [facility] office today. All diagnoses and orders for visit:     1. Acute kidney injury ([address])  · With peak creatinine 5/4 2.1 mg/dL attributed to HRS +/- YOLANDA +/- ATN with potential progression of underlying renal disease. Prior to initial hospitalization in April, eGFR range 60-80 mil/min   -     Comprehensive metabolic panel; Future   -     CBC and differential; Future  2. Stage 3b chronic kidney disease ([address])  · Most recent baseline creatinine appears to be around 1.1-1.2 mg/dL. Renal vascular calcifications without obstruction noted on CT with contrast done 7/28. No recent urine studies to review. As mentioned above, likely suffered progression at this point. Also, renal function potentially over estimated given cirrhosis and muscle mass loss. Etiology of chronicity likely due to recurrent ALEJANDRA, HTN nephropathy, prerenal effect of diuretic  3. Primary hypertension  · On both midodrine and losartan. BP log at facility reviewed. BP checked in AM and elevated, greater than 150 mmHg. Stop midodrine. Check AM and PM blood pressures. Avoid hypotension goal -140 mmHg. Send in log to office  4. SIADH (syndrome of inappropriate ADH production) ([address])  · Not on fluid restriction. Advised restricting fluids to 60 ounces per day in addition to low sodium diet and bumex  5. Ischemic colitis [facility])  · Management per our gastroenterology colleagues. Maintain appropriate blood pressure. 6. Bilateral lower extremity edema  · Continue bumex and low sodium diet. Short interval follow-up  7. Other chronic pain  · Refer to palliative care   -     Ambulatory Referral to Palliative Care;  Future          HPI: Haidee Cushing is a 69 y.o. female who is here for hospital follow-up    Presents from [facility] home    Follows with Dr. Elena Merino for CKD 3b, AELJANDRA, blood pressure    Pertinent medical problems include CKD 3b, ABLA 2/2 ischemic colitis versus Crohn's, cirrhosis of the liver, SIADH, PAF, CAD s/p CABG 2021, mild AS, HTN, Sjogren's    Multiple hospitalizations since April of 2023. Most recently 7/28-8/1/23 for hematochezia. Patient declined repeat colonoscopy. Etiology attributed to ischemic colitis versus Crohn's disease. Did not require PRBC this admission. Nephrology was not consulted and kidney function remained stable. She was discharged on both midodrine and losartan. Facility BP log reviewed and systolic BP persistently > 150 mmHg in the AM. BP not checked in the PM. Recommend discontinuation of midodrine. Check BP in AM and PM. Goal MAP > 65 mmHg, goal SBP > 110, < 140. Hold Losartan for hypotension. Send BP log to office. Refer to palliative care for symptom management. Continue bumex for edema. Fluid restriction 60 ounces per day. Repeat labs Wednesday, 8/9      ROS:   Review of Systems   Constitutional: Negative for chills and fever. HENT: Negative for ear pain and sore throat. Eyes: Negative for pain and visual disturbance. Respiratory: Negative for cough and shortness of breath. Cardiovascular: Positive for leg swelling. Negative for chest pain and palpitations. Gastrointestinal: Positive for abdominal pain. Negative for vomiting. Genitourinary: Negative for dysuria and hematuria. Musculoskeletal: Positive for arthralgias, back pain and gait problem. Skin: Negative for color change and rash. Neurological: Negative for seizures and syncope. All other systems reviewed and are negative.       Allergies: Ceftaroline, Diphenhydramine, and Influenza vaccines    Medications:   Current Outpatient Medications:   •  acetaminophen (TYLENOL) 650 mg CR tablet, Take by mouth, Disp: , Rfl:   •  artificial tear (LUBRIFRESH P.M.) 83-15 % ophthalmic ointment, Administer 1 Application to both eyes 2 (two) times a day, Disp: , Rfl:   •  levothyroxine 75 mcg tablet, Take 75 mcg by mouth daily, Disp: , Rfl:   •  losartan (COZAAR) 25 mg tablet, Take 25 mg by mouth daily Losartan POT 25 mg q am, Disp: , Rfl:   •  metoprolol succinate (TOPROL-XL) 25 mg 24 hr tablet, Take 25 mg by mouth 2 (two) times a day, Disp: , Rfl:   •  omeprazole (PriLOSEC) 40 MG capsule, Take 40 mg by mouth daily, Disp: , Rfl:   •  ondansetron (ZOFRAN) 8 mg tablet, As needed, Disp: , Rfl:   •  potassium chloride (K-DUR,KLOR-CON) 20 mEq tablet, Take 1 tablet (20 mEq total) by mouth daily Do not start before May 24, 2023., Disp: , Rfl: 0  •  saccharomyces boulardii (FLORASTOR) 250 mg capsule, Take 250 mg by mouth 2 (two) times a day, Disp: , Rfl:   •  traZODone (DESYREL) 50 mg tablet, Take 100 mg by mouth daily at bedtime, Disp: , Rfl:   •  bumetanide (BUMEX) 2 mg tablet, Take 1 tablet (2 mg total) by mouth daily Do not start before May 24, 2023., Disp: 30 tablet, Rfl: 0    Past Medical History:   Diagnosis Date   • Anemia    • Atrial fibrillation ([address])    • Depression    • GI (gastrointestinal bleed)    • Hypomagnesemia 4/29/2023   • Ischemic colitis [facility])      Past Surgical History:   Procedure Laterality Date   • APPENDECTOMY     • CARDIAC SURGERY     • CHOLECYSTECTOMY     • COLONOSCOPY  07/13/2023   • FLEXIBLE SIGMOIDOSCOPY  07/12/2023   • HIP SURGERY Right 2022    Partial replacement   • HYSTERECTOMY     • IR BIOPSY TRANSJUGULAR LIVER  05/18/2023     Family History   Problem Relation Age of Onset   • No Known Problems Mother       reports that she quit smoking about 30 years ago. Her smoking use included cigarettes. She smoked an average of .5 packs per day. She has never used smokeless tobacco. She reports that she does not drink alcohol and does not use drugs.       Physical Exam:   Vitals:    08/02/23 1127   BP: (!) 88/50   BP Location: Left arm   Patient Position: Sitting   Cuff Size: Standard   Pulse: 68   SpO2: 99%   Weight: 50.3 kg (111 lb)   Height: 4' 11" (1.499 m)     Body mass index is 22.42 kg/m². General: conscious, cooperative, in no acute distress, appears stated age  Eyes: conjunctivae pale, anicteric sclerae  ENT: lips and mucous membranes moist  Neck: supple, no JVD, no masses  Chest:  essentially clear breath sounds bilaterally, no crackles, ronchus or wheezings  CVS: S1 & S2, normal rate, regular rhythm  Abdomen: soft, non-tender, non-distended, normoactive bowel sounds, rounded  Extremities: + 2 edema of both legs  Skin: no rash   Neuro: awake, alert, oriented       Diagnostic Data:  Lab: I have personally reviewed pertinent lab results. ,   CBC:  Results from last 7 days   Lab Units 08/01/23  0604   WBC Thousand/uL 5.78   HEMOGLOBIN g/dL 8.1*   HEMATOCRIT % 24.7*   PLATELETS Thousands/uL 112*      CMP: No results found for: "SODIUM", "K", "CL", "CO2", "ANIONGAP", "BUN", "CREATININE", "GLUCOSE", "CALCIUM", "AST", "ALT", "ALKPHOS", "PROT", "BILITOT", "EGFR",   PT/INR: No results found for: "PT", "INR",   Magnesium: No components found for: "MAG",  Phosphorous: No results found for: "PHOS"    Patient Instructions   Repeat lab work next Wednesday    Please send us BP log     Restrict fluids to 60 ounces per day     Stop midodrine    Hold losartan for SBP <120 mmHg    Check BP at 2000    See Palliative care for pain management/goals of care      Portions of the record may have been created with voice recognition software. Occasional wrong word or "sound a like" substitutions may have occurred due to the inherent limitations of voice recognition software. Read the chart carefully and recognize, using context, where substitutions have occurred. If you have any questions, please contact the dictating provider.

## 2023-08-03 PROBLEM — G89.29 OTHER CHRONIC PAIN: Status: ACTIVE | Noted: 2023-08-03

## 2023-08-10 ENCOUNTER — TELEPHONE (OUTPATIENT)
Dept: NEPHROLOGY | Facility: CLINIC | Age: 78
End: 2023-08-10

## 2023-08-10 NOTE — TELEPHONE ENCOUNTER
I believe she is under the care of their in house physician, we see her for kidney care, we will defer hospice consult and care to her primary physician.

## 2023-08-10 NOTE — TELEPHONE ENCOUNTER
Fax [phone] [facility] called. Patient is asking for a referral for Hospice care. She received a referral for Palliative but is choosing hospice. Care home is asking if it can be backdated to 8/8/23.   Thank you

## 2023-08-17 ENCOUNTER — TELEPHONE (OUTPATIENT)
Dept: NEPHROLOGY | Facility: CLINIC | Age: 78
End: 2023-08-17

## 2023-08-17 NOTE — TELEPHONE ENCOUNTER
Patient's sister called requesting a call from you if possible. Patient was put on hospice. Please advise.

## 2023-08-22 NOTE — TELEPHONE ENCOUNTER
Patient's brother asking for a return call about sister being placed on hospice care. Please advise.

## 2023-08-24 PROBLEM — K85.90 PANCREATITIS: Status: ACTIVE | Noted: 2023-01-01

## 2023-08-24 PROBLEM — K74.60 CIRRHOSIS (HCC): Status: ACTIVE | Noted: 2023-01-01

## 2023-08-24 PROBLEM — N18.32 STAGE 3B CHRONIC KIDNEY DISEASE (HCC): Status: RESOLVED | Noted: 2023-01-01 | Resolved: 2023-01-01

## 2023-08-24 PROBLEM — R77.8 ELEVATED TROPONIN: Status: ACTIVE | Noted: 2023-01-01

## 2023-08-24 NOTE — CONSULTS
Consultation - General Surgery   Jacob Walden 69 y.o. female MRN: [id number]  Unit/Bed#: ED 16 Encounter: [id number]    Assessment/Plan     Assessment:  75yo female PMH afib, ischemic colitis, cirrhosis, hypothyroidism, SIADH, HTN presents with acute pancreatitis on CT scan, with concern for mesenteric ischemia   - noncontrast CT AP 8/24: "Findings suspicious for acute pancreatitis complicated by formation of an acute peripancreatic collection. "   -abdomen is soft with guarding, generalized TTP with pain out of proportion to exam   -concern for low flow mesenteric ischemia based on exam findings   -afebrile, hypotensive, nontachycardic   -WBC 27.9, leukocytosis   -Hgb 9.5, appears around baseline   -Cr 2.6, acute kidney injury   -troponin 1230, elevated   -INR 2.1, elevated   -lipase 24     Plan:  -patient presented with the option of surgery if needed for mesenteric ischemia, patient would like to wait 24 hours to see if she improves before deciding if surgery is necessary  -we will evaluate patient in the morning and make further recommendations  -NPO, IVF  -pain and nausea meds  -appreciate cardiology recs   -vitamin k subq 10mg, repeat INR in the am  -trend labs and vitals   -follow up blood cultures   -medical management per SLIM  -examined at bedside with Dr Katherin Mcallister    History of Present Illness     HPI:  Jacob Walden is a 69 y.o. female who presents with worsening abdominal pain for the last two days. Patient states she has been on hospice but she is unsure why and has since revoked hospice. She has new diagnosis of cirrhosis this year with an unknown cause. She has been worked up by GI during previous hospitalizations. Most recent colonoscopy 7/13 showed ischemic colitis vs Crohns disease and she has been on budesonide for Crohns treatment. She states she has on and off abdominal pain and nausea but this episode is much worse than usual. She lives in a personal care home.  She has been having nonbloody diarrhea. Previous abdominal surgical history includes cholecystectomy, appendectomy, and hysterectomy. Consults    Review of Systems   Constitutional: Positive for appetite change and chills. HENT: Negative. Respiratory: Negative. Cardiovascular: Negative. Gastrointestinal: Positive for abdominal pain, diarrhea and nausea. Genitourinary: Negative. Musculoskeletal: Positive for back pain. Skin: Negative. Neurological: Negative. Psychiatric/Behavioral: Negative.         Historical Information   Past Medical History:   Diagnosis Date   • Anemia    • Atrial fibrillation ([address])    • Depression    • GI (gastrointestinal bleed)    • Hypomagnesemia 2023   • Ischemic colitis [facility])      Past Surgical History:   Procedure Laterality Date   • APPENDECTOMY     • CARDIAC SURGERY     • CHOLECYSTECTOMY     • COLONOSCOPY  2023   • FLEXIBLE SIGMOIDOSCOPY  2023   • HIP SURGERY Right     Partial replacement   • HYSTERECTOMY     • IR BIOPSY TRANSJUGULAR LIVER  2023     Social History   Social History     Substance and Sexual Activity   Alcohol Use Never     Social History     Substance and Sexual Activity   Drug Use Never     E-Cigarette/Vaping   • E-Cigarette Use Never User      E-Cigarette/Vaping Substances   • Nicotine No    • THC No    • CBD No    • Flavoring No    • Other No    • Unknown No      Social History     Tobacco Use   Smoking Status Former   • Packs/day: 0.50   • Types: Cigarettes   • Quit date: 1993   • Years since quittin.3   Smokeless Tobacco Never     Family History: non-contributory    Meds/Allergies   all current active meds have been reviewed  Allergies   Allergen Reactions   • Ceftaroline GI Intolerance   • Diphenhydramine Hyperactivity   • Influenza Vaccines Other (See Comments) and Vomiting     nausea       Objective   First Vitals:   Blood Pressure: (!) 99/46 (23 1013)  Pulse: 85 (23 1013)  Temperature: 97.6 °F (36.4 °C) (23 1013)  Temp Source: Tympanic (08/24/23 1013)  Respirations: 18 (08/24/23 1013)  SpO2: 93 % (08/24/23 1013)    Current Vitals:   Blood Pressure: (!) 99/46 (08/24/23 1013)  Pulse: 85 (08/24/23 1013)  Temperature: 97.6 °F (36.4 °C) (08/24/23 1013)  Temp Source: Tympanic (08/24/23 1013)  Respirations: 18 (08/24/23 1013)  SpO2: 93 % (08/24/23 1013)      Intake/Output Summary (Last 24 hours) at 8/24/2023 1532  Last data filed at 8/24/2023 1511  Gross per 24 hour   Intake 1100 ml   Output --   Net 1100 ml       Invasive Devices     Peripheral Intravenous Line  Duration           Peripheral IV 08/24/23 Distal;Left;Ventral (anterior) Forearm <1 day                Physical Exam  Constitutional:       Appearance: She is ill-appearing. HENT:      Head: Normocephalic and atraumatic. Nose: Nose normal.      Mouth/Throat:      Mouth: Mucous membranes are dry. Pharynx: Oropharynx is clear. Eyes:      Conjunctiva/sclera: Conjunctivae normal.      Pupils: Pupils are equal, round, and reactive to light. Cardiovascular:      Rate and Rhythm: Normal rate. Rhythm irregular. Pulses: Normal pulses. Heart sounds: Normal heart sounds. Pulmonary:      Effort: Pulmonary effort is normal.   Abdominal:      General: Abdomen is flat. There is no distension. Palpations: Abdomen is soft. Tenderness: There is abdominal tenderness. There is guarding. Comments: Pain out of proportion to exam, diffusely tender   Skin:     General: Skin is warm and dry. Neurological:      General: No focal deficit present. Mental Status: She is alert. Mental status is at baseline.    Psychiatric:         Mood and Affect: Mood normal.         Lab Results:   CBC:   Lab Results   Component Value Date    WBC 27.92 (H) 08/24/2023    HGB 9.5 (L) 08/24/2023    HCT 28.2 (L) 08/24/2023    MCV 97 08/24/2023     (L) 08/24/2023    RBC 2.91 (L) 08/24/2023    MCH 32.6 08/24/2023    MCHC 33.7 08/24/2023    RDW 14.4 08/24/2023 MPV 10.5 08/24/2023    NRBC 0 08/24/2023   , CMP:   Lab Results   Component Value Date    SODIUM 130 (L) 08/24/2023    K 4.4 08/24/2023    CL 99 08/24/2023    CO2 21 08/24/2023    BUN 43 (H) 08/24/2023    CREATININE 2.67 (H) 08/24/2023    CALCIUM 7.5 (L) 08/24/2023    AST 39 08/24/2023    ALT 13 08/24/2023    ALKPHOS 286 (H) 08/24/2023    EGFR 16 08/24/2023   , Coagulation:   Lab Results   Component Value Date    INR 2.11 (H) 08/24/2023   , Lipase:   Lab Results   Component Value Date    LIPASE 24 08/24/2023       Counseling / Coordination of Care  Total floor / unit time spent today 25 minutes. Greater than 50% of total time was spent with the patient and / or family counseling and / or coordination of care.      Forrest Sun PA-C

## 2023-08-24 NOTE — ASSESSMENT & PLAN NOTE
Consult cards   Patient without chest pain  Suspected non mi troponin elevation possibly 2/2 renal failure, pancreatitis  Monitor on tele  Trend.

## 2023-08-24 NOTE — ASSESSMENT & PLAN NOTE
• Avoid hypotension, nephrotoxins, and NSAIDS if possible    • IV fluids  • Baseline Cr 1.1  • Recheck Cr in the AM  • If persists consult nephrology, check renal US

## 2023-08-24 NOTE — ED PROVIDER NOTES
History  Chief Complaint   Patient presents with   • Abdominal Pain     Patient comes in with diarrhea that started yesterday and left lower quadrant abdominal pain. 69-year-old female presents to the emergency department presents from gets his personal care home due to abdominal pain as well as diarrhea and vomiting and weakness. The patient is a poor historian overall and notes that she has had pain in her abdomen for weeks, but had diarrhea over the last few days and asked to go to the hospital.  The patient apparently is on hospice due to cirrhosis of the liver, and notes that her pain is predominantly in the left mid to left lower quadrant. Patient denies fever or chills. Patient notes nausea and one episode of vomiting but nothing recently in the ER. Patient is here for evaluation. Prior to Admission Medications   Prescriptions Last Dose Informant Patient Reported? Taking?   acetaminophen (TYLENOL) 650 mg CR tablet Past Week Care Giver Yes Yes   Sig: Take by mouth   artificial tear (LUBRIFRESH P.M.) 83-15 % ophthalmic ointment 8/23/2023 Care Giver Yes Yes   Sig: Administer 1 Application to both eyes 2 (two) times a day   bumetanide (BUMEX) 2 mg tablet  Care Giver No No   Sig: Take 1 tablet (2 mg total) by mouth daily Do not start before May 24, 2023.    levothyroxine 75 mcg tablet 8/23/2023 Care Giver Yes Yes   Sig: Take 75 mcg by mouth daily   losartan (COZAAR) 25 mg tablet 8/23/2023 Care Giver Yes Yes   Sig: Take 25 mg by mouth daily Losartan POT 25 mg q am   metoprolol succinate (TOPROL-XL) 25 mg 24 hr tablet 8/23/2023 Care Giver Yes Yes   Sig: Take 25 mg by mouth 2 (two) times a day   omeprazole (PriLOSEC) 40 MG capsule 8/23/2023 Care Giver Yes Yes   Sig: Take 40 mg by mouth daily   ondansetron (ZOFRAN) 8 mg tablet Unknown Care Giver Yes No   Sig: As needed   potassium chloride (K-DUR,KLOR-CON) 20 mEq tablet 8/23/2023 Care Giver No Yes   Sig: Take 1 tablet (20 mEq total) by mouth daily Do not start before May 24, 2023. saccharomyces boulardii (FLORASTOR) 250 mg capsule 2023 Care Giver Yes Yes   Sig: Take 250 mg by mouth 2 (two) times a day   traZODone (DESYREL) 50 mg tablet Past Week Care Giver Yes Yes   Sig: Take 100 mg by mouth daily at bedtime      Facility-Administered Medications: None       Past Medical History:   Diagnosis Date   • Anemia    • Atrial fibrillation (HCC)    • Depression    • GI (gastrointestinal bleed)    • Hypomagnesemia 2023   • Ischemic colitis [facility])        Past Surgical History:   Procedure Laterality Date   • APPENDECTOMY     • CARDIAC SURGERY     • CHOLECYSTECTOMY     • COLONOSCOPY  2023   • FLEXIBLE SIGMOIDOSCOPY  2023   • HIP SURGERY Right     Partial replacement   • HYSTERECTOMY     • IR BIOPSY TRANSJUGULAR LIVER  2023       Family History   Problem Relation Age of Onset   • No Known Problems Mother      I have reviewed and agree with the history as documented. E-Cigarette/Vaping   • E-Cigarette Use Never User      E-Cigarette/Vaping Substances   • Nicotine No    • THC No    • CBD No    • Flavoring No    • Other No    • Unknown No      Social History     Tobacco Use   • Smoking status: Former     Packs/day: 0.50     Types: Cigarettes     Quit date: 1993     Years since quittin.3   • Smokeless tobacco: Never   Vaping Use   • Vaping Use: Never used   Substance Use Topics   • Alcohol use: Never   • Drug use: Never       Review of Systems   Constitutional: Positive for activity change. Negative for chills, fatigue and fever. HENT: Negative for ear pain and sore throat. Eyes: Negative for pain and visual disturbance. Respiratory: Negative for cough and shortness of breath. Cardiovascular: Negative for chest pain and palpitations. Gastrointestinal: Positive for abdominal pain, diarrhea and nausea. Negative for vomiting. Genitourinary: Negative for dysuria and hematuria.    Musculoskeletal: Negative for arthralgias and back pain. Skin: Positive for color change. Negative for rash. Neurological: Positive for weakness. Negative for seizures and syncope. All other systems reviewed and are negative. Physical Exam  Physical Exam  Vitals and nursing note reviewed. Constitutional:       General: She is not in acute distress. Appearance: She is well-developed. HENT:      Head: Normocephalic and atraumatic. Eyes:      Conjunctiva/sclera: Conjunctivae normal.   Cardiovascular:      Rate and Rhythm: Normal rate and regular rhythm. Heart sounds: No murmur heard. Pulmonary:      Effort: Pulmonary effort is normal. No respiratory distress. Breath sounds: Normal breath sounds. Abdominal:      General: Bowel sounds are normal.      Palpations: Abdomen is soft. Tenderness: There is abdominal tenderness in the left lower quadrant. There is guarding. There is no rebound. Hernia: No hernia is present. Musculoskeletal:         General: No swelling. Cervical back: Neck supple. Skin:     General: Skin is warm and dry. Capillary Refill: Capillary refill takes less than 2 seconds. Coloration: Skin is pale. Neurological:      Mental Status: She is alert.    Psychiatric:         Mood and Affect: Mood normal.         Vital Signs  ED Triage Vitals   Temperature Pulse Respirations Blood Pressure SpO2   08/24/23 1013 08/24/23 1013 08/24/23 1013 08/24/23 1013 08/24/23 1013   97.6 °F (36.4 °C) 85 18 (!) 99/46 93 %      Temp Source Heart Rate Source Patient Position - Orthostatic VS BP Location FiO2 (%)   08/24/23 1013 08/24/23 1700 08/24/23 1013 08/24/23 1013 08/25/23 0907   Tympanic Monitor Lying Left arm 4      Pain Score       08/24/23 1013       10 - Worst Possible Pain           Vitals:    08/25/23 1500 08/25/23 1600 08/25/23 1700 08/25/23 1715   BP: (!) 52/29 (!) 50/31 93/51    Pulse: (!) 118 (!) 107 (!) 114 (!) 0   Patient Position - Orthostatic VS:             Visual Acuity  Visual Acuity Flowsheet Row Most Recent Value   L Pupil Size (mm) 2   R Pupil Size (mm) 2   L Pupil Shape Round   R Pupil Shape Round          ED Medications  Medications   sodium chloride 0.9 % bolus 1,000 mL (0 mL Intravenous Stopped 8/24/23 1200)   pantoprazole (PROTONIX) injection 40 mg (40 mg Intravenous Given 8/24/23 1122)   piperacillin-tazobactam (ZOSYN) IVPB 3.375 g (0 g Intravenous Stopped 8/24/23 1511)   sodium chloride 0.9 % bolus 500 mL (0 mL Intravenous Stopped 8/24/23 1626)   morphine injection 1 mg (1 mg Intravenous Given 8/24/23 1505)   phytonadione (AQUA-MEPHYTON) 10 mg/mL SC/IM injection 10 mg (10 mg Subcutaneous Given 8/24/23 1624)   albumin human (FLEXBUMIN) 5 % injection 12.5 g (0 g Intravenous Stopped 8/25/23 0425)   multi-electrolyte (ISOLYTE-S PH 7.4) bolus 1,000 mL (0 mL Intravenous Stopped 8/25/23 0546)   multi-electrolyte (ISOLYTE-S PH 7.4) bolus 1,000 mL (0 mL Intravenous Stopped 8/25/23 0551)   albumin human (FLEXBUMIN) 25 % injection 12.5 g (0 g Intravenous Stopped 8/25/23 0521)   dextrose 50 % IV solution 50 mL (50 mL Intravenous Given 8/25/23 0539)   dextrose 50 % IV solution 25 mL (25 mL Intravenous Given 8/25/23 0545)   magnesium sulfate 2 g/50 mL IVPB (premix) 2 g ( Intravenous Rate/Dose Verify 8/25/23 1101)   multi-electrolyte (ISOLYTE-S PH 7.4) bolus 500 mL (0 mL Intravenous Stopped 8/25/23 0950)   multi-electrolyte (ISOLYTE-S PH 7.4) bolus 500 mL (500 mL Intravenous New Bag 8/25/23 1024)       Diagnostic Studies  Results Reviewed     Procedure Component Value Units Date/Time    Blood culture #1 [[id number]] Collected: 08/24/23 1045    Lab Status: Preliminary result Specimen: Blood from Arm, Left Updated: 08/25/23 2101     Blood Culture No Growth at 24 hrs. Blood culture #2 [[id number]] Collected: 08/24/23 1045    Lab Status: Preliminary result Specimen: Blood from Arm, Right Updated: 08/25/23 2101     Blood Culture No Growth at 24 hrs.     Protime-INR [[id number]]  (Abnormal) Collected: 08/25/23 0650    Lab Status: Final result Specimen: Blood from Central Venous Line Updated: 08/25/23 0712     Protime 25.2 seconds      INR 2.30    HS Troponin I 4hr [[id number]]  (Abnormal) Collected: 08/24/23 1553    Lab Status: Final result Specimen: Blood from Arm, Left Updated: 08/24/23 1622     hs TnI 4hr 1,251 ng/L      Delta 4hr hsTnI 300 ng/L     UA w Reflex to Microscopic w Reflex to Culture [[id number]]  (Normal) Collected: 08/24/23 1434    Lab Status: Final result Specimen: Urine, Clean Catch Updated: 08/24/23 1444     Color, UA Yellow     Clarity, UA Clear     Specific Gravity, UA 1.010     pH, UA 5.5     Leukocytes, UA Negative     Nitrite, UA Negative     Protein, UA Negative mg/dl      Glucose, UA Negative mg/dl      Ketones, UA Negative mg/dl      Urobilinogen, UA 0.2 E.U./dl      Bilirubin, UA Negative     Occult Blood, UA Negative    HS Troponin I 2hr [[id number]]  (Abnormal) Collected: 08/24/23 1245    Lab Status: Final result Specimen: Blood from Arm, Right Updated: 08/24/23 1347     hs TnI 2hr 1,230 ng/L      Delta 2hr hsTnI 279 ng/L     Protime-INR [[id number]]  (Abnormal) Collected: 08/24/23 1045    Lab Status: Final result Specimen: Blood from Arm, Left Updated: 08/24/23 1207     Protime 23.6 seconds      INR 2.11    APTT [[id number]]  (Abnormal) Collected: 08/24/23 1045    Lab Status: Final result Specimen: Blood from Arm, Left Updated: 08/24/23 1207     PTT 52 seconds     HS Troponin 0hr (reflex protocol) [[id number]]  (Abnormal) Collected: 08/24/23 1045    Lab Status: Final result Specimen: Blood from Arm, Left Updated: 08/24/23 1158     hs TnI 0hr 951 ng/L     Ammonia [[id number]]  (Normal) Collected: 08/24/23 1045    Lab Status: Final result Specimen: Blood from Arm, Left Updated: 08/24/23 1151     Ammonia 28 umol/L     Lactic acid, plasma (w/reflex if result > 2.0) [[id number]]  (Normal) Collected: 08/24/23 1045    Lab Status: Final result Specimen: Blood from Arm, Left Updated: 08/24/23 1153 LACTIC ACID 1.6 mmol/L     Narrative:      Result may be elevated if tourniquet was used during collection.     Comprehensive metabolic panel [[id number]]  (Abnormal) Collected: 08/24/23 1045    Lab Status: Final result Specimen: Blood from Arm, Left Updated: 08/24/23 1151     Sodium 130 mmol/L      Potassium 4.4 mmol/L      Chloride 99 mmol/L      CO2 21 mmol/L      ANION GAP 10 mmol/L      BUN 43 mg/dL      Creatinine 2.67 mg/dL      Glucose 57 mg/dL      Calcium 7.5 mg/dL      Corrected Calcium 9.1 mg/dL      AST 39 U/L      ALT 13 U/L      Alkaline Phosphatase 286 U/L      Total Protein 5.3 g/dL      Albumin 2.0 g/dL      Total Bilirubin 1.83 mg/dL      eGFR 16 ml/min/1.73sq m     Narrative:      National Kid[employer] Foundation guidelines for Chronic Kidney Disease (CKD):   •  Stage 1 with normal or high GFR (GFR > 90 mL/min/1.73 square meters)  •  Stage 2 Mild CKD (GFR = 60-89 mL/min/1.73 square meters)  •  Stage 3A Moderate CKD (GFR = 45-59 mL/min/1.73 square meters)  •  Stage 3B Moderate CKD (GFR = 30-44 mL/min/1.73 square meters)  •  Stage 4 Severe CKD (GFR = 15-29 mL/min/1.73 square meters)  •  Stage 5 End Stage CKD (GFR <15 mL/min/1.73 square meters)  Note: GFR calculation is accurate only with a steady state creatinine    Lipase [[id number]]  (Normal) Collected: 08/24/23 1045    Lab Status: Final result Specimen: Blood from Arm, Left Updated: 08/24/23 1151     Lipase 24 u/L     CBC and differential [[id number]]  (Abnormal) Collected: 08/24/23 1045    Lab Status: Final result Specimen: Blood from Arm, Left Updated: 08/24/23 1132     WBC 27.92 Thousand/uL      RBC 2.91 Million/uL      Hemoglobin 9.5 g/dL      Hematocrit 28.2 %      MCV 97 fL      MCH 32.6 pg      MCHC 33.7 g/dL      RDW 14.4 %      MPV 10.5 fL      Platelets 688 Thousands/uL      nRBC 0 /100 WBCs      Neutrophils Relative 86 %      Immat GRANS % 1 %      Lymphocytes Relative 4 %      Monocytes Relative 9 %      Eosinophils Relative 0 % Basophils Relative 0 %      Neutrophils Absolute 23.59 Thousands/µL      Immature Grans Absolute 0.40 Thousand/uL      Lymphocytes Absolute 1.22 Thousands/µL      Monocytes Absolute 2.62 Thousand/µL      Eosinophils Absolute 0.04 Thousand/µL      Basophils Absolute 0.05 Thousands/µL                  XR chest portable   Final Result by Jose Reyes MD (08/25 9077)      1. New prominence of the AP window mediastinum/left suprahilar region compared to the most recent chest radiograph of May 5, 2023. This may represent vascular congestion, infection, underlying mass/lymphadenopathy with vascular pathology not excluded. Recommend CT chest for further evaluation. 2. Increase in mild pulmonary edema compared to the abdominal CT of yesterday. 3. Stable small pleural effusions and bibasilar atelectasis. 4. Satisfactory positioning of right IJ catheter. I personally discussed this study with Ginger Leone MD on 8/25/2023 10:59 AM.                     Resident: Kain Jiménez, the attending radiologist, have reviewed the images and agree with the final report above. Workstation performed: [id number]         CT abdomen pelvis wo contrast   Final Result by Romina Napoles MD (08/24 0202)      1. Findings suspicious for acute pancreatitis complicated by formation of an acute peripancreatic collection. Cannot exclude a component of necrosis on this noncontrast exam, particularly given the low attenuation of the involved pancreatic tail. This    may be further assessed by contrast-enhanced CT or MRI as renal function allows. 2.  Small bilateral pleural effusions. 3.  Bibasilar opacities may be due to atelectasis but cannot exclude pneumonia. 4.  Cirrhotic appearing liver morphology. The study was marked in [facility] for immediate notification.       Workstation performed: [id number]                    Procedures  ECG 12 Lead Documentation Only    Date/Time: 8/24/2023 11:24 AM    Performed by: Omar Corbett DO  Authorized by: Omar Corbett DO    ECG reviewed by me, the ED Provider: yes    Patient location:  ED  Comments:      EKG shows a sinus rhythm at 83 beats a minute with a normal axis. There is T wave inversions noted in the anterior leads as well as slight ST segment changes which are nonspecific in the lateral leads. CriticalCare Time    Date/Time: 8/24/2023 12:30 PM    Performed by: Omar Corbett DO  Authorized by: Omar Corbett DO    Critical care provider statement:     Critical care time (minutes):  55    Critical care was necessary to treat or prevent imminent or life-threatening deterioration of the following conditions:  Cardiac failure and sepsis    Critical care was time spent personally by me on the following activities:  Examination of patient, evaluation of patient's response to treatment, ordering and performing treatments and interventions, ordering and review of laboratory studies, ordering and review of radiographic studies, re-evaluation of patient's condition and review of old charts             ED Course  ED Course as of 08/26/23 1030   Thu Aug 24, 2023   1211 hs TnI 0hr(!): 951   1212 Creatinine(!): 2.67  Patient found to have worsening renal failure. 1213 Patient has rescinded hospice. 1214 WBC(!): 27.92   1443 Case discussed with Dr. Alfonzo Hernandez who will evaluate the patient. 56 Dr. Alfonzo Hernandez: The patient has acute pancreatitis, complicated by low flow mesenteric ischemia. She is safe to be admitted to the medical service here at [place]. We will follow the patient closely. We had a eliel and honest conversation around her risks for perioperative, morbidity and mortality with an Surgical intervention in the face of her liver cirrhosis. She fully understands. We will follow closely with medicine.                             Initial Sepsis Screening     Row Name 08/25/23 0600                Is the patient's history suggestive of a new or worsening infection? Yes (Proceed)  -DR        Suspected source of infection acute abdominal infection  -DR        Indicate SIRS criteria Tachycardia > 90 bpm;Leukocytosis (WBC > 02239 IJL) OR Leukopenia (WBC <4000 IJL) OR Bandemia (WBC >10% bands)  -DR        Are two or more of the above signs & symptoms of infection both present and new to the patient? Yes (Proceed)  -DR        Assess for evidence of organ dysfunction: Are any of the below criteria present within 6 hours of suspected infection and SIRS criteria that are NOT considered to be chronic conditions? SBP < 90;MAP < 65;Creatinine > 2.0  -DR        Date of presentation of severe sepsis --        Time of presentation of severe sepsis --        Date of presentation of septic shock 08/25/23  -DR        Time of presentation of septic shock 0600  -DR        Fluid Resuscitation: 30 ml/kg IV fluid bolus will be given based on actual body weight  -DR        Is the patient is persistently hypotensive in the hour after fluid bolus administration? If yes, patient meets criteria for vasopressor use. YES  -DR        Sepsis Note: Click "NEXT" below (NOT "close") to generate sepsis note based on above information. YES (proceed by clicking "NEXT")  -DR              User Key  (r) = Recorded By, (t) = Taken By, (c) = Cosigned By    [facility] [facility] Name Provider Type    DR Edmund Martin, [address] [facility] Nurse Practitioner                SBIRT 22yo+    Flowsheet Row Most Recent Value   Initial Alcohol Screen: US AUDIT-C     1. How often do you have a drink containing alcohol? 0 Filed at: 08/24/2023 1014   2. How many drinks containing alcohol do you have on a typical day you are drinking? 0 Filed at: 08/24/2023 1014   3a. Male UNDER 65: How often do you have five or more drinks on one occasion? 0 Filed at: 08/24/2023 1014   3b. FEMALE Any Age, or MALE 65+: How often do you have 4 or more drinks on one occassion?  0 Filed at: 08/24/2023 1014 Audit-C Score 0 Filed at: 08/24/2023 1014   ADAL: How many times in the past year have you. .. Used an illegal drug or used a prescription medication for non-medical reasons? Never Filed at: 08/24/2023 1014                    Medical Decision Making  Patient is a 50-year-old female who presented to the emergency department after having a few day history of worsening abdominal pain. The patient was on hospice for liver cirrhosis but came off hospice to get treatment in the emergency department. The patient was evaluated by myself and found to have guarding as well as significant leukocytosis and elevated troponin. Differential diagnosis is NSTEMI, cardiac dysfunction secondary to significant metabolic or infectious derangement, pancreatitis diverticulitis or bowel perforation. Also sepsis. The patient was found to have what appears to be a fluid collection near the tail of the pancreas. Because of this, surgery was consulted. Medicine was also consulted. They were informed that the patient was rescinding hospice at this time. The patient will be admitted to stepdown, with surgical consult. Antibiotics have been started. Patient was given a small dose of morphine while in the emergency department for pain. Aspirin also ordered due to elevated troponin. Elevated troponin: acute illness or injury  Pancreatitis: acute illness or injury  Phlegmon: acute illness or injury  Amount and/or Complexity of Data Reviewed  Labs: ordered. Decision-making details documented in ED Course. Radiology: ordered. Risk  Prescription drug management. Decision regarding hospitalization.           Disposition  Final diagnoses:   Pancreatitis   Phlegmon   Elevated troponin     Time reflects when diagnosis was documented in both MDM as applicable and the Disposition within this note     Time User Action Codes Description Comment    8/24/2023  3:00 PM Esther Hopkins [R77.8] Troponin I above reference range     8/24/2023 3:13 PM Arturo Taylor Add [K85.90] Pancreatitis     8/24/2023  3:34 PM Lisa Ford Modify [K85.90] Pancreatitis     8/24/2023  3:34 PM Brutico, Noberto Jose Add [L02.91] Phlegmon     8/24/2023  3:34 PM Brutico, Noberto Jose Add [R77.8] Elevated troponin     8/25/2023  8:23 AM Ivania Salinas Add [A41.9,  R65.21] Septic shock [facility])       ED Disposition     ED Disposition   Admit    Condition   Stable    Date/Time   u Aug 24, 2023  3:36 PM    Comment   Case was discussed with Dr. Faustina Beebe And the patient's admission status was agreed to be Admission Status: inpatient status to the service of Dr. Faustina Beebe . Follow-up Information    None       Date, Time and Cause of Death    Date of Death: 8/25/23  Time of Death:  5:15 PM  Preliminary Cause of Death: Septic shock(785.52)  Entered by: Zahra TREADWELL[SS1.1]     Attribution     SS1.1 Zahra Simons, [address] [place] [address] 08/25/23 17:21        Discharge Medication List as of 8/25/2023  7:52 PM      STOP taking these medications       acetaminophen (TYLENOL) 650 mg CR tablet Comments:   Reason for Stopping:         artificial tear (LUBRIFRESH P.M.) 83-15 % ophthalmic ointment Comments:   Reason for Stopping:         levothyroxine 75 mcg tablet Comments:   Reason for Stopping:         losartan (COZAAR) 25 mg tablet Comments:   Reason for Stopping:         metoprolol succinate (TOPROL-XL) 25 mg 24 hr tablet Comments:   Reason for Stopping:         omeprazole (PriLOSEC) 40 MG capsule Comments:   Reason for Stopping:         potassium chloride (K-DUR,KLOR-CON) 20 mEq tablet Comments:   Reason for Stopping:         saccharomyces boulardii (FLORASTOR) 250 mg capsule Comments:   Reason for Stopping:         traZODone (DESYREL) 50 mg tablet Comments:   Reason for Stopping:         bumetanide (BUMEX) 2 mg tablet Comments:   Reason for Stopping:         ondansetron (ZOFRAN) 8 mg tablet Comments:   Reason for Stopping:               No discharge procedures on file.     PDMP Review     None ED Provider  Electronically Signed by           Cedrick Chris., DO  08/26/23 1036

## 2023-08-24 NOTE — H&P
[address] [facility]  H&P  Name: Dania Jones 69 y.o. female I MRN: [id number]  Unit/Bed#: ED 12 I Date of Admission: 8/24/2023   Date of Service: 8/24/2023 I Hospital Day: 0    Hold chemical VTE ppx, patient auto- anticoagulated    Assessment/Plan   Pancreatitis  Assessment & Plan  NPO  IVF via lactated ringers  Narcotic analgesia  Consult to GI services. ER has consulted surgery in regards to area of necrosis seen. Cont empiric zosyn. Monitor Lipase      Cirrhosis ([address])  Assessment & Plan  New dx this year. Unclear causative etiology. Pt was previously on hospice but revoked. Elevated troponin  Assessment & Plan  Consult cards   Patient without chest pain  Suspected non mi troponin elevation possibly 2/2 renal failure, pancreatitis  Monitor on tele  Trend. SIADH (syndrome of inappropriate ADH production) ([address])  Assessment & Plan  With hyponatremia, monitor closely. PAF (paroxysmal atrial fibrillation) ([address])  Assessment & Plan  Cont lopressor. Pt not on anticoagulation    Acquired hypothyroidism  Assessment & Plan  Cont synthroid    Primary hypertension  Assessment & Plan  Bumex, losartan held   Cont toprol with hold parameters. Acute kidney injury [facility])  Assessment & Plan  • Avoid hypotension, nephrotoxins, and NSAIDS if possible    • IV fluids  • Baseline Cr 1.1  • Recheck Cr in the AM  • If persists consult nephrology, check renal US             Chief Complaint   Patient presents with   • Abdominal Pain     Patient comes in with diarrhea that started yesterday and left lower quadrant abdominal pain. HPI:  Dania Jones is a 69 y.o. female who presents with GI symptoms. Patient states she came from personal care home. There she was having poor oral intake. She was having nonbloody loose stools. She also reports generalized weakness. She denies fever. She is does endorse chills. She was apparently previously on hospice but has revoked this for medical treatment. Patient states the pain is in the epigastrium and left lower quadrant. Patient denies chest pain. She does associate nausea and nonbloody emesis prior to admission. No dyspnea. No dysuria. No sick contacts. No neurological complaints.     Historical Information   Past Medical History:   Diagnosis Date   • Anemia    • Atrial fibrillation ([address])    • Depression    • GI (gastrointestinal bleed)    • Hypomagnesemia 2023   • Ischemic colitis [facility])      Past Surgical History:   Procedure Laterality Date   • APPENDECTOMY     • CARDIAC SURGERY     • CHOLECYSTECTOMY     • COLONOSCOPY  2023   • FLEXIBLE SIGMOIDOSCOPY  2023   • HIP SURGERY Right     Partial replacement   • HYSTERECTOMY     • IR BIOPSY TRANSJUGULAR LIVER  2023     Social History   Social History     Substance and Sexual Activity   Alcohol Use Never     Social History     Substance and Sexual Activity   Drug Use Never     Social History     Tobacco Use   Smoking Status Former   • Packs/day: 0.50   • Types: Cigarettes   • Quit date: 1993   • Years since quittin.3   Smokeless Tobacco Never     Family History   Problem Relation Age of Onset   • No Known Problems Mother        Meds/Allergies   Allergies   Allergen Reactions   • Ceftaroline GI Intolerance   • Diphenhydramine Hyperactivity   • Influenza Vaccines Other (See Comments) and Vomiting     nausea       Meds:    Current Facility-Administered Medications:   •  sodium chloride 0.9 % bolus 500 mL, 500 mL, Intravenous, Once, Cedrick Salter, DO, Last Rate: 500 mL/hr at 23 1507, 500 mL at 23 1507    Current Outpatient Medications:   •  acetaminophen (TYLENOL) 650 mg CR tablet, Take by mouth, Disp: , Rfl:   •  artificial tear (LUBRIFRESH P.M.) 83-15 % ophthalmic ointment, Administer 1 Application to both eyes 2 (two) times a day, Disp: , Rfl:   •  bumetanide (BUMEX) 2 mg tablet, Take 1 tablet (2 mg total) by mouth daily Do not start before May 24, 2023., Disp: 30 tablet, Rfl: 0  •  levothyroxine 75 mcg tablet, Take 75 mcg by mouth daily, Disp: , Rfl:   •  losartan (COZAAR) 25 mg tablet, Take 25 mg by mouth daily Losartan POT 25 mg q am, Disp: , Rfl:   •  metoprolol succinate (TOPROL-XL) 25 mg 24 hr tablet, Take 25 mg by mouth 2 (two) times a day, Disp: , Rfl:   •  omeprazole (PriLOSEC) 40 MG capsule, Take 40 mg by mouth daily, Disp: , Rfl:   •  ondansetron (ZOFRAN) 8 mg tablet, As needed, Disp: , Rfl:   •  potassium chloride (K-DUR,KLOR-CON) 20 mEq tablet, Take 1 tablet (20 mEq total) by mouth daily Do not start before May 24, 2023., Disp: , Rfl: 0  •  saccharomyces boulardii (FLORASTOR) 250 mg capsule, Take 250 mg by mouth 2 (two) times a day, Disp: , Rfl:   •  traZODone (DESYREL) 50 mg tablet, Take 100 mg by mouth daily at bedtime, Disp: , Rfl:     (Not in a hospital admission)        Review of Systems:    A complete and comprehensive 14 point organ system review was performed and all other systems are negative other than stated above in the HPI    Current Vitals:   Blood Pressure: (!) 99/46 (08/24/23 1013)  Pulse: 85 (08/24/23 1013)  Temperature: 97.6 °F (36.4 °C) (08/24/23 1013)  Temp Source: Tympanic (08/24/23 1013)  Respirations: 18 (08/24/23 1013)  SpO2: 93 % (08/24/23 1013)  SPO2 RA Rest    Flowsheet Row ED from 8/24/2023 in [facility] Emergency Department   SpO2 93 %   SpO2 Activity At Rest   O2 Device None (Room air)   O2 Flow Rate --          Intake/Output Summary (Last 24 hours) at 8/24/2023 1520  Last data filed at 8/24/2023 1511  Gross per 24 hour   Intake 1100 ml   Output --   Net 1100 ml     There is no height or weight on file to calculate BMI.      Physical Exam:     General: frail, ill appearing, on o2  HEENT: atraumatic, PERRLA, moist mucosa, normal pharynx, normal tonsils and adenoids, normal tongue, no fluid in sinuses  Neck: Trachea midline, no carotid bruit, no masses  Respiratory: normal chest wall expansion, CTA B, no r/r/w, no rubs  Cardiovascular: RRR, no m/r/g, Normal S1 and S2  Abdomen: Soft,  tender, non-distended, normal bowel sounds in all quadrants, no hepatosplenomegaly, no tympany  Rectal: deferred  Musculoskeletal: normal ROM in upper and lower extremities  Integumentary: warm, dry, and pink, with no rash, purpura, or petechia  Heme/Lymph: no lymphadenopathy, no bruises  Neurological: Cranial Nerves II-XII grossly intact; no focal deficits in sensation or strength, A  x O x 3  Psychiatric: cooperative with normal mood, affect, and cognition    Lab Results:   CBC:   Lab Results   Component Value Date    WBC 27.92 (H) 08/24/2023    HGB 9.5 (L) 08/24/2023    HCT 28.2 (L) 08/24/2023    MCV 97 08/24/2023     (L) 08/24/2023    RBC 2.91 (L) 08/24/2023    MCH 32.6 08/24/2023    MCHC 33.7 08/24/2023    RDW 14.4 08/24/2023    MPV 10.5 08/24/2023    NRBC 0 08/24/2023     CMP:  Lab Results   Component Value Date    CL 99 08/24/2023    CO2 21 08/24/2023    BUN 43 (H) 08/24/2023    CREATININE 2.67 (H) 08/24/2023    CALCIUM 7.5 (L) 08/24/2023    AST 39 08/24/2023    ALT 13 08/24/2023    ALKPHOS 286 (H) 08/24/2023    EGFR 16 08/24/2023     No results found for: "TROPONINI", "CKMB", "CKTOTAL"  Coagulation:   Lab Results   Component Value Date    INR 2.11 (H) 08/24/2023    Urinalysis:  Lab Results   Component Value Date    COLORU Yellow 08/24/2023    CLARITYU Clear 08/24/2023    SPECGRAV 1.010 08/24/2023    PHUR 5.5 08/24/2023    LEUKOCYTESUR Negative 08/24/2023    NITRITE Negative 08/24/2023    GLUCOSEU Negative 08/24/2023    KETONESU Negative 08/24/2023    BILIRUBINUR Negative 08/24/2023    BLOODU Negative 08/24/2023      Amylase: No results found for: "AMYLASE"  Lipase:   Lab Results   Component Value Date    LIPASE 24 08/24/2023        Imaging: CT abdomen pelvis wo contrast    Result Date: 8/24/2023  Narrative: CT ABDOMEN AND PELVIS WITHOUT IV CONTRAST INDICATION:   L abd Pain and Diarrhea.  COMPARISON: CT abdomen pelvis 7/28/2023, MR abdomen 5/15/2023. TECHNIQUE:  CT examination of the abdomen and pelvis was performed without intravenous contrast. Multiplanar 2D reformatted images were created from the source data. This examination, like all CT scans performed in the [facility], was performed utilizing techniques to minimize radiation dose exposure, including the use of iterative reconstruction and automated exposure control. Radiation dose length product (DLP) for this visit:  538.24 mGy-cm Enteric contrast was not  administered. FINDINGS: ABDOMEN LOWER CHEST: Small bilateral pleural effusions and mild bibasilar opacities. Coronary artery and aortic calcifications. Small hiatal hernia. LIVER/BILIARY TREE: Hepatic steatosis. Diffusely nodular hepatic contour. Pneumobilia. GALLBLADDER: Surgically absent. SPLEEN: Normal spleen. Stable splenule adjacent to the tail of the pancreas. PANCREAS: Enlargement of the pancreatic tail with extensive surrounding inflammatory changes. Centrally, the enlarged pancreatic tail is low in attenuation and there is a new 2.4 cm fluid density structure between the tail of the pancreas and the splenic  flexure of the colon. ADRENAL GLANDS:  Unremarkable. KIDNEYS/URETERS: No hydronephrosis. Stable renal vascular calcifications. Small hemorrhagic cyst in the right kidney. STOMACH AND BOWEL: Normal course and caliber of the stomach and duodenum. No dilated loops of bowel. Colonic diverticulosis without evidence of acute diverticulitis. APPENDIX:  No findings to suggest appendicitis. ABDOMINOPELVIC CAVITY:  No ascites. No pneumoperitoneum. No lymphadenopathy. VESSELS: No abdominal aortic aneurysm. Extensive atherosclerotic calcifications. PELVIS REPRODUCTIVE ORGANS:  Surgical changes of prior hysterectomy. URINARY BLADDER:  Unremarkable. ABDOMINAL WALL/INGUINAL REGIONS:  Unremarkable. OSSEOUS STRUCTURES:  No acute fracture or destructive osseous lesion.  Unchanged multilevel thoracolumbar compression fractures. Status post right total hip arthroplasty. Impression: 1. Findings suspicious for acute pancreatitis complicated by formation of an acute peripancreatic collection. Cannot exclude a component of necrosis on this noncontrast exam, particularly given the low attenuation of the involved pancreatic tail. This may be further assessed by contrast-enhanced CT or MRI as renal function allows. 2.  Small bilateral pleural effusions. 3.  Bibasilar opacities may be due to atelectasis but cannot exclude pneumonia. 4.  Cirrhotic appearing liver morphology. The study was marked in [facility] for immediate notification. Workstation performed: [id number]     CT abdomen pelvis with contrast    Result Date: 7/28/2023  Narrative: CT ABDOMEN AND PELVIS WITH IV CONTRAST INDICATION:   LLQ abdominal pain L sided abdominal pain. Dark stools, colonic ulcerations on colonoscopy COMPARISON: CT abdomen and pelvis July 10, 2023 TECHNIQUE:  CT examination of the abdomen and pelvis was performed. Multiplanar 2D reformatted images were created from the source data. This examination, like all CT scans performed in the [facility], was performed utilizing techniques to minimize radiation dose exposure, including the use of iterative reconstruction and automated exposure control. Radiation dose length product (DLP) for this visit:  554.13 mGy-cm IV Contrast:  100 mL of iohexol (OMNIPAQUE) Enteric Contrast:  Enteric contrast was not administered. FINDINGS: ABDOMEN LOWER CHEST: Bilateral basilar atelectasis unchanged. Coronary artery calcifications and atherosclerotic disease of the thoracic aorta. Hiatal hernia. LIVER/BILIARY TREE: Hepatic steatosis. Hepatic surface nodularity with hypertrophy of the caudate lobe suggesting underlying cirrhosis. No change in pneumobilia. GALLBLADDER:  No calcified gallstones. No pericholecystic inflammatory change. SPLEEN:  Unremarkable.  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 Measurements reported on MRI were an underestimate. A tubular low-attenuation structure extends in the pancreatic tail from the level of the cyst which appears to be multilocular. ADRENAL GLANDS:  Unremarkable. KIDNEYS/URETERS: Renal vascular calcifications. No hydronephrosis. STOMACH AND BOWEL: There is suggestion of some thickening of the gastric wall. Hyperemia of the gastric wall is demonstrated which can be secondary to underlying portal hypertension or gastritis. Small bowel loops show normal caliber. Colonic diverticulosis is seen without associated diverticulitis. APPENDIX: History of appendectomy. ABDOMINOPELVIC CAVITY:  No ascites. No pneumoperitoneum. No lymphadenopathy. VESSELS:  Atherosclerotic changes are present. No evidence of aneurysm. PELVIS REPRODUCTIVE ORGANS: Hysterectomy. Pelvic wall floor relaxation. URINARY BLADDER:  Unremarkable. ABDOMINAL WALL/INGUINAL REGIONS: Body wall edema. OSSEOUS STRUCTURES: Total right hip replacement. Osteoarthritis of the left hip. Lumbar spondylosis with old compression fractures at L1, L4 and L5 as well as compression fractures at T11 and T12. Impression: 1. Gastric wall thickening with hyperemia which could be related to portal hypertension or underlying gastritis of other etiologies. 2. No inflammatory changes or bowel obstruction. 3. Cirrhosis. 4. 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 The study was marked in [facility] for immediate notification. . Workstation performed: [id number]     EKG, Pathology, and Other Studies: I have personally reviewed the results. VTE   Prophylaxis: In place    Code Status: Prior    Anticipated Length of Stay:  Patient will be admitted on an Inpatient basis with an anticipated length of stay of  greater 2 midnights. Counseling / Coordination of Care  Total floor / unit time spent today  50 minutes.   Greater than 50% of total time was spent with the patient and / or family counseling and / or coordination of care.      "This note has been constructed using a voice recognition system"      Matt Pires MD  8/24/2023, 3:20 PM

## 2023-08-24 NOTE — ASSESSMENT & PLAN NOTE
NPO  IVF via lactated ringers  Narcotic analgesia  Consult to GI services. ER has consulted surgery in regards to area of necrosis seen. Cont empiric zosyn.   Monitor Lipase

## 2023-08-24 NOTE — ED NOTES
Ultrasound guided IV tried x 3. IV infiltrate left AC. IV Infiltrate Right FA. IV unsuccessful, cannulated artery in error right AC. Manual pressure held to these sites, then secured with coban.       Hayden Kiran RN  08/24/23 4019

## 2023-08-25 PROBLEM — R65.21 SEPTIC SHOCK (HCC): Status: ACTIVE | Noted: 2023-01-01

## 2023-08-25 PROBLEM — A41.9 SEPTIC SHOCK (HCC): Status: ACTIVE | Noted: 2023-01-01

## 2023-08-25 PROBLEM — K65.9 PERITONITIS (HCC): Status: ACTIVE | Noted: 2023-01-01

## 2023-08-25 PROBLEM — I25.10 CORONARY ARTERY DISEASE: Status: ACTIVE | Noted: 2023-01-01

## 2023-08-25 PROBLEM — K85.90 PANCREATITIS: Status: RESOLVED | Noted: 2023-01-01 | Resolved: 2023-01-01

## 2023-08-25 PROBLEM — E22.2 SIADH (SYNDROME OF INAPPROPRIATE ADH PRODUCTION) (HCC): Chronic | Status: RESOLVED | Noted: 2023-01-01 | Resolved: 2023-01-01

## 2023-08-25 NOTE — DISCHARGE SUMMARY
[address] [facility]  Discharge- Sheryl Mckinney 1945, 69 y.o. female MRN: [id number]  Unit/Bed#: ICU 10-01 Encounter: [id number]  Primary Care Provider: Jeny Bowles MD   Date and time admitted to hospital: 8/24/2023 10:07 AM    * Acute pancreatitis  Assessment & Plan  · Pt presented to the ED with c/o poor oral intake, non-bloody loose stools, generalized weakness, and associated nausea with non-bloody emesis prior to admission. Pt states that the abdominal pain is in the epigastrium area and LLQ.   · (8/24) CT C/A/P: Findings suspicious for acute pancreatitis complicated by formation of an acute peripancreatic collection. Cannot exclude a component of necrosis. Small b/l pleural effusions. Bibasilar opacities in setting of atelectasis vs PNA. Cirrhosis. · Surgery was consulted and pt was presented with the option of surgery if needed for mesenteric ischemia, patient would like to wait 24 hours to see if she improves before deciding if surgery is necessary.  Surgery to re-evaluate and offer recommendations this am.    Plan:  · Serial abdominal exam  · NPO  · IVF  · Zosyn started  · Pain control; Dilaudid 0.5 mg IV q3h prn  · Discontinued-Patient transitioned to comfort measures      Septic shock ([address])  Assessment & Plan  · Pt met septic shock criteria with tachycardia, leukocytosis, ALEJANDRA, and hypotension now requiring vasopressors  · Pt received the 30 ml/kr of IVF; 2 L Isolyte, Albumin 5% 12.5g, and Albumin 25% 12.5g  · Despite IVF resuscitation, pt remained hypotensive and was started on Levophed    Plan:  · Continue Levophed for MAP >65  · Continue Zosyn  · Blood cx pending  · LA pending  · UA negative  · Discontinued-Patient transitioned to comfort measures    Coronary artery disease  Assessment & Plan  · Pt with hx of CAD s/p CABG in 6/2022  · In 8/2022, pt underwent additional surgery for MRSA sepsis and sternal wound infection  · Discontinued-Patient transitioned to comfort measures    Cirrhosis ([address])  Assessment & Plan  · Pt with new dx of Cirrhosis this year with unknown etiology  · Pt was on hospice for this diagnosis but revoked it for medical treatment this admission  · Discontinued-Patient transitioned to comfort measures        Elevated troponin  Assessment & Plan  · Likely elevated in setting of ALEJANDRA and pancreatitis  · Troponins 951/1,230/1,251  · Pt with no c/o chest pain    Plan:  · Monitor telemetry  · Trend troponins  · Discontinued-Patient transitioned to comfort measures        PAF (paroxysmal atrial fibrillation) ([address])  Assessment & Plan  · Pt is not on [facility] therapy as an outpatient    Plan:  · Monitor telemetry  · Discontinued-Patient transitioned to comfort measures    Acquired hypothyroidism  Assessment & Plan  Plan:  · Continue home Synthroid dose  Discontinued-Patient transitioned to comfort measures    Primary hypertension  Assessment & Plan  · Pt home regimen is Metoprolol 25 mg PO bid    Plan:  · Hold home antihypertensives in setting of hypotension and pressor requirements  · Discontinued-Patient transitioned to comfort measures      Acute kidney injury ([address])  Assessment & Plan  · Baseline Cr appears to be 1.1-1.2; Cr on admission was 2.67  · Likely in setting of volume depletion  · Appears pt was following with Nephrology as an outpatient after she had an ALEJANDRA on her previous admission.     Plan:  · Avoid hypotension and nephrotoxins  · IVF  · Discontinued-Patient transitioned to comfort measures  · Trend renal function  · Strict I/O  · Consult Nephrology if persists          Medical Problems     Resolved Problems  Date Reviewed: 8/25/2023          Resolved    SIADH (syndrome of inappropriate ADH production) ([address]) 8/25/2023     Resolved by  EBEN Lester    Stage 3b chronic kidney disease ([address]) 8/24/2023     Resolved by  Del Schrader MD          Admission Date:   Admission Orders (From admission, onward)     Ordered        08/24/23 0169 Inpatient Admission  Once                        Admitting Diagnosis: Phlegmon [L02.91]  Pancreatitis [K85.90]  Abdominal pain [R10.9]  Elevated troponin [R77.8]  Troponin I above reference range [R77.8]    HPI:   As per [place] Walkre. Poly TREADWELL's  HPI, "Liya Catalan is a 69 y.o. who presents with c/o poor oral intake, non-bloody loose stools, generalized weakness, and associated nausea with non-bloody emesis prior to admission. Pt states that the abdominal pain is in the epigastrium area and LLQ. CT C/A/P was suspicious for acute pancreatitis complicated by formation of an acute peripancreatic collection. Cannot exclude a component of necrosis. Small b/l pleural effusions. Bibasilar opacities in setting of atelectasis vs PNA. Cirrhosis. Surgery was consulted and pt was presented with the option of surgery if needed for mesenteric ischemia, patient would like to wait 24 hours to see if she improves before deciding if surgery is necessary. Surgery to re-evaluate and offer recommendations this am. Overnight, pt became lethargic and hypotensive with sbp in the 50's-60's despite IVF resuscitation. Pt was also noted to be hypoglycemic with a BS of 31; given 1.5 Amp of Dextrose with improvement. CC attending was contacted and pt was upgraded to CC. RIJ TLC was placed and Levophed was started. Pt met septic shock criteria. Blood cx pending, LA pending."    Procedures Performed:   Orders Placed This Encounter   Procedures   • Central Line   • ED ECG Documentation Only       Summary of Hospital Course:   Patient continued to have increasing vasopressor requirements. Patient requested she wanted to be comfortable and refused any further invasive treatments of surgeries. Goals of care were discussed given poor prognosis with patient, her sister Kavitha Hoff, and her brother Va Rosenbaum. Patient transitioned to comfort measures and passed peacefully surrounded by her family.         Significant Findings, Care, Treatment and Services Provided:    CT Abd/pel-Findings suspicious for acute pancreatitis complicated by formation of an acute peripancreatic collection. Cannot exclude a component of necrosis. Small bilateral pleural effusions. Bibasilar opacities may be due to atelectasis but cannot exclude pneumonia. Cirrhotic appearing liver morphology. -New prominence of the AP window mediastinum/left suprahilar region compared to the most recent chest radiograph of May 5, 2023. This may represent vascular congestion, infection, underlying mass/lymphadenopathy with vascular pathology not excluded. Increase in mild pulmonary edema compared to the abdominal CT of yesterday. Stable small pleural effusions and bibasilar atelectasis. Satisfactory positioning of right IJ catheter.      Complications: N/A    Condition at Time of Death:     Final Diagnosis: Septic Shock    Date, Time and Cause of Death    Date of Death: 23  Time of Death:  5:15 PM  Preliminary Cause of Death: Septic shock(785.52)  Entered by: Jones TREADWELL[SS1.1]     Attribution     SS1.1 EBEN Delvalle 23 17:21          PCP: Mumtaz Nunez MD    Disposition:

## 2023-08-25 NOTE — ASSESSMENT & PLAN NOTE
· Pt presented to the ED with c/o poor oral intake, non-bloody loose stools, generalized weakness, and associated nausea with non-bloody emesis prior to admission. Pt states that the abdominal pain is in the epigastrium area and LLQ.   · (8/24) CT C/A/P: Findings suspicious for acute pancreatitis complicated by formation of an acute peripancreatic collection. Cannot exclude a component of necrosis. Small b/l pleural effusions. Bibasilar opacities in setting of atelectasis vs PNA. Cirrhosis. · Surgery was consulted and pt was presented with the option of surgery if needed for mesenteric ischemia, patient would like to wait 24 hours to see if she improves before deciding if surgery is necessary.  Surgery to re-evaluate and offer recommendations this am.    Plan:  · Serial abdominal exam  · NPO  · IVF  · Zosyn started  · Pain control; Dilaudid 0.5 mg IV q3h prn

## 2023-08-25 NOTE — ASSESSMENT & PLAN NOTE
· Likely elevated in setting of ALEJANDRA and pancreatitis  · Troponins 951/1,230/1,251  · Pt with no c/o chest pain    Plan:  · Monitor telemetry  · Trend troponins

## 2023-08-25 NOTE — PROCEDURES
Central Line Insertion    Date/Time: 8/25/2023 8:21 AM    Performed by: EBEN Richardson  Authorized by: EBEN Richardson    Patient location:  Bedside  Consent:     Consent obtained:  Verbal    Consent given by:  Healthcare agent (sister Martha Cancino)    Risks discussed:  Arterial puncture, incorrect placement, infection, bleeding, pneumothorax and nerve damage    Alternatives discussed:  No treatment  Universal protocol:     Procedure explained and questions answered to patient or proxy's satisfaction: yes      Immediately prior to procedure, a time out was called: yes      Patient identity confirmed:  Arm band and hospital-assigned identification number  Pre-procedure details:     Hand hygiene: Hand hygiene performed prior to insertion      Sterile barrier technique: All elements of maximal sterile technique followed      Skin preparation:  ChloraPrep    Skin preparation agent: Skin preparation agent completely dried prior to procedure    Indications:     Central line indications: medications requiring central line      Site selection rationale:  RIJ   Anesthesia (see MAR for exact dosages): Anesthesia method:  Local infiltration    Local anesthetic:  Lidocaine 2% w/o epi  Procedure details:     Location:  Right internal jugular    Vessel type: vein      Laterality:  Right    Patient position:  Reverse Trendelenburg    Catheter type:  Triple lumen 16cm    Landmarks identified: yes      Ultrasound guidance: yes      Ultrasound image availability:  Still images obtained    Sterile ultrasound techniques: Sterile gel and sterile probe covers were used      Manometry confirmation: yes      Number of attempts:  1    Successful placement: yes    Post-procedure details:     Post-procedure:  Dressing applied and line sutured    Assessment:  Blood return through all ports, free fluid flow, no pneumothorax on x-ray and placement verified by x-ray    Patient tolerance of procedure:   Tolerated well, no immediate complications

## 2023-08-25 NOTE — NURSING NOTE
0730- Patient hypotensive, see flowsheets for vitals. Levo requirements increasing, Venessa Bingham made aware. Arterial line setup. Chest xray obtained for CVC placement confirmation. CVC okay to use per provider communication. [address] at bedside for arterial line placement, time out performed at this time. 0800- Patient alert and oriented x 4, weak but nods and responds appropriately. Patient stating she wanted care to stop and she just wanted to be comfortable. Refusing and requesting to not be stuck for blood work or procedures. [id number]- Multiple family discussions between family members, providers and patient about goals of care. 1130- Patient transitioned to comfort care. All family members in agreeable, sister and in-law at bedside. 1300- Patient son, sister and in law at bedside, emotional support provided. 1715- Patient asystole on monitor, no heart sounds auscultated, no palpable pulse present. Venessa Bingham made aware, time of death called. Family present at bedside.

## 2023-08-25 NOTE — ASSESSMENT & PLAN NOTE
I spoke with patient and she stated she doesn't take controlled substances. She stated their is a lady in [place] and sees a doctor in [place]; the lady has the same name and date of birth as this patient. She wants the klonopin taken off of her chart. She will call [facility] and have them take it off her profile there. Can you please update the patients chart/medication.       Patient aware we can't update her name because it matches whats on her ID and Medicare card. She voiced understanding. Alessandro   · Pt with new dx of Cirrhosis this year with unknown etiology  · Pt was on hospice for this diagnosis but revoked it for medical treatment this admission

## 2023-08-25 NOTE — ASSESSMENT & PLAN NOTE
· Pt is not on [facility] therapy as an outpatient    Plan:  · Monitor telemetry

## 2023-08-25 NOTE — DEATH NOTE
INPATIENT DEATH NOTE  Liya Catalan 69 y.o. female MRN: [id number]  Unit/Bed#: ICU 10-01 Encounter: [id number]    Date, Time and Cause of Death    Date of Death: 23  Time of Death:  5:15 PM  Preliminary Cause of Death: Septic shock(785.52)  Entered by: Spike TREADWELL[SS1.1]     Attribution     SS1.1 Spike Avelar, [address] [place] [address] 23 17:21           Patient's Information  Pronounced by: Alexi Guerrero  Did the patient's death occur in the ED?: No  Did the patient's death occur in the OR?: No  Did the patient's death occur less than 10 days post-op?: No  Did the patient's death occur within 24 hours of admission?: Yes  Was code status DNR at the time of death?: Yes    PHYSICAL EXAM:  Unresponsive to noxious stimuli, Spontaneous respirations absent, Breath sounds absent, Carotid pulse absent, Heart sounds absent, Pupillary light reflex absent and Corneal blink reflex absent    Medical Examiner notification criteria:  Patient  within 24 hours of arrival to hospital   Medical Examiner's office notified?:  Yes   Medical Examiner accepted case?:  No  Name of Medical Examiner: Jeana Gitelman    Family Notification  Was the family notified?: Yes  Date Notified: 23  Time Notified: 8499  Notified by: Alexi Guerrero  Name of Family Notified of Death: Franci Caruso   Relationship to Patient: Sister, Son  Family Notification Route:  At bedside  Was the family told to contact a  home?: Yes  Name of [facility][de-identified] will notify RN with name    Autopsy Options:  Post-mortem examination declined by next of kin    Primary Service Attending Physician notified?:  yes - Attending:  Laura Hancock*    Physician/Resident responsible for completing Discharge Summary:  Alexi Guerrero

## 2023-08-25 NOTE — PLAN OF CARE
Problem: Potential for Falls  Goal: Patient will remain free of falls  Description: INTERVENTIONS:  - Educate patient/family on patient safety including physical limitations  - Instruct patient to call for assistance with activity   - Consult OT/PT to assist with strengthening/mobility   - Keep Call bell within reach  - Keep bed low and locked with side rails adjusted as appropriate  - Keep care items and personal belongings within reach  - Initiate and maintain comfort rounds  - Make Fall Risk Sign visible to staff  - Offer Toileting every  Hours, in advance of need  - Initiate/Maintain alarm  - Obtain necessary fall risk management equipment:   - Apply yellow socks and bracelet for high fall risk patients  - Consider moving patient to room near nurses station  Outcome: Progressing     Problem: MOBILITY - ADULT  Goal: Maintain or return to baseline ADL function  Description: INTERVENTIONS:  -  Assess patient's ability to carry out ADLs; assess patient's baseline for ADL function and identify physical deficits which impact ability to perform ADLs (bathing, care of mouth/teeth, toileting, grooming, dressing, etc.)  - Assess/evaluate cause of self-care deficits   - Assess range of motion  - Assess patient's mobility; develop plan if impaired  - Assess patient's need for assistive devices and provide as appropriate  - Encourage maximum independence but intervene and supervise when necessary  - Involve family in performance of ADLs  - Assess for home care needs following discharge   - Consider OT consult to assist with ADL evaluation and planning for discharge  - Provide patient education as appropriate  Outcome: Progressing  Goal: Maintains/Returns to pre admission functional level  Description: INTERVENTIONS:  - Perform BMAT or MOVE assessment daily.   - Set and communicate daily mobility goal to care team and patient/family/caregiver.    - Collaborate with rehabilitation services on mobility goals if consulted  - Perform Range of Motion  times a day. - Reposition patient every  hours.   - Dangle patient  times a day  - Stand patient  times a day  - Ambulate patient  times a day  - Out of bed to chair  times a day   - Out of bed for meals  times a day  - Out of bed for toileting  - Record patient progress and toleration of activity level   Outcome: Progressing     Problem: METABOLIC, FLUID AND ELECTROLYTES - ADULT  Goal: Electrolytes maintained within normal limits  Description: INTERVENTIONS:  - Monitor labs and assess patient for signs and symptoms of electrolyte imbalances  - Administer electrolyte replacement as ordered  - Monitor response to electrolyte replacements, including repeat lab results as appropriate  - Instruct patient on fluid and nutrition as appropriate  Outcome: Progressing  Goal: Fluid balance maintained  Description: INTERVENTIONS:  - Monitor labs   - Monitor I/O and WT  - Instruct patient on fluid and nutrition as appropriate  - Assess for signs & symptoms of volume excess or deficit  Outcome: Progressing  Goal: Glucose maintained within target range  Description: INTERVENTIONS:  - Monitor Blood Glucose as ordered  - Assess for signs and symptoms of hyperglycemia and hypoglycemia  - Administer ordered medications to maintain glucose within target range  - Assess nutritional intake and initiate nutrition service referral as needed  Outcome: Progressing     Problem: PAIN - ADULT  Goal: Verbalizes/displays adequate comfort level or baseline comfort level  Description: Interventions:  - Encourage patient to monitor pain and request assistance  - Assess pain using appropriate pain scale  - Administer analgesics based on type and severity of pain and evaluate response  - Implement non-pharmacological measures as appropriate and evaluate response  - Consider cultural and social influences on pain and pain management  - Notify physician/advanced practitioner if interventions unsuccessful or patient reports new pain  Outcome: Progressing     Problem: INFECTION - ADULT  Goal: Absence or prevention of progression during hospitalization  Description: INTERVENTIONS:  - Assess and monitor for signs and symptoms of infection  - Monitor lab/diagnostic results  - Monitor all insertion sites, i.e. indwelling lines, tubes, and drains  - Monitor endotracheal if appropriate and nasal secretions for changes in amount and color  - [place] appropriate cooling/warming therapies per order  - Administer medications as ordered  - Instruct and encourage patient and family to use good hand hygiene technique  - Identify and instruct in appropriate isolation precautions for identified infection/condition  Outcome: Progressing  Goal: Absence of fever/infection during neutropenic period  Description: INTERVENTIONS:  - Monitor WBC    Outcome: Progressing     Problem: SAFETY ADULT  Goal: Patient will remain free of falls  Description: INTERVENTIONS:  - Educate patient/family on patient safety including physical limitations  - Instruct patient to call for assistance with activity   - Consult OT/PT to assist with strengthening/mobility   - Keep Call bell within reach  - Keep bed low and locked with side rails adjusted as appropriate  - Keep care items and personal belongings within reach  - Initiate and maintain comfort rounds  - Make Fall Risk Sign visible to staff  - Offer Toileting every Hours, in advance of need  - Initiate/Maintain alarm  - Obtain necessary fall risk management equipment:   - Apply yellow socks and bracelet for high fall risk patients  - Consider moving patient to room near nurses station  Outcome: Progressing  Goal: Maintain or return to baseline ADL function  Description: INTERVENTIONS:  -  Assess patient's ability to carry out ADLs; assess patient's baseline for ADL function and identify physical deficits which impact ability to perform ADLs (bathing, care of mouth/teeth, toileting, grooming, dressing, etc.)  - Assess/evaluate cause of self-care deficits   - Assess range of motion  - Assess patient's mobility; develop plan if impaired  - Assess patient's need for assistive devices and provide as appropriate  - Encourage maximum independence but intervene and supervise when necessary  - Involve family in performance of ADLs  - Assess for home care needs following discharge   - Consider OT consult to assist with ADL evaluation and planning for discharge  - Provide patient education as appropriate  Outcome: Progressing  Goal: Maintains/Returns to pre admission functional level  Description: INTERVENTIONS:  - Perform BMAT or MOVE assessment daily.   - Set and communicate daily mobility goal to care team and patient/family/caregiver. - Collaborate with rehabilitation services on mobility goals if consulted  - Perform Range of Motion times a day. - Reposition patient every  hours.   - Dangle patient  times a day  - Stand patient  times a day  - Ambulate patient  times a day  - Out of bed to chair  times a day   - Out of bed for meals  times a day  - Out of bed for toileting  - Record patient progress and toleration of activity level   Outcome: Progressing     Problem: DISCHARGE PLANNING  Goal: Discharge to home or other facility with appropriate resources  Description: INTERVENTIONS:  - Identify barriers to discharge w/patient and caregiver  - Arrange for needed discharge resources and transportation as appropriate  - Identify discharge learning needs (meds, wound care, etc.)  - Arrange for interpretive services to assist at discharge as needed  - Refer to Case Management Department for coordinating discharge planning if the patient needs post-hospital services based on physician/advanced practitioner order or complex needs related to functional status, cognitive ability, or social support system  Outcome: Progressing     Problem: Knowledge Deficit  Goal: Patient/family/caregiver demonstrates understanding of disease process, treatment plan, medications, and discharge instructions  Description: Complete learning assessment and assess knowledge base.   Interventions:  - Provide teaching at level of understanding  - Provide teaching via preferred learning methods  Outcome: Progressing

## 2023-08-25 NOTE — ASSESSMENT & PLAN NOTE
· Pt presented to the ED with c/o poor oral intake, non-bloody loose stools, generalized weakness, and associated nausea with non-bloody emesis prior to admission. Pt states that the abdominal pain is in the epigastrium area and LLQ.   · (8/24) CT C/A/P: Findings suspicious for acute pancreatitis complicated by formation of an acute peripancreatic collection. Cannot exclude a component of necrosis. Small b/l pleural effusions. Bibasilar opacities in setting of atelectasis vs PNA. Cirrhosis. · Surgery was consulted and pt was presented with the option of surgery if needed for mesenteric ischemia, patient would like to wait 24 hours to see if she improves before deciding if surgery is necessary.  Surgery to re-evaluate and offer recommendations this am.    Plan:  · Serial abdominal exam  · NPO  · IVF  · Zosyn started  · Pain control; Dilaudid 0.5 mg IV q3h prn  · Discontinued-Patient transitioned to comfort measures

## 2023-08-25 NOTE — ASSESSMENT & PLAN NOTE
· Pt with hx of CAD s/p CABG in 6/2022  · In 8/2022, pt underwent additional surgery for MRSA sepsis and sternal wound infection

## 2023-08-25 NOTE — CONSULTS
Consultation - [phone]  [facility]  Patient: Mika Qiuntero 69 y.o. female   MRN: [id number]  PCP: Leida Alfaro MD  Unit/Bed#: ICU 10-01 Encounter: [id number]  Length of Stay: 1 days          Inpatient consult to gastroenterology     Performed by  Scott Bañuelos MD   Authorized by Matt Pires MD           Assessment/Plan:  Mika Quintero is a 69 y.o. female with a PMH of anemia, colitis (ischemic vs Crohn's), cirrhosis of unclear etiology who presents with severe abdominal pain for 2 days, FTH acute pancreatitis now c/b septic shock likely from mesenteric ischemia. # acute pancreatitis   - as noted on imaging (with acute peripancreatic collection, necrosis unable to be excluded) and abdominal exam   - etiology unclear at this point, not alcohol, no stones/sludging, no recent hypertriglyceridemia, no offending medications. - IVF   - BISAP on admission 5   - advance diet as tolerated   - close monitoring of respiratory and volume status   - trend BMP + CBC frequently   - do not trend lipase   - would aim for more imaging to further delineate the intra-abdominal process, but there are active discussions w/ the pt, family and ICU team regarding comfort-based measures.     # septic shock  # lactic acidosis  # marked leukocytosis   - likely mesenteric ischemia in the s/o above   - per surgery, poor surgical candidate in the s/o HD instability and cirrhosis   - C discussions    # cryptogenic cirrhosis  MELD 3.0: 32 at 8/25/2023  6:50 AM  MELD-Na: 30 at 8/25/2023  6:50 AM  Calculated from:  Serum Creatinine: 2.61 mg/dL at 8/25/2023  4:49 AM  Serum Sodium: 131 mmol/L at 8/25/2023  4:49 AM  Total Bilirubin: 1.59 mg/dL at 8/25/2023  4:49 AM  Serum Albumin: 2.1 g/dL at 8/25/2023  4:49 AM  INR(ratio): 2.30 at 8/25/2023  6:50 AM  Age at listing (hypothetical): 77 years  Sex: Female at 8/25/2023  6:50 AM   - CP class C   - EV screening status: EGD 05/2023 tiny GEJ varices and mild PHG; NSBB for primary PPx, however currently requiring vasopressors   - Ascites: none; for LVP, give 8-10g 25% albumin per every 1 L removed beyond 5L; check albumin, TP, cell count w/ diff, cytology, Cx + GS   - SBP PPx: if prior SBP, active UGIB, AFTP < 1 (while inpatient), AFTP < 1.5 + impaired renal f(x) (Cr ? 1.2 or BUN ? 25 or Na ? 130 or CP ? 9) OR liver failure (CP ? 9 AND Tbili ? 3)   - HE grade none   - HCC screening: RUQ US and AFP q6mo   - 2g Na restriction, advance diet as tolerated   - HAV/HBV vaccination status: check HAV/HBV immune status   - last Cr: 2.61   - alc abstinence, avoid NSAIDs    History of Present Illness:  Jennifer Montez is a 69 y.o. female with a PMH of nemia, colitis (ischemic vs Crohn's), cirrhosis of unclear etiology who presents with severe abdominal pain for 2 days w/ associated loss of appetite. This is associated w/ chills and generalized weakness. 1 episode of vomiting PTA. Still having watery diarrhea. GI HPI NEGATIVES:  Denies any recent constipation, hematemesis, heartburn, dysphagia, odynophagia, hematochezia, melena, stool incontinence, jaundice, pruritis, weight loss. REVIEW OF SYSTEMS:  Otherwise, pt denies any fevers, lightheadedness, dizziness, CP, SOB, urinary symptoms, numbness/tingling. Past Medical History:   Diagnosis Date   • Anemia    • Atrial fibrillation [facility])    • Depression    • GI (gastrointestinal bleed)    • Hypomagnesemia 4/29/2023   • Ischemic colitis [facility])      Past Surgical History:   Procedure Laterality Date   • APPENDECTOMY     • CARDIAC SURGERY     • CHOLECYSTECTOMY     • COLONOSCOPY  07/13/2023   • FLEXIBLE SIGMOIDOSCOPY  07/12/2023   • HIP SURGERY Right 2022    Partial replacement   • HYSTERECTOMY     • IR BIOPSY TRANSJUGULAR LIVER  05/18/2023     Prior to Admission medications    Medication Sig Start Date End Date Taking?  Authorizing Provider   acetaminophen (TYLENOL) 650 mg CR tablet Take by mouth   Yes Historical Provider, MD   artificial tear (Carmon Rubinstein P.M.) 83-15 % ophthalmic ointment Administer 1 Application to both eyes 2 (two) times a day   Yes Historical Provider, MD   levothyroxine 75 mcg tablet Take 75 mcg by mouth daily   Yes Historical Provider, MD   losartan (COZAAR) 25 mg tablet Take 25 mg by mouth daily Losartan POT 25 mg q am   Yes Historical Provider, MD   metoprolol succinate (TOPROL-XL) 25 mg 24 hr tablet Take 25 mg by mouth 2 (two) times a day   Yes Historical Provider, MD   omeprazole (PriLOSEC) 40 MG capsule Take 40 mg by mouth daily   Yes Historical Provider, MD   potassium chloride (K-DUR,KLOR-CON) 20 mEq tablet Take 1 tablet (20 mEq total) by mouth daily Do not start before May 24, 2023. 23  Yes EBNE Mejía   saccharomyces boulardii (FLORASTOR) 250 mg capsule Take 250 mg by mouth 2 (two) times a day   Yes Historical Provider, MD   traZODone (DESYREL) 50 mg tablet Take 100 mg by mouth daily at bedtime   Yes Historical Provider, MD   bumetanide (BUMEX) 2 mg tablet Take 1 tablet (2 mg total) by mouth daily Do not start before May 24, 2023. 23  EBEN Mejía   ondansetron [facility]) 8 mg tablet As needed 6/3/23   Historical Provider, MD     Allergies   Allergen Reactions   • Ceftaroline GI Intolerance   • Diphenhydramine Hyperactivity   • Influenza Vaccines Other (See Comments) and Vomiting     nausea     Social History     Tobacco Use   • Smoking status: Former     Packs/day: 0.50     Types: Cigarettes     Quit date: 1993     Years since quittin.3   • Smokeless tobacco: Never   Vaping Use   • Vaping Use: Never used   Substance Use Topics   • Alcohol use: Never   • Drug use: Never     Family History   Problem Relation Age of Onset   • No Known Problems Mother        Objective:  BP (!) 86/44   Pulse 88   Temp (!) 97.4 °F (36.3 °C) (Tympanic)   Resp 19   Ht 4' 11" (1.499 m)   Wt 46.5 kg (102 lb 8.2 oz)   SpO2 95%   BMI 20.71 kg/m²     Intake/Output Summary (Last 24 hours) at 2023 1024  Last data filed at 8/25/2023 0600  Gross per 24 hour   Intake 5666.67 ml   Output 225 ml   Net 5441.67 ml     Body mass index is 20.71 kg/m². Physical Exam  Constitutional:       Appearance: She is toxic-appearing. Cardiovascular:      Rate and Rhythm: Normal rate. Pulmonary:      Effort: Pulmonary effort is normal.   Skin:     General: Skin is warm. Neurological:      Mental Status: She is alert and oriented to person, place, and time. Mental status is at baseline. Psychiatric:         Mood and Affect: Mood normal.     Extensive physical exam deferred in these trying times. Labs: I have reviewed all available labs and imaging results in Epic.   Results from last 7 days   Lab Units 08/25/23  0449 08/24/23  1045   SODIUM mmol/L 131* 130*   POTASSIUM mmol/L 4.9 4.4   CHLORIDE mmol/L 101 99   CO2 mmol/L 16* 21   BUN mg/dL 44* 43*   CREATININE mg/dL 2.61* 2.67*   GLUCOSE RANDOM mg/dL 31* 57*   CALCIUM mg/dL 7.4* 7.5*   ALBUMIN g/dL 2.1* 2.0*   ALK PHOS U/L 236* 286*   ALT U/L 12 13   AST U/L 45* 39   EGFR ml/min/1.73sq m 17 16     Results from last 7 days   Lab Units 08/25/23  0650 08/25/23  0449 08/24/23  1045   WBC Thousand/uL  --  25.86* 27.92*   HEMOGLOBIN g/dL  --  7.9* 9.5*   HEMATOCRIT %  --  24.6* 28.2*   PLATELETS Thousands/uL 105* 109* 136*   NEUTROS PCT %  --  85* 86*   LYMPHS PCT %  --  5* 4*   MONOS PCT %  --  9 9   EOS PCT %  --  0 0   BASOS PCT %  --  0 0   NEUTROS ABS Thousands/µL  --  21.89* 23.59*   LYMPHS ABS Thousands/µL  --  1.18 1.22   MONOS ABS Thousand/µL  --  2.44* 2.62*   EOS ABS Thousand/µL  --  0.02 0.04   BASOS ABS Thousands/µL  --  0.05 0.05     Results from last 7 days   Lab Units 08/25/23  0650 08/24/23  1045   PROTIME seconds 25.2* 23.6*   INR  2.30* 2.11*   PTT seconds  --  52*           Additional Labs:  Results from last 7 days   Lab Units 08/25/23  0852 08/25/23  0650 08/25/23  0449 08/24/23  1045   LIPASE u/L  --   --  61 24   LACTIC ACID mmol/L 3.1*  --   --  1.6 MAGNESIUM mg/dL  --  1.6*  --   --    PHOSPHORUS mg/dL  --  4.9*  --   --    AMMONIA umol/L  --   --   --  28                Imaging:  CT abdomen pelvis wo contrast   Final Result by Romina Napoles MD (08/24 1428)      1. Findings suspicious for acute pancreatitis complicated by formation of an acute peripancreatic collection. Cannot exclude a component of necrosis on this noncontrast exam, particularly given the low attenuation of the involved pancreatic tail. This    may be further assessed by contrast-enhanced CT or MRI as renal function allows. 2.  Small bilateral pleural effusions. 3.  Bibasilar opacities may be due to atelectasis but cannot exclude pneumonia. 4.  Cirrhotic appearing liver morphology. The study was marked in [facility] for immediate notification.       Workstation performed: [id number]         XR chest portable    (Results Pending)       Medications:   Current Facility-Administered Medications   Medication Dose Route Frequency Provider Last Rate   • acetaminophen  650 mg Oral Q6H PRN Dexter Chun PA-C     • artificial tear  1 Application Both Eyes BID Dexter Chun PA-C     • aspirin  324 mg Oral Once Dexter Chun PA-C     • HYDROmorphone  0.5 mg Intravenous Q3H PRN Dexter Chun PA-C     • lactated ringers  125 mL/hr Intravenous Continuous Dexter Chun PA-C Stopped (08/25/23 0530)   • levothyroxine  75 mcg Oral Daily Dexter Chun PA-C     • magnesium sulfate  2 g Intravenous Once KIMBERLY DelvalleNP 2 g (08/25/23 0949)   • multi-electrolyte  500 mL Intravenous Once EBEN Ernandez     • norepinephrine  1-30 mcg/min Intravenous Titrated Reche Una, CRNP 8 mcg/min (08/25/23 0800)   • ondansetron  4 mg Intravenous Q6H PRN Dexter Chun PA-C     • pantoprazole  40 mg Oral Early Morning Dexter Chun PA-C     • piperacillin-tazobactam  2.25 g Intravenous Q6H Rosamaria Chapman YOVANI Anderson     • traZODone  100 mg Oral HS Russell Lorenzo PA-C         Problem List:  Patient Active Problem List   Diagnosis   • Acute kidney injury ([address])   • Primary hypertension   • Acquired hypothyroidism   • Irritable bowel syndrome with diarrhea   • Abnormal CT scan   • Superior mesenteric artery stenosis (HCC)   • PAF (paroxysmal atrial fibrillation) (HCC)   • Hx of CABG   • Anemia   • Abnormal CT of liver   • Bilateral lower extremity edema   • Ischemic colitis (HCC)   • Chronic low back pain   • Other chronic pain   • Elevated troponin   • Cirrhosis ([address])   • Acute pancreatitis   • Coronary artery disease   • Septic shock ([address])   • Peritonitis [facility])       Case discussed with attending physician Dr. Shantanu Joseph. All recs are preliminary pending final attestation.     Ezequiel Sanders, PGY-4

## 2023-08-25 NOTE — ASSESSMENT & PLAN NOTE
· Pt home regimen is Metoprolol 25 mg PO bid    Plan:  · Hold home antihypertensives in setting of hypotension and pressor requirements

## 2023-08-25 NOTE — SEPSIS NOTE
Sepsis Note   Kaleb Aguilar 69 y.o. female MRN: [id number]  Unit/Bed#: ICU 10-01 Encounter: [id number]       Initial Sepsis Screening     [address] Name 08/25/23 0600                Is the patient's history suggestive of a new or worsening infection? Yes (Proceed)  -DR        Suspected source of infection acute abdominal infection  -DR        Indicate SIRS criteria Tachycardia > 90 bpm;Leukocytosis (WBC > 94572 IJL) OR Leukopenia (WBC <4000 IJL) OR Bandemia (WBC >10% bands)  -DR        Are two or more of the above signs & symptoms of infection both present and new to the patient? Yes (Proceed)  -DR        Assess for evidence of organ dysfunction: Are any of the below criteria present within 6 hours of suspected infection and SIRS criteria that are NOT considered to be chronic conditions? SBP < 90;MAP < 65;Creatinine > 2.0  -DR        Date of presentation of severe sepsis --        Time of presentation of severe sepsis --        Date of presentation of septic shock 08/25/23  -DR        Time of presentation of septic shock 0600  -DR        Fluid Resuscitation: 30 ml/kg IV fluid bolus will be given based on actual body weight  -DR        Is the patient is persistently hypotensive in the hour after fluid bolus administration? If yes, patient meets criteria for vasopressor use. YES  -DR        Sepsis Note: Click "NEXT" below (NOT "close") to generate sepsis note based on above information. YES (proceed by clicking "NEXT")  -DR              User Key  (r) = Recorded By, (t) = Taken By, (c) = Cosigned By    [address] [place] [address] Name Provider Type    EBEN Baker Nurse Practitioner                    Body mass index is 20.71 kg/m².   Wt Readings from Last 1 Encounters:   08/25/23 46.5 kg (102 lb 8.2 oz)     IBW (Ideal Body Weight): 43.2 kg    Ideal body weight: 48.8 kg (107 lb 8.4 oz)

## 2023-08-25 NOTE — NURSING NOTE
Pt Bp 73/37, pt alert to sternal rub, SLIM made aware albumin and 1L bolus ordered and given, Rpt bp's in 60's new orders for albumin and 1L bolus given, atkins placed.  Level of care changed to critical.

## 2023-08-25 NOTE — ASSESSMENT & PLAN NOTE
· Baseline Cr appears to be 1.1-1.2; Cr on admission was 2.67  · Likely in setting of volume depletion  · Appears pt was following with Nephrology as an outpatient after she had an ALEJANDRA on her previous admission.     Plan:  · Avoid hypotension and nephrotoxins  · IVF  · Discontinued-Patient transitioned to comfort measures  · Trend renal function  · Strict I/O  · Consult Nephrology if persists

## 2023-08-25 NOTE — ASSESSMENT & PLAN NOTE
· Pt with new dx of Cirrhosis this year with unknown etiology  · Pt was on hospice for this diagnosis but revoked it for medical treatment this admission  · Discontinued-Patient transitioned to comfort measures

## 2023-08-25 NOTE — ASSESSMENT & PLAN NOTE
· Baseline Cr appears to be 1.1-1.2; Cr on admission was 2.67  · Likely in setting of volume depletion  · Appears pt was following with Nephrology as an outpatient after she had an ALEJANDRA on her previous admission.     Plan:  · Avoid hypotension and nephrotoxins  · IVF  · Trend renal function  · Strict I/O  · Consult Nephrology if persists

## 2023-08-25 NOTE — PROGRESS NOTES
[address]  Progress Note  Name: Freedom Love  MRN: [id number]  Unit/Bed#: ICU 10-01 I Date of Admission: 8/24/2023   Date of Service: 8/25/2023 I Hospital Day: 1    Assessment/Plan   Peritonitis [facility])  Assessment & Plan  70 yo woman with PMH cirrhosis with recent admission for ischemic colitis returns to the hospital with severe abdominal pain. Work-up in ER with CT suggesting complicated pancreatitis and extensive ambdominal atherosclerosis. Overnight with refractory hypotension/shock requiring Levophed. Reports generalized pain this morning. Lethargic, abdomen with peritonitis. Temp 97.4, HR 88, RR 19, BP 86/44. WBC 25.86, Hgb 7.9, BUN 44, Cr 2.61, INR 2.30. Assessment:  Peritonitis 2/2 ischemic colitis vs complicated pancreatitis. Septic shock. Plan:  Bolivar Castillo has clinically declined within the first 12-24 hours since admission now requiring vasopressors to maintain blood pressure. During my encounter this morning she was able to answer orienting questions including her full name, date of birth, location as "[facility]" and date as 0. In the presence of myself, the critical care AP, and critical care RN Bolivar Castillo stated that she did not want surgery, that she wanted us to allow her to die and be made comfortable. As her prognosis is poor with or without surgery and she has clearly stated her wishes to avoid surgery will plan to continue to provide supportive measures only. Reviewed with Dr. Bhupinder Walker. Critical care team to discuss further with family. Subjective/Objective   Chief Complaint: "let me die"    Subjective: reports severe generalized pain, states that she does not want surgery, states that she would like us to allow her to die and to provide comfort measures    Objective: hypotension overnight    Blood pressure (!) 86/44, pulse 88, temperature (!) 97.4 °F (36.3 °C), temperature source Tympanic, resp.  rate 19, height 4' 11" (1.499 m), weight 46.5 kg (102 lb 8.2 oz), SpO2 95 %. ,Body mass index is 20.71 kg/m². Intake/Output Summary (Last 24 hours) at 8/25/2023 1235  Last data filed at 8/25/2023 1101  Gross per 24 hour   Intake 5839.05 ml   Output 265 ml   Net 5574.05 ml       Invasive Devices     Central Venous Catheter Line  Duration           CVC Central Lines 08/25/23 Triple Right Internal jugular <1 day          Peripheral Intravenous Line  Duration           Peripheral IV 08/24/23 Distal;Left;Ventral (anterior) Forearm 1 day          Drain  Duration           Urethral Catheter 16 Fr. <1 day                Physical Exam  Vitals and nursing note reviewed. Constitutional:       General: She is in acute distress. Appearance: She is well-developed. She is ill-appearing. She is not diaphoretic. Comments: Lethargic, arousable with physical stimuli. In distress due to pain. HENT:      Head: Normocephalic and atraumatic. Eyes:      Conjunctiva/sclera: Conjunctivae normal.   Pulmonary:      Effort: No respiratory distress. Abdominal:      Comments: Mild distention, firm to touch, generalized severe TTP with guarding and peritonitis. Bowel sounds absent. Musculoskeletal:      Cervical back: Normal range of motion. Skin:     General: Skin is warm and dry. Capillary Refill: Capillary refill takes less than 2 seconds. Neurological:      Mental Status: She is alert. Comments: States her full name, date of birth, location as "[facility]", and date as 2023. Psychiatric:      Comments: lethargic           Lab, Imaging and other studies:I have personally reviewed pertinent lab results.     VTE Pharmacologic Prophylaxis: Reason for no pharmacologic prophylaxis per primary  VTE Mechanical Prophylaxis: reason for no mechanical VTE prophylaxis per primary

## 2023-08-25 NOTE — ASSESSMENT & PLAN NOTE
· Pt is not on [facility] therapy as an outpatient    Plan:  · Monitor telemetry  · Discontinued-Patient transitioned to comfort measures

## 2023-08-25 NOTE — ASSESSMENT & PLAN NOTE
· Likely elevated in setting of ALEJANDRA and pancreatitis  · Troponins 951/1,230/1,251  · Pt with no c/o chest pain    Plan:  · Monitor telemetry  · Trend troponins  · Discontinued-Patient transitioned to comfort measures

## 2023-08-25 NOTE — ASSESSMENT & PLAN NOTE
· Pt met septic shock criteria with tachycardia, leukocytosis, ALEJANDRA, and hypotension now requiring vasopressors  · Pt received the 30 ml/kr of IVF; 2 L Isolyte, Albumin 5% 12.5g, and Albumin 25% 12.5g  · Despite IVF resuscitation, pt remained hypotensive and was started on Levophed    Plan:  · Continue Levophed for MAP >65  · Continue Zosyn  · Blood cx pending  · LA pending  · UA negative  · Discontinued-Patient transitioned to comfort measures

## 2023-08-25 NOTE — ASSESSMENT & PLAN NOTE
· Pt with hx of CAD s/p CABG in 6/2022  · In 8/2022, pt underwent additional surgery for MRSA sepsis and sternal wound infection  · Discontinued-Patient transitioned to comfort measures

## 2023-08-25 NOTE — NUTRITION
08/25/23 0905   Biochemical Data,Medical Tests, and Procedures   Biochemical Data/Medical Tests/Procedures Lab values reviewed; Meds reviewed   Labs (Comment) 8/25/23 glucose , Mg 1.6, P 4.9, Na 131, BUN 44, creat 2.61, Ca 7.4, AST 45, total bilirubin 1.59   Meds (Comment) levothyroxine, magnesium sulfate, protonix, LR infusion, norepinephrine   Nutrition-Focused Physical Exam   Nutrition-Focused Physical Exam Findings RN skin assessment reviewed; No edema documented; Wound  (No edema. Wound to sacrum.)   Nutrition-Focused Physical Exam Findings blankets pulled up, NFPE deferred to follow up   Medical-Related Concerns HTN, hypothyroidism, pancreatitis, IBS, BL LE edema   Current PO Intake   Current Diet Order NPO   Estimated calorie intake compared to estimated need Nutrient needs are not met. PES Statement   Energy Balance (1) Predicted suboptimal energy intake NI-1.4   Related to Other (Comment)  (Acute pancreatitis)   As evidenced by: Intake < estimated needs   Recommendations/Interventions   Malnutrition/BMI Present No  (One malnutrition criteria met. Will continue to monitor as hospital course progresses.)   Summary HCT Referral; weight loss, poor PO - MST 5; muscle/adipose loss. Presents from [facility] with abdominal pain. Past medical history significant for HTN, hypothyroidism, pancreatitis, IBS, BL LE edema. Weight history reviewed. Noted significant 9# weight loss in 1 month (8.1% body weight). No edema. Wound to sacrum. NPO at present. Met with pt at bedside. Her eyes open to my voice however pt only grunting/moaning to questions. Unable to obtain nutrition history at this time. Chart utilized for assessment primarily. One malnutrition criteria met. Will continue to monitor as hospital course progresses. When clinically indicated for diet advancement, recommend Lo Fat. Recommend Ensure Plus High Protein once daily to support wound healing. RD to follow. Interventions/Recommendations Diet to advance;Monitor I & O's;Supplement initiate   Education Assessment   Education Patient/caregiver not appropriate for education at this time

## 2023-08-25 NOTE — CONSULTS
[address]  Consult  Name: Farrukh Keller 69 y.o. female I MRN: [id number]  Unit/Bed#: ICU 10-01 I Date of Admission: 8/24/2023   Date of Service: 8/25/2023 I Hospital Day: 1    Consults    Assessment/Plan   * Acute pancreatitis  Assessment & Plan  · Pt presented to the ED with c/o poor oral intake, non-bloody loose stools, generalized weakness, and associated nausea with non-bloody emesis prior to admission. Pt states that the abdominal pain is in the epigastrium area and LLQ.   · (8/24) CT C/A/P: Findings suspicious for acute pancreatitis complicated by formation of an acute peripancreatic collection. Cannot exclude a component of necrosis. Small b/l pleural effusions. Bibasilar opacities in setting of atelectasis vs PNA. Cirrhosis. · Surgery was consulted and pt was presented with the option of surgery if needed for mesenteric ischemia, patient would like to wait 24 hours to see if she improves before deciding if surgery is necessary.  Surgery to re-evaluate and offer recommendations this am.    Plan:  · Serial abdominal exam  · NPO  · IVF  · Zosyn started  · Pain control; Dilaudid 0.5 mg IV q3h prn      Septic shock (HCC)  Assessment & Plan  · Pt met septic shock criteria with tachycardia, leukocytosis, ALEJANDRA, and hypotension now requiring vasopressors  · Pt received the 30 ml/kr of IVF; 2 L Isolyte, Albumin 5% 12.5g, and Albumin 25% 12.5g  · Despite IVF resuscitation, pt remained hypotensive and was started on Levophed    Plan:  · Continue Levophed for MAP >65  · Continue Zosyn  · Blood cx pending  · LA pending  · UA negative    Coronary artery disease  Assessment & Plan  · Pt with hx of CAD s/p CABG in 6/2022  · In 8/2022, pt underwent additional surgery for MRSA sepsis and sternal wound infection    Cirrhosis ([address])  Assessment & Plan  · Pt with new dx of Cirrhosis this year with unknown etiology  · Pt was on hospice for this diagnosis but revoked it for medical treatment this admission        Elevated troponin  Assessment & Plan  · Likely elevated in setting of ALEJANDRA and pancreatitis  · Troponins 951/1,230/1,251  · Pt with no c/o chest pain    Plan:  · Monitor telemetry  · Trend troponins        PAF (paroxysmal atrial fibrillation) (HCC)  Assessment & Plan  · Pt is not on [facility] therapy as an outpatient    Plan:  · Monitor telemetry    Acquired hypothyroidism  Assessment & Plan  Plan:  · Continue home Synthroid dose    Primary hypertension  Assessment & Plan  · Pt home regimen is Metoprolol 25 mg PO bid    Plan:  · Hold home antihypertensives in setting of hypotension and pressor requirements      Acute kidney injury ([address])  Assessment & Plan  · Baseline Cr appears to be 1.1-1.2; Cr on admission was 2.67  · Likely in setting of volume depletion  · Appears pt was following with Nephrology as an outpatient after she had an ALEJANDRA on her previous admission. Plan:  · Avoid hypotension and nephrotoxins  · IVF  · Trend renal function  · Strict I/O  · Consult Nephrology if persists           History of Present Illness     · HPI: Gunnar Knox is a 69 y.o. who presents with c/o poor oral intake, non-bloody loose stools, generalized weakness, and associated nausea with non-bloody emesis prior to admission. Pt states that the abdominal pain is in the epigastrium area and LLQ. CT C/A/P was suspicious for acute pancreatitis complicated by formation of an acute peripancreatic collection. Cannot exclude a component of necrosis. Small b/l pleural effusions. Bibasilar opacities in setting of atelectasis vs PNA. Cirrhosis. Surgery was consulted and pt was presented with the option of surgery if needed for mesenteric ischemia, patient would like to wait 24 hours to see if she improves before deciding if surgery is necessary. Surgery to re-evaluate and offer recommendations this am. Overnight, pt became lethargic and hypotensive with sbp in the 50's-60's despite IVF resuscitation.  Pt was also noted to be hypoglycemic with a BS of 31; given 1.5 Amp of Dextrose with improvement. CC attending was contacted and pt was upgraded to CC. RIJ TLC was placed and Levophed was started. Pt met septic shock criteria. Blood cx pending, LA pending. History obtained from sibling and chart review. Review of Systems Review of Systems - unable to attain due to mental status  Historical Information   Past Medical History:  No date: Anemia  No date: Atrial fibrillation ([address])  No date: Depression  No date: GI (gastrointestinal bleed)  2023: Hypomagnesemia  No date: Ischemic colitis [facility]) Past Surgical History:  No date: APPENDECTOMY  No date: CARDIAC SURGERY  No date: CHOLECYSTECTOMY  2023: COLONOSCOPY  2023: FLEXIBLE SIGMOIDOSCOPY  : HIP SURGERY;  Right      Comment:  Partial replacement  No date: HYSTERECTOMY  2023: IR BIOPSY TRANSJUGULAR LIVER   Current Outpatient Medications   Medication Instructions   • acetaminophen (TYLENOL) 650 mg CR tablet Oral   • artificial tear (LUBRIFRESH P.M.) 83-15 % ophthalmic ointment 1 Application, Both Eyes, 2 times daily   • bumetanide (BUMEX) 2 mg, Oral, Daily   • levothyroxine 75 mcg, Oral, Daily   • losartan (COZAAR) 25 mg, Oral, Daily, Losartan POT 25 mg q am   • metoprolol succinate (TOPROL-XL) 25 mg, Oral, 2 times daily   • omeprazole (PRILOSEC) 40 mg, Oral, Daily   • ondansetron (ZOFRAN) 8 mg tablet As needed   • potassium chloride (K-DUR,KLOR-CON) 20 mEq tablet 20 mEq, Oral, Daily   • saccharomyces boulardii (FLORASTOR) 250 mg, Oral, 2 times daily   • traZODone (DESYREL) 100 mg, Oral, Daily at bedtime    Allergies   Allergen Reactions   • Ceftaroline GI Intolerance   • Diphenhydramine Hyperactivity   • Influenza Vaccines Other (See Comments) and Vomiting     nausea      Social History     Tobacco Use   • Smoking status: Former     Packs/day: 0.50     Types: Cigarettes     Quit date: 1993     Years since quittin.3   • Smokeless tobacco: Never   Vaping Use   • Vaping Use: Never used   Substance Use Topics   • Alcohol use: Never   • Drug use: Never    Family History   Problem Relation Age of Onset   • No Known Problems Mother         Objective                            Vitals I/O      Most Recent Min/Max in 24hrs   Temp (!) 97.4 °F (36.3 °C) Temp  Min: 97 °F (36.1 °C)  Max: 97.6 °F (36.4 °C)   Pulse 88 Pulse  Min: 81  Max: 92   Resp 19 Resp  Min: 10  Max: 19   BP (!) 86/44 BP  Min: 73/37  Max: 99/46   O2 Sat 95 % SpO2  Min: 93 %  Max: 97 %      Intake/Output Summary (Last 24 hours) at 8/25/2023 2577  Last data filed at 8/25/2023 0600  Gross per 24 hour   Intake 5666.67 ml   Output 225 ml   Net 5441.67 ml         Diet NPO; Sips with meds     Invasive Monitoring Physical exam      Physical Exam  Eyes:      Pupils: Pupils are equal, round, and reactive to light. Skin:     General: Skin is cool and dry. Coloration: Skin is jaundiced. HENT:      Head: Normocephalic. Mouth/Throat:      Mouth: Mucous membranes are dry. Cardiovascular:      Rate and Rhythm: Normal rate and regular rhythm. Pulses: Decreased pulses. Heart sounds: Normal heart sounds. Abdominal:      General: Bowel sounds are normal.   Constitutional:       Appearance: She is underweight. She is ill-appearing. Interventions: Nasal cannula in place. Pulmonary:      Effort: No respiratory distress. Breath sounds: Decreased breath sounds present. Neurological:      Mental Status: She is lethargic and somnolent. Vitals and nursing note reviewed. Diagnostic Studies      EKG: NSR  Imaging:  I have personally reviewed pertinent reports.    and I have personally reviewed pertinent films in PACS     Medications:  Scheduled PRN   artificial tear, 1 Application, BID  aspirin, 324 mg, Once  levothyroxine, 75 mcg, Daily  pantoprazole, 40 mg, Early Morning  piperacillin-tazobactam, 2.25 g, Q6H  potassium chloride, 20 mEq, Daily  traZODone, 100 mg, HS acetaminophen, 650 mg, Q6H PRN  HYDROmorphone, 0.5 mg, Q3H PRN  ondansetron, 4 mg, Q6H PRN       Continuous    lactated ringers, 125 mL/hr, Last Rate: Stopped (08/25/23 0530)  norepinephrine, 1-30 mcg/min, Last Rate: 6 mcg/min (08/25/23 0755)         Labs:    CBC    Recent Labs     08/24/23  1045 08/25/23  0449 08/25/23  0650   WBC 27.92* 25.86*  --    HGB 9.5* 7.9*  --    HCT 28.2* 24.6*  --    * 109* 105*     BMP    Recent Labs     08/24/23  1045 08/25/23  0449   SODIUM 130* 131*   K 4.4 4.9   CL 99 101   CO2 21 16*   AGAP 10 14   BUN 43* 44*   CREATININE 2.67* 2.61*   CALCIUM 7.5* 7.4*       Coags    Recent Labs     08/24/23  1045 08/25/23  0650   INR 2.11* 2.30*   PTT 52*  --         Additional Electrolytes  Recent Labs     08/25/23  0650   MG 1.6*   PHOS 4.9*   CAIONIZED 0.97*          Blood Gas    No recent results  Recent Labs     08/25/23  0650   PHVEN 7.373   ITR0FRB 33.8*   PO2VEN 36.2   YXT8BUK 19.2*   BEVEN -5.4    LFTs  Recent Labs     08/24/23  1045 08/25/23  0449   ALT 13 12   AST 39 45*   ALKPHOS 286* 236*   ALB 2.0* 2.1*   TBILI 1.83* 1.59*       Infectious  No recent results  Glucose  Recent Labs     08/24/23  1045 08/25/23  0449   GLUC 57* 31*             Critical Care Time Delivered: Upon my evaluation, this patient had a high probability of imminent or life-threatening deterioration due to Septic Shock, which required my direct attention, intervention, and personal management. I have personally provided 120 minutes of critical care time, exclusive of procedures, teaching, family meetings, and any prior time recorded by providers other than myself.    Sybil Spurling, CRNP

## 2023-08-25 NOTE — ASSESSMENT & PLAN NOTE
· Pt met septic shock criteria with tachycardia, leukocytosis, ALEJANDRA, and hypotension now requiring vasopressors  · Pt received the 30 ml/kr of IVF; 2 L Isolyte, Albumin 5% 12.5g, and Albumin 25% 12.5g  · Despite IVF resuscitation, pt remained hypotensive and was started on Levophed    Plan:  · Continue Levophed for MAP >65  · Continue Zosyn  · Blood cx pending  · LA pending  · UA negative

## 2023-08-25 NOTE — CASE MANAGEMENT
Case Management Progress Note    Patient name Doris Trujillo  Location ICU 10/ICU 10-01 MRN [id number]  : 1945 Date 2023       LOS (days): 1  Geometric Mean LOS (GMLOS) (days): 3.10  Days to [facility]        OBJECTIVE:    Current admission status: Inpatient  Preferred Pharmacy:   [address] [place] [address] [address]  [address] [place] [address]  Phone: [phone] Fax: [phone]    Primary Care Provider: Lux Ariza MD    Primary Insurance: MEDICARE  Secondary Insurance: Sylvia Lloyd    PROGRESS NOTE:  Pt sister Erlin Ply  at the bedside. AS per sister the brother Donna Sosa is the POA and will be flying here from [place] tomorrow. Pt was with [facility] [place], signed off to come to the hospital.  As per sister the pt expressed she wanted to be kept comfortable. Comfort measures started.

## 2023-08-25 NOTE — ASSESSMENT & PLAN NOTE
· Pt home regimen is Metoprolol 25 mg PO bid    Plan:  · Hold home antihypertensives in setting of hypotension and pressor requirements  · Discontinued-Patient transitioned to comfort measures

## 2023-08-25 NOTE — ASSESSMENT & PLAN NOTE
70 yo woman with PMH cirrhosis with recent admission for ischemic colitis returns to the hospital with severe abdominal pain. Work-up in ER with CT suggesting complicated pancreatitis and extensive ambdominal atherosclerosis. Overnight with refractory hypotension/shock requiring Levophed. Reports generalized pain this morning. Lethargic, abdomen with peritonitis. Temp 97.4, HR 88, RR 19, BP 86/44. WBC 25.86, Hgb 7.9, BUN 44, Cr 2.61, INR 2.30. Assessment:  Peritonitis 2/2 ischemic colitis vs complicated pancreatitis. Septic shock. Plan:  Conor Cherry has clinically declined within the first 12-24 hours since admission now requiring vasopressors to maintain blood pressure. During my encounter this morning she was able to answer orienting questions including her full name, date of birth, location as "[facility]" and date as 0. In the presence of myself, the critical care AP, and critical care RN Conor Cherry stated that she did not want surgery, that she wanted us to allow her to die and be made comfortable. As her prognosis is poor with or without surgery and she has clearly stated her wishes to avoid surgery will plan to continue to provide supportive measures only. Reviewed with Dr. Oksana Boyle. Critical care team to discuss further with family.

## 2023-08-26 LAB
MRSA NOSE QL CULT: ABNORMAL
MRSA NOSE QL CULT: ABNORMAL

## 2023-08-27 LAB
BACTERIA BLD CULT: NORMAL
BACTERIA BLD CULT: NORMAL

## 2023-08-29 LAB
BACTERIA BLD CULT: NORMAL
BACTERIA BLD CULT: NORMAL
